# Patient Record
Sex: FEMALE | Race: WHITE | NOT HISPANIC OR LATINO
[De-identification: names, ages, dates, MRNs, and addresses within clinical notes are randomized per-mention and may not be internally consistent; named-entity substitution may affect disease eponyms.]

---

## 2020-01-01 ENCOUNTER — TRANSCRIPTION ENCOUNTER (OUTPATIENT)
Age: 70
End: 2020-01-01

## 2020-01-01 ENCOUNTER — APPOINTMENT (OUTPATIENT)
Dept: HEMATOLOGY ONCOLOGY | Facility: CLINIC | Age: 70
End: 2020-01-01

## 2020-01-01 ENCOUNTER — INPATIENT (INPATIENT)
Facility: HOSPITAL | Age: 70
LOS: 10 days | Discharge: NURSING, REHAB OR LONG-TERM CARE | End: 2020-09-23
Attending: STUDENT IN AN ORGANIZED HEALTH CARE EDUCATION/TRAINING PROGRAM | Admitting: STUDENT IN AN ORGANIZED HEALTH CARE EDUCATION/TRAINING PROGRAM
Payer: MEDICARE

## 2020-01-01 ENCOUNTER — APPOINTMENT (OUTPATIENT)
Dept: NUCLEAR MEDICINE | Facility: IMAGING CENTER | Age: 70
End: 2020-01-01

## 2020-01-01 ENCOUNTER — APPOINTMENT (OUTPATIENT)
Dept: INFUSION THERAPY | Facility: HOSPITAL | Age: 70
End: 2020-01-01

## 2020-01-01 ENCOUNTER — RESULT REVIEW (OUTPATIENT)
Age: 70
End: 2020-01-01

## 2020-01-01 ENCOUNTER — APPOINTMENT (OUTPATIENT)
Dept: ORTHOPEDIC SURGERY | Facility: CLINIC | Age: 70
End: 2020-01-01
Payer: MEDICARE

## 2020-01-01 ENCOUNTER — INPATIENT (INPATIENT)
Facility: HOSPITAL | Age: 70
LOS: 12 days | Discharge: HOME HEALTH | End: 2020-09-04
Attending: STUDENT IN AN ORGANIZED HEALTH CARE EDUCATION/TRAINING PROGRAM | Admitting: STUDENT IN AN ORGANIZED HEALTH CARE EDUCATION/TRAINING PROGRAM
Payer: MEDICARE

## 2020-01-01 ENCOUNTER — INPATIENT (INPATIENT)
Facility: HOSPITAL | Age: 70
LOS: 12 days | End: 2020-11-28
Attending: STUDENT IN AN ORGANIZED HEALTH CARE EDUCATION/TRAINING PROGRAM | Admitting: STUDENT IN AN ORGANIZED HEALTH CARE EDUCATION/TRAINING PROGRAM
Payer: MEDICARE

## 2020-01-01 ENCOUNTER — EMERGENCY (EMERGENCY)
Facility: HOSPITAL | Age: 70
LOS: 1 days | Discharge: HOME | End: 2020-01-01
Attending: STUDENT IN AN ORGANIZED HEALTH CARE EDUCATION/TRAINING PROGRAM | Admitting: STUDENT IN AN ORGANIZED HEALTH CARE EDUCATION/TRAINING PROGRAM
Payer: MEDICARE

## 2020-01-01 ENCOUNTER — OUTPATIENT (OUTPATIENT)
Dept: OUTPATIENT SERVICES | Facility: HOSPITAL | Age: 70
LOS: 1 days | Discharge: HOME | End: 2020-01-01

## 2020-01-01 ENCOUNTER — INPATIENT (INPATIENT)
Facility: HOSPITAL | Age: 70
LOS: 24 days | Discharge: NURSING, REHAB OR LONG-TERM CARE | End: 2020-11-04
Attending: HOSPITALIST | Admitting: HOSPITALIST
Payer: MEDICARE

## 2020-01-01 ENCOUNTER — OUTPATIENT (OUTPATIENT)
Dept: OUTPATIENT SERVICES | Facility: HOSPITAL | Age: 70
LOS: 1 days | End: 2020-01-01
Payer: COMMERCIAL

## 2020-01-01 ENCOUNTER — APPOINTMENT (OUTPATIENT)
Dept: HEMATOLOGY ONCOLOGY | Facility: CLINIC | Age: 70
End: 2020-01-01
Payer: MEDICARE

## 2020-01-01 VITALS
SYSTOLIC BLOOD PRESSURE: 159 MMHG | DIASTOLIC BLOOD PRESSURE: 85 MMHG | RESPIRATION RATE: 18 BRPM | TEMPERATURE: 98 F | HEART RATE: 82 BPM | OXYGEN SATURATION: 100 %

## 2020-01-01 VITALS
RESPIRATION RATE: 14 BRPM | BODY MASS INDEX: 20.77 KG/M2 | TEMPERATURE: 99.9 F | SYSTOLIC BLOOD PRESSURE: 107 MMHG | HEART RATE: 94 BPM | DIASTOLIC BLOOD PRESSURE: 70 MMHG | WEIGHT: 110 LBS | OXYGEN SATURATION: 95 % | HEIGHT: 61 IN

## 2020-01-01 VITALS
DIASTOLIC BLOOD PRESSURE: 66 MMHG | RESPIRATION RATE: 16 BRPM | HEART RATE: 102 BPM | TEMPERATURE: 98 F | OXYGEN SATURATION: 95 % | SYSTOLIC BLOOD PRESSURE: 105 MMHG

## 2020-01-01 VITALS
RESPIRATION RATE: 21 BRPM | SYSTOLIC BLOOD PRESSURE: 79 MMHG | DIASTOLIC BLOOD PRESSURE: 52 MMHG | HEART RATE: 111 BPM | TEMPERATURE: 98 F | OXYGEN SATURATION: 100 %

## 2020-01-01 VITALS
OXYGEN SATURATION: 100 % | TEMPERATURE: 98 F | SYSTOLIC BLOOD PRESSURE: 74 MMHG | HEART RATE: 93 BPM | RESPIRATION RATE: 18 BRPM | DIASTOLIC BLOOD PRESSURE: 44 MMHG | HEIGHT: 61 IN

## 2020-01-01 VITALS
BODY MASS INDEX: 20.42 KG/M2 | WEIGHT: 104 LBS | HEIGHT: 60 IN | HEART RATE: 85 BPM | TEMPERATURE: 97.2 F | SYSTOLIC BLOOD PRESSURE: 96 MMHG | DIASTOLIC BLOOD PRESSURE: 64 MMHG

## 2020-01-01 VITALS
OXYGEN SATURATION: 100 % | DIASTOLIC BLOOD PRESSURE: 68 MMHG | SYSTOLIC BLOOD PRESSURE: 125 MMHG | RESPIRATION RATE: 16 BRPM | TEMPERATURE: 99 F | HEART RATE: 72 BPM

## 2020-01-01 VITALS
SYSTOLIC BLOOD PRESSURE: 107 MMHG | TEMPERATURE: 98 F | RESPIRATION RATE: 18 BRPM | OXYGEN SATURATION: 96 % | DIASTOLIC BLOOD PRESSURE: 68 MMHG | HEART RATE: 75 BPM

## 2020-01-01 VITALS
DIASTOLIC BLOOD PRESSURE: 57 MMHG | HEART RATE: 82 BPM | TEMPERATURE: 99 F | SYSTOLIC BLOOD PRESSURE: 101 MMHG | RESPIRATION RATE: 18 BRPM | OXYGEN SATURATION: 100 %

## 2020-01-01 VITALS
WEIGHT: 108.03 LBS | HEART RATE: 74 BPM | SYSTOLIC BLOOD PRESSURE: 100 MMHG | RESPIRATION RATE: 16 BRPM | HEIGHT: 60 IN | TEMPERATURE: 98 F | DIASTOLIC BLOOD PRESSURE: 60 MMHG | OXYGEN SATURATION: 100 %

## 2020-01-01 VITALS
RESPIRATION RATE: 18 BRPM | OXYGEN SATURATION: 100 % | HEART RATE: 96 BPM | SYSTOLIC BLOOD PRESSURE: 85 MMHG | DIASTOLIC BLOOD PRESSURE: 56 MMHG

## 2020-01-01 DIAGNOSIS — Z29.9 ENCOUNTER FOR PROPHYLACTIC MEASURES, UNSPECIFIED: ICD-10-CM

## 2020-01-01 DIAGNOSIS — I48.91 UNSPECIFIED ATRIAL FIBRILLATION: ICD-10-CM

## 2020-01-01 DIAGNOSIS — Z51.5 ENCOUNTER FOR PALLIATIVE CARE: ICD-10-CM

## 2020-01-01 DIAGNOSIS — C34.90 MALIGNANT NEOPLASM OF UNSPECIFIED PART OF UNSPECIFIED BRONCHUS OR LUNG: ICD-10-CM

## 2020-01-01 DIAGNOSIS — R33.9 RETENTION OF URINE, UNSPECIFIED: ICD-10-CM

## 2020-01-01 DIAGNOSIS — M89.8X5 OTHER SPECIFIED DISORDERS OF BONE, THIGH: ICD-10-CM

## 2020-01-01 DIAGNOSIS — J18.9 PNEUMONIA, UNSPECIFIED ORGANISM: ICD-10-CM

## 2020-01-01 DIAGNOSIS — C79.9 SECONDARY MALIGNANT NEOPLASM OF UNSPECIFIED SITE: ICD-10-CM

## 2020-01-01 DIAGNOSIS — D64.9 ANEMIA, UNSPECIFIED: ICD-10-CM

## 2020-01-01 DIAGNOSIS — Z02.9 ENCOUNTER FOR ADMINISTRATIVE EXAMINATIONS, UNSPECIFIED: ICD-10-CM

## 2020-01-01 DIAGNOSIS — Z78.9 OTHER SPECIFIED HEALTH STATUS: ICD-10-CM

## 2020-01-01 DIAGNOSIS — C34.11 MALIGNANT NEOPLASM OF UPPER LOBE, RIGHT BRONCHUS OR LUNG: ICD-10-CM

## 2020-01-01 DIAGNOSIS — Z01.818 ENCOUNTER FOR OTHER PREPROCEDURAL EXAMINATION: ICD-10-CM

## 2020-01-01 DIAGNOSIS — E44.0 MODERATE PROTEIN-CALORIE MALNUTRITION: ICD-10-CM

## 2020-01-01 DIAGNOSIS — S72.001A FRACTURE OF UNSPECIFIED PART OF NECK OF RIGHT FEMUR, INITIAL ENCOUNTER FOR CLOSED FRACTURE: ICD-10-CM

## 2020-01-01 DIAGNOSIS — R53.81 OTHER MALAISE: ICD-10-CM

## 2020-01-01 DIAGNOSIS — A41.9 SEPSIS, UNSPECIFIED ORGANISM: ICD-10-CM

## 2020-01-01 DIAGNOSIS — C80.1 MALIGNANT (PRIMARY) NEOPLASM, UNSPECIFIED: ICD-10-CM

## 2020-01-01 DIAGNOSIS — G89.3 NEOPLASM RELATED PAIN (ACUTE) (CHRONIC): ICD-10-CM

## 2020-01-01 DIAGNOSIS — M54.5 LOW BACK PAIN: ICD-10-CM

## 2020-01-01 DIAGNOSIS — Z71.89 OTHER SPECIFIED COUNSELING: ICD-10-CM

## 2020-01-01 DIAGNOSIS — Z80.0 FAMILY HISTORY OF MALIGNANT NEOPLASM OF DIGESTIVE ORGANS: ICD-10-CM

## 2020-01-01 DIAGNOSIS — R91.8 OTHER NONSPECIFIC ABNORMAL FINDING OF LUNG FIELD: ICD-10-CM

## 2020-01-01 DIAGNOSIS — N32.1 VESICOINTESTINAL FISTULA: ICD-10-CM

## 2020-01-01 DIAGNOSIS — C79.51 SECONDARY MALIGNANT NEOPLASM OF BONE: ICD-10-CM

## 2020-01-01 DIAGNOSIS — K52.9 NONINFECTIVE GASTROENTERITIS AND COLITIS, UNSPECIFIED: ICD-10-CM

## 2020-01-01 DIAGNOSIS — J15.9 UNSPECIFIED BACTERIAL PNEUMONIA: ICD-10-CM

## 2020-01-01 DIAGNOSIS — T66.XXXA RADIATION SICKNESS, UNSPECIFIED, INITIAL ENCOUNTER: ICD-10-CM

## 2020-01-01 DIAGNOSIS — I31.4 CARDIAC TAMPONADE: ICD-10-CM

## 2020-01-01 DIAGNOSIS — R52 PAIN, UNSPECIFIED: ICD-10-CM

## 2020-01-01 DIAGNOSIS — A04.72 ENTEROCOLITIS DUE TO CLOSTRIDIUM DIFFICILE, NOT SPECIFIED AS RECURRENT: ICD-10-CM

## 2020-01-01 DIAGNOSIS — R11.0 NAUSEA: ICD-10-CM

## 2020-01-01 DIAGNOSIS — R06.02 SHORTNESS OF BREATH: ICD-10-CM

## 2020-01-01 DIAGNOSIS — K59.03 DRUG INDUCED CONSTIPATION: ICD-10-CM

## 2020-01-01 DIAGNOSIS — Z80.49 FAMILY HISTORY OF MALIGNANT NEOPLASM OF OTHER GENITAL ORGANS: ICD-10-CM

## 2020-01-01 DIAGNOSIS — I82.409 ACUTE EMBOLISM AND THROMBOSIS OF UNSPECIFIED DEEP VEINS OF UNSPECIFIED LOWER EXTREMITY: ICD-10-CM

## 2020-01-01 DIAGNOSIS — K59.01 SLOW TRANSIT CONSTIPATION: ICD-10-CM

## 2020-01-01 DIAGNOSIS — F17.200 NICOTINE DEPENDENCE, UNSPECIFIED, UNCOMPLICATED: ICD-10-CM

## 2020-01-01 DIAGNOSIS — E87.1 HYPO-OSMOLALITY AND HYPONATREMIA: ICD-10-CM

## 2020-01-01 LAB
-  AMPICILLIN: SIGNIFICANT CHANGE UP
-  CIPROFLOXACIN: SIGNIFICANT CHANGE UP
-  DAPTOMYCIN: SIGNIFICANT CHANGE UP
-  LEVOFLOXACIN: SIGNIFICANT CHANGE UP
-  LINEZOLID: SIGNIFICANT CHANGE UP
-  NITROFURANTOIN: SIGNIFICANT CHANGE UP
-  TETRACYCLINE: SIGNIFICANT CHANGE UP
-  VANCOMYCIN: SIGNIFICANT CHANGE UP
24R-OH-CALCIDIOL SERPL-MCNC: 43.5 PG/ML
25(OH)D3 SERPL-MCNC: 39.4 NG/ML
ALBUMIN SERPL ELPH-MCNC: 1.5 G/DL — LOW (ref 3.3–5)
ALBUMIN SERPL ELPH-MCNC: 1.7 G/DL — LOW (ref 3.3–5)
ALBUMIN SERPL ELPH-MCNC: 1.8 G/DL — LOW (ref 3.3–5)
ALBUMIN SERPL ELPH-MCNC: 1.9 G/DL — LOW (ref 3.3–5)
ALBUMIN SERPL ELPH-MCNC: 2 G/DL — LOW (ref 3.3–5)
ALBUMIN SERPL ELPH-MCNC: 2.1 G/DL — LOW (ref 3.3–5)
ALBUMIN SERPL ELPH-MCNC: 2.3 G/DL — LOW (ref 3.3–5)
ALBUMIN SERPL ELPH-MCNC: 2.3 G/DL — LOW (ref 3.3–5)
ALBUMIN SERPL ELPH-MCNC: 2.5 G/DL — LOW (ref 3.3–5)
ALBUMIN SERPL ELPH-MCNC: 2.5 G/DL — LOW (ref 3.3–5)
ALBUMIN SERPL ELPH-MCNC: 2.6 G/DL — LOW (ref 3.3–5)
ALBUMIN SERPL ELPH-MCNC: 2.7 G/DL — LOW (ref 3.3–5)
ALBUMIN SERPL ELPH-MCNC: 2.9 G/DL — LOW (ref 3.3–5)
ALBUMIN SERPL ELPH-MCNC: 3.1 G/DL — LOW (ref 3.3–5)
ALBUMIN SERPL ELPH-MCNC: 3.5 G/DL — SIGNIFICANT CHANGE UP (ref 3.3–5)
ALBUMIN SERPL ELPH-MCNC: 3.8 G/DL
ALBUMIN SERPL ELPH-MCNC: 3.9 G/DL — SIGNIFICANT CHANGE UP (ref 3.3–5)
ALBUMIN SERPL ELPH-MCNC: 4.2 G/DL — SIGNIFICANT CHANGE UP (ref 3.3–5)
ALP BLD-CCNC: 88 U/L
ALP SERPL-CCNC: 105 U/L — SIGNIFICANT CHANGE UP (ref 40–120)
ALP SERPL-CCNC: 110 U/L — SIGNIFICANT CHANGE UP (ref 40–120)
ALP SERPL-CCNC: 123 U/L — HIGH (ref 40–120)
ALP SERPL-CCNC: 124 U/L — HIGH (ref 40–120)
ALP SERPL-CCNC: 132 U/L — HIGH (ref 40–120)
ALP SERPL-CCNC: 137 U/L — HIGH (ref 40–120)
ALP SERPL-CCNC: 143 U/L — HIGH (ref 40–120)
ALP SERPL-CCNC: 154 U/L — HIGH (ref 40–120)
ALP SERPL-CCNC: 156 U/L — HIGH (ref 40–120)
ALP SERPL-CCNC: 167 U/L — HIGH (ref 40–120)
ALP SERPL-CCNC: 170 U/L — HIGH (ref 40–120)
ALP SERPL-CCNC: 183 U/L — HIGH (ref 40–120)
ALP SERPL-CCNC: 186 U/L — HIGH (ref 40–120)
ALP SERPL-CCNC: 189 U/L — HIGH (ref 40–120)
ALP SERPL-CCNC: 192 U/L — HIGH (ref 40–120)
ALP SERPL-CCNC: 193 U/L — HIGH (ref 40–120)
ALP SERPL-CCNC: 194 U/L — HIGH (ref 40–120)
ALP SERPL-CCNC: 195 U/L — HIGH (ref 40–120)
ALP SERPL-CCNC: 195 U/L — HIGH (ref 40–120)
ALP SERPL-CCNC: 196 U/L — HIGH (ref 40–120)
ALP SERPL-CCNC: 197 U/L — HIGH (ref 40–120)
ALP SERPL-CCNC: 203 U/L — HIGH (ref 40–120)
ALP SERPL-CCNC: 203 U/L — HIGH (ref 40–120)
ALP SERPL-CCNC: 220 U/L — HIGH (ref 40–120)
ALP SERPL-CCNC: 44 U/L — SIGNIFICANT CHANGE UP (ref 40–120)
ALP SERPL-CCNC: 73 U/L — SIGNIFICANT CHANGE UP (ref 40–120)
ALP SERPL-CCNC: 77 U/L — SIGNIFICANT CHANGE UP (ref 40–120)
ALP SERPL-CCNC: 87 U/L — SIGNIFICANT CHANGE UP (ref 40–120)
ALP SERPL-CCNC: 88 U/L — SIGNIFICANT CHANGE UP (ref 40–120)
ALP SERPL-CCNC: 95 U/L — SIGNIFICANT CHANGE UP (ref 40–120)
ALP SERPL-CCNC: 95 U/L — SIGNIFICANT CHANGE UP (ref 40–120)
ALP SERPL-CCNC: 97 U/L — SIGNIFICANT CHANGE UP (ref 40–120)
ALT FLD-CCNC: 13 U/L — SIGNIFICANT CHANGE UP (ref 4–33)
ALT FLD-CCNC: 14 U/L — SIGNIFICANT CHANGE UP (ref 4–33)
ALT FLD-CCNC: 15 U/L — SIGNIFICANT CHANGE UP (ref 4–33)
ALT FLD-CCNC: 15 U/L — SIGNIFICANT CHANGE UP (ref 4–33)
ALT FLD-CCNC: 16 U/L — SIGNIFICANT CHANGE UP (ref 4–33)
ALT FLD-CCNC: 17 U/L — SIGNIFICANT CHANGE UP (ref 4–33)
ALT FLD-CCNC: 18 U/L — SIGNIFICANT CHANGE UP (ref 4–33)
ALT FLD-CCNC: 19 U/L — SIGNIFICANT CHANGE UP (ref 4–33)
ALT FLD-CCNC: 20 U/L — SIGNIFICANT CHANGE UP (ref 4–33)
ALT FLD-CCNC: 23 U/L — SIGNIFICANT CHANGE UP (ref 4–33)
ALT FLD-CCNC: 26 U/L — SIGNIFICANT CHANGE UP (ref 4–33)
ALT FLD-CCNC: 26 U/L — SIGNIFICANT CHANGE UP (ref 4–33)
ALT FLD-CCNC: 27 U/L — SIGNIFICANT CHANGE UP (ref 4–33)
ALT FLD-CCNC: 27 U/L — SIGNIFICANT CHANGE UP (ref 4–33)
ALT FLD-CCNC: 29 U/L — SIGNIFICANT CHANGE UP (ref 4–33)
ALT FLD-CCNC: 30 U/L — SIGNIFICANT CHANGE UP (ref 4–33)
ALT FLD-CCNC: 31 U/L — SIGNIFICANT CHANGE UP (ref 4–33)
ALT FLD-CCNC: 32 U/L — SIGNIFICANT CHANGE UP (ref 4–33)
ALT FLD-CCNC: 35 U/L — HIGH (ref 4–33)
ALT FLD-CCNC: 36 U/L — HIGH (ref 4–33)
ALT FLD-CCNC: 38 U/L — HIGH (ref 4–33)
ALT FLD-CCNC: 9 U/L — SIGNIFICANT CHANGE UP (ref 4–33)
ALT FLD-CCNC: 9 U/L — SIGNIFICANT CHANGE UP (ref 4–33)
ALT SERPL-CCNC: 19 U/L
ANION GAP SERPL CALC-SCNC: 10 MMO/L — SIGNIFICANT CHANGE UP (ref 7–14)
ANION GAP SERPL CALC-SCNC: 11 MMO/L — SIGNIFICANT CHANGE UP (ref 7–14)
ANION GAP SERPL CALC-SCNC: 12 MMO/L — SIGNIFICANT CHANGE UP (ref 7–14)
ANION GAP SERPL CALC-SCNC: 13 MMO/L — SIGNIFICANT CHANGE UP (ref 7–14)
ANION GAP SERPL CALC-SCNC: 14 MMO/L — SIGNIFICANT CHANGE UP (ref 7–14)
ANION GAP SERPL CALC-SCNC: 15 MMO/L — HIGH (ref 7–14)
ANION GAP SERPL CALC-SCNC: 16 MMO/L — HIGH (ref 7–14)
ANION GAP SERPL CALC-SCNC: 16 MMO/L — HIGH (ref 7–14)
ANION GAP SERPL CALC-SCNC: 16 MMOL/L
ANION GAP SERPL CALC-SCNC: 17 MMO/L — HIGH (ref 7–14)
ANION GAP SERPL CALC-SCNC: 19 MMO/L — HIGH (ref 7–14)
ANION GAP SERPL CALC-SCNC: 23 MMO/L — HIGH (ref 7–14)
ANION GAP SERPL CALC-SCNC: 7 MMO/L — SIGNIFICANT CHANGE UP (ref 7–14)
ANION GAP SERPL CALC-SCNC: 8 MMO/L — SIGNIFICANT CHANGE UP (ref 7–14)
ANION GAP SERPL CALC-SCNC: 9 MMO/L — SIGNIFICANT CHANGE UP (ref 7–14)
ANISOCYTOSIS BLD QL: SIGNIFICANT CHANGE UP
ANISOCYTOSIS BLD QL: SIGNIFICANT CHANGE UP
ANISOCYTOSIS BLD QL: SLIGHT — SIGNIFICANT CHANGE UP
APPEARANCE UR: CLEAR — SIGNIFICANT CHANGE UP
APPEARANCE UR: SIGNIFICANT CHANGE UP
APPEARANCE UR: SIGNIFICANT CHANGE UP
APTT BLD: 104.4 SEC — HIGH (ref 27–36.3)
APTT BLD: 127.6 SEC — CRITICAL HIGH (ref 27–36.3)
APTT BLD: 26.1 SEC — LOW (ref 27–36.3)
APTT BLD: 27 SEC — SIGNIFICANT CHANGE UP (ref 27–36.3)
APTT BLD: 28.1 SEC — SIGNIFICANT CHANGE UP (ref 27–36.3)
APTT BLD: 28.3 SEC — SIGNIFICANT CHANGE UP (ref 27–36.3)
APTT BLD: 28.5 SEC — SIGNIFICANT CHANGE UP (ref 27–36.3)
APTT BLD: 29.8 SEC — SIGNIFICANT CHANGE UP (ref 27–36.3)
APTT BLD: 30.3 SEC — SIGNIFICANT CHANGE UP (ref 27–36.3)
APTT BLD: 30.4 SEC — SIGNIFICANT CHANGE UP (ref 27–36.3)
APTT BLD: 31.6 SEC — SIGNIFICANT CHANGE UP (ref 27–36.3)
APTT BLD: 32 SEC — SIGNIFICANT CHANGE UP (ref 27–36.3)
APTT BLD: 33.1 SEC — SIGNIFICANT CHANGE UP (ref 27–36.3)
APTT BLD: 34.7 SEC — SIGNIFICANT CHANGE UP (ref 27–36.3)
APTT BLD: 36.1 SEC — SIGNIFICANT CHANGE UP (ref 27–36.3)
APTT BLD: 36.4 SEC — HIGH (ref 27–36.3)
APTT BLD: 38.6 SEC — HIGH (ref 27–36.3)
APTT BLD: 41 SEC — HIGH (ref 27–36.3)
APTT BLD: 41.9 SEC — HIGH (ref 27–36.3)
APTT BLD: 43.4 SEC — HIGH (ref 27–36.3)
APTT BLD: 69.6 SEC — HIGH (ref 27–36.3)
APTT BLD: 74 SEC — HIGH (ref 27–36.3)
APTT BLD: 84.7 SEC — HIGH (ref 27–36.3)
APTT BLD: 87.4 SEC — HIGH (ref 27–36.3)
AST SERPL-CCNC: 16 U/L — SIGNIFICANT CHANGE UP (ref 4–32)
AST SERPL-CCNC: 17 U/L — SIGNIFICANT CHANGE UP (ref 4–32)
AST SERPL-CCNC: 18 U/L — SIGNIFICANT CHANGE UP (ref 4–32)
AST SERPL-CCNC: 19 U/L — SIGNIFICANT CHANGE UP (ref 4–32)
AST SERPL-CCNC: 20 U/L — SIGNIFICANT CHANGE UP (ref 4–32)
AST SERPL-CCNC: 20 U/L — SIGNIFICANT CHANGE UP (ref 4–32)
AST SERPL-CCNC: 21 U/L — SIGNIFICANT CHANGE UP (ref 4–32)
AST SERPL-CCNC: 21 U/L — SIGNIFICANT CHANGE UP (ref 4–32)
AST SERPL-CCNC: 22 U/L — SIGNIFICANT CHANGE UP (ref 4–32)
AST SERPL-CCNC: 23 U/L — SIGNIFICANT CHANGE UP (ref 4–32)
AST SERPL-CCNC: 24 U/L
AST SERPL-CCNC: 24 U/L — SIGNIFICANT CHANGE UP (ref 4–32)
AST SERPL-CCNC: 25 U/L — SIGNIFICANT CHANGE UP (ref 4–32)
AST SERPL-CCNC: 26 U/L — SIGNIFICANT CHANGE UP (ref 4–32)
AST SERPL-CCNC: 26 U/L — SIGNIFICANT CHANGE UP (ref 4–32)
AST SERPL-CCNC: 30 U/L — SIGNIFICANT CHANGE UP (ref 4–32)
AST SERPL-CCNC: 30 U/L — SIGNIFICANT CHANGE UP (ref 4–32)
AST SERPL-CCNC: 31 U/L — SIGNIFICANT CHANGE UP (ref 4–32)
AST SERPL-CCNC: 32 U/L — SIGNIFICANT CHANGE UP (ref 4–32)
AST SERPL-CCNC: 32 U/L — SIGNIFICANT CHANGE UP (ref 4–32)
AST SERPL-CCNC: 33 U/L — HIGH (ref 4–32)
AST SERPL-CCNC: 33 U/L — HIGH (ref 4–32)
AST SERPL-CCNC: 38 U/L — HIGH (ref 4–32)
AST SERPL-CCNC: 38 U/L — HIGH (ref 4–32)
AST SERPL-CCNC: 45 U/L — HIGH (ref 4–32)
AST SERPL-CCNC: 47 U/L — HIGH (ref 4–32)
AST SERPL-CCNC: 50 U/L — HIGH (ref 4–32)
AST SERPL-CCNC: 53 U/L — HIGH (ref 4–32)
B PERT DNA SPEC QL NAA+PROBE: SIGNIFICANT CHANGE UP
BACTERIA # UR AUTO: HIGH
BACTERIA # UR AUTO: SIGNIFICANT CHANGE UP
BASE EXCESS BLDA CALC-SCNC: -1.4 MMOL/L — SIGNIFICANT CHANGE UP
BASE EXCESS BLDA CALC-SCNC: -1.6 MMOL/L — SIGNIFICANT CHANGE UP
BASE EXCESS BLDA CALC-SCNC: -1.8 MMOL/L — SIGNIFICANT CHANGE UP
BASE EXCESS BLDA CALC-SCNC: -2.6 MMOL/L — SIGNIFICANT CHANGE UP
BASE EXCESS BLDA CALC-SCNC: -5.3 MMOL/L — SIGNIFICANT CHANGE UP
BASE EXCESS BLDV CALC-SCNC: -0.4 MMOL/L — SIGNIFICANT CHANGE UP
BASE EXCESS BLDV CALC-SCNC: -1.4 MMOL/L — SIGNIFICANT CHANGE UP
BASE EXCESS BLDV CALC-SCNC: -4 MMOL/L — SIGNIFICANT CHANGE UP
BASE EXCESS BLDV CALC-SCNC: 1.3 MMOL/L — SIGNIFICANT CHANGE UP
BASOPHILS # BLD AUTO: 0.01 K/UL — SIGNIFICANT CHANGE UP (ref 0–0.2)
BASOPHILS # BLD AUTO: 0.01 K/UL — SIGNIFICANT CHANGE UP (ref 0–0.2)
BASOPHILS # BLD AUTO: 0.02 K/UL — SIGNIFICANT CHANGE UP (ref 0–0.2)
BASOPHILS # BLD AUTO: 0.03 K/UL — SIGNIFICANT CHANGE UP (ref 0–0.2)
BASOPHILS # BLD AUTO: 0.04 K/UL — SIGNIFICANT CHANGE UP (ref 0–0.2)
BASOPHILS # BLD AUTO: 0.05 K/UL — SIGNIFICANT CHANGE UP (ref 0–0.2)
BASOPHILS # BLD AUTO: 0.06 K/UL — SIGNIFICANT CHANGE UP (ref 0–0.2)
BASOPHILS # BLD AUTO: 0.06 K/UL — SIGNIFICANT CHANGE UP (ref 0–0.2)
BASOPHILS # BLD AUTO: 0.07 K/UL — SIGNIFICANT CHANGE UP (ref 0–0.2)
BASOPHILS # BLD AUTO: 0.08 K/UL — SIGNIFICANT CHANGE UP (ref 0–0.2)
BASOPHILS # BLD AUTO: 0.14 K/UL — SIGNIFICANT CHANGE UP (ref 0–0.2)
BASOPHILS # BLD AUTO: 0.14 K/UL — SIGNIFICANT CHANGE UP (ref 0–0.2)
BASOPHILS NFR BLD AUTO: 0 % — SIGNIFICANT CHANGE UP (ref 0–2)
BASOPHILS NFR BLD AUTO: 0.1 % — SIGNIFICANT CHANGE UP (ref 0–2)
BASOPHILS NFR BLD AUTO: 0.2 % — SIGNIFICANT CHANGE UP (ref 0–2)
BASOPHILS NFR BLD AUTO: 0.3 % — SIGNIFICANT CHANGE UP (ref 0–2)
BASOPHILS NFR BLD AUTO: 0.4 % — SIGNIFICANT CHANGE UP (ref 0–2)
BASOPHILS NFR BLD AUTO: 0.5 % — SIGNIFICANT CHANGE UP (ref 0–2)
BASOPHILS NFR BLD AUTO: 0.5 % — SIGNIFICANT CHANGE UP (ref 0–2)
BASOPHILS NFR BLD AUTO: 0.6 % — SIGNIFICANT CHANGE UP (ref 0–2)
BASOPHILS NFR BLD AUTO: 0.8 % — SIGNIFICANT CHANGE UP (ref 0–2)
BASOPHILS NFR SPEC: 0 % — SIGNIFICANT CHANGE UP (ref 0–2)
BILIRUB SERPL-MCNC: 0.2 MG/DL — SIGNIFICANT CHANGE UP (ref 0.2–1.2)
BILIRUB SERPL-MCNC: 0.3 MG/DL — SIGNIFICANT CHANGE UP (ref 0.2–1.2)
BILIRUB SERPL-MCNC: 0.4 MG/DL — SIGNIFICANT CHANGE UP (ref 0.2–1.2)
BILIRUB SERPL-MCNC: 0.4 MG/DL — SIGNIFICANT CHANGE UP (ref 0.2–1.2)
BILIRUB SERPL-MCNC: 0.5 MG/DL — SIGNIFICANT CHANGE UP (ref 0.2–1.2)
BILIRUB SERPL-MCNC: 0.6 MG/DL — SIGNIFICANT CHANGE UP (ref 0.2–1.2)
BILIRUB SERPL-MCNC: 0.7 MG/DL — SIGNIFICANT CHANGE UP (ref 0.2–1.2)
BILIRUB SERPL-MCNC: 0.8 MG/DL — SIGNIFICANT CHANGE UP (ref 0.2–1.2)
BILIRUB SERPL-MCNC: 0.9 MG/DL — SIGNIFICANT CHANGE UP (ref 0.2–1.2)
BILIRUB SERPL-MCNC: < 0.2 MG/DL — LOW (ref 0.2–1.2)
BILIRUB SERPL-MCNC: <0.2 MG/DL
BILIRUB UR-MCNC: NEGATIVE — SIGNIFICANT CHANGE UP
BLASTS # FLD: 0 % — SIGNIFICANT CHANGE UP (ref 0–0)
BLD GP AB SCN SERPL QL: NEGATIVE — SIGNIFICANT CHANGE UP
BLOOD GAS ARTERIAL - FIO2: 100 — SIGNIFICANT CHANGE UP
BLOOD GAS VENOUS - CREATININE: 0.37 MG/DL — LOW (ref 0.5–1.3)
BLOOD GAS VENOUS - CREATININE: 0.46 MG/DL — LOW (ref 0.5–1.3)
BLOOD GAS VENOUS - CREATININE: 0.51 MG/DL — SIGNIFICANT CHANGE UP (ref 0.5–1.3)
BLOOD GAS VENOUS - CREATININE: 0.59 MG/DL — SIGNIFICANT CHANGE UP (ref 0.5–1.3)
BLOOD UR QL VISUAL: SIGNIFICANT CHANGE UP
BUN SERPL-MCNC: 10 MG/DL — SIGNIFICANT CHANGE UP (ref 7–23)
BUN SERPL-MCNC: 10 MG/DL — SIGNIFICANT CHANGE UP (ref 7–23)
BUN SERPL-MCNC: 11 MG/DL — SIGNIFICANT CHANGE UP (ref 7–23)
BUN SERPL-MCNC: 12 MG/DL — SIGNIFICANT CHANGE UP (ref 7–23)
BUN SERPL-MCNC: 13 MG/DL — SIGNIFICANT CHANGE UP (ref 7–23)
BUN SERPL-MCNC: 13 MG/DL — SIGNIFICANT CHANGE UP (ref 7–23)
BUN SERPL-MCNC: 14 MG/DL — SIGNIFICANT CHANGE UP (ref 7–23)
BUN SERPL-MCNC: 15 MG/DL — SIGNIFICANT CHANGE UP (ref 7–23)
BUN SERPL-MCNC: 15 MG/DL — SIGNIFICANT CHANGE UP (ref 7–23)
BUN SERPL-MCNC: 16 MG/DL — SIGNIFICANT CHANGE UP (ref 7–23)
BUN SERPL-MCNC: 17 MG/DL — SIGNIFICANT CHANGE UP (ref 7–23)
BUN SERPL-MCNC: 19 MG/DL — SIGNIFICANT CHANGE UP (ref 7–23)
BUN SERPL-MCNC: 19 MG/DL — SIGNIFICANT CHANGE UP (ref 7–23)
BUN SERPL-MCNC: 20 MG/DL — SIGNIFICANT CHANGE UP (ref 7–23)
BUN SERPL-MCNC: 22 MG/DL — SIGNIFICANT CHANGE UP (ref 7–23)
BUN SERPL-MCNC: 23 MG/DL — SIGNIFICANT CHANGE UP (ref 7–23)
BUN SERPL-MCNC: 24 MG/DL — HIGH (ref 7–23)
BUN SERPL-MCNC: 26 MG/DL — HIGH (ref 7–23)
BUN SERPL-MCNC: 27 MG/DL — HIGH (ref 7–23)
BUN SERPL-MCNC: 30 MG/DL — HIGH (ref 7–23)
BUN SERPL-MCNC: 32 MG/DL — HIGH (ref 7–23)
BUN SERPL-MCNC: 33 MG/DL — HIGH (ref 7–23)
BUN SERPL-MCNC: 5 MG/DL — LOW (ref 7–23)
BUN SERPL-MCNC: 6 MG/DL — LOW (ref 7–23)
BUN SERPL-MCNC: 6 MG/DL — LOW (ref 7–23)
BUN SERPL-MCNC: 7 MG/DL — SIGNIFICANT CHANGE UP (ref 7–23)
BUN SERPL-MCNC: 8 MG/DL
BUN SERPL-MCNC: 8 MG/DL — SIGNIFICANT CHANGE UP (ref 7–23)
BUN SERPL-MCNC: 9 MG/DL — SIGNIFICANT CHANGE UP (ref 7–23)
BURR CELLS BLD QL SMEAR: PRESENT — SIGNIFICANT CHANGE UP
C DIFF TOX GENS STL QL NAA+PROBE: DETECTED — SIGNIFICANT CHANGE UP
C DIFF TOX GENS STL QL NAA+PROBE: DETECTED — SIGNIFICANT CHANGE UP
C PNEUM DNA SPEC QL NAA+PROBE: SIGNIFICANT CHANGE UP
CA-I BLD-SCNC: 1.13 MMOL/L — SIGNIFICANT CHANGE UP (ref 1.03–1.23)
CA-I BLD-SCNC: 1.18 MMOL/L — SIGNIFICANT CHANGE UP (ref 1.03–1.23)
CA-I BLDA-SCNC: 1.25 MMOL/L — SIGNIFICANT CHANGE UP (ref 1.15–1.29)
CA-I BLDA-SCNC: 1.36 MMOL/L — HIGH (ref 1.15–1.29)
CA-I BLDA-SCNC: 1.41 MMOL/L — HIGH (ref 1.15–1.29)
CALCIUM SERPL-MCNC: 7 MG/DL — LOW (ref 8.4–10.5)
CALCIUM SERPL-MCNC: 7.2 MG/DL — LOW (ref 8.4–10.5)
CALCIUM SERPL-MCNC: 7.2 MG/DL — LOW (ref 8.4–10.5)
CALCIUM SERPL-MCNC: 7.5 MG/DL — LOW (ref 8.4–10.5)
CALCIUM SERPL-MCNC: 7.6 MG/DL — LOW (ref 8.4–10.5)
CALCIUM SERPL-MCNC: 7.7 MG/DL — LOW (ref 8.4–10.5)
CALCIUM SERPL-MCNC: 7.8 MG/DL — LOW (ref 8.4–10.5)
CALCIUM SERPL-MCNC: 7.9 MG/DL — LOW (ref 8.4–10.5)
CALCIUM SERPL-MCNC: 7.9 MG/DL — LOW (ref 8.4–10.5)
CALCIUM SERPL-MCNC: 8 MG/DL — LOW (ref 8.4–10.5)
CALCIUM SERPL-MCNC: 8.1 MG/DL — LOW (ref 8.4–10.5)
CALCIUM SERPL-MCNC: 8.1 MG/DL — LOW (ref 8.4–10.5)
CALCIUM SERPL-MCNC: 8.2 MG/DL — LOW (ref 8.4–10.5)
CALCIUM SERPL-MCNC: 8.3 MG/DL — LOW (ref 8.4–10.5)
CALCIUM SERPL-MCNC: 8.3 MG/DL — LOW (ref 8.4–10.5)
CALCIUM SERPL-MCNC: 8.4 MG/DL — SIGNIFICANT CHANGE UP (ref 8.4–10.5)
CALCIUM SERPL-MCNC: 8.4 MG/DL — SIGNIFICANT CHANGE UP (ref 8.4–10.5)
CALCIUM SERPL-MCNC: 8.5 MG/DL — SIGNIFICANT CHANGE UP (ref 8.4–10.5)
CALCIUM SERPL-MCNC: 8.6 MG/DL — SIGNIFICANT CHANGE UP (ref 8.4–10.5)
CALCIUM SERPL-MCNC: 8.6 MG/DL — SIGNIFICANT CHANGE UP (ref 8.4–10.5)
CALCIUM SERPL-MCNC: 8.7 MG/DL — SIGNIFICANT CHANGE UP (ref 8.4–10.5)
CALCIUM SERPL-MCNC: 8.7 MG/DL — SIGNIFICANT CHANGE UP (ref 8.4–10.5)
CALCIUM SERPL-MCNC: 8.9 MG/DL
CALCIUM SERPL-MCNC: 9 MG/DL — SIGNIFICANT CHANGE UP (ref 8.4–10.5)
CALCIUM SERPL-MCNC: 9 MG/DL — SIGNIFICANT CHANGE UP (ref 8.4–10.5)
CALCIUM SERPL-MCNC: 9.1 MG/DL — SIGNIFICANT CHANGE UP (ref 8.4–10.5)
CALCIUM SERPL-MCNC: 9.4 MG/DL — SIGNIFICANT CHANGE UP (ref 8.4–10.5)
CALCIUM SERPL-MCNC: 9.5 MG/DL — SIGNIFICANT CHANGE UP (ref 8.4–10.5)
CALCIUM SERPL-MCNC: 9.6 MG/DL — SIGNIFICANT CHANGE UP (ref 8.4–10.5)
CALCIUM SERPL-MCNC: 9.6 MG/DL — SIGNIFICANT CHANGE UP (ref 8.4–10.5)
CANCER AG19-9 SERPL-ACNC: HIGH U/ML
CEA SERPL-MCNC: 1500 NG/ML
CEA SERPL-MCNC: 950.8 NG/ML — HIGH (ref 1–3.8)
CHLORIDE BLDA-SCNC: 104 MMOL/L — SIGNIFICANT CHANGE UP (ref 96–108)
CHLORIDE BLDV-SCNC: 106 MMOL/L — SIGNIFICANT CHANGE UP (ref 96–108)
CHLORIDE BLDV-SCNC: 93 MMOL/L — LOW (ref 96–108)
CHLORIDE BLDV-SCNC: 95 MMOL/L — LOW (ref 96–108)
CHLORIDE BLDV-SCNC: 96 MMOL/L — SIGNIFICANT CHANGE UP (ref 96–108)
CHLORIDE SERPL-SCNC: 100 MMOL/L — SIGNIFICANT CHANGE UP (ref 98–107)
CHLORIDE SERPL-SCNC: 101 MMOL/L — SIGNIFICANT CHANGE UP (ref 98–107)
CHLORIDE SERPL-SCNC: 102 MMOL/L — SIGNIFICANT CHANGE UP (ref 98–107)
CHLORIDE SERPL-SCNC: 103 MMOL/L — SIGNIFICANT CHANGE UP (ref 98–107)
CHLORIDE SERPL-SCNC: 106 MMOL/L — SIGNIFICANT CHANGE UP (ref 98–107)
CHLORIDE SERPL-SCNC: 87 MMOL/L
CHLORIDE SERPL-SCNC: 89 MMOL/L — LOW (ref 98–107)
CHLORIDE SERPL-SCNC: 90 MMOL/L — LOW (ref 98–107)
CHLORIDE SERPL-SCNC: 91 MMOL/L — LOW (ref 98–107)
CHLORIDE SERPL-SCNC: 91 MMOL/L — LOW (ref 98–107)
CHLORIDE SERPL-SCNC: 92 MMOL/L — LOW (ref 98–107)
CHLORIDE SERPL-SCNC: 93 MMOL/L — LOW (ref 98–107)
CHLORIDE SERPL-SCNC: 94 MMOL/L — LOW (ref 98–107)
CHLORIDE SERPL-SCNC: 95 MMOL/L — LOW (ref 98–107)
CHLORIDE SERPL-SCNC: 95 MMOL/L — LOW (ref 98–107)
CHLORIDE SERPL-SCNC: 96 MMOL/L — LOW (ref 98–107)
CHLORIDE SERPL-SCNC: 97 MMOL/L — LOW (ref 98–107)
CHLORIDE SERPL-SCNC: 98 MMOL/L — SIGNIFICANT CHANGE UP (ref 98–107)
CHLORIDE SERPL-SCNC: 98 MMOL/L — SIGNIFICANT CHANGE UP (ref 98–107)
CHLORIDE SERPL-SCNC: 99 MMOL/L — SIGNIFICANT CHANGE UP (ref 98–107)
CHLORIDE UR-SCNC: < 20 MMOL/L — SIGNIFICANT CHANGE UP
CK SERPL-CCNC: 104 U/L — SIGNIFICANT CHANGE UP (ref 25–170)
CK SERPL-CCNC: 79 U/L — SIGNIFICANT CHANGE UP (ref 25–170)
CK SERPL-CCNC: 80 U/L — SIGNIFICANT CHANGE UP (ref 25–170)
CK SERPL-CCNC: 85 U/L — SIGNIFICANT CHANGE UP (ref 25–170)
CO2 SERPL-SCNC: 12 MMOL/L — LOW (ref 22–31)
CO2 SERPL-SCNC: 17 MMOL/L — LOW (ref 22–31)
CO2 SERPL-SCNC: 18 MMOL/L — LOW (ref 22–31)
CO2 SERPL-SCNC: 19 MMOL/L — LOW (ref 22–31)
CO2 SERPL-SCNC: 20 MMOL/L — LOW (ref 22–31)
CO2 SERPL-SCNC: 21 MMOL/L — LOW (ref 22–31)
CO2 SERPL-SCNC: 22 MMOL/L — SIGNIFICANT CHANGE UP (ref 22–31)
CO2 SERPL-SCNC: 22 MMOL/L — SIGNIFICANT CHANGE UP (ref 22–31)
CO2 SERPL-SCNC: 23 MMOL/L — SIGNIFICANT CHANGE UP (ref 22–31)
CO2 SERPL-SCNC: 23 MMOL/L — SIGNIFICANT CHANGE UP (ref 22–31)
CO2 SERPL-SCNC: 24 MMOL/L — SIGNIFICANT CHANGE UP (ref 22–31)
CO2 SERPL-SCNC: 25 MMOL/L — SIGNIFICANT CHANGE UP (ref 22–31)
CO2 SERPL-SCNC: 26 MMOL/L
CO2 SERPL-SCNC: 26 MMOL/L — SIGNIFICANT CHANGE UP (ref 22–31)
CO2 SERPL-SCNC: 27 MMOL/L — SIGNIFICANT CHANGE UP (ref 22–31)
CO2 SERPL-SCNC: 28 MMOL/L — SIGNIFICANT CHANGE UP (ref 22–31)
CO2 SERPL-SCNC: 29 MMOL/L — SIGNIFICANT CHANGE UP (ref 22–31)
CO2 SERPL-SCNC: 29 MMOL/L — SIGNIFICANT CHANGE UP (ref 22–31)
CO2 SERPL-SCNC: 30 MMOL/L — SIGNIFICANT CHANGE UP (ref 22–31)
CO2 SERPL-SCNC: 31 MMOL/L — SIGNIFICANT CHANGE UP (ref 22–31)
COLOR SPEC: YELLOW — SIGNIFICANT CHANGE UP
CORTIS SERPL-MCNC: 25.4 UG/DL — HIGH (ref 2.7–18.4)
CORTIS SERPL-MCNC: 37.3 UG/DL — HIGH (ref 2.7–18.4)
CREAT ?TM UR-MCNC: 49.1 MG/DL — SIGNIFICANT CHANGE UP
CREAT SERPL-MCNC: 0.26 MG/DL — LOW (ref 0.5–1.3)
CREAT SERPL-MCNC: 0.27 MG/DL — LOW (ref 0.5–1.3)
CREAT SERPL-MCNC: 0.27 MG/DL — LOW (ref 0.5–1.3)
CREAT SERPL-MCNC: 0.29 MG/DL — LOW (ref 0.5–1.3)
CREAT SERPL-MCNC: 0.31 MG/DL — LOW (ref 0.5–1.3)
CREAT SERPL-MCNC: 0.32 MG/DL — LOW (ref 0.5–1.3)
CREAT SERPL-MCNC: 0.33 MG/DL — LOW (ref 0.5–1.3)
CREAT SERPL-MCNC: 0.34 MG/DL — LOW (ref 0.5–1.3)
CREAT SERPL-MCNC: 0.36 MG/DL — LOW (ref 0.5–1.3)
CREAT SERPL-MCNC: 0.38 MG/DL — LOW (ref 0.5–1.3)
CREAT SERPL-MCNC: 0.39 MG/DL — LOW (ref 0.5–1.3)
CREAT SERPL-MCNC: 0.4 MG/DL — LOW (ref 0.5–1.3)
CREAT SERPL-MCNC: 0.41 MG/DL — LOW (ref 0.5–1.3)
CREAT SERPL-MCNC: 0.42 MG/DL — LOW (ref 0.5–1.3)
CREAT SERPL-MCNC: 0.43 MG/DL
CREAT SERPL-MCNC: 0.43 MG/DL — LOW (ref 0.5–1.3)
CREAT SERPL-MCNC: 0.44 MG/DL — LOW (ref 0.5–1.3)
CREAT SERPL-MCNC: 0.44 MG/DL — LOW (ref 0.5–1.3)
CREAT SERPL-MCNC: 0.46 MG/DL — LOW (ref 0.5–1.3)
CREAT SERPL-MCNC: 0.47 MG/DL — LOW (ref 0.5–1.3)
CREAT SERPL-MCNC: 0.48 MG/DL — LOW (ref 0.5–1.3)
CREAT SERPL-MCNC: 0.48 MG/DL — LOW (ref 0.5–1.3)
CREAT SERPL-MCNC: 0.51 MG/DL — SIGNIFICANT CHANGE UP (ref 0.5–1.3)
CREAT SERPL-MCNC: 0.51 MG/DL — SIGNIFICANT CHANGE UP (ref 0.5–1.3)
CREAT SERPL-MCNC: 0.6 MG/DL — SIGNIFICANT CHANGE UP (ref 0.5–1.3)
CREAT SERPL-MCNC: 0.69 MG/DL — SIGNIFICANT CHANGE UP (ref 0.5–1.3)
CRP SERPL-MCNC: 12.3 MG/L — HIGH
CULTURE RESULTS: SIGNIFICANT CHANGE UP
D DIMER BLD IA.RAPID-MCNC: 884 NG/ML — SIGNIFICANT CHANGE UP
DACRYOCYTES BLD QL SMEAR: SLIGHT — SIGNIFICANT CHANGE UP
DEPRECATED KAPPA LC FREE/LAMBDA SER: 13.87 RATIO — HIGH (ref 0.7–6.02)
EOSINOPHIL # BLD AUTO: 0 K/UL — SIGNIFICANT CHANGE UP (ref 0–0.5)
EOSINOPHIL # BLD AUTO: 0.01 K/UL — SIGNIFICANT CHANGE UP (ref 0–0.5)
EOSINOPHIL # BLD AUTO: 0.02 K/UL — SIGNIFICANT CHANGE UP (ref 0–0.5)
EOSINOPHIL # BLD AUTO: 0.02 K/UL — SIGNIFICANT CHANGE UP (ref 0–0.5)
EOSINOPHIL # BLD AUTO: 0.03 K/UL — SIGNIFICANT CHANGE UP (ref 0–0.5)
EOSINOPHIL # BLD AUTO: 0.04 K/UL — SIGNIFICANT CHANGE UP (ref 0–0.5)
EOSINOPHIL # BLD AUTO: 0.04 K/UL — SIGNIFICANT CHANGE UP (ref 0–0.5)
EOSINOPHIL # BLD AUTO: 0.05 K/UL — SIGNIFICANT CHANGE UP (ref 0–0.5)
EOSINOPHIL # BLD AUTO: 0.07 K/UL — SIGNIFICANT CHANGE UP (ref 0–0.5)
EOSINOPHIL # BLD AUTO: 0.08 K/UL — SIGNIFICANT CHANGE UP (ref 0–0.5)
EOSINOPHIL # BLD AUTO: 0.09 K/UL — SIGNIFICANT CHANGE UP (ref 0–0.5)
EOSINOPHIL # BLD AUTO: 0.15 K/UL — SIGNIFICANT CHANGE UP (ref 0–0.5)
EOSINOPHIL NFR BLD AUTO: 0 % — SIGNIFICANT CHANGE UP (ref 0–6)
EOSINOPHIL NFR BLD AUTO: 0.1 % — SIGNIFICANT CHANGE UP (ref 0–6)
EOSINOPHIL NFR BLD AUTO: 0.1 % — SIGNIFICANT CHANGE UP (ref 0–6)
EOSINOPHIL NFR BLD AUTO: 0.2 % — SIGNIFICANT CHANGE UP (ref 0–6)
EOSINOPHIL NFR BLD AUTO: 0.4 % — SIGNIFICANT CHANGE UP (ref 0–6)
EOSINOPHIL NFR BLD AUTO: 0.4 % — SIGNIFICANT CHANGE UP (ref 0–6)
EOSINOPHIL NFR BLD AUTO: 0.5 % — SIGNIFICANT CHANGE UP (ref 0–6)
EOSINOPHIL NFR BLD AUTO: 0.6 % — SIGNIFICANT CHANGE UP (ref 0–6)
EOSINOPHIL NFR BLD AUTO: 0.7 % — SIGNIFICANT CHANGE UP (ref 0–6)
EOSINOPHIL NFR BLD AUTO: 0.8 % — SIGNIFICANT CHANGE UP (ref 0–6)
EOSINOPHIL NFR BLD AUTO: 0.8 % — SIGNIFICANT CHANGE UP (ref 0–6)
EOSINOPHIL NFR BLD AUTO: 1 % — SIGNIFICANT CHANGE UP (ref 0–6)
EOSINOPHIL NFR FLD: 0 % — SIGNIFICANT CHANGE UP (ref 0–6)
EOSINOPHIL NFR FLD: 0.8 % — SIGNIFICANT CHANGE UP (ref 0–6)
ERYTHROCYTE [SEDIMENTATION RATE] IN BLOOD: 44 MM/HR — HIGH (ref 4–25)
FERRITIN SERPL-MCNC: 903 NG/ML
FERRITIN SERPL-MCNC: 967.3 NG/ML — HIGH (ref 15–150)
FLUAV H1 2009 PAND RNA SPEC QL NAA+PROBE: SIGNIFICANT CHANGE UP
FLUAV H1 RNA SPEC QL NAA+PROBE: SIGNIFICANT CHANGE UP
FLUAV H3 RNA SPEC QL NAA+PROBE: SIGNIFICANT CHANGE UP
FLUAV SUBTYP SPEC NAA+PROBE: SIGNIFICANT CHANGE UP
FLUBV RNA SPEC QL NAA+PROBE: SIGNIFICANT CHANGE UP
FOLATE SERPL-MCNC: 16.5 NG/ML
G6PD SER-CCNC: 18.9 U/G HGB
GAS PNL BLDMV: SIGNIFICANT CHANGE UP
GAS PNL BLDV: 123 MMOL/L — LOW (ref 136–146)
GAS PNL BLDV: 123 MMOL/L — LOW (ref 136–146)
GAS PNL BLDV: 124 MMOL/L — LOW (ref 136–146)
GAS PNL BLDV: 127 MMOL/L — LOW (ref 136–146)
GIANT PLATELETS BLD QL SMEAR: PRESENT — SIGNIFICANT CHANGE UP
GLUCOSE BLDA-MCNC: 123 MG/DL — HIGH (ref 70–99)
GLUCOSE BLDA-MCNC: 125 MG/DL — HIGH (ref 70–99)
GLUCOSE BLDA-MCNC: 128 MG/DL — HIGH (ref 70–99)
GLUCOSE BLDA-MCNC: 145 MG/DL — HIGH (ref 70–99)
GLUCOSE BLDA-MCNC: 157 MG/DL — HIGH (ref 70–99)
GLUCOSE BLDC GLUCOMTR-MCNC: 103 MG/DL — HIGH (ref 70–99)
GLUCOSE BLDC GLUCOMTR-MCNC: 112 MG/DL — HIGH (ref 70–99)
GLUCOSE BLDC GLUCOMTR-MCNC: 95 MG/DL — SIGNIFICANT CHANGE UP (ref 70–99)
GLUCOSE BLDV-MCNC: 104 MG/DL — HIGH (ref 70–99)
GLUCOSE BLDV-MCNC: 111 MG/DL — HIGH (ref 70–99)
GLUCOSE BLDV-MCNC: 123 MG/DL — HIGH (ref 70–99)
GLUCOSE BLDV-MCNC: 83 MG/DL — SIGNIFICANT CHANGE UP (ref 70–99)
GLUCOSE SERPL-MCNC: 100 MG/DL — HIGH (ref 70–99)
GLUCOSE SERPL-MCNC: 100 MG/DL — HIGH (ref 70–99)
GLUCOSE SERPL-MCNC: 101 MG/DL — HIGH (ref 70–99)
GLUCOSE SERPL-MCNC: 102 MG/DL — HIGH (ref 70–99)
GLUCOSE SERPL-MCNC: 102 MG/DL — HIGH (ref 70–99)
GLUCOSE SERPL-MCNC: 103 MG/DL — HIGH (ref 70–99)
GLUCOSE SERPL-MCNC: 104 MG/DL — HIGH (ref 70–99)
GLUCOSE SERPL-MCNC: 104 MG/DL — HIGH (ref 70–99)
GLUCOSE SERPL-MCNC: 105 MG/DL — HIGH (ref 70–99)
GLUCOSE SERPL-MCNC: 107 MG/DL — HIGH (ref 70–99)
GLUCOSE SERPL-MCNC: 109 MG/DL — HIGH (ref 70–99)
GLUCOSE SERPL-MCNC: 110 MG/DL — HIGH (ref 70–99)
GLUCOSE SERPL-MCNC: 116 MG/DL — HIGH (ref 70–99)
GLUCOSE SERPL-MCNC: 116 MG/DL — HIGH (ref 70–99)
GLUCOSE SERPL-MCNC: 117 MG/DL — HIGH (ref 70–99)
GLUCOSE SERPL-MCNC: 117 MG/DL — HIGH (ref 70–99)
GLUCOSE SERPL-MCNC: 118 MG/DL
GLUCOSE SERPL-MCNC: 120 MG/DL — HIGH (ref 70–99)
GLUCOSE SERPL-MCNC: 122 MG/DL — HIGH (ref 70–99)
GLUCOSE SERPL-MCNC: 124 MG/DL — HIGH (ref 70–99)
GLUCOSE SERPL-MCNC: 127 MG/DL — HIGH (ref 70–99)
GLUCOSE SERPL-MCNC: 128 MG/DL — HIGH (ref 70–99)
GLUCOSE SERPL-MCNC: 134 MG/DL — HIGH (ref 70–99)
GLUCOSE SERPL-MCNC: 136 MG/DL — HIGH (ref 70–99)
GLUCOSE SERPL-MCNC: 146 MG/DL — HIGH (ref 70–99)
GLUCOSE SERPL-MCNC: 148 MG/DL — HIGH (ref 70–99)
GLUCOSE SERPL-MCNC: 150 MG/DL — HIGH (ref 70–99)
GLUCOSE SERPL-MCNC: 150 MG/DL — HIGH (ref 70–99)
GLUCOSE SERPL-MCNC: 161 MG/DL — HIGH (ref 70–99)
GLUCOSE SERPL-MCNC: 168 MG/DL — HIGH (ref 70–99)
GLUCOSE SERPL-MCNC: 84 MG/DL — SIGNIFICANT CHANGE UP (ref 70–99)
GLUCOSE SERPL-MCNC: 85 MG/DL — SIGNIFICANT CHANGE UP (ref 70–99)
GLUCOSE SERPL-MCNC: 86 MG/DL — SIGNIFICANT CHANGE UP (ref 70–99)
GLUCOSE SERPL-MCNC: 87 MG/DL — SIGNIFICANT CHANGE UP (ref 70–99)
GLUCOSE SERPL-MCNC: 92 MG/DL — SIGNIFICANT CHANGE UP (ref 70–99)
GLUCOSE SERPL-MCNC: 94 MG/DL — SIGNIFICANT CHANGE UP (ref 70–99)
GLUCOSE SERPL-MCNC: 94 MG/DL — SIGNIFICANT CHANGE UP (ref 70–99)
GLUCOSE SERPL-MCNC: 95 MG/DL — SIGNIFICANT CHANGE UP (ref 70–99)
GLUCOSE SERPL-MCNC: 96 MG/DL — SIGNIFICANT CHANGE UP (ref 70–99)
GLUCOSE SERPL-MCNC: 97 MG/DL — SIGNIFICANT CHANGE UP (ref 70–99)
GLUCOSE SERPL-MCNC: 98 MG/DL — SIGNIFICANT CHANGE UP (ref 70–99)
GLUCOSE UR-MCNC: NEGATIVE — SIGNIFICANT CHANGE UP
HADV DNA SPEC QL NAA+PROBE: SIGNIFICANT CHANGE UP
HAPTOGLOB SERPL-MCNC: 282 MG/DL — HIGH (ref 34–200)
HBA1C BLD-MCNC: 6.1 % — HIGH (ref 4–5.6)
HBV CORE IGG+IGM SER QL: NONREACTIVE
HBV SURFACE AB SER QL: NONREACTIVE
HBV SURFACE AG SER QL: NONREACTIVE
HCO3 BLDA-SCNC: 20 MMOL/L — LOW (ref 22–26)
HCO3 BLDA-SCNC: 22 MMOL/L — SIGNIFICANT CHANGE UP (ref 22–26)
HCO3 BLDA-SCNC: 23 MMOL/L — SIGNIFICANT CHANGE UP (ref 22–26)
HCO3 BLDV-SCNC: 21 MMOL/L — SIGNIFICANT CHANGE UP (ref 20–27)
HCO3 BLDV-SCNC: 22 MMOL/L — SIGNIFICANT CHANGE UP (ref 20–27)
HCO3 BLDV-SCNC: 23 MMOL/L — SIGNIFICANT CHANGE UP (ref 20–27)
HCO3 BLDV-SCNC: 25 MMOL/L — SIGNIFICANT CHANGE UP (ref 20–27)
HCOV PNL SPEC NAA+PROBE: SIGNIFICANT CHANGE UP
HCT VFR BLD CALC: 17.8 % — CRITICAL LOW (ref 34.5–45)
HCT VFR BLD CALC: 19.8 % — CRITICAL LOW (ref 34.5–45)
HCT VFR BLD CALC: 21 % — CRITICAL LOW (ref 34.5–45)
HCT VFR BLD CALC: 21.4 % — LOW (ref 34.5–45)
HCT VFR BLD CALC: 22.4 % — LOW (ref 34.5–45)
HCT VFR BLD CALC: 22.9 % — LOW (ref 34.5–45)
HCT VFR BLD CALC: 23.2 % — LOW (ref 34.5–45)
HCT VFR BLD CALC: 23.6 % — LOW (ref 34.5–45)
HCT VFR BLD CALC: 24.4 % — LOW (ref 34.5–45)
HCT VFR BLD CALC: 24.5 % — LOW (ref 34.5–45)
HCT VFR BLD CALC: 25.1 % — LOW (ref 34.5–45)
HCT VFR BLD CALC: 25.3 % — LOW (ref 34.5–45)
HCT VFR BLD CALC: 25.4 % — LOW (ref 34.5–45)
HCT VFR BLD CALC: 25.5 % — LOW (ref 34.5–45)
HCT VFR BLD CALC: 26 % — LOW (ref 34.5–45)
HCT VFR BLD CALC: 26.1 % — LOW (ref 34.5–45)
HCT VFR BLD CALC: 26.4 % — LOW (ref 34.5–45)
HCT VFR BLD CALC: 26.4 % — LOW (ref 34.5–45)
HCT VFR BLD CALC: 26.5 % — LOW (ref 34.5–45)
HCT VFR BLD CALC: 26.8 % — LOW (ref 34.5–45)
HCT VFR BLD CALC: 26.9 % — LOW (ref 34.5–45)
HCT VFR BLD CALC: 27 % — LOW (ref 34.5–45)
HCT VFR BLD CALC: 27 % — LOW (ref 34.5–45)
HCT VFR BLD CALC: 27.1 % — LOW (ref 34.5–45)
HCT VFR BLD CALC: 27.4 % — LOW (ref 34.5–45)
HCT VFR BLD CALC: 27.5 % — LOW (ref 34.5–45)
HCT VFR BLD CALC: 27.6 % — LOW (ref 34.5–45)
HCT VFR BLD CALC: 27.6 % — LOW (ref 34.5–45)
HCT VFR BLD CALC: 27.7 % — LOW (ref 34.5–45)
HCT VFR BLD CALC: 27.9 % — LOW (ref 34.5–45)
HCT VFR BLD CALC: 28.2 % — LOW (ref 34.5–45)
HCT VFR BLD CALC: 28.4 % — LOW (ref 34.5–45)
HCT VFR BLD CALC: 28.5 % — LOW (ref 34.5–45)
HCT VFR BLD CALC: 28.5 % — LOW (ref 34.5–45)
HCT VFR BLD CALC: 28.6 % — LOW (ref 34.5–45)
HCT VFR BLD CALC: 28.7 % — LOW (ref 34.5–45)
HCT VFR BLD CALC: 28.9 % — LOW (ref 34.5–45)
HCT VFR BLD CALC: 29 % — LOW (ref 34.5–45)
HCT VFR BLD CALC: 29.2 % — LOW (ref 34.5–45)
HCT VFR BLD CALC: 29.2 % — LOW (ref 34.5–45)
HCT VFR BLD CALC: 29.5 % — LOW (ref 34.5–45)
HCT VFR BLD CALC: 30.3 % — LOW (ref 34.5–45)
HCT VFR BLD CALC: 30.9 % — LOW (ref 34.5–45)
HCT VFR BLD CALC: 31 % — LOW (ref 34.5–45)
HCT VFR BLD CALC: 31.7 % — LOW (ref 34.5–45)
HCT VFR BLD CALC: 32.6 % — LOW (ref 34.5–45)
HCT VFR BLD CALC: 33.5 % — LOW (ref 34.5–45)
HCT VFR BLD CALC: 33.7 % — LOW (ref 34.5–45)
HCT VFR BLD CALC: 33.8 % — LOW (ref 34.5–45)
HCT VFR BLD CALC: 34.3 % — LOW (ref 34.5–45)
HCT VFR BLD CALC: 34.6 % — SIGNIFICANT CHANGE UP (ref 34.5–45)
HCT VFR BLD CALC: 35.3 % — SIGNIFICANT CHANGE UP (ref 34.5–45)
HCT VFR BLD CALC: 35.5 % — SIGNIFICANT CHANGE UP (ref 34.5–45)
HCT VFR BLD CALC: 35.9 % — SIGNIFICANT CHANGE UP (ref 34.5–45)
HCT VFR BLD CALC: 36.1 % — SIGNIFICANT CHANGE UP (ref 34.5–45)
HCT VFR BLD CALC: 36.5 % — SIGNIFICANT CHANGE UP (ref 34.5–45)
HCT VFR BLD CALC: 38.3 % — SIGNIFICANT CHANGE UP (ref 34.5–45)
HCT VFR BLD CALC: 39.7 % — SIGNIFICANT CHANGE UP (ref 34.5–45)
HCT VFR BLDA CALC: 25.3 % — LOW (ref 34.5–46.5)
HCT VFR BLDA CALC: 25.7 % — LOW (ref 34.5–46.5)
HCT VFR BLDA CALC: 30.4 % — LOW (ref 34.5–46.5)
HCT VFR BLDA CALC: 32.2 % — LOW (ref 34.5–46.5)
HCT VFR BLDA CALC: 33.1 % — LOW (ref 34.5–46.5)
HCT VFR BLDV CALC: 22 % — LOW (ref 34.5–45)
HCT VFR BLDV CALC: 25.6 % — LOW (ref 34.5–45)
HCT VFR BLDV CALC: 27.3 % — LOW (ref 34.5–45)
HCT VFR BLDV CALC: 30.3 % — LOW (ref 34.5–45)
HCV AB S/CO SERPL IA: 0.12 S/CO — SIGNIFICANT CHANGE UP (ref 0–0.99)
HCV AB SER QL: NONREACTIVE
HCV AB SERPL-IMP: SIGNIFICANT CHANGE UP
HCV S/CO RATIO: 0.16 S/CO
HEPARIN CF II AG PPP-ACNC: 0.05 — SIGNIFICANT CHANGE UP (ref 0–0.39)
HGB BLD-MCNC: 10.2 G/DL — LOW (ref 11.5–15.5)
HGB BLD-MCNC: 10.3 G/DL — LOW (ref 11.5–15.5)
HGB BLD-MCNC: 10.4 G/DL — LOW (ref 11.5–15.5)
HGB BLD-MCNC: 10.7 G/DL — LOW (ref 11.5–15.5)
HGB BLD-MCNC: 10.7 G/DL — LOW (ref 11.5–15.5)
HGB BLD-MCNC: 10.8 G/DL — LOW (ref 11.5–15.5)
HGB BLD-MCNC: 10.9 G/DL — LOW (ref 11.5–15.5)
HGB BLD-MCNC: 11 G/DL — LOW (ref 11.5–15.5)
HGB BLD-MCNC: 11.1 G/DL — LOW (ref 11.5–15.5)
HGB BLD-MCNC: 11.2 G/DL — LOW (ref 11.5–15.5)
HGB BLD-MCNC: 11.3 G/DL — LOW (ref 11.5–15.5)
HGB BLD-MCNC: 11.5 G/DL — SIGNIFICANT CHANGE UP (ref 11.5–15.5)
HGB BLD-MCNC: 11.8 G/DL — SIGNIFICANT CHANGE UP (ref 11.5–15.5)
HGB BLD-MCNC: 11.9 G/DL — SIGNIFICANT CHANGE UP (ref 11.5–15.5)
HGB BLD-MCNC: 11.9 G/DL — SIGNIFICANT CHANGE UP (ref 11.5–15.5)
HGB BLD-MCNC: 5.7 G/DL — CRITICAL LOW (ref 11.5–15.5)
HGB BLD-MCNC: 6.6 G/DL — CRITICAL LOW (ref 11.5–15.5)
HGB BLD-MCNC: 6.8 G/DL — CRITICAL LOW (ref 11.5–15.5)
HGB BLD-MCNC: 7.2 G/DL — LOW (ref 11.5–15.5)
HGB BLD-MCNC: 7.4 G/DL — LOW (ref 11.5–15.5)
HGB BLD-MCNC: 7.5 G/DL — LOW (ref 11.5–15.5)
HGB BLD-MCNC: 7.6 G/DL — LOW (ref 11.5–15.5)
HGB BLD-MCNC: 7.8 G/DL — LOW (ref 11.5–15.5)
HGB BLD-MCNC: 8.1 G/DL — LOW (ref 11.5–15.5)
HGB BLD-MCNC: 8.2 G/DL — LOW (ref 11.5–15.5)
HGB BLD-MCNC: 8.3 G/DL — LOW (ref 11.5–15.5)
HGB BLD-MCNC: 8.4 G/DL — LOW (ref 11.5–15.5)
HGB BLD-MCNC: 8.5 G/DL — LOW (ref 11.5–15.5)
HGB BLD-MCNC: 8.6 G/DL — LOW (ref 11.5–15.5)
HGB BLD-MCNC: 8.7 G/DL — LOW (ref 11.5–15.5)
HGB BLD-MCNC: 8.8 G/DL — LOW (ref 11.5–15.5)
HGB BLD-MCNC: 8.8 G/DL — LOW (ref 11.5–15.5)
HGB BLD-MCNC: 8.9 G/DL — LOW (ref 11.5–15.5)
HGB BLD-MCNC: 9 G/DL — LOW (ref 11.5–15.5)
HGB BLD-MCNC: 9 G/DL — LOW (ref 11.5–15.5)
HGB BLD-MCNC: 9.1 G/DL — LOW (ref 11.5–15.5)
HGB BLD-MCNC: 9.2 G/DL — LOW (ref 11.5–15.5)
HGB BLD-MCNC: 9.3 G/DL — LOW (ref 11.5–15.5)
HGB BLD-MCNC: 9.4 G/DL — LOW (ref 11.5–15.5)
HGB BLD-MCNC: 9.5 G/DL — LOW (ref 11.5–15.5)
HGB BLD-MCNC: 9.7 G/DL — LOW (ref 11.5–15.5)
HGB BLD-MCNC: 9.7 G/DL — LOW (ref 11.5–15.5)
HGB BLD-MCNC: 9.8 G/DL — LOW (ref 11.5–15.5)
HGB BLD-MCNC: 9.9 G/DL — LOW (ref 11.5–15.5)
HGB BLDA-MCNC: 10.4 G/DL — LOW (ref 11.5–15.5)
HGB BLDA-MCNC: 10.7 G/DL — LOW (ref 11.5–15.5)
HGB BLDA-MCNC: 8.1 G/DL — LOW (ref 11.5–15.5)
HGB BLDA-MCNC: 8.3 G/DL — LOW (ref 11.5–15.5)
HGB BLDA-MCNC: 9.8 G/DL — LOW (ref 11.5–15.5)
HGB BLDV-MCNC: 7 G/DL — CRITICAL LOW (ref 11.5–15.5)
HGB BLDV-MCNC: 8.2 G/DL — LOW (ref 11.5–15.5)
HGB BLDV-MCNC: 8.8 G/DL — LOW (ref 11.5–15.5)
HGB BLDV-MCNC: 9.8 G/DL — LOW (ref 11.5–15.5)
HMPV RNA SPEC QL NAA+PROBE: SIGNIFICANT CHANGE UP
HPIV1 RNA SPEC QL NAA+PROBE: SIGNIFICANT CHANGE UP
HPIV2 RNA SPEC QL NAA+PROBE: SIGNIFICANT CHANGE UP
HPIV3 RNA SPEC QL NAA+PROBE: SIGNIFICANT CHANGE UP
HPIV4 RNA SPEC QL NAA+PROBE: SIGNIFICANT CHANGE UP
HYALINE CASTS # UR AUTO: HIGH
HYPOCHROMIA BLD QL: SIGNIFICANT CHANGE UP
HYPOCHROMIA BLD QL: SLIGHT — SIGNIFICANT CHANGE UP
HYPOCHROMIA BLD QL: SLIGHT — SIGNIFICANT CHANGE UP
IMM GRANULOCYTES NFR BLD AUTO: 0.3 % — SIGNIFICANT CHANGE UP (ref 0–1.5)
IMM GRANULOCYTES NFR BLD AUTO: 0.4 % — SIGNIFICANT CHANGE UP (ref 0–1.5)
IMM GRANULOCYTES NFR BLD AUTO: 0.5 % — SIGNIFICANT CHANGE UP (ref 0–1.5)
IMM GRANULOCYTES NFR BLD AUTO: 0.7 % — SIGNIFICANT CHANGE UP (ref 0–1.5)
IMM GRANULOCYTES NFR BLD AUTO: 1.1 % — SIGNIFICANT CHANGE UP (ref 0–1.5)
IMM GRANULOCYTES NFR BLD AUTO: 1.2 % — SIGNIFICANT CHANGE UP (ref 0–1.5)
IMM GRANULOCYTES NFR BLD AUTO: 1.3 % — SIGNIFICANT CHANGE UP (ref 0–1.5)
IMM GRANULOCYTES NFR BLD AUTO: 1.5 % — SIGNIFICANT CHANGE UP (ref 0–1.5)
IMM GRANULOCYTES NFR BLD AUTO: 1.7 % — HIGH (ref 0–1.5)
IMM GRANULOCYTES NFR BLD AUTO: 1.8 % — HIGH (ref 0–1.5)
IMM GRANULOCYTES NFR BLD AUTO: 1.8 % — HIGH (ref 0–1.5)
IMM GRANULOCYTES NFR BLD AUTO: 1.9 % — HIGH (ref 0–1.5)
IMM GRANULOCYTES NFR BLD AUTO: 1.9 % — HIGH (ref 0–1.5)
IMM GRANULOCYTES NFR BLD AUTO: 10.1 % — HIGH (ref 0–1.5)
IMM GRANULOCYTES NFR BLD AUTO: 11.6 % — HIGH (ref 0–1.5)
IMM GRANULOCYTES NFR BLD AUTO: 2.1 % — HIGH (ref 0–1.5)
IMM GRANULOCYTES NFR BLD AUTO: 2.1 % — HIGH (ref 0–1.5)
IMM GRANULOCYTES NFR BLD AUTO: 2.3 % — HIGH (ref 0–1.5)
IMM GRANULOCYTES NFR BLD AUTO: 2.4 % — HIGH (ref 0–1.5)
IMM GRANULOCYTES NFR BLD AUTO: 3 % — HIGH (ref 0–1.5)
IMM GRANULOCYTES NFR BLD AUTO: 3.8 % — HIGH (ref 0–1.5)
IMM GRANULOCYTES NFR BLD AUTO: 6.8 % — HIGH (ref 0–1.5)
INR BLD: 0.99 — SIGNIFICANT CHANGE UP (ref 0.88–1.16)
INR BLD: 1 — SIGNIFICANT CHANGE UP (ref 0.88–1.16)
INR BLD: 1.03 — SIGNIFICANT CHANGE UP (ref 0.88–1.16)
INR BLD: 1.05 — SIGNIFICANT CHANGE UP (ref 0.88–1.16)
INR BLD: 1.07 — SIGNIFICANT CHANGE UP (ref 0.88–1.16)
INR BLD: 1.09 — SIGNIFICANT CHANGE UP (ref 0.88–1.16)
INR BLD: 1.12 — SIGNIFICANT CHANGE UP (ref 0.88–1.16)
INR BLD: 1.16 — SIGNIFICANT CHANGE UP (ref 0.88–1.16)
INR BLD: 1.2 — HIGH (ref 0.88–1.16)
INR BLD: 1.35 — HIGH (ref 0.88–1.16)
INR BLD: 1.52 — HIGH (ref 0.88–1.16)
INR BLD: 1.53 — HIGH (ref 0.88–1.16)
INR BLD: 1.57 — HIGH (ref 0.88–1.16)
INR BLD: 1.57 — HIGH (ref 0.88–1.16)
INR BLD: 1.65 — HIGH (ref 0.88–1.16)
INR BLD: 1.86 — HIGH (ref 0.88–1.16)
INR BLD: 1.9 — HIGH (ref 0.88–1.16)
INR BLD: 1.93 — HIGH (ref 0.88–1.16)
IRON SATN MFR SERPL: 119 UG/DL — LOW (ref 140–530)
IRON SATN MFR SERPL: 12 UG/DL — LOW (ref 30–160)
IRON SATN MFR SERPL: 8 %
IRON SERPL-MCNC: 16 UG/DL
KAPPA FREE LIGHT CHAINS, SERUM: 1.29 MG/DL — SIGNIFICANT CHANGE UP (ref 0.33–1.94)
KAPPA LC UR-MCNC: 77.27 MG/L — HIGH
KAPPA/LAMBDA FREE LIGHT CHAIN RATIO, SERUM: 0.99 — SIGNIFICANT CHANGE UP
KETONES UR-MCNC: HIGH
KETONES UR-MCNC: NEGATIVE — SIGNIFICANT CHANGE UP
KETONES UR-MCNC: NEGATIVE — SIGNIFICANT CHANGE UP
LACTATE BLDA-SCNC: 1.8 MMOL/L — SIGNIFICANT CHANGE UP (ref 0.5–2)
LACTATE BLDA-SCNC: 2.4 MMOL/L — HIGH (ref 0.5–2)
LACTATE BLDV-MCNC: 1.2 MMOL/L — SIGNIFICANT CHANGE UP (ref 0.5–2)
LACTATE BLDV-MCNC: 1.2 MMOL/L — SIGNIFICANT CHANGE UP (ref 0.5–2)
LACTATE BLDV-MCNC: 2.2 MMOL/L — HIGH (ref 0.5–2)
LACTATE BLDV-MCNC: 2.3 MMOL/L — HIGH (ref 0.5–2)
LACTATE BLDV-MCNC: 3 MMOL/L — HIGH (ref 0.5–2)
LACTATE SERPL-SCNC: 1.5 MMOL/L — SIGNIFICANT CHANGE UP (ref 0.5–2)
LACTATE SERPL-SCNC: 2.1 MMOL/L — HIGH (ref 0.5–2)
LACTATE SERPL-SCNC: 2.4 MMOL/L — HIGH (ref 0.5–2)
LACTATE SERPL-SCNC: 3.4 MMOL/L — HIGH (ref 0.5–2)
LACTATE SERPL-SCNC: 3.7 MMOL/L — HIGH (ref 0.5–2)
LACTATE SERPL-SCNC: 3.8 MMOL/L — HIGH (ref 0.5–2)
LAMBDA FREE LIGHT CHAINS, SERUM: 1.3 MG/DL — SIGNIFICANT CHANGE UP (ref 0.57–2.63)
LAMBDA LC UR-MCNC: 5.57 MG/L — SIGNIFICANT CHANGE UP
LDH SERPL L TO P-CCNC: 329 U/L — HIGH (ref 135–225)
LDH SERPL L TO P-CCNC: 384 U/L — HIGH (ref 135–225)
LEUKOCYTE ESTERASE UR-ACNC: NEGATIVE — SIGNIFICANT CHANGE UP
LEUKOCYTE ESTERASE UR-ACNC: SIGNIFICANT CHANGE UP
LEUKOCYTE ESTERASE UR-ACNC: SIGNIFICANT CHANGE UP
LYMPHOCYTES # BLD AUTO: 0.15 K/UL — LOW (ref 1–3.3)
LYMPHOCYTES # BLD AUTO: 0.16 K/UL — LOW (ref 1–3.3)
LYMPHOCYTES # BLD AUTO: 0.32 K/UL — LOW (ref 1–3.3)
LYMPHOCYTES # BLD AUTO: 0.54 K/UL — LOW (ref 1–3.3)
LYMPHOCYTES # BLD AUTO: 0.55 K/UL — LOW (ref 1–3.3)
LYMPHOCYTES # BLD AUTO: 0.56 K/UL — LOW (ref 1–3.3)
LYMPHOCYTES # BLD AUTO: 0.59 K/UL — LOW (ref 1–3.3)
LYMPHOCYTES # BLD AUTO: 0.61 K/UL — LOW (ref 1–3.3)
LYMPHOCYTES # BLD AUTO: 0.61 K/UL — LOW (ref 1–3.3)
LYMPHOCYTES # BLD AUTO: 0.64 K/UL — LOW (ref 1–3.3)
LYMPHOCYTES # BLD AUTO: 0.64 K/UL — LOW (ref 1–3.3)
LYMPHOCYTES # BLD AUTO: 0.65 K/UL — LOW (ref 1–3.3)
LYMPHOCYTES # BLD AUTO: 0.66 K/UL — LOW (ref 1–3.3)
LYMPHOCYTES # BLD AUTO: 0.67 K/UL — LOW (ref 1–3.3)
LYMPHOCYTES # BLD AUTO: 0.75 K/UL — LOW (ref 1–3.3)
LYMPHOCYTES # BLD AUTO: 0.77 K/UL — LOW (ref 1–3.3)
LYMPHOCYTES # BLD AUTO: 0.8 % — LOW (ref 13–44)
LYMPHOCYTES # BLD AUTO: 0.82 K/UL — LOW (ref 1–3.3)
LYMPHOCYTES # BLD AUTO: 0.84 K/UL — LOW (ref 1–3.3)
LYMPHOCYTES # BLD AUTO: 0.92 K/UL — LOW (ref 1–3.3)
LYMPHOCYTES # BLD AUTO: 1 K/UL — SIGNIFICANT CHANGE UP (ref 1–3.3)
LYMPHOCYTES # BLD AUTO: 1.1 K/UL — SIGNIFICANT CHANGE UP (ref 1–3.3)
LYMPHOCYTES # BLD AUTO: 1.19 K/UL — SIGNIFICANT CHANGE UP (ref 1–3.3)
LYMPHOCYTES # BLD AUTO: 1.21 K/UL — SIGNIFICANT CHANGE UP (ref 1–3.3)
LYMPHOCYTES # BLD AUTO: 1.23 K/UL — SIGNIFICANT CHANGE UP (ref 1–3.3)
LYMPHOCYTES # BLD AUTO: 1.35 K/UL — SIGNIFICANT CHANGE UP (ref 1–3.3)
LYMPHOCYTES # BLD AUTO: 1.5 % — LOW (ref 13–44)
LYMPHOCYTES # BLD AUTO: 1.7 % — LOW (ref 13–44)
LYMPHOCYTES # BLD AUTO: 1.8 % — LOW (ref 13–44)
LYMPHOCYTES # BLD AUTO: 10.8 % — LOW (ref 13–44)
LYMPHOCYTES # BLD AUTO: 10.9 % — LOW (ref 13–44)
LYMPHOCYTES # BLD AUTO: 11.7 % — LOW (ref 13–44)
LYMPHOCYTES # BLD AUTO: 2.3 % — LOW (ref 13–44)
LYMPHOCYTES # BLD AUTO: 2.58 K/UL — SIGNIFICANT CHANGE UP (ref 1–3.3)
LYMPHOCYTES # BLD AUTO: 2.6 % — LOW (ref 13–44)
LYMPHOCYTES # BLD AUTO: 2.6 % — LOW (ref 13–44)
LYMPHOCYTES # BLD AUTO: 25 % — SIGNIFICANT CHANGE UP (ref 13–44)
LYMPHOCYTES # BLD AUTO: 3.1 % — LOW (ref 13–44)
LYMPHOCYTES # BLD AUTO: 3.3 % — LOW (ref 13–44)
LYMPHOCYTES # BLD AUTO: 3.8 % — LOW (ref 13–44)
LYMPHOCYTES # BLD AUTO: 3.9 % — LOW (ref 13–44)
LYMPHOCYTES # BLD AUTO: 4 % — LOW (ref 13–44)
LYMPHOCYTES # BLD AUTO: 4.1 % — LOW (ref 13–44)
LYMPHOCYTES # BLD AUTO: 4.1 % — LOW (ref 13–44)
LYMPHOCYTES # BLD AUTO: 4.2 % — LOW (ref 13–44)
LYMPHOCYTES # BLD AUTO: 4.9 % — LOW (ref 13–44)
LYMPHOCYTES # BLD AUTO: 5.4 % — LOW (ref 13–44)
LYMPHOCYTES # BLD AUTO: 5.7 % — LOW (ref 13–44)
LYMPHOCYTES # BLD AUTO: 6.1 % — LOW (ref 13–44)
LYMPHOCYTES # BLD AUTO: 7.4 % — LOW (ref 13–44)
LYMPHOCYTES # BLD AUTO: 9.2 % — LOW (ref 13–44)
LYMPHOCYTES # BLD AUTO: 9.8 % — LOW (ref 13–44)
LYMPHOCYTES NFR SPEC AUTO: 0 % — LOW (ref 13–44)
LYMPHOCYTES NFR SPEC AUTO: 0 % — LOW (ref 13–44)
LYMPHOCYTES NFR SPEC AUTO: 0.9 % — LOW (ref 13–44)
LYMPHOCYTES NFR SPEC AUTO: 0.9 % — LOW (ref 13–44)
LYMPHOCYTES NFR SPEC AUTO: 1.8 % — LOW (ref 13–44)
LYMPHOCYTES NFR SPEC AUTO: 3.4 % — LOW (ref 13–44)
LYMPHOCYTES NFR SPEC AUTO: 4 % — LOW (ref 13–44)
MACROCYTES BLD QL: SIGNIFICANT CHANGE UP
MACROCYTES BLD QL: SLIGHT — SIGNIFICANT CHANGE UP
MAGNESIUM SERPL-MCNC: 1.2 MG/DL — LOW (ref 1.6–2.6)
MAGNESIUM SERPL-MCNC: 1.5 MG/DL — LOW (ref 1.6–2.6)
MAGNESIUM SERPL-MCNC: 1.6 MG/DL — SIGNIFICANT CHANGE UP (ref 1.6–2.6)
MAGNESIUM SERPL-MCNC: 1.7 MG/DL — SIGNIFICANT CHANGE UP (ref 1.6–2.6)
MAGNESIUM SERPL-MCNC: 1.8 MG/DL — SIGNIFICANT CHANGE UP (ref 1.6–2.6)
MAGNESIUM SERPL-MCNC: 1.9 MG/DL — SIGNIFICANT CHANGE UP (ref 1.6–2.6)
MAGNESIUM SERPL-MCNC: 2 MG/DL — SIGNIFICANT CHANGE UP (ref 1.6–2.6)
MAGNESIUM SERPL-MCNC: 2.1 MG/DL — SIGNIFICANT CHANGE UP (ref 1.6–2.6)
MAGNESIUM SERPL-MCNC: 2.2 MG/DL — SIGNIFICANT CHANGE UP (ref 1.6–2.6)
MANUAL SMEAR VERIFICATION: SIGNIFICANT CHANGE UP
MCHC RBC-ENTMCNC: 24.9 PG — LOW (ref 27–34)
MCHC RBC-ENTMCNC: 24.9 PG — LOW (ref 27–34)
MCHC RBC-ENTMCNC: 25.1 PG — LOW (ref 27–34)
MCHC RBC-ENTMCNC: 25.3 PG — LOW (ref 27–34)
MCHC RBC-ENTMCNC: 25.4 PG — LOW (ref 27–34)
MCHC RBC-ENTMCNC: 25.5 PG — LOW (ref 27–34)
MCHC RBC-ENTMCNC: 25.8 PG — LOW (ref 27–34)
MCHC RBC-ENTMCNC: 25.8 PG — LOW (ref 27–34)
MCHC RBC-ENTMCNC: 25.9 PG — LOW (ref 27–34)
MCHC RBC-ENTMCNC: 26 PG — LOW (ref 27–34)
MCHC RBC-ENTMCNC: 26 PG — LOW (ref 27–34)
MCHC RBC-ENTMCNC: 26.1 PG — LOW (ref 27–34)
MCHC RBC-ENTMCNC: 26.1 PG — LOW (ref 27–34)
MCHC RBC-ENTMCNC: 26.2 PG — LOW (ref 27–34)
MCHC RBC-ENTMCNC: 26.3 PG — LOW (ref 27–34)
MCHC RBC-ENTMCNC: 26.3 PG — LOW (ref 27–34)
MCHC RBC-ENTMCNC: 26.4 PG — LOW (ref 27–34)
MCHC RBC-ENTMCNC: 26.5 PG — LOW (ref 27–34)
MCHC RBC-ENTMCNC: 26.6 PG — LOW (ref 27–34)
MCHC RBC-ENTMCNC: 26.6 PG — LOW (ref 27–34)
MCHC RBC-ENTMCNC: 26.7 PG — LOW (ref 27–34)
MCHC RBC-ENTMCNC: 26.7 PG — LOW (ref 27–34)
MCHC RBC-ENTMCNC: 26.8 PG — LOW (ref 27–34)
MCHC RBC-ENTMCNC: 26.9 PG — LOW (ref 27–34)
MCHC RBC-ENTMCNC: 27 PG — SIGNIFICANT CHANGE UP (ref 27–34)
MCHC RBC-ENTMCNC: 27 PG — SIGNIFICANT CHANGE UP (ref 27–34)
MCHC RBC-ENTMCNC: 27.1 PG — SIGNIFICANT CHANGE UP (ref 27–34)
MCHC RBC-ENTMCNC: 27.1 PG — SIGNIFICANT CHANGE UP (ref 27–34)
MCHC RBC-ENTMCNC: 27.2 PG — SIGNIFICANT CHANGE UP (ref 27–34)
MCHC RBC-ENTMCNC: 27.3 PG — SIGNIFICANT CHANGE UP (ref 27–34)
MCHC RBC-ENTMCNC: 27.4 PG — SIGNIFICANT CHANGE UP (ref 27–34)
MCHC RBC-ENTMCNC: 27.5 PG — SIGNIFICANT CHANGE UP (ref 27–34)
MCHC RBC-ENTMCNC: 27.6 PG — SIGNIFICANT CHANGE UP (ref 27–34)
MCHC RBC-ENTMCNC: 27.7 PG — SIGNIFICANT CHANGE UP (ref 27–34)
MCHC RBC-ENTMCNC: 27.8 PG — SIGNIFICANT CHANGE UP (ref 27–34)
MCHC RBC-ENTMCNC: 28.1 PG — SIGNIFICANT CHANGE UP (ref 27–34)
MCHC RBC-ENTMCNC: 28.4 PG — SIGNIFICANT CHANGE UP (ref 27–34)
MCHC RBC-ENTMCNC: 29.1 PG — SIGNIFICANT CHANGE UP (ref 27–34)
MCHC RBC-ENTMCNC: 29.3 PG — SIGNIFICANT CHANGE UP (ref 27–34)
MCHC RBC-ENTMCNC: 30 % — LOW (ref 32–36)
MCHC RBC-ENTMCNC: 30.3 % — LOW (ref 32–36)
MCHC RBC-ENTMCNC: 30.7 % — LOW (ref 32–36)
MCHC RBC-ENTMCNC: 30.8 % — LOW (ref 32–36)
MCHC RBC-ENTMCNC: 31.1 % — LOW (ref 32–36)
MCHC RBC-ENTMCNC: 31.1 % — LOW (ref 32–36)
MCHC RBC-ENTMCNC: 31.2 % — LOW (ref 32–36)
MCHC RBC-ENTMCNC: 31.3 % — LOW (ref 32–36)
MCHC RBC-ENTMCNC: 31.3 % — LOW (ref 32–36)
MCHC RBC-ENTMCNC: 31.4 % — LOW (ref 32–36)
MCHC RBC-ENTMCNC: 31.4 % — LOW (ref 32–36)
MCHC RBC-ENTMCNC: 31.5 % — LOW (ref 32–36)
MCHC RBC-ENTMCNC: 31.5 % — LOW (ref 32–36)
MCHC RBC-ENTMCNC: 31.6 % — LOW (ref 32–36)
MCHC RBC-ENTMCNC: 31.6 % — LOW (ref 32–36)
MCHC RBC-ENTMCNC: 31.7 % — LOW (ref 32–36)
MCHC RBC-ENTMCNC: 31.9 % — LOW (ref 32–36)
MCHC RBC-ENTMCNC: 32 % — SIGNIFICANT CHANGE UP (ref 32–36)
MCHC RBC-ENTMCNC: 32 G/DL — SIGNIFICANT CHANGE UP (ref 32–36)
MCHC RBC-ENTMCNC: 32.1 % — SIGNIFICANT CHANGE UP (ref 32–36)
MCHC RBC-ENTMCNC: 32.2 % — SIGNIFICANT CHANGE UP (ref 32–36)
MCHC RBC-ENTMCNC: 32.3 % — SIGNIFICANT CHANGE UP (ref 32–36)
MCHC RBC-ENTMCNC: 32.3 % — SIGNIFICANT CHANGE UP (ref 32–36)
MCHC RBC-ENTMCNC: 32.4 % — SIGNIFICANT CHANGE UP (ref 32–36)
MCHC RBC-ENTMCNC: 32.6 % — SIGNIFICANT CHANGE UP (ref 32–36)
MCHC RBC-ENTMCNC: 32.9 % — SIGNIFICANT CHANGE UP (ref 32–36)
MCHC RBC-ENTMCNC: 33 % — SIGNIFICANT CHANGE UP (ref 32–36)
MCHC RBC-ENTMCNC: 33.2 % — SIGNIFICANT CHANGE UP (ref 32–36)
MCHC RBC-ENTMCNC: 33.3 % — SIGNIFICANT CHANGE UP (ref 32–36)
MCHC RBC-ENTMCNC: 33.4 % — SIGNIFICANT CHANGE UP (ref 32–36)
MCHC RBC-ENTMCNC: 33.5 % — SIGNIFICANT CHANGE UP (ref 32–36)
MCHC RBC-ENTMCNC: 33.6 % — SIGNIFICANT CHANGE UP (ref 32–36)
MCHC RBC-ENTMCNC: 33.6 % — SIGNIFICANT CHANGE UP (ref 32–36)
MCHC RBC-ENTMCNC: 33.7 % — SIGNIFICANT CHANGE UP (ref 32–36)
MCHC RBC-ENTMCNC: 33.8 % — SIGNIFICANT CHANGE UP (ref 32–36)
MCHC RBC-ENTMCNC: 33.9 % — SIGNIFICANT CHANGE UP (ref 32–36)
MCHC RBC-ENTMCNC: 33.9 % — SIGNIFICANT CHANGE UP (ref 32–36)
MCHC RBC-ENTMCNC: 34 % — SIGNIFICANT CHANGE UP (ref 32–36)
MCHC RBC-ENTMCNC: 34.3 % — SIGNIFICANT CHANGE UP (ref 32–36)
MCHC RBC-ENTMCNC: 34.3 % — SIGNIFICANT CHANGE UP (ref 32–36)
MCHC RBC-ENTMCNC: 34.4 % — SIGNIFICANT CHANGE UP (ref 32–36)
MCHC RBC-ENTMCNC: 34.5 % — SIGNIFICANT CHANGE UP (ref 32–36)
MCHC RBC-ENTMCNC: 34.6 % — SIGNIFICANT CHANGE UP (ref 32–36)
MCHC RBC-ENTMCNC: 34.7 % — SIGNIFICANT CHANGE UP (ref 32–36)
MCV RBC AUTO: 78 FL — LOW (ref 80–100)
MCV RBC AUTO: 78.4 FL — LOW (ref 80–100)
MCV RBC AUTO: 78.6 FL — LOW (ref 80–100)
MCV RBC AUTO: 79.1 FL — LOW (ref 80–100)
MCV RBC AUTO: 79.2 FL — LOW (ref 80–100)
MCV RBC AUTO: 79.7 FL — LOW (ref 80–100)
MCV RBC AUTO: 80.1 FL — SIGNIFICANT CHANGE UP (ref 80–100)
MCV RBC AUTO: 80.2 FL — SIGNIFICANT CHANGE UP (ref 80–100)
MCV RBC AUTO: 80.2 FL — SIGNIFICANT CHANGE UP (ref 80–100)
MCV RBC AUTO: 80.4 FL — SIGNIFICANT CHANGE UP (ref 80–100)
MCV RBC AUTO: 80.4 FL — SIGNIFICANT CHANGE UP (ref 80–100)
MCV RBC AUTO: 80.8 FL — SIGNIFICANT CHANGE UP (ref 80–100)
MCV RBC AUTO: 80.8 FL — SIGNIFICANT CHANGE UP (ref 80–100)
MCV RBC AUTO: 80.9 FL — SIGNIFICANT CHANGE UP (ref 80–100)
MCV RBC AUTO: 80.9 FL — SIGNIFICANT CHANGE UP (ref 80–100)
MCV RBC AUTO: 81.1 FL — SIGNIFICANT CHANGE UP (ref 80–100)
MCV RBC AUTO: 81.3 FL — SIGNIFICANT CHANGE UP (ref 80–100)
MCV RBC AUTO: 81.4 FL — SIGNIFICANT CHANGE UP (ref 80–100)
MCV RBC AUTO: 81.5 FL — SIGNIFICANT CHANGE UP (ref 80–100)
MCV RBC AUTO: 81.6 FL — SIGNIFICANT CHANGE UP (ref 80–100)
MCV RBC AUTO: 81.8 FL — SIGNIFICANT CHANGE UP (ref 80–100)
MCV RBC AUTO: 81.9 FL — SIGNIFICANT CHANGE UP (ref 80–100)
MCV RBC AUTO: 81.9 FL — SIGNIFICANT CHANGE UP (ref 80–100)
MCV RBC AUTO: 82 FL — SIGNIFICANT CHANGE UP (ref 80–100)
MCV RBC AUTO: 82.2 FL — SIGNIFICANT CHANGE UP (ref 80–100)
MCV RBC AUTO: 82.3 FL — SIGNIFICANT CHANGE UP (ref 80–100)
MCV RBC AUTO: 82.4 FL — SIGNIFICANT CHANGE UP (ref 80–100)
MCV RBC AUTO: 82.4 FL — SIGNIFICANT CHANGE UP (ref 80–100)
MCV RBC AUTO: 82.5 FL — SIGNIFICANT CHANGE UP (ref 80–100)
MCV RBC AUTO: 82.8 FL — SIGNIFICANT CHANGE UP (ref 80–100)
MCV RBC AUTO: 82.8 FL — SIGNIFICANT CHANGE UP (ref 80–100)
MCV RBC AUTO: 82.9 FL — SIGNIFICANT CHANGE UP (ref 80–100)
MCV RBC AUTO: 82.9 FL — SIGNIFICANT CHANGE UP (ref 80–100)
MCV RBC AUTO: 83 FL — SIGNIFICANT CHANGE UP (ref 80–100)
MCV RBC AUTO: 83.2 FL — SIGNIFICANT CHANGE UP (ref 80–100)
MCV RBC AUTO: 83.3 FL — SIGNIFICANT CHANGE UP (ref 80–100)
MCV RBC AUTO: 83.4 FL — SIGNIFICANT CHANGE UP (ref 80–100)
MCV RBC AUTO: 83.4 FL — SIGNIFICANT CHANGE UP (ref 80–100)
MCV RBC AUTO: 83.8 FL — SIGNIFICANT CHANGE UP (ref 80–100)
MCV RBC AUTO: 83.8 FL — SIGNIFICANT CHANGE UP (ref 80–100)
MCV RBC AUTO: 84 FL — SIGNIFICANT CHANGE UP (ref 80–100)
MCV RBC AUTO: 84.4 FL — SIGNIFICANT CHANGE UP (ref 80–100)
MCV RBC AUTO: 85.2 FL — SIGNIFICANT CHANGE UP (ref 80–100)
MCV RBC AUTO: 85.5 FL — SIGNIFICANT CHANGE UP (ref 80–100)
MCV RBC AUTO: 85.8 FL — SIGNIFICANT CHANGE UP (ref 80–100)
MCV RBC AUTO: 85.9 FL — SIGNIFICANT CHANGE UP (ref 80–100)
MCV RBC AUTO: 86 FL — SIGNIFICANT CHANGE UP (ref 80–100)
MCV RBC AUTO: 86.8 FL — SIGNIFICANT CHANGE UP (ref 80–100)
MCV RBC AUTO: 87.4 FL — SIGNIFICANT CHANGE UP (ref 80–100)
METAMYELOCYTES # FLD: 0.9 % — SIGNIFICANT CHANGE UP (ref 0–1)
METAMYELOCYTES # FLD: 0.9 % — SIGNIFICANT CHANGE UP (ref 0–1)
METAMYELOCYTES # FLD: 1.7 % — HIGH (ref 0–1)
METAMYELOCYTES # FLD: 3 % — HIGH (ref 0–1)
METHOD TYPE: SIGNIFICANT CHANGE UP
MICROCYTES BLD QL: SLIGHT — SIGNIFICANT CHANGE UP
MONOCYTES # BLD AUTO: 0.3 K/UL — SIGNIFICANT CHANGE UP (ref 0–0.9)
MONOCYTES # BLD AUTO: 0.32 K/UL — SIGNIFICANT CHANGE UP (ref 0–0.9)
MONOCYTES # BLD AUTO: 0.45 K/UL — SIGNIFICANT CHANGE UP (ref 0–0.9)
MONOCYTES # BLD AUTO: 0.51 K/UL — SIGNIFICANT CHANGE UP (ref 0–0.9)
MONOCYTES # BLD AUTO: 0.58 K/UL — SIGNIFICANT CHANGE UP (ref 0–0.9)
MONOCYTES # BLD AUTO: 0.64 K/UL — SIGNIFICANT CHANGE UP (ref 0–0.9)
MONOCYTES # BLD AUTO: 0.65 K/UL — SIGNIFICANT CHANGE UP (ref 0–0.9)
MONOCYTES # BLD AUTO: 0.71 K/UL — SIGNIFICANT CHANGE UP (ref 0–0.9)
MONOCYTES # BLD AUTO: 0.75 K/UL — SIGNIFICANT CHANGE UP (ref 0–0.9)
MONOCYTES # BLD AUTO: 0.75 K/UL — SIGNIFICANT CHANGE UP (ref 0–0.9)
MONOCYTES # BLD AUTO: 0.85 K/UL — SIGNIFICANT CHANGE UP (ref 0–0.9)
MONOCYTES # BLD AUTO: 0.9 K/UL — SIGNIFICANT CHANGE UP (ref 0–0.9)
MONOCYTES # BLD AUTO: 0.91 K/UL — HIGH (ref 0–0.9)
MONOCYTES # BLD AUTO: 0.92 K/UL — HIGH (ref 0–0.9)
MONOCYTES # BLD AUTO: 0.94 K/UL — HIGH (ref 0–0.9)
MONOCYTES # BLD AUTO: 0.97 K/UL — HIGH (ref 0–0.9)
MONOCYTES # BLD AUTO: 1.04 K/UL — HIGH (ref 0–0.9)
MONOCYTES # BLD AUTO: 1.12 K/UL — HIGH (ref 0–0.9)
MONOCYTES # BLD AUTO: 1.16 K/UL — HIGH (ref 0–0.9)
MONOCYTES # BLD AUTO: 1.18 K/UL — HIGH (ref 0–0.9)
MONOCYTES # BLD AUTO: 1.27 K/UL — HIGH (ref 0–0.9)
MONOCYTES # BLD AUTO: 1.29 K/UL — HIGH (ref 0–0.9)
MONOCYTES # BLD AUTO: 1.33 K/UL — HIGH (ref 0–0.9)
MONOCYTES # BLD AUTO: 1.34 K/UL — HIGH (ref 0–0.9)
MONOCYTES # BLD AUTO: 1.38 K/UL — HIGH (ref 0–0.9)
MONOCYTES # BLD AUTO: 1.45 K/UL — HIGH (ref 0–0.9)
MONOCYTES NFR BLD AUTO: 1.4 % — LOW (ref 2–14)
MONOCYTES NFR BLD AUTO: 1.5 % — LOW (ref 2–14)
MONOCYTES NFR BLD AUTO: 11.6 % — SIGNIFICANT CHANGE UP (ref 2–14)
MONOCYTES NFR BLD AUTO: 3.5 % — SIGNIFICANT CHANGE UP (ref 2–14)
MONOCYTES NFR BLD AUTO: 3.5 % — SIGNIFICANT CHANGE UP (ref 2–14)
MONOCYTES NFR BLD AUTO: 3.7 % — SIGNIFICANT CHANGE UP (ref 2–14)
MONOCYTES NFR BLD AUTO: 4 % — SIGNIFICANT CHANGE UP (ref 2–14)
MONOCYTES NFR BLD AUTO: 4.4 % — SIGNIFICANT CHANGE UP (ref 2–14)
MONOCYTES NFR BLD AUTO: 4.6 % — SIGNIFICANT CHANGE UP (ref 2–14)
MONOCYTES NFR BLD AUTO: 4.7 % — SIGNIFICANT CHANGE UP (ref 2–14)
MONOCYTES NFR BLD AUTO: 4.8 % — SIGNIFICANT CHANGE UP (ref 2–14)
MONOCYTES NFR BLD AUTO: 4.8 % — SIGNIFICANT CHANGE UP (ref 2–14)
MONOCYTES NFR BLD AUTO: 5 % — SIGNIFICANT CHANGE UP (ref 2–14)
MONOCYTES NFR BLD AUTO: 5.2 % — SIGNIFICANT CHANGE UP (ref 2–14)
MONOCYTES NFR BLD AUTO: 5.5 % — SIGNIFICANT CHANGE UP (ref 2–14)
MONOCYTES NFR BLD AUTO: 5.8 % — SIGNIFICANT CHANGE UP (ref 2–14)
MONOCYTES NFR BLD AUTO: 6.1 % — SIGNIFICANT CHANGE UP (ref 2–14)
MONOCYTES NFR BLD AUTO: 6.6 % — SIGNIFICANT CHANGE UP (ref 2–14)
MONOCYTES NFR BLD AUTO: 7 % — SIGNIFICANT CHANGE UP (ref 2–14)
MONOCYTES NFR BLD AUTO: 7.1 % — SIGNIFICANT CHANGE UP (ref 2–14)
MONOCYTES NFR BLD AUTO: 7.6 % — SIGNIFICANT CHANGE UP (ref 2–14)
MONOCYTES NFR BLD AUTO: 8 % — SIGNIFICANT CHANGE UP (ref 2–14)
MONOCYTES NFR BLD AUTO: 8.7 % — SIGNIFICANT CHANGE UP (ref 2–14)
MONOCYTES NFR BLD AUTO: 8.8 % — SIGNIFICANT CHANGE UP (ref 2–14)
MONOCYTES NFR BLD AUTO: 9.1 % — SIGNIFICANT CHANGE UP (ref 2–14)
MONOCYTES NFR BLD AUTO: 9.5 % — SIGNIFICANT CHANGE UP (ref 2–14)
MONOCYTES NFR BLD: 0 % — LOW (ref 2–9)
MONOCYTES NFR BLD: 0 % — LOW (ref 2–9)
MONOCYTES NFR BLD: 1.8 % — LOW (ref 2–9)
MONOCYTES NFR BLD: 12 % — HIGH (ref 2–9)
MONOCYTES NFR BLD: 2.6 % — SIGNIFICANT CHANGE UP (ref 2–9)
MONOCYTES NFR BLD: 3.5 % — SIGNIFICANT CHANGE UP (ref 2–9)
MONOCYTES NFR BLD: 5.2 % — SIGNIFICANT CHANGE UP (ref 2–9)
MYELOCYTES NFR BLD: 0.9 % — HIGH (ref 0–0)
MYELOCYTES NFR BLD: 0.9 % — HIGH (ref 0–0)
NEUTROPHIL AB SER-ACNC: 70.4 % — SIGNIFICANT CHANGE UP (ref 43–77)
NEUTROPHIL AB SER-ACNC: 80 % — HIGH (ref 43–77)
NEUTROPHIL AB SER-ACNC: 90.6 % — HIGH (ref 43–77)
NEUTROPHIL AB SER-ACNC: 93.9 % — HIGH (ref 43–77)
NEUTROPHIL AB SER-ACNC: 97.3 % — HIGH (ref 43–77)
NEUTROPHIL AB SER-ACNC: 98.2 % — HIGH (ref 43–77)
NEUTROPHIL AB SER-ACNC: 99.1 % — HIGH (ref 43–77)
NEUTROPHILS # BLD AUTO: 11.35 K/UL — HIGH (ref 1.8–7.4)
NEUTROPHILS # BLD AUTO: 12.34 K/UL — HIGH (ref 1.8–7.4)
NEUTROPHILS # BLD AUTO: 12.51 K/UL — HIGH (ref 1.8–7.4)
NEUTROPHILS # BLD AUTO: 13.26 K/UL — HIGH (ref 1.8–7.4)
NEUTROPHILS # BLD AUTO: 13.65 K/UL — HIGH (ref 1.8–7.4)
NEUTROPHILS # BLD AUTO: 13.92 K/UL — HIGH (ref 1.8–7.4)
NEUTROPHILS # BLD AUTO: 14.44 K/UL — HIGH (ref 1.8–7.4)
NEUTROPHILS # BLD AUTO: 19.1 K/UL — HIGH (ref 1.8–7.4)
NEUTROPHILS # BLD AUTO: 19.93 K/UL — HIGH (ref 1.8–7.4)
NEUTROPHILS # BLD AUTO: 20.13 K/UL — HIGH (ref 1.8–7.4)
NEUTROPHILS # BLD AUTO: 21.39 K/UL — HIGH (ref 1.8–7.4)
NEUTROPHILS # BLD AUTO: 22.44 K/UL — HIGH (ref 1.8–7.4)
NEUTROPHILS # BLD AUTO: 26.39 K/UL — HIGH (ref 1.8–7.4)
NEUTROPHILS # BLD AUTO: 26.5 K/UL — HIGH (ref 1.8–7.4)
NEUTROPHILS # BLD AUTO: 26.79 K/UL — HIGH (ref 1.8–7.4)
NEUTROPHILS # BLD AUTO: 26.9 K/UL — HIGH (ref 1.8–7.4)
NEUTROPHILS # BLD AUTO: 33.04 K/UL — HIGH (ref 1.8–7.4)
NEUTROPHILS # BLD AUTO: 4.74 K/UL — SIGNIFICANT CHANGE UP (ref 1.8–7.4)
NEUTROPHILS # BLD AUTO: 5.21 K/UL — SIGNIFICANT CHANGE UP (ref 1.8–7.4)
NEUTROPHILS # BLD AUTO: 6.64 K/UL — SIGNIFICANT CHANGE UP (ref 1.8–7.4)
NEUTROPHILS # BLD AUTO: 7.08 K/UL — SIGNIFICANT CHANGE UP (ref 1.8–7.4)
NEUTROPHILS # BLD AUTO: 7.87 K/UL — HIGH (ref 1.8–7.4)
NEUTROPHILS # BLD AUTO: 8.71 K/UL — HIGH (ref 1.8–7.4)
NEUTROPHILS # BLD AUTO: 9.23 K/UL — HIGH (ref 1.8–7.4)
NEUTROPHILS # BLD AUTO: 9.26 K/UL — HIGH (ref 1.8–7.4)
NEUTROPHILS # BLD AUTO: 9.28 K/UL — HIGH (ref 1.8–7.4)
NEUTROPHILS NFR BLD AUTO: 64.3 % — SIGNIFICANT CHANGE UP (ref 43–77)
NEUTROPHILS NFR BLD AUTO: 73.1 % — SIGNIFICANT CHANGE UP (ref 43–77)
NEUTROPHILS NFR BLD AUTO: 75.8 % — SIGNIFICANT CHANGE UP (ref 43–77)
NEUTROPHILS NFR BLD AUTO: 75.8 % — SIGNIFICANT CHANGE UP (ref 43–77)
NEUTROPHILS NFR BLD AUTO: 77.9 % — HIGH (ref 43–77)
NEUTROPHILS NFR BLD AUTO: 81.4 % — HIGH (ref 43–77)
NEUTROPHILS NFR BLD AUTO: 81.6 % — HIGH (ref 43–77)
NEUTROPHILS NFR BLD AUTO: 82.6 % — HIGH (ref 43–77)
NEUTROPHILS NFR BLD AUTO: 83 % — HIGH (ref 43–77)
NEUTROPHILS NFR BLD AUTO: 83.2 % — HIGH (ref 43–77)
NEUTROPHILS NFR BLD AUTO: 84.6 % — HIGH (ref 43–77)
NEUTROPHILS NFR BLD AUTO: 85.1 % — HIGH (ref 43–77)
NEUTROPHILS NFR BLD AUTO: 87.8 % — HIGH (ref 43–77)
NEUTROPHILS NFR BLD AUTO: 88.4 % — HIGH (ref 43–77)
NEUTROPHILS NFR BLD AUTO: 88.6 % — HIGH (ref 43–77)
NEUTROPHILS NFR BLD AUTO: 88.7 % — HIGH (ref 43–77)
NEUTROPHILS NFR BLD AUTO: 89.3 % — HIGH (ref 43–77)
NEUTROPHILS NFR BLD AUTO: 90.5 % — HIGH (ref 43–77)
NEUTROPHILS NFR BLD AUTO: 90.5 % — HIGH (ref 43–77)
NEUTROPHILS NFR BLD AUTO: 90.6 % — HIGH (ref 43–77)
NEUTROPHILS NFR BLD AUTO: 91.3 % — HIGH (ref 43–77)
NEUTROPHILS NFR BLD AUTO: 91.4 % — HIGH (ref 43–77)
NEUTROPHILS NFR BLD AUTO: 91.7 % — HIGH (ref 43–77)
NEUTROPHILS NFR BLD AUTO: 92 % — HIGH (ref 43–77)
NEUTROPHILS NFR BLD AUTO: 95.4 % — HIGH (ref 43–77)
NEUTROPHILS NFR BLD AUTO: 96.5 % — HIGH (ref 43–77)
NEUTS BAND # BLD: 0.9 % — SIGNIFICANT CHANGE UP (ref 0–6)
NEUTS BAND # BLD: 0.9 % — SIGNIFICANT CHANGE UP (ref 0–6)
NEUTS BAND # BLD: 1 % — SIGNIFICANT CHANGE UP (ref 0–6)
NEUTS BAND # BLD: 1.7 % — SIGNIFICANT CHANGE UP (ref 0–6)
NEUTS BAND # BLD: 19.1 % — HIGH (ref 0–6)
NITRITE UR-MCNC: NEGATIVE — SIGNIFICANT CHANGE UP
NRBC # BLD: 0 /100 WBCS — SIGNIFICANT CHANGE UP (ref 0–0)
NRBC # BLD: 0 /100WBC — SIGNIFICANT CHANGE UP
NRBC # FLD: 0 K/UL — SIGNIFICANT CHANGE UP (ref 0–0)
NRBC # FLD: 0.02 K/UL — SIGNIFICANT CHANGE UP (ref 0–0)
NRBC # FLD: 0.03 K/UL — SIGNIFICANT CHANGE UP (ref 0–0)
NRBC # FLD: 0.04 K/UL — SIGNIFICANT CHANGE UP (ref 0–0)
NRBC # FLD: 0.05 K/UL — SIGNIFICANT CHANGE UP (ref 0–0)
NRBC # FLD: 0.06 K/UL — SIGNIFICANT CHANGE UP (ref 0–0)
NRBC # FLD: 0.07 K/UL — SIGNIFICANT CHANGE UP (ref 0–0)
NRBC # FLD: 0.1 K/UL — SIGNIFICANT CHANGE UP (ref 0–0)
NRBC # FLD: 0.12 K/UL — SIGNIFICANT CHANGE UP (ref 0–0)
NT-PROBNP SERPL-SCNC: 143.9 PG/ML — SIGNIFICANT CHANGE UP
OB PNL STL: POSITIVE — SIGNIFICANT CHANGE UP
ORGANISM # SPEC MICROSCOPIC CNT: SIGNIFICANT CHANGE UP
ORGANISM # SPEC MICROSCOPIC CNT: SIGNIFICANT CHANGE UP
OSMOLALITY UR: 103 MOSMO/KG — SIGNIFICANT CHANGE UP (ref 50–1200)
OSMOLALITY UR: 201 MOSMO/KG — SIGNIFICANT CHANGE UP (ref 50–1200)
OSMOLALITY UR: 458 MOSMO/KG — SIGNIFICANT CHANGE UP (ref 50–1200)
OSMOLALITY UR: 465 MOSMO/KG — SIGNIFICANT CHANGE UP (ref 50–1200)
OTHER - HEMATOLOGY %: 0 — SIGNIFICANT CHANGE UP
OVALOCYTES BLD QL SMEAR: SIGNIFICANT CHANGE UP
OVALOCYTES BLD QL SMEAR: SLIGHT — SIGNIFICANT CHANGE UP
PCO2 BLDA: 28 MMHG — LOW (ref 32–48)
PCO2 BLDA: 35 MMHG — SIGNIFICANT CHANGE UP (ref 32–48)
PCO2 BLDA: 40 MMHG — SIGNIFICANT CHANGE UP (ref 32–48)
PCO2 BLDA: 43 MMHG — SIGNIFICANT CHANGE UP (ref 32–48)
PCO2 BLDA: 43 MMHG — SIGNIFICANT CHANGE UP (ref 32–48)
PCO2 BLDV: 34 MMHG — LOW (ref 41–51)
PCO2 BLDV: 41 MMHG — SIGNIFICANT CHANGE UP (ref 41–51)
PH BLDA: 7.34 PH — LOW (ref 7.35–7.45)
PH BLDA: 7.35 PH — SIGNIFICANT CHANGE UP (ref 7.35–7.45)
PH BLDA: 7.37 PH — SIGNIFICANT CHANGE UP (ref 7.35–7.45)
PH BLDA: 7.42 PH — SIGNIFICANT CHANGE UP (ref 7.35–7.45)
PH BLDA: 7.43 PH — SIGNIFICANT CHANGE UP (ref 7.35–7.45)
PH BLDV: 7.37 PH — SIGNIFICANT CHANGE UP (ref 7.32–7.43)
PH BLDV: 7.38 PH — SIGNIFICANT CHANGE UP (ref 7.32–7.43)
PH BLDV: 7.39 PH — SIGNIFICANT CHANGE UP (ref 7.32–7.43)
PH BLDV: 7.41 PH — SIGNIFICANT CHANGE UP (ref 7.32–7.43)
PH UR: 6 — SIGNIFICANT CHANGE UP (ref 5–8)
PH UR: 6.5 — SIGNIFICANT CHANGE UP (ref 5–8)
PH UR: 6.5 — SIGNIFICANT CHANGE UP (ref 5–8)
PHOSPHATE SERPL-MCNC: 1.7 MG/DL — LOW (ref 2.5–4.5)
PHOSPHATE SERPL-MCNC: 2 MG/DL — LOW (ref 2.5–4.5)
PHOSPHATE SERPL-MCNC: 2.1 MG/DL — LOW (ref 2.5–4.5)
PHOSPHATE SERPL-MCNC: 2.4 MG/DL — LOW (ref 2.5–4.5)
PHOSPHATE SERPL-MCNC: 2.5 MG/DL — SIGNIFICANT CHANGE UP (ref 2.5–4.5)
PHOSPHATE SERPL-MCNC: 2.6 MG/DL — SIGNIFICANT CHANGE UP (ref 2.5–4.5)
PHOSPHATE SERPL-MCNC: 2.7 MG/DL — SIGNIFICANT CHANGE UP (ref 2.5–4.5)
PHOSPHATE SERPL-MCNC: 2.8 MG/DL — SIGNIFICANT CHANGE UP (ref 2.5–4.5)
PHOSPHATE SERPL-MCNC: 2.9 MG/DL — SIGNIFICANT CHANGE UP (ref 2.5–4.5)
PHOSPHATE SERPL-MCNC: 3 MG/DL — SIGNIFICANT CHANGE UP (ref 2.5–4.5)
PHOSPHATE SERPL-MCNC: 3.1 MG/DL — SIGNIFICANT CHANGE UP (ref 2.5–4.5)
PHOSPHATE SERPL-MCNC: 3.1 MG/DL — SIGNIFICANT CHANGE UP (ref 2.5–4.5)
PHOSPHATE SERPL-MCNC: 3.2 MG/DL — SIGNIFICANT CHANGE UP (ref 2.5–4.5)
PHOSPHATE SERPL-MCNC: 3.3 MG/DL — SIGNIFICANT CHANGE UP (ref 2.5–4.5)
PHOSPHATE SERPL-MCNC: 3.4 MG/DL — SIGNIFICANT CHANGE UP (ref 2.5–4.5)
PHOSPHATE SERPL-MCNC: 3.5 MG/DL — SIGNIFICANT CHANGE UP (ref 2.5–4.5)
PHOSPHATE SERPL-MCNC: 3.6 MG/DL — SIGNIFICANT CHANGE UP (ref 2.5–4.5)
PHOSPHATE SERPL-MCNC: 3.7 MG/DL — SIGNIFICANT CHANGE UP (ref 2.5–4.5)
PHOSPHATE SERPL-MCNC: 4.1 MG/DL — SIGNIFICANT CHANGE UP (ref 2.5–4.5)
PLATELET # BLD AUTO: 105 K/UL — LOW (ref 150–400)
PLATELET # BLD AUTO: 111 K/UL — LOW (ref 150–400)
PLATELET # BLD AUTO: 121 K/UL — LOW (ref 150–400)
PLATELET # BLD AUTO: 130 K/UL — LOW (ref 150–400)
PLATELET # BLD AUTO: 142 K/UL — LOW (ref 150–400)
PLATELET # BLD AUTO: 169 K/UL — SIGNIFICANT CHANGE UP (ref 150–400)
PLATELET # BLD AUTO: 170 K/UL — SIGNIFICANT CHANGE UP (ref 150–400)
PLATELET # BLD AUTO: 171 K/UL — SIGNIFICANT CHANGE UP (ref 150–400)
PLATELET # BLD AUTO: 199 K/UL — SIGNIFICANT CHANGE UP (ref 150–400)
PLATELET # BLD AUTO: 203 K/UL — SIGNIFICANT CHANGE UP (ref 150–400)
PLATELET # BLD AUTO: 205 K/UL — SIGNIFICANT CHANGE UP (ref 150–400)
PLATELET # BLD AUTO: 216 K/UL — SIGNIFICANT CHANGE UP (ref 150–400)
PLATELET # BLD AUTO: 219 K/UL — SIGNIFICANT CHANGE UP (ref 150–400)
PLATELET # BLD AUTO: 225 K/UL — SIGNIFICANT CHANGE UP (ref 150–400)
PLATELET # BLD AUTO: 231 K/UL — SIGNIFICANT CHANGE UP (ref 150–400)
PLATELET # BLD AUTO: 242 K/UL — SIGNIFICANT CHANGE UP (ref 150–400)
PLATELET # BLD AUTO: 243 K/UL — SIGNIFICANT CHANGE UP (ref 150–400)
PLATELET # BLD AUTO: 248 K/UL — SIGNIFICANT CHANGE UP (ref 150–400)
PLATELET # BLD AUTO: 250 K/UL — SIGNIFICANT CHANGE UP (ref 150–400)
PLATELET # BLD AUTO: 255 K/UL — SIGNIFICANT CHANGE UP (ref 150–400)
PLATELET # BLD AUTO: 258 K/UL — SIGNIFICANT CHANGE UP (ref 150–400)
PLATELET # BLD AUTO: 263 K/UL — SIGNIFICANT CHANGE UP (ref 150–400)
PLATELET # BLD AUTO: 264 K/UL — SIGNIFICANT CHANGE UP (ref 150–400)
PLATELET # BLD AUTO: 264 K/UL — SIGNIFICANT CHANGE UP (ref 150–400)
PLATELET # BLD AUTO: 279 K/UL — SIGNIFICANT CHANGE UP (ref 150–400)
PLATELET # BLD AUTO: 284 K/UL — SIGNIFICANT CHANGE UP (ref 150–400)
PLATELET # BLD AUTO: 287 K/UL — SIGNIFICANT CHANGE UP (ref 150–400)
PLATELET # BLD AUTO: 29 K/UL — LOW (ref 150–400)
PLATELET # BLD AUTO: 305 K/UL — SIGNIFICANT CHANGE UP (ref 150–400)
PLATELET # BLD AUTO: 310 K/UL — SIGNIFICANT CHANGE UP (ref 150–400)
PLATELET # BLD AUTO: 313 K/UL — SIGNIFICANT CHANGE UP (ref 150–400)
PLATELET # BLD AUTO: 315 K/UL — SIGNIFICANT CHANGE UP (ref 150–400)
PLATELET # BLD AUTO: 325 K/UL — SIGNIFICANT CHANGE UP (ref 150–400)
PLATELET # BLD AUTO: 334 K/UL — SIGNIFICANT CHANGE UP (ref 150–400)
PLATELET # BLD AUTO: 337 K/UL — SIGNIFICANT CHANGE UP (ref 150–400)
PLATELET # BLD AUTO: 348 K/UL — SIGNIFICANT CHANGE UP (ref 150–400)
PLATELET # BLD AUTO: 357 K/UL — SIGNIFICANT CHANGE UP (ref 150–400)
PLATELET # BLD AUTO: 358 K/UL — SIGNIFICANT CHANGE UP (ref 150–400)
PLATELET # BLD AUTO: 358 K/UL — SIGNIFICANT CHANGE UP (ref 150–400)
PLATELET # BLD AUTO: 363 K/UL — SIGNIFICANT CHANGE UP (ref 150–400)
PLATELET # BLD AUTO: 367 K/UL — SIGNIFICANT CHANGE UP (ref 150–400)
PLATELET # BLD AUTO: 373 K/UL — SIGNIFICANT CHANGE UP (ref 150–400)
PLATELET # BLD AUTO: 374 K/UL — SIGNIFICANT CHANGE UP (ref 150–400)
PLATELET # BLD AUTO: 386 K/UL — SIGNIFICANT CHANGE UP (ref 150–400)
PLATELET # BLD AUTO: 389 K/UL — SIGNIFICANT CHANGE UP (ref 150–400)
PLATELET # BLD AUTO: 409 K/UL — HIGH (ref 150–400)
PLATELET # BLD AUTO: 42 K/UL — LOW (ref 150–400)
PLATELET # BLD AUTO: 423 K/UL — HIGH (ref 150–400)
PLATELET # BLD AUTO: 423 K/UL — HIGH (ref 150–400)
PLATELET # BLD AUTO: 440 K/UL — HIGH (ref 150–400)
PLATELET # BLD AUTO: 446 K/UL — HIGH (ref 150–400)
PLATELET # BLD AUTO: 447 K/UL — HIGH (ref 150–400)
PLATELET # BLD AUTO: 463 K/UL — HIGH (ref 150–400)
PLATELET # BLD AUTO: 472 K/UL — HIGH (ref 150–400)
PLATELET # BLD AUTO: 475 K/UL — HIGH (ref 150–400)
PLATELET # BLD AUTO: 486 K/UL — HIGH (ref 150–400)
PLATELET # BLD AUTO: 486 K/UL — HIGH (ref 150–400)
PLATELET # BLD AUTO: 487 K/UL — HIGH (ref 150–400)
PLATELET # BLD AUTO: 502 K/UL — HIGH (ref 150–400)
PLATELET # BLD AUTO: 514 K/UL — HIGH (ref 150–400)
PLATELET # BLD AUTO: 59 K/UL — LOW (ref 150–400)
PLATELET # BLD AUTO: 96 K/UL — LOW (ref 150–400)
PLATELET COUNT - ESTIMATE: NORMAL — SIGNIFICANT CHANGE UP
PLATELET COUNT - ESTIMATE: SIGNIFICANT CHANGE UP
PLATELET COUNT - ESTIMATE: SIGNIFICANT CHANGE UP
PMV BLD: 10 FL — SIGNIFICANT CHANGE UP (ref 7–13)
PMV BLD: 10.1 FL — SIGNIFICANT CHANGE UP (ref 7–13)
PMV BLD: 10.2 FL — SIGNIFICANT CHANGE UP (ref 7–13)
PMV BLD: 10.2 FL — SIGNIFICANT CHANGE UP (ref 7–13)
PMV BLD: 10.3 FL — SIGNIFICANT CHANGE UP (ref 7–13)
PMV BLD: 10.4 FL — SIGNIFICANT CHANGE UP (ref 7–13)
PMV BLD: 10.5 FL — SIGNIFICANT CHANGE UP (ref 7–13)
PMV BLD: 10.5 FL — SIGNIFICANT CHANGE UP (ref 7–13)
PMV BLD: 10.6 FL — SIGNIFICANT CHANGE UP (ref 7–13)
PMV BLD: 10.7 FL — SIGNIFICANT CHANGE UP (ref 7–13)
PMV BLD: 10.7 FL — SIGNIFICANT CHANGE UP (ref 7–13)
PMV BLD: 10.8 FL — SIGNIFICANT CHANGE UP (ref 7–13)
PMV BLD: 10.8 FL — SIGNIFICANT CHANGE UP (ref 7–13)
PMV BLD: 10.9 FL — SIGNIFICANT CHANGE UP (ref 7–13)
PMV BLD: 10.9 FL — SIGNIFICANT CHANGE UP (ref 7–13)
PMV BLD: 11 FL — SIGNIFICANT CHANGE UP (ref 7–13)
PMV BLD: 11.2 FL — SIGNIFICANT CHANGE UP (ref 7–13)
PMV BLD: 11.3 FL — SIGNIFICANT CHANGE UP (ref 7–13)
PMV BLD: 11.3 FL — SIGNIFICANT CHANGE UP (ref 7–13)
PMV BLD: 11.6 FL — SIGNIFICANT CHANGE UP (ref 7–13)
PMV BLD: 12.1 FL — SIGNIFICANT CHANGE UP (ref 7–13)
PMV BLD: 9.2 FL — SIGNIFICANT CHANGE UP (ref 7–13)
PMV BLD: 9.3 FL — SIGNIFICANT CHANGE UP (ref 7–13)
PMV BLD: 9.6 FL — SIGNIFICANT CHANGE UP (ref 7–13)
PMV BLD: 9.6 FL — SIGNIFICANT CHANGE UP (ref 7–13)
PMV BLD: 9.7 FL — SIGNIFICANT CHANGE UP (ref 7–13)
PMV BLD: 9.7 FL — SIGNIFICANT CHANGE UP (ref 7–13)
PMV BLD: 9.8 FL — SIGNIFICANT CHANGE UP (ref 7–13)
PMV BLD: 9.9 FL — SIGNIFICANT CHANGE UP (ref 7–13)
PMV BLD: SIGNIFICANT CHANGE UP FL (ref 7–13)
PO2 BLDA: 126 MMHG — HIGH (ref 83–108)
PO2 BLDA: 176 MMHG — HIGH (ref 83–108)
PO2 BLDA: 190 MMHG — HIGH (ref 83–108)
PO2 BLDA: 195 MMHG — HIGH (ref 83–108)
PO2 BLDA: 76 MMHG — LOW (ref 83–108)
PO2 BLDV: 19 MMHG — LOW (ref 35–40)
PO2 BLDV: 33 MMHG — LOW (ref 35–40)
PO2 BLDV: 34 MMHG — LOW (ref 35–40)
PO2 BLDV: < 24 MMHG — LOW (ref 35–40)
POIKILOCYTOSIS BLD QL AUTO: SIGNIFICANT CHANGE UP
POIKILOCYTOSIS BLD QL AUTO: SLIGHT — SIGNIFICANT CHANGE UP
POIKILOCYTOSIS BLD QL AUTO: SLIGHT — SIGNIFICANT CHANGE UP
POLYCHROMASIA BLD QL SMEAR: SLIGHT — SIGNIFICANT CHANGE UP
POTASSIUM BLDA-SCNC: 3.3 MMOL/L — LOW (ref 3.4–4.5)
POTASSIUM BLDA-SCNC: 3.3 MMOL/L — LOW (ref 3.4–4.5)
POTASSIUM BLDA-SCNC: 3.5 MMOL/L — SIGNIFICANT CHANGE UP (ref 3.4–4.5)
POTASSIUM BLDA-SCNC: 3.8 MMOL/L — SIGNIFICANT CHANGE UP (ref 3.4–4.5)
POTASSIUM BLDA-SCNC: 3.9 MMOL/L — SIGNIFICANT CHANGE UP (ref 3.4–4.5)
POTASSIUM BLDV-SCNC: 2.9 MMOL/L — CRITICAL LOW (ref 3.4–4.5)
POTASSIUM BLDV-SCNC: 3.2 MMOL/L — LOW (ref 3.4–4.5)
POTASSIUM BLDV-SCNC: 3.2 MMOL/L — LOW (ref 3.4–4.5)
POTASSIUM BLDV-SCNC: 3.5 MMOL/L — SIGNIFICANT CHANGE UP (ref 3.4–4.5)
POTASSIUM SERPL-MCNC: 2.9 MMOL/L — CRITICAL LOW (ref 3.5–5.3)
POTASSIUM SERPL-MCNC: 3.1 MMOL/L — LOW (ref 3.5–5.3)
POTASSIUM SERPL-MCNC: 3.1 MMOL/L — LOW (ref 3.5–5.3)
POTASSIUM SERPL-MCNC: 3.2 MMOL/L — LOW (ref 3.5–5.3)
POTASSIUM SERPL-MCNC: 3.2 MMOL/L — LOW (ref 3.5–5.3)
POTASSIUM SERPL-MCNC: 3.3 MMOL/L — LOW (ref 3.5–5.3)
POTASSIUM SERPL-MCNC: 3.4 MMOL/L — LOW (ref 3.5–5.3)
POTASSIUM SERPL-MCNC: 3.5 MMOL/L — SIGNIFICANT CHANGE UP (ref 3.5–5.3)
POTASSIUM SERPL-MCNC: 3.6 MMOL/L — SIGNIFICANT CHANGE UP (ref 3.5–5.3)
POTASSIUM SERPL-MCNC: 3.7 MMOL/L — SIGNIFICANT CHANGE UP (ref 3.5–5.3)
POTASSIUM SERPL-MCNC: 3.7 MMOL/L — SIGNIFICANT CHANGE UP (ref 3.5–5.3)
POTASSIUM SERPL-MCNC: 3.8 MMOL/L — SIGNIFICANT CHANGE UP (ref 3.5–5.3)
POTASSIUM SERPL-MCNC: 3.9 MMOL/L — SIGNIFICANT CHANGE UP (ref 3.5–5.3)
POTASSIUM SERPL-MCNC: 4 MMOL/L — SIGNIFICANT CHANGE UP (ref 3.5–5.3)
POTASSIUM SERPL-MCNC: 4.1 MMOL/L — SIGNIFICANT CHANGE UP (ref 3.5–5.3)
POTASSIUM SERPL-MCNC: 4.1 MMOL/L — SIGNIFICANT CHANGE UP (ref 3.5–5.3)
POTASSIUM SERPL-MCNC: 4.2 MMOL/L — SIGNIFICANT CHANGE UP (ref 3.5–5.3)
POTASSIUM SERPL-MCNC: 4.4 MMOL/L — SIGNIFICANT CHANGE UP (ref 3.5–5.3)
POTASSIUM SERPL-MCNC: 4.6 MMOL/L — SIGNIFICANT CHANGE UP (ref 3.5–5.3)
POTASSIUM SERPL-MCNC: 4.7 MMOL/L — SIGNIFICANT CHANGE UP (ref 3.5–5.3)
POTASSIUM SERPL-MCNC: 4.8 MMOL/L — SIGNIFICANT CHANGE UP (ref 3.5–5.3)
POTASSIUM SERPL-SCNC: 2.9 MMOL/L — CRITICAL LOW (ref 3.5–5.3)
POTASSIUM SERPL-SCNC: 3.1 MMOL/L — LOW (ref 3.5–5.3)
POTASSIUM SERPL-SCNC: 3.1 MMOL/L — LOW (ref 3.5–5.3)
POTASSIUM SERPL-SCNC: 3.2 MMOL/L — LOW (ref 3.5–5.3)
POTASSIUM SERPL-SCNC: 3.2 MMOL/L — LOW (ref 3.5–5.3)
POTASSIUM SERPL-SCNC: 3.3 MMOL/L — LOW (ref 3.5–5.3)
POTASSIUM SERPL-SCNC: 3.4 MMOL/L — LOW (ref 3.5–5.3)
POTASSIUM SERPL-SCNC: 3.5 MMOL/L — SIGNIFICANT CHANGE UP (ref 3.5–5.3)
POTASSIUM SERPL-SCNC: 3.6 MMOL/L — SIGNIFICANT CHANGE UP (ref 3.5–5.3)
POTASSIUM SERPL-SCNC: 3.7 MMOL/L — SIGNIFICANT CHANGE UP (ref 3.5–5.3)
POTASSIUM SERPL-SCNC: 3.7 MMOL/L — SIGNIFICANT CHANGE UP (ref 3.5–5.3)
POTASSIUM SERPL-SCNC: 3.8 MMOL/L — SIGNIFICANT CHANGE UP (ref 3.5–5.3)
POTASSIUM SERPL-SCNC: 3.9 MMOL/L — SIGNIFICANT CHANGE UP (ref 3.5–5.3)
POTASSIUM SERPL-SCNC: 4 MMOL/L
POTASSIUM SERPL-SCNC: 4 MMOL/L — SIGNIFICANT CHANGE UP (ref 3.5–5.3)
POTASSIUM SERPL-SCNC: 4.1 MMOL/L — SIGNIFICANT CHANGE UP (ref 3.5–5.3)
POTASSIUM SERPL-SCNC: 4.1 MMOL/L — SIGNIFICANT CHANGE UP (ref 3.5–5.3)
POTASSIUM SERPL-SCNC: 4.2 MMOL/L — SIGNIFICANT CHANGE UP (ref 3.5–5.3)
POTASSIUM SERPL-SCNC: 4.4 MMOL/L — SIGNIFICANT CHANGE UP (ref 3.5–5.3)
POTASSIUM SERPL-SCNC: 4.6 MMOL/L — SIGNIFICANT CHANGE UP (ref 3.5–5.3)
POTASSIUM SERPL-SCNC: 4.7 MMOL/L — SIGNIFICANT CHANGE UP (ref 3.5–5.3)
POTASSIUM SERPL-SCNC: 4.8 MMOL/L — SIGNIFICANT CHANGE UP (ref 3.5–5.3)
POTASSIUM UR-SCNC: 13.8 MMOL/L — SIGNIFICANT CHANGE UP
POTASSIUM UR-SCNC: 25.3 MMOL/L — SIGNIFICANT CHANGE UP
POTASSIUM UR-SCNC: 39.6 MMOL/L — SIGNIFICANT CHANGE UP
PROMYELOCYTES # FLD: 0 % — SIGNIFICANT CHANGE UP (ref 0–0)
PROT SERPL-MCNC: 3.3 G/DL — LOW (ref 6–8.3)
PROT SERPL-MCNC: 3.4 G/DL — LOW (ref 6–8.3)
PROT SERPL-MCNC: 3.5 G/DL — LOW (ref 6–8.3)
PROT SERPL-MCNC: 3.6 G/DL — LOW (ref 6–8.3)
PROT SERPL-MCNC: 3.6 G/DL — LOW (ref 6–8.3)
PROT SERPL-MCNC: 3.7 G/DL — LOW (ref 6–8.3)
PROT SERPL-MCNC: 3.8 G/DL — LOW (ref 6–8.3)
PROT SERPL-MCNC: 3.9 G/DL — LOW (ref 6–8.3)
PROT SERPL-MCNC: 3.9 G/DL — LOW (ref 6–8.3)
PROT SERPL-MCNC: 4 G/DL — LOW (ref 6–8.3)
PROT SERPL-MCNC: 4.1 G/DL — LOW (ref 6–8.3)
PROT SERPL-MCNC: 4.2 G/DL — LOW (ref 6–8.3)
PROT SERPL-MCNC: 4.2 G/DL — LOW (ref 6–8.3)
PROT SERPL-MCNC: 4.4 G/DL — LOW (ref 6–8.3)
PROT SERPL-MCNC: 4.6 G/DL — LOW (ref 6–8.3)
PROT SERPL-MCNC: 4.6 G/DL — LOW (ref 6–8.3)
PROT SERPL-MCNC: 4.7 G/DL — LOW (ref 6–8.3)
PROT SERPL-MCNC: 4.7 G/DL — LOW (ref 6–8.3)
PROT SERPL-MCNC: 4.8 G/DL — LOW (ref 6–8.3)
PROT SERPL-MCNC: 4.9 G/DL — LOW (ref 6–8.3)
PROT SERPL-MCNC: 5.2 G/DL — LOW (ref 6–8.3)
PROT SERPL-MCNC: 5.3 G/DL — LOW (ref 6–8.3)
PROT SERPL-MCNC: 5.4 G/DL — LOW (ref 6–8.3)
PROT SERPL-MCNC: 5.6 G/DL — LOW (ref 6–8.3)
PROT SERPL-MCNC: 6.2 G/DL — SIGNIFICANT CHANGE UP (ref 6–8.3)
PROT SERPL-MCNC: 6.3 G/DL
PROT SERPL-MCNC: 6.4 G/DL — SIGNIFICANT CHANGE UP (ref 6–8.3)
PROT SERPL-MCNC: 6.7 G/DL — SIGNIFICANT CHANGE UP (ref 6–8.3)
PROT SERPL-MCNC: 6.8 G/DL — SIGNIFICANT CHANGE UP (ref 6–8.3)
PROT SERPL-MCNC: 7.2 G/DL — SIGNIFICANT CHANGE UP (ref 6–8.3)
PROT UR-MCNC: 12.3 MG/DL — SIGNIFICANT CHANGE UP
PROT UR-MCNC: 20 — SIGNIFICANT CHANGE UP
PROT UR-MCNC: 70 — SIGNIFICANT CHANGE UP
PROT UR-MCNC: 9.1 MG/DL — SIGNIFICANT CHANGE UP
PROT UR-MCNC: NEGATIVE — SIGNIFICANT CHANGE UP
PROTHROM AB SERPL-ACNC: 11.3 SEC — SIGNIFICANT CHANGE UP (ref 10.6–13.6)
PROTHROM AB SERPL-ACNC: 11.4 SEC — SIGNIFICANT CHANGE UP (ref 10.6–13.6)
PROTHROM AB SERPL-ACNC: 11.8 SEC — SIGNIFICANT CHANGE UP (ref 10.6–13.6)
PROTHROM AB SERPL-ACNC: 11.9 SEC — SIGNIFICANT CHANGE UP (ref 10.6–13.6)
PROTHROM AB SERPL-ACNC: 12.2 SEC — SIGNIFICANT CHANGE UP (ref 10.6–13.6)
PROTHROM AB SERPL-ACNC: 12.5 SEC — SIGNIFICANT CHANGE UP (ref 10.6–13.6)
PROTHROM AB SERPL-ACNC: 12.8 SEC — SIGNIFICANT CHANGE UP (ref 10.6–13.6)
PROTHROM AB SERPL-ACNC: 13.1 SEC — SIGNIFICANT CHANGE UP (ref 10.6–13.6)
PROTHROM AB SERPL-ACNC: 13.6 SEC — SIGNIFICANT CHANGE UP (ref 10.6–13.6)
PROTHROM AB SERPL-ACNC: 15.3 SEC — HIGH (ref 10.6–13.6)
PROTHROM AB SERPL-ACNC: 17.1 SEC — HIGH (ref 10.6–13.6)
PROTHROM AB SERPL-ACNC: 17.2 SEC — HIGH (ref 10.6–13.6)
PROTHROM AB SERPL-ACNC: 17.5 SEC — HIGH (ref 10.6–13.6)
PROTHROM AB SERPL-ACNC: 17.7 SEC — HIGH (ref 10.6–13.6)
PROTHROM AB SERPL-ACNC: 18.5 SEC — HIGH (ref 10.6–13.6)
PROTHROM AB SERPL-ACNC: 20.8 SEC — HIGH (ref 10.6–13.6)
PROTHROM AB SERPL-ACNC: 21 SEC — HIGH (ref 10.6–13.6)
PROTHROM AB SERPL-ACNC: 21.5 SEC — HIGH (ref 10.6–13.6)
PTH-INTACT SERPL-MCNC: 28.89 PG/ML — SIGNIFICANT CHANGE UP (ref 15–65)
RAPID RVP RESULT: SIGNIFICANT CHANGE UP
RBC # BLD: 2.2 M/UL — LOW (ref 3.8–5.2)
RBC # BLD: 2.47 M/UL — LOW (ref 3.8–5.2)
RBC # BLD: 2.56 M/UL — LOW (ref 3.8–5.2)
RBC # BLD: 2.58 M/UL — LOW (ref 3.8–5.2)
RBC # BLD: 2.64 M/UL — LOW (ref 3.8–5.2)
RBC # BLD: 2.83 M/UL — LOW (ref 3.8–5.2)
RBC # BLD: 2.87 M/UL — LOW (ref 3.8–5.2)
RBC # BLD: 2.92 M/UL — LOW (ref 3.8–5.2)
RBC # BLD: 2.97 M/UL — LOW (ref 3.8–5.2)
RBC # BLD: 2.99 M/UL — LOW (ref 3.8–5.2)
RBC # BLD: 3.1 M/UL — LOW (ref 3.8–5.2)
RBC # BLD: 3.11 M/UL — LOW (ref 3.8–5.2)
RBC # BLD: 3.12 M/UL — LOW (ref 3.8–5.2)
RBC # BLD: 3.13 M/UL — LOW (ref 3.8–5.2)
RBC # BLD: 3.13 M/UL — LOW (ref 3.8–5.2)
RBC # BLD: 3.14 M/UL — LOW (ref 3.8–5.2)
RBC # BLD: 3.16 M/UL — LOW (ref 3.8–5.2)
RBC # BLD: 3.17 M/UL — LOW (ref 3.8–5.2)
RBC # BLD: 3.19 M/UL — LOW (ref 3.8–5.2)
RBC # BLD: 3.19 M/UL — LOW (ref 3.8–5.2)
RBC # BLD: 3.2 M/UL — LOW (ref 3.8–5.2)
RBC # BLD: 3.2 M/UL — LOW (ref 3.8–5.2)
RBC # BLD: 3.24 M/UL — LOW (ref 3.8–5.2)
RBC # BLD: 3.28 M/UL — LOW (ref 3.8–5.2)
RBC # BLD: 3.3 M/UL — LOW (ref 3.8–5.2)
RBC # BLD: 3.3 M/UL — LOW (ref 3.8–5.2)
RBC # BLD: 3.31 M/UL — LOW (ref 3.8–5.2)
RBC # BLD: 3.32 M/UL — LOW (ref 3.8–5.2)
RBC # BLD: 3.35 M/UL — LOW (ref 3.8–5.2)
RBC # BLD: 3.36 M/UL — LOW (ref 3.8–5.2)
RBC # BLD: 3.36 M/UL — LOW (ref 3.8–5.2)
RBC # BLD: 3.4 M/UL — LOW (ref 3.8–5.2)
RBC # BLD: 3.42 M/UL — LOW (ref 3.8–5.2)
RBC # BLD: 3.44 M/UL — LOW (ref 3.8–5.2)
RBC # BLD: 3.45 M/UL — LOW (ref 3.8–5.2)
RBC # BLD: 3.47 M/UL — LOW (ref 3.8–5.2)
RBC # BLD: 3.48 M/UL — LOW (ref 3.8–5.2)
RBC # BLD: 3.5 M/UL — LOW (ref 3.8–5.2)
RBC # BLD: 3.59 M/UL — LOW (ref 3.8–5.2)
RBC # BLD: 3.63 M/UL — LOW (ref 3.8–5.2)
RBC # BLD: 3.77 M/UL — LOW (ref 3.8–5.2)
RBC # BLD: 3.8 M/UL — SIGNIFICANT CHANGE UP (ref 3.8–5.2)
RBC # BLD: 3.8 M/UL — SIGNIFICANT CHANGE UP (ref 3.8–5.2)
RBC # BLD: 3.85 M/UL — SIGNIFICANT CHANGE UP (ref 3.8–5.2)
RBC # BLD: 4.09 M/UL — SIGNIFICANT CHANGE UP (ref 3.8–5.2)
RBC # BLD: 4.11 M/UL — SIGNIFICANT CHANGE UP (ref 3.8–5.2)
RBC # BLD: 4.16 M/UL — SIGNIFICANT CHANGE UP (ref 3.8–5.2)
RBC # BLD: 4.23 M/UL — SIGNIFICANT CHANGE UP (ref 3.8–5.2)
RBC # BLD: 4.28 M/UL — SIGNIFICANT CHANGE UP (ref 3.8–5.2)
RBC # BLD: 4.36 M/UL — SIGNIFICANT CHANGE UP (ref 3.8–5.2)
RBC # BLD: 4.37 M/UL — SIGNIFICANT CHANGE UP (ref 3.8–5.2)
RBC # BLD: 4.43 M/UL — SIGNIFICANT CHANGE UP (ref 3.8–5.2)
RBC # BLD: 4.46 M/UL — SIGNIFICANT CHANGE UP (ref 3.8–5.2)
RBC # BLD: 4.49 M/UL — SIGNIFICANT CHANGE UP (ref 3.8–5.2)
RBC # BLD: 4.66 M/UL — SIGNIFICANT CHANGE UP (ref 3.8–5.2)
RBC # BLD: 4.74 M/UL — SIGNIFICANT CHANGE UP (ref 3.8–5.2)
RBC # FLD: 13.2 % — SIGNIFICANT CHANGE UP (ref 10.3–14.5)
RBC # FLD: 13.3 % — SIGNIFICANT CHANGE UP (ref 10.3–14.5)
RBC # FLD: 13.5 % — SIGNIFICANT CHANGE UP (ref 10.3–14.5)
RBC # FLD: 13.6 % — SIGNIFICANT CHANGE UP (ref 10.3–14.5)
RBC # FLD: 13.6 % — SIGNIFICANT CHANGE UP (ref 10.3–14.5)
RBC # FLD: 13.7 % — SIGNIFICANT CHANGE UP (ref 10.3–14.5)
RBC # FLD: 13.8 % — SIGNIFICANT CHANGE UP (ref 10.3–14.5)
RBC # FLD: 13.9 % — SIGNIFICANT CHANGE UP (ref 10.3–14.5)
RBC # FLD: 14.1 % — SIGNIFICANT CHANGE UP (ref 10.3–14.5)
RBC # FLD: 14.6 % — HIGH (ref 10.3–14.5)
RBC # FLD: 15.3 % — HIGH (ref 10.3–14.5)
RBC # FLD: 15.6 % — HIGH (ref 10.3–14.5)
RBC # FLD: 16.1 % — HIGH (ref 10.3–14.5)
RBC # FLD: 16.3 % — HIGH (ref 10.3–14.5)
RBC # FLD: 16.8 % — HIGH (ref 10.3–14.5)
RBC # FLD: 16.8 % — HIGH (ref 10.3–14.5)
RBC # FLD: 17.2 % — HIGH (ref 10.3–14.5)
RBC # FLD: 17.5 % — HIGH (ref 10.3–14.5)
RBC # FLD: 17.8 % — HIGH (ref 10.3–14.5)
RBC # FLD: 17.8 % — HIGH (ref 10.3–14.5)
RBC # FLD: 17.9 % — HIGH (ref 10.3–14.5)
RBC # FLD: 17.9 % — HIGH (ref 10.3–14.5)
RBC # FLD: 18.2 % — HIGH (ref 10.3–14.5)
RBC # FLD: 18.4 % — HIGH (ref 10.3–14.5)
RBC # FLD: 18.4 % — HIGH (ref 10.3–14.5)
RBC # FLD: 18.5 % — HIGH (ref 10.3–14.5)
RBC # FLD: 18.6 % — HIGH (ref 10.3–14.5)
RBC # FLD: 18.6 % — HIGH (ref 10.3–14.5)
RBC # FLD: 18.7 % — HIGH (ref 10.3–14.5)
RBC # FLD: 18.8 % — HIGH (ref 10.3–14.5)
RBC # FLD: 18.9 % — HIGH (ref 10.3–14.5)
RBC # FLD: 19 % — HIGH (ref 10.3–14.5)
RBC # FLD: 19.1 % — HIGH (ref 10.3–14.5)
RBC # FLD: 19.1 % — HIGH (ref 10.3–14.5)
RBC # FLD: 19.2 % — HIGH (ref 10.3–14.5)
RBC # FLD: 19.3 % — HIGH (ref 10.3–14.5)
RBC # FLD: 19.3 % — HIGH (ref 10.3–14.5)
RBC # FLD: 19.4 % — HIGH (ref 10.3–14.5)
RBC # FLD: 19.5 % — HIGH (ref 10.3–14.5)
RBC # FLD: 19.5 % — HIGH (ref 10.3–14.5)
RBC # FLD: 20.1 % — HIGH (ref 10.3–14.5)
RBC # FLD: 20.2 % — HIGH (ref 10.3–14.5)
RBC # FLD: 20.2 % — HIGH (ref 10.3–14.5)
RBC # FLD: 20.4 % — HIGH (ref 10.3–14.5)
RBC # FLD: 20.4 % — HIGH (ref 10.3–14.5)
RBC # FLD: 20.5 % — HIGH (ref 10.3–14.5)
RBC # FLD: 21 % — HIGH (ref 10.3–14.5)
RBC CASTS # UR COMP ASSIST: HIGH (ref 0–?)
RBC CASTS # UR COMP ASSIST: SIGNIFICANT CHANGE UP (ref 0–?)
RBC CASTS # UR COMP ASSIST: SIGNIFICANT CHANGE UP (ref 0–?)
RETICS #: 31 K/UL — SIGNIFICANT CHANGE UP (ref 25–125)
RETICS/RBC NFR: 0.7 % — SIGNIFICANT CHANGE UP (ref 0.5–2.5)
REVIEW TO FOLLOW: YES — SIGNIFICANT CHANGE UP
REVIEW TO FOLLOW: YES — SIGNIFICANT CHANGE UP
RH IG SCN BLD-IMP: POSITIVE — SIGNIFICANT CHANGE UP
RSV RNA SPEC QL NAA+PROBE: SIGNIFICANT CHANGE UP
RV+EV RNA SPEC QL NAA+PROBE: SIGNIFICANT CHANGE UP
SAO2 % BLDA: 95.7 % — SIGNIFICANT CHANGE UP (ref 95–99)
SAO2 % BLDA: 98.5 % — SIGNIFICANT CHANGE UP (ref 95–99)
SAO2 % BLDA: 98.9 % — SIGNIFICANT CHANGE UP (ref 95–99)
SAO2 % BLDA: 99.4 % — HIGH (ref 95–99)
SAO2 % BLDA: 99.4 % — HIGH (ref 95–99)
SAO2 % BLDV: 22.7 % — LOW (ref 60–85)
SAO2 % BLDV: 25.9 % — LOW (ref 60–85)
SAO2 % BLDV: 60.1 % — SIGNIFICANT CHANGE UP (ref 60–85)
SAO2 % BLDV: 60.3 % — SIGNIFICANT CHANGE UP (ref 60–85)
SARS-COV-2 RNA SPEC QL NAA+PROBE: SIGNIFICANT CHANGE UP
SCHISTOCYTES BLD QL AUTO: SLIGHT — SIGNIFICANT CHANGE UP
SMUDGE CELLS # BLD: PRESENT — SIGNIFICANT CHANGE UP
SODIUM BLDA-SCNC: 132 MMOL/L — LOW (ref 136–146)
SODIUM BLDA-SCNC: 133 MMOL/L — LOW (ref 136–146)
SODIUM SERPL-SCNC: 118 MMOL/L — CRITICAL LOW (ref 135–145)
SODIUM SERPL-SCNC: 121 MMOL/L — LOW (ref 135–145)
SODIUM SERPL-SCNC: 122 MMOL/L — LOW (ref 135–145)
SODIUM SERPL-SCNC: 123 MMOL/L — LOW (ref 135–145)
SODIUM SERPL-SCNC: 124 MMOL/L — LOW (ref 135–145)
SODIUM SERPL-SCNC: 125 MMOL/L — LOW (ref 135–145)
SODIUM SERPL-SCNC: 126 MMOL/L — LOW (ref 135–145)
SODIUM SERPL-SCNC: 126 MMOL/L — LOW (ref 135–145)
SODIUM SERPL-SCNC: 127 MMOL/L — LOW (ref 135–145)
SODIUM SERPL-SCNC: 127 MMOL/L — LOW (ref 135–145)
SODIUM SERPL-SCNC: 128 MMOL/L
SODIUM SERPL-SCNC: 128 MMOL/L — LOW (ref 135–145)
SODIUM SERPL-SCNC: 129 MMOL/L — LOW (ref 135–145)
SODIUM SERPL-SCNC: 130 MMOL/L — LOW (ref 135–145)
SODIUM SERPL-SCNC: 130 MMOL/L — LOW (ref 135–145)
SODIUM SERPL-SCNC: 131 MMOL/L — LOW (ref 135–145)
SODIUM SERPL-SCNC: 132 MMOL/L — LOW (ref 135–145)
SODIUM SERPL-SCNC: 133 MMOL/L — LOW (ref 135–145)
SODIUM SERPL-SCNC: 134 MMOL/L — LOW (ref 135–145)
SODIUM SERPL-SCNC: 135 MMOL/L — SIGNIFICANT CHANGE UP (ref 135–145)
SODIUM SERPL-SCNC: 136 MMOL/L — SIGNIFICANT CHANGE UP (ref 135–145)
SODIUM SERPL-SCNC: 136 MMOL/L — SIGNIFICANT CHANGE UP (ref 135–145)
SODIUM SERPL-SCNC: 137 MMOL/L — SIGNIFICANT CHANGE UP (ref 135–145)
SODIUM SERPL-SCNC: 138 MMOL/L — SIGNIFICANT CHANGE UP (ref 135–145)
SODIUM SERPL-SCNC: 139 MMOL/L — SIGNIFICANT CHANGE UP (ref 135–145)
SODIUM SERPL-SCNC: 140 MMOL/L — SIGNIFICANT CHANGE UP (ref 135–145)
SODIUM UR-SCNC: 23 MMOL/L — SIGNIFICANT CHANGE UP
SODIUM UR-SCNC: < 20 MMOL/L — SIGNIFICANT CHANGE UP
SP GR SPEC: 1.01 — SIGNIFICANT CHANGE UP (ref 1–1.04)
SP GR SPEC: 1.02 — SIGNIFICANT CHANGE UP (ref 1–1.04)
SP GR SPEC: 1.04 — SIGNIFICANT CHANGE UP (ref 1–1.04)
SPECIMEN SOURCE: SIGNIFICANT CHANGE UP
SQUAMOUS # UR AUTO: SIGNIFICANT CHANGE UP
SQUAMOUS # UR AUTO: SIGNIFICANT CHANGE UP
T4 FREE SERPL-MCNC: 0.56 NG/DL — LOW (ref 0.9–1.8)
T4 FREE SERPL-MCNC: 1.07 NG/DL — SIGNIFICANT CHANGE UP (ref 0.9–1.8)
T4 FREE SERPL-MCNC: 1.2 NG/DL — SIGNIFICANT CHANGE UP (ref 0.9–1.8)
TARGETS BLD QL SMEAR: SIGNIFICANT CHANGE UP
TARGETS BLD QL SMEAR: SLIGHT — SIGNIFICANT CHANGE UP
TIBC SERPL-MCNC: 210 UG/DL
TRANSFERRIN SERPL-MCNC: 94 MG/DL — LOW (ref 200–360)
TROPONIN T, HIGH SENSITIVITY: 20 NG/L — SIGNIFICANT CHANGE UP (ref ?–14)
TROPONIN T, HIGH SENSITIVITY: 22 NG/L — SIGNIFICANT CHANGE UP (ref ?–14)
TSH SERPL-MCNC: 2.29 UIU/ML — SIGNIFICANT CHANGE UP (ref 0.27–4.2)
TSH SERPL-MCNC: 2.31 UIU/ML — SIGNIFICANT CHANGE UP (ref 0.27–4.2)
TSH SERPL-MCNC: 6.44 UIU/ML — HIGH (ref 0.27–4.2)
UIBC SERPL-MCNC: 107 UG/DL — LOW (ref 110–370)
UIBC SERPL-MCNC: 194 UG/DL
URATE SERPL-MCNC: 3.1 MG/DL — SIGNIFICANT CHANGE UP (ref 2.5–7)
UROBILINOGEN FLD QL: NORMAL — SIGNIFICANT CHANGE UP
UROBILINOGEN FLD QL: NORMAL — SIGNIFICANT CHANGE UP
UROBILINOGEN FLD QL: SIGNIFICANT CHANGE UP
VANCOMYCIN FLD-MCNC: 0.9 UG/ML — SIGNIFICANT CHANGE UP
VARIANT LYMPHS # BLD: 1.7 % — SIGNIFICANT CHANGE UP
VIT B12 SERPL-MCNC: >2000 PG/ML
WBC # BLD: 10.13 K/UL — SIGNIFICANT CHANGE UP (ref 3.8–10.5)
WBC # BLD: 10.32 K/UL — SIGNIFICANT CHANGE UP (ref 3.8–10.5)
WBC # BLD: 10.62 K/UL — HIGH (ref 3.8–10.5)
WBC # BLD: 10.88 K/UL — HIGH (ref 3.8–10.5)
WBC # BLD: 11.15 K/UL — HIGH (ref 3.8–10.5)
WBC # BLD: 11.17 K/UL — HIGH (ref 3.8–10.5)
WBC # BLD: 11.34 K/UL — HIGH (ref 3.8–10.5)
WBC # BLD: 11.51 K/UL — HIGH (ref 3.8–10.5)
WBC # BLD: 12.46 K/UL — HIGH (ref 3.8–10.5)
WBC # BLD: 12.72 K/UL — HIGH (ref 3.8–10.5)
WBC # BLD: 13.31 K/UL — HIGH (ref 3.8–10.5)
WBC # BLD: 14.93 K/UL — HIGH (ref 3.8–10.5)
WBC # BLD: 15.12 K/UL — HIGH (ref 3.8–10.5)
WBC # BLD: 15.54 K/UL — HIGH (ref 3.8–10.5)
WBC # BLD: 15.68 K/UL — HIGH (ref 3.8–10.5)
WBC # BLD: 15.73 K/UL — HIGH (ref 3.8–10.5)
WBC # BLD: 16 K/UL — HIGH (ref 3.8–10.5)
WBC # BLD: 16.29 K/UL — HIGH (ref 3.8–10.5)
WBC # BLD: 16.5 K/UL — HIGH (ref 3.8–10.5)
WBC # BLD: 16.98 K/UL — HIGH (ref 3.8–10.5)
WBC # BLD: 19.19 K/UL — HIGH (ref 3.8–10.5)
WBC # BLD: 19.84 K/UL — HIGH (ref 3.8–10.5)
WBC # BLD: 19.96 K/UL — HIGH (ref 3.8–10.5)
WBC # BLD: 20.87 K/UL — HIGH (ref 3.8–10.5)
WBC # BLD: 20.91 K/UL — HIGH (ref 3.8–10.5)
WBC # BLD: 21.11 K/UL — HIGH (ref 3.8–10.5)
WBC # BLD: 22.87 K/UL — HIGH (ref 3.8–10.5)
WBC # BLD: 24.12 K/UL — HIGH (ref 3.8–10.5)
WBC # BLD: 24.48 K/UL — HIGH (ref 3.8–10.5)
WBC # BLD: 26.21 K/UL — HIGH (ref 3.8–10.5)
WBC # BLD: 27.58 K/UL — HIGH (ref 3.8–10.5)
WBC # BLD: 29.16 K/UL — HIGH (ref 3.8–10.5)
WBC # BLD: 29.32 K/UL — HIGH (ref 3.8–10.5)
WBC # BLD: 29.44 K/UL — HIGH (ref 3.8–10.5)
WBC # BLD: 29.59 K/UL — HIGH (ref 3.8–10.5)
WBC # BLD: 29.92 K/UL — HIGH (ref 3.8–10.5)
WBC # BLD: 34.04 K/UL — HIGH (ref 3.8–10.5)
WBC # BLD: 35.03 K/UL — HIGH (ref 3.8–10.5)
WBC # BLD: 35.9 K/UL — HIGH (ref 3.8–10.5)
WBC # BLD: 39.72 K/UL — HIGH (ref 3.8–10.5)
WBC # BLD: 5.1 K/UL — SIGNIFICANT CHANGE UP (ref 3.8–10.5)
WBC # BLD: 5.21 K/UL — SIGNIFICANT CHANGE UP (ref 3.8–10.5)
WBC # BLD: 5.23 K/UL — SIGNIFICANT CHANGE UP (ref 3.8–10.5)
WBC # BLD: 5.54 K/UL — SIGNIFICANT CHANGE UP (ref 3.8–10.5)
WBC # BLD: 6.02 K/UL — SIGNIFICANT CHANGE UP (ref 3.8–10.5)
WBC # BLD: 6.08 K/UL — SIGNIFICANT CHANGE UP (ref 3.8–10.5)
WBC # BLD: 6.08 K/UL — SIGNIFICANT CHANGE UP (ref 3.8–10.5)
WBC # BLD: 6.25 K/UL — SIGNIFICANT CHANGE UP (ref 3.8–10.5)
WBC # BLD: 6.32 K/UL — SIGNIFICANT CHANGE UP (ref 3.8–10.5)
WBC # BLD: 6.38 K/UL — SIGNIFICANT CHANGE UP (ref 3.8–10.5)
WBC # BLD: 6.76 K/UL — SIGNIFICANT CHANGE UP (ref 3.8–10.5)
WBC # BLD: 6.76 K/UL — SIGNIFICANT CHANGE UP (ref 3.8–10.5)
WBC # BLD: 6.83 K/UL — SIGNIFICANT CHANGE UP (ref 3.8–10.5)
WBC # BLD: 7.23 K/UL — SIGNIFICANT CHANGE UP (ref 3.8–10.5)
WBC # BLD: 7.78 K/UL — SIGNIFICANT CHANGE UP (ref 3.8–10.5)
WBC # BLD: 7.94 K/UL — SIGNIFICANT CHANGE UP (ref 3.8–10.5)
WBC # BLD: 8.05 K/UL — SIGNIFICANT CHANGE UP (ref 3.8–10.5)
WBC # BLD: 8.63 K/UL — SIGNIFICANT CHANGE UP (ref 3.8–10.5)
WBC # BLD: 9.01 K/UL — SIGNIFICANT CHANGE UP (ref 3.8–10.5)
WBC # BLD: 9.36 K/UL — SIGNIFICANT CHANGE UP (ref 3.8–10.5)
WBC # BLD: 9.68 K/UL — SIGNIFICANT CHANGE UP (ref 3.8–10.5)
WBC # BLD: 9.93 K/UL — SIGNIFICANT CHANGE UP (ref 3.8–10.5)
WBC # FLD AUTO: 10.13 K/UL — SIGNIFICANT CHANGE UP (ref 3.8–10.5)
WBC # FLD AUTO: 10.32 K/UL — SIGNIFICANT CHANGE UP (ref 3.8–10.5)
WBC # FLD AUTO: 10.62 K/UL — HIGH (ref 3.8–10.5)
WBC # FLD AUTO: 10.88 K/UL — HIGH (ref 3.8–10.5)
WBC # FLD AUTO: 11.15 K/UL — HIGH (ref 3.8–10.5)
WBC # FLD AUTO: 11.17 K/UL — HIGH (ref 3.8–10.5)
WBC # FLD AUTO: 11.34 K/UL — HIGH (ref 3.8–10.5)
WBC # FLD AUTO: 11.51 K/UL — HIGH (ref 3.8–10.5)
WBC # FLD AUTO: 12.46 K/UL — HIGH (ref 3.8–10.5)
WBC # FLD AUTO: 12.72 K/UL — HIGH (ref 3.8–10.5)
WBC # FLD AUTO: 13.31 K/UL — HIGH (ref 3.8–10.5)
WBC # FLD AUTO: 14.93 K/UL — HIGH (ref 3.8–10.5)
WBC # FLD AUTO: 15.12 K/UL — HIGH (ref 3.8–10.5)
WBC # FLD AUTO: 15.54 K/UL — HIGH (ref 3.8–10.5)
WBC # FLD AUTO: 15.68 K/UL — HIGH (ref 3.8–10.5)
WBC # FLD AUTO: 15.73 K/UL — HIGH (ref 3.8–10.5)
WBC # FLD AUTO: 16 K/UL — HIGH (ref 3.8–10.5)
WBC # FLD AUTO: 16.29 K/UL — HIGH (ref 3.8–10.5)
WBC # FLD AUTO: 16.5 K/UL — HIGH (ref 3.8–10.5)
WBC # FLD AUTO: 16.98 K/UL — HIGH (ref 3.8–10.5)
WBC # FLD AUTO: 19.19 K/UL — HIGH (ref 3.8–10.5)
WBC # FLD AUTO: 19.84 K/UL — HIGH (ref 3.8–10.5)
WBC # FLD AUTO: 19.96 K/UL — HIGH (ref 3.8–10.5)
WBC # FLD AUTO: 20.87 K/UL — HIGH (ref 3.8–10.5)
WBC # FLD AUTO: 20.91 K/UL — HIGH (ref 3.8–10.5)
WBC # FLD AUTO: 21.11 K/UL — HIGH (ref 3.8–10.5)
WBC # FLD AUTO: 22.87 K/UL — HIGH (ref 3.8–10.5)
WBC # FLD AUTO: 24.12 K/UL — HIGH (ref 3.8–10.5)
WBC # FLD AUTO: 24.48 K/UL — HIGH (ref 3.8–10.5)
WBC # FLD AUTO: 26.21 K/UL — HIGH (ref 3.8–10.5)
WBC # FLD AUTO: 27.58 K/UL — HIGH (ref 3.8–10.5)
WBC # FLD AUTO: 29.16 K/UL — HIGH (ref 3.8–10.5)
WBC # FLD AUTO: 29.32 K/UL — HIGH (ref 3.8–10.5)
WBC # FLD AUTO: 29.44 K/UL — HIGH (ref 3.8–10.5)
WBC # FLD AUTO: 29.59 K/UL — HIGH (ref 3.8–10.5)
WBC # FLD AUTO: 29.92 K/UL — HIGH (ref 3.8–10.5)
WBC # FLD AUTO: 34.04 K/UL — HIGH (ref 3.8–10.5)
WBC # FLD AUTO: 35.03 K/UL — HIGH (ref 3.8–10.5)
WBC # FLD AUTO: 35.9 K/UL — HIGH (ref 3.8–10.5)
WBC # FLD AUTO: 39.72 K/UL — HIGH (ref 3.8–10.5)
WBC # FLD AUTO: 5.1 K/UL — SIGNIFICANT CHANGE UP (ref 3.8–10.5)
WBC # FLD AUTO: 5.21 K/UL — SIGNIFICANT CHANGE UP (ref 3.8–10.5)
WBC # FLD AUTO: 5.23 K/UL — SIGNIFICANT CHANGE UP (ref 3.8–10.5)
WBC # FLD AUTO: 5.54 K/UL — SIGNIFICANT CHANGE UP (ref 3.8–10.5)
WBC # FLD AUTO: 6.02 K/UL — SIGNIFICANT CHANGE UP (ref 3.8–10.5)
WBC # FLD AUTO: 6.08 K/UL — SIGNIFICANT CHANGE UP (ref 3.8–10.5)
WBC # FLD AUTO: 6.08 K/UL — SIGNIFICANT CHANGE UP (ref 3.8–10.5)
WBC # FLD AUTO: 6.25 K/UL — SIGNIFICANT CHANGE UP (ref 3.8–10.5)
WBC # FLD AUTO: 6.32 K/UL — SIGNIFICANT CHANGE UP (ref 3.8–10.5)
WBC # FLD AUTO: 6.38 K/UL — SIGNIFICANT CHANGE UP (ref 3.8–10.5)
WBC # FLD AUTO: 6.76 K/UL — SIGNIFICANT CHANGE UP (ref 3.8–10.5)
WBC # FLD AUTO: 6.76 K/UL — SIGNIFICANT CHANGE UP (ref 3.8–10.5)
WBC # FLD AUTO: 6.83 K/UL — SIGNIFICANT CHANGE UP (ref 3.8–10.5)
WBC # FLD AUTO: 7.23 K/UL — SIGNIFICANT CHANGE UP (ref 3.8–10.5)
WBC # FLD AUTO: 7.78 K/UL — SIGNIFICANT CHANGE UP (ref 3.8–10.5)
WBC # FLD AUTO: 7.94 K/UL — SIGNIFICANT CHANGE UP (ref 3.8–10.5)
WBC # FLD AUTO: 8.05 K/UL — SIGNIFICANT CHANGE UP (ref 3.8–10.5)
WBC # FLD AUTO: 8.63 K/UL — SIGNIFICANT CHANGE UP (ref 3.8–10.5)
WBC # FLD AUTO: 9.01 K/UL — SIGNIFICANT CHANGE UP (ref 3.8–10.5)
WBC # FLD AUTO: 9.36 K/UL — SIGNIFICANT CHANGE UP (ref 3.8–10.5)
WBC # FLD AUTO: 9.68 K/UL — SIGNIFICANT CHANGE UP (ref 3.8–10.5)
WBC # FLD AUTO: 9.93 K/UL — SIGNIFICANT CHANGE UP (ref 3.8–10.5)
WBC UR QL: HIGH (ref 0–?)
WBC UR QL: HIGH (ref 0–?)
WBC UR QL: SIGNIFICANT CHANGE UP (ref 0–?)

## 2020-01-01 PROCEDURE — 99291 CRITICAL CARE FIRST HOUR: CPT

## 2020-01-01 PROCEDURE — 76937 US GUIDE VASCULAR ACCESS: CPT | Mod: 26,59

## 2020-01-01 PROCEDURE — 99233 SBSQ HOSP IP/OBS HIGH 50: CPT | Mod: GC

## 2020-01-01 PROCEDURE — 99223 1ST HOSP IP/OBS HIGH 75: CPT

## 2020-01-01 PROCEDURE — 74177 CT ABD & PELVIS W/CONTRAST: CPT | Mod: 26

## 2020-01-01 PROCEDURE — 77262 THER RADIOLOGY TX PLNG INTRM: CPT

## 2020-01-01 PROCEDURE — 88342 IMHCHEM/IMCYTCHM 1ST ANTB: CPT | Mod: 26

## 2020-01-01 PROCEDURE — 73502 X-RAY EXAM HIP UNI 2-3 VIEWS: CPT | Mod: 26,RT

## 2020-01-01 PROCEDURE — 88341 IMHCHEM/IMCYTCHM EA ADD ANTB: CPT | Mod: 26

## 2020-01-01 PROCEDURE — 78815 PET IMAGE W/CT SKULL-THIGH: CPT

## 2020-01-01 PROCEDURE — 99222 1ST HOSP IP/OBS MODERATE 55: CPT

## 2020-01-01 PROCEDURE — 99232 SBSQ HOSP IP/OBS MODERATE 35: CPT

## 2020-01-01 PROCEDURE — 99233 SBSQ HOSP IP/OBS HIGH 50: CPT

## 2020-01-01 PROCEDURE — 86335 IMMUNFIX E-PHORSIS/URINE/CSF: CPT | Mod: 26

## 2020-01-01 PROCEDURE — 70552 MRI BRAIN STEM W/DYE: CPT | Mod: 26

## 2020-01-01 PROCEDURE — 93010 ELECTROCARDIOGRAM REPORT: CPT

## 2020-01-01 PROCEDURE — ZZZZZ: CPT

## 2020-01-01 PROCEDURE — 77012 CT SCAN FOR NEEDLE BIOPSY: CPT | Mod: 26

## 2020-01-01 PROCEDURE — 99232 SBSQ HOSP IP/OBS MODERATE 35: CPT | Mod: GC

## 2020-01-01 PROCEDURE — 88305 TISSUE EXAM BY PATHOLOGIST: CPT | Mod: 26

## 2020-01-01 PROCEDURE — 73552 X-RAY EXAM OF FEMUR 2/>: CPT | Mod: 26,RT

## 2020-01-01 PROCEDURE — 72157 MRI CHEST SPINE W/O & W/DYE: CPT | Mod: 26

## 2020-01-01 PROCEDURE — 99283 EMERGENCY DEPT VISIT LOW MDM: CPT | Mod: 25

## 2020-01-01 PROCEDURE — 70450 CT HEAD/BRAIN W/O DYE: CPT | Mod: 26

## 2020-01-01 PROCEDURE — 99239 HOSP IP/OBS DSCHRG MGMT >30: CPT | Mod: GC

## 2020-01-01 PROCEDURE — 77427 RADIATION TX MANAGEMENT X5: CPT

## 2020-01-01 PROCEDURE — 77295 3-D RADIOTHERAPY PLAN: CPT | Mod: 26

## 2020-01-01 PROCEDURE — 88173 CYTOPATH EVAL FNA REPORT: CPT | Mod: 26

## 2020-01-01 PROCEDURE — 71250 CT THORAX DX C-: CPT | Mod: 26

## 2020-01-01 PROCEDURE — 84165 PROTEIN E-PHORESIS SERUM: CPT | Mod: 26

## 2020-01-01 PROCEDURE — 99223 1ST HOSP IP/OBS HIGH 75: CPT | Mod: GC

## 2020-01-01 PROCEDURE — 74176 CT ABD & PELVIS W/O CONTRAST: CPT | Mod: 26

## 2020-01-01 PROCEDURE — 99239 HOSP IP/OBS DSCHRG MGMT >30: CPT

## 2020-01-01 PROCEDURE — 71045 X-RAY EXAM CHEST 1 VIEW: CPT | Mod: 26

## 2020-01-01 PROCEDURE — 77334 RADIATION TREATMENT AID(S): CPT | Mod: 26

## 2020-01-01 PROCEDURE — 74181 MRI ABDOMEN W/O CONTRAST: CPT | Mod: 26

## 2020-01-01 PROCEDURE — 20225 BONE BIOPSY TROCAR/NDL DEEP: CPT

## 2020-01-01 PROCEDURE — 72158 MRI LUMBAR SPINE W/O & W/DYE: CPT | Mod: 26

## 2020-01-01 PROCEDURE — 99221 1ST HOSP IP/OBS SF/LOW 40: CPT

## 2020-01-01 PROCEDURE — 73560 X-RAY EXAM OF KNEE 1 OR 2: CPT | Mod: 26,RT

## 2020-01-01 PROCEDURE — 99284 EMERGENCY DEPT VISIT MOD MDM: CPT

## 2020-01-01 PROCEDURE — 99024 POSTOP FOLLOW-UP VISIT: CPT

## 2020-01-01 PROCEDURE — 99285 EMERGENCY DEPT VISIT HI MDM: CPT

## 2020-01-01 PROCEDURE — 86334 IMMUNOFIX E-PHORESIS SERUM: CPT | Mod: 26

## 2020-01-01 PROCEDURE — 99497 ADVNCD CARE PLAN 30 MIN: CPT | Mod: GC,25

## 2020-01-01 PROCEDURE — 84166 PROTEIN E-PHORESIS/URINE/CSF: CPT | Mod: 26

## 2020-01-01 PROCEDURE — G0414: CPT

## 2020-01-01 PROCEDURE — 99291 CRITICAL CARE FIRST HOUR: CPT | Mod: 25

## 2020-01-01 PROCEDURE — 72190 X-RAY EXAM OF PELVIS: CPT | Mod: 26

## 2020-01-01 PROCEDURE — 43752 NASAL/OROGASTRIC W/TUBE PLMT: CPT

## 2020-01-01 PROCEDURE — 27075 RESECT HIP TUMOR: CPT | Mod: RT

## 2020-01-01 PROCEDURE — 73501 X-RAY EXAM HIP UNI 1 VIEW: CPT | Mod: 26,RT

## 2020-01-01 PROCEDURE — 93306 TTE W/DOPPLER COMPLETE: CPT | Mod: 26

## 2020-01-01 PROCEDURE — 12345: CPT | Mod: NC,GC

## 2020-01-01 PROCEDURE — 99222 1ST HOSP IP/OBS MODERATE 55: CPT | Mod: GC

## 2020-01-01 PROCEDURE — 71275 CT ANGIOGRAPHY CHEST: CPT | Mod: 26

## 2020-01-01 PROCEDURE — 74018 RADEX ABDOMEN 1 VIEW: CPT | Mod: 26

## 2020-01-01 PROCEDURE — 99223 1ST HOSP IP/OBS HIGH 75: CPT | Mod: AI

## 2020-01-01 PROCEDURE — 72125 CT NECK SPINE W/O DYE: CPT | Mod: 26

## 2020-01-01 PROCEDURE — 36569 INSJ PICC 5 YR+ W/O IMAGING: CPT

## 2020-01-01 PROCEDURE — 77280 THER RAD SIMULAJ FIELD SMPL: CPT | Mod: 26

## 2020-01-01 PROCEDURE — 72170 X-RAY EXAM OF PELVIS: CPT

## 2020-01-01 PROCEDURE — 88311 DECALCIFY TISSUE: CPT | Mod: 26

## 2020-01-01 PROCEDURE — 27130 TOTAL HIP ARTHROPLASTY: CPT | Mod: RT

## 2020-01-01 PROCEDURE — 88360 TUMOR IMMUNOHISTOCHEM/MANUAL: CPT | Mod: 26,59

## 2020-01-01 PROCEDURE — 99215 OFFICE O/P EST HI 40 MIN: CPT

## 2020-01-01 PROCEDURE — 71045 X-RAY EXAM CHEST 1 VIEW: CPT | Mod: 26,76

## 2020-01-01 PROCEDURE — 93971 EXTREMITY STUDY: CPT | Mod: 26

## 2020-01-01 PROCEDURE — 72197 MRI PELVIS W/O & W/DYE: CPT | Mod: 26

## 2020-01-01 PROCEDURE — 77300 RADIATION THERAPY DOSE PLAN: CPT | Mod: 26

## 2020-01-01 PROCEDURE — A9552: CPT

## 2020-01-01 PROCEDURE — 78815 PET IMAGE W/CT SKULL-THIGH: CPT | Mod: 26,PI

## 2020-01-01 PROCEDURE — 76376 3D RENDER W/INTRP POSTPROCES: CPT | Mod: 26

## 2020-01-01 PROCEDURE — 72192 CT PELVIS W/O DYE: CPT | Mod: 26

## 2020-01-01 PROCEDURE — 72156 MRI NECK SPINE W/O & W/DYE: CPT | Mod: 26

## 2020-01-01 PROCEDURE — 99354: CPT

## 2020-01-01 PROCEDURE — 72100 X-RAY EXAM L-S SPINE 2/3 VWS: CPT | Mod: 26

## 2020-01-01 RX ORDER — NOREPINEPHRINE BITARTRATE/D5W 8 MG/250ML
0.06 PLASTIC BAG, INJECTION (ML) INTRAVENOUS
Qty: 8 | Refills: 0 | Status: DISCONTINUED | OUTPATIENT
Start: 2020-01-01 | End: 2020-01-01

## 2020-01-01 RX ORDER — METRONIDAZOLE 500 MG
500 TABLET ORAL EVERY 8 HOURS
Refills: 0 | Status: DISCONTINUED | OUTPATIENT
Start: 2020-01-01 | End: 2020-01-01

## 2020-01-01 RX ORDER — LACTOBACILLUS ACIDOPHILUS 100MM CELL
1 CAPSULE ORAL
Qty: 0 | Refills: 0 | DISCHARGE
Start: 2020-01-01

## 2020-01-01 RX ORDER — ENOXAPARIN SODIUM 100 MG/ML
40 INJECTION SUBCUTANEOUS AT BEDTIME
Refills: 0 | Status: DISCONTINUED | OUTPATIENT
Start: 2020-01-01 | End: 2020-01-01

## 2020-01-01 RX ORDER — SODIUM CHLORIDE 9 MG/ML
1000 INJECTION, SOLUTION INTRAVENOUS
Refills: 0 | Status: DISCONTINUED | OUTPATIENT
Start: 2020-01-01 | End: 2020-01-01

## 2020-01-01 RX ORDER — OXYCODONE HYDROCHLORIDE 5 MG/1
1 TABLET ORAL
Qty: 0 | Refills: 0 | DISCHARGE

## 2020-01-01 RX ORDER — OXYCODONE HYDROCHLORIDE 5 MG/1
20 TABLET ORAL EVERY 12 HOURS
Refills: 0 | Status: DISCONTINUED | OUTPATIENT
Start: 2020-01-01 | End: 2020-01-01

## 2020-01-01 RX ORDER — CEFEPIME 1 G/1
INJECTION, POWDER, FOR SOLUTION INTRAMUSCULAR; INTRAVENOUS
Refills: 0 | Status: DISCONTINUED | OUTPATIENT
Start: 2020-01-01 | End: 2020-01-01

## 2020-01-01 RX ORDER — MORPHINE SULFATE 15 MG/1
15 TABLET, FILM COATED, EXTENDED RELEASE ORAL
Refills: 0 | Status: ACTIVE | COMMUNITY

## 2020-01-01 RX ORDER — DEXTROSE 50 % IN WATER 50 %
25 SYRINGE (ML) INTRAVENOUS ONCE
Refills: 0 | Status: DISCONTINUED | OUTPATIENT
Start: 2020-01-01 | End: 2020-01-01

## 2020-01-01 RX ORDER — OXYCODONE HYDROCHLORIDE 5 MG/1
10 TABLET ORAL EVERY 8 HOURS
Refills: 0 | Status: DISCONTINUED | OUTPATIENT
Start: 2020-01-01 | End: 2020-01-01

## 2020-01-01 RX ORDER — LIDOCAINE 4 G/100G
1 CREAM TOPICAL EVERY 24 HOURS
Refills: 0 | Status: DISCONTINUED | OUTPATIENT
Start: 2020-01-01 | End: 2020-01-01

## 2020-01-01 RX ORDER — OXYCODONE HYDROCHLORIDE 5 MG/1
20 TABLET ORAL EVERY 8 HOURS
Refills: 0 | Status: DISCONTINUED | OUTPATIENT
Start: 2020-01-01 | End: 2020-01-01

## 2020-01-01 RX ORDER — SODIUM CHLORIDE 9 MG/ML
1000 INJECTION INTRAMUSCULAR; INTRAVENOUS; SUBCUTANEOUS ONCE
Refills: 0 | Status: COMPLETED | OUTPATIENT
Start: 2020-01-01 | End: 2020-01-01

## 2020-01-01 RX ORDER — LIDOCAINE 4 G/100G
1 CREAM TOPICAL DAILY
Refills: 0 | Status: DISCONTINUED | OUTPATIENT
Start: 2020-01-01 | End: 2020-01-01

## 2020-01-01 RX ORDER — LIDOCAINE 4 G/100G
1 CREAM TOPICAL
Qty: 0 | Refills: 0 | DISCHARGE
Start: 2020-01-01

## 2020-01-01 RX ORDER — ACETAMINOPHEN 325 MG/1
325 TABLET ORAL
Refills: 0 | Status: ACTIVE | COMMUNITY

## 2020-01-01 RX ORDER — POTASSIUM CHLORIDE 20 MEQ
40 PACKET (EA) ORAL ONCE
Refills: 0 | Status: COMPLETED | OUTPATIENT
Start: 2020-01-01 | End: 2020-01-01

## 2020-01-01 RX ORDER — OXYCODONE 10 MG/1
10 TABLET ORAL
Refills: 0 | Status: ACTIVE | COMMUNITY

## 2020-01-01 RX ORDER — ONDANSETRON 8 MG/1
4 TABLET, FILM COATED ORAL EVERY 8 HOURS
Refills: 0 | Status: COMPLETED | OUTPATIENT
Start: 2020-01-01 | End: 2020-01-01

## 2020-01-01 RX ORDER — MIDODRINE HYDROCHLORIDE 2.5 MG/1
20 TABLET ORAL ONCE
Refills: 0 | Status: COMPLETED | OUTPATIENT
Start: 2020-01-01 | End: 2020-01-01

## 2020-01-01 RX ORDER — LANOLIN ALCOHOL/MO/W.PET/CERES
3 CREAM (GRAM) TOPICAL AT BEDTIME
Refills: 0 | Status: DISCONTINUED | OUTPATIENT
Start: 2020-01-01 | End: 2020-01-01

## 2020-01-01 RX ORDER — ALBUMIN HUMAN 25 %
50 VIAL (ML) INTRAVENOUS ONCE
Refills: 0 | Status: COMPLETED | OUTPATIENT
Start: 2020-01-01 | End: 2020-01-01

## 2020-01-01 RX ORDER — LIDOCAINE 4 G/100G
2 CREAM TOPICAL DAILY
Refills: 0 | Status: DISCONTINUED | OUTPATIENT
Start: 2020-01-01 | End: 2020-01-01

## 2020-01-01 RX ORDER — HEPARIN SODIUM 5000 [USP'U]/ML
5000 INJECTION INTRAVENOUS; SUBCUTANEOUS EVERY 8 HOURS
Refills: 0 | Status: DISCONTINUED | OUTPATIENT
Start: 2020-01-01 | End: 2020-01-01

## 2020-01-01 RX ORDER — INFLUENZA VIRUS VACCINE 15; 15; 15; 15 UG/.5ML; UG/.5ML; UG/.5ML; UG/.5ML
0.5 SUSPENSION INTRAMUSCULAR ONCE
Refills: 0 | Status: DISCONTINUED | OUTPATIENT
Start: 2020-01-01 | End: 2020-01-01

## 2020-01-01 RX ORDER — PANTOPRAZOLE SODIUM 20 MG/1
40 TABLET, DELAYED RELEASE ORAL
Refills: 0 | Status: DISCONTINUED | OUTPATIENT
Start: 2020-01-01 | End: 2020-01-01

## 2020-01-01 RX ORDER — MIDODRINE HYDROCHLORIDE 2.5 MG/1
10 TABLET ORAL EVERY 8 HOURS
Refills: 0 | Status: DISCONTINUED | OUTPATIENT
Start: 2020-01-01 | End: 2020-01-01

## 2020-01-01 RX ORDER — MEROPENEM 1 G/30ML
1000 INJECTION INTRAVENOUS EVERY 8 HOURS
Refills: 0 | Status: DISCONTINUED | OUTPATIENT
Start: 2020-01-01 | End: 2020-01-01

## 2020-01-01 RX ORDER — SODIUM CHLORIDE 9 MG/ML
1000 INJECTION INTRAMUSCULAR; INTRAVENOUS; SUBCUTANEOUS
Refills: 0 | Status: DISCONTINUED | OUTPATIENT
Start: 2020-01-01 | End: 2020-01-01

## 2020-01-01 RX ORDER — APIXABAN 2.5 MG/1
1 TABLET, FILM COATED ORAL
Qty: 0 | Refills: 0 | DISCHARGE
Start: 2020-01-01

## 2020-01-01 RX ORDER — ONDANSETRON 8 MG/1
4 TABLET, FILM COATED ORAL ONCE
Refills: 0 | Status: COMPLETED | OUTPATIENT
Start: 2020-01-01 | End: 2020-01-01

## 2020-01-01 RX ORDER — MAGNESIUM SULFATE 500 MG/ML
2 VIAL (ML) INJECTION ONCE
Refills: 0 | Status: COMPLETED | OUTPATIENT
Start: 2020-01-01 | End: 2020-01-01

## 2020-01-01 RX ORDER — AZITHROMYCIN 500 MG/1
500 TABLET, FILM COATED ORAL ONCE
Refills: 0 | Status: COMPLETED | OUTPATIENT
Start: 2020-01-01 | End: 2020-01-01

## 2020-01-01 RX ORDER — LIDOCAINE 5% 700 MG/1
5 PATCH TOPICAL
Refills: 0 | Status: ACTIVE | COMMUNITY

## 2020-01-01 RX ORDER — APIXABAN 2.5 MG/1
2.5 TABLET, FILM COATED ORAL
Refills: 0 | Status: DISCONTINUED | OUTPATIENT
Start: 2020-01-01 | End: 2020-01-01

## 2020-01-01 RX ORDER — TOBRAMYCIN 0.3 %
2 DROPS OPHTHALMIC (EYE) EVERY 4 HOURS
Refills: 0 | Status: COMPLETED | OUTPATIENT
Start: 2020-01-01 | End: 2020-01-01

## 2020-01-01 RX ORDER — OXYCODONE HYDROCHLORIDE 5 MG/1
10 TABLET ORAL
Refills: 0 | Status: DISCONTINUED | OUTPATIENT
Start: 2020-01-01 | End: 2020-01-01

## 2020-01-01 RX ORDER — APIXABAN 2.5 MG/1
2 TABLET, FILM COATED ORAL
Qty: 0 | Refills: 0 | DISCHARGE
Start: 2020-01-01

## 2020-01-01 RX ORDER — SODIUM CHLORIDE 9 MG/ML
500 INJECTION INTRAMUSCULAR; INTRAVENOUS; SUBCUTANEOUS ONCE
Refills: 0 | Status: COMPLETED | OUTPATIENT
Start: 2020-01-01 | End: 2020-01-01

## 2020-01-01 RX ORDER — FUROSEMIDE 40 MG
40 TABLET ORAL ONCE
Refills: 0 | Status: COMPLETED | OUTPATIENT
Start: 2020-01-01 | End: 2020-01-01

## 2020-01-01 RX ORDER — PIPERACILLIN AND TAZOBACTAM 4; .5 G/20ML; G/20ML
3.38 INJECTION, POWDER, LYOPHILIZED, FOR SOLUTION INTRAVENOUS EVERY 8 HOURS
Refills: 0 | Status: DISCONTINUED | OUTPATIENT
Start: 2020-01-01 | End: 2020-01-01

## 2020-01-01 RX ORDER — HEPARIN SODIUM 5000 [USP'U]/ML
INJECTION INTRAVENOUS; SUBCUTANEOUS
Qty: 25000 | Refills: 0 | Status: DISCONTINUED | OUTPATIENT
Start: 2020-01-01 | End: 2020-01-01

## 2020-01-01 RX ORDER — ASPIRIN/CALCIUM CARB/MAGNESIUM 324 MG
325 TABLET ORAL DAILY
Refills: 0 | Status: DISCONTINUED | OUTPATIENT
Start: 2020-01-01 | End: 2020-01-01

## 2020-01-01 RX ORDER — HEPARIN SODIUM 5000 [USP'U]/ML
2000 INJECTION INTRAVENOUS; SUBCUTANEOUS EVERY 6 HOURS
Refills: 0 | Status: DISCONTINUED | OUTPATIENT
Start: 2020-01-01 | End: 2020-01-01

## 2020-01-01 RX ORDER — VANCOMYCIN HCL 1 G
1000 VIAL (EA) INTRAVENOUS EVERY 12 HOURS
Refills: 0 | Status: DISCONTINUED | OUTPATIENT
Start: 2020-01-01 | End: 2020-01-01

## 2020-01-01 RX ORDER — APIXABAN 2.5 MG/1
5 TABLET, FILM COATED ORAL
Refills: 0 | Status: DISCONTINUED | OUTPATIENT
Start: 2020-01-01 | End: 2020-01-01

## 2020-01-01 RX ORDER — METOPROLOL TARTRATE 50 MG
5 TABLET ORAL ONCE
Refills: 0 | Status: COMPLETED | OUTPATIENT
Start: 2020-01-01 | End: 2020-01-01

## 2020-01-01 RX ORDER — POLYETHYLENE GLYCOL 3350 17 G/17G
17 POWDER, FOR SOLUTION ORAL
Qty: 510 | Refills: 0
Start: 2020-01-01 | End: 2020-01-01

## 2020-01-01 RX ORDER — MIDODRINE HYDROCHLORIDE 2.5 MG/1
30 TABLET ORAL EVERY 8 HOURS
Refills: 0 | Status: DISCONTINUED | OUTPATIENT
Start: 2020-01-01 | End: 2020-01-01

## 2020-01-01 RX ORDER — POLYETHYLENE GLYCOL 3350 17 G/17G
17 POWDER, FOR SOLUTION ORAL
Refills: 0 | Status: DISCONTINUED | OUTPATIENT
Start: 2020-01-01 | End: 2020-01-01

## 2020-01-01 RX ORDER — ASPIRIN/CALCIUM CARB/MAGNESIUM 324 MG
1 TABLET ORAL
Qty: 0 | Refills: 0 | DISCHARGE
Start: 2020-01-01

## 2020-01-01 RX ORDER — OXYCODONE HYDROCHLORIDE 5 MG/1
5 TABLET ORAL EVERY 4 HOURS
Refills: 0 | Status: DISCONTINUED | OUTPATIENT
Start: 2020-01-01 | End: 2020-01-01

## 2020-01-01 RX ORDER — NOREPINEPHRINE BITARTRATE/D5W 8 MG/250ML
0.05 PLASTIC BAG, INJECTION (ML) INTRAVENOUS
Qty: 8 | Refills: 0 | Status: DISCONTINUED | OUTPATIENT
Start: 2020-01-01 | End: 2020-01-01

## 2020-01-01 RX ORDER — HALOPERIDOL DECANOATE 100 MG/ML
2.5 INJECTION INTRAMUSCULAR ONCE
Refills: 0 | Status: COMPLETED | OUTPATIENT
Start: 2020-01-01 | End: 2020-01-01

## 2020-01-01 RX ORDER — SODIUM CHLORIDE 9 MG/ML
500 INJECTION, SOLUTION INTRAVENOUS
Refills: 0 | Status: DISCONTINUED | OUTPATIENT
Start: 2020-01-01 | End: 2020-01-01

## 2020-01-01 RX ORDER — OXYCODONE HYDROCHLORIDE 5 MG/1
1 TABLET ORAL
Qty: 0 | Refills: 0 | DISCHARGE
Start: 2020-01-01

## 2020-01-01 RX ORDER — LANOLIN ALCOHOL/MO/W.PET/CERES
5 CREAM (GRAM) TOPICAL AT BEDTIME
Refills: 0 | Status: DISCONTINUED | OUTPATIENT
Start: 2020-01-01 | End: 2020-01-01

## 2020-01-01 RX ORDER — SENNA PLUS 8.6 MG/1
2 TABLET ORAL AT BEDTIME
Refills: 0 | Status: DISCONTINUED | OUTPATIENT
Start: 2020-01-01 | End: 2020-01-01

## 2020-01-01 RX ORDER — MORPHINE SULFATE 50 MG/1
2 CAPSULE, EXTENDED RELEASE ORAL ONCE
Refills: 0 | Status: DISCONTINUED | OUTPATIENT
Start: 2020-01-01 | End: 2020-01-01

## 2020-01-01 RX ORDER — APREPITANT 80 MG/1
20 CAPSULE ORAL ONCE
Refills: 0 | Status: DISCONTINUED | OUTPATIENT
Start: 2020-01-01 | End: 2020-01-01

## 2020-01-01 RX ORDER — SENNA PLUS 8.6 MG/1
2 TABLET ORAL
Qty: 60 | Refills: 0
Start: 2020-01-01 | End: 2020-01-01

## 2020-01-01 RX ORDER — POLYETHYLENE GLYCOL 3350 17 G/17G
17 POWDER, FOR SOLUTION ORAL
Refills: 0 | Status: ACTIVE | COMMUNITY

## 2020-01-01 RX ORDER — ACETAMINOPHEN 500 MG
1 TABLET ORAL
Qty: 0 | Refills: 0 | DISCHARGE
Start: 2020-01-01

## 2020-01-01 RX ORDER — LACTOBACILLUS ACIDOPHILUS 100MM CELL
0 CAPSULE ORAL
Qty: 0 | Refills: 0 | DISCHARGE
Start: 2020-01-01

## 2020-01-01 RX ORDER — HEPARIN SODIUM 5000 [USP'U]/ML
4000 INJECTION INTRAVENOUS; SUBCUTANEOUS ONCE
Refills: 0 | Status: COMPLETED | OUTPATIENT
Start: 2020-01-01 | End: 2020-01-01

## 2020-01-01 RX ORDER — APIXABAN 2.5 MG/1
10 TABLET, FILM COATED ORAL
Refills: 0 | Status: DISCONTINUED | OUTPATIENT
Start: 2020-01-01 | End: 2020-01-01

## 2020-01-01 RX ORDER — METHYLNALTREXONE BROMIDE 12 MG/.6ML
8 INJECTION, SOLUTION SUBCUTANEOUS ONCE
Refills: 0 | Status: COMPLETED | OUTPATIENT
Start: 2020-01-01 | End: 2020-01-01

## 2020-01-01 RX ORDER — PHYTONADIONE (VIT K1) 5 MG
5 TABLET ORAL ONCE
Refills: 0 | Status: COMPLETED | OUTPATIENT
Start: 2020-01-01 | End: 2020-01-01

## 2020-01-01 RX ORDER — POTASSIUM PHOSPHATE, MONOBASIC POTASSIUM PHOSPHATE, DIBASIC 236; 224 MG/ML; MG/ML
15 INJECTION, SOLUTION INTRAVENOUS ONCE
Refills: 0 | Status: COMPLETED | OUTPATIENT
Start: 2020-01-01 | End: 2020-01-01

## 2020-01-01 RX ORDER — LIDOCAINE 4 G/100G
2 CREAM TOPICAL EVERY 24 HOURS
Refills: 0 | Status: DISCONTINUED | OUTPATIENT
Start: 2020-01-01 | End: 2020-01-01

## 2020-01-01 RX ORDER — ALBUMIN HUMAN 25 %
250 VIAL (ML) INTRAVENOUS ONCE
Refills: 0 | Status: COMPLETED | OUTPATIENT
Start: 2020-01-01 | End: 2020-01-01

## 2020-01-01 RX ORDER — HALOPERIDOL DECANOATE 100 MG/ML
2.5 INJECTION INTRAMUSCULAR ONCE
Refills: 0 | Status: DISCONTINUED | OUTPATIENT
Start: 2020-01-01 | End: 2020-01-01

## 2020-01-01 RX ORDER — PIPERACILLIN AND TAZOBACTAM 4; .5 G/20ML; G/20ML
3.38 INJECTION, POWDER, LYOPHILIZED, FOR SOLUTION INTRAVENOUS EVERY 8 HOURS
Refills: 0 | Status: COMPLETED | OUTPATIENT
Start: 2020-01-01 | End: 2020-01-01

## 2020-01-01 RX ORDER — APIXABAN 2.5 MG/1
10 TABLET, FILM COATED ORAL EVERY 12 HOURS
Refills: 0 | Status: DISCONTINUED | OUTPATIENT
Start: 2020-01-01 | End: 2020-01-01

## 2020-01-01 RX ORDER — ONDANSETRON 8 MG/1
4 TABLET, FILM COATED ORAL
Refills: 0 | Status: DISCONTINUED | OUTPATIENT
Start: 2020-01-01 | End: 2020-01-01

## 2020-01-01 RX ORDER — OLANZAPINE 15 MG/1
2.5 TABLET, FILM COATED ORAL ONCE
Refills: 0 | Status: COMPLETED | OUTPATIENT
Start: 2020-01-01 | End: 2020-01-01

## 2020-01-01 RX ORDER — OXYCODONE HYDROCHLORIDE 5 MG/1
10 TABLET ORAL EVERY 12 HOURS
Refills: 0 | Status: DISCONTINUED | OUTPATIENT
Start: 2020-01-01 | End: 2020-01-01

## 2020-01-01 RX ORDER — HALOPERIDOL DECANOATE 100 MG/ML
2 INJECTION INTRAMUSCULAR ONCE
Refills: 0 | Status: COMPLETED | OUTPATIENT
Start: 2020-01-01 | End: 2020-01-01

## 2020-01-01 RX ORDER — SODIUM CHLORIDE 9 MG/ML
1000 INJECTION, SOLUTION INTRAVENOUS ONCE
Refills: 0 | Status: DISCONTINUED | OUTPATIENT
Start: 2020-01-01 | End: 2020-01-01

## 2020-01-01 RX ORDER — TIGECYCLINE 50 MG/5ML
50 INJECTION, POWDER, LYOPHILIZED, FOR SOLUTION INTRAVENOUS EVERY 12 HOURS
Refills: 0 | Status: DISCONTINUED | OUTPATIENT
Start: 2020-01-01 | End: 2020-01-01

## 2020-01-01 RX ORDER — ONDANSETRON 8 MG/1
1 TABLET, FILM COATED ORAL
Qty: 28 | Refills: 0
Start: 2020-01-01 | End: 2020-01-01

## 2020-01-01 RX ORDER — MIDODRINE HYDROCHLORIDE 2.5 MG/1
20 TABLET ORAL EVERY 8 HOURS
Refills: 0 | Status: DISCONTINUED | OUTPATIENT
Start: 2020-01-01 | End: 2020-01-01

## 2020-01-01 RX ORDER — BENZOCAINE AND MENTHOL 5; 1 G/100ML; G/100ML
1 LIQUID ORAL
Refills: 0 | Status: DISCONTINUED | OUTPATIENT
Start: 2020-01-01 | End: 2020-01-01

## 2020-01-01 RX ORDER — HEPARIN SODIUM 5000 [USP'U]/ML
4000 INJECTION INTRAVENOUS; SUBCUTANEOUS EVERY 6 HOURS
Refills: 0 | Status: DISCONTINUED | OUTPATIENT
Start: 2020-01-01 | End: 2020-01-01

## 2020-01-01 RX ORDER — ONDANSETRON 8 MG/1
4 TABLET, FILM COATED ORAL EVERY 12 HOURS
Refills: 0 | Status: DISCONTINUED | OUTPATIENT
Start: 2020-01-01 | End: 2020-01-01

## 2020-01-01 RX ORDER — FENTANYL CITRATE 50 UG/ML
25 INJECTION INTRAVENOUS ONCE
Refills: 0 | Status: DISCONTINUED | OUTPATIENT
Start: 2020-01-01 | End: 2020-01-01

## 2020-01-01 RX ORDER — HYDROMORPHONE HYDROCHLORIDE 2 MG/ML
1 INJECTION INTRAMUSCULAR; INTRAVENOUS; SUBCUTANEOUS ONCE
Refills: 0 | Status: DISCONTINUED | OUTPATIENT
Start: 2020-01-01 | End: 2020-01-01

## 2020-01-01 RX ORDER — METRONIDAZOLE 500 MG
500 TABLET ORAL ONCE
Refills: 0 | Status: COMPLETED | OUTPATIENT
Start: 2020-01-01 | End: 2020-01-01

## 2020-01-01 RX ORDER — METRONIDAZOLE 500 MG
TABLET ORAL
Refills: 0 | Status: DISCONTINUED | OUTPATIENT
Start: 2020-01-01 | End: 2020-01-01

## 2020-01-01 RX ORDER — ACETAMINOPHEN 500 MG
975 TABLET ORAL EVERY 8 HOURS
Refills: 0 | Status: DISCONTINUED | OUTPATIENT
Start: 2020-01-01 | End: 2020-01-01

## 2020-01-01 RX ORDER — PANTOPRAZOLE SODIUM 20 MG/1
1 TABLET, DELAYED RELEASE ORAL
Qty: 0 | Refills: 0 | DISCHARGE
Start: 2020-01-01

## 2020-01-01 RX ORDER — VANCOMYCIN HCL 1 G
125 VIAL (EA) INTRAVENOUS EVERY 6 HOURS
Refills: 0 | Status: DISCONTINUED | OUTPATIENT
Start: 2020-01-01 | End: 2020-01-01

## 2020-01-01 RX ORDER — LINEZOLID 600 MG/300ML
600 INJECTION, SOLUTION INTRAVENOUS ONCE
Refills: 0 | Status: COMPLETED | OUTPATIENT
Start: 2020-01-01 | End: 2020-01-01

## 2020-01-01 RX ORDER — POLYETHYLENE GLYCOL 3350 17 G/17G
17 POWDER, FOR SOLUTION ORAL DAILY
Refills: 0 | Status: DISCONTINUED | OUTPATIENT
Start: 2020-01-01 | End: 2020-01-01

## 2020-01-01 RX ORDER — OXYCODONE HYDROCHLORIDE 5 MG/1
7.5 TABLET ORAL
Refills: 0 | Status: DISCONTINUED | OUTPATIENT
Start: 2020-01-01 | End: 2020-01-01

## 2020-01-01 RX ORDER — NYSTATIN CREAM 100000 [USP'U]/G
1 CREAM TOPICAL
Refills: 0 | Status: DISCONTINUED | OUTPATIENT
Start: 2020-01-01 | End: 2020-01-01

## 2020-01-01 RX ORDER — OXYCODONE HYDROCHLORIDE 5 MG/1
5 TABLET ORAL
Refills: 0 | Status: DISCONTINUED | OUTPATIENT
Start: 2020-01-01 | End: 2020-01-01

## 2020-01-01 RX ORDER — AZITHROMYCIN 500 MG/1
TABLET, FILM COATED ORAL
Refills: 0 | Status: DISCONTINUED | OUTPATIENT
Start: 2020-01-01 | End: 2020-01-01

## 2020-01-01 RX ORDER — SENNA PLUS 8.6 MG/1
2 TABLET ORAL
Qty: 0 | Refills: 0 | DISCHARGE
Start: 2020-01-01

## 2020-01-01 RX ORDER — KETOROLAC TROMETHAMINE 30 MG/ML
30 SYRINGE (ML) INJECTION EVERY 6 HOURS
Refills: 0 | Status: DISCONTINUED | OUTPATIENT
Start: 2020-01-01 | End: 2020-01-01

## 2020-01-01 RX ORDER — CEFEPIME 1 G/1
2000 INJECTION, POWDER, FOR SOLUTION INTRAMUSCULAR; INTRAVENOUS ONCE
Refills: 0 | Status: DISCONTINUED | OUTPATIENT
Start: 2020-01-01 | End: 2020-01-01

## 2020-01-01 RX ORDER — ASPIRIN/CALCIUM CARB/MAGNESIUM 324 MG
325 TABLET ORAL
Refills: 0 | Status: DISCONTINUED | OUTPATIENT
Start: 2020-01-01 | End: 2020-01-01

## 2020-01-01 RX ORDER — MULTIVITAMIN
TABLET ORAL
Refills: 0 | Status: ACTIVE | COMMUNITY

## 2020-01-01 RX ORDER — ENOXAPARIN SODIUM 100 MG/ML
40 INJECTION SUBCUTANEOUS DAILY
Refills: 0 | Status: DISCONTINUED | OUTPATIENT
Start: 2020-01-01 | End: 2020-01-01

## 2020-01-01 RX ORDER — IPRATROPIUM/ALBUTEROL SULFATE 18-103MCG
3 AEROSOL WITH ADAPTER (GRAM) INHALATION EVERY 6 HOURS
Refills: 0 | Status: DISCONTINUED | OUTPATIENT
Start: 2020-01-01 | End: 2020-01-01

## 2020-01-01 RX ORDER — SODIUM,POTASSIUM PHOSPHATES 278-250MG
2 POWDER IN PACKET (EA) ORAL THREE TIMES A DAY
Refills: 0 | Status: DISCONTINUED | OUTPATIENT
Start: 2020-01-01 | End: 2020-01-01

## 2020-01-01 RX ORDER — IBUPROFEN 200 MG
600 TABLET ORAL ONCE
Refills: 0 | Status: COMPLETED | OUTPATIENT
Start: 2020-01-01 | End: 2020-01-01

## 2020-01-01 RX ORDER — APIXABAN 2.5 MG/1
2.5 TABLET, FILM COATED ORAL EVERY 12 HOURS
Refills: 0 | Status: DISCONTINUED | OUTPATIENT
Start: 2020-01-01 | End: 2020-01-01

## 2020-01-01 RX ORDER — LACTOBACILLUS ACIDOPHILUS 100MM CELL
1 CAPSULE ORAL
Refills: 0 | Status: DISCONTINUED | OUTPATIENT
Start: 2020-01-01 | End: 2020-01-01

## 2020-01-01 RX ORDER — MORPHINE SULFATE 50 MG/1
4 CAPSULE, EXTENDED RELEASE ORAL ONCE
Refills: 0 | Status: DISCONTINUED | OUTPATIENT
Start: 2020-01-01 | End: 2020-01-01

## 2020-01-01 RX ORDER — MORPHINE SULFATE 50 MG/1
1 CAPSULE, EXTENDED RELEASE ORAL
Qty: 42 | Refills: 0
Start: 2020-01-01 | End: 2020-01-01

## 2020-01-01 RX ORDER — MIDAZOLAM HYDROCHLORIDE 1 MG/ML
0.5 INJECTION, SOLUTION INTRAMUSCULAR; INTRAVENOUS ONCE
Refills: 0 | Status: DISCONTINUED | OUTPATIENT
Start: 2020-01-01 | End: 2020-01-01

## 2020-01-01 RX ORDER — SODIUM CHLORIDE 9 MG/ML
1000 INJECTION, SOLUTION INTRAVENOUS ONCE
Refills: 0 | Status: COMPLETED | OUTPATIENT
Start: 2020-01-01 | End: 2020-01-01

## 2020-01-01 RX ORDER — LANOLIN ALCOHOL/MO/W.PET/CERES
0.5 CREAM (GRAM) TOPICAL AT BEDTIME
Refills: 0 | Status: DISCONTINUED | OUTPATIENT
Start: 2020-01-01 | End: 2020-01-01

## 2020-01-01 RX ORDER — APIXABAN 2.5 MG/1
10 TABLET, FILM COATED ORAL EVERY 12 HOURS
Refills: 0 | Status: COMPLETED | OUTPATIENT
Start: 2020-01-01 | End: 2020-01-01

## 2020-01-01 RX ORDER — BISACODYL 10 MG/1
10 SUPPOSITORY RECTAL
Refills: 0 | Status: ACTIVE | COMMUNITY

## 2020-01-01 RX ORDER — CHLORHEXIDINE GLUCONATE 213 G/1000ML
1 SOLUTION TOPICAL
Refills: 0 | Status: DISCONTINUED | OUTPATIENT
Start: 2020-01-01 | End: 2020-01-01

## 2020-01-01 RX ORDER — SODIUM CHLORIDE 9 MG/ML
500 INJECTION, SOLUTION INTRAVENOUS ONCE
Refills: 0 | Status: COMPLETED | OUTPATIENT
Start: 2020-01-01 | End: 2020-01-01

## 2020-01-01 RX ORDER — VANCOMYCIN HCL 1 G
500 VIAL (EA) INTRAVENOUS ONCE
Refills: 0 | Status: COMPLETED | OUTPATIENT
Start: 2020-01-01 | End: 2020-01-01

## 2020-01-01 RX ORDER — NYSTATIN 100000 [USP'U]/G
POWDER TOPICAL
Refills: 0 | Status: ACTIVE | COMMUNITY

## 2020-01-01 RX ORDER — POVIDONE-IODINE 5 %
1 AEROSOL (ML) TOPICAL ONCE
Refills: 0 | Status: COMPLETED | OUTPATIENT
Start: 2020-01-01 | End: 2020-01-01

## 2020-01-01 RX ORDER — VANCOMYCIN HCL 1 G
1000 VIAL (EA) INTRAVENOUS ONCE
Refills: 0 | Status: COMPLETED | OUTPATIENT
Start: 2020-01-01 | End: 2020-01-01

## 2020-01-01 RX ORDER — MORPHINE SULFATE 50 MG/1
1 CAPSULE, EXTENDED RELEASE ORAL
Refills: 0 | Status: DISCONTINUED | OUTPATIENT
Start: 2020-01-01 | End: 2020-01-01

## 2020-01-01 RX ORDER — KETOROLAC TROMETHAMINE 30 MG/ML
30 SYRINGE (ML) INJECTION ONCE
Refills: 0 | Status: DISCONTINUED | OUTPATIENT
Start: 2020-01-01 | End: 2020-01-01

## 2020-01-01 RX ORDER — OXYCODONE HYDROCHLORIDE 5 MG/1
15 TABLET ORAL EVERY 8 HOURS
Refills: 0 | Status: DISCONTINUED | OUTPATIENT
Start: 2020-01-01 | End: 2020-01-01

## 2020-01-01 RX ORDER — POTASSIUM PHOSPHATE, MONOBASIC POTASSIUM PHOSPHATE, DIBASIC 236; 224 MG/ML; MG/ML
30 INJECTION, SOLUTION INTRAVENOUS ONCE
Refills: 0 | Status: COMPLETED | OUTPATIENT
Start: 2020-01-01 | End: 2020-01-01

## 2020-01-01 RX ORDER — VANCOMYCIN HCL 1 G
500 VIAL (EA) INTRAVENOUS EVERY 6 HOURS
Refills: 0 | Status: DISCONTINUED | OUTPATIENT
Start: 2020-01-01 | End: 2020-01-01

## 2020-01-01 RX ORDER — MEROPENEM 1 G/30ML
1000 INJECTION INTRAVENOUS EVERY 12 HOURS
Refills: 0 | Status: DISCONTINUED | OUTPATIENT
Start: 2020-01-01 | End: 2020-01-01

## 2020-01-01 RX ORDER — OXYCODONE HYDROCHLORIDE 5 MG/1
10 TABLET ORAL EVERY 8 HOURS
Refills: 0 | Status: COMPLETED | OUTPATIENT
Start: 2020-01-01 | End: 2020-01-01

## 2020-01-01 RX ORDER — SIMETHICONE 80 MG/1
1 TABLET, CHEWABLE ORAL
Qty: 0 | Refills: 0 | DISCHARGE
Start: 2020-01-01

## 2020-01-01 RX ORDER — OXYCODONE HYDROCHLORIDE 5 MG/1
1 TABLET ORAL
Qty: 42 | Refills: 0
Start: 2020-01-01 | End: 2020-01-01

## 2020-01-01 RX ORDER — LANOLIN ALCOHOL/MO/W.PET/CERES
1 CREAM (GRAM) TOPICAL
Qty: 0 | Refills: 0 | DISCHARGE
Start: 2020-01-01

## 2020-01-01 RX ORDER — OXYCODONE HYDROCHLORIDE 5 MG/1
10 TABLET ORAL ONCE
Refills: 0 | Status: DISCONTINUED | OUTPATIENT
Start: 2020-01-01 | End: 2020-01-01

## 2020-01-01 RX ORDER — OXYCODONE HYDROCHLORIDE 5 MG/1
10 TABLET ORAL EVERY 4 HOURS
Refills: 0 | Status: DISCONTINUED | OUTPATIENT
Start: 2020-01-01 | End: 2020-01-01

## 2020-01-01 RX ORDER — SIMETHICONE 80 MG/1
80 TABLET, CHEWABLE ORAL THREE TIMES A DAY
Refills: 0 | Status: DISCONTINUED | OUTPATIENT
Start: 2020-01-01 | End: 2020-01-01

## 2020-01-01 RX ORDER — OXYCODONE HYDROCHLORIDE 5 MG/1
7.5 TABLET ORAL EVERY 4 HOURS
Refills: 0 | Status: DISCONTINUED | OUTPATIENT
Start: 2020-01-01 | End: 2020-01-01

## 2020-01-01 RX ORDER — APIXABAN 2.5 MG/1
5 TABLET, FILM COATED ORAL ONCE
Refills: 0 | Status: COMPLETED | OUTPATIENT
Start: 2020-01-01 | End: 2020-01-01

## 2020-01-01 RX ORDER — MIDODRINE HYDROCHLORIDE 2.5 MG/1
40 TABLET ORAL EVERY 8 HOURS
Refills: 0 | Status: DISCONTINUED | OUTPATIENT
Start: 2020-01-01 | End: 2020-01-01

## 2020-01-01 RX ORDER — MORPHINE SULFATE 50 MG/1
1 CAPSULE, EXTENDED RELEASE ORAL ONCE
Refills: 0 | Status: DISCONTINUED | OUTPATIENT
Start: 2020-01-01 | End: 2020-01-01

## 2020-01-01 RX ORDER — ONDANSETRON 8 MG/1
4 TABLET, FILM COATED ORAL ONCE
Refills: 0 | Status: DISCONTINUED | OUTPATIENT
Start: 2020-01-01 | End: 2020-01-01

## 2020-01-01 RX ORDER — GLUCOSAMINE HCL/CHONDROITIN SU 500-400 MG
3 CAPSULE ORAL
Refills: 0 | Status: ACTIVE | COMMUNITY

## 2020-01-01 RX ORDER — CEFEPIME 1 G/1
2000 INJECTION, POWDER, FOR SOLUTION INTRAMUSCULAR; INTRAVENOUS EVERY 8 HOURS
Refills: 0 | Status: DISCONTINUED | OUTPATIENT
Start: 2020-01-01 | End: 2020-01-01

## 2020-01-01 RX ORDER — OXYCODONE HYDROCHLORIDE 5 MG/1
0.5 TABLET ORAL
Qty: 56 | Refills: 0
Start: 2020-01-01 | End: 2020-01-01

## 2020-01-01 RX ORDER — OXYCODONE HYDROCHLORIDE 5 MG/1
1 TABLET ORAL
Qty: 112 | Refills: 0
Start: 2020-01-01 | End: 2020-01-01

## 2020-01-01 RX ORDER — MORPHINE SULFATE 50 MG/1
15 CAPSULE, EXTENDED RELEASE ORAL EVERY 8 HOURS
Refills: 0 | Status: DISCONTINUED | OUTPATIENT
Start: 2020-01-01 | End: 2020-01-01

## 2020-01-01 RX ORDER — ACETAMINOPHEN 500 MG
650 TABLET ORAL EVERY 6 HOURS
Refills: 0 | Status: DISCONTINUED | OUTPATIENT
Start: 2020-01-01 | End: 2020-01-01

## 2020-01-01 RX ORDER — METOCLOPRAMIDE 10 MG/1
10 TABLET ORAL
Qty: 60 | Refills: 5 | Status: ACTIVE | COMMUNITY
Start: 2020-01-01 | End: 1900-01-01

## 2020-01-01 RX ORDER — AZITHROMYCIN 500 MG/1
500 TABLET, FILM COATED ORAL EVERY 24 HOURS
Refills: 0 | Status: DISCONTINUED | OUTPATIENT
Start: 2020-01-01 | End: 2020-01-01

## 2020-01-01 RX ORDER — AZITHROMYCIN 500 MG/1
500 TABLET, FILM COATED ORAL ONCE
Refills: 0 | Status: DISCONTINUED | OUTPATIENT
Start: 2020-01-01 | End: 2020-01-01

## 2020-01-01 RX ORDER — MORPHINE SULFATE 50 MG/1
2 CAPSULE, EXTENDED RELEASE ORAL EVERY 4 HOURS
Refills: 0 | Status: DISCONTINUED | OUTPATIENT
Start: 2020-01-01 | End: 2020-01-01

## 2020-01-01 RX ORDER — MORPHINE SULFATE 50 MG/1
2 CAPSULE, EXTENDED RELEASE ORAL
Refills: 0 | Status: DISCONTINUED | OUTPATIENT
Start: 2020-01-01 | End: 2020-01-01

## 2020-01-01 RX ORDER — SENNOSIDES 8.6 MG TABLETS 8.6 MG/1
8.6 TABLET ORAL
Refills: 0 | Status: ACTIVE | COMMUNITY

## 2020-01-01 RX ORDER — SODIUM CHLORIDE 9 MG/ML
250 INJECTION, SOLUTION INTRAVENOUS ONCE
Refills: 0 | Status: COMPLETED | OUTPATIENT
Start: 2020-01-01 | End: 2020-01-01

## 2020-01-01 RX ORDER — BENZOCAINE AND MENTHOL 5; 1 G/100ML; G/100ML
1 LIQUID ORAL
Qty: 0 | Refills: 0 | DISCHARGE
Start: 2020-01-01

## 2020-01-01 RX ORDER — MAGNESIUM SULFATE 500 MG/ML
1 VIAL (ML) INJECTION ONCE
Refills: 0 | Status: COMPLETED | OUTPATIENT
Start: 2020-01-01 | End: 2020-01-01

## 2020-01-01 RX ORDER — ONDANSETRON 4 MG/1
4 TABLET ORAL
Refills: 0 | Status: ACTIVE | COMMUNITY

## 2020-01-01 RX ORDER — FENTANYL CITRATE 50 UG/ML
25 INJECTION INTRAVENOUS
Refills: 0 | Status: DISCONTINUED | OUTPATIENT
Start: 2020-01-01 | End: 2020-01-01

## 2020-01-01 RX ORDER — TIGECYCLINE 50 MG/5ML
INJECTION, POWDER, LYOPHILIZED, FOR SOLUTION INTRAVENOUS
Refills: 0 | Status: DISCONTINUED | OUTPATIENT
Start: 2020-01-01 | End: 2020-01-01

## 2020-01-01 RX ORDER — GLUCAGON INJECTION, SOLUTION 0.5 MG/.1ML
1 INJECTION, SOLUTION SUBCUTANEOUS ONCE
Refills: 0 | Status: DISCONTINUED | OUTPATIENT
Start: 2020-01-01 | End: 2020-01-01

## 2020-01-01 RX ORDER — POTASSIUM CHLORIDE 20 MEQ
40 PACKET (EA) ORAL EVERY 4 HOURS
Refills: 0 | Status: COMPLETED | OUTPATIENT
Start: 2020-01-01 | End: 2020-01-01

## 2020-01-01 RX ORDER — OXYCODONE HYDROCHLORIDE 5 MG/1
5 TABLET ORAL EVERY 6 HOURS
Refills: 0 | Status: DISCONTINUED | OUTPATIENT
Start: 2020-01-01 | End: 2020-01-01

## 2020-01-01 RX ORDER — LACTULOSE 10 G/15ML
20 SOLUTION ORAL ONCE
Refills: 0 | Status: COMPLETED | OUTPATIENT
Start: 2020-01-01 | End: 2020-01-01

## 2020-01-01 RX ORDER — SODIUM,POTASSIUM PHOSPHATES 278-250MG
1 POWDER IN PACKET (EA) ORAL
Refills: 0 | Status: COMPLETED | OUTPATIENT
Start: 2020-01-01 | End: 2020-01-01

## 2020-01-01 RX ORDER — TIGECYCLINE 50 MG/5ML
100 INJECTION, POWDER, LYOPHILIZED, FOR SOLUTION INTRAVENOUS ONCE
Refills: 0 | Status: COMPLETED | OUTPATIENT
Start: 2020-01-01 | End: 2020-01-01

## 2020-01-01 RX ORDER — LIDOCAINE 4 G/100G
2 CREAM TOPICAL ONCE
Refills: 0 | Status: COMPLETED | OUTPATIENT
Start: 2020-01-01 | End: 2020-01-01

## 2020-01-01 RX ORDER — OXYCODONE AND ACETAMINOPHEN 5; 325 MG/1; MG/1
1 TABLET ORAL ONCE
Refills: 0 | Status: DISCONTINUED | OUTPATIENT
Start: 2020-01-01 | End: 2020-01-01

## 2020-01-01 RX ORDER — NYSTATIN CREAM 100000 [USP'U]/G
1 CREAM TOPICAL
Qty: 1 | Refills: 0
Start: 2020-01-01 | End: 2020-01-01

## 2020-01-01 RX ORDER — ONDANSETRON 8 MG/1
4 TABLET, FILM COATED ORAL EVERY 6 HOURS
Refills: 0 | Status: DISCONTINUED | OUTPATIENT
Start: 2020-01-01 | End: 2020-01-01

## 2020-01-01 RX ORDER — PIPERACILLIN AND TAZOBACTAM 4; .5 G/20ML; G/20ML
3.38 INJECTION, POWDER, LYOPHILIZED, FOR SOLUTION INTRAVENOUS ONCE
Refills: 0 | Status: COMPLETED | OUTPATIENT
Start: 2020-01-01 | End: 2020-01-01

## 2020-01-01 RX ORDER — POTASSIUM CHLORIDE 20 MEQ
20 PACKET (EA) ORAL ONCE
Refills: 0 | Status: COMPLETED | OUTPATIENT
Start: 2020-01-01 | End: 2020-01-01

## 2020-01-01 RX ORDER — DRONABINOL 2.5 MG
2.5 CAPSULE ORAL DAILY
Refills: 0 | Status: DISCONTINUED | OUTPATIENT
Start: 2020-01-01 | End: 2020-01-01

## 2020-01-01 RX ORDER — LINEZOLID 600 MG/300ML
INJECTION, SOLUTION INTRAVENOUS
Refills: 0 | Status: DISCONTINUED | OUTPATIENT
Start: 2020-01-01 | End: 2020-01-01

## 2020-01-01 RX ORDER — DEXTROSE 50 % IN WATER 50 %
12.5 SYRINGE (ML) INTRAVENOUS ONCE
Refills: 0 | Status: DISCONTINUED | OUTPATIENT
Start: 2020-01-01 | End: 2020-01-01

## 2020-01-01 RX ORDER — HALOPERIDOL DECANOATE 100 MG/ML
1 INJECTION INTRAMUSCULAR ONCE
Refills: 0 | Status: COMPLETED | OUTPATIENT
Start: 2020-01-01 | End: 2020-01-01

## 2020-01-01 RX ORDER — SODIUM,POTASSIUM PHOSPHATES 278-250MG
1 POWDER IN PACKET (EA) ORAL ONCE
Refills: 0 | Status: COMPLETED | OUTPATIENT
Start: 2020-01-01 | End: 2020-01-01

## 2020-01-01 RX ORDER — SODIUM,POTASSIUM PHOSPHATES 278-250MG
1 POWDER IN PACKET (EA) ORAL EVERY 4 HOURS
Refills: 0 | Status: COMPLETED | OUTPATIENT
Start: 2020-01-01 | End: 2020-01-01

## 2020-01-01 RX ORDER — POTASSIUM CHLORIDE 20 MEQ
10 PACKET (EA) ORAL
Refills: 0 | Status: COMPLETED | OUTPATIENT
Start: 2020-01-01 | End: 2020-01-01

## 2020-01-01 RX ORDER — CHLORHEXIDINE GLUCONATE 213 G/1000ML
1 SOLUTION TOPICAL ONCE
Refills: 0 | Status: COMPLETED | OUTPATIENT
Start: 2020-01-01 | End: 2020-01-01

## 2020-01-01 RX ORDER — BISACODYL 5 MG/1
5 TABLET, DELAYED RELEASE ORAL
Refills: 0 | Status: ACTIVE | COMMUNITY

## 2020-01-01 RX ORDER — PANTOPRAZOLE SODIUM 20 MG/1
40 TABLET, DELAYED RELEASE ORAL ONCE
Refills: 0 | Status: COMPLETED | OUTPATIENT
Start: 2020-01-01 | End: 2020-01-01

## 2020-01-01 RX ORDER — MORPHINE SULFATE 50 MG/1
0.5 CAPSULE, EXTENDED RELEASE ORAL ONCE
Refills: 0 | Status: DISCONTINUED | OUTPATIENT
Start: 2020-01-01 | End: 2020-01-01

## 2020-01-01 RX ORDER — CEFAZOLIN SODIUM 1 G
2000 VIAL (EA) INJECTION EVERY 8 HOURS
Refills: 0 | Status: COMPLETED | OUTPATIENT
Start: 2020-01-01 | End: 2020-01-01

## 2020-01-01 RX ORDER — ENOXAPARIN SODIUM 100 MG/ML
50 INJECTION SUBCUTANEOUS
Refills: 0 | Status: DISCONTINUED | OUTPATIENT
Start: 2020-01-01 | End: 2020-01-01

## 2020-01-01 RX ORDER — LIDOCAINE 4 G/100G
1 CREAM TOPICAL
Qty: 14 | Refills: 0
Start: 2020-01-01 | End: 2020-01-01

## 2020-01-01 RX ORDER — ACETAMINOPHEN 500 MG
500 TABLET ORAL ONCE
Refills: 0 | Status: COMPLETED | OUTPATIENT
Start: 2020-01-01 | End: 2020-01-01

## 2020-01-01 RX ORDER — LINEZOLID 600 MG/300ML
600 INJECTION, SOLUTION INTRAVENOUS EVERY 12 HOURS
Refills: 0 | Status: DISCONTINUED | OUTPATIENT
Start: 2020-01-01 | End: 2020-01-01

## 2020-01-01 RX ORDER — IPRATROPIUM/ALBUTEROL SULFATE 18-103MCG
3 AEROSOL WITH ADAPTER (GRAM) INHALATION EVERY 6 HOURS
Refills: 0 | Status: COMPLETED | OUTPATIENT
Start: 2020-01-01 | End: 2020-01-01

## 2020-01-01 RX ORDER — APIXABAN 2.5 MG/1
5 TABLET, FILM COATED ORAL EVERY 12 HOURS
Refills: 0 | Status: DISCONTINUED | OUTPATIENT
Start: 2020-01-01 | End: 2020-01-01

## 2020-01-01 RX ORDER — OXYCODONE HYDROCHLORIDE 5 MG/1
0.5 TABLET ORAL
Qty: 0 | Refills: 0 | DISCHARGE

## 2020-01-01 RX ORDER — HEPARIN SODIUM 5000 [USP'U]/ML
5000 INJECTION INTRAVENOUS; SUBCUTANEOUS ONCE
Refills: 0 | Status: DISCONTINUED | OUTPATIENT
Start: 2020-01-01 | End: 2020-01-01

## 2020-01-01 RX ORDER — ACETAMINOPHEN 500 MG
975 TABLET ORAL ONCE
Refills: 0 | Status: COMPLETED | OUTPATIENT
Start: 2020-01-01 | End: 2020-01-01

## 2020-01-01 RX ORDER — HYDROMORPHONE HYDROCHLORIDE 2 MG/ML
0.5 INJECTION INTRAMUSCULAR; INTRAVENOUS; SUBCUTANEOUS ONCE
Refills: 0 | Status: DISCONTINUED | OUTPATIENT
Start: 2020-01-01 | End: 2020-01-01

## 2020-01-01 RX ORDER — OXYCODONE AND ACETAMINOPHEN 5; 325 MG/1; MG/1
1 TABLET ORAL EVERY 6 HOURS
Refills: 0 | Status: DISCONTINUED | OUTPATIENT
Start: 2020-01-01 | End: 2020-01-01

## 2020-01-01 RX ORDER — CHLORHEXIDINE GLUCONATE 213 G/1000ML
1 SOLUTION TOPICAL DAILY
Refills: 0 | Status: DISCONTINUED | OUTPATIENT
Start: 2020-01-01 | End: 2020-01-01

## 2020-01-01 RX ORDER — GABAPENTIN 400 MG/1
1 CAPSULE ORAL
Qty: 0 | Refills: 0 | DISCHARGE
Start: 2020-01-01

## 2020-01-01 RX ORDER — HYDROCODONE BITARTRATE 50 MG/1
0.5 CAPSULE, EXTENDED RELEASE ORAL
Qty: 21 | Refills: 0
Start: 2020-01-01 | End: 2020-01-01

## 2020-01-01 RX ORDER — OXYCODONE HYDROCHLORIDE 5 MG/1
25 TABLET ORAL EVERY 8 HOURS
Refills: 0 | Status: DISCONTINUED | OUTPATIENT
Start: 2020-01-01 | End: 2020-01-01

## 2020-01-01 RX ORDER — LIDOCAINE 4 G/100G
0 CREAM TOPICAL
Qty: 0 | Refills: 0 | DISCHARGE
Start: 2020-01-01

## 2020-01-01 RX ORDER — ACETAMINOPHEN 500 MG
500 TABLET ORAL EVERY 4 HOURS
Refills: 0 | Status: DISCONTINUED | OUTPATIENT
Start: 2020-01-01 | End: 2020-01-01

## 2020-01-01 RX ORDER — GABAPENTIN 400 MG/1
100 CAPSULE ORAL THREE TIMES A DAY
Refills: 0 | Status: DISCONTINUED | OUTPATIENT
Start: 2020-01-01 | End: 2020-01-01

## 2020-01-01 RX ORDER — OLANZAPINE 15 MG/1
2.5 TABLET, FILM COATED ORAL ONCE
Refills: 0 | Status: DISCONTINUED | OUTPATIENT
Start: 2020-01-01 | End: 2020-01-01

## 2020-01-01 RX ORDER — ACETAMINOPHEN 500 MG
2 TABLET ORAL
Qty: 0 | Refills: 0 | DISCHARGE
Start: 2020-01-01

## 2020-01-01 RX ORDER — DEXTROSE 50 % IN WATER 50 %
15 SYRINGE (ML) INTRAVENOUS ONCE
Refills: 0 | Status: DISCONTINUED | OUTPATIENT
Start: 2020-01-01 | End: 2020-01-01

## 2020-01-01 RX ORDER — ONDANSETRON 8 MG/1
4 TABLET, FILM COATED ORAL EVERY 4 HOURS
Refills: 0 | Status: DISCONTINUED | OUTPATIENT
Start: 2020-01-01 | End: 2020-01-01

## 2020-01-01 RX ORDER — METOPROLOL TARTRATE 50 MG
5 TABLET ORAL
Refills: 0 | Status: DISCONTINUED | OUTPATIENT
Start: 2020-01-01 | End: 2020-01-01

## 2020-01-01 RX ORDER — PHENYLEPHRINE HYDROCHLORIDE 10 MG/ML
1.07 INJECTION INTRAVENOUS
Qty: 40 | Refills: 0 | Status: DISCONTINUED | OUTPATIENT
Start: 2020-01-01 | End: 2020-01-01

## 2020-01-01 RX ADMIN — Medication 100 MILLIGRAM(S): at 11:36

## 2020-01-01 RX ADMIN — Medication 100 MILLIGRAM(S): at 05:15

## 2020-01-01 RX ADMIN — Medication 125 MILLIGRAM(S): at 23:19

## 2020-01-01 RX ADMIN — Medication 3 MILLIGRAM(S): at 21:41

## 2020-01-01 RX ADMIN — Medication 500 MILLIGRAM(S): at 05:14

## 2020-01-01 RX ADMIN — Medication 1 TABLET(S): at 17:30

## 2020-01-01 RX ADMIN — SODIUM CHLORIDE 50 MILLILITER(S): 9 INJECTION INTRAMUSCULAR; INTRAVENOUS; SUBCUTANEOUS at 05:43

## 2020-01-01 RX ADMIN — MORPHINE SULFATE 2 MILLIGRAM(S): 50 CAPSULE, EXTENDED RELEASE ORAL at 07:52

## 2020-01-01 RX ADMIN — LIDOCAINE 1 PATCH: 4 CREAM TOPICAL at 12:09

## 2020-01-01 RX ADMIN — OXYCODONE HYDROCHLORIDE 10 MILLIGRAM(S): 5 TABLET ORAL at 22:08

## 2020-01-01 RX ADMIN — OXYCODONE HYDROCHLORIDE 7.5 MILLIGRAM(S): 5 TABLET ORAL at 17:00

## 2020-01-01 RX ADMIN — Medication 500 MILLIGRAM(S): at 11:35

## 2020-01-01 RX ADMIN — OXYCODONE HYDROCHLORIDE 20 MILLIGRAM(S): 5 TABLET ORAL at 06:12

## 2020-01-01 RX ADMIN — MIDODRINE HYDROCHLORIDE 20 MILLIGRAM(S): 2.5 TABLET ORAL at 13:33

## 2020-01-01 RX ADMIN — Medication 1 PACKET(S): at 17:11

## 2020-01-01 RX ADMIN — MIDODRINE HYDROCHLORIDE 40 MILLIGRAM(S): 2.5 TABLET ORAL at 14:01

## 2020-01-01 RX ADMIN — MORPHINE SULFATE 2 MILLIGRAM(S): 50 CAPSULE, EXTENDED RELEASE ORAL at 18:51

## 2020-01-01 RX ADMIN — PANTOPRAZOLE SODIUM 40 MILLIGRAM(S): 20 TABLET, DELAYED RELEASE ORAL at 06:27

## 2020-01-01 RX ADMIN — MORPHINE SULFATE 15 MILLIGRAM(S): 50 CAPSULE, EXTENDED RELEASE ORAL at 15:46

## 2020-01-01 RX ADMIN — GABAPENTIN 100 MILLIGRAM(S): 400 CAPSULE ORAL at 05:56

## 2020-01-01 RX ADMIN — OXYCODONE HYDROCHLORIDE 7.5 MILLIGRAM(S): 5 TABLET ORAL at 18:20

## 2020-01-01 RX ADMIN — Medication 125 MILLIGRAM(S): at 10:46

## 2020-01-01 RX ADMIN — OXYCODONE HYDROCHLORIDE 5 MILLIGRAM(S): 5 TABLET ORAL at 00:14

## 2020-01-01 RX ADMIN — MORPHINE SULFATE 15 MILLIGRAM(S): 50 CAPSULE, EXTENDED RELEASE ORAL at 15:31

## 2020-01-01 RX ADMIN — Medication 500 MILLIGRAM(S): at 21:22

## 2020-01-01 RX ADMIN — MORPHINE SULFATE 2 MILLIGRAM(S): 50 CAPSULE, EXTENDED RELEASE ORAL at 16:39

## 2020-01-01 RX ADMIN — OXYCODONE HYDROCHLORIDE 10 MILLIGRAM(S): 5 TABLET ORAL at 14:06

## 2020-01-01 RX ADMIN — OXYCODONE HYDROCHLORIDE 5 MILLIGRAM(S): 5 TABLET ORAL at 04:51

## 2020-01-01 RX ADMIN — Medication 50 GRAM(S): at 05:34

## 2020-01-01 RX ADMIN — OXYCODONE HYDROCHLORIDE 10 MILLIGRAM(S): 5 TABLET ORAL at 00:14

## 2020-01-01 RX ADMIN — APIXABAN 10 MILLIGRAM(S): 2.5 TABLET, FILM COATED ORAL at 22:10

## 2020-01-01 RX ADMIN — OXYCODONE HYDROCHLORIDE 7.5 MILLIGRAM(S): 5 TABLET ORAL at 05:59

## 2020-01-01 RX ADMIN — Medication 100 MILLIGRAM(S): at 05:48

## 2020-01-01 RX ADMIN — Medication 30 MILLIGRAM(S): at 17:30

## 2020-01-01 RX ADMIN — OXYCODONE HYDROCHLORIDE 10 MILLIGRAM(S): 5 TABLET ORAL at 13:30

## 2020-01-01 RX ADMIN — OXYCODONE HYDROCHLORIDE 10 MILLIGRAM(S): 5 TABLET ORAL at 20:06

## 2020-01-01 RX ADMIN — Medication 325 MILLIGRAM(S): at 05:14

## 2020-01-01 RX ADMIN — Medication 500 MILLIGRAM(S): at 21:28

## 2020-01-01 RX ADMIN — ONDANSETRON 4 MILLIGRAM(S): 8 TABLET, FILM COATED ORAL at 08:00

## 2020-01-01 RX ADMIN — OXYCODONE HYDROCHLORIDE 10 MILLIGRAM(S): 5 TABLET ORAL at 05:04

## 2020-01-01 RX ADMIN — MIDODRINE HYDROCHLORIDE 30 MILLIGRAM(S): 2.5 TABLET ORAL at 22:04

## 2020-01-01 RX ADMIN — OXYCODONE AND ACETAMINOPHEN 1 TABLET(S): 5; 325 TABLET ORAL at 21:47

## 2020-01-01 RX ADMIN — OXYCODONE HYDROCHLORIDE 7.5 MILLIGRAM(S): 5 TABLET ORAL at 06:29

## 2020-01-01 RX ADMIN — OXYCODONE HYDROCHLORIDE 10 MILLIGRAM(S): 5 TABLET ORAL at 13:06

## 2020-01-01 RX ADMIN — Medication 1 APPLICATION(S): at 15:31

## 2020-01-01 RX ADMIN — OXYCODONE HYDROCHLORIDE 5 MILLIGRAM(S): 5 TABLET ORAL at 09:48

## 2020-01-01 RX ADMIN — OXYCODONE HYDROCHLORIDE 7.5 MILLIGRAM(S): 5 TABLET ORAL at 15:52

## 2020-01-01 RX ADMIN — Medication 125 MILLIGRAM(S): at 13:20

## 2020-01-01 RX ADMIN — OXYCODONE HYDROCHLORIDE 10 MILLIGRAM(S): 5 TABLET ORAL at 20:46

## 2020-01-01 RX ADMIN — MIDODRINE HYDROCHLORIDE 10 MILLIGRAM(S): 2.5 TABLET ORAL at 22:26

## 2020-01-01 RX ADMIN — Medication 1 TABLET(S): at 12:22

## 2020-01-01 RX ADMIN — OXYCODONE HYDROCHLORIDE 10 MILLIGRAM(S): 5 TABLET ORAL at 10:16

## 2020-01-01 RX ADMIN — OXYCODONE HYDROCHLORIDE 10 MILLIGRAM(S): 5 TABLET ORAL at 08:22

## 2020-01-01 RX ADMIN — OXYCODONE HYDROCHLORIDE 5 MILLIGRAM(S): 5 TABLET ORAL at 13:08

## 2020-01-01 RX ADMIN — OXYCODONE HYDROCHLORIDE 20 MILLIGRAM(S): 5 TABLET ORAL at 05:14

## 2020-01-01 RX ADMIN — HEPARIN SODIUM 5000 UNIT(S): 5000 INJECTION INTRAVENOUS; SUBCUTANEOUS at 14:43

## 2020-01-01 RX ADMIN — GABAPENTIN 100 MILLIGRAM(S): 400 CAPSULE ORAL at 13:35

## 2020-01-01 RX ADMIN — Medication 500 MILLIGRAM(S): at 04:04

## 2020-01-01 RX ADMIN — APIXABAN 10 MILLIGRAM(S): 2.5 TABLET, FILM COATED ORAL at 10:19

## 2020-01-01 RX ADMIN — OXYCODONE HYDROCHLORIDE 5 MILLIGRAM(S): 5 TABLET ORAL at 15:45

## 2020-01-01 RX ADMIN — POLYETHYLENE GLYCOL 3350 17 GRAM(S): 17 POWDER, FOR SOLUTION ORAL at 13:02

## 2020-01-01 RX ADMIN — OXYCODONE HYDROCHLORIDE 10 MILLIGRAM(S): 5 TABLET ORAL at 19:30

## 2020-01-01 RX ADMIN — GABAPENTIN 100 MILLIGRAM(S): 400 CAPSULE ORAL at 13:38

## 2020-01-01 RX ADMIN — LINEZOLID 300 MILLIGRAM(S): 600 INJECTION, SOLUTION INTRAVENOUS at 11:14

## 2020-01-01 RX ADMIN — OXYCODONE HYDROCHLORIDE 10 MILLIGRAM(S): 5 TABLET ORAL at 22:24

## 2020-01-01 RX ADMIN — Medication 1 TABLET(S): at 18:19

## 2020-01-01 RX ADMIN — Medication 650 MILLIGRAM(S): at 21:56

## 2020-01-01 RX ADMIN — TIGECYCLINE 105 MILLIGRAM(S): 50 INJECTION, POWDER, LYOPHILIZED, FOR SOLUTION INTRAVENOUS at 19:28

## 2020-01-01 RX ADMIN — GABAPENTIN 100 MILLIGRAM(S): 400 CAPSULE ORAL at 05:11

## 2020-01-01 RX ADMIN — LIDOCAINE 1 PATCH: 4 CREAM TOPICAL at 19:37

## 2020-01-01 RX ADMIN — SODIUM CHLORIDE 100 MILLILITER(S): 9 INJECTION, SOLUTION INTRAVENOUS at 05:15

## 2020-01-01 RX ADMIN — PANTOPRAZOLE SODIUM 40 MILLIGRAM(S): 20 TABLET, DELAYED RELEASE ORAL at 19:55

## 2020-01-01 RX ADMIN — MIDODRINE HYDROCHLORIDE 20 MILLIGRAM(S): 2.5 TABLET ORAL at 22:56

## 2020-01-01 RX ADMIN — Medication 975 MILLIGRAM(S): at 02:10

## 2020-01-01 RX ADMIN — Medication 500 MILLIGRAM(S): at 22:22

## 2020-01-01 RX ADMIN — Medication 500 MILLIGRAM(S): at 19:13

## 2020-01-01 RX ADMIN — SODIUM CHLORIDE 100 MILLILITER(S): 9 INJECTION, SOLUTION INTRAVENOUS at 10:38

## 2020-01-01 RX ADMIN — OXYCODONE HYDROCHLORIDE 10 MILLIGRAM(S): 5 TABLET ORAL at 10:07

## 2020-01-01 RX ADMIN — Medication 500 MILLIGRAM(S): at 09:06

## 2020-01-01 RX ADMIN — Medication 40 MILLIEQUIVALENT(S): at 14:01

## 2020-01-01 RX ADMIN — Medication 500 MILLIGRAM(S): at 17:40

## 2020-01-01 RX ADMIN — OXYCODONE HYDROCHLORIDE 7.5 MILLIGRAM(S): 5 TABLET ORAL at 13:18

## 2020-01-01 RX ADMIN — Medication 500 MILLIGRAM(S): at 04:06

## 2020-01-01 RX ADMIN — SODIUM CHLORIDE 75 MILLILITER(S): 9 INJECTION INTRAMUSCULAR; INTRAVENOUS; SUBCUTANEOUS at 10:19

## 2020-01-01 RX ADMIN — Medication 500 MILLIGRAM(S): at 02:00

## 2020-01-01 RX ADMIN — Medication 500 MILLIGRAM(S): at 06:22

## 2020-01-01 RX ADMIN — Medication 975 MILLIGRAM(S): at 14:33

## 2020-01-01 RX ADMIN — CHLORHEXIDINE GLUCONATE 1 APPLICATION(S): 213 SOLUTION TOPICAL at 13:59

## 2020-01-01 RX ADMIN — OXYCODONE HYDROCHLORIDE 10 MILLIGRAM(S): 5 TABLET ORAL at 15:31

## 2020-01-01 RX ADMIN — OXYCODONE HYDROCHLORIDE 10 MILLIGRAM(S): 5 TABLET ORAL at 12:50

## 2020-01-01 RX ADMIN — OXYCODONE HYDROCHLORIDE 10 MILLIGRAM(S): 5 TABLET ORAL at 21:30

## 2020-01-01 RX ADMIN — Medication 650 MILLIGRAM(S): at 09:53

## 2020-01-01 RX ADMIN — Medication 500 MILLIGRAM(S): at 13:59

## 2020-01-01 RX ADMIN — GABAPENTIN 100 MILLIGRAM(S): 400 CAPSULE ORAL at 22:56

## 2020-01-01 RX ADMIN — OXYCODONE HYDROCHLORIDE 5 MILLIGRAM(S): 5 TABLET ORAL at 18:22

## 2020-01-01 RX ADMIN — OXYCODONE HYDROCHLORIDE 10 MILLIGRAM(S): 5 TABLET ORAL at 23:30

## 2020-01-01 RX ADMIN — OXYCODONE HYDROCHLORIDE 10 MILLIGRAM(S): 5 TABLET ORAL at 14:14

## 2020-01-01 RX ADMIN — OXYCODONE AND ACETAMINOPHEN 1 TABLET(S): 5; 325 TABLET ORAL at 13:35

## 2020-01-01 RX ADMIN — POLYETHYLENE GLYCOL 3350 17 GRAM(S): 17 POWDER, FOR SOLUTION ORAL at 17:37

## 2020-01-01 RX ADMIN — LIDOCAINE 1 PATCH: 4 CREAM TOPICAL at 19:51

## 2020-01-01 RX ADMIN — OXYCODONE HYDROCHLORIDE 5 MILLIGRAM(S): 5 TABLET ORAL at 02:40

## 2020-01-01 RX ADMIN — ONDANSETRON 4 MILLIGRAM(S): 8 TABLET, FILM COATED ORAL at 10:22

## 2020-01-01 RX ADMIN — MORPHINE SULFATE 2 MILLIGRAM(S): 50 CAPSULE, EXTENDED RELEASE ORAL at 18:09

## 2020-01-01 RX ADMIN — OXYCODONE HYDROCHLORIDE 7.5 MILLIGRAM(S): 5 TABLET ORAL at 08:15

## 2020-01-01 RX ADMIN — OXYCODONE HYDROCHLORIDE 10 MILLIGRAM(S): 5 TABLET ORAL at 06:21

## 2020-01-01 RX ADMIN — Medication 5.63 MICROGRAM(S)/KG/MIN: at 20:00

## 2020-01-01 RX ADMIN — ONDANSETRON 4 MILLIGRAM(S): 8 TABLET, FILM COATED ORAL at 05:58

## 2020-01-01 RX ADMIN — Medication 500 MILLIGRAM(S): at 23:00

## 2020-01-01 RX ADMIN — Medication 500 MILLIGRAM(S): at 09:53

## 2020-01-01 RX ADMIN — OXYCODONE HYDROCHLORIDE 5 MILLIGRAM(S): 5 TABLET ORAL at 14:30

## 2020-01-01 RX ADMIN — Medication 500 MILLIGRAM(S): at 14:20

## 2020-01-01 RX ADMIN — METHYLNALTREXONE BROMIDE 8 MILLIGRAM(S): 12 INJECTION, SOLUTION SUBCUTANEOUS at 13:43

## 2020-01-01 RX ADMIN — APIXABAN 5 MILLIGRAM(S): 2.5 TABLET, FILM COATED ORAL at 09:44

## 2020-01-01 RX ADMIN — LIDOCAINE 2 PATCH: 4 CREAM TOPICAL at 20:36

## 2020-01-01 RX ADMIN — OXYCODONE HYDROCHLORIDE 20 MILLIGRAM(S): 5 TABLET ORAL at 14:10

## 2020-01-01 RX ADMIN — GABAPENTIN 100 MILLIGRAM(S): 400 CAPSULE ORAL at 05:52

## 2020-01-01 RX ADMIN — Medication 975 MILLIGRAM(S): at 22:53

## 2020-01-01 RX ADMIN — MIDODRINE HYDROCHLORIDE 40 MILLIGRAM(S): 2.5 TABLET ORAL at 13:21

## 2020-01-01 RX ADMIN — Medication 5.63 MICROGRAM(S)/KG/MIN: at 08:20

## 2020-01-01 RX ADMIN — OXYCODONE HYDROCHLORIDE 10 MILLIGRAM(S): 5 TABLET ORAL at 13:16

## 2020-01-01 RX ADMIN — TIGECYCLINE 105 MILLIGRAM(S): 50 INJECTION, POWDER, LYOPHILIZED, FOR SOLUTION INTRAVENOUS at 18:13

## 2020-01-01 RX ADMIN — Medication 100 MILLIGRAM(S): at 13:19

## 2020-01-01 RX ADMIN — Medication 500 MILLIGRAM(S): at 09:18

## 2020-01-01 RX ADMIN — GABAPENTIN 100 MILLIGRAM(S): 400 CAPSULE ORAL at 14:10

## 2020-01-01 RX ADMIN — Medication 500 MILLIGRAM(S): at 18:52

## 2020-01-01 RX ADMIN — Medication 3 MILLIGRAM(S): at 22:22

## 2020-01-01 RX ADMIN — LIDOCAINE 2 PATCH: 4 CREAM TOPICAL at 13:15

## 2020-01-01 RX ADMIN — APIXABAN 5 MILLIGRAM(S): 2.5 TABLET, FILM COATED ORAL at 12:07

## 2020-01-01 RX ADMIN — OXYCODONE HYDROCHLORIDE 10 MILLIGRAM(S): 5 TABLET ORAL at 12:16

## 2020-01-01 RX ADMIN — Medication 500 MILLIGRAM(S): at 10:41

## 2020-01-01 RX ADMIN — Medication 1 TABLET(S): at 06:45

## 2020-01-01 RX ADMIN — OXYCODONE HYDROCHLORIDE 10 MILLIGRAM(S): 5 TABLET ORAL at 07:29

## 2020-01-01 RX ADMIN — OXYCODONE HYDROCHLORIDE 10 MILLIGRAM(S): 5 TABLET ORAL at 01:50

## 2020-01-01 RX ADMIN — OXYCODONE HYDROCHLORIDE 10 MILLIGRAM(S): 5 TABLET ORAL at 15:23

## 2020-01-01 RX ADMIN — Medication 500 MILLIGRAM(S): at 13:47

## 2020-01-01 RX ADMIN — MIDODRINE HYDROCHLORIDE 30 MILLIGRAM(S): 2.5 TABLET ORAL at 13:06

## 2020-01-01 RX ADMIN — SENNA PLUS 2 TABLET(S): 8.6 TABLET ORAL at 21:55

## 2020-01-01 RX ADMIN — Medication 500 MILLIGRAM(S): at 02:09

## 2020-01-01 RX ADMIN — Medication 500 MILLIGRAM(S): at 13:06

## 2020-01-01 RX ADMIN — Medication 2 DROP(S): at 10:08

## 2020-01-01 RX ADMIN — Medication 2.5 MILLIGRAM(S): at 20:55

## 2020-01-01 RX ADMIN — OXYCODONE HYDROCHLORIDE 7.5 MILLIGRAM(S): 5 TABLET ORAL at 09:14

## 2020-01-01 RX ADMIN — SENNA PLUS 2 TABLET(S): 8.6 TABLET ORAL at 00:58

## 2020-01-01 RX ADMIN — Medication 30 MILLIGRAM(S): at 17:45

## 2020-01-01 RX ADMIN — Medication 500 MILLIGRAM(S): at 03:10

## 2020-01-01 RX ADMIN — MORPHINE SULFATE 2 MILLIGRAM(S): 50 CAPSULE, EXTENDED RELEASE ORAL at 10:17

## 2020-01-01 RX ADMIN — OXYCODONE HYDROCHLORIDE 10 MILLIGRAM(S): 5 TABLET ORAL at 15:09

## 2020-01-01 RX ADMIN — Medication 3 MILLIGRAM(S): at 22:30

## 2020-01-01 RX ADMIN — Medication 500 MILLIGRAM(S): at 17:37

## 2020-01-01 RX ADMIN — Medication 1 TABLET(S): at 11:50

## 2020-01-01 RX ADMIN — Medication 500 MILLIGRAM(S): at 11:19

## 2020-01-01 RX ADMIN — Medication 6.09 MICROGRAM(S)/KG/MIN: at 21:30

## 2020-01-01 RX ADMIN — MORPHINE SULFATE 2 MILLIGRAM(S): 50 CAPSULE, EXTENDED RELEASE ORAL at 02:53

## 2020-01-01 RX ADMIN — AZITHROMYCIN 255 MILLIGRAM(S): 500 TABLET, FILM COATED ORAL at 04:00

## 2020-01-01 RX ADMIN — OXYCODONE HYDROCHLORIDE 10 MILLIGRAM(S): 5 TABLET ORAL at 00:30

## 2020-01-01 RX ADMIN — OXYCODONE HYDROCHLORIDE 7.5 MILLIGRAM(S): 5 TABLET ORAL at 15:03

## 2020-01-01 RX ADMIN — GABAPENTIN 100 MILLIGRAM(S): 400 CAPSULE ORAL at 21:11

## 2020-01-01 RX ADMIN — OXYCODONE HYDROCHLORIDE 5 MILLIGRAM(S): 5 TABLET ORAL at 07:25

## 2020-01-01 RX ADMIN — Medication 3 MILLIGRAM(S): at 22:54

## 2020-01-01 RX ADMIN — Medication 100 MILLIGRAM(S): at 17:00

## 2020-01-01 RX ADMIN — Medication 975 MILLIGRAM(S): at 05:50

## 2020-01-01 RX ADMIN — OXYCODONE HYDROCHLORIDE 5 MILLIGRAM(S): 5 TABLET ORAL at 07:57

## 2020-01-01 RX ADMIN — MORPHINE SULFATE 2 MILLIGRAM(S): 50 CAPSULE, EXTENDED RELEASE ORAL at 06:47

## 2020-01-01 RX ADMIN — Medication 100 MILLIGRAM(S): at 05:20

## 2020-01-01 RX ADMIN — MORPHINE SULFATE 2 MILLIGRAM(S): 50 CAPSULE, EXTENDED RELEASE ORAL at 17:05

## 2020-01-01 RX ADMIN — Medication 125 MILLIGRAM(S): at 05:34

## 2020-01-01 RX ADMIN — OXYCODONE HYDROCHLORIDE 10 MILLIGRAM(S): 5 TABLET ORAL at 10:39

## 2020-01-01 RX ADMIN — Medication 500 MILLIGRAM(S): at 10:20

## 2020-01-01 RX ADMIN — LIDOCAINE 2 PATCH: 4 CREAM TOPICAL at 23:00

## 2020-01-01 RX ADMIN — OXYCODONE HYDROCHLORIDE 7.5 MILLIGRAM(S): 5 TABLET ORAL at 20:40

## 2020-01-01 RX ADMIN — LIDOCAINE 1 PATCH: 4 CREAM TOPICAL at 20:40

## 2020-01-01 RX ADMIN — OXYCODONE HYDROCHLORIDE 10 MILLIGRAM(S): 5 TABLET ORAL at 07:35

## 2020-01-01 RX ADMIN — GABAPENTIN 100 MILLIGRAM(S): 400 CAPSULE ORAL at 06:22

## 2020-01-01 RX ADMIN — LIDOCAINE 1 PATCH: 4 CREAM TOPICAL at 12:06

## 2020-01-01 RX ADMIN — Medication 500 MILLIGRAM(S): at 01:31

## 2020-01-01 RX ADMIN — Medication 500 MILLIGRAM(S): at 21:05

## 2020-01-01 RX ADMIN — APIXABAN 5 MILLIGRAM(S): 2.5 TABLET, FILM COATED ORAL at 23:00

## 2020-01-01 RX ADMIN — Medication 125 MILLIGRAM(S): at 11:15

## 2020-01-01 RX ADMIN — Medication 500 MILLIGRAM(S): at 05:21

## 2020-01-01 RX ADMIN — PANTOPRAZOLE SODIUM 40 MILLIGRAM(S): 20 TABLET, DELAYED RELEASE ORAL at 06:13

## 2020-01-01 RX ADMIN — LIDOCAINE 1 PATCH: 4 CREAM TOPICAL at 00:16

## 2020-01-01 RX ADMIN — ENOXAPARIN SODIUM 40 MILLIGRAM(S): 100 INJECTION SUBCUTANEOUS at 12:22

## 2020-01-01 RX ADMIN — HEPARIN SODIUM 1000 UNIT(S)/HR: 5000 INJECTION INTRAVENOUS; SUBCUTANEOUS at 23:01

## 2020-01-01 RX ADMIN — MIDODRINE HYDROCHLORIDE 40 MILLIGRAM(S): 2.5 TABLET ORAL at 23:19

## 2020-01-01 RX ADMIN — Medication 125 MILLIGRAM(S): at 18:17

## 2020-01-01 RX ADMIN — OXYCODONE HYDROCHLORIDE 10 MILLIGRAM(S): 5 TABLET ORAL at 02:15

## 2020-01-01 RX ADMIN — CHLORHEXIDINE GLUCONATE 1 APPLICATION(S): 213 SOLUTION TOPICAL at 06:11

## 2020-01-01 RX ADMIN — OXYCODONE HYDROCHLORIDE 7.5 MILLIGRAM(S): 5 TABLET ORAL at 01:25

## 2020-01-01 RX ADMIN — Medication 500 MILLIGRAM(S): at 02:12

## 2020-01-01 RX ADMIN — FENTANYL CITRATE 25 MICROGRAM(S): 50 INJECTION INTRAVENOUS at 03:48

## 2020-01-01 RX ADMIN — GABAPENTIN 100 MILLIGRAM(S): 400 CAPSULE ORAL at 05:15

## 2020-01-01 RX ADMIN — LIDOCAINE 2 PATCH: 4 CREAM TOPICAL at 12:48

## 2020-01-01 RX ADMIN — SENNA PLUS 2 TABLET(S): 8.6 TABLET ORAL at 21:33

## 2020-01-01 RX ADMIN — Medication 40 MILLIEQUIVALENT(S): at 11:38

## 2020-01-01 RX ADMIN — Medication 500 MILLIGRAM(S): at 07:36

## 2020-01-01 RX ADMIN — OXYCODONE HYDROCHLORIDE 20 MILLIGRAM(S): 5 TABLET ORAL at 05:44

## 2020-01-01 RX ADMIN — OXYCODONE HYDROCHLORIDE 10 MILLIGRAM(S): 5 TABLET ORAL at 16:21

## 2020-01-01 RX ADMIN — Medication 1 TABLET(S): at 18:08

## 2020-01-01 RX ADMIN — MORPHINE SULFATE 2 MILLIGRAM(S): 50 CAPSULE, EXTENDED RELEASE ORAL at 09:20

## 2020-01-01 RX ADMIN — ONDANSETRON 4 MILLIGRAM(S): 8 TABLET, FILM COATED ORAL at 17:03

## 2020-01-01 RX ADMIN — PANTOPRAZOLE SODIUM 40 MILLIGRAM(S): 20 TABLET, DELAYED RELEASE ORAL at 05:09

## 2020-01-01 RX ADMIN — ONDANSETRON 4 MILLIGRAM(S): 8 TABLET, FILM COATED ORAL at 03:48

## 2020-01-01 RX ADMIN — OXYCODONE HYDROCHLORIDE 10 MILLIGRAM(S): 5 TABLET ORAL at 05:24

## 2020-01-01 RX ADMIN — MORPHINE SULFATE 2 MILLIGRAM(S): 50 CAPSULE, EXTENDED RELEASE ORAL at 00:37

## 2020-01-01 RX ADMIN — Medication 125 MILLIGRAM(S): at 05:35

## 2020-01-01 RX ADMIN — Medication 500 MILLIGRAM(S): at 18:04

## 2020-01-01 RX ADMIN — APIXABAN 5 MILLIGRAM(S): 2.5 TABLET, FILM COATED ORAL at 13:55

## 2020-01-01 RX ADMIN — OXYCODONE HYDROCHLORIDE 5 MILLIGRAM(S): 5 TABLET ORAL at 10:55

## 2020-01-01 RX ADMIN — ONDANSETRON 4 MILLIGRAM(S): 8 TABLET, FILM COATED ORAL at 12:30

## 2020-01-01 RX ADMIN — OXYCODONE HYDROCHLORIDE 10 MILLIGRAM(S): 5 TABLET ORAL at 13:47

## 2020-01-01 RX ADMIN — OXYCODONE HYDROCHLORIDE 7.5 MILLIGRAM(S): 5 TABLET ORAL at 09:39

## 2020-01-01 RX ADMIN — Medication 500 MILLIGRAM(S): at 06:45

## 2020-01-01 RX ADMIN — Medication 40 MILLIEQUIVALENT(S): at 19:25

## 2020-01-01 RX ADMIN — PIPERACILLIN AND TAZOBACTAM 25 GRAM(S): 4; .5 INJECTION, POWDER, LYOPHILIZED, FOR SOLUTION INTRAVENOUS at 05:11

## 2020-01-01 RX ADMIN — OXYCODONE HYDROCHLORIDE 5 MILLIGRAM(S): 5 TABLET ORAL at 17:51

## 2020-01-01 RX ADMIN — SODIUM CHLORIDE 500 MILLILITER(S): 9 INJECTION, SOLUTION INTRAVENOUS at 17:45

## 2020-01-01 RX ADMIN — OXYCODONE HYDROCHLORIDE 20 MILLIGRAM(S): 5 TABLET ORAL at 14:02

## 2020-01-01 RX ADMIN — LIDOCAINE 2 PATCH: 4 CREAM TOPICAL at 23:04

## 2020-01-01 RX ADMIN — Medication 500 MILLIGRAM(S): at 11:12

## 2020-01-01 RX ADMIN — Medication 500 MILLIGRAM(S): at 00:52

## 2020-01-01 RX ADMIN — Medication 500 MILLIGRAM(S): at 14:32

## 2020-01-01 RX ADMIN — PANTOPRAZOLE SODIUM 40 MILLIGRAM(S): 20 TABLET, DELAYED RELEASE ORAL at 05:15

## 2020-01-01 RX ADMIN — Medication 500 MILLIGRAM(S): at 11:47

## 2020-01-01 RX ADMIN — MEROPENEM 100 MILLIGRAM(S): 1 INJECTION INTRAVENOUS at 17:11

## 2020-01-01 RX ADMIN — OXYCODONE HYDROCHLORIDE 5 MILLIGRAM(S): 5 TABLET ORAL at 23:00

## 2020-01-01 RX ADMIN — MIDODRINE HYDROCHLORIDE 10 MILLIGRAM(S): 2.5 TABLET ORAL at 21:30

## 2020-01-01 RX ADMIN — GABAPENTIN 100 MILLIGRAM(S): 400 CAPSULE ORAL at 21:12

## 2020-01-01 RX ADMIN — ONDANSETRON 4 MILLIGRAM(S): 8 TABLET, FILM COATED ORAL at 16:28

## 2020-01-01 RX ADMIN — Medication 50 GRAM(S): at 12:19

## 2020-01-01 RX ADMIN — Medication 100 MILLIGRAM(S): at 13:52

## 2020-01-01 RX ADMIN — SODIUM CHLORIDE 250 MILLILITER(S): 9 INJECTION, SOLUTION INTRAVENOUS at 22:35

## 2020-01-01 RX ADMIN — Medication 500 MILLIGRAM(S): at 09:22

## 2020-01-01 RX ADMIN — HALOPERIDOL DECANOATE 2.5 MILLIGRAM(S): 100 INJECTION INTRAMUSCULAR at 01:00

## 2020-01-01 RX ADMIN — CHLORHEXIDINE GLUCONATE 1 APPLICATION(S): 213 SOLUTION TOPICAL at 06:03

## 2020-01-01 RX ADMIN — MIDODRINE HYDROCHLORIDE 30 MILLIGRAM(S): 2.5 TABLET ORAL at 23:00

## 2020-01-01 RX ADMIN — OXYCODONE HYDROCHLORIDE 7.5 MILLIGRAM(S): 5 TABLET ORAL at 17:14

## 2020-01-01 RX ADMIN — ENOXAPARIN SODIUM 40 MILLIGRAM(S): 100 INJECTION SUBCUTANEOUS at 17:32

## 2020-01-01 RX ADMIN — OXYCODONE HYDROCHLORIDE 10 MILLIGRAM(S): 5 TABLET ORAL at 08:04

## 2020-01-01 RX ADMIN — OXYCODONE HYDROCHLORIDE 15 MILLIGRAM(S): 5 TABLET ORAL at 14:58

## 2020-01-01 RX ADMIN — Medication 500 MILLIGRAM(S): at 14:15

## 2020-01-01 RX ADMIN — OXYCODONE HYDROCHLORIDE 10 MILLIGRAM(S): 5 TABLET ORAL at 18:35

## 2020-01-01 RX ADMIN — OXYCODONE HYDROCHLORIDE 20 MILLIGRAM(S): 5 TABLET ORAL at 06:07

## 2020-01-01 RX ADMIN — OXYCODONE HYDROCHLORIDE 10 MILLIGRAM(S): 5 TABLET ORAL at 04:04

## 2020-01-01 RX ADMIN — CHLORHEXIDINE GLUCONATE 1 APPLICATION(S): 213 SOLUTION TOPICAL at 11:15

## 2020-01-01 RX ADMIN — GABAPENTIN 100 MILLIGRAM(S): 400 CAPSULE ORAL at 05:04

## 2020-01-01 RX ADMIN — OXYCODONE HYDROCHLORIDE 7.5 MILLIGRAM(S): 5 TABLET ORAL at 12:21

## 2020-01-01 RX ADMIN — SENNA PLUS 2 TABLET(S): 8.6 TABLET ORAL at 21:25

## 2020-01-01 RX ADMIN — OXYCODONE HYDROCHLORIDE 5 MILLIGRAM(S): 5 TABLET ORAL at 01:36

## 2020-01-01 RX ADMIN — Medication 975 MILLIGRAM(S): at 22:36

## 2020-01-01 RX ADMIN — APIXABAN 5 MILLIGRAM(S): 2.5 TABLET, FILM COATED ORAL at 10:39

## 2020-01-01 RX ADMIN — HEPARIN SODIUM 5000 UNIT(S): 5000 INJECTION INTRAVENOUS; SUBCUTANEOUS at 05:12

## 2020-01-01 RX ADMIN — Medication 1 TABLET(S): at 13:20

## 2020-01-01 RX ADMIN — Medication 100 MILLIGRAM(S): at 21:16

## 2020-01-01 RX ADMIN — Medication 500 MILLIGRAM(S): at 02:02

## 2020-01-01 RX ADMIN — Medication 500 MILLIGRAM(S): at 11:38

## 2020-01-01 RX ADMIN — CHLORHEXIDINE GLUCONATE 1 APPLICATION(S): 213 SOLUTION TOPICAL at 12:31

## 2020-01-01 RX ADMIN — TIGECYCLINE 105 MILLIGRAM(S): 50 INJECTION, POWDER, LYOPHILIZED, FOR SOLUTION INTRAVENOUS at 06:32

## 2020-01-01 RX ADMIN — Medication 500 MILLIGRAM(S): at 09:47

## 2020-01-01 RX ADMIN — OXYCODONE HYDROCHLORIDE 7.5 MILLIGRAM(S): 5 TABLET ORAL at 00:56

## 2020-01-01 RX ADMIN — Medication 125 MILLILITER(S): at 03:23

## 2020-01-01 RX ADMIN — Medication 500 MILLIGRAM(S): at 18:10

## 2020-01-01 RX ADMIN — OXYCODONE HYDROCHLORIDE 10 MILLIGRAM(S): 5 TABLET ORAL at 05:57

## 2020-01-01 RX ADMIN — Medication 125 MILLIGRAM(S): at 00:59

## 2020-01-01 RX ADMIN — OXYCODONE HYDROCHLORIDE 10 MILLIGRAM(S): 5 TABLET ORAL at 17:05

## 2020-01-01 RX ADMIN — Medication 3 MILLIGRAM(S): at 21:36

## 2020-01-01 RX ADMIN — MIDODRINE HYDROCHLORIDE 40 MILLIGRAM(S): 2.5 TABLET ORAL at 13:16

## 2020-01-01 RX ADMIN — OXYCODONE HYDROCHLORIDE 10 MILLIGRAM(S): 5 TABLET ORAL at 09:37

## 2020-01-01 RX ADMIN — Medication 250 MILLIGRAM(S): at 05:44

## 2020-01-01 RX ADMIN — Medication 10 MILLIGRAM(S): at 13:06

## 2020-01-01 RX ADMIN — Medication 975 MILLIGRAM(S): at 06:33

## 2020-01-01 RX ADMIN — OXYCODONE HYDROCHLORIDE 5 MILLIGRAM(S): 5 TABLET ORAL at 04:06

## 2020-01-01 RX ADMIN — Medication 3 MILLIGRAM(S): at 21:16

## 2020-01-01 RX ADMIN — APIXABAN 10 MILLIGRAM(S): 2.5 TABLET, FILM COATED ORAL at 06:22

## 2020-01-01 RX ADMIN — OXYCODONE HYDROCHLORIDE 10 MILLIGRAM(S): 5 TABLET ORAL at 22:42

## 2020-01-01 RX ADMIN — MORPHINE SULFATE 15 MILLIGRAM(S): 50 CAPSULE, EXTENDED RELEASE ORAL at 05:30

## 2020-01-01 RX ADMIN — OXYCODONE HYDROCHLORIDE 5 MILLIGRAM(S): 5 TABLET ORAL at 21:30

## 2020-01-01 RX ADMIN — OXYCODONE HYDROCHLORIDE 10 MILLIGRAM(S): 5 TABLET ORAL at 19:45

## 2020-01-01 RX ADMIN — OXYCODONE HYDROCHLORIDE 10 MILLIGRAM(S): 5 TABLET ORAL at 17:38

## 2020-01-01 RX ADMIN — HEPARIN SODIUM 800 UNIT(S)/HR: 5000 INJECTION INTRAVENOUS; SUBCUTANEOUS at 01:42

## 2020-01-01 RX ADMIN — Medication 325 MILLIGRAM(S): at 19:13

## 2020-01-01 RX ADMIN — OXYCODONE HYDROCHLORIDE 10 MILLIGRAM(S): 5 TABLET ORAL at 19:03

## 2020-01-01 RX ADMIN — GABAPENTIN 100 MILLIGRAM(S): 400 CAPSULE ORAL at 13:55

## 2020-01-01 RX ADMIN — ONDANSETRON 4 MILLIGRAM(S): 8 TABLET, FILM COATED ORAL at 17:20

## 2020-01-01 RX ADMIN — Medication 500 MILLIGRAM(S): at 03:05

## 2020-01-01 RX ADMIN — MORPHINE SULFATE 1 MILLIGRAM(S): 50 CAPSULE, EXTENDED RELEASE ORAL at 11:56

## 2020-01-01 RX ADMIN — OXYCODONE HYDROCHLORIDE 10 MILLIGRAM(S): 5 TABLET ORAL at 11:09

## 2020-01-01 RX ADMIN — Medication 325 MILLIGRAM(S): at 12:10

## 2020-01-01 RX ADMIN — OXYCODONE HYDROCHLORIDE 5 MILLIGRAM(S): 5 TABLET ORAL at 03:03

## 2020-01-01 RX ADMIN — ENOXAPARIN SODIUM 40 MILLIGRAM(S): 100 INJECTION SUBCUTANEOUS at 11:36

## 2020-01-01 RX ADMIN — OXYCODONE HYDROCHLORIDE 10 MILLIGRAM(S): 5 TABLET ORAL at 20:16

## 2020-01-01 RX ADMIN — APIXABAN 5 MILLIGRAM(S): 2.5 TABLET, FILM COATED ORAL at 21:30

## 2020-01-01 RX ADMIN — Medication 3 MILLIGRAM(S): at 21:05

## 2020-01-01 RX ADMIN — MORPHINE SULFATE 2 MILLIGRAM(S): 50 CAPSULE, EXTENDED RELEASE ORAL at 20:30

## 2020-01-01 RX ADMIN — GABAPENTIN 100 MILLIGRAM(S): 400 CAPSULE ORAL at 15:21

## 2020-01-01 RX ADMIN — APIXABAN 5 MILLIGRAM(S): 2.5 TABLET, FILM COATED ORAL at 21:37

## 2020-01-01 RX ADMIN — Medication 500 MILLIGRAM(S): at 14:01

## 2020-01-01 RX ADMIN — Medication 1 TABLET(S): at 05:04

## 2020-01-01 RX ADMIN — Medication 500 MILLIGRAM(S): at 17:10

## 2020-01-01 RX ADMIN — PIPERACILLIN AND TAZOBACTAM 25 GRAM(S): 4; .5 INJECTION, POWDER, LYOPHILIZED, FOR SOLUTION INTRAVENOUS at 05:57

## 2020-01-01 RX ADMIN — OXYCODONE HYDROCHLORIDE 10 MILLIGRAM(S): 5 TABLET ORAL at 17:16

## 2020-01-01 RX ADMIN — OXYCODONE HYDROCHLORIDE 10 MILLIGRAM(S): 5 TABLET ORAL at 05:33

## 2020-01-01 RX ADMIN — Medication 500 MILLIGRAM(S): at 13:33

## 2020-01-01 RX ADMIN — OXYCODONE HYDROCHLORIDE 10 MILLIGRAM(S): 5 TABLET ORAL at 23:19

## 2020-01-01 RX ADMIN — Medication 100 MILLIGRAM(S): at 23:47

## 2020-01-01 RX ADMIN — LIDOCAINE 2 PATCH: 4 CREAM TOPICAL at 20:00

## 2020-01-01 RX ADMIN — OXYCODONE HYDROCHLORIDE 10 MILLIGRAM(S): 5 TABLET ORAL at 16:30

## 2020-01-01 RX ADMIN — GABAPENTIN 100 MILLIGRAM(S): 400 CAPSULE ORAL at 13:53

## 2020-01-01 RX ADMIN — GABAPENTIN 100 MILLIGRAM(S): 400 CAPSULE ORAL at 22:39

## 2020-01-01 RX ADMIN — Medication 500 MILLIGRAM(S): at 14:07

## 2020-01-01 RX ADMIN — PIPERACILLIN AND TAZOBACTAM 25 GRAM(S): 4; .5 INJECTION, POWDER, LYOPHILIZED, FOR SOLUTION INTRAVENOUS at 13:18

## 2020-01-01 RX ADMIN — Medication 40 MILLIEQUIVALENT(S): at 13:47

## 2020-01-01 RX ADMIN — Medication 50 MILLILITER(S): at 15:07

## 2020-01-01 RX ADMIN — MEROPENEM 100 MILLIGRAM(S): 1 INJECTION INTRAVENOUS at 05:23

## 2020-01-01 RX ADMIN — Medication 125 MILLIGRAM(S): at 13:42

## 2020-01-01 RX ADMIN — Medication 3 MILLILITER(S): at 10:12

## 2020-01-01 RX ADMIN — OXYCODONE AND ACETAMINOPHEN 1 TABLET(S): 5; 325 TABLET ORAL at 14:05

## 2020-01-01 RX ADMIN — OXYCODONE HYDROCHLORIDE 20 MILLIGRAM(S): 5 TABLET ORAL at 05:28

## 2020-01-01 RX ADMIN — GABAPENTIN 100 MILLIGRAM(S): 400 CAPSULE ORAL at 05:10

## 2020-01-01 RX ADMIN — SODIUM CHLORIDE 500 MILLILITER(S): 9 INJECTION INTRAMUSCULAR; INTRAVENOUS; SUBCUTANEOUS at 11:07

## 2020-01-01 RX ADMIN — OXYCODONE HYDROCHLORIDE 10 MILLIGRAM(S): 5 TABLET ORAL at 02:55

## 2020-01-01 RX ADMIN — OXYCODONE HYDROCHLORIDE 10 MILLIGRAM(S): 5 TABLET ORAL at 17:50

## 2020-01-01 RX ADMIN — LIDOCAINE 1 PATCH: 4 CREAM TOPICAL at 19:30

## 2020-01-01 RX ADMIN — Medication 500 MILLIGRAM(S): at 09:45

## 2020-01-01 RX ADMIN — MORPHINE SULFATE 2 MILLIGRAM(S): 50 CAPSULE, EXTENDED RELEASE ORAL at 02:20

## 2020-01-01 RX ADMIN — Medication 500 MILLIGRAM(S): at 14:10

## 2020-01-01 RX ADMIN — OXYCODONE HYDROCHLORIDE 7.5 MILLIGRAM(S): 5 TABLET ORAL at 18:56

## 2020-01-01 RX ADMIN — POLYETHYLENE GLYCOL 3350 17 GRAM(S): 17 POWDER, FOR SOLUTION ORAL at 06:14

## 2020-01-01 RX ADMIN — Medication 100 MILLIEQUIVALENT(S): at 13:19

## 2020-01-01 RX ADMIN — Medication 500 MILLIGRAM(S): at 00:08

## 2020-01-01 RX ADMIN — OXYCODONE HYDROCHLORIDE 20 MILLIGRAM(S): 5 TABLET ORAL at 06:22

## 2020-01-01 RX ADMIN — Medication 1 TABLET(S): at 17:39

## 2020-01-01 RX ADMIN — OXYCODONE HYDROCHLORIDE 10 MILLIGRAM(S): 5 TABLET ORAL at 00:03

## 2020-01-01 RX ADMIN — OXYCODONE HYDROCHLORIDE 10 MILLIGRAM(S): 5 TABLET ORAL at 21:25

## 2020-01-01 RX ADMIN — OXYCODONE HYDROCHLORIDE 5 MILLIGRAM(S): 5 TABLET ORAL at 18:18

## 2020-01-01 RX ADMIN — Medication 50 GRAM(S): at 08:39

## 2020-01-01 RX ADMIN — Medication 1 PACKET(S): at 04:41

## 2020-01-01 RX ADMIN — GABAPENTIN 100 MILLIGRAM(S): 400 CAPSULE ORAL at 13:06

## 2020-01-01 RX ADMIN — LIDOCAINE 2 PATCH: 4 CREAM TOPICAL at 13:37

## 2020-01-01 RX ADMIN — Medication 3 MILLIGRAM(S): at 22:10

## 2020-01-01 RX ADMIN — PANTOPRAZOLE SODIUM 40 MILLIGRAM(S): 20 TABLET, DELAYED RELEASE ORAL at 05:30

## 2020-01-01 RX ADMIN — MORPHINE SULFATE 2 MILLIGRAM(S): 50 CAPSULE, EXTENDED RELEASE ORAL at 16:54

## 2020-01-01 RX ADMIN — HYDROMORPHONE HYDROCHLORIDE 1 MILLIGRAM(S): 2 INJECTION INTRAMUSCULAR; INTRAVENOUS; SUBCUTANEOUS at 23:47

## 2020-01-01 RX ADMIN — Medication 50 MILLILITER(S): at 05:14

## 2020-01-01 RX ADMIN — OXYCODONE HYDROCHLORIDE 7.5 MILLIGRAM(S): 5 TABLET ORAL at 03:55

## 2020-01-01 RX ADMIN — OXYCODONE HYDROCHLORIDE 20 MILLIGRAM(S): 5 TABLET ORAL at 16:00

## 2020-01-01 RX ADMIN — Medication 600 MILLIGRAM(S): at 22:04

## 2020-01-01 RX ADMIN — MORPHINE SULFATE 2 MILLIGRAM(S): 50 CAPSULE, EXTENDED RELEASE ORAL at 13:53

## 2020-01-01 RX ADMIN — Medication 500 MILLIGRAM(S): at 11:14

## 2020-01-01 RX ADMIN — OXYCODONE HYDROCHLORIDE 10 MILLIGRAM(S): 5 TABLET ORAL at 01:16

## 2020-01-01 RX ADMIN — GABAPENTIN 100 MILLIGRAM(S): 400 CAPSULE ORAL at 14:47

## 2020-01-01 RX ADMIN — ONDANSETRON 4 MILLIGRAM(S): 8 TABLET, FILM COATED ORAL at 21:49

## 2020-01-01 RX ADMIN — Medication 500 MILLIGRAM(S): at 17:15

## 2020-01-01 RX ADMIN — MIDODRINE HYDROCHLORIDE 30 MILLIGRAM(S): 2.5 TABLET ORAL at 05:15

## 2020-01-01 RX ADMIN — CHLORHEXIDINE GLUCONATE 1 APPLICATION(S): 213 SOLUTION TOPICAL at 16:06

## 2020-01-01 RX ADMIN — OXYCODONE HYDROCHLORIDE 10 MILLIGRAM(S): 5 TABLET ORAL at 01:46

## 2020-01-01 RX ADMIN — OXYCODONE HYDROCHLORIDE 10 MILLIGRAM(S): 5 TABLET ORAL at 01:43

## 2020-01-01 RX ADMIN — Medication 975 MILLIGRAM(S): at 13:51

## 2020-01-01 RX ADMIN — OXYCODONE HYDROCHLORIDE 7.5 MILLIGRAM(S): 5 TABLET ORAL at 05:50

## 2020-01-01 RX ADMIN — Medication 500 MILLIGRAM(S): at 06:43

## 2020-01-01 RX ADMIN — OXYCODONE HYDROCHLORIDE 10 MILLIGRAM(S): 5 TABLET ORAL at 10:50

## 2020-01-01 RX ADMIN — OXYCODONE HYDROCHLORIDE 5 MILLIGRAM(S): 5 TABLET ORAL at 18:19

## 2020-01-01 RX ADMIN — Medication 250 MILLIGRAM(S): at 06:11

## 2020-01-01 RX ADMIN — Medication 100 MILLIGRAM(S): at 13:59

## 2020-01-01 RX ADMIN — MORPHINE SULFATE 2 MILLIGRAM(S): 50 CAPSULE, EXTENDED RELEASE ORAL at 15:30

## 2020-01-01 RX ADMIN — Medication 1 TABLET(S): at 17:44

## 2020-01-01 RX ADMIN — PIPERACILLIN AND TAZOBACTAM 25 GRAM(S): 4; .5 INJECTION, POWDER, LYOPHILIZED, FOR SOLUTION INTRAVENOUS at 21:17

## 2020-01-01 RX ADMIN — OXYCODONE HYDROCHLORIDE 5 MILLIGRAM(S): 5 TABLET ORAL at 12:00

## 2020-01-01 RX ADMIN — MORPHINE SULFATE 1 MILLIGRAM(S): 50 CAPSULE, EXTENDED RELEASE ORAL at 04:13

## 2020-01-01 RX ADMIN — OXYCODONE HYDROCHLORIDE 10 MILLIGRAM(S): 5 TABLET ORAL at 10:22

## 2020-01-01 RX ADMIN — MIDODRINE HYDROCHLORIDE 40 MILLIGRAM(S): 2.5 TABLET ORAL at 06:15

## 2020-01-01 RX ADMIN — Medication 500 MILLIGRAM(S): at 17:44

## 2020-01-01 RX ADMIN — Medication 975 MILLIGRAM(S): at 07:00

## 2020-01-01 RX ADMIN — Medication 3 MILLILITER(S): at 16:34

## 2020-01-01 RX ADMIN — GABAPENTIN 100 MILLIGRAM(S): 400 CAPSULE ORAL at 21:03

## 2020-01-01 RX ADMIN — Medication 1 TABLET(S): at 17:37

## 2020-01-01 RX ADMIN — OXYCODONE HYDROCHLORIDE 5 MILLIGRAM(S): 5 TABLET ORAL at 02:30

## 2020-01-01 RX ADMIN — PANTOPRAZOLE SODIUM 40 MILLIGRAM(S): 20 TABLET, DELAYED RELEASE ORAL at 05:33

## 2020-01-01 RX ADMIN — Medication 500 MILLIGRAM(S): at 01:07

## 2020-01-01 RX ADMIN — Medication 3 MILLIGRAM(S): at 21:27

## 2020-01-01 RX ADMIN — Medication 325 MILLIGRAM(S): at 06:33

## 2020-01-01 RX ADMIN — Medication 500 MILLIGRAM(S): at 18:14

## 2020-01-01 RX ADMIN — Medication 500 MILLIGRAM(S): at 14:40

## 2020-01-01 RX ADMIN — Medication 975 MILLIGRAM(S): at 05:30

## 2020-01-01 RX ADMIN — Medication 500 MILLIGRAM(S): at 10:44

## 2020-01-01 RX ADMIN — MORPHINE SULFATE 0.5 MILLIGRAM(S): 50 CAPSULE, EXTENDED RELEASE ORAL at 00:44

## 2020-01-01 RX ADMIN — Medication 500 MILLIGRAM(S): at 17:35

## 2020-01-01 RX ADMIN — OXYCODONE HYDROCHLORIDE 20 MILLIGRAM(S): 5 TABLET ORAL at 14:36

## 2020-01-01 RX ADMIN — METHYLNALTREXONE BROMIDE 8 MILLIGRAM(S): 12 INJECTION, SOLUTION SUBCUTANEOUS at 12:47

## 2020-01-01 RX ADMIN — OXYCODONE HYDROCHLORIDE 10 MILLIGRAM(S): 5 TABLET ORAL at 09:04

## 2020-01-01 RX ADMIN — PANTOPRAZOLE SODIUM 40 MILLIGRAM(S): 20 TABLET, DELAYED RELEASE ORAL at 07:42

## 2020-01-01 RX ADMIN — PIPERACILLIN AND TAZOBACTAM 200 GRAM(S): 4; .5 INJECTION, POWDER, LYOPHILIZED, FOR SOLUTION INTRAVENOUS at 14:33

## 2020-01-01 RX ADMIN — OXYCODONE HYDROCHLORIDE 5 MILLIGRAM(S): 5 TABLET ORAL at 09:02

## 2020-01-01 RX ADMIN — Medication 100 MILLIGRAM(S): at 05:54

## 2020-01-01 RX ADMIN — OXYCODONE HYDROCHLORIDE 5 MILLIGRAM(S): 5 TABLET ORAL at 11:54

## 2020-01-01 RX ADMIN — Medication 500 MILLIGRAM(S): at 01:15

## 2020-01-01 RX ADMIN — OXYCODONE HYDROCHLORIDE 5 MILLIGRAM(S): 5 TABLET ORAL at 13:25

## 2020-01-01 RX ADMIN — MIDODRINE HYDROCHLORIDE 20 MILLIGRAM(S): 2.5 TABLET ORAL at 06:23

## 2020-01-01 RX ADMIN — OXYCODONE HYDROCHLORIDE 10 MILLIGRAM(S): 5 TABLET ORAL at 16:05

## 2020-01-01 RX ADMIN — OXYCODONE HYDROCHLORIDE 10 MILLIGRAM(S): 5 TABLET ORAL at 07:28

## 2020-01-01 RX ADMIN — ENOXAPARIN SODIUM 40 MILLIGRAM(S): 100 INJECTION SUBCUTANEOUS at 12:33

## 2020-01-01 RX ADMIN — LIDOCAINE 2 PATCH: 4 CREAM TOPICAL at 14:36

## 2020-01-01 RX ADMIN — Medication 500 MILLIGRAM(S): at 20:00

## 2020-01-01 RX ADMIN — OXYCODONE HYDROCHLORIDE 10 MILLIGRAM(S): 5 TABLET ORAL at 18:54

## 2020-01-01 RX ADMIN — Medication 3 MILLIGRAM(S): at 21:34

## 2020-01-01 RX ADMIN — Medication 1 TABLET(S): at 06:13

## 2020-01-01 RX ADMIN — OXYCODONE HYDROCHLORIDE 20 MILLIGRAM(S): 5 TABLET ORAL at 13:38

## 2020-01-01 RX ADMIN — Medication 50 GRAM(S): at 04:41

## 2020-01-01 RX ADMIN — MIDODRINE HYDROCHLORIDE 30 MILLIGRAM(S): 2.5 TABLET ORAL at 21:27

## 2020-01-01 RX ADMIN — Medication 100 GRAM(S): at 10:20

## 2020-01-01 RX ADMIN — HYDROMORPHONE HYDROCHLORIDE 1 MILLIGRAM(S): 2 INJECTION INTRAMUSCULAR; INTRAVENOUS; SUBCUTANEOUS at 00:00

## 2020-01-01 RX ADMIN — OXYCODONE HYDROCHLORIDE 7.5 MILLIGRAM(S): 5 TABLET ORAL at 01:49

## 2020-01-01 RX ADMIN — OXYCODONE HYDROCHLORIDE 7.5 MILLIGRAM(S): 5 TABLET ORAL at 13:21

## 2020-01-01 RX ADMIN — POLYETHYLENE GLYCOL 3350 17 GRAM(S): 17 POWDER, FOR SOLUTION ORAL at 12:34

## 2020-01-01 RX ADMIN — TIGECYCLINE 105 MILLIGRAM(S): 50 INJECTION, POWDER, LYOPHILIZED, FOR SOLUTION INTRAVENOUS at 06:00

## 2020-01-01 RX ADMIN — Medication 100 MILLIGRAM(S): at 06:26

## 2020-01-01 RX ADMIN — ONDANSETRON 4 MILLIGRAM(S): 8 TABLET, FILM COATED ORAL at 10:23

## 2020-01-01 RX ADMIN — MORPHINE SULFATE 2 MILLIGRAM(S): 50 CAPSULE, EXTENDED RELEASE ORAL at 10:54

## 2020-01-01 RX ADMIN — GABAPENTIN 100 MILLIGRAM(S): 400 CAPSULE ORAL at 05:51

## 2020-01-01 RX ADMIN — MORPHINE SULFATE 2 MILLIGRAM(S): 50 CAPSULE, EXTENDED RELEASE ORAL at 09:28

## 2020-01-01 RX ADMIN — OXYCODONE HYDROCHLORIDE 7.5 MILLIGRAM(S): 5 TABLET ORAL at 00:26

## 2020-01-01 RX ADMIN — OXYCODONE HYDROCHLORIDE 5 MILLIGRAM(S): 5 TABLET ORAL at 13:50

## 2020-01-01 RX ADMIN — Medication 6.09 MICROGRAM(S)/KG/MIN: at 18:10

## 2020-01-01 RX ADMIN — Medication 100 MILLIGRAM(S): at 21:02

## 2020-01-01 RX ADMIN — Medication 325 MILLIGRAM(S): at 17:24

## 2020-01-01 RX ADMIN — Medication 1 TABLET(S): at 05:10

## 2020-01-01 RX ADMIN — Medication 100 MILLIGRAM(S): at 00:24

## 2020-01-01 RX ADMIN — MIDODRINE HYDROCHLORIDE 30 MILLIGRAM(S): 2.5 TABLET ORAL at 05:53

## 2020-01-01 RX ADMIN — OXYCODONE HYDROCHLORIDE 5 MILLIGRAM(S): 5 TABLET ORAL at 08:51

## 2020-01-01 RX ADMIN — Medication 325 MILLIGRAM(S): at 11:47

## 2020-01-01 RX ADMIN — GABAPENTIN 100 MILLIGRAM(S): 400 CAPSULE ORAL at 21:15

## 2020-01-01 RX ADMIN — Medication 500 MILLIGRAM(S): at 05:50

## 2020-01-01 RX ADMIN — OXYCODONE HYDROCHLORIDE 15 MILLIGRAM(S): 5 TABLET ORAL at 05:27

## 2020-01-01 RX ADMIN — APIXABAN 5 MILLIGRAM(S): 2.5 TABLET, FILM COATED ORAL at 17:30

## 2020-01-01 RX ADMIN — OXYCODONE HYDROCHLORIDE 20 MILLIGRAM(S): 5 TABLET ORAL at 06:00

## 2020-01-01 RX ADMIN — OXYCODONE HYDROCHLORIDE 10 MILLIGRAM(S): 5 TABLET ORAL at 11:04

## 2020-01-01 RX ADMIN — Medication 975 MILLIGRAM(S): at 13:17

## 2020-01-01 RX ADMIN — Medication 3 MILLIGRAM(S): at 21:33

## 2020-01-01 RX ADMIN — OXYCODONE HYDROCHLORIDE 5 MILLIGRAM(S): 5 TABLET ORAL at 20:30

## 2020-01-01 RX ADMIN — HEPARIN SODIUM 5000 UNIT(S): 5000 INJECTION INTRAVENOUS; SUBCUTANEOUS at 21:58

## 2020-01-01 RX ADMIN — LINEZOLID 300 MILLIGRAM(S): 600 INJECTION, SOLUTION INTRAVENOUS at 09:18

## 2020-01-01 RX ADMIN — Medication 30 MILLIGRAM(S): at 22:56

## 2020-01-01 RX ADMIN — Medication 500 MILLIGRAM(S): at 02:30

## 2020-01-01 RX ADMIN — OXYCODONE HYDROCHLORIDE 10 MILLIGRAM(S): 5 TABLET ORAL at 23:44

## 2020-01-01 RX ADMIN — Medication 500 MILLIGRAM(S): at 12:10

## 2020-01-01 RX ADMIN — Medication 500 MILLIGRAM(S): at 15:21

## 2020-01-01 RX ADMIN — LIDOCAINE 1 PATCH: 4 CREAM TOPICAL at 12:03

## 2020-01-01 RX ADMIN — PANTOPRAZOLE SODIUM 40 MILLIGRAM(S): 20 TABLET, DELAYED RELEASE ORAL at 05:04

## 2020-01-01 RX ADMIN — MEROPENEM 100 MILLIGRAM(S): 1 INJECTION INTRAVENOUS at 15:22

## 2020-01-01 RX ADMIN — MIDODRINE HYDROCHLORIDE 30 MILLIGRAM(S): 2.5 TABLET ORAL at 14:15

## 2020-01-01 RX ADMIN — Medication 500 MILLIGRAM(S): at 13:12

## 2020-01-01 RX ADMIN — MORPHINE SULFATE 2 MILLIGRAM(S): 50 CAPSULE, EXTENDED RELEASE ORAL at 03:06

## 2020-01-01 RX ADMIN — OXYCODONE HYDROCHLORIDE 10 MILLIGRAM(S): 5 TABLET ORAL at 20:00

## 2020-01-01 RX ADMIN — MIDODRINE HYDROCHLORIDE 30 MILLIGRAM(S): 2.5 TABLET ORAL at 14:16

## 2020-01-01 RX ADMIN — SENNA PLUS 2 TABLET(S): 8.6 TABLET ORAL at 21:44

## 2020-01-01 RX ADMIN — OXYCODONE HYDROCHLORIDE 10 MILLIGRAM(S): 5 TABLET ORAL at 03:47

## 2020-01-01 RX ADMIN — NYSTATIN CREAM 1 APPLICATION(S): 100000 CREAM TOPICAL at 05:41

## 2020-01-01 RX ADMIN — ONDANSETRON 4 MILLIGRAM(S): 8 TABLET, FILM COATED ORAL at 21:19

## 2020-01-01 RX ADMIN — Medication 5.63 MICROGRAM(S)/KG/MIN: at 20:56

## 2020-01-01 RX ADMIN — Medication 1 TABLET(S): at 06:22

## 2020-01-01 RX ADMIN — MORPHINE SULFATE 15 MILLIGRAM(S): 50 CAPSULE, EXTENDED RELEASE ORAL at 14:16

## 2020-01-01 RX ADMIN — Medication 500 MILLIGRAM(S): at 02:50

## 2020-01-01 RX ADMIN — FENTANYL CITRATE 25 MICROGRAM(S): 50 INJECTION INTRAVENOUS at 10:06

## 2020-01-01 RX ADMIN — OXYCODONE HYDROCHLORIDE 7.5 MILLIGRAM(S): 5 TABLET ORAL at 20:03

## 2020-01-01 RX ADMIN — OXYCODONE HYDROCHLORIDE 5 MILLIGRAM(S): 5 TABLET ORAL at 17:39

## 2020-01-01 RX ADMIN — POLYETHYLENE GLYCOL 3350 17 GRAM(S): 17 POWDER, FOR SOLUTION ORAL at 11:21

## 2020-01-01 RX ADMIN — Medication 975 MILLIGRAM(S): at 13:05

## 2020-01-01 RX ADMIN — MIDODRINE HYDROCHLORIDE 30 MILLIGRAM(S): 2.5 TABLET ORAL at 15:21

## 2020-01-01 RX ADMIN — OXYCODONE HYDROCHLORIDE 20 MILLIGRAM(S): 5 TABLET ORAL at 21:36

## 2020-01-01 RX ADMIN — MIDODRINE HYDROCHLORIDE 20 MILLIGRAM(S): 2.5 TABLET ORAL at 21:05

## 2020-01-01 RX ADMIN — OXYCODONE HYDROCHLORIDE 10 MILLIGRAM(S): 5 TABLET ORAL at 06:02

## 2020-01-01 RX ADMIN — OXYCODONE HYDROCHLORIDE 10 MILLIGRAM(S): 5 TABLET ORAL at 21:10

## 2020-01-01 RX ADMIN — OXYCODONE HYDROCHLORIDE 10 MILLIGRAM(S): 5 TABLET ORAL at 21:16

## 2020-01-01 RX ADMIN — OXYCODONE HYDROCHLORIDE 10 MILLIGRAM(S): 5 TABLET ORAL at 09:40

## 2020-01-01 RX ADMIN — Medication 500 MILLIGRAM(S): at 06:00

## 2020-01-01 RX ADMIN — ONDANSETRON 4 MILLIGRAM(S): 8 TABLET, FILM COATED ORAL at 16:10

## 2020-01-01 RX ADMIN — Medication 500 MILLIGRAM(S): at 14:46

## 2020-01-01 RX ADMIN — HALOPERIDOL DECANOATE 2 MILLIGRAM(S): 100 INJECTION INTRAMUSCULAR at 22:35

## 2020-01-01 RX ADMIN — OXYCODONE HYDROCHLORIDE 7.5 MILLIGRAM(S): 5 TABLET ORAL at 16:44

## 2020-01-01 RX ADMIN — OXYCODONE HYDROCHLORIDE 5 MILLIGRAM(S): 5 TABLET ORAL at 03:06

## 2020-01-01 RX ADMIN — Medication 500 MILLIGRAM(S): at 09:37

## 2020-01-01 RX ADMIN — OXYCODONE HYDROCHLORIDE 5 MILLIGRAM(S): 5 TABLET ORAL at 16:50

## 2020-01-01 RX ADMIN — Medication 250 MILLIGRAM(S): at 18:09

## 2020-01-01 RX ADMIN — Medication 1 TABLET(S): at 17:38

## 2020-01-01 RX ADMIN — OXYCODONE HYDROCHLORIDE 10 MILLIGRAM(S): 5 TABLET ORAL at 17:31

## 2020-01-01 RX ADMIN — OLANZAPINE 2.5 MILLIGRAM(S): 15 TABLET, FILM COATED ORAL at 10:21

## 2020-01-01 RX ADMIN — FENTANYL CITRATE 25 MICROGRAM(S): 50 INJECTION INTRAVENOUS at 10:30

## 2020-01-01 RX ADMIN — OXYCODONE HYDROCHLORIDE 10 MILLIGRAM(S): 5 TABLET ORAL at 03:22

## 2020-01-01 RX ADMIN — Medication 500 MILLIGRAM(S): at 22:31

## 2020-01-01 RX ADMIN — MORPHINE SULFATE 2 MILLIGRAM(S): 50 CAPSULE, EXTENDED RELEASE ORAL at 14:00

## 2020-01-01 RX ADMIN — ONDANSETRON 4 MILLIGRAM(S): 8 TABLET, FILM COATED ORAL at 17:15

## 2020-01-01 RX ADMIN — Medication 1 TABLET(S): at 05:11

## 2020-01-01 RX ADMIN — OXYCODONE HYDROCHLORIDE 15 MILLIGRAM(S): 5 TABLET ORAL at 21:11

## 2020-01-01 RX ADMIN — MORPHINE SULFATE 2 MILLIGRAM(S): 50 CAPSULE, EXTENDED RELEASE ORAL at 20:07

## 2020-01-01 RX ADMIN — LIDOCAINE 2 PATCH: 4 CREAM TOPICAL at 19:14

## 2020-01-01 RX ADMIN — Medication 500 MILLIGRAM(S): at 13:53

## 2020-01-01 RX ADMIN — GABAPENTIN 100 MILLIGRAM(S): 400 CAPSULE ORAL at 21:41

## 2020-01-01 RX ADMIN — Medication 1 TABLET(S): at 12:12

## 2020-01-01 RX ADMIN — OXYCODONE HYDROCHLORIDE 5 MILLIGRAM(S): 5 TABLET ORAL at 10:18

## 2020-01-01 RX ADMIN — LIDOCAINE 2 PATCH: 4 CREAM TOPICAL at 20:07

## 2020-01-01 RX ADMIN — OXYCODONE HYDROCHLORIDE 5 MILLIGRAM(S): 5 TABLET ORAL at 16:06

## 2020-01-01 RX ADMIN — APIXABAN 10 MILLIGRAM(S): 2.5 TABLET, FILM COATED ORAL at 09:47

## 2020-01-01 RX ADMIN — Medication 500 MILLIGRAM(S): at 00:54

## 2020-01-01 RX ADMIN — Medication 500 MILLIGRAM(S): at 05:57

## 2020-01-01 RX ADMIN — OXYCODONE HYDROCHLORIDE 10 MILLIGRAM(S): 5 TABLET ORAL at 12:34

## 2020-01-01 RX ADMIN — MIDODRINE HYDROCHLORIDE 20 MILLIGRAM(S): 2.5 TABLET ORAL at 21:11

## 2020-01-01 RX ADMIN — OXYCODONE HYDROCHLORIDE 5 MILLIGRAM(S): 5 TABLET ORAL at 19:49

## 2020-01-01 RX ADMIN — LIDOCAINE 2 PATCH: 4 CREAM TOPICAL at 01:00

## 2020-01-01 RX ADMIN — MIDODRINE HYDROCHLORIDE 30 MILLIGRAM(S): 2.5 TABLET ORAL at 22:05

## 2020-01-01 RX ADMIN — MORPHINE SULFATE 2 MILLIGRAM(S): 50 CAPSULE, EXTENDED RELEASE ORAL at 09:44

## 2020-01-01 RX ADMIN — HEPARIN SODIUM 5000 UNIT(S): 5000 INJECTION INTRAVENOUS; SUBCUTANEOUS at 21:17

## 2020-01-01 RX ADMIN — Medication 5.63 MICROGRAM(S)/KG/MIN: at 20:13

## 2020-01-01 RX ADMIN — OXYCODONE HYDROCHLORIDE 10 MILLIGRAM(S): 5 TABLET ORAL at 06:19

## 2020-01-01 RX ADMIN — OXYCODONE HYDROCHLORIDE 7.5 MILLIGRAM(S): 5 TABLET ORAL at 15:45

## 2020-01-01 RX ADMIN — Medication 500 MILLIGRAM(S): at 17:30

## 2020-01-01 RX ADMIN — OXYCODONE HYDROCHLORIDE 20 MILLIGRAM(S): 5 TABLET ORAL at 22:40

## 2020-01-01 RX ADMIN — ONDANSETRON 4 MILLIGRAM(S): 8 TABLET, FILM COATED ORAL at 05:33

## 2020-01-01 RX ADMIN — PIPERACILLIN AND TAZOBACTAM 3.38 GRAM(S): 4; .5 INJECTION, POWDER, LYOPHILIZED, FOR SOLUTION INTRAVENOUS at 17:39

## 2020-01-01 RX ADMIN — OXYCODONE HYDROCHLORIDE 5 MILLIGRAM(S): 5 TABLET ORAL at 09:45

## 2020-01-01 RX ADMIN — OXYCODONE HYDROCHLORIDE 10 MILLIGRAM(S): 5 TABLET ORAL at 00:00

## 2020-01-01 RX ADMIN — OXYCODONE HYDROCHLORIDE 20 MILLIGRAM(S): 5 TABLET ORAL at 21:46

## 2020-01-01 RX ADMIN — MIDODRINE HYDROCHLORIDE 20 MILLIGRAM(S): 2.5 TABLET ORAL at 21:03

## 2020-01-01 RX ADMIN — OXYCODONE HYDROCHLORIDE 10 MILLIGRAM(S): 5 TABLET ORAL at 17:45

## 2020-01-01 RX ADMIN — PIPERACILLIN AND TAZOBACTAM 25 GRAM(S): 4; .5 INJECTION, POWDER, LYOPHILIZED, FOR SOLUTION INTRAVENOUS at 21:32

## 2020-01-01 RX ADMIN — Medication 3 MILLIGRAM(S): at 22:56

## 2020-01-01 RX ADMIN — LINEZOLID 300 MILLIGRAM(S): 600 INJECTION, SOLUTION INTRAVENOUS at 10:11

## 2020-01-01 RX ADMIN — Medication 250 MILLIGRAM(S): at 19:30

## 2020-01-01 RX ADMIN — Medication 500 MILLIGRAM(S): at 13:45

## 2020-01-01 RX ADMIN — Medication 500 MILLIGRAM(S): at 18:22

## 2020-01-01 RX ADMIN — MIDODRINE HYDROCHLORIDE 30 MILLIGRAM(S): 2.5 TABLET ORAL at 13:59

## 2020-01-01 RX ADMIN — Medication 100 MILLIGRAM(S): at 00:56

## 2020-01-01 RX ADMIN — Medication 3 MILLIGRAM(S): at 21:59

## 2020-01-01 RX ADMIN — Medication 500 MILLIGRAM(S): at 09:19

## 2020-01-01 RX ADMIN — Medication 40 MILLIEQUIVALENT(S): at 14:33

## 2020-01-01 RX ADMIN — Medication 3 MILLILITER(S): at 04:46

## 2020-01-01 RX ADMIN — Medication 500 MILLIGRAM(S): at 17:38

## 2020-01-01 RX ADMIN — GABAPENTIN 100 MILLIGRAM(S): 400 CAPSULE ORAL at 05:06

## 2020-01-01 RX ADMIN — GABAPENTIN 100 MILLIGRAM(S): 400 CAPSULE ORAL at 13:33

## 2020-01-01 RX ADMIN — Medication 40 MILLIEQUIVALENT(S): at 13:18

## 2020-01-01 RX ADMIN — OXYCODONE HYDROCHLORIDE 7.5 MILLIGRAM(S): 5 TABLET ORAL at 22:28

## 2020-01-01 RX ADMIN — APIXABAN 10 MILLIGRAM(S): 2.5 TABLET, FILM COATED ORAL at 09:59

## 2020-01-01 RX ADMIN — OXYCODONE HYDROCHLORIDE 10 MILLIGRAM(S): 5 TABLET ORAL at 13:21

## 2020-01-01 RX ADMIN — Medication 500 MILLIGRAM(S): at 17:02

## 2020-01-01 RX ADMIN — OXYCODONE HYDROCHLORIDE 10 MILLIGRAM(S): 5 TABLET ORAL at 09:22

## 2020-01-01 RX ADMIN — OXYCODONE HYDROCHLORIDE 20 MILLIGRAM(S): 5 TABLET ORAL at 22:30

## 2020-01-01 RX ADMIN — Medication 3 MILLIGRAM(S): at 21:12

## 2020-01-01 RX ADMIN — Medication 100 MILLIGRAM(S): at 14:15

## 2020-01-01 RX ADMIN — Medication 50 GRAM(S): at 09:34

## 2020-01-01 RX ADMIN — ONDANSETRON 4 MILLIGRAM(S): 8 TABLET, FILM COATED ORAL at 13:18

## 2020-01-01 RX ADMIN — OXYCODONE HYDROCHLORIDE 7.5 MILLIGRAM(S): 5 TABLET ORAL at 16:22

## 2020-01-01 RX ADMIN — OXYCODONE AND ACETAMINOPHEN 1 TABLET(S): 5; 325 TABLET ORAL at 23:25

## 2020-01-01 RX ADMIN — OXYCODONE HYDROCHLORIDE 10 MILLIGRAM(S): 5 TABLET ORAL at 07:02

## 2020-01-01 RX ADMIN — Medication 40 MILLIEQUIVALENT(S): at 17:53

## 2020-01-01 RX ADMIN — OXYCODONE HYDROCHLORIDE 20 MILLIGRAM(S): 5 TABLET ORAL at 22:23

## 2020-01-01 RX ADMIN — Medication 500 MILLIGRAM(S): at 18:15

## 2020-01-01 RX ADMIN — MORPHINE SULFATE 2 MILLIGRAM(S): 50 CAPSULE, EXTENDED RELEASE ORAL at 17:20

## 2020-01-01 RX ADMIN — Medication 500 MILLIGRAM(S): at 22:00

## 2020-01-01 RX ADMIN — APIXABAN 5 MILLIGRAM(S): 2.5 TABLET, FILM COATED ORAL at 21:28

## 2020-01-01 RX ADMIN — MORPHINE SULFATE 2 MILLIGRAM(S): 50 CAPSULE, EXTENDED RELEASE ORAL at 10:32

## 2020-01-01 RX ADMIN — OXYCODONE HYDROCHLORIDE 10 MILLIGRAM(S): 5 TABLET ORAL at 15:55

## 2020-01-01 RX ADMIN — Medication 3 MILLILITER(S): at 21:12

## 2020-01-01 RX ADMIN — Medication 500 MILLIGRAM(S): at 10:18

## 2020-01-01 RX ADMIN — Medication 500 MILLIGRAM(S): at 21:06

## 2020-01-01 RX ADMIN — Medication 500 MILLIGRAM(S): at 01:43

## 2020-01-01 RX ADMIN — GABAPENTIN 100 MILLIGRAM(S): 400 CAPSULE ORAL at 06:13

## 2020-01-01 RX ADMIN — OXYCODONE HYDROCHLORIDE 10 MILLIGRAM(S): 5 TABLET ORAL at 02:01

## 2020-01-01 RX ADMIN — OXYCODONE HYDROCHLORIDE 20 MILLIGRAM(S): 5 TABLET ORAL at 10:45

## 2020-01-01 RX ADMIN — Medication 500 MILLIGRAM(S): at 21:50

## 2020-01-01 RX ADMIN — Medication 500 MILLIGRAM(S): at 09:27

## 2020-01-01 RX ADMIN — MORPHINE SULFATE 2 MILLIGRAM(S): 50 CAPSULE, EXTENDED RELEASE ORAL at 05:14

## 2020-01-01 RX ADMIN — GABAPENTIN 100 MILLIGRAM(S): 400 CAPSULE ORAL at 14:16

## 2020-01-01 RX ADMIN — GABAPENTIN 100 MILLIGRAM(S): 400 CAPSULE ORAL at 21:29

## 2020-01-01 RX ADMIN — SODIUM CHLORIDE 500 MILLILITER(S): 9 INJECTION, SOLUTION INTRAVENOUS at 15:06

## 2020-01-01 RX ADMIN — Medication 3 MILLILITER(S): at 16:51

## 2020-01-01 RX ADMIN — MORPHINE SULFATE 1 MILLIGRAM(S): 50 CAPSULE, EXTENDED RELEASE ORAL at 11:41

## 2020-01-01 RX ADMIN — MIDODRINE HYDROCHLORIDE 10 MILLIGRAM(S): 2.5 TABLET ORAL at 13:08

## 2020-01-01 RX ADMIN — OXYCODONE HYDROCHLORIDE 5 MILLIGRAM(S): 5 TABLET ORAL at 23:55

## 2020-01-01 RX ADMIN — MIDODRINE HYDROCHLORIDE 30 MILLIGRAM(S): 2.5 TABLET ORAL at 08:01

## 2020-01-01 RX ADMIN — Medication 500 MILLIGRAM(S): at 02:35

## 2020-01-01 RX ADMIN — Medication 500 MILLIGRAM(S): at 17:36

## 2020-01-01 RX ADMIN — OXYCODONE HYDROCHLORIDE 10 MILLIGRAM(S): 5 TABLET ORAL at 02:07

## 2020-01-01 RX ADMIN — OXYCODONE HYDROCHLORIDE 10 MILLIGRAM(S): 5 TABLET ORAL at 21:06

## 2020-01-01 RX ADMIN — LIDOCAINE 1 PATCH: 4 CREAM TOPICAL at 02:07

## 2020-01-01 RX ADMIN — Medication 5.63 MICROGRAM(S)/KG/MIN: at 19:13

## 2020-01-01 RX ADMIN — OXYCODONE AND ACETAMINOPHEN 1 TABLET(S): 5; 325 TABLET ORAL at 07:40

## 2020-01-01 RX ADMIN — MIDODRINE HYDROCHLORIDE 40 MILLIGRAM(S): 2.5 TABLET ORAL at 05:33

## 2020-01-01 RX ADMIN — PANTOPRAZOLE SODIUM 40 MILLIGRAM(S): 20 TABLET, DELAYED RELEASE ORAL at 05:07

## 2020-01-01 RX ADMIN — CHLORHEXIDINE GLUCONATE 1 APPLICATION(S): 213 SOLUTION TOPICAL at 11:52

## 2020-01-01 RX ADMIN — Medication 500 MILLIGRAM(S): at 13:05

## 2020-01-01 RX ADMIN — GABAPENTIN 100 MILLIGRAM(S): 400 CAPSULE ORAL at 21:34

## 2020-01-01 RX ADMIN — OXYCODONE HYDROCHLORIDE 7.5 MILLIGRAM(S): 5 TABLET ORAL at 00:25

## 2020-01-01 RX ADMIN — Medication 3 MILLILITER(S): at 16:38

## 2020-01-01 RX ADMIN — SODIUM CHLORIDE 75 MILLILITER(S): 9 INJECTION INTRAMUSCULAR; INTRAVENOUS; SUBCUTANEOUS at 14:16

## 2020-01-01 RX ADMIN — HALOPERIDOL DECANOATE 1 MILLIGRAM(S): 100 INJECTION INTRAMUSCULAR at 08:59

## 2020-01-01 RX ADMIN — NYSTATIN CREAM 1 APPLICATION(S): 100000 CREAM TOPICAL at 05:33

## 2020-01-01 RX ADMIN — HEPARIN SODIUM 5000 UNIT(S): 5000 INJECTION INTRAVENOUS; SUBCUTANEOUS at 13:30

## 2020-01-01 RX ADMIN — Medication 3 MILLIGRAM(S): at 21:51

## 2020-01-01 RX ADMIN — GABAPENTIN 100 MILLIGRAM(S): 400 CAPSULE ORAL at 05:33

## 2020-01-01 RX ADMIN — ENOXAPARIN SODIUM 40 MILLIGRAM(S): 100 INJECTION SUBCUTANEOUS at 12:09

## 2020-01-01 RX ADMIN — Medication 325 MILLIGRAM(S): at 17:05

## 2020-01-01 RX ADMIN — Medication 100 MILLIGRAM(S): at 00:28

## 2020-01-01 RX ADMIN — Medication 325 MILLIGRAM(S): at 05:30

## 2020-01-01 RX ADMIN — ENOXAPARIN SODIUM 40 MILLIGRAM(S): 100 INJECTION SUBCUTANEOUS at 11:35

## 2020-01-01 RX ADMIN — Medication 1 TABLET(S): at 05:42

## 2020-01-01 RX ADMIN — OXYCODONE HYDROCHLORIDE 10 MILLIGRAM(S): 5 TABLET ORAL at 16:46

## 2020-01-01 RX ADMIN — Medication 1 TABLET(S): at 13:17

## 2020-01-01 RX ADMIN — Medication 1 TABLET(S): at 05:57

## 2020-01-01 RX ADMIN — PANTOPRAZOLE SODIUM 40 MILLIGRAM(S): 20 TABLET, DELAYED RELEASE ORAL at 05:27

## 2020-01-01 RX ADMIN — MEROPENEM 100 MILLIGRAM(S): 1 INJECTION INTRAVENOUS at 21:25

## 2020-01-01 RX ADMIN — OXYCODONE HYDROCHLORIDE 10 MILLIGRAM(S): 5 TABLET ORAL at 10:14

## 2020-01-01 RX ADMIN — POTASSIUM PHOSPHATE, MONOBASIC POTASSIUM PHOSPHATE, DIBASIC 62.5 MILLIMOLE(S): 236; 224 INJECTION, SOLUTION INTRAVENOUS at 14:16

## 2020-01-01 RX ADMIN — MIDODRINE HYDROCHLORIDE 30 MILLIGRAM(S): 2.5 TABLET ORAL at 14:28

## 2020-01-01 RX ADMIN — HEPARIN SODIUM 5000 UNIT(S): 5000 INJECTION INTRAVENOUS; SUBCUTANEOUS at 05:58

## 2020-01-01 RX ADMIN — OXYCODONE HYDROCHLORIDE 10 MILLIGRAM(S): 5 TABLET ORAL at 22:22

## 2020-01-01 RX ADMIN — GABAPENTIN 100 MILLIGRAM(S): 400 CAPSULE ORAL at 21:49

## 2020-01-01 RX ADMIN — LIDOCAINE 2 PATCH: 4 CREAM TOPICAL at 07:07

## 2020-01-01 RX ADMIN — LIDOCAINE 1 PATCH: 4 CREAM TOPICAL at 12:22

## 2020-01-01 RX ADMIN — LIDOCAINE 2 PATCH: 4 CREAM TOPICAL at 11:32

## 2020-01-01 RX ADMIN — MORPHINE SULFATE 2 MILLIGRAM(S): 50 CAPSULE, EXTENDED RELEASE ORAL at 14:10

## 2020-01-01 RX ADMIN — OXYCODONE HYDROCHLORIDE 10 MILLIGRAM(S): 5 TABLET ORAL at 16:15

## 2020-01-01 RX ADMIN — OXYCODONE HYDROCHLORIDE 10 MILLIGRAM(S): 5 TABLET ORAL at 06:00

## 2020-01-01 RX ADMIN — Medication 30 MILLIGRAM(S): at 23:17

## 2020-01-01 RX ADMIN — TIGECYCLINE 105 MILLIGRAM(S): 50 INJECTION, POWDER, LYOPHILIZED, FOR SOLUTION INTRAVENOUS at 06:26

## 2020-01-01 RX ADMIN — Medication 100 MILLIGRAM(S): at 05:53

## 2020-01-01 RX ADMIN — OXYCODONE HYDROCHLORIDE 7.5 MILLIGRAM(S): 5 TABLET ORAL at 09:50

## 2020-01-01 RX ADMIN — MIDODRINE HYDROCHLORIDE 20 MILLIGRAM(S): 2.5 TABLET ORAL at 06:21

## 2020-01-01 RX ADMIN — OXYCODONE HYDROCHLORIDE 10 MILLIGRAM(S): 5 TABLET ORAL at 16:06

## 2020-01-01 RX ADMIN — Medication 500 MILLIGRAM(S): at 14:36

## 2020-01-01 RX ADMIN — Medication 500 MILLIGRAM(S): at 05:28

## 2020-01-01 RX ADMIN — OXYCODONE HYDROCHLORIDE 10 MILLIGRAM(S): 5 TABLET ORAL at 23:24

## 2020-01-01 RX ADMIN — Medication 500 MILLIGRAM(S): at 05:04

## 2020-01-01 RX ADMIN — OXYCODONE HYDROCHLORIDE 15 MILLIGRAM(S): 5 TABLET ORAL at 22:10

## 2020-01-01 RX ADMIN — MORPHINE SULFATE 2 MILLIGRAM(S): 50 CAPSULE, EXTENDED RELEASE ORAL at 09:08

## 2020-01-01 RX ADMIN — Medication 500 MILLIGRAM(S): at 20:06

## 2020-01-01 RX ADMIN — APIXABAN 10 MILLIGRAM(S): 2.5 TABLET, FILM COATED ORAL at 05:53

## 2020-01-01 RX ADMIN — Medication 500 MILLIGRAM(S): at 01:39

## 2020-01-01 RX ADMIN — Medication 125 MILLIGRAM(S): at 17:41

## 2020-01-01 RX ADMIN — OXYCODONE HYDROCHLORIDE 10 MILLIGRAM(S): 5 TABLET ORAL at 08:03

## 2020-01-01 RX ADMIN — APIXABAN 10 MILLIGRAM(S): 2.5 TABLET, FILM COATED ORAL at 21:08

## 2020-01-01 RX ADMIN — Medication 100 MILLIGRAM(S): at 21:49

## 2020-01-01 RX ADMIN — Medication 5 MILLIGRAM(S): at 02:35

## 2020-01-01 RX ADMIN — Medication 3 MILLIGRAM(S): at 21:08

## 2020-01-01 RX ADMIN — TIGECYCLINE 105 MILLIGRAM(S): 50 INJECTION, POWDER, LYOPHILIZED, FOR SOLUTION INTRAVENOUS at 05:15

## 2020-01-01 RX ADMIN — PANTOPRAZOLE SODIUM 40 MILLIGRAM(S): 20 TABLET, DELAYED RELEASE ORAL at 05:28

## 2020-01-01 RX ADMIN — Medication 100 MILLIGRAM(S): at 11:12

## 2020-01-01 RX ADMIN — SODIUM CHLORIDE 125 MILLILITER(S): 9 INJECTION INTRAMUSCULAR; INTRAVENOUS; SUBCUTANEOUS at 11:37

## 2020-01-01 RX ADMIN — ONDANSETRON 4 MILLIGRAM(S): 8 TABLET, FILM COATED ORAL at 05:06

## 2020-01-01 RX ADMIN — OXYCODONE HYDROCHLORIDE 10 MILLIGRAM(S): 5 TABLET ORAL at 14:15

## 2020-01-01 RX ADMIN — OXYCODONE HYDROCHLORIDE 20 MILLIGRAM(S): 5 TABLET ORAL at 13:34

## 2020-01-01 RX ADMIN — Medication 3 MILLIGRAM(S): at 21:26

## 2020-01-01 RX ADMIN — OXYCODONE HYDROCHLORIDE 5 MILLIGRAM(S): 5 TABLET ORAL at 05:30

## 2020-01-01 RX ADMIN — OXYCODONE HYDROCHLORIDE 10 MILLIGRAM(S): 5 TABLET ORAL at 17:15

## 2020-01-01 RX ADMIN — Medication 1 TABLET(S): at 13:19

## 2020-01-01 RX ADMIN — Medication 500 MILLIGRAM(S): at 22:25

## 2020-01-01 RX ADMIN — Medication 500 MILLIGRAM(S): at 23:21

## 2020-01-01 RX ADMIN — PIPERACILLIN AND TAZOBACTAM 25 GRAM(S): 4; .5 INJECTION, POWDER, LYOPHILIZED, FOR SOLUTION INTRAVENOUS at 05:44

## 2020-01-01 RX ADMIN — OXYCODONE HYDROCHLORIDE 10 MILLIGRAM(S): 5 TABLET ORAL at 22:26

## 2020-01-01 RX ADMIN — MIDODRINE HYDROCHLORIDE 10 MILLIGRAM(S): 2.5 TABLET ORAL at 05:57

## 2020-01-01 RX ADMIN — Medication 100 MILLIGRAM(S): at 13:55

## 2020-01-01 RX ADMIN — OXYCODONE HYDROCHLORIDE 7.5 MILLIGRAM(S): 5 TABLET ORAL at 01:56

## 2020-01-01 RX ADMIN — Medication 1 DROP(S): at 05:06

## 2020-01-01 RX ADMIN — LIDOCAINE 1 PATCH: 4 CREAM TOPICAL at 11:20

## 2020-01-01 RX ADMIN — LIDOCAINE 1 PATCH: 4 CREAM TOPICAL at 01:00

## 2020-01-01 RX ADMIN — LIDOCAINE 1 PATCH: 4 CREAM TOPICAL at 23:30

## 2020-01-01 RX ADMIN — MIDODRINE HYDROCHLORIDE 30 MILLIGRAM(S): 2.5 TABLET ORAL at 05:58

## 2020-01-01 RX ADMIN — OXYCODONE HYDROCHLORIDE 5 MILLIGRAM(S): 5 TABLET ORAL at 20:09

## 2020-01-01 RX ADMIN — PANTOPRAZOLE SODIUM 40 MILLIGRAM(S): 20 TABLET, DELAYED RELEASE ORAL at 05:53

## 2020-01-01 RX ADMIN — OXYCODONE AND ACETAMINOPHEN 1 TABLET(S): 5; 325 TABLET ORAL at 17:20

## 2020-01-01 RX ADMIN — LIDOCAINE 1 PATCH: 4 CREAM TOPICAL at 23:11

## 2020-01-01 RX ADMIN — OXYCODONE HYDROCHLORIDE 10 MILLIGRAM(S): 5 TABLET ORAL at 13:38

## 2020-01-01 RX ADMIN — GABAPENTIN 100 MILLIGRAM(S): 400 CAPSULE ORAL at 06:21

## 2020-01-01 RX ADMIN — OXYCODONE HYDROCHLORIDE 20 MILLIGRAM(S): 5 TABLET ORAL at 13:14

## 2020-01-01 RX ADMIN — Medication 125 MILLIGRAM(S): at 01:26

## 2020-01-01 RX ADMIN — OXYCODONE HYDROCHLORIDE 10 MILLIGRAM(S): 5 TABLET ORAL at 00:19

## 2020-01-01 RX ADMIN — Medication 500 MILLIGRAM(S): at 22:10

## 2020-01-01 RX ADMIN — Medication 500 MILLIGRAM(S): at 17:18

## 2020-01-01 RX ADMIN — GABAPENTIN 100 MILLIGRAM(S): 400 CAPSULE ORAL at 22:22

## 2020-01-01 RX ADMIN — Medication 975 MILLIGRAM(S): at 22:33

## 2020-01-01 RX ADMIN — GABAPENTIN 100 MILLIGRAM(S): 400 CAPSULE ORAL at 21:46

## 2020-01-01 RX ADMIN — Medication 5 MILLIGRAM(S): at 02:50

## 2020-01-01 RX ADMIN — Medication 500 MILLIGRAM(S): at 10:51

## 2020-01-01 RX ADMIN — ONDANSETRON 4 MILLIGRAM(S): 8 TABLET, FILM COATED ORAL at 03:57

## 2020-01-01 RX ADMIN — PIPERACILLIN AND TAZOBACTAM 25 GRAM(S): 4; .5 INJECTION, POWDER, LYOPHILIZED, FOR SOLUTION INTRAVENOUS at 12:56

## 2020-01-01 RX ADMIN — AZITHROMYCIN 255 MILLIGRAM(S): 500 TABLET, FILM COATED ORAL at 09:54

## 2020-01-01 RX ADMIN — MORPHINE SULFATE 2 MILLIGRAM(S): 50 CAPSULE, EXTENDED RELEASE ORAL at 14:15

## 2020-01-01 RX ADMIN — NYSTATIN CREAM 1 APPLICATION(S): 100000 CREAM TOPICAL at 18:03

## 2020-01-01 RX ADMIN — SODIUM CHLORIDE 150 MILLILITER(S): 9 INJECTION, SOLUTION INTRAVENOUS at 08:15

## 2020-01-01 RX ADMIN — OXYCODONE HYDROCHLORIDE 5 MILLIGRAM(S): 5 TABLET ORAL at 08:36

## 2020-01-01 RX ADMIN — Medication 100 MILLIGRAM(S): at 14:58

## 2020-01-01 RX ADMIN — FENTANYL CITRATE 25 MICROGRAM(S): 50 INJECTION INTRAVENOUS at 05:31

## 2020-01-01 RX ADMIN — OXYCODONE HYDROCHLORIDE 20 MILLIGRAM(S): 5 TABLET ORAL at 14:28

## 2020-01-01 RX ADMIN — OXYCODONE AND ACETAMINOPHEN 1 TABLET(S): 5; 325 TABLET ORAL at 16:29

## 2020-01-01 RX ADMIN — OXYCODONE AND ACETAMINOPHEN 1 TABLET(S): 5; 325 TABLET ORAL at 00:25

## 2020-01-01 RX ADMIN — SENNA PLUS 2 TABLET(S): 8.6 TABLET ORAL at 21:36

## 2020-01-01 RX ADMIN — Medication 1 TABLET(S): at 12:38

## 2020-01-01 RX ADMIN — OXYCODONE HYDROCHLORIDE 10 MILLIGRAM(S): 5 TABLET ORAL at 21:27

## 2020-01-01 RX ADMIN — OXYCODONE HYDROCHLORIDE 20 MILLIGRAM(S): 5 TABLET ORAL at 21:48

## 2020-01-01 RX ADMIN — POLYETHYLENE GLYCOL 3350 17 GRAM(S): 17 POWDER, FOR SOLUTION ORAL at 12:22

## 2020-01-01 RX ADMIN — Medication 125 MILLIGRAM(S): at 06:15

## 2020-01-01 RX ADMIN — PANTOPRAZOLE SODIUM 40 MILLIGRAM(S): 20 TABLET, DELAYED RELEASE ORAL at 05:45

## 2020-01-01 RX ADMIN — Medication 500 MILLIGRAM(S): at 06:13

## 2020-01-01 RX ADMIN — LIDOCAINE 1 PATCH: 4 CREAM TOPICAL at 12:43

## 2020-01-01 RX ADMIN — Medication 500 MILLIGRAM(S): at 05:12

## 2020-01-01 RX ADMIN — SODIUM CHLORIDE 1000 MILLILITER(S): 9 INJECTION, SOLUTION INTRAVENOUS at 22:11

## 2020-01-01 RX ADMIN — OXYCODONE HYDROCHLORIDE 5 MILLIGRAM(S): 5 TABLET ORAL at 03:00

## 2020-01-01 RX ADMIN — Medication 500 MILLIGRAM(S): at 23:11

## 2020-01-01 RX ADMIN — OXYCODONE HYDROCHLORIDE 10 MILLIGRAM(S): 5 TABLET ORAL at 20:17

## 2020-01-01 RX ADMIN — POLYETHYLENE GLYCOL 3350 17 GRAM(S): 17 POWDER, FOR SOLUTION ORAL at 17:40

## 2020-01-01 RX ADMIN — MORPHINE SULFATE 1 MILLIGRAM(S): 50 CAPSULE, EXTENDED RELEASE ORAL at 23:30

## 2020-01-01 RX ADMIN — Medication 50 GRAM(S): at 08:32

## 2020-01-01 RX ADMIN — GABAPENTIN 100 MILLIGRAM(S): 400 CAPSULE ORAL at 06:41

## 2020-01-01 RX ADMIN — Medication 500 MILLIGRAM(S): at 07:29

## 2020-01-01 RX ADMIN — Medication 500 MILLIGRAM(S): at 21:26

## 2020-01-01 RX ADMIN — ONDANSETRON 4 MILLIGRAM(S): 8 TABLET, FILM COATED ORAL at 09:23

## 2020-01-01 RX ADMIN — Medication 1 TABLET(S): at 17:10

## 2020-01-01 RX ADMIN — ONDANSETRON 4 MILLIGRAM(S): 8 TABLET, FILM COATED ORAL at 16:38

## 2020-01-01 RX ADMIN — OXYCODONE HYDROCHLORIDE 7.5 MILLIGRAM(S): 5 TABLET ORAL at 17:10

## 2020-01-01 RX ADMIN — PANTOPRAZOLE SODIUM 40 MILLIGRAM(S): 20 TABLET, DELAYED RELEASE ORAL at 05:12

## 2020-01-01 RX ADMIN — SODIUM CHLORIDE 1000 MILLILITER(S): 9 INJECTION INTRAMUSCULAR; INTRAVENOUS; SUBCUTANEOUS at 13:33

## 2020-01-01 RX ADMIN — ONDANSETRON 4 MILLIGRAM(S): 8 TABLET, FILM COATED ORAL at 21:15

## 2020-01-01 RX ADMIN — Medication 325 MILLIGRAM(S): at 11:43

## 2020-01-01 RX ADMIN — MORPHINE SULFATE 2 MILLIGRAM(S): 50 CAPSULE, EXTENDED RELEASE ORAL at 05:45

## 2020-01-01 RX ADMIN — OXYCODONE HYDROCHLORIDE 10 MILLIGRAM(S): 5 TABLET ORAL at 13:08

## 2020-01-01 RX ADMIN — APIXABAN 10 MILLIGRAM(S): 2.5 TABLET, FILM COATED ORAL at 18:22

## 2020-01-01 RX ADMIN — OXYCODONE HYDROCHLORIDE 20 MILLIGRAM(S): 5 TABLET ORAL at 13:12

## 2020-01-01 RX ADMIN — OXYCODONE HYDROCHLORIDE 10 MILLIGRAM(S): 5 TABLET ORAL at 17:06

## 2020-01-01 RX ADMIN — Medication 100 MILLIGRAM(S): at 11:21

## 2020-01-01 RX ADMIN — Medication 40 MILLIEQUIVALENT(S): at 22:00

## 2020-01-01 RX ADMIN — Medication 40 MILLIEQUIVALENT(S): at 09:45

## 2020-01-01 RX ADMIN — Medication 500 MILLIGRAM(S): at 20:14

## 2020-01-01 RX ADMIN — Medication 100 MILLIGRAM(S): at 00:39

## 2020-01-01 RX ADMIN — LINEZOLID 300 MILLIGRAM(S): 600 INJECTION, SOLUTION INTRAVENOUS at 22:05

## 2020-01-01 RX ADMIN — Medication 500 MILLIGRAM(S): at 19:00

## 2020-01-01 RX ADMIN — OXYCODONE HYDROCHLORIDE 5 MILLIGRAM(S): 5 TABLET ORAL at 10:50

## 2020-01-01 RX ADMIN — OXYCODONE HYDROCHLORIDE 20 MILLIGRAM(S): 5 TABLET ORAL at 21:05

## 2020-01-01 RX ADMIN — MORPHINE SULFATE 2 MILLIGRAM(S): 50 CAPSULE, EXTENDED RELEASE ORAL at 00:00

## 2020-01-01 RX ADMIN — SODIUM CHLORIDE 1000 MILLILITER(S): 9 INJECTION, SOLUTION INTRAVENOUS at 06:13

## 2020-01-01 RX ADMIN — Medication 2 DROP(S): at 05:34

## 2020-01-01 RX ADMIN — OXYCODONE HYDROCHLORIDE 10 MILLIGRAM(S): 5 TABLET ORAL at 23:42

## 2020-01-01 RX ADMIN — ONDANSETRON 4 MILLIGRAM(S): 8 TABLET, FILM COATED ORAL at 16:31

## 2020-01-01 RX ADMIN — TIGECYCLINE 105 MILLIGRAM(S): 50 INJECTION, POWDER, LYOPHILIZED, FOR SOLUTION INTRAVENOUS at 05:48

## 2020-01-01 RX ADMIN — APIXABAN 10 MILLIGRAM(S): 2.5 TABLET, FILM COATED ORAL at 05:57

## 2020-01-01 RX ADMIN — APIXABAN 2.5 MILLIGRAM(S): 2.5 TABLET, FILM COATED ORAL at 17:37

## 2020-01-01 RX ADMIN — Medication 125 MILLIGRAM(S): at 06:37

## 2020-01-01 RX ADMIN — OXYCODONE HYDROCHLORIDE 5 MILLIGRAM(S): 5 TABLET ORAL at 13:00

## 2020-01-01 RX ADMIN — OXYCODONE HYDROCHLORIDE 15 MILLIGRAM(S): 5 TABLET ORAL at 13:53

## 2020-01-01 RX ADMIN — OXYCODONE HYDROCHLORIDE 10 MILLIGRAM(S): 5 TABLET ORAL at 20:30

## 2020-01-01 RX ADMIN — GABAPENTIN 100 MILLIGRAM(S): 400 CAPSULE ORAL at 14:01

## 2020-01-01 RX ADMIN — LIDOCAINE 2 PATCH: 4 CREAM TOPICAL at 01:01

## 2020-01-01 RX ADMIN — PANTOPRAZOLE SODIUM 40 MILLIGRAM(S): 20 TABLET, DELAYED RELEASE ORAL at 05:52

## 2020-01-01 RX ADMIN — OXYCODONE HYDROCHLORIDE 10 MILLIGRAM(S): 5 TABLET ORAL at 12:12

## 2020-01-01 RX ADMIN — OXYCODONE HYDROCHLORIDE 20 MILLIGRAM(S): 5 TABLET ORAL at 13:45

## 2020-01-01 RX ADMIN — SODIUM CHLORIDE 1000 MILLILITER(S): 9 INJECTION INTRAMUSCULAR; INTRAVENOUS; SUBCUTANEOUS at 11:20

## 2020-01-01 RX ADMIN — Medication 500 MILLIGRAM(S): at 05:11

## 2020-01-01 RX ADMIN — TIGECYCLINE 105 MILLIGRAM(S): 50 INJECTION, POWDER, LYOPHILIZED, FOR SOLUTION INTRAVENOUS at 18:25

## 2020-01-01 RX ADMIN — CHLORHEXIDINE GLUCONATE 1 APPLICATION(S): 213 SOLUTION TOPICAL at 06:32

## 2020-01-01 RX ADMIN — Medication 1 PACKET(S): at 11:55

## 2020-01-01 RX ADMIN — LIDOCAINE 1 PATCH: 4 CREAM TOPICAL at 19:25

## 2020-01-01 RX ADMIN — OXYCODONE HYDROCHLORIDE 7.5 MILLIGRAM(S): 5 TABLET ORAL at 22:50

## 2020-01-01 RX ADMIN — LIDOCAINE 1 PATCH: 4 CREAM TOPICAL at 00:00

## 2020-01-01 RX ADMIN — APIXABAN 10 MILLIGRAM(S): 2.5 TABLET, FILM COATED ORAL at 18:19

## 2020-01-01 RX ADMIN — Medication 1 PACKET(S): at 12:19

## 2020-01-01 RX ADMIN — OXYCODONE HYDROCHLORIDE 10 MILLIGRAM(S): 5 TABLET ORAL at 12:30

## 2020-01-01 RX ADMIN — Medication 3 MILLIGRAM(S): at 21:07

## 2020-01-01 RX ADMIN — OXYCODONE HYDROCHLORIDE 7.5 MILLIGRAM(S): 5 TABLET ORAL at 21:58

## 2020-01-01 RX ADMIN — OXYCODONE HYDROCHLORIDE 7.5 MILLIGRAM(S): 5 TABLET ORAL at 11:51

## 2020-01-01 RX ADMIN — OXYCODONE HYDROCHLORIDE 5 MILLIGRAM(S): 5 TABLET ORAL at 16:28

## 2020-01-01 RX ADMIN — PANTOPRAZOLE SODIUM 40 MILLIGRAM(S): 20 TABLET, DELAYED RELEASE ORAL at 06:12

## 2020-01-01 RX ADMIN — OXYCODONE HYDROCHLORIDE 10 MILLIGRAM(S): 5 TABLET ORAL at 10:55

## 2020-01-01 RX ADMIN — Medication 100 MILLIEQUIVALENT(S): at 11:15

## 2020-01-01 RX ADMIN — OXYCODONE HYDROCHLORIDE 10 MILLIGRAM(S): 5 TABLET ORAL at 21:44

## 2020-01-01 RX ADMIN — LINEZOLID 300 MILLIGRAM(S): 600 INJECTION, SOLUTION INTRAVENOUS at 22:58

## 2020-01-01 RX ADMIN — Medication 3 MILLIGRAM(S): at 21:11

## 2020-01-01 RX ADMIN — OXYCODONE HYDROCHLORIDE 7.5 MILLIGRAM(S): 5 TABLET ORAL at 17:30

## 2020-01-01 RX ADMIN — LIDOCAINE 1 PATCH: 4 CREAM TOPICAL at 23:53

## 2020-01-01 RX ADMIN — LIDOCAINE 2 PATCH: 4 CREAM TOPICAL at 01:25

## 2020-01-01 RX ADMIN — APIXABAN 2.5 MILLIGRAM(S): 2.5 TABLET, FILM COATED ORAL at 05:15

## 2020-01-01 RX ADMIN — MORPHINE SULFATE 2 MILLIGRAM(S): 50 CAPSULE, EXTENDED RELEASE ORAL at 06:11

## 2020-01-01 RX ADMIN — OXYCODONE HYDROCHLORIDE 10 MILLIGRAM(S): 5 TABLET ORAL at 16:56

## 2020-01-01 RX ADMIN — Medication 5.63 MICROGRAM(S)/KG/MIN: at 09:02

## 2020-01-01 RX ADMIN — Medication 1 TABLET(S): at 14:17

## 2020-01-01 RX ADMIN — Medication 100 MILLIGRAM(S): at 04:01

## 2020-01-01 RX ADMIN — Medication 500 MILLIGRAM(S): at 20:24

## 2020-01-01 RX ADMIN — OXYCODONE HYDROCHLORIDE 10 MILLIGRAM(S): 5 TABLET ORAL at 00:50

## 2020-01-01 RX ADMIN — MIDODRINE HYDROCHLORIDE 10 MILLIGRAM(S): 2.5 TABLET ORAL at 13:53

## 2020-01-01 RX ADMIN — OXYCODONE HYDROCHLORIDE 20 MILLIGRAM(S): 5 TABLET ORAL at 00:37

## 2020-01-01 RX ADMIN — ONDANSETRON 4 MILLIGRAM(S): 8 TABLET, FILM COATED ORAL at 14:24

## 2020-01-01 RX ADMIN — ENOXAPARIN SODIUM 40 MILLIGRAM(S): 100 INJECTION SUBCUTANEOUS at 11:47

## 2020-01-01 RX ADMIN — OXYCODONE HYDROCHLORIDE 10 MILLIGRAM(S): 5 TABLET ORAL at 21:14

## 2020-01-01 RX ADMIN — PANTOPRAZOLE SODIUM 40 MILLIGRAM(S): 20 TABLET, DELAYED RELEASE ORAL at 06:22

## 2020-01-01 RX ADMIN — Medication 40 MILLIEQUIVALENT(S): at 11:10

## 2020-01-01 RX ADMIN — APIXABAN 10 MILLIGRAM(S): 2.5 TABLET, FILM COATED ORAL at 17:39

## 2020-01-01 RX ADMIN — MORPHINE SULFATE 2 MILLIGRAM(S): 50 CAPSULE, EXTENDED RELEASE ORAL at 14:30

## 2020-01-01 RX ADMIN — Medication 100 GRAM(S): at 09:35

## 2020-01-01 RX ADMIN — Medication 100 MILLIGRAM(S): at 12:06

## 2020-01-01 RX ADMIN — OXYCODONE HYDROCHLORIDE 7.5 MILLIGRAM(S): 5 TABLET ORAL at 10:15

## 2020-01-01 RX ADMIN — Medication 100 MILLIGRAM(S): at 13:33

## 2020-01-01 RX ADMIN — MORPHINE SULFATE 2 MILLIGRAM(S): 50 CAPSULE, EXTENDED RELEASE ORAL at 10:53

## 2020-01-01 RX ADMIN — Medication 3 MILLIGRAM(S): at 21:29

## 2020-01-01 RX ADMIN — APIXABAN 5 MILLIGRAM(S): 2.5 TABLET, FILM COATED ORAL at 09:06

## 2020-01-01 RX ADMIN — OXYCODONE HYDROCHLORIDE 5 MILLIGRAM(S): 5 TABLET ORAL at 09:43

## 2020-01-01 RX ADMIN — OXYCODONE HYDROCHLORIDE 5 MILLIGRAM(S): 5 TABLET ORAL at 00:00

## 2020-01-01 RX ADMIN — OXYCODONE HYDROCHLORIDE 5 MILLIGRAM(S): 5 TABLET ORAL at 01:56

## 2020-01-01 RX ADMIN — Medication 40 MILLIEQUIVALENT(S): at 12:38

## 2020-01-01 RX ADMIN — LIDOCAINE 1 PATCH: 4 CREAM TOPICAL at 00:24

## 2020-01-01 RX ADMIN — OXYCODONE HYDROCHLORIDE 10 MILLIGRAM(S): 5 TABLET ORAL at 22:30

## 2020-01-01 RX ADMIN — Medication 3 MILLIGRAM(S): at 22:36

## 2020-01-01 RX ADMIN — OXYCODONE AND ACETAMINOPHEN 1 TABLET(S): 5; 325 TABLET ORAL at 10:30

## 2020-01-01 RX ADMIN — OXYCODONE HYDROCHLORIDE 20 MILLIGRAM(S): 5 TABLET ORAL at 22:15

## 2020-01-01 RX ADMIN — ENOXAPARIN SODIUM 40 MILLIGRAM(S): 100 INJECTION SUBCUTANEOUS at 12:59

## 2020-01-01 RX ADMIN — OXYCODONE HYDROCHLORIDE 7.5 MILLIGRAM(S): 5 TABLET ORAL at 18:23

## 2020-01-01 RX ADMIN — Medication 500 MILLIGRAM(S): at 02:01

## 2020-01-01 RX ADMIN — OXYCODONE HYDROCHLORIDE 20 MILLIGRAM(S): 5 TABLET ORAL at 13:44

## 2020-01-01 RX ADMIN — SENNA PLUS 2 TABLET(S): 8.6 TABLET ORAL at 21:17

## 2020-01-01 RX ADMIN — OXYCODONE HYDROCHLORIDE 10 MILLIGRAM(S): 5 TABLET ORAL at 15:56

## 2020-01-01 RX ADMIN — LIDOCAINE 2 PATCH: 4 CREAM TOPICAL at 11:55

## 2020-01-01 RX ADMIN — MORPHINE SULFATE 2 MILLIGRAM(S): 50 CAPSULE, EXTENDED RELEASE ORAL at 01:07

## 2020-01-01 RX ADMIN — Medication 3 MILLIGRAM(S): at 21:48

## 2020-01-01 RX ADMIN — OXYCODONE HYDROCHLORIDE 10 MILLIGRAM(S): 5 TABLET ORAL at 18:53

## 2020-01-01 RX ADMIN — Medication 500 MILLIGRAM(S): at 05:06

## 2020-01-01 RX ADMIN — ONDANSETRON 4 MILLIGRAM(S): 8 TABLET, FILM COATED ORAL at 08:34

## 2020-01-01 RX ADMIN — MIDODRINE HYDROCHLORIDE 30 MILLIGRAM(S): 2.5 TABLET ORAL at 21:28

## 2020-01-01 RX ADMIN — MORPHINE SULFATE 2 MILLIGRAM(S): 50 CAPSULE, EXTENDED RELEASE ORAL at 09:34

## 2020-01-01 RX ADMIN — PANTOPRAZOLE SODIUM 40 MILLIGRAM(S): 20 TABLET, DELAYED RELEASE ORAL at 06:03

## 2020-01-01 RX ADMIN — Medication 500 MILLIGRAM(S): at 14:06

## 2020-01-01 RX ADMIN — APIXABAN 2.5 MILLIGRAM(S): 2.5 TABLET, FILM COATED ORAL at 17:01

## 2020-01-01 RX ADMIN — Medication 1 TABLET(S): at 17:35

## 2020-01-01 RX ADMIN — LINEZOLID 300 MILLIGRAM(S): 600 INJECTION, SOLUTION INTRAVENOUS at 23:00

## 2020-01-01 RX ADMIN — Medication 650 MILLIGRAM(S): at 23:00

## 2020-01-01 RX ADMIN — ENOXAPARIN SODIUM 40 MILLIGRAM(S): 100 INJECTION SUBCUTANEOUS at 21:48

## 2020-01-01 RX ADMIN — Medication 500 MILLIGRAM(S): at 17:11

## 2020-01-01 RX ADMIN — ENOXAPARIN SODIUM 40 MILLIGRAM(S): 100 INJECTION SUBCUTANEOUS at 12:06

## 2020-01-01 RX ADMIN — Medication 500 MILLIGRAM(S): at 01:08

## 2020-01-01 RX ADMIN — OXYCODONE HYDROCHLORIDE 20 MILLIGRAM(S): 5 TABLET ORAL at 06:36

## 2020-01-01 RX ADMIN — SODIUM CHLORIDE 100 MILLILITER(S): 9 INJECTION, SOLUTION INTRAVENOUS at 09:02

## 2020-01-01 RX ADMIN — Medication 100 MILLIGRAM(S): at 07:36

## 2020-01-01 RX ADMIN — Medication 500 MILLIGRAM(S): at 05:10

## 2020-01-01 RX ADMIN — Medication 500 MILLIGRAM(S): at 08:48

## 2020-01-01 RX ADMIN — Medication 6.09 MICROGRAM(S)/KG/MIN: at 16:19

## 2020-01-01 RX ADMIN — MORPHINE SULFATE 2 MILLIGRAM(S): 50 CAPSULE, EXTENDED RELEASE ORAL at 02:45

## 2020-01-01 RX ADMIN — OXYCODONE HYDROCHLORIDE 10 MILLIGRAM(S): 5 TABLET ORAL at 06:04

## 2020-01-01 RX ADMIN — PANTOPRAZOLE SODIUM 40 MILLIGRAM(S): 20 TABLET, DELAYED RELEASE ORAL at 06:44

## 2020-01-01 RX ADMIN — SODIUM CHLORIDE 1000 MILLILITER(S): 9 INJECTION, SOLUTION INTRAVENOUS at 05:15

## 2020-01-01 RX ADMIN — MIDODRINE HYDROCHLORIDE 30 MILLIGRAM(S): 2.5 TABLET ORAL at 21:22

## 2020-01-01 RX ADMIN — OXYCODONE HYDROCHLORIDE 10 MILLIGRAM(S): 5 TABLET ORAL at 07:24

## 2020-01-01 RX ADMIN — OXYCODONE HYDROCHLORIDE 10 MILLIGRAM(S): 5 TABLET ORAL at 12:10

## 2020-01-01 RX ADMIN — GABAPENTIN 100 MILLIGRAM(S): 400 CAPSULE ORAL at 13:05

## 2020-01-01 RX ADMIN — LIDOCAINE 2 PATCH: 4 CREAM TOPICAL at 01:40

## 2020-01-01 RX ADMIN — GABAPENTIN 100 MILLIGRAM(S): 400 CAPSULE ORAL at 05:28

## 2020-01-01 RX ADMIN — MORPHINE SULFATE 4 MILLIGRAM(S): 50 CAPSULE, EXTENDED RELEASE ORAL at 02:10

## 2020-01-01 RX ADMIN — OXYCODONE HYDROCHLORIDE 10 MILLIGRAM(S): 5 TABLET ORAL at 12:33

## 2020-01-01 RX ADMIN — Medication 650 MILLIGRAM(S): at 09:23

## 2020-01-01 RX ADMIN — Medication 500 MILLIGRAM(S): at 05:27

## 2020-01-01 RX ADMIN — Medication 500 MILLIGRAM(S): at 05:15

## 2020-01-01 RX ADMIN — Medication 325 MILLIGRAM(S): at 05:29

## 2020-01-01 RX ADMIN — MIDODRINE HYDROCHLORIDE 30 MILLIGRAM(S): 2.5 TABLET ORAL at 22:02

## 2020-01-01 RX ADMIN — OXYCODONE HYDROCHLORIDE 7.5 MILLIGRAM(S): 5 TABLET ORAL at 23:45

## 2020-01-01 RX ADMIN — GABAPENTIN 100 MILLIGRAM(S): 400 CAPSULE ORAL at 12:55

## 2020-01-01 RX ADMIN — GABAPENTIN 100 MILLIGRAM(S): 400 CAPSULE ORAL at 21:30

## 2020-01-01 RX ADMIN — Medication 3 MILLIGRAM(S): at 22:39

## 2020-01-01 RX ADMIN — LIDOCAINE 1 PATCH: 4 CREAM TOPICAL at 13:06

## 2020-01-01 RX ADMIN — MIDODRINE HYDROCHLORIDE 30 MILLIGRAM(S): 2.5 TABLET ORAL at 06:37

## 2020-01-01 RX ADMIN — OXYCODONE HYDROCHLORIDE 10 MILLIGRAM(S): 5 TABLET ORAL at 15:28

## 2020-01-01 RX ADMIN — Medication 1 TABLET(S): at 13:53

## 2020-01-01 RX ADMIN — Medication 500 MILLIGRAM(S): at 02:46

## 2020-01-01 RX ADMIN — PIPERACILLIN AND TAZOBACTAM 25 GRAM(S): 4; .5 INJECTION, POWDER, LYOPHILIZED, FOR SOLUTION INTRAVENOUS at 13:29

## 2020-01-01 RX ADMIN — Medication 500 MILLIGRAM(S): at 18:21

## 2020-01-01 RX ADMIN — SODIUM CHLORIDE 500 MILLILITER(S): 9 INJECTION INTRAMUSCULAR; INTRAVENOUS; SUBCUTANEOUS at 06:24

## 2020-01-01 RX ADMIN — HEPARIN SODIUM 800 UNIT(S)/HR: 5000 INJECTION INTRAVENOUS; SUBCUTANEOUS at 18:46

## 2020-01-01 RX ADMIN — OXYCODONE HYDROCHLORIDE 7.5 MILLIGRAM(S): 5 TABLET ORAL at 09:44

## 2020-01-01 RX ADMIN — OXYCODONE HYDROCHLORIDE 20 MILLIGRAM(S): 5 TABLET ORAL at 06:30

## 2020-01-01 RX ADMIN — Medication 100 GRAM(S): at 13:10

## 2020-01-01 RX ADMIN — OXYCODONE HYDROCHLORIDE 10 MILLIGRAM(S): 5 TABLET ORAL at 21:17

## 2020-01-01 RX ADMIN — MORPHINE SULFATE 2 MILLIGRAM(S): 50 CAPSULE, EXTENDED RELEASE ORAL at 11:10

## 2020-01-01 RX ADMIN — CHLORHEXIDINE GLUCONATE 1 APPLICATION(S): 213 SOLUTION TOPICAL at 14:48

## 2020-01-01 RX ADMIN — Medication 325 MILLIGRAM(S): at 11:36

## 2020-01-01 RX ADMIN — CHLORHEXIDINE GLUCONATE 1 APPLICATION(S): 213 SOLUTION TOPICAL at 13:38

## 2020-01-01 RX ADMIN — OXYCODONE HYDROCHLORIDE 5 MILLIGRAM(S): 5 TABLET ORAL at 19:42

## 2020-01-01 RX ADMIN — Medication 100 MILLIGRAM(S): at 05:21

## 2020-01-01 RX ADMIN — Medication 5.63 MICROGRAM(S)/KG/MIN: at 09:33

## 2020-01-01 RX ADMIN — OXYCODONE HYDROCHLORIDE 10 MILLIGRAM(S): 5 TABLET ORAL at 06:30

## 2020-01-01 RX ADMIN — ONDANSETRON 4 MILLIGRAM(S): 8 TABLET, FILM COATED ORAL at 05:15

## 2020-01-01 RX ADMIN — OXYCODONE HYDROCHLORIDE 10 MILLIGRAM(S): 5 TABLET ORAL at 23:03

## 2020-01-01 RX ADMIN — Medication 500 MILLIGRAM(S): at 11:00

## 2020-01-01 RX ADMIN — POLYETHYLENE GLYCOL 3350 17 GRAM(S): 17 POWDER, FOR SOLUTION ORAL at 12:09

## 2020-01-01 RX ADMIN — GABAPENTIN 100 MILLIGRAM(S): 400 CAPSULE ORAL at 21:27

## 2020-01-01 RX ADMIN — OXYCODONE HYDROCHLORIDE 10 MILLIGRAM(S): 5 TABLET ORAL at 02:40

## 2020-01-01 RX ADMIN — Medication 5.63 MICROGRAM(S)/KG/MIN: at 20:25

## 2020-01-01 RX ADMIN — OXYCODONE HYDROCHLORIDE 10 MILLIGRAM(S): 5 TABLET ORAL at 13:53

## 2020-01-01 RX ADMIN — OXYCODONE HYDROCHLORIDE 10 MILLIGRAM(S): 5 TABLET ORAL at 18:19

## 2020-01-01 RX ADMIN — OXYCODONE HYDROCHLORIDE 10 MILLIGRAM(S): 5 TABLET ORAL at 21:29

## 2020-01-01 RX ADMIN — Medication 500 MILLIGRAM(S): at 21:34

## 2020-01-01 RX ADMIN — OXYCODONE HYDROCHLORIDE 10 MILLIGRAM(S): 5 TABLET ORAL at 09:06

## 2020-01-01 RX ADMIN — ENOXAPARIN SODIUM 40 MILLIGRAM(S): 100 INJECTION SUBCUTANEOUS at 11:51

## 2020-01-01 RX ADMIN — MIDODRINE HYDROCHLORIDE 20 MILLIGRAM(S): 2.5 TABLET ORAL at 20:28

## 2020-01-01 RX ADMIN — MORPHINE SULFATE 2 MILLIGRAM(S): 50 CAPSULE, EXTENDED RELEASE ORAL at 17:30

## 2020-01-01 RX ADMIN — SODIUM CHLORIDE 3000 MILLILITER(S): 9 INJECTION, SOLUTION INTRAVENOUS at 02:15

## 2020-01-01 RX ADMIN — Medication 100 MILLIGRAM(S): at 22:42

## 2020-01-01 RX ADMIN — GABAPENTIN 100 MILLIGRAM(S): 400 CAPSULE ORAL at 21:28

## 2020-01-01 RX ADMIN — OXYCODONE HYDROCHLORIDE 10 MILLIGRAM(S): 5 TABLET ORAL at 04:22

## 2020-01-01 RX ADMIN — MORPHINE SULFATE 2 MILLIGRAM(S): 50 CAPSULE, EXTENDED RELEASE ORAL at 00:59

## 2020-01-01 RX ADMIN — OXYCODONE HYDROCHLORIDE 5 MILLIGRAM(S): 5 TABLET ORAL at 20:31

## 2020-01-01 RX ADMIN — APIXABAN 5 MILLIGRAM(S): 2.5 TABLET, FILM COATED ORAL at 10:22

## 2020-01-01 RX ADMIN — Medication 100 MILLIGRAM(S): at 12:34

## 2020-01-01 RX ADMIN — OXYCODONE HYDROCHLORIDE 10 MILLIGRAM(S): 5 TABLET ORAL at 08:40

## 2020-01-01 RX ADMIN — MORPHINE SULFATE 1 MILLIGRAM(S): 50 CAPSULE, EXTENDED RELEASE ORAL at 23:15

## 2020-01-01 RX ADMIN — PHENYLEPHRINE HYDROCHLORIDE 20 MICROGRAM(S)/KG/MIN: 10 INJECTION INTRAVENOUS at 02:00

## 2020-01-01 RX ADMIN — Medication 100 MILLIGRAM(S): at 17:15

## 2020-01-01 RX ADMIN — Medication 1 TABLET(S): at 17:18

## 2020-01-01 RX ADMIN — Medication 3 MILLIGRAM(S): at 21:22

## 2020-01-01 RX ADMIN — MORPHINE SULFATE 2 MILLIGRAM(S): 50 CAPSULE, EXTENDED RELEASE ORAL at 00:44

## 2020-01-01 RX ADMIN — Medication 500 MILLIGRAM(S): at 21:10

## 2020-01-01 RX ADMIN — OXYCODONE HYDROCHLORIDE 5 MILLIGRAM(S): 5 TABLET ORAL at 17:20

## 2020-01-01 RX ADMIN — SODIUM CHLORIDE 100 MILLILITER(S): 9 INJECTION, SOLUTION INTRAVENOUS at 08:00

## 2020-01-01 RX ADMIN — Medication 975 MILLIGRAM(S): at 05:14

## 2020-01-01 RX ADMIN — LIDOCAINE 2 PATCH: 4 CREAM TOPICAL at 00:00

## 2020-01-01 RX ADMIN — Medication 1 PACKET(S): at 09:22

## 2020-01-01 RX ADMIN — HEPARIN SODIUM 900 UNIT(S)/HR: 5000 INJECTION INTRAVENOUS; SUBCUTANEOUS at 12:20

## 2020-01-01 RX ADMIN — Medication 100 MILLIGRAM(S): at 17:22

## 2020-01-01 RX ADMIN — Medication 3 MILLIGRAM(S): at 21:28

## 2020-01-01 RX ADMIN — Medication 500 MILLIGRAM(S): at 08:04

## 2020-01-01 RX ADMIN — OXYCODONE HYDROCHLORIDE 7.5 MILLIGRAM(S): 5 TABLET ORAL at 11:21

## 2020-01-01 RX ADMIN — MORPHINE SULFATE 2 MILLIGRAM(S): 50 CAPSULE, EXTENDED RELEASE ORAL at 09:35

## 2020-01-01 RX ADMIN — MORPHINE SULFATE 2 MILLIGRAM(S): 50 CAPSULE, EXTENDED RELEASE ORAL at 18:47

## 2020-01-01 RX ADMIN — ONDANSETRON 4 MILLIGRAM(S): 8 TABLET, FILM COATED ORAL at 17:36

## 2020-01-01 RX ADMIN — OXYCODONE HYDROCHLORIDE 7.5 MILLIGRAM(S): 5 TABLET ORAL at 16:39

## 2020-01-01 RX ADMIN — Medication 50 GRAM(S): at 11:38

## 2020-01-01 RX ADMIN — MORPHINE SULFATE 2 MILLIGRAM(S): 50 CAPSULE, EXTENDED RELEASE ORAL at 09:50

## 2020-01-01 RX ADMIN — FENTANYL CITRATE 25 MICROGRAM(S): 50 INJECTION INTRAVENOUS at 10:45

## 2020-01-01 RX ADMIN — OXYCODONE HYDROCHLORIDE 5 MILLIGRAM(S): 5 TABLET ORAL at 13:18

## 2020-01-01 RX ADMIN — LIDOCAINE 2 PATCH: 4 CREAM TOPICAL at 21:42

## 2020-01-01 RX ADMIN — OXYCODONE HYDROCHLORIDE 5 MILLIGRAM(S): 5 TABLET ORAL at 14:00

## 2020-01-01 RX ADMIN — Medication 500 MILLIGRAM(S): at 01:52

## 2020-01-01 RX ADMIN — OXYCODONE HYDROCHLORIDE 10 MILLIGRAM(S): 5 TABLET ORAL at 09:00

## 2020-01-01 RX ADMIN — Medication 500 MILLIGRAM(S): at 06:41

## 2020-01-01 RX ADMIN — OXYCODONE HYDROCHLORIDE 10 MILLIGRAM(S): 5 TABLET ORAL at 22:58

## 2020-01-01 RX ADMIN — Medication 50 GRAM(S): at 09:22

## 2020-01-01 RX ADMIN — Medication 500 MILLIGRAM(S): at 17:08

## 2020-01-01 RX ADMIN — OXYCODONE HYDROCHLORIDE 7.5 MILLIGRAM(S): 5 TABLET ORAL at 02:19

## 2020-01-01 RX ADMIN — FENTANYL CITRATE 25 MICROGRAM(S): 50 INJECTION INTRAVENOUS at 06:06

## 2020-01-01 RX ADMIN — Medication 975 MILLIGRAM(S): at 05:10

## 2020-01-01 RX ADMIN — Medication 1 TABLET(S): at 13:51

## 2020-01-01 RX ADMIN — HEPARIN SODIUM 5000 UNIT(S): 5000 INJECTION INTRAVENOUS; SUBCUTANEOUS at 21:27

## 2020-01-01 RX ADMIN — MORPHINE SULFATE 2 MILLIGRAM(S): 50 CAPSULE, EXTENDED RELEASE ORAL at 10:45

## 2020-01-01 RX ADMIN — Medication 3 MILLILITER(S): at 03:27

## 2020-01-01 RX ADMIN — Medication 500 MILLIGRAM(S): at 13:08

## 2020-01-01 RX ADMIN — OXYCODONE HYDROCHLORIDE 10 MILLIGRAM(S): 5 TABLET ORAL at 13:05

## 2020-01-01 RX ADMIN — LIDOCAINE 1 PATCH: 4 CREAM TOPICAL at 13:02

## 2020-01-01 RX ADMIN — TIGECYCLINE 105 MILLIGRAM(S): 50 INJECTION, POWDER, LYOPHILIZED, FOR SOLUTION INTRAVENOUS at 17:26

## 2020-01-01 RX ADMIN — MIDODRINE HYDROCHLORIDE 20 MILLIGRAM(S): 2.5 TABLET ORAL at 13:19

## 2020-01-01 RX ADMIN — Medication 500 MILLIGRAM(S): at 21:03

## 2020-01-01 RX ADMIN — OXYCODONE HYDROCHLORIDE 10 MILLIGRAM(S): 5 TABLET ORAL at 11:52

## 2020-01-01 RX ADMIN — POTASSIUM PHOSPHATE, MONOBASIC POTASSIUM PHOSPHATE, DIBASIC 83.33 MILLIMOLE(S): 236; 224 INJECTION, SOLUTION INTRAVENOUS at 11:13

## 2020-01-01 RX ADMIN — OXYCODONE HYDROCHLORIDE 10 MILLIGRAM(S): 5 TABLET ORAL at 12:31

## 2020-01-01 RX ADMIN — MIDODRINE HYDROCHLORIDE 20 MILLIGRAM(S): 2.5 TABLET ORAL at 06:26

## 2020-01-01 RX ADMIN — POLYETHYLENE GLYCOL 3350 17 GRAM(S): 17 POWDER, FOR SOLUTION ORAL at 12:03

## 2020-01-01 RX ADMIN — MIDODRINE HYDROCHLORIDE 10 MILLIGRAM(S): 2.5 TABLET ORAL at 21:15

## 2020-01-01 RX ADMIN — MORPHINE SULFATE 2 MILLIGRAM(S): 50 CAPSULE, EXTENDED RELEASE ORAL at 06:50

## 2020-01-01 RX ADMIN — OXYCODONE HYDROCHLORIDE 10 MILLIGRAM(S): 5 TABLET ORAL at 07:45

## 2020-01-01 RX ADMIN — OXYCODONE HYDROCHLORIDE 10 MILLIGRAM(S): 5 TABLET ORAL at 17:25

## 2020-01-01 RX ADMIN — OXYCODONE HYDROCHLORIDE 5 MILLIGRAM(S): 5 TABLET ORAL at 10:25

## 2020-01-01 RX ADMIN — OXYCODONE HYDROCHLORIDE 5 MILLIGRAM(S): 5 TABLET ORAL at 08:57

## 2020-01-01 RX ADMIN — Medication 500 MILLIGRAM(S): at 09:48

## 2020-01-01 RX ADMIN — OXYCODONE HYDROCHLORIDE 7.5 MILLIGRAM(S): 5 TABLET ORAL at 16:09

## 2020-01-01 RX ADMIN — Medication 500 MILLIGRAM(S): at 13:20

## 2020-01-01 RX ADMIN — LIDOCAINE 2 PATCH: 4 CREAM TOPICAL at 01:26

## 2020-01-01 RX ADMIN — OXYCODONE HYDROCHLORIDE 10 MILLIGRAM(S): 5 TABLET ORAL at 12:11

## 2020-01-01 RX ADMIN — OXYCODONE HYDROCHLORIDE 10 MILLIGRAM(S): 5 TABLET ORAL at 10:06

## 2020-01-01 RX ADMIN — MORPHINE SULFATE 2 MILLIGRAM(S): 50 CAPSULE, EXTENDED RELEASE ORAL at 03:08

## 2020-01-01 RX ADMIN — APIXABAN 5 MILLIGRAM(S): 2.5 TABLET, FILM COATED ORAL at 09:45

## 2020-01-01 RX ADMIN — Medication 100 MILLIGRAM(S): at 13:35

## 2020-01-01 RX ADMIN — LIDOCAINE 1 PATCH: 4 CREAM TOPICAL at 12:12

## 2020-01-01 RX ADMIN — Medication 325 MILLIGRAM(S): at 05:10

## 2020-01-01 RX ADMIN — SODIUM CHLORIDE 1000 MILLILITER(S): 9 INJECTION INTRAMUSCULAR; INTRAVENOUS; SUBCUTANEOUS at 17:39

## 2020-01-01 RX ADMIN — GABAPENTIN 100 MILLIGRAM(S): 400 CAPSULE ORAL at 05:53

## 2020-01-01 RX ADMIN — OXYCODONE HYDROCHLORIDE 5 MILLIGRAM(S): 5 TABLET ORAL at 02:36

## 2020-01-01 RX ADMIN — Medication 500 MILLIGRAM(S): at 21:48

## 2020-01-01 RX ADMIN — Medication 500 MILLIGRAM(S): at 05:33

## 2020-01-01 RX ADMIN — OXYCODONE HYDROCHLORIDE 5 MILLIGRAM(S): 5 TABLET ORAL at 22:31

## 2020-01-01 RX ADMIN — Medication 500 MILLIGRAM(S): at 01:40

## 2020-01-01 RX ADMIN — OXYCODONE HYDROCHLORIDE 5 MILLIGRAM(S): 5 TABLET ORAL at 00:30

## 2020-01-01 RX ADMIN — SODIUM CHLORIDE 75 MILLILITER(S): 9 INJECTION, SOLUTION INTRAVENOUS at 09:00

## 2020-01-01 RX ADMIN — Medication 500 MILLIGRAM(S): at 13:46

## 2020-01-01 RX ADMIN — OXYCODONE HYDROCHLORIDE 20 MILLIGRAM(S): 5 TABLET ORAL at 21:29

## 2020-01-01 RX ADMIN — OXYCODONE HYDROCHLORIDE 10 MILLIGRAM(S): 5 TABLET ORAL at 08:15

## 2020-01-01 RX ADMIN — SENNA PLUS 2 TABLET(S): 8.6 TABLET ORAL at 22:22

## 2020-01-01 RX ADMIN — Medication 975 MILLIGRAM(S): at 14:16

## 2020-01-01 RX ADMIN — OXYCODONE HYDROCHLORIDE 10 MILLIGRAM(S): 5 TABLET ORAL at 09:03

## 2020-01-01 RX ADMIN — OXYCODONE HYDROCHLORIDE 7.5 MILLIGRAM(S): 5 TABLET ORAL at 05:19

## 2020-01-01 RX ADMIN — Medication 500 MILLIGRAM(S): at 05:52

## 2020-01-01 RX ADMIN — OXYCODONE HYDROCHLORIDE 10 MILLIGRAM(S): 5 TABLET ORAL at 17:53

## 2020-01-01 RX ADMIN — SODIUM CHLORIDE 1000 MILLILITER(S): 9 INJECTION, SOLUTION INTRAVENOUS at 20:28

## 2020-01-01 RX ADMIN — LIDOCAINE 1 PATCH: 4 CREAM TOPICAL at 19:27

## 2020-01-01 RX ADMIN — OXYCODONE HYDROCHLORIDE 20 MILLIGRAM(S): 5 TABLET ORAL at 21:32

## 2020-01-01 RX ADMIN — OXYCODONE HYDROCHLORIDE 10 MILLIGRAM(S): 5 TABLET ORAL at 16:28

## 2020-01-01 RX ADMIN — HEPARIN SODIUM 900 UNIT(S)/HR: 5000 INJECTION INTRAVENOUS; SUBCUTANEOUS at 05:45

## 2020-01-01 RX ADMIN — MEROPENEM 100 MILLIGRAM(S): 1 INJECTION INTRAVENOUS at 05:15

## 2020-01-01 RX ADMIN — OXYCODONE HYDROCHLORIDE 10 MILLIGRAM(S): 5 TABLET ORAL at 04:17

## 2020-01-01 RX ADMIN — APIXABAN 10 MILLIGRAM(S): 2.5 TABLET, FILM COATED ORAL at 21:34

## 2020-01-01 RX ADMIN — Medication 975 MILLIGRAM(S): at 22:58

## 2020-01-01 RX ADMIN — Medication 500 MILLIGRAM(S): at 17:13

## 2020-01-01 RX ADMIN — SENNA PLUS 2 TABLET(S): 8.6 TABLET ORAL at 21:08

## 2020-01-01 RX ADMIN — Medication 500 MILLIGRAM(S): at 06:20

## 2020-01-01 RX ADMIN — SODIUM CHLORIDE 500 MILLILITER(S): 9 INJECTION INTRAMUSCULAR; INTRAVENOUS; SUBCUTANEOUS at 04:44

## 2020-01-01 RX ADMIN — Medication 500 MILLIGRAM(S): at 18:18

## 2020-01-01 RX ADMIN — OXYCODONE HYDROCHLORIDE 10 MILLIGRAM(S): 5 TABLET ORAL at 12:13

## 2020-01-01 RX ADMIN — Medication 500 MILLIGRAM(S): at 03:44

## 2020-01-01 RX ADMIN — OXYCODONE HYDROCHLORIDE 10 MILLIGRAM(S): 5 TABLET ORAL at 12:41

## 2020-01-01 RX ADMIN — Medication 500 MILLIGRAM(S): at 10:22

## 2020-01-01 RX ADMIN — Medication 500 MILLIGRAM(S): at 18:08

## 2020-01-01 RX ADMIN — OXYCODONE HYDROCHLORIDE 5 MILLIGRAM(S): 5 TABLET ORAL at 23:28

## 2020-01-01 RX ADMIN — SENNA PLUS 2 TABLET(S): 8.6 TABLET ORAL at 21:27

## 2020-01-01 RX ADMIN — SODIUM CHLORIDE 75 MILLILITER(S): 9 INJECTION INTRAMUSCULAR; INTRAVENOUS; SUBCUTANEOUS at 13:55

## 2020-01-01 RX ADMIN — Medication 100 MILLIGRAM(S): at 10:39

## 2020-01-01 RX ADMIN — Medication 3 MILLILITER(S): at 10:13

## 2020-01-01 RX ADMIN — OXYCODONE HYDROCHLORIDE 10 MILLIGRAM(S): 5 TABLET ORAL at 13:19

## 2020-01-01 RX ADMIN — OXYCODONE HYDROCHLORIDE 5 MILLIGRAM(S): 5 TABLET ORAL at 00:28

## 2020-01-01 RX ADMIN — MORPHINE SULFATE 2 MILLIGRAM(S): 50 CAPSULE, EXTENDED RELEASE ORAL at 14:55

## 2020-01-01 RX ADMIN — Medication 40 MILLIEQUIVALENT(S): at 19:47

## 2020-01-01 RX ADMIN — NYSTATIN CREAM 1 APPLICATION(S): 100000 CREAM TOPICAL at 13:58

## 2020-01-01 RX ADMIN — Medication 100 MILLIGRAM(S): at 22:05

## 2020-01-01 RX ADMIN — LINEZOLID 300 MILLIGRAM(S): 600 INJECTION, SOLUTION INTRAVENOUS at 11:24

## 2020-01-01 RX ADMIN — Medication 500 MILLIGRAM(S): at 13:55

## 2020-01-01 RX ADMIN — PANTOPRAZOLE SODIUM 40 MILLIGRAM(S): 20 TABLET, DELAYED RELEASE ORAL at 05:42

## 2020-01-01 RX ADMIN — SODIUM CHLORIDE 75 MILLILITER(S): 9 INJECTION INTRAMUSCULAR; INTRAVENOUS; SUBCUTANEOUS at 11:38

## 2020-01-01 RX ADMIN — GABAPENTIN 100 MILLIGRAM(S): 400 CAPSULE ORAL at 13:58

## 2020-01-01 RX ADMIN — PANTOPRAZOLE SODIUM 40 MILLIGRAM(S): 20 TABLET, DELAYED RELEASE ORAL at 05:06

## 2020-01-01 RX ADMIN — Medication 500 MILLIGRAM(S): at 09:55

## 2020-01-01 RX ADMIN — LACTULOSE 20 GRAM(S): 10 SOLUTION ORAL at 11:21

## 2020-01-01 RX ADMIN — Medication 100 MILLIGRAM(S): at 21:03

## 2020-01-01 RX ADMIN — Medication 3 MILLIGRAM(S): at 21:10

## 2020-01-01 RX ADMIN — Medication 500 MILLIGRAM(S): at 23:08

## 2020-01-01 RX ADMIN — OXYCODONE HYDROCHLORIDE 20 MILLIGRAM(S): 5 TABLET ORAL at 21:30

## 2020-01-01 RX ADMIN — Medication 50 GRAM(S): at 09:33

## 2020-01-01 RX ADMIN — OXYCODONE HYDROCHLORIDE 10 MILLIGRAM(S): 5 TABLET ORAL at 05:59

## 2020-01-01 RX ADMIN — OXYCODONE HYDROCHLORIDE 10 MILLIGRAM(S): 5 TABLET ORAL at 06:43

## 2020-01-01 RX ADMIN — Medication 500 MILLIGRAM(S): at 17:14

## 2020-01-01 RX ADMIN — OXYCODONE HYDROCHLORIDE 20 MILLIGRAM(S): 5 TABLET ORAL at 23:00

## 2020-01-01 RX ADMIN — OXYCODONE HYDROCHLORIDE 5 MILLIGRAM(S): 5 TABLET ORAL at 10:45

## 2020-01-01 RX ADMIN — OXYCODONE HYDROCHLORIDE 10 MILLIGRAM(S): 5 TABLET ORAL at 14:01

## 2020-01-01 RX ADMIN — Medication 500 MILLIGRAM(S): at 22:04

## 2020-01-01 RX ADMIN — OXYCODONE HYDROCHLORIDE 10 MILLIGRAM(S): 5 TABLET ORAL at 23:46

## 2020-01-01 RX ADMIN — Medication 1 TABLET(S): at 13:15

## 2020-01-01 RX ADMIN — OXYCODONE HYDROCHLORIDE 10 MILLIGRAM(S): 5 TABLET ORAL at 13:50

## 2020-01-01 RX ADMIN — SODIUM CHLORIDE 500 MILLILITER(S): 9 INJECTION, SOLUTION INTRAVENOUS at 17:46

## 2020-01-01 RX ADMIN — Medication 1 TABLET(S): at 06:00

## 2020-01-01 RX ADMIN — Medication 100 MILLIGRAM(S): at 05:44

## 2020-01-01 RX ADMIN — LIDOCAINE 2 PATCH: 4 CREAM TOPICAL at 22:30

## 2020-01-01 RX ADMIN — LIDOCAINE 2 PATCH: 4 CREAM TOPICAL at 13:18

## 2020-01-01 RX ADMIN — SODIUM CHLORIDE 75 MILLILITER(S): 9 INJECTION, SOLUTION INTRAVENOUS at 20:56

## 2020-01-01 RX ADMIN — Medication 1 TABLET(S): at 11:31

## 2020-01-01 RX ADMIN — OXYCODONE HYDROCHLORIDE 10 MILLIGRAM(S): 5 TABLET ORAL at 04:44

## 2020-01-01 RX ADMIN — OXYCODONE HYDROCHLORIDE 10 MILLIGRAM(S): 5 TABLET ORAL at 18:31

## 2020-01-01 RX ADMIN — Medication 500 MILLIGRAM(S): at 05:54

## 2020-01-01 RX ADMIN — GABAPENTIN 100 MILLIGRAM(S): 400 CAPSULE ORAL at 14:23

## 2020-01-01 RX ADMIN — LIDOCAINE 2 PATCH: 4 CREAM TOPICAL at 19:27

## 2020-01-01 RX ADMIN — PANTOPRAZOLE SODIUM 40 MILLIGRAM(S): 20 TABLET, DELAYED RELEASE ORAL at 05:46

## 2020-01-01 RX ADMIN — LIDOCAINE 1 PATCH: 4 CREAM TOPICAL at 13:04

## 2020-01-01 RX ADMIN — LIDOCAINE 1 PATCH: 4 CREAM TOPICAL at 23:37

## 2020-01-01 RX ADMIN — OXYCODONE HYDROCHLORIDE 10 MILLIGRAM(S): 5 TABLET ORAL at 21:42

## 2020-01-01 RX ADMIN — Medication 40 MILLIEQUIVALENT(S): at 08:34

## 2020-01-01 RX ADMIN — OXYCODONE HYDROCHLORIDE 10 MILLIGRAM(S): 5 TABLET ORAL at 19:59

## 2020-01-01 RX ADMIN — OXYCODONE HYDROCHLORIDE 10 MILLIGRAM(S): 5 TABLET ORAL at 12:40

## 2020-01-01 RX ADMIN — Medication 500 MILLIGRAM(S): at 08:15

## 2020-01-01 RX ADMIN — Medication 1 TABLET(S): at 18:04

## 2020-01-01 RX ADMIN — Medication 3 MILLIGRAM(S): at 21:58

## 2020-01-01 RX ADMIN — Medication 5 MILLIGRAM(S): at 14:29

## 2020-01-01 RX ADMIN — HEPARIN SODIUM 5000 UNIT(S): 5000 INJECTION INTRAVENOUS; SUBCUTANEOUS at 13:17

## 2020-01-01 RX ADMIN — LIDOCAINE 1 PATCH: 4 CREAM TOPICAL at 07:00

## 2020-01-01 RX ADMIN — LIDOCAINE 1 PATCH: 4 CREAM TOPICAL at 19:15

## 2020-01-01 RX ADMIN — OXYCODONE HYDROCHLORIDE 10 MILLIGRAM(S): 5 TABLET ORAL at 14:51

## 2020-01-01 RX ADMIN — OXYCODONE HYDROCHLORIDE 10 MILLIGRAM(S): 5 TABLET ORAL at 15:32

## 2020-01-01 RX ADMIN — OXYCODONE HYDROCHLORIDE 10 MILLIGRAM(S): 5 TABLET ORAL at 06:22

## 2020-01-01 RX ADMIN — ONDANSETRON 4 MILLIGRAM(S): 8 TABLET, FILM COATED ORAL at 05:21

## 2020-01-01 RX ADMIN — GABAPENTIN 100 MILLIGRAM(S): 400 CAPSULE ORAL at 21:35

## 2020-01-01 RX ADMIN — CHLORHEXIDINE GLUCONATE 1 APPLICATION(S): 213 SOLUTION TOPICAL at 13:12

## 2020-01-01 RX ADMIN — MORPHINE SULFATE 2 MILLIGRAM(S): 50 CAPSULE, EXTENDED RELEASE ORAL at 05:04

## 2020-01-01 RX ADMIN — SODIUM CHLORIDE 1000 MILLILITER(S): 9 INJECTION INTRAMUSCULAR; INTRAVENOUS; SUBCUTANEOUS at 19:03

## 2020-01-01 RX ADMIN — OXYCODONE HYDROCHLORIDE 7.5 MILLIGRAM(S): 5 TABLET ORAL at 23:40

## 2020-01-01 RX ADMIN — LIDOCAINE 1 PATCH: 4 CREAM TOPICAL at 19:36

## 2020-01-01 RX ADMIN — MORPHINE SULFATE 2 MILLIGRAM(S): 50 CAPSULE, EXTENDED RELEASE ORAL at 17:10

## 2020-01-01 RX ADMIN — ENOXAPARIN SODIUM 40 MILLIGRAM(S): 100 INJECTION SUBCUTANEOUS at 11:21

## 2020-01-01 RX ADMIN — FENTANYL CITRATE 25 MICROGRAM(S): 50 INJECTION INTRAVENOUS at 04:05

## 2020-01-01 RX ADMIN — OXYCODONE HYDROCHLORIDE 10 MILLIGRAM(S): 5 TABLET ORAL at 05:01

## 2020-01-01 RX ADMIN — Medication 1 TABLET(S): at 11:36

## 2020-01-01 RX ADMIN — Medication 1 TABLET(S): at 11:15

## 2020-01-01 RX ADMIN — Medication 500 MILLIGRAM(S): at 21:11

## 2020-01-01 RX ADMIN — MIDODRINE HYDROCHLORIDE 30 MILLIGRAM(S): 2.5 TABLET ORAL at 13:15

## 2020-01-01 RX ADMIN — PANTOPRAZOLE SODIUM 40 MILLIGRAM(S): 20 TABLET, DELAYED RELEASE ORAL at 06:00

## 2020-01-01 RX ADMIN — Medication 100 MILLIGRAM(S): at 14:29

## 2020-01-01 RX ADMIN — MIDODRINE HYDROCHLORIDE 30 MILLIGRAM(S): 2.5 TABLET ORAL at 05:14

## 2020-01-01 RX ADMIN — CHLORHEXIDINE GLUCONATE 1 APPLICATION(S): 213 SOLUTION TOPICAL at 14:10

## 2020-01-01 RX ADMIN — Medication 6.09 MICROGRAM(S)/KG/MIN: at 19:24

## 2020-01-01 RX ADMIN — OXYCODONE HYDROCHLORIDE 20 MILLIGRAM(S): 5 TABLET ORAL at 05:48

## 2020-01-01 RX ADMIN — CHLORHEXIDINE GLUCONATE 1 APPLICATION(S): 213 SOLUTION TOPICAL at 17:01

## 2020-01-01 RX ADMIN — ONDANSETRON 4 MILLIGRAM(S): 8 TABLET, FILM COATED ORAL at 23:30

## 2020-01-01 RX ADMIN — OXYCODONE HYDROCHLORIDE 5 MILLIGRAM(S): 5 TABLET ORAL at 09:53

## 2020-01-01 RX ADMIN — MORPHINE SULFATE 2 MILLIGRAM(S): 50 CAPSULE, EXTENDED RELEASE ORAL at 06:00

## 2020-01-01 RX ADMIN — Medication 500 MILLIGRAM(S): at 09:44

## 2020-01-01 RX ADMIN — Medication 2 DROP(S): at 13:46

## 2020-01-01 RX ADMIN — OXYCODONE AND ACETAMINOPHEN 1 TABLET(S): 5; 325 TABLET ORAL at 07:10

## 2020-01-01 RX ADMIN — ONDANSETRON 4 MILLIGRAM(S): 8 TABLET, FILM COATED ORAL at 14:52

## 2020-01-01 RX ADMIN — MIDODRINE HYDROCHLORIDE 30 MILLIGRAM(S): 2.5 TABLET ORAL at 05:22

## 2020-01-01 RX ADMIN — Medication 1 TABLET(S): at 11:55

## 2020-01-01 RX ADMIN — OXYCODONE HYDROCHLORIDE 20 MILLIGRAM(S): 5 TABLET ORAL at 05:32

## 2020-01-01 RX ADMIN — OXYCODONE HYDROCHLORIDE 20 MILLIGRAM(S): 5 TABLET ORAL at 14:58

## 2020-01-01 RX ADMIN — MIDODRINE HYDROCHLORIDE 30 MILLIGRAM(S): 2.5 TABLET ORAL at 05:52

## 2020-01-01 RX ADMIN — GABAPENTIN 100 MILLIGRAM(S): 400 CAPSULE ORAL at 13:08

## 2020-01-01 RX ADMIN — Medication 40 MILLIEQUIVALENT(S): at 12:56

## 2020-01-01 RX ADMIN — OXYCODONE HYDROCHLORIDE 10 MILLIGRAM(S): 5 TABLET ORAL at 21:51

## 2020-01-01 RX ADMIN — OXYCODONE HYDROCHLORIDE 15 MILLIGRAM(S): 5 TABLET ORAL at 14:32

## 2020-01-01 RX ADMIN — LIDOCAINE 1 PATCH: 4 CREAM TOPICAL at 00:30

## 2020-01-01 RX ADMIN — POLYETHYLENE GLYCOL 3350 17 GRAM(S): 17 POWDER, FOR SOLUTION ORAL at 11:51

## 2020-01-01 RX ADMIN — Medication 500 MILLIGRAM(S): at 21:27

## 2020-01-01 RX ADMIN — GABAPENTIN 100 MILLIGRAM(S): 400 CAPSULE ORAL at 05:21

## 2020-01-01 RX ADMIN — LIDOCAINE 1 PATCH: 4 CREAM TOPICAL at 17:00

## 2020-01-01 RX ADMIN — OXYCODONE HYDROCHLORIDE 7.5 MILLIGRAM(S): 5 TABLET ORAL at 08:36

## 2020-01-01 RX ADMIN — Medication 325 MILLIGRAM(S): at 05:07

## 2020-01-01 RX ADMIN — Medication 975 MILLIGRAM(S): at 23:30

## 2020-01-01 RX ADMIN — ONDANSETRON 4 MILLIGRAM(S): 8 TABLET, FILM COATED ORAL at 20:07

## 2020-01-01 RX ADMIN — OXYCODONE HYDROCHLORIDE 20 MILLIGRAM(S): 5 TABLET ORAL at 21:02

## 2020-01-01 RX ADMIN — OXYCODONE HYDROCHLORIDE 10 MILLIGRAM(S): 5 TABLET ORAL at 16:00

## 2020-01-01 RX ADMIN — LIDOCAINE 2 PATCH: 4 CREAM TOPICAL at 09:34

## 2020-01-01 RX ADMIN — OXYCODONE HYDROCHLORIDE 5 MILLIGRAM(S): 5 TABLET ORAL at 20:19

## 2020-01-01 RX ADMIN — Medication 500 MILLIGRAM(S): at 11:36

## 2020-01-01 RX ADMIN — OXYCODONE HYDROCHLORIDE 10 MILLIGRAM(S): 5 TABLET ORAL at 21:08

## 2020-01-01 RX ADMIN — MIDODRINE HYDROCHLORIDE 40 MILLIGRAM(S): 2.5 TABLET ORAL at 06:26

## 2020-01-01 RX ADMIN — LIDOCAINE 2 PATCH: 4 CREAM TOPICAL at 19:37

## 2020-01-01 RX ADMIN — Medication 325 MILLIGRAM(S): at 17:07

## 2020-01-01 RX ADMIN — MIDODRINE HYDROCHLORIDE 20 MILLIGRAM(S): 2.5 TABLET ORAL at 13:47

## 2020-01-01 RX ADMIN — OXYCODONE HYDROCHLORIDE 10 MILLIGRAM(S): 5 TABLET ORAL at 05:45

## 2020-01-01 RX ADMIN — Medication 125 MILLIGRAM(S): at 00:28

## 2020-01-01 RX ADMIN — Medication 100 MILLIGRAM(S): at 21:22

## 2020-01-01 RX ADMIN — OXYCODONE HYDROCHLORIDE 7.5 MILLIGRAM(S): 5 TABLET ORAL at 19:56

## 2020-01-01 RX ADMIN — OXYCODONE HYDROCHLORIDE 10 MILLIGRAM(S): 5 TABLET ORAL at 05:20

## 2020-01-01 RX ADMIN — OXYCODONE HYDROCHLORIDE 7.5 MILLIGRAM(S): 5 TABLET ORAL at 16:00

## 2020-01-01 RX ADMIN — MIDODRINE HYDROCHLORIDE 20 MILLIGRAM(S): 2.5 TABLET ORAL at 13:46

## 2020-01-01 RX ADMIN — TIGECYCLINE 110 MILLIGRAM(S): 50 INJECTION, POWDER, LYOPHILIZED, FOR SOLUTION INTRAVENOUS at 15:48

## 2020-01-01 RX ADMIN — OXYCODONE HYDROCHLORIDE 5 MILLIGRAM(S): 5 TABLET ORAL at 16:27

## 2020-01-01 RX ADMIN — Medication 500 MILLIGRAM(S): at 05:45

## 2020-01-01 RX ADMIN — MEROPENEM 100 MILLIGRAM(S): 1 INJECTION INTRAVENOUS at 05:51

## 2020-01-01 RX ADMIN — LIDOCAINE 2 PATCH: 4 CREAM TOPICAL at 13:21

## 2020-01-01 RX ADMIN — Medication 40 MILLIEQUIVALENT(S): at 09:32

## 2020-01-01 RX ADMIN — OXYCODONE HYDROCHLORIDE 7.5 MILLIGRAM(S): 5 TABLET ORAL at 09:36

## 2020-01-01 RX ADMIN — OXYCODONE HYDROCHLORIDE 10 MILLIGRAM(S): 5 TABLET ORAL at 20:15

## 2020-01-01 RX ADMIN — LINEZOLID 300 MILLIGRAM(S): 600 INJECTION, SOLUTION INTRAVENOUS at 13:05

## 2020-01-01 RX ADMIN — MIDODRINE HYDROCHLORIDE 30 MILLIGRAM(S): 2.5 TABLET ORAL at 22:40

## 2020-01-01 RX ADMIN — GABAPENTIN 100 MILLIGRAM(S): 400 CAPSULE ORAL at 23:28

## 2020-01-01 RX ADMIN — OXYCODONE HYDROCHLORIDE 7.5 MILLIGRAM(S): 5 TABLET ORAL at 13:48

## 2020-01-01 RX ADMIN — MORPHINE SULFATE 2 MILLIGRAM(S): 50 CAPSULE, EXTENDED RELEASE ORAL at 19:00

## 2020-01-01 RX ADMIN — OXYCODONE HYDROCHLORIDE 10 MILLIGRAM(S): 5 TABLET ORAL at 13:15

## 2020-01-01 RX ADMIN — PANTOPRAZOLE SODIUM 40 MILLIGRAM(S): 20 TABLET, DELAYED RELEASE ORAL at 06:01

## 2020-01-01 RX ADMIN — OXYCODONE HYDROCHLORIDE 5 MILLIGRAM(S): 5 TABLET ORAL at 23:14

## 2020-01-01 RX ADMIN — Medication 50 GRAM(S): at 08:48

## 2020-01-01 RX ADMIN — Medication 500 MILLIGRAM(S): at 21:41

## 2020-01-01 RX ADMIN — Medication 500 MILLIGRAM(S): at 16:16

## 2020-01-01 RX ADMIN — LIDOCAINE 2 PATCH: 4 CREAM TOPICAL at 22:56

## 2020-01-01 RX ADMIN — ONDANSETRON 4 MILLIGRAM(S): 8 TABLET, FILM COATED ORAL at 05:54

## 2020-01-01 RX ADMIN — APIXABAN 10 MILLIGRAM(S): 2.5 TABLET, FILM COATED ORAL at 09:18

## 2020-01-01 RX ADMIN — OXYCODONE HYDROCHLORIDE 10 MILLIGRAM(S): 5 TABLET ORAL at 13:57

## 2020-01-01 RX ADMIN — Medication 325 MILLIGRAM(S): at 16:53

## 2020-01-01 RX ADMIN — Medication 975 MILLIGRAM(S): at 15:30

## 2020-01-01 RX ADMIN — SODIUM CHLORIDE 250 MILLILITER(S): 9 INJECTION INTRAMUSCULAR; INTRAVENOUS; SUBCUTANEOUS at 16:31

## 2020-01-01 RX ADMIN — OXYCODONE HYDROCHLORIDE 7.5 MILLIGRAM(S): 5 TABLET ORAL at 17:15

## 2020-01-01 RX ADMIN — MIDODRINE HYDROCHLORIDE 20 MILLIGRAM(S): 2.5 TABLET ORAL at 12:15

## 2020-01-01 RX ADMIN — ONDANSETRON 4 MILLIGRAM(S): 8 TABLET, FILM COATED ORAL at 06:33

## 2020-01-01 RX ADMIN — Medication 1 TABLET(S): at 05:53

## 2020-01-01 RX ADMIN — MORPHINE SULFATE 2 MILLIGRAM(S): 50 CAPSULE, EXTENDED RELEASE ORAL at 03:30

## 2020-01-01 RX ADMIN — SENNA PLUS 2 TABLET(S): 8.6 TABLET ORAL at 21:51

## 2020-01-01 RX ADMIN — PIPERACILLIN AND TAZOBACTAM 25 GRAM(S): 4; .5 INJECTION, POWDER, LYOPHILIZED, FOR SOLUTION INTRAVENOUS at 06:11

## 2020-01-01 RX ADMIN — HEPARIN SODIUM 5000 UNIT(S): 5000 INJECTION INTRAVENOUS; SUBCUTANEOUS at 12:21

## 2020-01-01 RX ADMIN — Medication 500 MILLIGRAM(S): at 00:45

## 2020-01-01 RX ADMIN — Medication 500 MILLIGRAM(S): at 02:34

## 2020-01-01 RX ADMIN — Medication 100 MILLIEQUIVALENT(S): at 12:23

## 2020-01-01 RX ADMIN — OXYCODONE HYDROCHLORIDE 10 MILLIGRAM(S): 5 TABLET ORAL at 14:28

## 2020-01-01 RX ADMIN — Medication 500 MILLIGRAM(S): at 21:12

## 2020-01-01 RX ADMIN — MIDODRINE HYDROCHLORIDE 30 MILLIGRAM(S): 2.5 TABLET ORAL at 13:54

## 2020-01-01 RX ADMIN — Medication 500 MILLIGRAM(S): at 01:03

## 2020-01-01 RX ADMIN — SENNA PLUS 2 TABLET(S): 8.6 TABLET ORAL at 21:34

## 2020-01-01 RX ADMIN — MORPHINE SULFATE 2 MILLIGRAM(S): 50 CAPSULE, EXTENDED RELEASE ORAL at 00:18

## 2020-01-01 RX ADMIN — Medication 975 MILLIGRAM(S): at 05:07

## 2020-01-01 RX ADMIN — MEROPENEM 100 MILLIGRAM(S): 1 INJECTION INTRAVENOUS at 21:28

## 2020-01-01 RX ADMIN — Medication 50 GRAM(S): at 17:06

## 2020-01-01 RX ADMIN — SODIUM CHLORIDE 150 MILLILITER(S): 9 INJECTION, SOLUTION INTRAVENOUS at 01:15

## 2020-01-01 RX ADMIN — HEPARIN SODIUM 800 UNIT(S)/HR: 5000 INJECTION INTRAVENOUS; SUBCUTANEOUS at 09:14

## 2020-01-01 RX ADMIN — OXYCODONE HYDROCHLORIDE 20 MILLIGRAM(S): 5 TABLET ORAL at 13:59

## 2020-01-01 RX ADMIN — LIDOCAINE 2 PATCH: 4 CREAM TOPICAL at 13:12

## 2020-01-01 RX ADMIN — MORPHINE SULFATE 1 MILLIGRAM(S): 50 CAPSULE, EXTENDED RELEASE ORAL at 23:45

## 2020-01-01 RX ADMIN — Medication 3 MILLILITER(S): at 16:07

## 2020-01-01 RX ADMIN — OXYCODONE HYDROCHLORIDE 10 MILLIGRAM(S): 5 TABLET ORAL at 05:49

## 2020-01-01 RX ADMIN — ENOXAPARIN SODIUM 50 MILLIGRAM(S): 100 INJECTION SUBCUTANEOUS at 17:40

## 2020-01-01 RX ADMIN — OXYCODONE HYDROCHLORIDE 20 MILLIGRAM(S): 5 TABLET ORAL at 14:22

## 2020-01-01 RX ADMIN — Medication 100 MILLIGRAM(S): at 05:35

## 2020-01-01 RX ADMIN — SODIUM CHLORIDE 1000 MILLILITER(S): 9 INJECTION, SOLUTION INTRAVENOUS at 01:30

## 2020-01-01 RX ADMIN — Medication 100 MILLIGRAM(S): at 21:04

## 2020-01-01 RX ADMIN — GABAPENTIN 100 MILLIGRAM(S): 400 CAPSULE ORAL at 22:10

## 2020-01-01 RX ADMIN — Medication 1 TABLET(S): at 12:45

## 2020-01-01 RX ADMIN — Medication 3 MILLIGRAM(S): at 21:46

## 2020-01-01 RX ADMIN — GABAPENTIN 100 MILLIGRAM(S): 400 CAPSULE ORAL at 14:32

## 2020-01-01 RX ADMIN — Medication 1 TABLET(S): at 17:23

## 2020-01-01 RX ADMIN — Medication 500 MILLIGRAM(S): at 14:29

## 2020-01-01 RX ADMIN — HEPARIN SODIUM 4000 UNIT(S): 5000 INJECTION INTRAVENOUS; SUBCUTANEOUS at 14:36

## 2020-01-01 RX ADMIN — PANTOPRAZOLE SODIUM 40 MILLIGRAM(S): 20 TABLET, DELAYED RELEASE ORAL at 05:14

## 2020-01-01 RX ADMIN — Medication 500 MILLIGRAM(S): at 09:02

## 2020-01-01 RX ADMIN — OXYCODONE HYDROCHLORIDE 15 MILLIGRAM(S): 5 TABLET ORAL at 05:11

## 2020-01-01 RX ADMIN — APIXABAN 5 MILLIGRAM(S): 2.5 TABLET, FILM COATED ORAL at 09:19

## 2020-01-01 RX ADMIN — MIDODRINE HYDROCHLORIDE 10 MILLIGRAM(S): 2.5 TABLET ORAL at 05:52

## 2020-01-01 RX ADMIN — MIDODRINE HYDROCHLORIDE 40 MILLIGRAM(S): 2.5 TABLET ORAL at 22:30

## 2020-01-01 RX ADMIN — SODIUM CHLORIDE 50 MILLILITER(S): 9 INJECTION INTRAMUSCULAR; INTRAVENOUS; SUBCUTANEOUS at 00:45

## 2020-01-01 RX ADMIN — SENNA PLUS 2 TABLET(S): 8.6 TABLET ORAL at 22:40

## 2020-01-01 RX ADMIN — APIXABAN 2.5 MILLIGRAM(S): 2.5 TABLET, FILM COATED ORAL at 05:10

## 2020-01-01 RX ADMIN — CHLORHEXIDINE GLUCONATE 1 APPLICATION(S): 213 SOLUTION TOPICAL at 16:02

## 2020-01-01 RX ADMIN — OXYCODONE HYDROCHLORIDE 10 MILLIGRAM(S): 5 TABLET ORAL at 08:16

## 2020-01-01 RX ADMIN — MIDODRINE HYDROCHLORIDE 30 MILLIGRAM(S): 2.5 TABLET ORAL at 21:48

## 2020-01-01 RX ADMIN — HEPARIN SODIUM 5000 UNIT(S): 5000 INJECTION INTRAVENOUS; SUBCUTANEOUS at 12:55

## 2020-01-01 RX ADMIN — OXYCODONE HYDROCHLORIDE 5 MILLIGRAM(S): 5 TABLET ORAL at 00:56

## 2020-01-01 RX ADMIN — OXYCODONE HYDROCHLORIDE 20 MILLIGRAM(S): 5 TABLET ORAL at 13:04

## 2020-01-01 RX ADMIN — OXYCODONE HYDROCHLORIDE 5 MILLIGRAM(S): 5 TABLET ORAL at 10:32

## 2020-01-01 RX ADMIN — OXYCODONE HYDROCHLORIDE 5 MILLIGRAM(S): 5 TABLET ORAL at 11:42

## 2020-01-01 RX ADMIN — Medication 20 MILLIEQUIVALENT(S): at 08:46

## 2020-01-01 RX ADMIN — Medication 3 MILLILITER(S): at 16:09

## 2020-01-01 RX ADMIN — Medication 100 MILLIGRAM(S): at 17:35

## 2020-01-01 RX ADMIN — OXYCODONE HYDROCHLORIDE 5 MILLIGRAM(S): 5 TABLET ORAL at 17:50

## 2020-01-01 RX ADMIN — OXYCODONE HYDROCHLORIDE 5 MILLIGRAM(S): 5 TABLET ORAL at 19:44

## 2020-01-01 RX ADMIN — OXYCODONE HYDROCHLORIDE 10 MILLIGRAM(S): 5 TABLET ORAL at 08:48

## 2020-01-01 RX ADMIN — OXYCODONE HYDROCHLORIDE 10 MILLIGRAM(S): 5 TABLET ORAL at 18:24

## 2020-01-01 RX ADMIN — MIDODRINE HYDROCHLORIDE 10 MILLIGRAM(S): 2.5 TABLET ORAL at 14:00

## 2020-01-01 RX ADMIN — LIDOCAINE 1 PATCH: 4 CREAM TOPICAL at 20:37

## 2020-01-01 RX ADMIN — Medication 500 MILLIGRAM(S): at 15:00

## 2020-01-01 RX ADMIN — Medication 500 MILLIGRAM(S): at 12:19

## 2020-01-01 RX ADMIN — Medication 500 MILLIGRAM(S): at 06:14

## 2020-01-01 RX ADMIN — MORPHINE SULFATE 1 MILLIGRAM(S): 50 CAPSULE, EXTENDED RELEASE ORAL at 08:27

## 2020-01-01 RX ADMIN — OXYCODONE HYDROCHLORIDE 10 MILLIGRAM(S): 5 TABLET ORAL at 21:02

## 2020-01-01 RX ADMIN — Medication 325 MILLIGRAM(S): at 12:59

## 2020-01-01 RX ADMIN — MORPHINE SULFATE 2 MILLIGRAM(S): 50 CAPSULE, EXTENDED RELEASE ORAL at 16:09

## 2020-01-01 RX ADMIN — OXYCODONE HYDROCHLORIDE 7.5 MILLIGRAM(S): 5 TABLET ORAL at 12:48

## 2020-01-01 RX ADMIN — Medication 40 MILLIGRAM(S): at 14:06

## 2020-01-01 RX ADMIN — Medication 500 MILLIGRAM(S): at 09:12

## 2020-01-01 RX ADMIN — GABAPENTIN 100 MILLIGRAM(S): 400 CAPSULE ORAL at 22:04

## 2020-01-01 RX ADMIN — ONDANSETRON 4 MILLIGRAM(S): 8 TABLET, FILM COATED ORAL at 06:43

## 2020-01-01 RX ADMIN — APIXABAN 10 MILLIGRAM(S): 2.5 TABLET, FILM COATED ORAL at 21:12

## 2020-01-01 RX ADMIN — Medication 100 MILLIGRAM(S): at 06:30

## 2020-01-01 RX ADMIN — OXYCODONE HYDROCHLORIDE 10 MILLIGRAM(S): 5 TABLET ORAL at 06:27

## 2020-01-01 RX ADMIN — PIPERACILLIN AND TAZOBACTAM 200 GRAM(S): 4; .5 INJECTION, POWDER, LYOPHILIZED, FOR SOLUTION INTRAVENOUS at 11:01

## 2020-01-01 RX ADMIN — Medication 1 TABLET(S): at 17:12

## 2020-01-01 RX ADMIN — APIXABAN 5 MILLIGRAM(S): 2.5 TABLET, FILM COATED ORAL at 21:46

## 2020-01-01 RX ADMIN — Medication 1 TABLET(S): at 05:33

## 2020-01-01 RX ADMIN — Medication 50 GRAM(S): at 09:02

## 2020-01-01 RX ADMIN — Medication 500 MILLIGRAM(S): at 17:00

## 2020-01-01 RX ADMIN — Medication 1 TABLET(S): at 17:00

## 2020-01-01 RX ADMIN — PIPERACILLIN AND TAZOBACTAM 25 GRAM(S): 4; .5 INJECTION, POWDER, LYOPHILIZED, FOR SOLUTION INTRAVENOUS at 12:22

## 2020-01-01 RX ADMIN — OXYCODONE HYDROCHLORIDE 7.5 MILLIGRAM(S): 5 TABLET ORAL at 01:26

## 2020-01-01 RX ADMIN — OXYCODONE HYDROCHLORIDE 10 MILLIGRAM(S): 5 TABLET ORAL at 06:15

## 2020-01-01 RX ADMIN — MIDODRINE HYDROCHLORIDE 30 MILLIGRAM(S): 2.5 TABLET ORAL at 05:48

## 2020-01-01 RX ADMIN — Medication 500 MILLIGRAM(S): at 20:28

## 2020-01-01 RX ADMIN — MORPHINE SULFATE 1 MILLIGRAM(S): 50 CAPSULE, EXTENDED RELEASE ORAL at 08:40

## 2020-01-01 RX ADMIN — ONDANSETRON 4 MILLIGRAM(S): 8 TABLET, FILM COATED ORAL at 00:46

## 2020-01-01 RX ADMIN — Medication 500 MILLIGRAM(S): at 02:56

## 2020-01-01 RX ADMIN — Medication 500 MILLIGRAM(S): at 21:29

## 2020-01-01 RX ADMIN — Medication 3 MILLIGRAM(S): at 21:02

## 2020-01-01 RX ADMIN — OXYCODONE HYDROCHLORIDE 5 MILLIGRAM(S): 5 TABLET ORAL at 15:00

## 2020-01-01 RX ADMIN — GABAPENTIN 100 MILLIGRAM(S): 400 CAPSULE ORAL at 13:12

## 2020-01-01 RX ADMIN — HEPARIN SODIUM 5000 UNIT(S): 5000 INJECTION INTRAVENOUS; SUBCUTANEOUS at 05:45

## 2020-01-01 RX ADMIN — Medication 500 MILLIGRAM(S): at 06:21

## 2020-01-01 RX ADMIN — Medication 500 MILLIGRAM(S): at 10:55

## 2020-01-01 RX ADMIN — Medication 975 MILLIGRAM(S): at 23:15

## 2020-01-01 RX ADMIN — LIDOCAINE 2 PATCH: 4 CREAM TOPICAL at 19:20

## 2020-01-01 RX ADMIN — LIDOCAINE 1 PATCH: 4 CREAM TOPICAL at 14:17

## 2020-01-01 RX ADMIN — OXYCODONE HYDROCHLORIDE 10 MILLIGRAM(S): 5 TABLET ORAL at 17:54

## 2020-01-01 RX ADMIN — OXYCODONE HYDROCHLORIDE 20 MILLIGRAM(S): 5 TABLET ORAL at 21:34

## 2020-01-01 RX ADMIN — LIDOCAINE 1 PATCH: 4 CREAM TOPICAL at 19:00

## 2020-01-01 RX ADMIN — Medication 1 TABLET(S): at 18:22

## 2020-01-01 RX ADMIN — Medication 40 MILLIEQUIVALENT(S): at 10:57

## 2020-01-01 RX ADMIN — LIDOCAINE 2 PATCH: 4 CREAM TOPICAL at 20:15

## 2020-01-01 RX ADMIN — OXYCODONE HYDROCHLORIDE 5 MILLIGRAM(S): 5 TABLET ORAL at 12:40

## 2020-01-01 RX ADMIN — LIDOCAINE 1 PATCH: 4 CREAM TOPICAL at 12:34

## 2020-01-01 RX ADMIN — OXYCODONE HYDROCHLORIDE 10 MILLIGRAM(S): 5 TABLET ORAL at 06:45

## 2020-01-01 RX ADMIN — OXYCODONE HYDROCHLORIDE 7.5 MILLIGRAM(S): 5 TABLET ORAL at 14:18

## 2020-01-01 RX ADMIN — OXYCODONE HYDROCHLORIDE 7.5 MILLIGRAM(S): 5 TABLET ORAL at 14:53

## 2020-01-01 RX ADMIN — ONDANSETRON 4 MILLIGRAM(S): 8 TABLET, FILM COATED ORAL at 17:33

## 2020-01-01 RX ADMIN — PANTOPRAZOLE SODIUM 40 MILLIGRAM(S): 20 TABLET, DELAYED RELEASE ORAL at 06:21

## 2020-01-01 RX ADMIN — Medication 500 MILLIGRAM(S): at 01:00

## 2020-01-01 RX ADMIN — ONDANSETRON 4 MILLIGRAM(S): 8 TABLET, FILM COATED ORAL at 05:11

## 2020-01-01 RX ADMIN — APIXABAN 5 MILLIGRAM(S): 2.5 TABLET, FILM COATED ORAL at 22:22

## 2020-01-01 RX ADMIN — Medication 500 MILLIGRAM(S): at 09:50

## 2020-01-01 RX ADMIN — SENNA PLUS 2 TABLET(S): 8.6 TABLET ORAL at 21:42

## 2020-01-01 RX ADMIN — APIXABAN 5 MILLIGRAM(S): 2.5 TABLET, FILM COATED ORAL at 06:21

## 2020-01-01 RX ADMIN — Medication 500 MILLIGRAM(S): at 22:56

## 2020-01-01 RX ADMIN — OXYCODONE HYDROCHLORIDE 5 MILLIGRAM(S): 5 TABLET ORAL at 21:10

## 2020-01-01 RX ADMIN — Medication 500 MILLIGRAM(S): at 21:36

## 2020-01-01 RX ADMIN — Medication 100 MILLIGRAM(S): at 23:00

## 2020-01-01 RX ADMIN — MIDODRINE HYDROCHLORIDE 20 MILLIGRAM(S): 2.5 TABLET ORAL at 05:14

## 2020-01-01 RX ADMIN — Medication 125 MILLIGRAM(S): at 18:24

## 2020-01-01 RX ADMIN — OXYCODONE HYDROCHLORIDE 20 MILLIGRAM(S): 5 TABLET ORAL at 10:16

## 2020-01-01 RX ADMIN — OXYCODONE HYDROCHLORIDE 5 MILLIGRAM(S): 5 TABLET ORAL at 22:15

## 2020-01-01 RX ADMIN — Medication 100 MILLIGRAM(S): at 05:05

## 2020-01-01 RX ADMIN — Medication 3 MILLILITER(S): at 12:37

## 2020-01-01 RX ADMIN — GABAPENTIN 100 MILLIGRAM(S): 400 CAPSULE ORAL at 22:26

## 2020-01-01 RX ADMIN — GABAPENTIN 100 MILLIGRAM(S): 400 CAPSULE ORAL at 05:27

## 2020-01-01 RX ADMIN — Medication 250 MILLIGRAM(S): at 02:30

## 2020-01-01 RX ADMIN — Medication 3 MILLILITER(S): at 22:10

## 2020-01-01 RX ADMIN — OXYCODONE HYDROCHLORIDE 10 MILLIGRAM(S): 5 TABLET ORAL at 14:37

## 2020-01-01 RX ADMIN — Medication 3 MILLIGRAM(S): at 21:03

## 2020-01-01 RX ADMIN — OXYCODONE HYDROCHLORIDE 10 MILLIGRAM(S): 5 TABLET ORAL at 06:20

## 2020-01-01 RX ADMIN — GABAPENTIN 100 MILLIGRAM(S): 400 CAPSULE ORAL at 13:59

## 2020-01-01 RX ADMIN — Medication 1 TABLET(S): at 13:05

## 2020-01-01 RX ADMIN — MORPHINE SULFATE 2 MILLIGRAM(S): 50 CAPSULE, EXTENDED RELEASE ORAL at 20:50

## 2020-01-01 RX ADMIN — Medication 30 MILLIGRAM(S): at 04:18

## 2020-01-01 RX ADMIN — OXYCODONE HYDROCHLORIDE 10 MILLIGRAM(S): 5 TABLET ORAL at 18:52

## 2020-01-01 RX ADMIN — HEPARIN SODIUM 5000 UNIT(S): 5000 INJECTION INTRAVENOUS; SUBCUTANEOUS at 21:32

## 2020-01-01 RX ADMIN — Medication 1 TABLET(S): at 13:06

## 2020-01-01 RX ADMIN — OXYCODONE HYDROCHLORIDE 10 MILLIGRAM(S): 5 TABLET ORAL at 19:35

## 2020-01-01 RX ADMIN — OXYCODONE HYDROCHLORIDE 10 MILLIGRAM(S): 5 TABLET ORAL at 11:59

## 2020-01-01 RX ADMIN — OXYCODONE HYDROCHLORIDE 10 MILLIGRAM(S): 5 TABLET ORAL at 03:01

## 2020-01-01 RX ADMIN — POLYETHYLENE GLYCOL 3350 17 GRAM(S): 17 POWDER, FOR SOLUTION ORAL at 12:06

## 2020-01-01 RX ADMIN — Medication 975 MILLIGRAM(S): at 13:50

## 2020-01-01 RX ADMIN — OXYCODONE HYDROCHLORIDE 5 MILLIGRAM(S): 5 TABLET ORAL at 14:46

## 2020-01-01 RX ADMIN — MORPHINE SULFATE 15 MILLIGRAM(S): 50 CAPSULE, EXTENDED RELEASE ORAL at 22:35

## 2020-01-01 RX ADMIN — Medication 1 TABLET(S): at 12:06

## 2020-01-01 RX ADMIN — CHLORHEXIDINE GLUCONATE 1 APPLICATION(S): 213 SOLUTION TOPICAL at 11:19

## 2020-01-01 RX ADMIN — MORPHINE SULFATE 2 MILLIGRAM(S): 50 CAPSULE, EXTENDED RELEASE ORAL at 13:45

## 2020-01-01 RX ADMIN — AZITHROMYCIN 255 MILLIGRAM(S): 500 TABLET, FILM COATED ORAL at 10:04

## 2020-01-01 RX ADMIN — GABAPENTIN 100 MILLIGRAM(S): 400 CAPSULE ORAL at 21:07

## 2020-01-01 RX ADMIN — OXYCODONE HYDROCHLORIDE 5 MILLIGRAM(S): 5 TABLET ORAL at 09:25

## 2020-01-01 RX ADMIN — OXYCODONE HYDROCHLORIDE 10 MILLIGRAM(S): 5 TABLET ORAL at 07:25

## 2020-01-01 RX ADMIN — MIDODRINE HYDROCHLORIDE 10 MILLIGRAM(S): 2.5 TABLET ORAL at 13:31

## 2020-01-01 RX ADMIN — LIDOCAINE 2 PATCH: 4 CREAM TOPICAL at 00:49

## 2020-01-01 RX ADMIN — APIXABAN 2.5 MILLIGRAM(S): 2.5 TABLET, FILM COATED ORAL at 17:20

## 2020-01-01 RX ADMIN — MORPHINE SULFATE 2 MILLIGRAM(S): 50 CAPSULE, EXTENDED RELEASE ORAL at 20:55

## 2020-01-01 RX ADMIN — MIDODRINE HYDROCHLORIDE 20 MILLIGRAM(S): 2.5 TABLET ORAL at 05:11

## 2020-01-01 RX ADMIN — Medication 30 MILLILITER(S): at 13:05

## 2020-01-01 RX ADMIN — Medication 3 MILLIGRAM(S): at 22:34

## 2020-01-01 RX ADMIN — OXYCODONE HYDROCHLORIDE 7.5 MILLIGRAM(S): 5 TABLET ORAL at 22:45

## 2020-01-01 RX ADMIN — Medication 100 MILLIGRAM(S): at 09:55

## 2020-01-01 RX ADMIN — OXYCODONE HYDROCHLORIDE 5 MILLIGRAM(S): 5 TABLET ORAL at 12:08

## 2020-01-01 RX ADMIN — Medication 50 GRAM(S): at 09:38

## 2020-01-01 RX ADMIN — OXYCODONE HYDROCHLORIDE 5 MILLIGRAM(S): 5 TABLET ORAL at 04:00

## 2020-01-01 RX ADMIN — OXYCODONE HYDROCHLORIDE 20 MILLIGRAM(S): 5 TABLET ORAL at 23:24

## 2020-01-01 RX ADMIN — GABAPENTIN 100 MILLIGRAM(S): 400 CAPSULE ORAL at 13:47

## 2020-01-01 RX ADMIN — POLYETHYLENE GLYCOL 3350 17 GRAM(S): 17 POWDER, FOR SOLUTION ORAL at 12:12

## 2020-01-01 RX ADMIN — Medication 100 MILLIGRAM(S): at 06:02

## 2020-01-01 RX ADMIN — Medication 500 MILLIGRAM(S): at 05:24

## 2020-01-01 RX ADMIN — OXYCODONE HYDROCHLORIDE 7.5 MILLIGRAM(S): 5 TABLET ORAL at 17:45

## 2020-01-01 RX ADMIN — OXYCODONE HYDROCHLORIDE 5 MILLIGRAM(S): 5 TABLET ORAL at 03:15

## 2020-01-01 RX ADMIN — OXYCODONE HYDROCHLORIDE 10 MILLIGRAM(S): 5 TABLET ORAL at 09:49

## 2020-01-01 RX ADMIN — CHLORHEXIDINE GLUCONATE 1 APPLICATION(S): 213 SOLUTION TOPICAL at 05:53

## 2020-01-01 RX ADMIN — OXYCODONE HYDROCHLORIDE 5 MILLIGRAM(S): 5 TABLET ORAL at 02:08

## 2020-01-01 RX ADMIN — OXYCODONE HYDROCHLORIDE 10 MILLIGRAM(S): 5 TABLET ORAL at 20:52

## 2020-01-01 RX ADMIN — Medication 125 MILLILITER(S): at 10:08

## 2020-01-01 RX ADMIN — Medication 3 MILLIGRAM(S): at 21:30

## 2020-01-01 RX ADMIN — OXYCODONE HYDROCHLORIDE 10 MILLIGRAM(S): 5 TABLET ORAL at 16:16

## 2020-01-01 RX ADMIN — Medication 500 MILLIGRAM(S): at 18:00

## 2020-01-01 RX ADMIN — Medication 30 MILLIGRAM(S): at 04:02

## 2020-01-01 RX ADMIN — MORPHINE SULFATE 2 MILLIGRAM(S): 50 CAPSULE, EXTENDED RELEASE ORAL at 13:54

## 2020-01-01 RX ADMIN — Medication 3 MILLILITER(S): at 08:01

## 2020-01-01 RX ADMIN — ENOXAPARIN SODIUM 50 MILLIGRAM(S): 100 INJECTION SUBCUTANEOUS at 05:36

## 2020-01-01 RX ADMIN — OXYCODONE HYDROCHLORIDE 5 MILLIGRAM(S): 5 TABLET ORAL at 22:00

## 2020-01-01 RX ADMIN — Medication 3 MILLILITER(S): at 22:25

## 2020-01-01 RX ADMIN — LIDOCAINE 1 PATCH: 4 CREAM TOPICAL at 19:13

## 2020-01-01 RX ADMIN — Medication 975 MILLIGRAM(S): at 22:30

## 2020-01-01 RX ADMIN — MORPHINE SULFATE 2 MILLIGRAM(S): 50 CAPSULE, EXTENDED RELEASE ORAL at 20:34

## 2020-01-01 RX ADMIN — MORPHINE SULFATE 1 MILLIGRAM(S): 50 CAPSULE, EXTENDED RELEASE ORAL at 20:55

## 2020-01-01 RX ADMIN — OXYCODONE HYDROCHLORIDE 7.5 MILLIGRAM(S): 5 TABLET ORAL at 08:50

## 2020-01-01 RX ADMIN — Medication 3 MILLIGRAM(S): at 22:05

## 2020-01-01 RX ADMIN — GABAPENTIN 100 MILLIGRAM(S): 400 CAPSULE ORAL at 06:44

## 2020-01-01 RX ADMIN — MORPHINE SULFATE 1 MILLIGRAM(S): 50 CAPSULE, EXTENDED RELEASE ORAL at 21:38

## 2020-01-01 RX ADMIN — OXYCODONE HYDROCHLORIDE 10 MILLIGRAM(S): 5 TABLET ORAL at 09:30

## 2020-01-01 RX ADMIN — OXYCODONE HYDROCHLORIDE 10 MILLIGRAM(S): 5 TABLET ORAL at 14:05

## 2020-01-01 RX ADMIN — OXYCODONE HYDROCHLORIDE 7.5 MILLIGRAM(S): 5 TABLET ORAL at 04:55

## 2020-01-01 RX ADMIN — Medication 3 MILLILITER(S): at 21:56

## 2020-01-01 RX ADMIN — Medication 6.09 MICROGRAM(S)/KG/MIN: at 08:30

## 2020-01-01 RX ADMIN — Medication 1 TABLET(S): at 06:21

## 2020-01-01 RX ADMIN — SODIUM CHLORIDE 750 MILLILITER(S): 9 INJECTION, SOLUTION INTRAVENOUS at 02:11

## 2020-01-01 RX ADMIN — PIPERACILLIN AND TAZOBACTAM 25 GRAM(S): 4; .5 INJECTION, POWDER, LYOPHILIZED, FOR SOLUTION INTRAVENOUS at 21:59

## 2020-01-01 RX ADMIN — MIDODRINE HYDROCHLORIDE 40 MILLIGRAM(S): 2.5 TABLET ORAL at 21:05

## 2020-01-01 RX ADMIN — OXYCODONE HYDROCHLORIDE 20 MILLIGRAM(S): 5 TABLET ORAL at 06:13

## 2020-01-01 RX ADMIN — OXYCODONE HYDROCHLORIDE 5 MILLIGRAM(S): 5 TABLET ORAL at 23:59

## 2020-01-01 RX ADMIN — GABAPENTIN 100 MILLIGRAM(S): 400 CAPSULE ORAL at 21:26

## 2020-01-01 RX ADMIN — ONDANSETRON 4 MILLIGRAM(S): 8 TABLET, FILM COATED ORAL at 16:06

## 2020-01-01 RX ADMIN — OXYCODONE HYDROCHLORIDE 10 MILLIGRAM(S): 5 TABLET ORAL at 21:37

## 2020-01-01 RX ADMIN — HALOPERIDOL DECANOATE 2.5 MILLIGRAM(S): 100 INJECTION INTRAMUSCULAR at 17:17

## 2020-01-01 RX ADMIN — CHLORHEXIDINE GLUCONATE 1 APPLICATION(S): 213 SOLUTION TOPICAL at 06:13

## 2020-01-01 RX ADMIN — OXYCODONE HYDROCHLORIDE 10 MILLIGRAM(S): 5 TABLET ORAL at 12:05

## 2020-01-01 RX ADMIN — Medication 3 MILLIGRAM(S): at 23:20

## 2020-01-01 RX ADMIN — Medication 500 MILLIGRAM(S): at 17:20

## 2020-01-01 RX ADMIN — PIPERACILLIN AND TAZOBACTAM 200 GRAM(S): 4; .5 INJECTION, POWDER, LYOPHILIZED, FOR SOLUTION INTRAVENOUS at 05:30

## 2020-01-01 RX ADMIN — GABAPENTIN 100 MILLIGRAM(S): 400 CAPSULE ORAL at 05:42

## 2020-01-01 RX ADMIN — OXYCODONE HYDROCHLORIDE 10 MILLIGRAM(S): 5 TABLET ORAL at 11:35

## 2020-01-01 RX ADMIN — OXYCODONE HYDROCHLORIDE 7.5 MILLIGRAM(S): 5 TABLET ORAL at 18:10

## 2020-01-01 RX ADMIN — OXYCODONE HYDROCHLORIDE 10 MILLIGRAM(S): 5 TABLET ORAL at 16:40

## 2020-01-01 RX ADMIN — LIDOCAINE 1 PATCH: 4 CREAM TOPICAL at 12:33

## 2020-01-01 RX ADMIN — OXYCODONE HYDROCHLORIDE 10 MILLIGRAM(S): 5 TABLET ORAL at 13:46

## 2020-01-01 RX ADMIN — Medication 975 MILLIGRAM(S): at 05:29

## 2020-01-01 RX ADMIN — PIPERACILLIN AND TAZOBACTAM 25 GRAM(S): 4; .5 INJECTION, POWDER, LYOPHILIZED, FOR SOLUTION INTRAVENOUS at 21:58

## 2020-01-01 RX ADMIN — Medication 100 MILLIGRAM(S): at 14:25

## 2020-01-01 RX ADMIN — OXYCODONE AND ACETAMINOPHEN 1 TABLET(S): 5; 325 TABLET ORAL at 22:54

## 2020-01-01 RX ADMIN — MIDODRINE HYDROCHLORIDE 30 MILLIGRAM(S): 2.5 TABLET ORAL at 05:35

## 2020-01-01 RX ADMIN — Medication 500 MILLIGRAM(S): at 13:38

## 2020-01-01 RX ADMIN — OXYCODONE HYDROCHLORIDE 20 MILLIGRAM(S): 5 TABLET ORAL at 05:35

## 2020-01-01 RX ADMIN — GABAPENTIN 100 MILLIGRAM(S): 400 CAPSULE ORAL at 14:15

## 2020-01-01 RX ADMIN — Medication 1 TABLET(S): at 12:20

## 2020-01-01 RX ADMIN — PANTOPRAZOLE SODIUM 40 MILLIGRAM(S): 20 TABLET, DELAYED RELEASE ORAL at 05:57

## 2020-01-01 RX ADMIN — Medication 500 MILLIGRAM(S): at 21:46

## 2020-01-01 RX ADMIN — SODIUM CHLORIDE 75 MILLILITER(S): 9 INJECTION INTRAMUSCULAR; INTRAVENOUS; SUBCUTANEOUS at 12:20

## 2020-01-01 RX ADMIN — OXYCODONE HYDROCHLORIDE 20 MILLIGRAM(S): 5 TABLET ORAL at 05:42

## 2020-01-01 RX ADMIN — OXYCODONE HYDROCHLORIDE 10 MILLIGRAM(S): 5 TABLET ORAL at 16:25

## 2020-01-01 RX ADMIN — MORPHINE SULFATE 2 MILLIGRAM(S): 50 CAPSULE, EXTENDED RELEASE ORAL at 20:40

## 2020-01-01 RX ADMIN — Medication 1 TABLET(S): at 05:27

## 2020-01-01 RX ADMIN — Medication 500 MILLIGRAM(S): at 09:14

## 2020-01-01 RX ADMIN — GABAPENTIN 100 MILLIGRAM(S): 400 CAPSULE ORAL at 06:00

## 2020-01-01 RX ADMIN — OXYCODONE HYDROCHLORIDE 10 MILLIGRAM(S): 5 TABLET ORAL at 17:24

## 2020-01-01 RX ADMIN — Medication 500 MILLIGRAM(S): at 09:33

## 2020-01-01 RX ADMIN — OXYCODONE HYDROCHLORIDE 20 MILLIGRAM(S): 5 TABLET ORAL at 06:20

## 2020-01-01 RX ADMIN — OXYCODONE HYDROCHLORIDE 5 MILLIGRAM(S): 5 TABLET ORAL at 13:38

## 2020-01-01 RX ADMIN — OXYCODONE HYDROCHLORIDE 5 MILLIGRAM(S): 5 TABLET ORAL at 00:58

## 2020-01-01 RX ADMIN — GABAPENTIN 100 MILLIGRAM(S): 400 CAPSULE ORAL at 21:22

## 2020-01-01 RX ADMIN — MIDODRINE HYDROCHLORIDE 30 MILLIGRAM(S): 2.5 TABLET ORAL at 13:53

## 2020-01-01 RX ADMIN — Medication 500 MILLIGRAM(S): at 02:15

## 2020-01-01 RX ADMIN — Medication 5.63 MICROGRAM(S)/KG/MIN: at 08:49

## 2020-01-01 RX ADMIN — OXYCODONE HYDROCHLORIDE 10 MILLIGRAM(S): 5 TABLET ORAL at 09:19

## 2020-01-01 RX ADMIN — Medication 500 MILLIGRAM(S): at 10:12

## 2020-01-01 RX ADMIN — MORPHINE SULFATE 2 MILLIGRAM(S): 50 CAPSULE, EXTENDED RELEASE ORAL at 14:08

## 2020-01-01 RX ADMIN — OXYCODONE HYDROCHLORIDE 10 MILLIGRAM(S): 5 TABLET ORAL at 12:00

## 2020-01-01 RX ADMIN — Medication 2.5 MILLIGRAM(S): at 11:15

## 2020-01-01 RX ADMIN — MIDODRINE HYDROCHLORIDE 10 MILLIGRAM(S): 2.5 TABLET ORAL at 21:16

## 2020-01-01 RX ADMIN — MORPHINE SULFATE 2 MILLIGRAM(S): 50 CAPSULE, EXTENDED RELEASE ORAL at 07:38

## 2020-01-01 RX ADMIN — OXYCODONE HYDROCHLORIDE 10 MILLIGRAM(S): 5 TABLET ORAL at 07:59

## 2020-01-01 RX ADMIN — LIDOCAINE 1 PATCH: 4 CREAM TOPICAL at 21:26

## 2020-01-01 RX ADMIN — HEPARIN SODIUM 1000 UNIT(S)/HR: 5000 INJECTION INTRAVENOUS; SUBCUTANEOUS at 14:36

## 2020-01-01 RX ADMIN — Medication 3 MILLILITER(S): at 04:58

## 2020-01-01 RX ADMIN — Medication 500 MILLIGRAM(S): at 02:05

## 2020-01-01 RX ADMIN — OXYCODONE HYDROCHLORIDE 10 MILLIGRAM(S): 5 TABLET ORAL at 08:33

## 2020-01-01 RX ADMIN — PIPERACILLIN AND TAZOBACTAM 25 GRAM(S): 4; .5 INJECTION, POWDER, LYOPHILIZED, FOR SOLUTION INTRAVENOUS at 21:27

## 2020-01-01 RX ADMIN — MORPHINE SULFATE 2 MILLIGRAM(S): 50 CAPSULE, EXTENDED RELEASE ORAL at 04:49

## 2020-01-01 RX ADMIN — LIDOCAINE 2 PATCH: 4 CREAM TOPICAL at 12:06

## 2020-01-01 RX ADMIN — MIDAZOLAM HYDROCHLORIDE 0.5 MILLIGRAM(S): 1 INJECTION, SOLUTION INTRAMUSCULAR; INTRAVENOUS at 14:35

## 2020-01-01 RX ADMIN — ONDANSETRON 4 MILLIGRAM(S): 8 TABLET, FILM COATED ORAL at 17:11

## 2020-01-01 RX ADMIN — HALOPERIDOL DECANOATE 2 MILLIGRAM(S): 100 INJECTION INTRAMUSCULAR at 09:25

## 2020-01-01 RX ADMIN — Medication 500 MILLIGRAM(S): at 08:34

## 2020-01-01 RX ADMIN — Medication 325 MILLIGRAM(S): at 12:28

## 2020-01-01 RX ADMIN — Medication 500 MILLIGRAM(S): at 10:19

## 2020-01-01 RX ADMIN — LIDOCAINE 1 PATCH: 4 CREAM TOPICAL at 01:15

## 2020-01-01 RX ADMIN — OXYCODONE HYDROCHLORIDE 10 MILLIGRAM(S): 5 TABLET ORAL at 00:05

## 2020-01-01 RX ADMIN — MORPHINE SULFATE 2 MILLIGRAM(S): 50 CAPSULE, EXTENDED RELEASE ORAL at 06:25

## 2020-01-01 RX ADMIN — SENNA PLUS 2 TABLET(S): 8.6 TABLET ORAL at 21:30

## 2020-01-01 RX ADMIN — Medication 1 TABLET(S): at 05:28

## 2020-01-01 RX ADMIN — HEPARIN SODIUM 5000 UNIT(S): 5000 INJECTION INTRAVENOUS; SUBCUTANEOUS at 06:13

## 2020-01-01 RX ADMIN — PANTOPRAZOLE SODIUM 40 MILLIGRAM(S): 20 TABLET, DELAYED RELEASE ORAL at 06:42

## 2020-01-01 RX ADMIN — OXYCODONE HYDROCHLORIDE 20 MILLIGRAM(S): 5 TABLET ORAL at 05:33

## 2020-01-01 RX ADMIN — LIDOCAINE 2 PATCH: 4 CREAM TOPICAL at 17:00

## 2020-01-01 RX ADMIN — MORPHINE SULFATE 2 MILLIGRAM(S): 50 CAPSULE, EXTENDED RELEASE ORAL at 17:00

## 2020-01-01 RX ADMIN — LIDOCAINE 1 PATCH: 4 CREAM TOPICAL at 11:41

## 2020-01-01 RX ADMIN — OXYCODONE HYDROCHLORIDE 10 MILLIGRAM(S): 5 TABLET ORAL at 05:19

## 2020-01-01 RX ADMIN — SENNA PLUS 2 TABLET(S): 8.6 TABLET ORAL at 23:14

## 2020-01-01 RX ADMIN — Medication 100 MILLIGRAM(S): at 05:24

## 2020-01-01 RX ADMIN — ONDANSETRON 4 MILLIGRAM(S): 8 TABLET, FILM COATED ORAL at 15:45

## 2020-01-01 RX ADMIN — Medication 3 MILLILITER(S): at 21:17

## 2020-01-01 RX ADMIN — MORPHINE SULFATE 1 MILLIGRAM(S): 50 CAPSULE, EXTENDED RELEASE ORAL at 23:47

## 2020-01-01 RX ADMIN — Medication 3 MILLILITER(S): at 04:24

## 2020-01-01 RX ADMIN — OXYCODONE HYDROCHLORIDE 10 MILLIGRAM(S): 5 TABLET ORAL at 16:17

## 2020-01-01 RX ADMIN — Medication 500 MILLIGRAM(S): at 17:39

## 2020-01-01 RX ADMIN — GABAPENTIN 100 MILLIGRAM(S): 400 CAPSULE ORAL at 14:36

## 2020-01-01 RX ADMIN — Medication 500 MILLIGRAM(S): at 23:44

## 2020-01-01 RX ADMIN — MIDODRINE HYDROCHLORIDE 10 MILLIGRAM(S): 2.5 TABLET ORAL at 13:55

## 2020-01-01 RX ADMIN — LINEZOLID 300 MILLIGRAM(S): 600 INJECTION, SOLUTION INTRAVENOUS at 21:02

## 2020-01-01 RX ADMIN — Medication 500 MILLIGRAM(S): at 09:00

## 2020-01-01 RX ADMIN — OXYCODONE HYDROCHLORIDE 5 MILLIGRAM(S): 5 TABLET ORAL at 11:18

## 2020-01-01 RX ADMIN — FENTANYL CITRATE 25 MICROGRAM(S): 50 INJECTION INTRAVENOUS at 09:20

## 2020-01-01 RX ADMIN — Medication 1 TABLET(S): at 11:19

## 2020-01-01 RX ADMIN — Medication 1 TABLET(S): at 13:31

## 2020-01-01 RX ADMIN — MORPHINE SULFATE 0.5 MILLIGRAM(S): 50 CAPSULE, EXTENDED RELEASE ORAL at 01:00

## 2020-01-01 RX ADMIN — CHLORHEXIDINE GLUCONATE 1 APPLICATION(S): 213 SOLUTION TOPICAL at 09:03

## 2020-01-01 RX ADMIN — OXYCODONE HYDROCHLORIDE 10 MILLIGRAM(S): 5 TABLET ORAL at 12:52

## 2020-01-01 RX ADMIN — Medication 125 MILLIGRAM(S): at 03:08

## 2020-01-01 RX ADMIN — OXYCODONE HYDROCHLORIDE 10 MILLIGRAM(S): 5 TABLET ORAL at 20:02

## 2020-01-01 RX ADMIN — Medication 500 MILLIGRAM(S): at 22:39

## 2020-01-01 RX ADMIN — ENOXAPARIN SODIUM 40 MILLIGRAM(S): 100 INJECTION SUBCUTANEOUS at 12:12

## 2020-01-01 RX ADMIN — OXYCODONE HYDROCHLORIDE 5 MILLIGRAM(S): 5 TABLET ORAL at 21:33

## 2020-01-01 RX ADMIN — SODIUM CHLORIDE 75 MILLILITER(S): 9 INJECTION INTRAMUSCULAR; INTRAVENOUS; SUBCUTANEOUS at 19:57

## 2020-01-01 RX ADMIN — Medication 500 MILLIGRAM(S): at 13:30

## 2020-01-01 RX ADMIN — OXYCODONE HYDROCHLORIDE 7.5 MILLIGRAM(S): 5 TABLET ORAL at 07:44

## 2020-01-01 RX ADMIN — MORPHINE SULFATE 2 MILLIGRAM(S): 50 CAPSULE, EXTENDED RELEASE ORAL at 04:44

## 2020-01-01 RX ADMIN — OXYCODONE HYDROCHLORIDE 5 MILLIGRAM(S): 5 TABLET ORAL at 12:03

## 2020-01-01 RX ADMIN — Medication 500 MILLIGRAM(S): at 14:58

## 2020-01-01 RX ADMIN — Medication 500 MILLIGRAM(S): at 14:05

## 2020-01-01 RX ADMIN — OXYCODONE HYDROCHLORIDE 5 MILLIGRAM(S): 5 TABLET ORAL at 02:14

## 2020-01-01 RX ADMIN — APIXABAN 5 MILLIGRAM(S): 2.5 TABLET, FILM COATED ORAL at 09:50

## 2020-01-01 RX ADMIN — MIDODRINE HYDROCHLORIDE 30 MILLIGRAM(S): 2.5 TABLET ORAL at 21:02

## 2020-01-01 RX ADMIN — PIPERACILLIN AND TAZOBACTAM 25 GRAM(S): 4; .5 INJECTION, POWDER, LYOPHILIZED, FOR SOLUTION INTRAVENOUS at 14:43

## 2020-01-01 RX ADMIN — SODIUM CHLORIDE 250 MILLILITER(S): 9 INJECTION, SOLUTION INTRAVENOUS at 14:47

## 2020-01-01 RX ADMIN — ONDANSETRON 4 MILLIGRAM(S): 8 TABLET, FILM COATED ORAL at 15:38

## 2020-01-01 RX ADMIN — MORPHINE SULFATE 2 MILLIGRAM(S): 50 CAPSULE, EXTENDED RELEASE ORAL at 14:37

## 2020-01-01 RX ADMIN — POLYETHYLENE GLYCOL 3350 17 GRAM(S): 17 POWDER, FOR SOLUTION ORAL at 11:36

## 2020-01-01 RX ADMIN — OXYCODONE HYDROCHLORIDE 5 MILLIGRAM(S): 5 TABLET ORAL at 15:27

## 2020-01-01 RX ADMIN — Medication 500 MILLIGRAM(S): at 01:41

## 2020-01-01 RX ADMIN — OXYCODONE HYDROCHLORIDE 5 MILLIGRAM(S): 5 TABLET ORAL at 11:45

## 2020-01-01 RX ADMIN — Medication 500 MILLIGRAM(S): at 03:19

## 2020-01-01 RX ADMIN — Medication 1 TABLET(S): at 12:55

## 2020-01-01 RX ADMIN — Medication 500 MILLIGRAM(S): at 21:15

## 2020-01-01 RX ADMIN — SODIUM CHLORIDE 100 MILLILITER(S): 9 INJECTION, SOLUTION INTRAVENOUS at 08:49

## 2020-01-01 RX ADMIN — Medication 500 MILLIGRAM(S): at 11:39

## 2020-01-01 RX ADMIN — Medication 250 MILLIGRAM(S): at 17:12

## 2020-01-01 RX ADMIN — Medication 3 MILLIGRAM(S): at 21:17

## 2020-01-01 RX ADMIN — PANTOPRAZOLE SODIUM 40 MILLIGRAM(S): 20 TABLET, DELAYED RELEASE ORAL at 05:29

## 2020-01-01 RX ADMIN — OXYCODONE HYDROCHLORIDE 10 MILLIGRAM(S): 5 TABLET ORAL at 05:41

## 2020-01-01 RX ADMIN — Medication 5.63 MICROGRAM(S)/KG/MIN: at 23:10

## 2020-01-01 RX ADMIN — OXYCODONE HYDROCHLORIDE 5 MILLIGRAM(S): 5 TABLET ORAL at 13:55

## 2020-01-01 RX ADMIN — OXYCODONE AND ACETAMINOPHEN 1 TABLET(S): 5; 325 TABLET ORAL at 10:17

## 2020-01-01 RX ADMIN — Medication 500 MILLIGRAM(S): at 05:53

## 2020-01-01 RX ADMIN — POLYETHYLENE GLYCOL 3350 17 GRAM(S): 17 POWDER, FOR SOLUTION ORAL at 05:36

## 2020-01-01 RX ADMIN — OXYCODONE HYDROCHLORIDE 7.5 MILLIGRAM(S): 5 TABLET ORAL at 08:39

## 2020-01-01 RX ADMIN — OXYCODONE HYDROCHLORIDE 10 MILLIGRAM(S): 5 TABLET ORAL at 14:10

## 2020-01-01 RX ADMIN — Medication 3 MILLIGRAM(S): at 22:26

## 2020-01-01 RX ADMIN — LIDOCAINE 1 PATCH: 4 CREAM TOPICAL at 13:52

## 2020-01-01 RX ADMIN — LIDOCAINE 2 PATCH: 4 CREAM TOPICAL at 11:16

## 2020-01-01 RX ADMIN — OXYCODONE HYDROCHLORIDE 5 MILLIGRAM(S): 5 TABLET ORAL at 03:33

## 2020-01-01 RX ADMIN — Medication 1 TABLET(S): at 17:15

## 2020-01-01 RX ADMIN — Medication 100 MILLIGRAM(S): at 14:40

## 2020-01-01 RX ADMIN — MIDODRINE HYDROCHLORIDE 30 MILLIGRAM(S): 2.5 TABLET ORAL at 21:47

## 2020-01-01 RX ADMIN — APIXABAN 5 MILLIGRAM(S): 2.5 TABLET, FILM COATED ORAL at 22:39

## 2020-01-01 RX ADMIN — Medication 500 MILLIGRAM(S): at 18:03

## 2020-08-21 NOTE — ED ADULT NURSE NOTE - OBJECTIVE STATEMENT
Pt presents with chief complaint of upper back and shoulder pain, and pain in the left hip. Pt is A&Ox3, pt states she has difficulty ambulating because of the pain. Pt has knee brace on L knee, Pt states she wears knee brace to help with the pain. pt denies any PSH. Respirations are even and unlabored, abdomen soft non-tender. MD veliz in progress. Patient safety maintained. Continue to continue. Pt presents with chief complaint of upper back and shoulder pain, and pain in the left hip. Pt is A&Ox3, pt states she has difficulty ambulating because of the pain. Pt has knee brace on R knee, Pt states she wears knee brace to help with the pain. pt denies any PSH. Respirations are even and unlabored, abdomen soft non-tender. MD veliz in progress. Patient safety maintained. Continue to continue.

## 2020-08-21 NOTE — ED PROVIDER NOTE - ATTENDING CONTRIBUTION TO CARE
69F with no pmh presenting with complaint of lower back pain x 2 months. Patient states pain started 2 months ago after lifting a heavy garbage can. Otherwise denies any trauma or accidents. Patient also endorses chronic right knee pain which has worsened causing her to start using a cane past 3 months. Denies fever, chills, cp, sob, nausea, vomiting, diarrhea, dysuria, weakness, numbness, tingling, bowel or urinary habit changes    GEN: NAD, awake, well appearing  HEENT: NCAT, MMM, normal conjunctiva, perrl  CHEST/LUNGS: Non-tachypneic, CTAB, bilateral breath sounds  CARDIAC: Non-tachycardic, s1s2, normal perfusion, no peripheral edema  ABDOMEN: Soft, NTND, No rebound/guarding  MSK: No joint tenderness, no gross deformity of extremities, ambulatory with cane, no spinal tenderness, knee with no swelling, ROM intact, mild pain with weight bearing   SKIN: No rashes, no petechiae, no vesicles  NEURO: CN grossly intact, normal coordination, no focal motor or sensory deficits  PSYCH: Alert, appropriate, cooperative     Patient presenting lower back pain x 2months. Likely exacerbated by chronic knee pain with change in ambulatory habits/posture with cane and compensation with back muscles otherwise aggravated by lifting heavy garbage can. Exam benign, no neuro deficits, no bony tenderness with no concern for any cord pathology.

## 2020-08-21 NOTE — ED ADULT NURSE NOTE - NSIMPLEMENTINTERV_GEN_ALL_ED
Implemented All Fall with Harm Risk Interventions:  [place] to call system. Call bell, personal items and telephone within reach. Instruct patient to call for assistance. Room bathroom lighting operational. Non-slip footwear when patient is off stretcher. Physically safe environment: no spills, clutter or unnecessary equipment. Stretcher in lowest position, wheels locked, appropriate side rails in place. Provide visual cue, wrist band, yellow gown, etc. Monitor gait and stability. Monitor for mental status changes and reorient to person, place, and time. Review medications for side effects contributing to fall risk. Reinforce activity limits and safety measures with patient and family. Provide visual clues: red socks.

## 2020-08-21 NOTE — ED PROVIDER NOTE - OBJECTIVE STATEMENT
Patient is a 70yo woman with no PMH coming in due to acute on chronic back pain. Patient states she has been having back pain for about a year, but it has worsened in the last couple of days. Has been using back patches for pain but has not helped. Also complains of right hip pain that has worsened over the last few days; using a cane to ambulate. No PMH, no meds. Geovani Cintron serves as  [phone]. Patient is a 68yo woman with no PMH coming in due to back pain. Patient states that three months ago she hurt her right knee, and has been using a brace and cane to ambulate. Two months ago patient lifted a heavy trash bag and started feeling bilateral lower back pain. No PMH, no meds. Information attained using hospital .

## 2020-08-21 NOTE — ED PROVIDER NOTE - PROGRESS NOTE DETAILS
Treva Begum MD, PGY-1: Pt with improved pain. Xrays of knee, hip, pelvis, showing no acute fracture,

## 2020-08-21 NOTE — ED PROVIDER NOTE - PATIENT PORTAL LINK FT
You can access the FollowMyHealth Patient Portal offered by [facility] by registering at the following website: [email or web address]. By joining [employer]’s FollowMyHealth portal, you will also be able to view your health information using other applications (apps) compatible with our system.

## 2020-08-21 NOTE — ED ADULT TRIAGE NOTE - NS ED NURSE BANDS TYPE
TDAP injection given IN R DELTOID.  Patient tolerated without incident. See IMM for documentation.    
Name band;

## 2020-08-21 NOTE — ED PROVIDER NOTE - NSFOLLOWUPINSTRUCTIONS_ED_ALL_ED_FT
Thank you for visiting our Emergency Department, it has been a pleasure taking part in your healthcare. Please follow up with your primary doctor within x48 hours.    Your discharge diagnosis is: low back pain    Return precautions to the Emergency Department include but are not limited to: unrelenting nausea, vomiting, fever, chills, chest pain, shortness of breath, dizziness, abdominal pain, worsening pain, syncope, blood in urine or stool, headache that doesn't resolve, numbness or tingling, loss of sensation, loss of motor function, or any other concerning symptoms.

## 2020-08-21 NOTE — ED ADULT TRIAGE NOTE - CHIEF COMPLAINT QUOTE
back pain    intermittent mid back pain rad to between shoulder blades for 2 months... saw pmd and told had muscle spasms... wearing pain patches but no relief.  denies trauma.  denies cp, sob, cough, fever.  speaks [place]ian- son interpreting-  gricel - [phone].  denies med or surgical  hx  or meds,

## 2020-08-21 NOTE — ED PROVIDER NOTE - CLINICAL SUMMARY MEDICAL DECISION MAKING FREE TEXT BOX
Treva Begum MD, PGY-1: Patient is a 68yo woman with no PMH coming in due to back pain. Likely musculoskeletal due to onset s/p heavy lifting, possible component of OA to hips, knees. Low suspicion cauda equina given no neuro findings. Screening xrays, pain control, DC home.

## 2020-08-21 NOTE — ED ADULT NURSE NOTE - CHIEF COMPLAINT QUOTE
back pain    intermittent mid back pain rad to between shoulder blades for 2 months... saw pmd and told had muscle spasms... wearing pain patches but no relief.  denies trauma.  denies cp, sob, cough, fever.  speaks [place]- son interpreting-  gricel - [phone].  denies med or surgical  hx  or meds,

## 2020-08-22 NOTE — ED PROVIDER NOTE - OBJECTIVE STATEMENT
68YO female, no pmhx, presenting with back pain and callback for radiology read.   hx obtained from 8/21: Patient states that three months ago she hurt her right knee, and has been using a brace and cane to ambulate. Two months ago patient lifted a heavy trash bag and started feeling bilateral lower back pain. No PMH, no meds. Information attained using hospital .  additional hx from 8/22: Patient presents with back pain today, in similar locations as prior visit - b/l R and L lower back and between R, L shoulders. States that pain in the lower back is higher up today than it was yesterday. Additional history: endorses no pmhx, no hx of cancer, hx of smoking 1-2 cigarettes per day. endorses 2m of difficulty sleeping on the back, waking up with back pain, increased urinary frequency, decreased PO intake and nausea for 1m. Denies weight loss. family hx of colon cancer, mother with endometrial cancer.

## 2020-08-22 NOTE — ED PROVIDER NOTE - ATTENDING CONTRIBUTION TO CARE
70 y/o F with no known PMH w/ recent ed vsiit for  back pain. called back for possibly lytic lesion in the right acetabulum. Pt reports she has had  three months of back pain and reports groin pain in left side. She states she hurt her right knee, and has been using a brace and cane to ambulate. She reports smoking 1-2  cigarettes for several years and more In the past. denies fever, chills, chest pain, SOB, abdominal pain, diarrhea, dysuria, syncope, bleeding, new rash,weakness, numbness, blurred vision    ROS  otherwise negative as per HPI  Gen: Awake, Alert, WD, WN, NAD, thin   Head:  NC/AT  Eyes:  PERRL, EOMI, Conjunctiva pink, lids normal, no scleral icterus  ENT: OP clear, no exudates, no erythema, uvula midline, TMs clear bilaterally, moist mucus membranes  Neck: supple, nontender, no meningismus, no JVD, trachea midline  Cardiac/CV:  S1 S2, RRR, no M/G/R  Respiratory/Pulm:  CTAB, good air movement, normal resp effort, no wheezes/stridor/retractions/rales/rhonchi  Gastrointestinal/Abdomen:  Soft, nontender, nondistended, +BS, no rebound/guarding  Back: midline back pain thoracic   Ext:  warm, well perfused, moving all extremities spontaneously, no peripheral edema, distal pulses intact  Skin: intact, no rash  Neuro:  AAOx3, sensation intact, motor 5/5 x 4 extremities, , speech clear  MDM as above

## 2020-08-22 NOTE — ED ADULT NURSE REASSESSMENT NOTE - NS ED NURSE REASSESS COMMENT FT1
Pt verbalized pain relief from lidocaine patch and ibuprofen. States her lower extremity pain is gone but back pain is still there. Comfort measures provided, extra pillows given for back. Will continue to monitor.

## 2020-08-22 NOTE — ED PROVIDER NOTE - NS ED ROS FT
+ frequency  + back pain    Gen: Denies fever, chills, weight loss  CV: Denies chest pain, palpitations  Skin: Denies rash, erythema, color changes  Resp: Denies SOB, cough  Endo: + increased urination  GI: + nausea. Denies diarrhea, constipation  Msk: + back pain. Denies LE swelling, extremity pain  : + increased frequency. Denies dysuria.   Neuro: Denies LOC, weakness  Psych: Denies mood changes

## 2020-08-22 NOTE — ED POST DISCHARGE NOTE - RESULT SUMMARY
Received call from Radiology at this time with abnormal finding on xray as follows:  Permeative lytic destruction of lateral right acetabular roof and lower right iliac wing compatible with myeloma or metastatic disease. No additional suspected aggressive destructive appearing lesions..  Called pt's phone and emergency contact without .  Messages left on both phones for call back.  Message left on call back line for ED to attempt call back again.

## 2020-08-22 NOTE — ED PROVIDER NOTE - CLINICAL SUMMARY MEDICAL DECISION MAKING FREE TEXT BOX
68 y/o F with no known PMH w/ recent ed vsiit for  back pain. called back for possibly lytic lesion in the right acetabulum. pt w/ pain in back and hx of smoking for many years , concern for possible malignant lesion to bone will do ct chest abdomen pelvis and c spine. Will check cbc, cmp, pt/inr, retic, ua, ionized calcium. pt will require orhto follow up and pcp follow yup for evalaution for possible MM vs metastatic cancer breast vs lung

## 2020-08-22 NOTE — ED PROVIDER NOTE - PHYSICAL EXAMINATION
Gen: WDWN, NAD  HEENT: EOMI, no nasal discharge, mucous membranes moist  CV: RRR, +S1/S2, no M/R/G  Resp: CTAB, no W/R/R  GI: Abdomen soft non-distended, NTTP, no masses  MSK: No open wounds, no bruising, no LE edema. No TTP of lumbosacral spine. No TTP of b/l shoulders, flanks. Full ROM of R, L hip. strength 3/5 R leg, L leg 5/5.  Neuro: A&Ox4, following commands, moving all four extremities spontaneously  Psych: appropriate mood.

## 2020-08-22 NOTE — ED PROVIDER NOTE - PROGRESS NOTE DETAILS
Dr. Waddell: I have personally seen and examined this patient at the bedside. I have fully participated in the care of this patient. I have reviewed all pertinent clinical information, including history, physical exam, plan and the Resident's note and agree except as noted. HPI above as by me. PE above as by me. DDX PLAN Treva Begum MD, PGY-1: Spoke with patient at bedside with son as . Pt endorses mildly improved pain s/p percocet. Will give toradol. I spoke with the patient and her son about diagnosis of primary lung cancer with mets to the rib, thoracic spine, and pelvis. Patient and family are compliant with admission for further workup.

## 2020-08-22 NOTE — ED ADULT NURSE NOTE - OBJECTIVE STATEMENT
pt received to room 12, English and [place] speaking c/o back pain, right hip pain and right leg pain. right knee in brace. pt ambulates with cane at baseline. 20G IV placed right arm, labs sent. VS as noted.

## 2020-08-23 NOTE — PROGRESS NOTE ADULT - PROBLEM SELECTOR PLAN 3
Also lesions in adrenal gland and thyroid  -f/u TSH  -f/u MRI abdomen adrenal protocol  -onc/palliative c/s as above

## 2020-08-23 NOTE — PROGRESS NOTE ADULT - PROBLEM SELECTOR PLAN 1
Recent x-ray with lytic lesion in acetabulum  -on CT A/P involvement of T11 and 6th rib  -concern for MM vs other cancerous process  -MRI of spine, brain, and abdomen with contrast ordered  -family history of malignancy in 2 sisters (CRC)  -appreciate oncology recs: MRI ?IR biopsy of acetabular mass  -palliative c/s for pain control and introduction of GOC  -pain control

## 2020-08-23 NOTE — H&P ADULT - NSHPPHYSICALEXAM_GEN_ALL_CORE
VITALS:   Vital Signs Last 24 Hrs  T(C): 36.7 (23 Aug 2020 00:03), Max: 37 (22 Aug 2020 18:34)  T(F): 98 (23 Aug 2020 00:03), Max: 98.6 (22 Aug 2020 18:34)  HR: 73 (23 Aug 2020 00:03) (66 - 73)  BP: 125/56 (23 Aug 2020 00:03) (125/56 - 128/58)  RR: 16 (23 Aug 2020 00:03) (16 - 16)  SpO2: 99% (23 Aug 2020 00:03) (97% - 100%)    GENERAL: NAD, lying in bed comfortably  HEAD:  Atraumatic, Normocephalic  EYES: EOMI, PERRLA, conjunctiva and sclera clear  ENT: Moist mucous membranes  NECK: Supple, No JVD  CHEST/LUNG: Clear to auscultation bilaterally; No rales, rhonchi, wheezing, or rubs. Unlabored respirations  HEART: Regular rate and rhythm; No murmurs, rubs, or gallops  ABDOMEN: Bowel sounds present; Soft, Nontender, Nondistended. No hepatomegally  EXTREMITIES:  2+ Peripheral Pulses, brisk capillary refill. No clubbing, cyanosis, or edema  NERVOUS SYSTEM:  Alert & Oriented X3, speech clear. No deficits   MSK: FROM all 4 extremities, full and equal strength  SKIN: No rashes or lesions VITALS:   Vital Signs Last 24 Hrs  T(C): 36.7 (23 Aug 2020 00:03), Max: 37 (22 Aug 2020 18:34)  T(F): 98 (23 Aug 2020 00:03), Max: 98.6 (22 Aug 2020 18:34)  HR: 73 (23 Aug 2020 00:03) (66 - 73)  BP: 125/56 (23 Aug 2020 00:03) (125/56 - 128/58)  RR: 16 (23 Aug 2020 00:03) (16 - 16)  SpO2: 99% (23 Aug 2020 00:03) (97% - 100%)    GENERAL: NAD, lying in bed comfortably  HEAD:  Atraumatic, Normocephalic  EYES: EOMI, PERRLA, conjunctiva and sclera clear  ENT: Moist mucous membranes  NECK: Supple, No JVD  CHEST/LUNG: Clear to auscultation bilaterally; No rales, rhonchi, wheezing, or rubs. Unlabored respirations  HEART: Regular rate and rhythm; No murmurs, rubs, or gallops  ABDOMEN: Bowel sounds present; Soft, Nontender, Nondistended.   EXTREMITIES:  2+ Peripheral Pulses, brisk capillary refill. No clubbing, cyanosis, or edema  NERVOUS SYSTEM:  Alert & Oriented X3, speech clear. No deficits   MSK: Weakness in right leg on active ROM. Preserved ROM passively. TTP in back  SKIN: No rashes or lesions VITALS:   Vital Signs Last 24 Hrs  T(C): 36.7 (23 Aug 2020 00:03), Max: 37 (22 Aug 2020 18:34)  T(F): 98 (23 Aug 2020 00:03), Max: 98.6 (22 Aug 2020 18:34)  HR: 73 (23 Aug 2020 00:03) (66 - 73)  BP: 125/56 (23 Aug 2020 00:03) (125/56 - 128/58)  RR: 16 (23 Aug 2020 00:03) (16 - 16)  SpO2: 99% (23 Aug 2020 00:03) (97% - 100%)    Constitutional: NAD, well-developed, well-nourished  Ears, Nose, Mouth, and Throat: normal external ears and nose, normal hearing, moist oral mucosa  Eyes: normal conjunctiva, EOMI, PERRL  Neck: supple, no JVD  Respiratory: Clear to auscultation bilaterally. No wheezes, rales or rhonchi. Normal respiratory effort  Cardiovascular: RRR, no M/R/G, no edema, 2+ Peripheral Pulses  Gastrointestinal: soft, nontender, nondistended, +BS, no hernia  Skin: warm, dry, no rash  Neurologic: sensation grossly intact, CN grossly intact, non-focal exam  Musculoskeletal: no clubbing, no cyanosis, no joint swelling, weakness in right leg on active ROM. Preserved ROM passively. TTP in back  Psychiatric: AOX3, appropriate mood, affect

## 2020-08-23 NOTE — H&P ADULT - PROBLEM SELECTOR PLAN 1
Recent x-ray with lytic lesion in acetabulum  -on CT A/P involvement of T11 and 6th rib  -concern for MM vs other cancerous process  -MRI of spine already ordered  -does not meet other CRAB criteria however, normal gamma gap  -family history of malignancy in 2 sisters (rectal? and colon Ca)  -to consult oncology in AM  -pain control

## 2020-08-23 NOTE — H&P ADULT - PROBLEM SELECTOR PLAN 2
Two spiculated nodules in RUL with concern for primary lung lesion  -oncology referral as above  -currently afebrile, in no respiratory distress

## 2020-08-23 NOTE — CONSULT NOTE ADULT - SUBJECTIVE AND OBJECTIVE BOX
Oncology Consult Note    HPI: 68 y/o Burmese speaking F with no known PMH w/ recent ED (on ) visit for back pain who presents to the hospital after being told to return for possible lytic lesion. Burmese interpreters unavailable at time of assessment via [employer]. The patient originally presented to the hospital for right knee pain x3 months (post trauma) as well as b/l back pain x2 months. The patient then received a x-ray of the hip/knee/L-spine and discharged. Lytic lesion was detected on her lateral R. acetabulum. The patient received a CT chest/A/P which noted 2 confluent spiculated nodules in the RUL with concern for primary lung cancer. In the spine on T11 noted with mild pathological fracture in addition to involvement of T6 and posterior 6th rib.  pt w/ pain in back and hx of smoking for many years , concern for possible malignant lesion to bone will do ct chest abdomen pelvis and c spine. (23 Aug 2020 02:09)    Oncology consulted given presence of multiple mets seen on imaging as above. History obtained from patient using [facility]  (ID [id number]). Patient reports that symptoms of joint pain started approximately 3 months prior, developed worsening right hip pain; underwent physical therapy and saw an orthopedic surgeon, treated also with Meloxican which helped her pain. She presented again when she developed acute worsening back pain. Denies any fevers, chills, changes in appetite or night sweats; denies significant weight loss (1 lb this month). She lives alone, but she has family in the area.     PAST MEDICAL & SURGICAL HISTORY:  No pertinent past medical history  No significant past surgical history    FAMILY HISTORY:  Mother  in her 30s of cervical cancer  She had 2 sisters who  of GI malignancy (1 rectal / anal, one colon)  No family hx of malignancy in her father's side of the family    MEDICATIONS  (STANDING):  lidocaine   Patch 1 Patch Transdermal daily  polyethylene glycol 3350 17 Gram(s) Oral daily  senna 2 Tablet(s) Oral at bedtime    MEDICATIONS  (PRN):  acetaminophen   Tablet .. 650 milliGRAM(s) Oral every 6 hours PRN Moderate Pain (4 - 6)  oxycodone    5 mG/acetaminophen 325 mG 1 Tablet(s) Oral every 6 hours PRN Severe Pain (7 - 10)    Allergies    No Known Allergies    Intolerances        SOCIAL HISTORY: Reports hx of smoking, ~ 1 pack per day since age 15, quit this year when her hip started hurting (~50 pack years); denies hx of alcohol use. 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 She has family in the area and has an aide; she is independent in her ADLs.    REVIEW OF SYSTEMS:    CONSTITUTIONAL: No weakness, fevers or chills; no significant weight loss; denies night sweats; pain as in HPI  EYES/ENT: No visual changes;  No vertigo or throat pain   NECK: No pain or stiffness  RESPIRATORY: No cough, wheezing, hemoptysis; No shortness of breath  CARDIOVASCULAR: No chest pain or palpitations  GASTROINTESTINAL: No abdominal or epigastric pain. No nausea, vomiting, or hematemesis; No diarrhea or constipation. No melena or hematochezia.  GENITOURINARY: No dysuria, frequency or hematuria  NEUROLOGICAL: No numbness or weakness  SKIN: No itching, burning, rashes, or lesions   All other review of systems is negative unless indicated above.        T(F): 98.5 (20 @ 10:16), Max: 98.6 (20 @ 18:34)  HR: 77 (20 @ 10:16)  BP: 125/75 (20 @ 10:16)  RR: 17 (20 @ 10:16)  SpO2: 98% (20 @ 10:16)  Wt(kg): --    GENERAL: NAD, well-developed, thin but not cachectic  HEAD:  Atraumatic, Normocephalic  EYES: EOMI, PERRLA, conjunctiva and sclera clear  NECK: Supple, No JVD  CHEST/LUNG: Clear to auscultation bilaterally; No wheeze  HEART: Regular rate and rhythm; No murmurs, rubs, or gallops  ABDOMEN: Soft, Nontender, Nondistended; Bowel sounds present  EXTREMITIES:  2+ Peripheral Pulses, No clubbing, cyanosis, or edema  NEUROLOGY: non-focal  SKIN: No rashes or lesions                          11.8   11.34 )-----------( 315      ( 22 Aug 2020 20:45 )             36.5           137  |  101  |  15  ----------------------------<  136<H>  3.8   |  23  |  0.38<L>    Ca    9.4      22 Aug 2020 20:45    TPro  7.2  /  Alb  4.2  /  TBili  < 0.2<L>  /  DBili  x   /  AST  21  /  ALT  18  /  AlkPhos  105  08-    < from: CT Cervical Spine No Cont (20 @ 20:05) >    EXAM:  CT CERVICAL SPINE        PROCEDURE DATE:  Aug 22 2020         INTERPRETATION:  INDICATIONS:  Back pain    TECHNIQUE:  Axial images were obtained using multislice helical technique.  Reformatted coronal and sagittal images were performed.    COMPARISON EXAMINATION:  None.    FINDINGS:  The bones are diffusely demineralized. There is no acute cervical spine fracture or evidence of traumatic malalignment. There is no significant prevertebral soft tissue swelling/hematoma. There is no suspicious lytic or sclerotic bony lesion. There is mild multilevel vertebral disc space narrowing small disc bulges and facet and uncinate hypertrophy contributing to mild multilevel segmental canal stenosis and varying degrees of neural foraminal stenosis. Minimal retrolisthesis of C3 on C4 and C4 on C5 is due to degenerated facets. There is heterogeneity of the thyroid gland with a hypodense nodule lesion in the left thyroid lobe measuring 1.8 x 1.5 x 2.8 cm. The incompletely visualized lung apices are clear.    IMPRESSION:    No acute cervical spine fracture or evidence of traumatic malalignment. Mild cervical spondylosis. No suspicious lytic or sclerotic    DIOGO CRUMP M.D., RESIDENT RADIOLOGIST  This document has been electronically signed.  LEONCIO SALEH D.O. ATTENDING RADIOLOGIST  This document has been electronically signed. Aug 22 2020 10:15PM      < end of copied text >    < from: CT Chest No Cont (20 @ 20:04) >    EXAM:  CT ABDOMEN AND PELVIS      EXAM:  CT CHEST        PROCEDURE DATE:  Aug 22 2020         INTERPRETATION:  CLINICAL INFORMATION: Back pain    COMPARISON: None.    PROCEDURE:  CT of the Chest, Abdomen and Pelvis was performed without intravenouscontrast.  Intravenous contrast: None.  Oral contrast: None.  Sagittal and coronal reformats were performed.    FINDINGS:  CHEST:  LUNGS AND LARGE AIRWAYS: Patent central airways. Two confulent spiculated nodules in the right upper lobe measuring 1.8x 2.1 cm and 1.7 x 2.1 cm in maximal axial dimensions (4:124 and 4:113). Left lower lung linear subsegmental atelectasis.  PLEURA: No pleural effusion.  VESSELS: Within normal limits.  HEART: Heart is mildly enlarged. Small pericardial effusion.  MEDIASTINUM AND OZIEL: No lymphadenopathy.  CHEST WALL AND LOWER NECK: Heterogeneous appearance of bilateral thyroid lobes, better evaluated on CT cervical spine from same day.    ABDOMEN AND PELVIS:  Evaluation of the solid organs is limited without the administration of IV contrast.  LIVER: Few scattered subcentimeter low-attenuation lesions that are too small to characterize.  BILE DUCTS: Normal caliber.  GALLBLADDER: Within normal limits.  SPLEEN: Within normal limits.  PANCREAS: Within normal limits.  ADRENALS: Indeterminate 1.7 cm nodule in the left adrenal gland.  KIDNEYS/URETERS: Left upper pole renal cyst. Right kidney subcentimeter round hyperdense and hypodense lesions are indeterminate, but may represent hemorrhagic/proteinaceous cyst.No right or left hydronephrosis. Punctate nonobstructing left intrarenal calculus.    BLADDER: Within normal limits.  REPRODUCTIVE ORGANS: Uterus and adnexa are grossly unremarkable.    BOWEL: No bowel obstruction. Appendix is not visualized. No evidence of inflammation in the pericecal region.  PERITONEUM: No ascites or pneumoperitoneum. No mesenteric lymphadenopathy.  VESSELS: Mild atherosclerotic calcifications of the abdominal aorta and iliac tree. Normal caliber abdominal aorta.  RETROPERITONEUM/LYMPH NODES: No lymphadenopathy.  ABDOMINAL WALL: Within normal limits.  BONES: Lytic metastatic lesions with extraosseous soft tissue extension involving the right acetabular roof, T11 vertebral body with involvement of the left-sided pedicle and transverse process, as well as, the posterior left 11th rib. Mild pathologic compression of the T11 vertebral body and mild extraosseous ventral epidural soft tissue extension at T11. Additional lytic lesion with extraosseous soft tissue at T6 involving the left-sided pedicle and transverse process transverse process, as well as, left posterior sixth rib with mild epidural soft tissue extension.    Permeative lytic and exophytic destructive lesion of lateral right acetabular roof and lower rightiliac wing, measuring up to 2.9 x 4.7 x 4.1 cm (2:123). Additional permeative lytic destruction lesion of the posterior left 11th rib, measuring 2.3 x 1.7 x 2.5 cm (2:55). Additional lytic destructive lesions of the T11 vertebral body, left transverse process and pedicle. The lesion of the left transverse process and pedicle measures 3.0 x 1.9 x 3.1 cm (2:55). There is minimal extension into the spinal canal at this level without significant spinal canal stenosis, the full extent of which is not well evaluated on CT.    IMPRESSION:    Two confluent spiculated nodules in the right upper lobe measuring up to 1.8 x 2.1 cm and 1.7 x 2.1 cm respectively highly concerning for primary lung malignancy.    Lytic metastatic lesions with extraosseous soft tissue extension involving the right acetabular roof, T11 vertebral body with involvement of the left-sided pedicle and transverse process, left posterior 11th rib, T6 left-sided pedicle and transverse process and left posterior sixth rib. Mild extraosseous soft tissue extension into the epidural space at T11 and T6. Mild pathologic compression of the T11 vertebral body. Dedicated MRI of the thoracic spine with contrast should be considered for better characterization.    Indeterminate left adrenal nodule.    DIOGO CRUMP M.D., RESIDENT RADIOLOGIST  This document has been electronically signed.  LEONCIO SALEH D.O. ATTENDING RADIOLOGIST  This document has been electronically signed. Aug 22 2020 10:04PM      < end of copied text >    < from: Xray Hip w/ Pelvis 2 or 3 Views, Right (20 @ 22:55) >    EXAM:  XR HIP WITH PELV 2-3V RT        PROCEDURE DATE:  Aug 21 2020         INTERPRETATION:  CLINICAL INFORMATION: Lower back and right hip pain.    TECHNIQUE: Frontal radiographs of the pelvis and right hip. Frog-leg radiograph of the right hip    COMPARISON: None.    FINDINGS/  IMPRESSION:  Generalized osteopenia.    Permeative lytic destruction of lateral right acetabular roof and lower right iliac wing compatible with myeloma or metastatic disease. No additional suspected aggressive destructive appearing lesions.    No current fractures or dislocations.    Intact pelvic and obturator rings and symmetrically aligned spaced SI joints and pubic symphysis.    No gross stigmata of arthropathy in either hip.    The above represents a change in interpretation from the preliminary report and was discussed with POLY Edmonds on 2020 at 1105 with read back.    DIOGO CRUMP M.D., RESIDENT RADIOLOGIST  This document has been electronically signed.  BLANE COSBY M.D., ATTENDING RADIOLOGIST  This document has been electronically signed. Aug 22 2020 11:05AM      < end of copied text >    < from: Xray Lumbar Spine AP + Lateral (20 @ 22:54) >    EXAM:  RAD LUMBAR SPINE AP LAT        PROCEDURE DATE:  Aug 21 2020         INTERPRETATION:  CLINICAL INFORMATION: Low back tenderness.    EXAM: 2 views of the lumbar spine.    COMPARISON: No similar prior studies available for comparison.    IMPRESSION:  No compression fractures, spondylolistheses, or spondylolysis defects.    Multilevel degenerative disease manifest by varying degrees of disc space narrowing with disc margin osteophyte formation. Slight lumbar levocurvature.    Unremarkable SIjoints and partially visualized hips.    No lytic or blastic lesions.    ZULMA BECK M.D., RADIOLOGY RESIDENT  This document has been electronically signed.  BLANE COSBY M.D., ATTENDING RADIOLOGIST  This document has been electronically signed. Aug 22 2020 10:56AM      < end of copied text >

## 2020-08-23 NOTE — H&P ADULT - NSHPREVIEWOFSYSTEMS_GEN_ALL_CORE
REVIEW OF SYSTEMS:    CONSTITUTIONAL: No weakness, fevers or chills  EYES/ENT: No visual changes;  No vertigo or throat pain   NECK: No pain or stiffness  RESPIRATORY: No cough, wheezing, hemoptysis; No shortness of breath  CARDIOVASCULAR: No chest pain or palpitations  GASTROINTESTINAL: No abdominal or epigastric pain. No nausea, vomiting, or hematemesis; No diarrhea or constipation. No melena or hematochezia.  GENITOURINARY: No dysuria, frequency or hematuria  NEUROLOGICAL: No numbness or weakness  SKIN: No itching, burning, rashes, or lesions   All other review of systems is negative unless indicated above. REVIEW OF SYSTEMS:    CONSTITUTIONAL: No weakness, fevers or chills  EYES/ENT: No visual changes;  No vertigo or throat pain   NECK: No pain or stiffness  RESPIRATORY: No cough, wheezing, hemoptysis; No shortness of breath  CARDIOVASCULAR: No chest pain or palpitations  GASTROINTESTINAL: No abdominal or epigastric pain. No nausea, vomiting, or hematemesis; No diarrhea or constipation. No melena or hematochezia.  GENITOURINARY: No dysuria, frequency or hematuria  NEUROLOGICAL: Endorses weakness  SKIN: No itching, burning, rashes, or lesions   All other review of systems is negative unless indicated above. Constitutional Symptoms: +weakness, no fevers, chills, weight loss  Eyes: No visual changes, headache, eye pain, double vision  Ears, Nose, Mouth, Throat: No runny nose, sinus pain, ear pain, tinnitus, sore throat, dysphagia, odynophagia  Cardiovascular: No chest pain, palpitations, edema  Respiratory: No cough, wheezing, hemoptysis, shortness of breath  Gastrointestinal: No abdominal pain, dysphagia, anorexia, nausea/vomiting, diarrhea/constipation, hematemesis, BRBPR, melena  Genitourinary: No dysuria, frequency, hematuria  Musculoskeletal: +back pain, no joint pain, joint swelling, decreased ROM  Skin: No pruritus, rashes, lesions, wounds  Neurologic:  No seizures, headache, paraesthesia, numbness, limb weakness    Positives and pertinent negatives noted and all other systems negative.

## 2020-08-23 NOTE — PROGRESS NOTE ADULT - SUBJECTIVE AND OBJECTIVE BOX
*******************************************************  Armen Medina MD PGY-1  Internal Medicine  Pager [phone] / [phone]  *******************************************************  Patient is a 69y old  Female who presents with a chief complaint of Lytic bone lesions (23 Aug 2020 02:09)      SUBJECTIVE / OVERNIGHT EVENTS:  - Patient seen and evaluated at bedside.  - No acute events overnight.   - Full history obtained, patient currently with adequate pain control, no other complaints  - Denies fevers/chills, headache, SOB at rest, chest pain, palpitations, abdominal pain, nausea/vomiting/diarrhea/constipation, dysuria, bleeding/bruising    MEDICATIONS  (STANDING):  lidocaine   Patch 1 Patch Transdermal daily  polyethylene glycol 3350 17 Gram(s) Oral daily  senna 2 Tablet(s) Oral at bedtime    MEDICATIONS  (PRN):  acetaminophen   Tablet .. 650 milliGRAM(s) Oral every 6 hours PRN Moderate Pain (4 - 6)  oxycodone    5 mG/acetaminophen 325 mG 1 Tablet(s) Oral every 6 hours PRN Severe Pain (7 - 10)    -------------------------------------------------------------------------------------------------------------    PHYSICAL EXAM:  Vital Signs Last 24 Hrs  T(C): 36.9 (23 Aug 2020 10:16), Max: 37 (22 Aug 2020 18:34)  T(F): 98.5 (23 Aug 2020 10:16), Max: 98.6 (22 Aug 2020 18:34)  HR: 77 (23 Aug 2020 10:16) (66 - 81)  BP: 125/75 (23 Aug 2020 10:16) (125/56 - 158/74)  RR: 17 (23 Aug 2020 10:16) (16 - 18)  SpO2: 98% (23 Aug 2020 10:16) (97% - 100%)    CONSTITUTIONAL: NAD, well-developed  HEENT: NCAT, EOMI, PERRLA, no scleral icterus, MMM  RESPIRATORY/CHEST: Normal respiratory effort; lungs are clear to auscultation bilaterally; no wheezing/crackles  CARDIO: Regular rate, normal S1 and S2, no murmur/rub/gallop; No JVD  VASCULAR: No lower extremity edema; Peripheral pulses are 2+ bilaterally; Capillary refill brisk  ABDOMEN: Soft, nontender to palpation, normoactive bowel sounds, no rebound/guarding; No hepatosplenomegaly  MUSCLOSKELETAL: unable to lift right leg due to hip pain  EXTREMITIES: hands/feet are warm, and without cyanosis or clubbing  SKIN: no visible rashes, pallor, diaphoresis, or jaundice  NEURO: No focal deficits; moving all extremities  PSYCH: A+O to person, place, and time; affect appropriate; cooperative  -------------------------------------------------------------------------------------------------------------  LABS:                        11.8   11.34 )-----------( 315      ( 22 Aug 2020 20:45 )             36.5     08-22    137  |  101  |  15  ----------------------------<  136<H>  3.8   |  23  |  0.38<L>    Ca    9.4      22 Aug 2020 20:45    TPro  7.2  /  Alb  4.2  /  TBili  < 0.2<L>  /  DBili  x   /  AST  21  /  ALT  18  /  AlkPhos  105  08-    PT/INR - ( 22 Aug 2020 20:45 )   PT: 11.3 SEC;   INR: 0.99          PTT - ( 22 Aug 2020 20:45 )  PTT:31.6 SEC      Urinalysis Basic - ( 22 Aug 2020 21:48 )    Color: YELLOW / Appearance: CLEAR / S.012 / pH: 6.5  Gluc: NEGATIVE / Ketone: NEGATIVE  / Bili: NEGATIVE / Urobili: NORMAL   Blood: TRACE / Protein: NEGATIVE / Nitrite: NEGATIVE   Leuk Esterase: NEGATIVE / RBC: 1-3 / WBC 0-1   Sq Epi: x / Non Sq Epi: x / Bacteria: x          RADIOLOGY & ADDITIONAL TESTS:  Results Reviewed:     Imaging Personally Reviewed:  Electrocardiogram Personally Reviewed:      COORDINATION OF CARE:  Care Discussed with Consultants/Other Providers [Y/N]:   Prior or Outpatient Records Reviewed [Y/N]:   -------------------------------------------------------------------------------------------------------------

## 2020-08-23 NOTE — H&P ADULT - ASSESSMENT
Patient is a 69 y.o Iraqi speaking F with no known PMH who originally presented for b/l flank pain and was found to have lung lesions concerning for primary lung Ca as well as lytic lesions on the acetabulum concerning for malignancy

## 2020-08-23 NOTE — H&P ADULT - HISTORY OF PRESENT ILLNESS
70 y/o Swiss speaking F with no known PMH w/ recent ED (on 8/22) visit for back pain who presents to the hospital after being told to return for possible lytic lesion. The patient originally presented to the hospital for right knee pain x3 months (post trauma) as well as b/l back pain x2 months. The patient then received a x-ray of the hip/knee/L-spine and discharged. Lytic lesion was detected on her lateral R. acetabulum. The patient received a CT chest/A/P which noted 2 confluent spiculated nodules in the RUL with concern for primary lung cancer. In the spine on T11 noted with mild pathological fracture in addition to involvement of T6 and posterior 6th rib.  pt w/ pain in back and hx of smoking for many years , concern for possible malignant lesion to bone will do ct chest abdomen pelvis and c spine. 70 y/o Persian speaking F with no known PMH w/ recent ED (on 8/22) visit for back pain who presents to the hospital after being told to return for possible lytic lesion. Persian interpreters unavailable at time of assessment via "[employer]. The patient originally presented to the hospital for right knee pain x3 months (post trauma) as well as b/l back pain x2 months. The patient then received a x-ray of the hip/knee/L-spine and discharged. Lytic lesion was detected on her lateral R. acetabulum. The patient received a CT chest/A/P which noted 2 confluent spiculated nodules in the RUL with concern for primary lung cancer. In the spine on T11 noted with mild pathological fracture in addition to involvement of T6 and posterior 6th rib.  pt w/ pain in back and hx of smoking for many years , concern for possible malignant lesion to bone will do ct chest abdomen pelvis and c spine. 70 y/o Kiswahili speaking F with no known PMH w/ recent ED (on 8/22) visit for back pain who presents to the hospital after being told to return for possible lytic lesion. Kiswahili interpreters unavailable at time of assessment via [employer]. The patient originally presented to the hospital for right knee pain x3 months (post trauma) as well as b/l back pain x2 months. The patient then received a x-ray of the hip/knee/L-spine and discharged. Lytic lesion was detected on her lateral R. acetabulum. The patient received a CT chest/A/P which noted 2 confluent spiculated nodules in the RUL with concern for primary lung cancer. In the spine on T11 noted with mild pathological fracture in addition to involvement of T6 and posterior 6th rib.  pt w/ pain in back and hx of smoking for many years , concern for possible malignant lesion to bone will do ct chest abdomen pelvis and c spine.

## 2020-08-23 NOTE — H&P ADULT - NSHPLABSRESULTS_GEN_ALL_CORE
137  |  101  |  15  ----------------------------<  136<H>  3.8   |  23  |  0.38<L>    Ca    9.4      22 Aug 2020 20:45    TPro  7.2  /  Alb  4.2  /  TBili  < 0.2<L>  /  DBili  x   /  AST  21  /  ALT  18  /  AlkPhos  105                         11.8   11.34 )-----------( 315      ( 22 Aug 2020 20:45 )             36.5     LIVER FUNCTIONS - ( 22 Aug 2020 20:45 )  Alb: 4.2 g/dL / Pro: 7.2 g/dL / ALK PHOS: 105 u/L / ALT: 18 u/L / AST: 21 u/L / GGT: x             PT/INR - ( 22 Aug 2020 20:45 )   PT: 11.3 SEC;   INR: 0.99          PTT - ( 22 Aug 2020 20:45 )  PTT:31.6 SEC    Urinalysis Basic - ( 22 Aug 2020 21:48 )    Color: YELLOW / Appearance: CLEAR / S.012 / pH: 6.5  Gluc: NEGATIVE / Ketone: NEGATIVE  / Bili: NEGATIVE / Urobili: NORMAL   Blood: TRACE / Protein: NEGATIVE / Nitrite: NEGATIVE   Leuk Esterase: NEGATIVE / RBC: 1-3 / WBC 0-1   Sq Epi: x / Non Sq Epi: x / Bacteria: x    < end of copied text >    < from: CT Chest No Cont (20 @ 20:04) >    EXAM:  CT ABDOMEN AND PELVIS      EXAM:  CT CHEST    PROCEDURE DATE:  Aug 22 2020     INTERPRETATION:  CLINICAL INFORMATION: Back pain    < end of copied text >    < from: CT Chest No Cont (20 @ 20:04) >    IMPRESSION:    Two confluent spiculated nodules in the right upper lobe measuring up to 1.8 x 2.1 cm and 1.7 x 2.1 cm respectively highly concerning for primary lung malignancy.    Lytic metastatic lesions with extraosseous soft tissue extension involving the right acetabular roof, T11 vertebral body with involvement of the left-sided pedicle and transverse process, left posterior 11th rib, T6 left-sided pedicle and transverse process and left posterior sixth rib. Mild extraosseous soft tissue extension into the epidural space at T11 and T6. Mild pathologic compression of the T11 vertebral body. Dedicated MRI of the thoracic spine with contrast should be considered for better characterization.    Indeterminate left adrenal nodule.    < end of copied text >

## 2020-08-23 NOTE — CONSULT NOTE ADULT - ASSESSMENT
Patient is a 69 y.o Serbian speaking F with no known PMH who originally presented for b/l flank pain and was found to have lung lesions concerning for primary lung Ca as well as lytic lesions on the acetabulum concerning for malignancy; oncology consulted for evaluation of suspected metastatic disease.    #r/o Metastatic disease  -imaging suspicious for primary lung cancer with mets as described above  -recommend obtaining MRI of the brain w/ IV contrast to complete staging; recommended also doing MRI of the spine with IV contrast given evidence of metastatic disease with soft tissue component; no symptoms noted at this time of spinal cord compression, but would monitor  -would need tissue diagnosis to discuss treatment options, discussed as such with patient and with her daughter, Veda, over the phone. -Recommend IR consult for core needle bx (not FNA) of one of the metastatic lesions with a soft tissue component; would recommend core needle over FNA since need enough tissue to sent for next-gen sequencing to evaluate treatment options.  -Recommend MRI w/ contrast of the abdomen protocoled to evaluate possible adrenal met  -Recommend orthopedic surgery evaluation to evaluate stability of R acetabular met (?risk for pathologic fracture) and possible stabilization  -would check LDH and uric acid    Will continue to follow. Please call back with any questions.    Kenji Rubio MD, MPH  Hematology / Oncology Fellow, PGY6  p  Patient is a 69 y.o Estonian speaking F with no known PMH who originally presented for b/l flank pain and was found to have lung lesions concerning for primary lung Ca as well as lytic lesions on the acetabulum concerning for malignancy; oncology consulted for evaluation of suspected metastatic disease.    #r/o Metastatic disease  -imaging suspicious for primary lung cancer with mets as described above  -recommend obtaining MRI of the brain w/ IV contrast to complete staging; recommended also doing MRI of the spine with IV contrast given evidence of metastatic disease with soft tissue component; no symptoms noted at this time of spinal cord compression, but would monitor  -would need tissue diagnosis to discuss treatment options, discussed as such with patient and with her daughter, Veda, over the phone. -Recommend IR consult for core needle bx (not FNA) of one of the metastatic lesions with a soft tissue component; would recommend core needle over FNA since need enough tissue to sent for next-gen sequencing to evaluate treatment options.  -Recommend MRI w/ contrast of the abdomen protocoled to evaluate possible adrenal met (and will also further evaluate liver lesions noted on CT noncon)  -Recommend orthopedic surgery evaluation to evaluate stability of R acetabular met (?risk for pathologic fracture) and possible stabilization  -would check LDH and uric acid    Will continue to follow. Please call back with any questions.    Kenji Rubio MD, MPH  Hematology / Oncology Fellow, PGY6  p

## 2020-08-23 NOTE — PROGRESS NOTE ADULT - ATTENDING COMMENTS
69 year old woman with no past medical history presented due to R hip pain on 8/21 to the ED. Patient had x-rays with a negative prelim read and d'cd home. Official read of x-rays revealed lytic lesions of R acetabular roof and lower R iliac wing so patient called back to [facility]. Patient since has had CTAP showing lytic lesions throughout thoracic spine w/o any fecal/urinary incontinence, or saddle anesthesia. Patient had CT chest revealing two spiculated lung lesions. Oncology consulted. Likely primary lung cancer w/ mets. Patient pending MRI brain, spine w/ IV contrast, MRI abdomen w/ contrast for further workup. Patient will also need biopsy of one of the mets for tissue diagnosis. Physical exam elderly woman lying in bed comfortably intermittently tearful, cardiac normal S1 and S2 no murmur, lungs CTAB, abdomen soft non-tender non-distended, CN II-XII intact, strength 5/5 upper extremities. 5/5 left lower extremity. Right lower extremity unable to move 2/2 to pain. Patient sensation intact. Finger to nose intact. Patient affect and behavior appropriate.   - ldh, uric acid  - onc recs appreciated   - MRI brain/ spine/ abdomen w/ contrast   - IR consult for biopsy   - orthopedic surgery eval to evaluate stability of R acetabular met   - palliative care consult for pain control and for advanced malignancy   - TFTs for thyroid nodule 69 year old woman with no past medical history presented due to R hip pain on 8/21 to the ED. Patient had x-rays with a negative prelim read and d'cd home. Official read of x-rays revealed lytic lesions of R acetabular roof and lower R iliac wing so patient called back to [facility]. Patient since has had CTAP showing lytic lesions throughout thoracic spine w/o any fecal/urinary incontinence, or saddle anesthesia. Patient had CT chest revealing two spiculated lung lesions. Oncology consulted. Likely primary lung cancer w/ mets. Patient pending MRI brain, spine w/ IV contrast, MRI abdomen w/ contrast for further workup. Patient will also need biopsy of one of the mets for tissue diagnosis. Physical exam elderly woman lying in bed comfortably intermittently tearful, cardiac normal S1 and S2 no murmur, lungs CTAB, abdomen soft non-tender non-distended, CN II-XII intact, strength 5/5 upper extremities. 5/5 left lower extremity. Right lower extremity unable to move 2/2 to pain. Patient sensation intact. Finger to nose intact. Patient affect and behavior appropriate.   - ldh, uric acid  - onc recs appreciated   - MRI brain/ spine/ abdomen w/ contrast   - IR consult for biopsy   - orthopedic surgery eval to evaluate stability of R acetabular met   - palliative care consult for pain control and for advanced malignancy   - TFTs for thyroid nodule    Dispo TBD pending Oncology Eval; PT eval after ortho eval to determine placement if needed; likely d'c 8/26-8/27

## 2020-08-24 NOTE — CONSULT NOTE ADULT - PROBLEM SELECTOR RECOMMENDATION 3
- Introduced palliative care as extra layer of support for symptom management and for assistance in goals of care discussion. Pt has 2 children in [place], 1 son and a daughter.   - Daughter Veda stated she is involved in her mothers care, but is difficult as she has a young son at home. Reported pt was smoking prior to admission.   - For now families goal is to receive a diagnosis as well as staging of possibly newly diagnosed cancer. Emotional support provided.

## 2020-08-24 NOTE — PROGRESS NOTE ADULT - ATTENDING COMMENTS
Patient was seen and examined personally by me. I have discussed the plan and reviewed the resident's note and agree with the above physical exam findings including assessment and plan except as indicated below.    69 year old woman with no PMH p/w R hip pain. Found to have lytic lesions of R acetabular roof and lower R iliac wing and CT chest revealing two spiculated lung lesions. Suspect possible primary lung cancer w/ mets.     On exam pain appears well controlled on current regimen    IR consulted and requested core biopsy: IR planning CT guided biopsy of T11 vertebral body.  Pending MRI brain, spine w/ IV contrast, MRI abdomen w/ contrast for further workup per Onc recs.   Appreciate ortho input: awaiting MRI pelvis  PT eval with weight bearing limits of RLE  TSH elevated but FT4 WNL Patient was seen and examined personally by me. I have discussed the plan and reviewed the resident's note and agree with the above physical exam findings including assessment and plan except as indicated below.    69 year old woman with no PMH p/w R hip pain. Found to have lytic lesions of R acetabular roof and lower R iliac wing and CT chest revealing two spiculated lung lesions. Suspect possible primary lung cancer w/ mets.     On exam pain appears well controlled on current regimen    IR consulted and requested core biopsy: IR planning CT guided biopsy of T11 vertebral body.  Pending MRI brain, spine w/ IV contrast, MRI abdomen w/ contrast for further workup per Onc recs.   Appreciate ortho input: awaiting MRI pelvis  PT eval with weight bearing limits of RLE  TSH elevated but FT4 WNL, suggesting subclinical hypothyroidism. No need for treatment presently. Can recheck TFTs in 4 weeks

## 2020-08-24 NOTE — CONSULT NOTE ADULT - SUBJECTIVE AND OBJECTIVE BOX
HPI:  68 y/o Palestinian speaking F with no known PMH w/ recent ED (on ) visit for back pain who presents to the hospital after being told to return for possible lytic lesion. Palestinian interpreters unavailable at time of assessment via Webs[employer]. The patient originally presented to the hospital for right knee pain x3 months (post trauma) as well as b/l back pain x2 months. The patient then received a x-ray of the hip/knee/L-spine and discharged. Lytic lesion was detected on her lateral R. acetabulum. The patient received a CT chest/A/P which noted 2 confluent spiculated nodules in the RUL with concern for primary lung cancer. In the spine on T11 noted with mild pathological fracture in addition to involvement of T6 and posterior 6th rib.  pt w/ pain in back and hx of smoking for many years , concern for possible malignant lesion to bone will do ct chest abdomen pelvis and c spine. (23 Aug 2020 02:09)    PERTINENT PM/SXH:   No pertinent past medical history    No significant past surgical history    FAMILY HISTORY:  No pertinent family history in first degree relatives    ITEMS NOT CHECKED ARE NOT PRESENT    SOCIAL HISTORY:   Significant other/partner[x]  Children[x] 1 son and 1 daughter  Anabaptism/Spirituality: Turkmen Spiritism  Substance hx:  [ ]   Tobacco hx:  [x] Smoker 1-2 cigarretes for multiple years with smoking a higher amount years in the past  Alcohol hx: [ ]   Home Opioid hx:  [x] I-Stop Reference No: # [id number]  Living Situation: [x]Home lives with daughter Veda and her grandson [ ]Long term care  [ ]Rehab [ ]Other    ADVANCE DIRECTIVES:    DNR  MOLST  [ ]  Living Will  [ ]   DECISION MAKER(s):  [ ] Health Care Proxy(s)  [x] Surrogate(s)  [ ] Guardian           Name(s): Veda Phone Number(s): [phone]    BASELINE (I)ADL(s) (prior to admission):  [place]: [x]Total  [ ] Moderate [ ]Dependent    Allergies    No Known Allergies    Intolerances    MEDICATIONS  (STANDING):  lidocaine   Patch 1 Patch Transdermal daily  polyethylene glycol 3350 17 Gram(s) Oral daily  senna 2 Tablet(s) Oral at bedtime    MEDICATIONS  (PRN):  acetaminophen   Tablet .. 650 milliGRAM(s) Oral every 6 hours PRN Moderate Pain (4 - 6)  oxycodone    5 mG/acetaminophen 325 mG 1 Tablet(s) Oral every 6 hours PRN Severe Pain (7 - 10)    PRESENT SYMPTOMS: [ ]Unable to obtain due to poor mentation   Source if other than patient:  [ ]Family   [ ]Team     Pain: [x]yes [ ]no  QOL impact - yes  Location - Both legs, hips, both shoulders and neck. Most Severe in left hip                    Aggravating factors - Movement and weight bearing   Quality - sharp / shooting / stabbing   Radiation - no   Timing- intermittent   Severity (0-10 scale): 10/10  Minimal acceptable level (0-10 scale): 5/10     PAIN AD Score:     [email or web address] (press ctrl +  left click to view)    Dyspnea:                           [ ]Mild [ ]Moderate [ ]Severe  Anxiety:                             [ ]Mild [ ]Moderate [ ]Severe  Fatigue:                             [ ]Mild [ ]Moderate [ ]Severe  Nausea:                             [ ]Mild [x]Moderate [ ]Severe  Loss of appetite:              [ ]Mild [ ]Moderate [ ]Severe  Constipation:                    [ ]Mild [ ]Moderate [x]Severe    Other Symptoms:  [x]All other review of systems negative     Palliative Performance Status Version 2: 60%    http://npcrc.org/files/news/palliative_performance_scale_ppsv2.pdf  PHYSICAL EXAM:  Vital Signs Last 24 Hrs  T(C): 36.7 (24 Aug 2020 09:12), Max: 37 (23 Aug 2020 16:24)  T(F): 98.1 (24 Aug 2020 09:12), Max: 98.6 (23 Aug 2020 16:24)  HR: 78 (24 Aug 2020 09:12) (60 - 78)  BP: 131/68 (24 Aug 2020 09:12) (114/61 - 131/68)  BP(mean): --  RR: 16 (24 Aug 2020 09:12) (16 - 18)  SpO2: 99% (24 Aug 2020 09:12) (97% - 100%) I&O's Summary    GENERAL:  [x]Alert  [x]Oriented x 3  [ ]Lethargic  [ ]Cachexia  [ ]Unarousable  [x]Verbal  [ ]Non-Verbal  Behavioral:   [ ] Anxiety  [ ] Delirium [ ] Agitation [ ] Other  HEENT:  [x]Normal   [ ]Dry mouth   [ ]ET Tube/Trach  [ ]Oral lesions  PULMONARY:   [x]Clear [ ]Tachypnea  [ ]Audible excessive secretions   [ ]Rhonchi        [ ]Right [ ]Left [ ]Bilateral  [ ]Crackles        [ ]Right [ ]Left [ ]Bilateral  [ ]Wheezing     [ ]Right [ ]Left [ ]Bilatera  [ ]Diminished breath sounds [ ]right [ ]left [ ]bilateral  CARDIOVASCULAR:    [x]Regular [ ]Irregular [ ]Tachy  [ ]Prasanth [ ]Murmur [ ]Other  GASTROINTESTINAL:  [x]Soft  [ ]Distended   [x]+BS  [x]Non tender [ ]Tender  [ ]PEG [ ]OGT/ NGT  Last BM:   GENITOURINARY:  [x]Normal [ ] Incontinent   [ ]Oliguria/Anuria   [ ]Zamorano  MUSCULOSKELETAL:   [x]Normal   [ ]Weakness  [ ]Bed/Wheelchair bound [ ]Edema  NEUROLOGIC:   [x]No focal deficits  [ ]Cognitive impairment  [ ]Dysphagia [ ]Dysarthria [ ]Paresis [ ]Other   SKIN:   [x]Normal    [ ]Rash  [ ]Pressure ulcer(s)       Present on admission [ ]y [ ]n    CRITICAL CARE:  [ ] Shock Present  [ ]Septic [ ]Cardiogenic [ ]Neurologic [ ]Hypovolemic  [ ]  Vasopressors [ ]  Inotropes   [ ]Respiratory failure present [ ]Mechanical ventilation [ ]Non-invasive ventilatory support [ ]High flow  [ ]Acute  [ ]Chronic [ ]Hypoxic  [ ]Hypercarbic [ ]Other  [ ]Other organ failure     LABS:                        11.9   10.32 )-----------( 325      ( 24 Aug 2020 05:30 )             38.3   08-24    139  |  99  |  17  ----------------------------<  86  4.1   |  26  |  0.47<L>    Ca    9.6      24 Aug 2020 05:30  Phos  3.7     08-24  Mg     1.9     08-24    TPro  6.8  /  Alb  3.9  /  TBili  0.2  /  DBili  x   /  AST  18  /  ALT  14  /  AlkPhos  95  08-24  PT/INR - ( 24 Aug 2020 05:30 )   PT: 11.4 SEC;   INR: 1.00          PTT - ( 24 Aug 2020 05:30 )  PTT:26.1 SEC    Urinalysis Basic - ( 22 Aug 2020 21:48 )    Color: YELLOW / Appearance: CLEAR / S.012 / pH: 6.5  Gluc: NEGATIVE / Ketone: NEGATIVE  / Bili: NEGATIVE / Urobili: NORMAL   Blood: TRACE / Protein: NEGATIVE / Nitrite: NEGATIVE   Leuk Esterase: NEGATIVE / RBC: 1-3 / WBC 0-1   Sq Epi: x / Non Sq Epi: x / Bacteria: x    RADIOLOGY & ADDITIONAL STUDIES:    < from: CT Chest No Cont (20 @ 20:04) >  FINDINGS:  CHEST:  LUNGS AND LARGE AIRWAYS: Patent central airways. Two confulent spiculated nodules in the right upper lobe measuring 1.8x 2.1 cm and 1.7 x 2.1 cm in maximal axial dimensions (4:124 and 4:113). Left lower lung linear subsegmental atelectasis.  PLEURA: No pleural effusion.  VESSELS: Within normal limits.  HEART: Heart is mildly enlarged. Small pericardial effusion.  MEDIASTINUM AND OZIEL: No lymphadenopathy.  CHEST WALL AND LOWER NECK: Heterogeneous appearance of bilateral thyroid lobes, better evaluated on CT cervical spine from same day.    ABDOMEN AND PELVIS:  Evaluation of the solid organs is limited without the administration of IV contrast.  LIVER: Few scattered subcentimeter low-attenuation lesions that are too small to characterize.  BILE DUCTS: Normal caliber.  GALLBLADDER: Within normal limits.  SPLEEN: Within normal limits.  PANCREAS: Within normal limits.  ADRENALS: Indeterminate 1.7 cm nodule in the left adrenal gland.  KIDNEYS/URETERS: Left upper pole renal cyst. Right kidney subcentimeter round hyperdense and hypodense lesions are indeterminate, but may represent hemorrhagic/proteinaceous cyst.No right or left hydronephrosis. Punctate nonobstructing left intrarenal calculus.    BLADDER: Within normal limits.  REPRODUCTIVE ORGANS: Uterus and adnexa are grossly unremarkable.    BOWEL: No bowel obstruction. Appendix is not visualized. No evidence of inflammation in the pericecal region.  PERITONEUM: No ascites or pneumoperitoneum. No mesenteric lymphadenopathy.  VESSELS: Mild atherosclerotic calcifications of the abdominal aorta and iliac tree. Normal caliber abdominal aorta.  RETROPERITONEUM/LYMPH NODES: No lymphadenopathy.  ABDOMINAL WALL: Within normal limits.  BONES: Lytic metastatic lesions with extraosseous soft tissue extension involving the right acetabular roof, T11 vertebral body with involvement of the left-sided pedicle and transverse process, as well as, the posterior left 11th rib. Mild pathologic compression of the T11 vertebral body and mild extraosseous ventral epidural soft tissue extension at T11. Additional lytic lesion with extraosseous soft tissue at T6 involving the left-sided pedicle and transverse process transverse process, as well as, left posterior sixth rib with mild epidural soft tissue extension.    Permeative lytic and exophytic destructive lesion of lateral right acetabular roof and lower rightiliac wing, measuring up to 2.9 x 4.7 x 4.1 cm (2:123). Additional permeative lytic destruction lesion of the posterior left 11th rib, measuring 2.3 x 1.7 x 2.5 cm (2:55). Additional lytic destructive lesions of the T11 vertebral body, left transverse process and pedicle. The lesion of the left transverse process and pedicle measures 3.0 x 1.9 x 3.1 cm (2:55). There is minimal extension into the spinal canal at this level without significant spinal canal stenosis, the full extent of which is not well evaluated on CT.    IMPRESSION:    Two confluent spiculated nodules in the right upper lobe measuring up to 1.8 x 2.1 cm and 1.7 x 2.1 cm respectively highly concerning for primary lung malignancy.    Lytic metastatic lesions with extraosseous soft tissue extension involving the right acetabular roof, T11 vertebral body with involvement of the left-sided pedicle and transverse process, left posterior 11th rib, T6 left-sided pedicle and transverse process and left posterior sixth rib. Mild extraosseous soft tissue extension into the epidural space at T11 and T6. Mild pathologic compression of the T11 vertebral body. Dedicated MRI of the thoracic spine with contrast should be considered for better characterization.    Indeterminate left adrenal nodule.    < end of copied text >      < from: Xray Pelvis Oblique (20 @ 11:22) >  INTERPRETATION:  CLINICAL INDICATION: right acetabular lesion    EXAM:  AP and bilateral oblique views of the pelvis from 2020 at 1122. Compared to appearance of the bony pelvis and hips on abdomen/pelvis CT from 2020.    IMPRESSION:  Generalized osteopenia.    Redemonstrated lytic destructive lesion in lower mid and lateral iliac bone and involving lateral right acetabular roof articular cortex with extraosseous soft tissue mass component compatible with metastatic disease or myeloma.    No additional focal osseous lesions in the remaining imaged regions.    No current fractures or dislocations.    No sacroiliac or pubic symphysis diastases.    Preserved left hip joint space.    < end of copied text >    PROTEIN CALORIE MALNUTRITION PRESENT: [ ]mild [ ]moderate [ ]severe [ ]underweight [ ]morbid obesity  https://www.andeal.org/vault/2440/web/files/ONC/Table_Clinical%20Characteristics%20to%20Document%20Malnutrition-White%20JV%20et%20al%2020.pdf    [ ]PPSV2 < or = to 30% [ ]significant weight loss  [ ]poor nutritional intake  [ ]anasarca     Albumin, Serum: 3.9 g/dL (20 @ 05:30)   [ ]Artificial Nutrition      REFERRALS:   [ ]Chaplaincy  [ ]Hospice  [ ]Child Life  [ ]Social Work  [ ]Case management [ ]Holistic Therapy     Goals of Care Document: HPI:  68 y/o Finnish speaking F with no known PMH w/ recent ED (on ) visit for back pain who presents to the hospital after being told to return for possible lytic lesion. Finnish interpreters unavailable at time of assessment via [employer]o. The patient originally presented to the hospital for right knee pain x3 months (post trauma) as well as b/l back pain x2 months. The patient then received a x-ray of the hip/knee/L-spine and discharged. Lytic lesion was detected on her lateral R. acetabulum. The patient received a CT chest/A/P which noted 2 confluent spiculated nodules in the RUL with concern for primary lung cancer. In the spine on T11 noted with mild pathological fracture in addition to involvement of T6 and posterior 6th rib.  pt w/ pain in back and hx of smoking for many years , concern for possible malignant lesion to bone will do ct chest abdomen pelvis and c spine. (23 Aug 2020 02:09)    PERTINENT PM/SXH:   No pertinent past medical history    No significant past surgical history    FAMILY HISTORY:  No pertinent family history in first degree relatives    ITEMS NOT CHECKED ARE NOT PRESENT    SOCIAL HISTORY:   Significant other/partner[x]  Children[x] 1 son and 1 daughter  Mormon/Spirituality: Ukrainian Bahai  Substance hx:  [ ]   Tobacco hx:  [x] Smoker 1-2 cigarretes for multiple years with smoking a higher amount years in the past  Alcohol hx: [ ]   Home Opioid hx:  [x] I-Stop Reference No: # [id number]  Living Situation: [x]Home lives with daughter Veda and her grandson [ ]Long term care  [ ]Rehab [ ]Other    ADVANCE DIRECTIVES:    DNR  MOLST  [ ]  Living Will  [ ]   DECISION MAKER(s):  [ ] Health Care Proxy(s)  [x] Surrogate(s)  [ ] Guardian           Name(s): Veda Phone Number(s): [phone]    BASELINE (I)ADL(s) (prior to admission):  [place]: [x]Total  [ ] Moderate [ ]Dependent    Allergies    No Known Allergies    Intolerances    MEDICATIONS  (STANDING):  lidocaine   Patch 1 Patch Transdermal daily  polyethylene glycol 3350 17 Gram(s) Oral daily  senna 2 Tablet(s) Oral at bedtime    MEDICATIONS  (PRN):  acetaminophen   Tablet .. 650 milliGRAM(s) Oral every 6 hours PRN Moderate Pain (4 - 6)  oxycodone    5 mG/acetaminophen 325 mG 1 Tablet(s) Oral every 6 hours PRN Severe Pain (7 - 10)    PRESENT SYMPTOMS: [ ]Unable to obtain due to poor mentation   Source if other than patient:  [ ]Family   [ ]Team     Pain: [x]yes [ ]no  QOL impact - yes  Location - Both legs, lower back, hips, both shoulders and neck. Most Severe in left hip                    Aggravating factors - Movement and weight bearing   Quality - sharp / shooting / stabbing   Radiation - no   Timing- intermittent   Severity (0-10 scale): 10/10  Minimal acceptable level (0-10 scale): 5/10     PAIN AD Score:     [email or web address] (press ctrl +  left click to view)    Dyspnea:                           [ ]Mild [ ]Moderate [ ]Severe  Anxiety:                             [ ]Mild [ ]Moderate [ ]Severe  Fatigue:                             [ ]Mild [ ]Moderate [ ]Severe  Nausea:                             [ ]Mild [x]Moderate [ ]Severe  Loss of appetite:              [ ]Mild [ ]Moderate [ ]Severe  Constipation:                    [ ]Mild [ ]Moderate [x]Severe    Other Symptoms:  [x]All other review of systems negative     Palliative Performance Status Version 2: 60%    http://npcrc.org/files/news/palliative_performance_scale_ppsv2.pdf  PHYSICAL EXAM:  Vital Signs Last 24 Hrs  T(C): 36.7 (24 Aug 2020 09:12), Max: 37 (23 Aug 2020 16:24)  T(F): 98.1 (24 Aug 2020 09:12), Max: 98.6 (23 Aug 2020 16:24)  HR: 78 (24 Aug 2020 09:12) (60 - 78)  BP: 131/68 (24 Aug 2020 09:12) (114/61 - 131/68)  BP(mean): --  RR: 16 (24 Aug 2020 09:12) (16 - 18)  SpO2: 99% (24 Aug 2020 09:12) (97% - 100%) I&O's Summary    GENERAL:  [x]Alert  [x]Oriented x 3  [ ]Lethargic  [ ]Cachexia  [ ]Unarousable  [x]Verbal  [ ]Non-Verbal  Behavioral:   [ ] Anxiety  [ ] Delirium [ ] Agitation [ ] Other  HEENT:  [x]Normal   [ ]Dry mouth   [ ]ET Tube/Trach  [ ]Oral lesions  PULMONARY:   [x]Clear [ ]Tachypnea  [ ]Audible excessive secretions   [ ]Rhonchi        [ ]Right [ ]Left [ ]Bilateral  [ ]Crackles        [ ]Right [ ]Left [ ]Bilateral  [ ]Wheezing     [ ]Right [ ]Left [ ]Bilatera  [ ]Diminished breath sounds [ ]right [ ]left [ ]bilateral  CARDIOVASCULAR:    [x]Regular [ ]Irregular [ ]Tachy  [ ]Prasanth [ ]Murmur [ ]Other  GASTROINTESTINAL:  [x]Soft  [ ]Distended   [x]+BS  [x]Non tender [ ]Tender  [ ]PEG [ ]OGT/ NGT  Last BM:   GENITOURINARY:  [x]Normal [ ] Incontinent   [ ]Oliguria/Anuria   [ ]Zamorano  MUSCULOSKELETAL:   [x]Normal   [ ]Weakness  [ ]Bed/Wheelchair bound [ ]Edema  NEUROLOGIC:   [x]No focal deficits  [ ]Cognitive impairment  [ ]Dysphagia [ ]Dysarthria [ ]Paresis [ ]Other   SKIN:   [x]Normal    [ ]Rash  [ ]Pressure ulcer(s)       Present on admission [ ]y [ ]n    CRITICAL CARE:  [ ] Shock Present  [ ]Septic [ ]Cardiogenic [ ]Neurologic [ ]Hypovolemic  [ ]  Vasopressors [ ]  Inotropes   [ ]Respiratory failure present [ ]Mechanical ventilation [ ]Non-invasive ventilatory support [ ]High flow  [ ]Acute  [ ]Chronic [ ]Hypoxic  [ ]Hypercarbic [ ]Other  [ ]Other organ failure     LABS:                        11.9   10.32 )-----------( 325      ( 24 Aug 2020 05:30 )             38.3   08-24    139  |  99  |  17  ----------------------------<  86  4.1   |  26  |  0.47<L>    Ca    9.6      24 Aug 2020 05:30  Phos  3.7     08-24  Mg     1.9     08-24    TPro  6.8  /  Alb  3.9  /  TBili  0.2  /  DBili  x   /  AST  18  /  ALT  14  /  AlkPhos  95  08-24  PT/INR - ( 24 Aug 2020 05:30 )   PT: 11.4 SEC;   INR: 1.00          PTT - ( 24 Aug 2020 05:30 )  PTT:26.1 SEC    Urinalysis Basic - ( 22 Aug 2020 21:48 )    Color: YELLOW / Appearance: CLEAR / S.012 / pH: 6.5  Gluc: NEGATIVE / Ketone: NEGATIVE  / Bili: NEGATIVE / Urobili: NORMAL   Blood: TRACE / Protein: NEGATIVE / Nitrite: NEGATIVE   Leuk Esterase: NEGATIVE / RBC: 1-3 / WBC 0-1   Sq Epi: x / Non Sq Epi: x / Bacteria: x    RADIOLOGY & ADDITIONAL STUDIES:    < from: CT Chest No Cont (20 @ 20:04) >  FINDINGS:  CHEST:  LUNGS AND LARGE AIRWAYS: Patent central airways. Two confulent spiculated nodules in the right upper lobe measuring 1.8x 2.1 cm and 1.7 x 2.1 cm in maximal axial dimensions (4:124 and 4:113). Left lower lung linear subsegmental atelectasis.  PLEURA: No pleural effusion.  VESSELS: Within normal limits.  HEART: Heart is mildly enlarged. Small pericardial effusion.  MEDIASTINUM AND OZIEL: No lymphadenopathy.  CHEST WALL AND LOWER NECK: Heterogeneous appearance of bilateral thyroid lobes, better evaluated on CT cervical spine from same day.    ABDOMEN AND PELVIS:  Evaluation of the solid organs is limited without the administration of IV contrast.  LIVER: Few scattered subcentimeter low-attenuation lesions that are too small to characterize.  BILE DUCTS: Normal caliber.  GALLBLADDER: Within normal limits.  SPLEEN: Within normal limits.  PANCREAS: Within normal limits.  ADRENALS: Indeterminate 1.7 cm nodule in the left adrenal gland.  KIDNEYS/URETERS: Left upper pole renal cyst. Right kidney subcentimeter round hyperdense and hypodense lesions are indeterminate, but may represent hemorrhagic/proteinaceous cyst.No right or left hydronephrosis. Punctate nonobstructing left intrarenal calculus.    BLADDER: Within normal limits.  REPRODUCTIVE ORGANS: Uterus and adnexa are grossly unremarkable.    BOWEL: No bowel obstruction. Appendix is not visualized. No evidence of inflammation in the pericecal region.  PERITONEUM: No ascites or pneumoperitoneum. No mesenteric lymphadenopathy.  VESSELS: Mild atherosclerotic calcifications of the abdominal aorta and iliac tree. Normal caliber abdominal aorta.  RETROPERITONEUM/LYMPH NODES: No lymphadenopathy.  ABDOMINAL WALL: Within normal limits.  BONES: Lytic metastatic lesions with extraosseous soft tissue extension involving the right acetabular roof, T11 vertebral body with involvement of the left-sided pedicle and transverse process, as well as, the posterior left 11th rib. Mild pathologic compression of the T11 vertebral body and mild extraosseous ventral epidural soft tissue extension at T11. Additional lytic lesion with extraosseous soft tissue at T6 involving the left-sided pedicle and transverse process transverse process, as well as, left posterior sixth rib with mild epidural soft tissue extension.    Permeative lytic and exophytic destructive lesion of lateral right acetabular roof and lower rightiliac wing, measuring up to 2.9 x 4.7 x 4.1 cm (2:123). Additional permeative lytic destruction lesion of the posterior left 11th rib, measuring 2.3 x 1.7 x 2.5 cm (2:55). Additional lytic destructive lesions of the T11 vertebral body, left transverse process and pedicle. The lesion of the left transverse process and pedicle measures 3.0 x 1.9 x 3.1 cm (2:55). There is minimal extension into the spinal canal at this level without significant spinal canal stenosis, the full extent of which is not well evaluated on CT.    IMPRESSION:    Two confluent spiculated nodules in the right upper lobe measuring up to 1.8 x 2.1 cm and 1.7 x 2.1 cm respectively highly concerning for primary lung malignancy.    Lytic metastatic lesions with extraosseous soft tissue extension involving the right acetabular roof, T11 vertebral body with involvement of the left-sided pedicle and transverse process, left posterior 11th rib, T6 left-sided pedicle and transverse process and left posterior sixth rib. Mild extraosseous soft tissue extension into the epidural space at T11 and T6. Mild pathologic compression of the T11 vertebral body. Dedicated MRI of the thoracic spine with contrast should be considered for better characterization.    Indeterminate left adrenal nodule.    < end of copied text >      < from: Xray Pelvis Oblique (20 @ 11:22) >  INTERPRETATION:  CLINICAL INDICATION: right acetabular lesion    EXAM:  AP and bilateral oblique views of the pelvis from 2020 at 1122. Compared to appearance of the bony pelvis and hips on abdomen/pelvis CT from 2020.    IMPRESSION:  Generalized osteopenia.    Redemonstrated lytic destructive lesion in lower mid and lateral iliac bone and involving lateral right acetabular roof articular cortex with extraosseous soft tissue mass component compatible with metastatic disease or myeloma.    No additional focal osseous lesions in the remaining imaged regions.    No current fractures or dislocations.    No sacroiliac or pubic symphysis diastases.    Preserved left hip joint space.    < end of copied text >    PROTEIN CALORIE MALNUTRITION PRESENT: [ ]mild [ ]moderate [ ]severe [ ]underweight [ ]morbid obesity  https://www.andeal.org/vault/2440/web/files/ONC/Table_Clinical%20Characteristics%20to%20Document%20Malnutrition-White%20JV%20et%20al%2020.pdf    [ ]PPSV2 < or = to 30% [ ]significant weight loss  [ ]poor nutritional intake  [ ]anasarca     Albumin, Serum: 3.9 g/dL (20 @ 05:30)   [ ]Artificial Nutrition      REFERRALS:   [ ]Chaplaincy  [ ]Hospice  [ ]Child Life  [ ]Social Work  [ ]Case management [ ]Holistic Therapy     Goals of Care Document:

## 2020-08-24 NOTE — CHART NOTE - NSCHARTNOTEFT_GEN_A_CORE
Pre-Interventional Radiology Procedure Note    69y    Female    Procedure: IR guided bx.     Diagnosis/Indication: Patient is a 69y old  Female who presents with a chief complaint of Lytic bone lesions (24 Aug 2020 14:34)      Interventional Radiology Attending Physician: Dr. Akhtar     Ordering Attending Physician: Dr. Alicea     PAST MEDICAL & SURGICAL HISTORY:  No pertinent past medical history  No significant past surgical history       CBC Full  -  ( 24 Aug 2020 05:30 )  WBC Count : 10.32 K/uL  RBC Count : 4.66 M/uL  Hemoglobin : 11.9 g/dL  Hematocrit : 38.3 %  Platelet Count - Automated : 325 K/uL  Mean Cell Volume : 82.2 fL  Mean Cell Hemoglobin : 25.5 pg  Mean Cell Hemoglobin Concentration : 31.1 %  Auto Neutrophil # : 6.64 K/uL  Auto Lymphocyte # : 2.58 K/uL  Auto Monocyte # : 0.94 K/uL  Auto Eosinophil # : 0.08 K/uL  Auto Basophil # : 0.05 K/uL  Auto Neutrophil % : 64.3 %  Auto Lymphocyte % : 25.0 %  Auto Monocyte % : 9.1 %  Auto Eosinophil % : 0.8 %  Auto Basophil % : 0.5 %    08-24    139  |  99  |  17  ----------------------------<  86  4.1   |  26  |  0.47<L>    Ca    9.6      24 Aug 2020 05:30  Phos  3.7     08-24  Mg     1.9     08-24    TPro  6.8  /  Alb  3.9  /  TBili  0.2  /  DBili  x   /  AST  18  /  ALT  14  /  AlkPhos  95  08-24    PT/INR - ( 24 Aug 2020 05:30 )   PT: 11.4 SEC;   INR: 1.00          PTT - ( 24 Aug 2020 05:30 )  PTT:26.1 SEC Pre-Interventional Radiology Procedure Note    69y    Female    Procedure: IR guided bx of t11 vertebral body.     Diagnosis/Indication: Patient is a 69y old  Female who presents with a chief complaint of Lytic bone lesions (24 Aug 2020 14:34)      Interventional Radiology Attending Physician: Dr. Akhtar     Ordering Attending Physician: Dr. Alicea     PAST MEDICAL & SURGICAL HISTORY:  No pertinent past medical history  No significant past surgical history       CBC Full  -  ( 24 Aug 2020 05:30 )  WBC Count : 10.32 K/uL  RBC Count : 4.66 M/uL  Hemoglobin : 11.9 g/dL  Hematocrit : 38.3 %  Platelet Count - Automated : 325 K/uL  Mean Cell Volume : 82.2 fL  Mean Cell Hemoglobin : 25.5 pg  Mean Cell Hemoglobin Concentration : 31.1 %  Auto Neutrophil # : 6.64 K/uL  Auto Lymphocyte # : 2.58 K/uL  Auto Monocyte # : 0.94 K/uL  Auto Eosinophil # : 0.08 K/uL  Auto Basophil # : 0.05 K/uL  Auto Neutrophil % : 64.3 %  Auto Lymphocyte % : 25.0 %  Auto Monocyte % : 9.1 %  Auto Eosinophil % : 0.8 %  Auto Basophil % : 0.5 %    08-24    139  |  99  |  17  ----------------------------<  86  4.1   |  26  |  0.47<L>    Ca    9.6      24 Aug 2020 05:30  Phos  3.7     08-24  Mg     1.9     08-24    TPro  6.8  /  Alb  3.9  /  TBili  0.2  /  DBili  x   /  AST  18  /  ALT  14  /  AlkPhos  95  08-24    PT/INR - ( 24 Aug 2020 05:30 )   PT: 11.4 SEC;   INR: 1.00          PTT - ( 24 Aug 2020 05:30 )  PTT:26.1 SEC

## 2020-08-24 NOTE — CONSULT NOTE ADULT - PROBLEM SELECTOR RECOMMENDATION 9
- Pt complains of shooting pain, 10/10 in bilateral hips, shoulders, neck. Pain is most severe on her lower back on the left side. Pain is constant, but exacerbated by movement and reported pain often wakes her up at night. Pain started 3 months ago, started on her right leg and now has travelled to her lower back and left leg. Making ambulating difficult for her.   - CT imaging showing multiple lytic metastatic bone lesions with extraosseous soft tissue extension involving the right acetabular roof, T11 vertebral body with involvement of the left-sided pedicle and transverse process, left posterior 11th rib, T6 left-sided pedicle and transverse process and left posterior sixth rib. Mild extraosseous soft tissue extension into the epidural space at T11 and T6. Mild pathologic compression of the T11 vertebral body.   - Pt was started on Percocet and has required BTD x 4 in the past 24 hrs   - Reported Percocet reliefs pain to 5/10 but effect only lasts for 3 hrs.   - Discussed adding long acting opioid for pain relief. Pt concerned about sedating effect but understands opioids are able to help her control her symptoms.  - Pt denies any relief from Tylenol, would DC percocet   - Would recommend adding Oxycontin 10mg q/ 12 hrs   - Switch to oxycodone 5mg q/ 4 hrs as needed for severe pain - Pt complains of shooting pain, 10/10 in lower back, bilateral hips, shoulders, neck. - Pain is most severe on her lower back on the left side. Pain is constant, but exacerbated by movement and reported pain often wakes her up at night. Pain started 3 months ago, started on her right leg and now has travelled to her lower back and left leg. Making ambulating difficult for her.   - CT imaging showing multiple lytic metastatic bone lesions with extraosseous soft tissue extension involving the right acetabular roof, T11 vertebral body with involvement of the left-sided pedicle and transverse process, left posterior 11th rib, T6 left-sided pedicle and transverse process and left posterior sixth rib. Mild extraosseous soft tissue extension into the epidural space at T11 and T6. Mild pathologic compression of the T11 vertebral body.   - Pending IR guided bx   - Pt was started on Percocet and has required BTD x 4 in the past 24 hrs   - Reported Percocet reliefs pain to 5/10 but effect only lasts for 3 hrs.   - Discussed adding long acting opioid for pain relief. Pt concerned about sedating effect but understands opioids are able to help her control her symptoms.  - Pt denies any relief from Tylenol, would DC percocet   - Would recommend adding Oxycontin 10mg q/ 12 hrs   - Switch to oxycodone 5mg q/ 4 hrs as needed for severe pain

## 2020-08-24 NOTE — PROGRESS NOTE ADULT - PROBLEM SELECTOR PLAN 3
Also lesions in adrenal gland and thyroid  -f/u labs - TSH, Uric acid, LDH, PTH, ESR/CRP, SPEP/UPEP, alk phos, iCa, TSH, Ca19-9, CEA  -f/u MRI abdomen adrenal protocol  -onc/palliative c/s as above

## 2020-08-24 NOTE — CONSULT NOTE ADULT - PROBLEM SELECTOR RECOMMENDATION 2
- Pt reported last BM 4 days ago  - Currently on senna and Miralax  - Would recommend adding Dulcolax oral 5mg q/ 12 PRN for constipation

## 2020-08-24 NOTE — PROGRESS NOTE ADULT - PROBLEM SELECTOR PLAN 4
ppx: lovenox (holding pending head imaging then lovenox)  diet: regular  Dispo: admission for malignancy work up

## 2020-08-24 NOTE — PROGRESS NOTE ADULT - PROBLEM SELECTOR PLAN 5
Transitions of Care Status:  1.  Name of PCP: no PCP  2.  PCP Contacted on Admission: [ ] Y    [ ] N    3.  PCP contacted at Discharge: [ ] Y    [ ] N    [ ] N/A  4.  Post-Discharge Appointment Date and Location:  5.  Summary of Handoff given to PCP:

## 2020-08-24 NOTE — CONSULT NOTE ADULT - ATTENDING COMMENTS
Agree with above. Pt admitted for 3 months of progressive back/r hip pain. Xrays reveal a large destructive lesion of the right acetabulum.     Imaging reviewed. Follow up MRI pelvis for further evaluation and possible preoperative planning.  FU bone scan. Will have further discussion after studies have been performed.
The patient was seen and examined today with the fellow, Dr. Rubio, and I agree with the History, Physical Exam and Plan in the record. Labs and imaging reviewed by me.

## 2020-08-24 NOTE — PROGRESS NOTE ADULT - SUBJECTIVE AND OBJECTIVE BOX
*******************************************************  Armen Medina MD PGY-1  Internal Medicine  Pager [phone] / [phone]  *******************************************************  Patient is a 69y old  Female who presents with a chief complaint of Lytic bone lesions (24 Aug 2020 10:32)      SUBJECTIVE / OVERNIGHT EVENTS:  - No acute events overnight.   - Patient seen and evaluated at bedside.  - Feeling well this AM. Endorsing some L lower back pain.  - Denies fevers/chills, headache, SOB at rest, chest pain, palpitations, abdominal pain, nausea/vomiting/diarrhea/constipation, dysuria    -------------------------------------------------------------------------------------------------------------  MEDICATIONS  (STANDING):  lidocaine   Patch 1 Patch Transdermal daily  polyethylene glycol 3350 17 Gram(s) Oral daily  senna 2 Tablet(s) Oral at bedtime    MEDICATIONS  (PRN):  acetaminophen   Tablet .. 650 milliGRAM(s) Oral every 6 hours PRN Moderate Pain (4 - 6)  oxycodone    5 mG/acetaminophen 325 mG 1 Tablet(s) Oral every 6 hours PRN Severe Pain (7 - 10)    -------------------------------------------------------------------------------------------------------------    OBJECTIVE:    PHYSICAL EXAM:  Vital Signs Last 24 Hrs  T(C): 36.7 (24 Aug 2020 09:12), Max: 37 (23 Aug 2020 16:24)  T(F): 98.1 (24 Aug 2020 09:12), Max: 98.6 (23 Aug 2020 16:24)  HR: 78 (24 Aug 2020 09:12) (60 - 78)  BP: 131/68 (24 Aug 2020 09:12) (114/61 - 131/68)  RR: 16 (24 Aug 2020 09:12) (16 - 18)  SpO2: 99% RA (24 Aug 2020 09:12) (97% - 100%)    CONSTITUTIONAL: NAD, well-developed  HEENT: NCAT, EOMI, PERRLA, no scleral icterus, MMM  RESPIRATORY/CHEST: Normal respiratory effort; lungs are clear to auscultation bilaterally; no wheezing/crackles  CARDIO: Regular rate, normal S1 and S2, no murmur/rub/gallop; No JVD  VASCULAR: No lower extremity edema; Peripheral pulses are 2+ bilaterally; Capillary refill brisk  ABDOMEN: Soft, nontender to palpation, normoactive bowel sounds, no rebound/guarding; No hepatosplenomegaly  MUSCLOSKELETAL: [+]unable to lift right leg due to hip pain  EXTREMITIES: hands/feet are warm, and without cyanosis or clubbing  SKIN: no visible rashes, pallor, diaphoresis, or jaundice  NEURO: No focal deficits; moving all extremities  PSYCH: A+O to person, place, and time; affect appropriate; cooperative    -------------------------------------------------------------------------------------------------------------  LABS:                        11.9   10.32 )-----------( 325      ( 24 Aug 2020 05:30 )             38.3     08-24    139  |  99  |  17  ----------------------------<  86  4.1   |  26  |  0.47<L>    Ca    9.6      24 Aug 2020 05:30  Phos  3.7     08-24  Mg     1.9     08-24    TPro  6.8  /  Alb  3.9  /  TBili  0.2  /  DBili  x   /  AST  18  /  ALT  14  /  AlkPhos  95  08-24    PT/INR - ( 24 Aug 2020 05:30 )   PT: 11.4 SEC;   INR: 1.00     PTT - ( 24 Aug 2020 05:30 )  PTT:26.1 SEC      Urinalysis Basic - ( 22 Aug 2020 21:48 )    Color: YELLOW / Appearance: CLEAR / S.012 / pH: 6.5  Gluc: NEGATIVE / Ketone: NEGATIVE  / Bili: NEGATIVE / Urobili: NORMAL   Blood: TRACE / Protein: NEGATIVE / Nitrite: NEGATIVE   Leuk Esterase: NEGATIVE / RBC: 1-3 / WBC 0-1   Sq Epi: x / Non Sq Epi: x / Bacteria: x          RADIOLOGY & ADDITIONAL TESTS:  Results Reviewed:     Imaging Personally Reviewed:  Electrocardiogram Personally Reviewed:      COORDINATION OF CARE:  Care Discussed with Consultants/Other Providers [Y/N]: Ortho, Onc, IR, Palliative  Prior or Outpatient Records Reviewed [Y/N]:   -------------------------------------------------------------------------------------------------------------

## 2020-08-24 NOTE — CONSULT NOTE ADULT - ASSESSMENT
Patient is a 69 y.o Telugu speaking F with no known PMH who originally presented for b/l flank pain and was found to have lung lesions concerning for primary lung Ca as well as lytic lesions on the acetabulum concerning for malignancy. Palliative care team consulted for symptom management and goals of care. Met with pt at bedside used Grace[employer] Telugu  # [id number]. Daughter Veda at bedside.

## 2020-08-24 NOTE — CONSULT NOTE ADULT - SUBJECTIVE AND OBJECTIVE BOX
Ortho Consult Note  MAGDALENE YOST   MRN-[id number]    69F admitted for back/hip pain for 3mos, atraumatic. Per primary team patient was a XR recall for lytic lesion.  Pt mainly Uzbek speaking but can carry conversation in English. Pt denies pain at rest, pain only on ROM.    Vital Signs Last 24 Hrs  T(C): 36.7 (24 Aug 2020 09:12), Max: 37 (23 Aug 2020 16:24)  T(F): 98.1 (24 Aug 2020 09:12), Max: 98.6 (23 Aug 2020 16:24)  HR: 78 (24 Aug 2020 09:12) (60 - 78)  BP: 131/68 (24 Aug 2020 09:12) (114/61 - 131/68)  BP(mean): --  RR: 16 (24 Aug 2020 09:12) (16 - 18)  SpO2: 99% (24 Aug 2020 09:12) (97% - 100%)  No Known Allergies      equal leg lengths  full AROM on LLE vs limited AROM to R hip - able to SLR but coronado due to pain, otherwise full AROM to remaining RLE  no tenderness or decrease in degrees on PROM of Right hip  pt's own knee sleeve to Right knee in place  no tenderness to pelvic rock, heel strike BLE, internal/external rotation BLE  motor BLE EHL, TA, GS 5/5 intact  sensation BLE intact to light touch  no skin discoloration to BLE                          11.9   10.32 )-----------( 325      ( 24 Aug 2020 05:30 )             38.3     08-24    139  |  99  |  17  ----------------------------<  86  4.1   |  26  |  0.47<L>    Ca    9.6      24 Aug 2020 05:30< from: Xray Hip w/ Pelvis 2 or 3 Views, Right (08.21.20 @ 22:55) >  Phos  3.7     08-24  Mg     1.9     08-24    TPro  6.8  /  Alb  3.9  /  TBili  0.2  /  DBili  x   /  AST  18  /  ALT  14  /  AlkPhos  95  08-24    PT/INR - ( 24 Aug 2020 05:30 )   PT: 11.4 SEC;   INR: 1.00     PTT - ( 24 Aug 2020 05:30 )  PTT:26.1 SEC        < from: Xray Hip w/ Pelvis 2 or 3 Views, Right (08.21.20 @ 22:55) >  EXAM:  XR HIP WITH PELV 2-3V RT      PROCEDURE DATE:  Aug 21 2020   INTERPRETATION:  CLINICAL INFORMATION: Lower back and right hip pain.    TECHNIQUE: Frontal radiographs of the pelvis and right hip. Frog-leg radiograph of the right hip    COMPARISON: None.    FINDINGS/  IMPRESSION:  Generalized osteopenia.    Permeative lytic destruction of lateral right acetabular roof and lower right iliac wing compatible with myeloma or metastatic disease. No additional suspected aggressive destructive appearing lesions.    No current fractures or dislocations.    Intact pelvic and obturator rings and symmetrically aligned spaced SI joints and pubic symphysis.    No gross stigmata of arthropathy in either hip.      < from: CT Abdomen and Pelvis No Cont (08.22.20 @ 20:04) >  IMPRESSION:    Two confluent spiculated nodules in the right upper lobe measuring up to 1.8 x 2.1 cm and 1.7 x 2.1 cm respectively highly concerning for primary lung malignancy.    Lytic metastatic lesions with extraosseous soft tissue extension involving the right acetabular roof, T11 vertebral body with involvement of the left-sided pedicle and transverse process, left posterior 11th rib, T6 left-sided pedicle and transverse process and left posterior sixth rib. Mild extraosseous soft tissue extension into the epidural space at T11 and T6. Mild pathologic compression of the T11 vertebral body. Dedicated MRI of the thoracic spine with contrast should be considered for better characterization.    Indeterminate left adrenal nodule.    < end of copied text >        A/P 69F with Right acetabular lytic lesion  d/w Dr. Gilbert Alcantar - recommends:  ck Labs if not already done - ESR/CRP, SPEP/UPEP, alk phos, ionized Ca, PTH, TSH, LDH, Ca199, CEA  MRI pelvis with/without contrast to eval Right acetab lesion  Bone scan to eval extent of other poss bony disease  agree with IR biopsy of R acetab lesion for path  NWB to RLE for now, patient may be OOB with walker with Physical Therapy for gait training  final plan to be determined once Dr. Ortiz veliz pt  consider anticoagulation therapy due to newly diagnosed mets CA if medically stable   continue pain control per primary team    recs d/w primary team spectra [id number] Ortho Consult Note  MAGDALENE YOST   MRN-[id number]    69F admitted for back/hip pain for 3mos, atraumatic. Per primary team patient was a XR recall for lytic lesion.  Pt mainly Khmer speaking but can carry conversation in English. Pt denies pain at rest, pain only on ROM.    Vital Signs Last 24 Hrs  T(C): 36.7 (24 Aug 2020 09:12), Max: 37 (23 Aug 2020 16:24)  T(F): 98.1 (24 Aug 2020 09:12), Max: 98.6 (23 Aug 2020 16:24)  HR: 78 (24 Aug 2020 09:12) (60 - 78)  BP: 131/68 (24 Aug 2020 09:12) (114/61 - 131/68)  BP(mean): --  RR: 16 (24 Aug 2020 09:12) (16 - 18)  SpO2: 99% (24 Aug 2020 09:12) (97% - 100%)  No Known Allergies      equal leg lengths  full AROM on LLE vs limited AROM to R hip - able to SLR but coronado due to pain, otherwise full AROM to remaining RLE  no tenderness or decrease in degrees on PROM of Right hip  pt's own knee sleeve to Right knee in place  no tenderness to pelvic rock, heel strike BLE, internal/external rotation BLE  motor BLE EHL, TA, GS 5/5 intact  sensation BLE intact to light touch  no skin discoloration to BLE                          11.9   10.32 )-----------( 325      ( 24 Aug 2020 05:30 )             38.3     08-24    139  |  99  |  17  ----------------------------<  86  4.1   |  26  |  0.47<L>    Ca    9.6      24 Aug 2020 05:30< from: Xray Hip w/ Pelvis 2 or 3 Views, Right (08.21.20 @ 22:55) >  Phos  3.7     08-24  Mg     1.9     08-24    TPro  6.8  /  Alb  3.9  /  TBili  0.2  /  DBili  x   /  AST  18  /  ALT  14  /  AlkPhos  95  08-24    PT/INR - ( 24 Aug 2020 05:30 )   PT: 11.4 SEC;   INR: 1.00     PTT - ( 24 Aug 2020 05:30 )  PTT:26.1 SEC        < from: Xray Hip w/ Pelvis 2 or 3 Views, Right (08.21.20 @ 22:55) >  EXAM:  XR HIP WITH PELV 2-3V RT      PROCEDURE DATE:  Aug 21 2020   INTERPRETATION:  CLINICAL INFORMATION: Lower back and right hip pain.    TECHNIQUE: Frontal radiographs of the pelvis and right hip. Frog-leg radiograph of the right hip    COMPARISON: None.    FINDINGS/  IMPRESSION:  Generalized osteopenia.    Permeative lytic destruction of lateral right acetabular roof and lower right iliac wing compatible with myeloma or metastatic disease. No additional suspected aggressive destructive appearing lesions.    No current fractures or dislocations.    Intact pelvic and obturator rings and symmetrically aligned spaced SI joints and pubic symphysis.    No gross stigmata of arthropathy in either hip.      < from: CT Abdomen and Pelvis No Cont (08.22.20 @ 20:04) >  IMPRESSION:    Two confluent spiculated nodules in the right upper lobe measuring up to 1.8 x 2.1 cm and 1.7 x 2.1 cm respectively highly concerning for primary lung malignancy.    Lytic metastatic lesions with extraosseous soft tissue extension involving the right acetabular roof, T11 vertebral body with involvement of the left-sided pedicle and transverse process, left posterior 11th rib, T6 left-sided pedicle and transverse process and left posterior sixth rib. Mild extraosseous soft tissue extension into the epidural space at T11 and T6. Mild pathologic compression of the T11 vertebral body. Dedicated MRI of the thoracic spine with contrast should be considered for better characterization.    Indeterminate left adrenal nodule.    < end of copied text >        A/P 69F with Right acetabular lytic lesion  d/w Dr. Gilbert Alcantar - recommends:  ck Labs if not already done - ESR/CRP, SPEP/UPEP, alk phos, ionized Ca, PTH, TSH, LDH, Ca199, CEA  follow up XR obliques (Judet views) - ordered by Ortho  MRI pelvis with/without contrast to eval Right acetab lesion  Bone scan to eval extent of other poss bony disease  agree with IR biopsy of R acetab lesion for path  NWB to RLE for now, patient may be OOB with walker with Physical Therapy for gait training  final plan to be determined once Dr. Ortiz veliz pt  consider anticoagulation therapy due to newly diagnosed mets CA if medically stable   continue pain control per primary team    recs d/w primary team spectra [id number]

## 2020-08-24 NOTE — CONSULT NOTE ADULT - SUBJECTIVE AND OBJECTIVE BOX
VASCULAR & INTERVENTIONAL RADIOLOGY    Case discussed and reviewed with Dr. Akhtar.     Interventional Radiology Pre-Procedure Note    Patient is a 69y old  Female who presents with a chief complaint of Lytic bone lesions (24 Aug 2020 13:41). IR has been consulted for core needle biopsy of possible bony metastatic lesion from lung nodules concerning for primary lung cancer.    PAST MEDICAL & SURGICAL HISTORY:  No pertinent past medical history  No significant past surgical history    Allergies: No Known Allergies      LABS:  CBC Full  -  ( 24 Aug 2020 05:30 )  WBC Count : 10.32 K/uL  RBC Count : 4.66 M/uL  Hemoglobin : 11.9 g/dL  Hematocrit : 38.3 %  Platelet Count - Automated : 325 K/uL    08-24    139  |  99  |  17  ----------------------------<  86  4.1   |  26  |  0.47<L>    Ca    9.6      24 Aug 2020 05:30  Phos  3.7     08-24  Mg     1.9     08-24    TPro  6.8  /  Alb  3.9  /  TBili  0.2  /  DBili  x   /  AST  18  /  ALT  14  /  AlkPhos  95  08-24    PT/INR - ( 24 Aug 2020 05:30 )   PT: 11.4 SEC;   INR: 1.00          PTT - ( 24 Aug 2020 05:30 )  PTT:26.1 SEC    Plan:  - CT guided biopsy of T11 vertebral body.  - NPO after midnight  - CBC, BMP and coags in AM  - IR procedure order under Dr. Akhtar. Approved for 8/25/2020.  - please complete IR pre-procedure checklist and pre-procedure note    Please contact us if the clinical situation changes or if you have any questions/comments/concerns.    Soo Pacheco MD

## 2020-08-24 NOTE — PROGRESS NOTE ADULT - PROBLEM SELECTOR PLAN 1
Recent x-ray with lytic lesion in acetabulum on CT A/P involvement of T11 and 6th rib, concern for MM vs other cancerous process    Plan:  -MRI of spine, brain, hip, and abdomen with contrast ordered  -family history of malignancy in 2 sisters (CRC)  -appreciate oncology recs: MRI, IR biopsy of acetabular mass  -appreciate ortho recs: NWB R LE, MRI  -palliative c/s for pain control and introduction of GOC  -pain control

## 2020-08-25 NOTE — CHART NOTE - NSCHARTNOTEFT_GEN_A_CORE
MRI spine reviewed with Dr. Alcantar. Per Dr. Alcantar, will delay tomorrow's scheduled surgery for patient until patient is evaluated for spine lesions.   Discussed above with primary team. Recommended neurosurgery consult.   Ortho operative planning to be made after patient is evaluated by neurosurgery.

## 2020-08-25 NOTE — PROGRESS NOTE ADULT - PROBLEM SELECTOR PLAN 1
Recent x-ray with lytic lesion in acetabulum on CT A/P involvement of T11 and 6th rib, concern for MM vs other cancerous process    Plan:  -MRI of spine, brain, hip, and abdomen with contrast ordered  -family history of malignancy in 2 sisters (CRC)  -appreciate oncology recs: MRI, IR biopsy of acetabular mass  -appreciate ortho recs: NWB R LE, MRI  -palliative c/s for pain control and introduction of GOC  -pain control Recent x-ray with lytic lesion in acetabulum on CT A/P involvement of T11 and 6th rib, concern for MM vs other cancerous process    Plan:  -f/u MRI hip, and abdomen with contrast   -MRI brain /spine with multiple lesions   -family history of malignancy in 2 sisters (CRC)  -appreciate oncology recs: MRI, IR biopsy of acetabular mass  -appreciate ortho recs: NWB R LE, MRI  -palliative c/s for pain control and introduction of GOC  -pain control Recent x-ray with lytic lesion in acetabulum on CT A/P involvement of T11 and 6th rib, concern for MM vs other cancerous process    Plan:  -f/u MRI hip, and abdomen with contrast   -MRI brain /spine with multiple lesions   -family history of malignancy in 2 sisters (CRC)  -appreciate oncology recs: MRI, IR biopsy of t11 lesion  -appreciate ortho recs: NWB R LE, MRI hip, neurosurg c/s  -appreciate neuro surg recs: no intervention, proceed with ortho surgery. consider rad onc consult  -appreciate palliative recs re pain control RCRI score: 0. Patient with no history of cardiac, pulmonary, renal disease or diabetes on insulin. Patient previously active prior to R hip and back pain 2/2 metastatic disease. ECG with NSR with occasional PACs, BNP wnl.     plan:  -no further cardiac evaluation needed.  -patient medically optimized RCRI score: 0. Patient with no history of cardiac, pulmonary, renal disease or diabetes on insulin. Patient previously active prior to R hip and back pain 2/2 metastatic disease. ECG with NSR with occasional PACs, BNP wnl.   plan:  -no further cardiac evaluation needed.  -patient medically optimized

## 2020-08-25 NOTE — PROGRESS NOTE ADULT - SUBJECTIVE AND OBJECTIVE BOX
Pt's recent MRI findings reviewed. She has extensive multifocal metastatic disease with a poor prognosis. A full discussion was had with the family regarding goals of care. Due to the extent of diease we are recommending nonoperative management, as any attempt at surgical excision carries a high risk of morbidity and mortality. May refer to radiation oncology for possible XRT to the pelvis to help halt progression. FU PRN. All questions were answered.

## 2020-08-25 NOTE — PROGRESS NOTE ADULT - SUBJECTIVE AND OBJECTIVE BOX
S: Patient seen and examined at bedside. Pain unchanged    O:  Vital Signs Last 24 Hrs  T(C): 36.7 (25 Aug 2020 06:01), Max: 37 (24 Aug 2020 07:08)  T(F): 98.1 (25 Aug 2020 06:01), Max: 98.6 (24 Aug 2020 07:08)  HR: 77 (25 Aug 2020 06:01) (60 - 78)  BP: 114/61 (25 Aug 2020 06:01) (114/61 - 131/68)  BP(mean): --  RR: 18 (25 Aug 2020 06:01) (16 - 18)  SpO2: 98% (25 Aug 2020 06:01) (98% - 99%)    PHYSICAL EXAM:    Gen: NAD, AAOx3    Right Lower Extremity:  +EHL/FHL/TA/GS  SILT L3-S1  +DP/PT Pulses  Compartments soft  No calf TTP B/L      A/P 69F with Right acetabular lytic lesion  -Plan for OR thursday  -NWB RLE  -FU MRI  -FU cancer labs  -Needs clearance

## 2020-08-25 NOTE — CONSULT NOTE ADULT - SUBJECTIVE AND OBJECTIVE BOX
NEUROSURGERY CONSULT  MAGDALENE YOST   08-25-20 @ 14:52    HPI: 68 y/o Thai speaking F with no known PMH w/ recent ED (on 8/22) visit for back pain who presents to the hospital after being told to return for possible lytic lesion. The patient originally presented to the hospital for right knee pain x3 months (post trauma) as well as b/l back pain x2 months. The patient then received a x-ray of the hip/knee/L-spine and discharged. Lytic lesion was detected on her lateral R. acetabulum. The patient received a CT chest/A/P which noted 2 confluent spiculated nodules in the RUL with concern for primary lung cancer. In the spine on T11 noted with mild pathological fracture in addition to involvement of T6 and posterior 6th rib.  pt w/ pain in back and hx of smoking for many years , concern for possible malignant lesion to bone will do ct chest abdomen pelvis and c spine. (23 Aug 2020 02:09)    RADIOLOGY:   < from: MR Head w/ IV Cont (08.24.20 @ 21:38) >  There is evidence of a abnormal enhancing lesion seen along the left CP angle region. This lesion measures approximately 2.2 x 1.1 cm. While this could be compatible meningioma, the possibility of an underlying dural metastasis cannot be entirely excluded given patient's history. Clinical correlation and continued close interval follow-up is recommended.    There is no acute hemorrhage in the posterior fossa or supratentorial region.    Evaluation of the diffusion weighted sequence demonstrates no abnormal areas of restricted diffusion to suggest acute infarct.    The large vessels demonstrate normal flow voids    Left ethmoid sinus mucosal thickening is seen.    Both mastoid and middle ear regions appear clear.    There is a well-defined extracalvarial soft tissue lesion seen involving the right apex. This lesion measures approximately 2.1 x 1.4 cm and is likely compatible with a sebaceous cyst. Clinical correlation is recommended. No significant shift or herniation is seen.    Evaluation of the diffusion weighted sequence demonstrates no abnormal areas of restricted diffusion to suggest acute infarct.    The large vessels demonstrate normal flow voids.    IMPRESSION: Enhancing extra-axial lesion is seen involving the left CP angle region as described above.    Extracalvarial lesion seen involving the high left parietal region which is suspicious for a sebaceous cyst.    < from: MR Lumbar Spine w/wo IV Cont (08.24.20 @ 21:38) >  Cervical spine:    Loss of the normal cervical lordosis is seen.    Mild scoliosis is seen.    C3 is slightly posteriorly displaced relative to C4. This is likely the result of chronic degenerative changes.    The vertebral body height and marrow signal appear normal    Disc desiccation is seen involving the C2-3 C3-4 C4-5 C5-6 C6-7levels. These findings are secondary to degenerative change    C2-3: Normal.    C3-4: Disc bulge and bilateral hypertrophic facet changes seen. Mild narrowing of the spinal canal is seen. Moderate narrowing of both neural foramen is seen.    C4-5: Disc bulge is seen. Bilateral hypertrophic facet changes seen. Moderate to severe narrowing of the right neural foramen is seen.    C5-6: Disc bulge is identified. Mild narrowing of the spinal canal. Mild to moderate narrowing of the left neural foramen. Moderate to severe narrowing of the right neural foramen    C6-7: Normal    C7-T1: Normal    The spinal cord demonstrates normal signal and caliber. No abnormal areas enhancement seen.    Evaluation of the paraspinal soft tissues appear normal.    Thoracic spine:    Scoliosis is identified.    There is evidence of abnormal signal seen involving the left pedicle, left lamina and left transverse process of C3-C6 with associated enhancement. This finding does extend demonstrate epidural extension on the left side without spinal cord compression at this time.    There is evidence of small focus of abnormal T1 prolongation with associated enhancement involving the left T7 vertebral body. This is best seen on series 3 image 9 and series 6/5 image 19.    Abnormal T1 prolongation seen involving the inferior endplate of T10 on left side with involvement of the left pedicle and transverse process. This finding does extend into the left T10-11 neural foramen as well as the left paraspinal soft tissue.    Abnormal T1 prolongation with associated enhancement is seen involving the T11 vertebral body. Involvement of the left pedicle and left T11-12 neural foramen is seen. There is evidence of a large bulky soft tissue mass in the left paravertebralregion at the T11 level. Tumor involves the adjacent posterior left T11 rib as well.    These above lesions are likely compatible with metastasis, though multiple myeloma lymphoma or other similar etiology cannot because excluded. Clinical correlation recommended.    Small pleural effusion is seen on left side.    Lumbar spine:    Loss of the normal lumbar lordosis seen.    Disc desiccation seen throughout the lumbar spine region with associated Schmorl's nodes. These findings are secondary to degenerative change    T12-L1: Bilateral hypertrophic facet changes are seen. Mild narrowing of the spinal canal.    L1-2: Disc bulge is seen. No significant, as of the spinal canal or either neural foramen.    L2-3: Disc bulge and bilateral hypertrophic facet joint changes are seen. Mild narrowing of the spinal canal.    L3-4: Disc bulge and bilateral hypertrophic facet joint changes are seen. Moderate narrowing of the right neural foramen. Moderate narrowing of the spinal canal.    L4-5: Disc bulge and bilateral hypertrophic facet joint changes seen. Hypertrophic change is seen involving both ligamentum flavum. Mild to moderate narrowing of the spinal canal. Moderate narrowing of the right neural foramen and moderate to severe narrowing of theleft neural foramen    L5-S1: Disc bulge and bilateral hypertrophic facet joint changes seen. Mild narrowing of the spinal canal and both neural foramen.    No abnormal areas of enhancement seen.    The conus ends at L2 and appears normal    Bilateral renal cysts are identified.    Evaluation of the paraspinal soft tissues appear normal.    IMPRESSION: Abnormal lesions identified as described above.    MEDS:  acetaminophen   Tablet .. 650 milliGRAM(s) Oral every 6 hours PRN  bisacodyl 5 milliGRAM(s) Oral every 12 hours PRN  lidocaine   Patch 1 Patch Transdermal daily  oxyCODONE    IR 5 milliGRAM(s) Oral every 4 hours PRN  oxyCODONE  ER Tablet 10 milliGRAM(s) Oral every 12 hours  polyethylene glycol 3350 17 Gram(s) Oral daily  senna 2 Tablet(s) Oral at bedtime      PHYSICAL EXAM:  Vital Signs Last 24 Hrs  T(C): 36.8 (25 Aug 2020 12:43), Max: 36.8 (25 Aug 2020 11:55)  T(F): 98.3 (25 Aug 2020 12:43), Max: 98.3 (25 Aug 2020 11:55)  HR: 61 (25 Aug 2020 12:43) (61 - 77)  BP: 120/60 (25 Aug 2020 12:43) (103/58 - 127/69)  BP(mean): --  RR: 18 (25 Aug 2020 12:43) (18 - 18)  SpO2: 98% (25 Aug 2020 12:43) (97% - 99%)    LABS:                        11.9   9.93  )-----------( 305      ( 25 Aug 2020 05:57 )             39.7     08-25    139  |  99  |  15  ----------------------------<  98  3.9   |  24  |  0.41<L>    Ca    9.5      25 Aug 2020 05:57  Phos  3.6     08-25  Mg     1.8     08-25    TPro  6.8  /  Alb  3.9  /  TBili  0.3  /  DBili  x   /  AST  24  /  ALT  17  /  AlkPhos  97  08-25    PT/INR - ( 25 Aug 2020 05:57 )   PT: 11.8 SEC;   INR: 1.03          PTT - ( 25 Aug 2020 05:57 )  PTT:30.3 SEC NEUROSURGERY CONSULT  MAGDALENE YOST   08-25-20 @ 14:52    HPI: 68 y/o Kiswahili speaking F with no known PMH w/ recent ED (on 8/22) visit for back pain who presents to the hospital after being told to return for possible lytic lesion. The patient originally presented to the hospital for right knee pain x3 months (post trauma) as well as b/l back pain x2 months. The patient then received a x-ray of the hip/knee/L-spine and discharged. Lytic lesion was detected on her lateral R. acetabulum. The patient received a CT chest/A/P which noted 2 confluent spiculated nodules in the RUL with concern for primary lung cancer. In the spine on T11 noted with mild pathological fracture in addition to involvement of T6 and posterior 6th rib.  pt w/ pain in back and hx of smoking for many years , concern for possible malignant lesion to bone will do ct chest abdomen pelvis and c spine. (23 Aug 2020 02:09)    RADIOLOGY:   < from: MR Head w/ IV Cont (08.24.20 @ 21:38) >  There is evidence of a abnormal enhancing lesion seen along the left CP angle region. This lesion measures approximately 2.2 x 1.1 cm. While this could be compatible meningioma, the possibility of an underlying dural metastasis cannot be entirely excluded given patient's history. Clinical correlation and continued close interval follow-up is recommended.    There is no acute hemorrhage in the posterior fossa or supratentorial region.    Evaluation of the diffusion weighted sequence demonstrates no abnormal areas of restricted diffusion to suggest acute infarct.    The large vessels demonstrate normal flow voids    Left ethmoid sinus mucosal thickening is seen.    Both mastoid and middle ear regions appear clear.    There is a well-defined extracalvarial soft tissue lesion seen involving the right apex. This lesion measures approximately 2.1 x 1.4 cm and is likely compatible with a sebaceous cyst. Clinical correlation is recommended. No significant shift or herniation is seen.    Evaluation of the diffusion weighted sequence demonstrates no abnormal areas of restricted diffusion to suggest acute infarct.    The large vessels demonstrate normal flow voids.    IMPRESSION: Enhancing extra-axial lesion is seen involving the left CP angle region as described above.    Extracalvarial lesion seen involving the high left parietal region which is suspicious for a sebaceous cyst.    < from: MR Lumbar Spine w/wo IV Cont (08.24.20 @ 21:38) >  Cervical spine:    Loss of the normal cervical lordosis is seen.    Mild scoliosis is seen.    C3 is slightly posteriorly displaced relative to C4. This is likely the result of chronic degenerative changes.    The vertebral body height and marrow signal appear normal    Disc desiccation is seen involving the C2-3 C3-4 C4-5 C5-6 C6-7levels. These findings are secondary to degenerative change    C2-3: Normal.    C3-4: Disc bulge and bilateral hypertrophic facet changes seen. Mild narrowing of the spinal canal is seen. Moderate narrowing of both neural foramen is seen.    C4-5: Disc bulge is seen. Bilateral hypertrophic facet changes seen. Moderate to severe narrowing of the right neural foramen is seen.    C5-6: Disc bulge is identified. Mild narrowing of the spinal canal. Mild to moderate narrowing of the left neural foramen. Moderate to severe narrowing of the right neural foramen    C6-7: Normal    C7-T1: Normal    The spinal cord demonstrates normal signal and caliber. No abnormal areas enhancement seen.    Evaluation of the paraspinal soft tissues appear normal.    Thoracic spine:    Scoliosis is identified.    There is evidence of abnormal signal seen involving the left pedicle, left lamina and left transverse process of C3-C6 with associated enhancement. This finding does extend demonstrate epidural extension on the left side without spinal cord compression at this time.    There is evidence of small focus of abnormal T1 prolongation with associated enhancement involving the left T7 vertebral body. This is best seen on series 3 image 9 and series 6/5 image 19.    Abnormal T1 prolongation seen involving the inferior endplate of T10 on left side with involvement of the left pedicle and transverse process. This finding does extend into the left T10-11 neural foramen as well as the left paraspinal soft tissue.    Abnormal T1 prolongation with associated enhancement is seen involving the T11 vertebral body. Involvement of the left pedicle and left T11-12 neural foramen is seen. There is evidence of a large bulky soft tissue mass in the left paravertebralregion at the T11 level. Tumor involves the adjacent posterior left T11 rib as well.    These above lesions are likely compatible with metastasis, though multiple myeloma lymphoma or other similar etiology cannot because excluded. Clinical correlation recommended.    Small pleural effusion is seen on left side.    Lumbar spine:    Loss of the normal lumbar lordosis seen.    Disc desiccation seen throughout the lumbar spine region with associated Schmorl's nodes. These findings are secondary to degenerative change    T12-L1: Bilateral hypertrophic facet changes are seen. Mild narrowing of the spinal canal.    L1-2: Disc bulge is seen. No significant, as of the spinal canal or either neural foramen.    L2-3: Disc bulge and bilateral hypertrophic facet joint changes are seen. Mild narrowing of the spinal canal.    L3-4: Disc bulge and bilateral hypertrophic facet joint changes are seen. Moderate narrowing of the right neural foramen. Moderate narrowing of the spinal canal.    L4-5: Disc bulge and bilateral hypertrophic facet joint changes seen. Hypertrophic change is seen involving both ligamentum flavum. Mild to moderate narrowing of the spinal canal. Moderate narrowing of the right neural foramen and moderate to severe narrowing of theleft neural foramen    L5-S1: Disc bulge and bilateral hypertrophic facet joint changes seen. Mild narrowing of the spinal canal and both neural foramen.    No abnormal areas of enhancement seen.    The conus ends at L2 and appears normal    Bilateral renal cysts are identified.    Evaluation of the paraspinal soft tissues appear normal.    IMPRESSION: Abnormal lesions identified as described above.    MEDS:  acetaminophen   Tablet .. 650 milliGRAM(s) Oral every 6 hours PRN  bisacodyl 5 milliGRAM(s) Oral every 12 hours PRN  lidocaine   Patch 1 Patch Transdermal daily  oxyCODONE    IR 5 milliGRAM(s) Oral every 4 hours PRN  oxyCODONE  ER Tablet 10 milliGRAM(s) Oral every 12 hours  polyethylene glycol 3350 17 Gram(s) Oral daily  senna 2 Tablet(s) Oral at bedtime      PHYSICAL EXAM:  Vital Signs Last 24 Hrs  T(C): 36.8 (25 Aug 2020 12:43), Max: 36.8 (25 Aug 2020 11:55)  T(F): 98.3 (25 Aug 2020 12:43), Max: 98.3 (25 Aug 2020 11:55)  HR: 61 (25 Aug 2020 12:43) (61 - 77)  BP: 120/60 (25 Aug 2020 12:43) (103/58 - 127/69)  BP(mean): --  RR: 18 (25 Aug 2020 12:43) (18 - 18)  SpO2: 98% (25 Aug 2020 12:43) (97% - 99%)    AAOx3  MAEx4  PERRL, EOMI  B/L UE 5/5, no hoffmans   RLE 2/5 HF, Distally 4/5  LLE 4+/5, distal 5/5   no clonus     LABS:                        11.9   9.93  )-----------( 305      ( 25 Aug 2020 05:57 )             39.7     08-25    139  |  99  |  15  ----------------------------<  98  3.9   |  24  |  0.41<L>    Ca    9.5      25 Aug 2020 05:57  Phos  3.6     08-25  Mg     1.8     08-25    TPro  6.8  /  Alb  3.9  /  TBili  0.3  /  DBili  x   /  AST  24  /  ALT  17  /  AlkPhos  97  08-25    PT/INR - ( 25 Aug 2020 05:57 )   PT: 11.8 SEC;   INR: 1.03          PTT - ( 25 Aug 2020 05:57 )  PTT:30.3 SEC

## 2020-08-25 NOTE — PROGRESS NOTE ADULT - ATTENDING COMMENTS
Spine MRI performed yesterday demonstrating multilevel disease compatible with metastatic disease. Thoracis lesions are quite large, will recommend a spine consultation. Priority will be to address spinal tumor prior to any hip intervention. Patients scheduled for biopsy today, will FU results. Family called and this was all discussed. All questions were answered.

## 2020-08-25 NOTE — CHART NOTE - NSCHARTNOTEFT_GEN_A_CORE
had discussion with daughter and son regarding plan this morning. updated them on imaging findings and plan for today including IR biopsy and MRI evaluation. All questions answered.

## 2020-08-25 NOTE — PROGRESS NOTE ADULT - ATTENDING COMMENTS
Patient was seen and examined personally by me. I have discussed the plan and reviewed the resident's note and agree with the above physical exam findings including assessment and plan except as indicated below.    69 year old woman with no PMH p/w R hip pain. Found to have lytic lesions of R acetabular roof and lower R iliac wing and CT chest revealing two spiculated lung lesions. Suspect possible primary lung cancer w/ mets, r/o myeloma    IR planning CT guided biopsy of T11 vertebral body 8/25  MRI brain with well-defined extracalvarial soft tissue lesion" possible sebaceous cyst and abnml enhancing lesion along CP angle possibly meningioma, underlying dural mets can't be excluded, MRI spine w/ IV contrast with abnml lesions and large bulky soft tissue mass in left paravertebral region at T11 also involving T11 rib.  F/u SPEP, UPEP, serum and urine immunofixation, serum and urine free light chains  MRI abdomen w/ contrast pending for further workup per Onc recs.   Appreciate ortho input: awaiting MRI pelvis, possible OR on Thurs for right acetabular lytic lesion after Nsx comments on spinal lesions  PT eval with weight bearing limits of RLE

## 2020-08-25 NOTE — PROGRESS NOTE ADULT - PROBLEM SELECTOR PLAN 5
Transitions of Care Status:  1.  Name of PCP: no PCP  2.  PCP Contacted on Admission: [ ] Y    [ ] N    3.  PCP contacted at Discharge: [ ] Y    [ ] N    [ ] N/A  4.  Post-Discharge Appointment Date and Location:  5.  Summary of Handoff given to PCP: ppx: holding lovenox pending procedure(s)  diet: regular  Dispo: admission for malignancy work up

## 2020-08-25 NOTE — PROGRESS NOTE ADULT - SUBJECTIVE AND OBJECTIVE BOX
*INCOMPLETE NOTE*  *******************************************************  Armen Medina MD PGY-1  Internal Medicine  Pager [phone] / [phone]  *******************************************************  Patient is a 69y old  Female who presents with a chief complaint of Lytic bone lesions (25 Aug 2020 06:54)          NOTE TO BE UPDATED AFTER ROUNDS *INCOMPLETE NOTE*  *******************************************************  Armen Medina MD PGY-1  Internal Medicine  Pager [phone] / [phone]  *******************************************************  Patient is a 69y old  Female who presents with a chief complaint of Lytic bone lesions (25 Aug 2020 07:31)      SUBJECTIVE / OVERNIGHT EVENTS:  - No acute events overnight.   - Patient seen and evaluated at bedside.  - endorses L sided back pain from 3am until she was next given pain meds at 6am.  - Denies fevers/chills, headache, SOB at rest, chest pain, palpitations, abdominal pain, nausea/vomiting/diarrhea/constipation, dysuria, abnormal sensations, numbness/tingling, bowel/bladder incontinence.    -------------------------------------------------------------------------------------------------------------  MEDICATIONS  (STANDING):  lidocaine   Patch 1 Patch Transdermal daily  oxyCODONE  ER Tablet 10 milliGRAM(s) Oral every 12 hours  polyethylene glycol 3350 17 Gram(s) Oral daily  senna 2 Tablet(s) Oral at bedtime    MEDICATIONS  (PRN):  acetaminophen   Tablet .. 650 milliGRAM(s) Oral every 6 hours PRN Moderate Pain (4 - 6)  bisacodyl 5 milliGRAM(s) Oral every 12 hours PRN Constipation  oxyCODONE    IR 5 milliGRAM(s) Oral every 4 hours PRN Severe Pain (7 - 10)    -------------------------------------------------------------------------------------------------------------    OBJECTIVE:  Daily Height in cm: 154.94 (24 Aug 2020 15:17)    Daily Weight in k (24 Aug 2020 15:17)    PHYSICAL EXAM:  Vital Signs Last 24 Hrs  T(C): 36.7 (25 Aug 2020 06:01), Max: 36.7 (24 Aug 2020 23:09)  T(F): 98.1 (25 Aug 2020 06:01), Max: 98.1 (24 Aug 2020 23:09)  HR: 77 (25 Aug 2020 06:01) (70 - 77)  BP: 114/61 (25 Aug 2020 06:01) (114/61 - 127/69)  RR: 18 (25 Aug 2020 06:01) (18 - 18)  SpO2: 98% RA (25 Aug 2020 06:01) (98% - 99%)    CONSTITUTIONAL: NAD, well-developed  HEENT: NCAT, EOMI, PERRLA, no scleral icterus, MMM  RESPIRATORY/CHEST: Normal respiratory effort; lungs are clear to auscultation bilaterally; no wheezing/crackles  CARDIO: Regular rate, normal S1 and S2, no murmur/rub/gallop; No JVD  VASCULAR: No lower extremity edema; Peripheral pulses are 2+ bilaterally; Capillary refill brisk  ABDOMEN: Soft, nontender to palpation, normoactive bowel sounds, no rebound/guarding; No hepatosplenomegaly  MUSCLOSKELETAL: [+]unable to lift right leg due to hip pain  EXTREMITIES: hands/feet are warm, and without cyanosis or clubbing  SKIN: no visible rashes, pallor, diaphoresis, or jaundice  NEURO: No focal deficits; moving all extremities  PSYCH: A+O to person, place, and time; affect appropriate; cooperative  -------------------------------------------------------------------------------------------------------------  LABS:                        11.9   9.93  )-----------( 305      ( 25 Aug 2020 05:57 )             39.7     08-    139  |  99  |  15  ----------------------------<  98  3.9   |  24  |  0.41<L>    Ca    9.5      25 Aug 2020 05:57  Phos  3.6       Mg     1.8         TPro  6.8  /  Alb  3.9  /  TBili  0.3  /  DBili  x   /  AST  24  /  ALT  17  /  AlkPhos  97  08    PT/INR - ( 25 Aug 2020 05:57 )   PT: 11.8 SEC;   INR: 1.03     PTT - ( 25 Aug 2020 05:57 )  PTT:30.3 SEC            RADIOLOGY & ADDITIONAL TESTS:  Results Reviewed:     Imaging Personally Reviewed:  Electrocardiogram Personally Reviewed:      COORDINATION OF CARE:  Care Discussed with Consultants/Other Providers [Y/N]:   Prior or Outpatient Records Reviewed [Y/N]:   ------------------------------------------------------------------------------------------------------------- *******************************************************  Armen Medina MD PGY-1  Internal Medicine  Pager [phone] / [phone]  *******************************************************  Patient is a 69y old  Female who presents with a chief complaint of Lytic bone lesions (25 Aug 2020 07:31)      SUBJECTIVE / OVERNIGHT EVENTS:  - No acute events overnight.   - Patient seen and evaluated at bedside.  - endorses L sided back pain from 3am until she was next given pain meds at 6am.  - Denies fevers/chills, headache, SOB at rest, chest pain, palpitations, abdominal pain, nausea/vomiting/diarrhea/constipation, dysuria, abnormal sensations, numbness/tingling, bowel/bladder incontinence.    -------------------------------------------------------------------------------------------------------------  MEDICATIONS  (STANDING):  lidocaine   Patch 1 Patch Transdermal daily  oxyCODONE  ER Tablet 10 milliGRAM(s) Oral every 12 hours  polyethylene glycol 3350 17 Gram(s) Oral daily  senna 2 Tablet(s) Oral at bedtime    MEDICATIONS  (PRN):  acetaminophen   Tablet .. 650 milliGRAM(s) Oral every 6 hours PRN Moderate Pain (4 - 6)  bisacodyl 5 milliGRAM(s) Oral every 12 hours PRN Constipation  oxyCODONE    IR 5 milliGRAM(s) Oral every 4 hours PRN Severe Pain (7 - 10)    -------------------------------------------------------------------------------------------------------------    OBJECTIVE:  Daily Height in cm: 154.94 (24 Aug 2020 15:17)    Daily Weight in k (24 Aug 2020 15:17)    PHYSICAL EXAM:  Vital Signs Last 24 Hrs  T(C): 36.7 (25 Aug 2020 06:01), Max: 36.7 (24 Aug 2020 23:09)  T(F): 98.1 (25 Aug 2020 06:01), Max: 98.1 (24 Aug 2020 23:09)  HR: 77 (25 Aug 2020 06:01) (70 - 77)  BP: 114/61 (25 Aug 2020 06:01) (114/61 - 127/69)  RR: 18 (25 Aug 2020 06:01) (18 - 18)  SpO2: 98% RA (25 Aug 2020 06:01) (98% - 99%)    CONSTITUTIONAL: NAD, well-developed  HEENT: NCAT, EOMI, PERRLA, no scleral icterus, MMM  RESPIRATORY/CHEST: Normal respiratory effort; lungs are clear to auscultation bilaterally; no wheezing/crackles  CARDIO: Regular rate, normal S1 and S2, no murmur/rub/gallop; No JVD  VASCULAR: No lower extremity edema; Peripheral pulses are 2+ bilaterally; Capillary refill brisk  ABDOMEN: Soft, nontender to palpation, normoactive bowel sounds, no rebound/guarding; No hepatosplenomegaly  MUSCLOSKELETAL: [+]unable to lift right leg due to hip pain  EXTREMITIES: hands/feet are warm, and without cyanosis or clubbing  SKIN: no visible rashes, pallor, diaphoresis, or jaundice  NEURO: No focal deficits; moving all extremities  PSYCH: A+O to person, place, and time; affect appropriate; cooperative  -------------------------------------------------------------------------------------------------------------  LABS:                        11.9   9.93  )-----------( 305      ( 25 Aug 2020 05:57 )             39.7     08-    139  |  99  |  15  ----------------------------<  98  3.9   |  24  |  0.41<L>    Ca    9.5      25 Aug 2020 05:57  Phos  3.6       Mg     1.8         TPro  6.8  /  Alb  3.9  /  TBili  0.3  /  DBili  x   /  AST  24  /  ALT  17  /  AlkPhos  97  08-    PT/INR - ( 25 Aug 2020 05:57 )   PT: 11.8 SEC;   INR: 1.03     PTT - ( 25 Aug 2020 05:57 )  PTT:30.3 SEC            RADIOLOGY & ADDITIONAL TESTS:  Results Reviewed:     Imaging Personally Reviewed:  Electrocardiogram Personally Reviewed:      COORDINATION OF CARE:  Care Discussed with Consultants/Other Providers [Y/N]:   Prior or Outpatient Records Reviewed [Y/N]:   ------------------------------------------------------------------------------------------------------------- *******************************************************  Armen Medina MD PGY-1  Internal Medicine  Pager [phone] / [phone]  *******************************************************  Patient is a 69y old  Female who presents with a chief complaint of Lytic bone lesions (25 Aug 2020 07:31)      PI Greek [id number]    SUBJECTIVE / OVERNIGHT EVENTS:  - No acute events overnight.   - Patient seen and evaluated at bedside.  - endorses L sided back pain from 3am until she was next given pain meds at 6am.  - Denies fevers/chills, headache, SOB at rest, chest pain, palpitations, abdominal pain, nausea/vomiting/diarrhea/constipation, dysuria, abnormal sensations, numbness/tingling, bowel/bladder incontinence.    -------------------------------------------------------------------------------------------------------------  MEDICATIONS  (STANDING):  lidocaine   Patch 1 Patch Transdermal daily  oxyCODONE  ER Tablet 10 milliGRAM(s) Oral every 12 hours  polyethylene glycol 3350 17 Gram(s) Oral daily  senna 2 Tablet(s) Oral at bedtime    MEDICATIONS  (PRN):  acetaminophen   Tablet .. 650 milliGRAM(s) Oral every 6 hours PRN Moderate Pain (4 - 6)  bisacodyl 5 milliGRAM(s) Oral every 12 hours PRN Constipation  oxyCODONE    IR 5 milliGRAM(s) Oral every 4 hours PRN Severe Pain (7 - 10)    -------------------------------------------------------------------------------------------------------------    OBJECTIVE:  Daily Height in cm: 154.94 (24 Aug 2020 15:17)    Daily Weight in k (24 Aug 2020 15:17)    PHYSICAL EXAM:  Vital Signs Last 24 Hrs  T(C): 36.7 (25 Aug 2020 06:01), Max: 36.7 (24 Aug 2020 23:09)  T(F): 98.1 (25 Aug 2020 06:01), Max: 98.1 (24 Aug 2020 23:09)  HR: 77 (25 Aug 2020 06:01) (70 - 77)  BP: 114/61 (25 Aug 2020 06:01) (114/61 - 127/69)  RR: 18 (25 Aug 2020 06:01) (18 - 18)  SpO2: 98% RA (25 Aug 2020 06:01) (98% - 99%)    CONSTITUTIONAL: NAD, well-developed  HEENT: NCAT, EOMI, PERRLA, no scleral icterus, MMM  RESPIRATORY/CHEST: Normal respiratory effort; lungs are clear to auscultation bilaterally; no wheezing/crackles  CARDIO: Regular rate, normal S1 and S2, no murmur/rub/gallop; No JVD  VASCULAR: No lower extremity edema; Peripheral pulses are 2+ bilaterally; Capillary refill brisk  ABDOMEN: Soft, nontender to palpation, normoactive bowel sounds, no rebound/guarding; No hepatosplenomegaly  MUSCLOSKELETAL: [+]unable to lift right leg due to hip pain  EXTREMITIES: hands/feet are warm, and without cyanosis or clubbing  SKIN: no visible rashes, pallor, diaphoresis, or jaundice  NEURO: No focal deficits; moving all extremities  PSYCH: A+O to person, place, and time; affect appropriate; cooperative  -------------------------------------------------------------------------------------------------------------  LABS:                        11.9   9.93  )-----------( 305      ( 25 Aug 2020 05:57 )             39.7     08-    139  |  99  |  15  ----------------------------<  98  3.9   |  24  |  0.41<L>    Ca    9.5      25 Aug 2020 05:57  Phos  3.6       Mg     1.8         TPro  6.8  /  Alb  3.9  /  TBili  0.3  /  DBili  x   /  AST  24  /  ALT  17  /  AlkPhos  97  08    PT/INR - ( 25 Aug 2020 05:57 )   PT: 11.8 SEC;   INR: 1.03     PTT - ( 25 Aug 2020 05:57 )  PTT:30.3 SEC            RADIOLOGY & ADDITIONAL TESTS:  Results Reviewed:     Imaging Personally Reviewed:  Electrocardiogram Personally Reviewed:      COORDINATION OF CARE:  Care Discussed with Consultants/Other Providers [Y/N]:   Prior or Outpatient Records Reviewed [Y/N]:   -------------------------------------------------------------------------------------------------------------

## 2020-08-25 NOTE — PROGRESS NOTE ADULT - PROBLEM SELECTOR PLAN 3
Also lesions in adrenal gland and thyroid  -f/u labs - TSH, Uric acid, LDH, PTH, ESR/CRP, SPEP/UPEP, alk phos, iCa, TSH, Ca19-9, CEA  -f/u MRI abdomen adrenal protocol  -onc/palliative c/s as above Two spiculated nodules in RUL with concern for primary lung lesion  -oncology referral as above  -currently afebrile, in no respiratory distress

## 2020-08-25 NOTE — PROGRESS NOTE ADULT - PROBLEM SELECTOR PLAN 4
ppx: lovenox (holding pending head imaging then lovenox)  diet: regular  Dispo: admission for malignancy work up ppx: holding lovenox pending procedure(s)  diet: regular  Dispo: admission for malignancy work up Also lesions in adrenal gland and thyroid  -f/u labs - TSH, Uric acid, LDH, PTH, ESR/CRP, SPEP/UPEP, alk phos, iCa, TSH, Ca19-9, CEA  -f/u MRI abdomen adrenal protocol  -onc/palliative c/s as above

## 2020-08-25 NOTE — CONSULT NOTE ADULT - ASSESSMENT
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 Per daughter at bedside, patient has been having hip pain x 3 months, with intermittent back pain x 1 month. Now walks with a walker but otherwise independent. Daughter requests for all conversations had with her mother that she be called as well.     PLAN:   - f/u IR bx of T11 soft tissue mass   - call radiation oncology consult   - oncology consult for prognosis   - order TLSO brace for T11 compression fx, to be worn when OOB  - will decide on surgical management after rad onc/oncology weigh in     Case discussed with attending neurosurgeon.

## 2020-08-26 NOTE — CONSULT NOTE ADULT - REASON FOR ADMISSION
Lytic bone lesions

## 2020-08-26 NOTE — PROGRESS NOTE ADULT - PROBLEM SELECTOR PLAN 1
Recent x-ray with lytic lesion in acetabulum on CT A/P involvement of T11 and 6th rib, concern for MM vs other cancerous process    Plan:  -f/u MRI hip, and abdomen with contrast   -MRI brain /spine with multiple lesions   -family history of malignancy in 2 sisters (CRC)  -appreciate oncology recs: MRI, IR biopsy of t11 lesion by IR  -appreciate ortho recs: NWB R LE, neurosurg c/s for spine lesions, no ortho surgery at this time due to prognosis  -appreciate palliative recs re pain control - pain not adequately controlled Recent x-ray with lytic lesion in acetabulum on CT A/P involvement of T11 and 6th rib, concern for MM vs other cancerous process    Plan:  -f/u MRI hip, and abdomen with contrast   -MRI brain /spine with multiple lesions   -family history of malignancy in 2 sisters (CRC)  -appreciate oncology recs: MRI, IR biopsy of t11 lesion by IR  -appreciate ortho recs: NWB R LE, neurosurg c/s for spine lesions, no ortho surgery at this time due to prognosis  -appreciate palliative recs re pain control - pain not adequately controlled will inform patient to ask for pain medications that have already been ordered.

## 2020-08-26 NOTE — CONSULT NOTE ADULT - SUBJECTIVE AND OBJECTIVE BOX
HPI:  70 y/o Russian speaking F with no known PMH w/ recent ED (on 8/22) visit for back pain who presents to the hospital after being told to return for possible lytic lesions. RT consult was requested today for back pain.  Pathology is pending.       The patient originally presented to the hospital for right knee pain x3 months (post trauma) as well as b/l back pain x2 months. The patient then received a x-ray of the hip/knee/L-spine and discharged. Lytic lesion was detected on her lateral R. acetabulum. The patient received a CT chest/A/P which noted 2 confluent spiculated nodules in the RUL with concern for primary lung cancer. In the spine on T11 noted with mild pathological fracture in addition to involvement of T6 and posterior 6th rib.  pt w/ pain in back and hx of smoking for many years , concern for possible malignant lesion to bone will do ct chest abdomen pelvis and c spine. (23 Aug 2020 02:09)    She lives alone but has family in the area.    < from: CT Abdomen and Pelvis No Cont (08.22.20 @ 20:04) >  IMPRESSION:    Two confluent spiculated nodules in the right upper lobe measuring up to 1.8 x 2.1 cm and 1.7 x 2.1 cm respectively highly concerning for primary lung malignancy.    Lytic metastatic lesions with extraosseous soft tissue extension involving the right acetabular roof, T11 vertebral body with involvement of the left-sided pedicle and transverse process, left posterior 11th rib, T6 left-sided pedicle and transverse process and left posterior sixth rib. Mild extraosseous soft tissue extension into the epidural space at T11 and T6. Mild pathologic compression of the T11 vertebral body. Dedicated MRI of the thoracic spine with contrast should be considered for better characterization.    Indeterminate left adrenal nodule.      < from: MR Head w/ IV Cont (08.24.20 @ 21:38) >  IMPRESSION: Enhancing extra-axial lesion is seen involving the left CP angle region as described above.    Extracalvarial lesion seen involving the high left parietal region which is suspicious for a sebaceous cyst.                  Allergies    No Known Allergies    Intolerances        ROS: [  ] Fever  [  ] Chills  [  ]Chest Pain [  ] SOB  [  ]Cough [  ] N/V  [  ] Diarrhea [  ]Constipation [  ]Other ROS:  [  ] ROS otherwise negative    PAST MEDICAL & SURGICAL HISTORY:  No pertinent past medical history  No significant past surgical history      FAMILY HISTORY:  No pertinent family history in first degree relatives      MEDICATIONS  (STANDING):  lidocaine   Patch 1 Patch Transdermal daily  oxyCODONE  ER Tablet 10 milliGRAM(s) Oral every 12 hours  polyethylene glycol 3350 17 Gram(s) Oral daily  senna 2 Tablet(s) Oral at bedtime    MEDICATIONS  (PRN):  acetaminophen   Tablet .. 650 milliGRAM(s) Oral every 6 hours PRN Moderate Pain (4 - 6)  bisacodyl 5 milliGRAM(s) Oral every 12 hours PRN Constipation  oxyCODONE    IR 5 milliGRAM(s) Oral every 4 hours PRN Severe Pain (7 - 10)      PHYSICAL EXAM  Vital Signs Last 24 Hrs  T(C): 36.8 (26 Aug 2020 06:04), Max: 37 (25 Aug 2020 19:30)  T(F): 98.2 (26 Aug 2020 06:04), Max: 98.6 (25 Aug 2020 19:30)  HR: 81 (26 Aug 2020 06:04) (61 - 85)  BP: 131/72 (26 Aug 2020 06:04) (103/58 - 142/64)  BP(mean): --  RR: 17 (26 Aug 2020 06:04) (17 - 18)  SpO2: 97% (26 Aug 2020 06:04) (96% - 98%)    General: Well nourished, well developed, no acute distress  HEENT: NC/AT; EOMI, PERRL, sclera nonicteric; external ears normal; no rhinorrhea or epistaxis; mucous membranes moist; oropharynx clear and without erythema  CV: NR, RR; no appreciable r/m/g  Lungs: CTAB, no increased work of breathing  Abodmen: Bowel sounds present; soft, NTND  MSK: Vertebral spine non-tender to palpation  Neuro: AAOx3; cranial nerves II-XII intact; strength 5/5 in upper and lower extremities; sensation to light touch in tact bilaterally.  Psych: Full affect; mood congruent  Skin: no visible rashes on limited examination      ASSESSMENT/PLAN    MAGDALENE YOST is a 69y woman with     We discussed the use of palliative radiation in this setting, namely to improve quality of life through the reduction of symptoms.  We talked about the risks, benefits, acute and long term side effects, as well as expected treatment outcomes.  He/She was given the opportunity to ask questions, which were answered to his/her apparent satisfaction.  He/She provided written consent to proceed with radiation therapy. We will arrange for inpatient/outpatient treatment. HPI:  70 y/o Georgian speaking F with no known PMH w/ recent ED (on 8/22) visit for back pain who presents to the hospital after being told to return for possible lytic lesions. RT consult was requested today for back pain.  Pathology is unavailable as CT guided biopsy was planned via I.R. at T11 lesion.  It is unclear if biopsy has been done yet.     The patient originally presented to the hospital for right knee pain x3 months (post trauma) as well as b/l back pain x2 months. The patient then received a x-ray of the hip/knee/L-spine and discharged. Lytic lesion was detected on her lateral R. acetabulum. The patient received a CT chest/A/P which noted 2 confluent spiculated nodules in the RUL with concern for primary lung cancer. In the spine on T11 noted with mild pathological fracture in addition to involvement of T6 and posterior 6th rib.  pt w/ pain in back and hx of smoking for many years , concern for possible malignant lesion to bone will do ct chest abdomen pelvis and c spine. (23 Aug 2020 02:09)    She was evaluated by orthopedics and deemed no intervention.    She lives alone but has family in the area.    < from: CT Abdomen and Pelvis No Cont (08.22.20 @ 20:04) >  IMPRESSION:    Two confluent spiculated nodules in the right upper lobe measuring up to 1.8 x 2.1 cm and 1.7 x 2.1 cm respectively highly concerning for primary lung malignancy.    Lytic metastatic lesions with extraosseous soft tissue extension involving the right acetabular roof, T11 vertebral body with involvement of the left-sided pedicle and transverse process, left posterior 11th rib, T6 left-sided pedicle and transverse process and left posterior sixth rib. Mild extraosseous soft tissue extension into the epidural space at T11 and T6. Mild pathologic compression of the T11 vertebral body. Dedicated MRI of the thoracic spine with contrast should be considered for better characterization.    Indeterminate left adrenal nodule.    < from: MR Thoracic Spine w/wo IV Cont (08.24.20 @ 21:38) >  There is evidence of abnormal signal seen involving the left pedicle, left lamina and left transverse process of C3-C6 with associated enhancement. This finding does extend demonstrate epidural extension on the left side without spinal cord compression at this time.    There is evidence of small focus of abnormal T1 prolongation with associated enhancement involving the left T7 vertebral body. This is best seen on series 3 image 9 and series 6/5 image 19.    Abnormal T1 prolongation seen involving the inferior endplate of T10 on left side with involvement of the left pedicle and transverse process. This finding does extend into the left T10-11 neural foramen as well as the left paraspinal soft tissue.    Abnormal T1 prolongation with associated enhancement is seen involving the T11 vertebral body. Involvement of the left pedicle and left T11-12 neural foramen is seen. There is evidence of a large bulky soft tissue mass in the left paravertebralregion at the T11 level. Tumor involves the adjacent posterior left T11 rib as well.        < from: MR Head w/ IV Cont (08.24.20 @ 21:38) >  IMPRESSION: Enhancing extra-axial lesion is seen involving the left CP angle region as described above.    Extra calvarial lesion seen involving the high left parietal region which is suspicious for a sebaceous cyst.          Allergies    No Known Allergies    Intolerances        ROS: [  ] Fever  [  ] Chills  [  ]Chest Pain [  ] SOB  [  ]Cough [  ] N/V  [  ] Diarrhea [  ]Constipation [  ]Other ROS:  [  ] ROS otherwise negative    PAST MEDICAL & SURGICAL HISTORY:  No pertinent past medical history  No significant past surgical history      FAMILY HISTORY:  No pertinent family history in first degree relatives      MEDICATIONS  (STANDING):  lidocaine   Patch 1 Patch Transdermal daily  oxyCODONE  ER Tablet 10 milliGRAM(s) Oral every 12 hours  polyethylene glycol 3350 17 Gram(s) Oral daily  senna 2 Tablet(s) Oral at bedtime    MEDICATIONS  (PRN):  acetaminophen   Tablet .. 650 milliGRAM(s) Oral every 6 hours PRN Moderate Pain (4 - 6)  bisacodyl 5 milliGRAM(s) Oral every 12 hours PRN Constipation  oxyCODONE    IR 5 milliGRAM(s) Oral every 4 hours PRN Severe Pain (7 - 10)      PHYSICAL EXAM  Vital Signs Last 24 Hrs  T(C): 36.8 (26 Aug 2020 06:04), Max: 37 (25 Aug 2020 19:30)  T(F): 98.2 (26 Aug 2020 06:04), Max: 98.6 (25 Aug 2020 19:30)  HR: 81 (26 Aug 2020 06:04) (61 - 85)  BP: 131/72 (26 Aug 2020 06:04) (103/58 - 142/64)  BP(mean): --  RR: 17 (26 Aug 2020 06:04) (17 - 18)  SpO2: 97% (26 Aug 2020 06:04) (96% - 98%)    General: Well nourished, well developed, no acute distress  HEENT: NC/AT; EOMI, PERRL, sclera nonicteric; external ears normal; no rhinorrhea or epistaxis; mucous membranes moist; oropharynx clear and without erythema  CV: NR, RR; no appreciable r/m/g  Lungs: CTAB, no increased work of breathing  Abodmen: Bowel sounds present; soft, NTND  MSK: Vertebral spine non-tender to palpation  Neuro: AAOx3; cranial nerves II-XII intact; strength 5/5 in upper and lower extremities; sensation to light touch in tact bilaterally.  Psych: Full affect; mood congruent  Skin: no visible rashes on limited examination      ASSESSMENT/PLAN    MAGDALENE YOST is a 69y woman with a recent history of mid to low back pain, and right sided hip pains likely secondary to metastatic lung cancer.  Imaging results show two spiculated lung nodules in the RUL, a right acetabular lesion, and T6/T11 lesions causing pain.   C3-C6 MRI shows epidural extension without cord involvement.     We discussed the use of palliative radiation in this setting, namely to improve quality of life through the reduction of symptoms.  We talked about the risks, benefits, acute and long term side effects, as well as expected treatment outcomes.  He/She was given the opportunity to ask questions, which were answered to his/her apparent satisfaction.  He/She provided written consent to proceed with radiation therapy. We will arrange for inpatient/outpatient treatment. HPI:  70 y/o Welsh speaking F with no known PMH w/ recent ED (on 8/22) visit for back pain who presents to the hospital after being told to return for possible lytic lesions. RT consult was requested today for back pain.  Pathology is unavailable as CT guided biopsy was completed via I.R. at T11 lesion yesterday per floor RN.    The patient originally presented to the hospital for right knee pain x3 months (post trauma) as well as b/l back pain x2 months. The patient then received a x-ray of the hip/knee/L-spine and discharged. Lytic lesion was detected on her lateral R. acetabulum. The patient received a CT chest/A/P which noted 2 confluent spiculated nodules in the RUL with concern for primary lung cancer. In the spine on T11 noted with mild pathological fracture in addition to involvement of T6 and posterior 6th rib.  pt w/ pain in back and hx of smoking for many years , concern for possible malignant lesion to bone will do ct chest abdomen pelvis and c spine. (23 Aug 2020 02:09)    She was evaluated by orthopedics and deemed no intervention.    She lives alone but has family in the area.    < from: CT Abdomen and Pelvis No Cont (08.22.20 @ 20:04) >  IMPRESSION:    Two confluent spiculated nodules in the right upper lobe measuring up to 1.8 x 2.1 cm and 1.7 x 2.1 cm respectively highly concerning for primary lung malignancy.    Lytic metastatic lesions with extraosseous soft tissue extension involving the right acetabular roof, T11 vertebral body with involvement of the left-sided pedicle and transverse process, left posterior 11th rib, T6 left-sided pedicle and transverse process and left posterior sixth rib. Mild extraosseous soft tissue extension into the epidural space at T11 and T6. Mild pathologic compression of the T11 vertebral body. Dedicated MRI of the thoracic spine with contrast should be considered for better characterization.    Indeterminate left adrenal nodule.    < from: MR Thoracic Spine w/wo IV Cont (08.24.20 @ 21:38) >  There is evidence of abnormal signal seen involving the left pedicle, left lamina and left transverse process of C3-C6 with associated enhancement. This finding does extend demonstrate epidural extension on the left side without spinal cord compression at this time.    There is evidence of small focus of abnormal T1 prolongation with associated enhancement involving the left T7 vertebral body. This is best seen on series 3 image 9 and series 6/5 image 19.    Abnormal T1 prolongation seen involving the inferior endplate of T10 on left side with involvement of the left pedicle and transverse process. This finding does extend into the left T10-11 neural foramen as well as the left paraspinal soft tissue.    Abnormal T1 prolongation with associated enhancement is seen involving the T11 vertebral body. Involvement of the left pedicle and left T11-12 neural foramen is seen. There is evidence of a large bulky soft tissue mass in the left paravertebralregion at the T11 level. Tumor involves the adjacent posterior left T11 rib as well.        < from: MR Head w/ IV Cont (08.24.20 @ 21:38) >  IMPRESSION: Enhancing extra-axial lesion is seen involving the left CP angle region as described above.    Extra calvarial lesion seen involving the high left parietal region which is suspicious for a sebaceous cyst.          Allergies    No Known Allergies    Intolerances        ROS: [  ] Fever  [  ] Chills  [  ]Chest Pain [  ] SOB  [  ]Cough [  ] N/V  [  ] Diarrhea [  ]Constipation [  ]Other ROS:  [  ] ROS otherwise negative    PAST MEDICAL & SURGICAL HISTORY:  No pertinent past medical history  No significant past surgical history      FAMILY HISTORY:  No pertinent family history in first degree relatives      MEDICATIONS  (STANDING):  lidocaine   Patch 1 Patch Transdermal daily  oxyCODONE  ER Tablet 10 milliGRAM(s) Oral every 12 hours  polyethylene glycol 3350 17 Gram(s) Oral daily  senna 2 Tablet(s) Oral at bedtime    MEDICATIONS  (PRN):  acetaminophen   Tablet .. 650 milliGRAM(s) Oral every 6 hours PRN Moderate Pain (4 - 6)  bisacodyl 5 milliGRAM(s) Oral every 12 hours PRN Constipation  oxyCODONE    IR 5 milliGRAM(s) Oral every 4 hours PRN Severe Pain (7 - 10)      PHYSICAL EXAM  KPS 50-60  Vital Signs Last 24 Hrs  T(C): 36.8 (26 Aug 2020 06:04), Max: 37 (25 Aug 2020 19:30)  T(F): 98.2 (26 Aug 2020 06:04), Max: 98.6 (25 Aug 2020 19:30)  HR: 81 (26 Aug 2020 06:04) (61 - 85)  BP: 131/72 (26 Aug 2020 06:04) (103/58 - 142/64)  BP(mean): --  RR: 17 (26 Aug 2020 06:04) (17 - 18)  SpO2: 97% (26 Aug 2020 06:04) (96% - 98%)    General: thin, no acute distress  HEENT: NC/AT; EOMI, PERRL, sclera nonicteric; external ears normal; no rhinorrhea or epistaxis; mucous membranes moist; oropharynx clear and without erythema  CV: NR, RR; no appreciable r/m/g  Lungs: CTAB, no increased work of breathing  Abdomen: Bowel sounds present; soft, NTND  MSK: Vertebral spine tender to palpation at the mid-low back.  also tenderness to the right hip region.   Neuro: AAOx3; cranial nerves II-XII intact; strength 5/5 in upper extremities, RLE weakness unable to raise off of the bed 4/5.  LLE 5/5 with some weakness.   Psych: Full affect; mood congruent  Skin: no visible rashes on limited examination      ASSESSMENT/PLAN    MAGDALENE YOST is a 69y woman with a recent history of mid to low back pain, and right sided hip pains likely secondary to metastatic lung cancer pathology pending.  Imaging results show two spiculated lung nodules in the RUL, a right acetabular lesion, and T6/T11 lesions causing pain.   C3-C6 MRI shows epidural extension without cord involvement.     We plan for CT simulation today.     We discussed the use of palliative radiation in this setting, namely to improve quality of life through the reduction of symptoms.  We talked about the risks, benefits, acute and long term side effects, as well as expected treatment outcomes.  She was given the opportunity to ask questions, which were answered to her apparent satisfaction.  She provided written consent to proceed with radiation therapy. We will arrange for inpatient/outpatient treatment. HPI:  70 y/o Martiniquais speaking F with no known PMH w/ recent ED (on 8/22) visit for back pain who presents to the hospital after being told to return for possible lytic lesions. RT consult was requested today for back pain.  Pathology is unavailable as CT guided biopsy was completed via I.R. at T11 lesion yesterday per floor RN.    The patient originally presented to the hospital for right knee pain x3 months (post trauma) as well as b/l back pain x2 months. The patient then received a x-ray of the hip/knee/L-spine and discharged. Lytic lesion was detected on her lateral R. acetabulum. The patient received a CT chest/A/P which noted 2 confluent spiculated nodules in the RUL with concern for primary lung cancer. In the spine on T11 noted with mild pathological fracture in addition to involvement of T6 and posterior 6th rib.  pt w/ pain in back and hx of smoking for many years , concern for possible malignant lesion to bone will do ct chest abdomen pelvis and c spine. (23 Aug 2020 02:09)    She was evaluated by orthopedics and deemed no intervention.    She lives alone but has family in the area.    from: CT   IMPRESSION:    Two confluent spiculated nodules in the right upper lobe measuring up to 1.8 x 2.1 cm and 1.7 x 2.1 cm respectively highly concerning for primary lung malignancy.  Lytic metastatic lesions with extraosseous soft tissue extension involving the right acetabular roof, T11 vertebral body with involvement of the left-sided pedicle and transverse process, left posterior 11th rib, T6 left-sided pedicle and transverse process and left posterior sixth rib. Mild extraosseous soft tissue extension into the epidural space at T11 and T6. Mild pathologic compression of the T11 vertebral body. Dedicated MRI of the thoracic spine with contrast should be considered for better characterization.  Indeterminate left adrenal nodule.  < from: MR Thoracic Spine w/wo IV Cont (08.24.20 @ 21:38) >  There is evidence of abnormal signal seen involving the left pedicle, left lamina and left transverse process of C3-C6 with associated enhancement. This finding does extend demonstrate epidural extension on the left side without spinal cord compression at this time.  There is evidence of small focus of abnormal T1 prolongation with associated enhancement involving the left T7 vertebral body. This is best seen on series 3 image 9 and series 6/5 image 19.  Abnormal T1 prolongation seen involving the inferior endplate of T10 on left side with involvement of the left pedicle and transverse process. This finding does extend into the left T10-11 neural foramen as well as the left paraspinal soft tissue.  Abnormal T1 prolongation with associated enhancement is seen involving the T11 vertebral body. Involvement of the left pedicle and left T11-12 neural foramen is seen. There is evidence of a large bulky soft tissue mass in the left paravertebralregion at the T11 level. Tumor involves the adjacent posterior left T11 rib as well.      EXAM:  CT CHEST/ABDOMEN AND PELVIS    PROCEDURE DATE:  Aug 22 2020   FINDINGS:  CHEST:  LUNGS AND LARGE AIRWAYS: Patent central airways. Two confulent spiculated nodules in the right upper lobe measuring 1.8x 2.1 cm and 1.7 x 2.1 cm in maximal axial dimensions (4:124 and 4:113). Left lower lung linear subsegmental atelectasis.  PLEURA: No pleural effusion.  VESSELS: Within normal limits.  HEART: Heart is mildly enlarged. Small pericardial effusion.  MEDIASTINUM AND OZIEL: No lymphadenopathy.  CHEST WALL AND LOWER NECK: Heterogeneous appearance of bilateral thyroid lobes, better evaluated on CT cervical spine from same day.    ABDOMEN AND PELVIS:  Evaluation of the solid organs is limited without the administration of IV contrast.  LIVER: Few scattered subcentimeter low-attenuation lesions that are too small to characterize.  BILE DUCTS: Normal caliber.  GALLBLADDER: Within normal limits.  SPLEEN: Within normal limits.  PANCREAS: Within normal limits.  ADRENALS: Indeterminate 1.7 cm nodule in the left adrenal gland.  KIDNEYS/URETERS: Left upper pole renal cyst. Right kidney subcentimeter round hyperdense and hypodense lesions are indeterminate, but may represent hemorrhagic/proteinaceous cyst.No right or left hydronephrosis. Punctate nonobstructing left intrarenal calculus.    BLADDER: Within normal limits.  REPRODUCTIVE ORGANS: Uterus and adnexa are grossly unremarkable.    BOWEL: No bowel obstruction. Appendix is not visualized. No evidence of inflammation in the pericecal region.  PERITONEUM: No ascites or pneumoperitoneum. No mesenteric lymphadenopathy.  VESSELS: Mild atherosclerotic calcifications of the abdominal aorta and iliac tree. Normal caliber abdominal aorta.  RETROPERITONEUM/LYMPH NODES: No lymphadenopathy.  ABDOMINAL WALL: Within normal limits.  BONES: Lytic metastatic lesions with extraosseous soft tissue extension involving the right acetabular roof, T11 vertebral body with involvement of the left-sided pedicle and transverse process, as well as, the posterior left 11th rib. Mild pathologic compression of the T11 vertebral body and mild extraosseous ventral epidural soft tissue extension at T11. Additional lytic lesion with extraosseous soft tissue at T6 involving the left-sided pedicle and transverse process transverse process, as well as, left posterior sixth rib with mild epidural soft tissue extension.    Permeative lytic and exophytic destructive lesion of lateral right acetabular roof and lower rightiliac wing, measuring up to 2.9 x 4.7 x 4.1 cm (2:123). Additional permeative lytic destruction lesion of the posterior left 11th rib, measuring 2.3 x 1.7 x 2.5 cm (2:55). Additional lytic destructive lesions of the T11 vertebral body, left transverse process and pedicle. The lesion of the left transverse process and pedicle measures 3.0 x 1.9 x 3.1 cm (2:55). There is minimal extension into the spinal canal at this level without significant spinal canal stenosis, the full extent of which is not well evaluated on CT.    IMPRESSION:  Two confluent spiculated nodules in the right upper lobe measuring up to 1.8 x 2.1 cm and 1.7 x 2.1 cm respectively highly concerning for primary lung malignancy.  Lytic metastatic lesions with extraosseous soft tissue extension involving the right acetabular roof, T11 vertebral body with involvement of the left-sided pedicle and transverse process, left posterior 11th rib, T6 left-sided pedicle and transverse process and left posterior sixth rib. Mild extraosseous soft tissue extension into the epidural space at T11 and T6. Mild pathologic compression of the T11 vertebral body. Dedicated MRI of the thoracic spine with contrast should be considered for better characterization.  Indeterminate left adrenal nodule.    No Known Allergies    Intolerances        ROS: [  ] Fever  [  ] Chills  [  ]Chest Pain [  ] SOB  [  ]Cough [  ] N/V  [  ] Diarrhea [  ]Constipation [  ]Other ROS:  [  ] ROS otherwise negative    PAST MEDICAL & SURGICAL HISTORY:  No pertinent past medical history  No significant past surgical history      FAMILY HISTORY:  No pertinent family history in first degree relatives      MEDICATIONS  (STANDING):  lidocaine   Patch 1 Patch Transdermal daily  oxyCODONE  ER Tablet 10 milliGRAM(s) Oral every 12 hours  polyethylene glycol 3350 17 Gram(s) Oral daily  senna 2 Tablet(s) Oral at bedtime    MEDICATIONS  (PRN):  acetaminophen   Tablet .. 650 milliGRAM(s) Oral every 6 hours PRN Moderate Pain (4 - 6)  bisacodyl 5 milliGRAM(s) Oral every 12 hours PRN Constipation  oxyCODONE    IR 5 milliGRAM(s) Oral every 4 hours PRN Severe Pain (7 - 10)      PHYSICAL EXAM  KPS 50-60  Vital Signs Last 24 Hrs  T(C): 36.8 (26 Aug 2020 06:04), Max: 37 (25 Aug 2020 19:30)  T(F): 98.2 (26 Aug 2020 06:04), Max: 98.6 (25 Aug 2020 19:30)  HR: 81 (26 Aug 2020 06:04) (61 - 85)  BP: 131/72 (26 Aug 2020 06:04) (103/58 - 142/64)  BP(mean): --  RR: 17 (26 Aug 2020 06:04) (17 - 18)  SpO2: 97% (26 Aug 2020 06:04) (96% - 98%)    General: thin, no acute distress  HEENT: NC/AT; EOMI, PERRL, sclera nonicteric; external ears normal; no rhinorrhea or epistaxis; mucous membranes moist; oropharynx clear and without erythema  CV: NR, RR; no appreciable r/m/g  Lungs: scattered wheezing  Abdomen: Bowel sounds present; soft, NTND  MSK: Vertebral spine tender to palpation at the mid-low back.  also tenderness to the right hip region.   Neuro: AAOx3; cranial nerves II-XII intact; strength 5/5 in upper extremities, RLE weakness unable to raise off of the bed 4/5.  LLE 5/5 with some weakness.   Psych: Full affect; mood congruent  Skin: no visible rashes on limited examination      ASSESSMENT/PLAN    MAGDALENE YOST is a 69y woman with a recent history of mid to low back pain, and right sided hip pains likely secondary to metastatic lung cancer pathology pending.  Imaging results show two spiculated lung nodules in the RUL, a lytic right acetabular lesion, and T6/T11 lesions causing pain.   C3-C6 MRI shows epidural extension without cord involvement.     We plan for CT simulation today; the plan is  to treat the most heavily involved areas of the spine as well as the symptomatic right hip/acetabular lesion.  We discussed the use of palliative radiation in this setting, namely to improve quality of life through the reduction of symptoms.  We talked about the risks, benefits, acute and long term side effects, as well as expected treatment outcomes.  She was given the opportunity to ask questions, which were answered to her apparent satisfaction.  She provided written consent to proceed with radiation therapy. We will arrange for inpatientt treatment. HPI:  70 y/o [place]ian speaking F with no known PMH w/ recent ED (on 8/22) visit for back pain who presents to the hospital after being told to return for possible lytic lesions. RT consult was requested today for back pain.  Pathology is unavailable as CT guided biopsy was completed via I.R. at T11 lesion yesterday per floor RN.    The patient originally presented to the hospital for right knee pain x3 months (post trauma) as well as b/l back pain x2 months. The patient then received a x-ray of the hip/knee/L-spine and discharged. Lytic lesion was detected on her lateral R. acetabulum. The patient received a CT chest/A/P which noted 2 confluent spiculated nodules in the RUL with concern for primary lung cancer. In the spine on T11 noted with mild pathological fracture in addition to involvement of T6 and posterior 6th rib.  pt w/ pain in back and hx of smoking for many years , concern for possible malignant lesion to bone will do ct chest abdomen pelvis and c spine. (23 Aug 2020 02:09)    She was evaluated by orthopedics and deemed no intervention.    She lives alone but has family in the area.    from: CT   IMPRESSION:    Two confluent spiculated nodules in the right upper lobe measuring up to 1.8 x 2.1 cm and 1.7 x 2.1 cm respectively highly concerning for primary lung malignancy.  Lytic metastatic lesions with extraosseous soft tissue extension involving the right acetabular roof, T11 vertebral body with involvement of the left-sided pedicle and transverse process, left posterior 11th rib, T6 left-sided pedicle and transverse process and left posterior sixth rib. Mild extraosseous soft tissue extension into the epidural space at T11 and T6. Mild pathologic compression of the T11 vertebral body. Dedicated MRI of the thoracic spine with contrast should be considered for better characterization.  Indeterminate left adrenal nodule.  < from: MR Thoracic Spine w/wo IV Cont (08.24.20 @ 21:38) >  There is evidence of abnormal signal seen involving the left pedicle, left lamina and left transverse process of C3-C6 with associated enhancement. This finding does extend demonstrate epidural extension on the left side without spinal cord compression at this time.  There is evidence of small focus of abnormal T1 prolongation with associated enhancement involving the left T7 vertebral body. This is best seen on series 3 image 9 and series 6/5 image 19.  Abnormal T1 prolongation seen involving the inferior endplate of T10 on left side with involvement of the left pedicle and transverse process. This finding does extend into the left T10-11 neural foramen as well as the left paraspinal soft tissue.  Abnormal T1 prolongation with associated enhancement is seen involving the T11 vertebral body. Involvement of the left pedicle and left T11-12 neural foramen is seen. There is evidence of a large bulky soft tissue mass in the left paravertebralregion at the T11 level. Tumor involves the adjacent posterior left T11 rib as well.      EXAM:  CT CHEST/ABDOMEN AND PELVIS    PROCEDURE DATE:  Aug 22 2020   FINDINGS:  CHEST:  LUNGS AND LARGE AIRWAYS: Patent central airways. Two confulent spiculated nodules in the right upper lobe measuring 1.8x 2.1 cm and 1.7 x 2.1 cm in maximal axial dimensions (4:124 and 4:113). Left lower lung linear subsegmental atelectasis.  PLEURA: No pleural effusion.  VESSELS: Within normal limits.  HEART: Heart is mildly enlarged. Small pericardial effusion.  MEDIASTINUM AND OZIEL: No lymphadenopathy.  CHEST WALL AND LOWER NECK: Heterogeneous appearance of bilateral thyroid lobes, better evaluated on CT cervical spine from same day.    ABDOMEN AND PELVIS:  Evaluation of the solid organs is limited without the administration of IV contrast.  LIVER: Few scattered subcentimeter low-attenuation lesions that are too small to characterize.  BILE DUCTS: Normal caliber.  GALLBLADDER: Within normal limits.  SPLEEN: Within normal limits.  PANCREAS: Within normal limits.  ADRENALS: Indeterminate 1.7 cm nodule in the left adrenal gland.  KIDNEYS/URETERS: Left upper pole renal cyst. Right kidney subcentimeter round hyperdense and hypodense lesions are indeterminate, but may represent hemorrhagic/proteinaceous cyst.No right or left hydronephrosis. Punctate nonobstructing left intrarenal calculus.    BLADDER: Within normal limits.  REPRODUCTIVE ORGANS: Uterus and adnexa are grossly unremarkable.    BOWEL: No bowel obstruction. Appendix is not visualized. No evidence of inflammation in the pericecal region.  PERITONEUM: No ascites or pneumoperitoneum. No mesenteric lymphadenopathy.  VESSELS: Mild atherosclerotic calcifications of the abdominal aorta and iliac tree. Normal caliber abdominal aorta.  RETROPERITONEUM/LYMPH NODES: No lymphadenopathy.  ABDOMINAL WALL: Within normal limits.  BONES: Lytic metastatic lesions with extraosseous soft tissue extension involving the right acetabular roof, T11 vertebral body with involvement of the left-sided pedicle and transverse process, as well as, the posterior left 11th rib. Mild pathologic compression of the T11 vertebral body and mild extraosseous ventral epidural soft tissue extension at T11. Additional lytic lesion with extraosseous soft tissue at T6 involving the left-sided pedicle and transverse process transverse process, as well as, left posterior sixth rib with mild epidural soft tissue extension.    Permeative lytic and exophytic destructive lesion of lateral right acetabular roof and lower rightiliac wing, measuring up to 2.9 x 4.7 x 4.1 cm (2:123). Additional permeative lytic destruction lesion of the posterior left 11th rib, measuring 2.3 x 1.7 x 2.5 cm (2:55). Additional lytic destructive lesions of the T11 vertebral body, left transverse process and pedicle. The lesion of the left transverse process and pedicle measures 3.0 x 1.9 x 3.1 cm (2:55). There is minimal extension into the spinal canal at this level without significant spinal canal stenosis, the full extent of which is not well evaluated on CT.    IMPRESSION:  Two confluent spiculated nodules in the right upper lobe measuring up to 1.8 x 2.1 cm and 1.7 x 2.1 cm respectively highly concerning for primary lung malignancy.  Lytic metastatic lesions with extraosseous soft tissue extension involving the right acetabular roof, T11 vertebral body with involvement of the left-sided pedicle and transverse process, left posterior 11th rib, T6 left-sided pedicle and transverse process and left posterior sixth rib. Mild extraosseous soft tissue extension into the epidural space at T11 and T6. Mild pathologic compression of the T11 vertebral body. Dedicated MRI of the thoracic spine with contrast should be considered for better characterization.  Indeterminate left adrenal nodule.    No Known Allergies    Intolerances        ROS: [  ] Fever  [  ] Chills  [  ]Chest Pain [  ] SOB  [  ]Cough [  ] N/V  [  ] Diarrhea [  ]Constipation [  ]Other ROS:  [  ] ROS otherwise negative    PAST MEDICAL & SURGICAL HISTORY:  No pertinent past medical history  No significant past surgical history      FAMILY HISTORY:  No pertinent family history in first degree relatives      MEDICATIONS  (STANDING):  lidocaine   Patch 1 Patch Transdermal daily  oxyCODONE  ER Tablet 10 milliGRAM(s) Oral every 12 hours  polyethylene glycol 3350 17 Gram(s) Oral daily  senna 2 Tablet(s) Oral at bedtime    MEDICATIONS  (PRN):  acetaminophen   Tablet .. 650 milliGRAM(s) Oral every 6 hours PRN Moderate Pain (4 - 6)  bisacodyl 5 milliGRAM(s) Oral every 12 hours PRN Constipation  oxyCODONE    IR 5 milliGRAM(s) Oral every 4 hours PRN Severe Pain (7 - 10)      PHYSICAL EXAM  KPS 50-60  Vital Signs Last 24 Hrs  T(C): 36.8 (26 Aug 2020 06:04), Max: 37 (25 Aug 2020 19:30)  T(F): 98.2 (26 Aug 2020 06:04), Max: 98.6 (25 Aug 2020 19:30)  HR: 81 (26 Aug 2020 06:04) (61 - 85)  BP: 131/72 (26 Aug 2020 06:04) (103/58 - 142/64)  BP(mean): --  RR: 17 (26 Aug 2020 06:04) (17 - 18)  SpO2: 97% (26 Aug 2020 06:04) (96% - 98%)    General: thin, no acute distress  HEENT: NC/AT; EOMI, PERRL, sclera nonicteric; external ears normal; no rhinorrhea or epistaxis; mucous membranes moist; oropharynx clear and without erythema  CV: NR, RR; no appreciable r/m/g  Lungs: scattered wheezing  Abdomen: Bowel sounds present; soft, NTND  MSK: Vertebral spine tender to palpation at the mid-low back.  also tenderness to the right hip region.   Neuro: AAOx3; cranial nerves II-XII intact; strength 5/5 in upper extremities, RLE weakness unable to raise off of the bed 4/5.  LLE 5/5 with some weakness.   Psych: Full affect; mood congruent  Skin: no visible rashes on limited examination      ASSESSMENT/PLAN    MAGDALENE YOST is a 69y woman with a recent history of mid to low back pain, and right sided hip pains likely secondary to metastatic lung cancer pathology pending.  Imaging results show two spiculated lung nodules in the RUL, a lytic right acetabular lesion, and T6/T11 lesions causing pain.   C3-C6 MRI shows epidural extension without cord involvement. I discussed with 4th year resident Jarrell who confirms that no neoplastic disease was seen in the C-spine    We plan for CT simulation today; the plan is  to treat the most heavily involved areas of the spine Tspine as well as the symptomatic right hip/acetabular lesion.  We discussed the use of palliative radiation in this setting, namely to improve quality of life through the reduction of symptoms.  We talked about the risks, benefits, acute and long term side effects, as well as expected treatment outcomes.  Ms Yost was given the opportunity to ask questions, which were answered to her apparent satisfaction.  She provided written consent to proceed with radiation therapy. We will arrange for inpatientt treatment. HPI:  68 y/o Lithuanian speaking F with no known PMH w/ recent ED (on 8/22) visit for back pain who presents to the hospital after being told to return for possible lytic lesions. RT consult was requested today for back pain.  Pathology is unavailable as CT guided biopsy was completed via I.R. at T11 lesion yesterday per floor RN.    The patient originally presented to the hospital for right knee pain x3 months (post trauma) as well as b/l back pain x2 months. The patient then received a x-ray of the hip/knee/L-spine and discharged. Lytic lesion was detected on her lateral R. acetabulum. The patient received a CT chest/A/P which noted 2 confluent spiculated nodules in the RUL with concern for primary lung cancer. In the spine on T11 noted with mild pathological fracture in addition to involvement of T3-T6 and posterior 6th rib.  pt w/ pain in back and hx of smoking for many years , concern for possible malignant lesion to bone will do ct chest abdomen pelvis and c spine. (23 Aug 2020 02:09)    She was evaluated by orthopedics and deemed no intervention.    She lives alone but has family in the area.    from: CT   IMPRESSION:    Two confluent spiculated nodules in the right upper lobe measuring up to 1.8 x 2.1 cm and 1.7 x 2.1 cm respectively highly concerning for primary lung malignancy.  Lytic metastatic lesions with extraosseous soft tissue extension involving the right acetabular roof, T11 vertebral body with involvement of the left-sided pedicle and transverse process, left posterior 11th rib, T6 left-sided pedicle and transverse process and left posterior sixth rib. Mild extraosseous soft tissue extension into the epidural space at T11 and T6. Mild pathologic compression of the T11 vertebral body. Dedicated MRI of the thoracic spine with contrast should be considered for better characterization.  Indeterminate left adrenal nodule.  < from: MR Thoracic Spine w/wo IV Cont (08.24.20 @ 21:38) >  There is evidence of abnormal signal seen involving the left pedicle, left lamina and left transverse process of C3-C6 with associated enhancement. This finding does extend demonstrate epidural extension on the left side without spinal cord compression at this time.  There is evidence of small focus of abnormal T1 prolongation with associated enhancement involving the left T7 vertebral body. This is best seen on series 3 image 9 and series 6/5 image 19.  Abnormal T1 prolongation seen involving the inferior endplate of T10 on left side with involvement of the left pedicle and transverse process. This finding does extend into the left T10-11 neural foramen as well as the left paraspinal soft tissue.  Abnormal T1 prolongation with associated enhancement is seen involving the T11 vertebral body. Involvement of the left pedicle and left T11-12 neural foramen is seen. There is evidence of a large bulky soft tissue mass in the left paravertebralregion at the T11 level. Tumor involves the adjacent posterior left T11 rib as well.      EXAM:  CT CHEST/ABDOMEN AND PELVIS    PROCEDURE DATE:  Aug 22 2020   FINDINGS:  CHEST:  LUNGS AND LARGE AIRWAYS: Patent central airways. Two confulent spiculated nodules in the right upper lobe measuring 1.8x 2.1 cm and 1.7 x 2.1 cm in maximal axial dimensions (4:124 and 4:113). Left lower lung linear subsegmental atelectasis.  PLEURA: No pleural effusion.  VESSELS: Within normal limits.  HEART: Heart is mildly enlarged. Small pericardial effusion.  MEDIASTINUM AND OZIEL: No lymphadenopathy.  CHEST WALL AND LOWER NECK: Heterogeneous appearance of bilateral thyroid lobes, better evaluated on CT cervical spine from same day.    ABDOMEN AND PELVIS:  Evaluation of the solid organs is limited without the administration of IV contrast.  LIVER: Few scattered subcentimeter low-attenuation lesions that are too small to characterize.  BILE DUCTS: Normal caliber.  GALLBLADDER: Within normal limits.  SPLEEN: Within normal limits.  PANCREAS: Within normal limits.  ADRENALS: Indeterminate 1.7 cm nodule in the left adrenal gland.  KIDNEYS/URETERS: Left upper pole renal cyst. Right kidney subcentimeter round hyperdense and hypodense lesions are indeterminate, but may represent hemorrhagic/proteinaceous cyst.No right or left hydronephrosis. Punctate nonobstructing left intrarenal calculus.    BLADDER: Within normal limits.  REPRODUCTIVE ORGANS: Uterus and adnexa are grossly unremarkable.    BOWEL: No bowel obstruction. Appendix is not visualized. No evidence of inflammation in the pericecal region.  PERITONEUM: No ascites or pneumoperitoneum. No mesenteric lymphadenopathy.  VESSELS: Mild atherosclerotic calcifications of the abdominal aorta and iliac tree. Normal caliber abdominal aorta.  RETROPERITONEUM/LYMPH NODES: No lymphadenopathy.  ABDOMINAL WALL: Within normal limits.  BONES: Lytic metastatic lesions with extraosseous soft tissue extension involving the right acetabular roof, T11 vertebral body with involvement of the left-sided pedicle and transverse process, as well as, the posterior left 11th rib. Mild pathologic compression of the T11 vertebral body and mild extraosseous ventral epidural soft tissue extension at T11. Additional lytic lesion with extraosseous soft tissue at T6 involving the left-sided pedicle and transverse process transverse process, as well as, left posterior sixth rib with mild epidural soft tissue extension.    Permeative lytic and exophytic destructive lesion of lateral right acetabular roof and lower rightiliac wing, measuring up to 2.9 x 4.7 x 4.1 cm (2:123). Additional permeative lytic destruction lesion of the posterior left 11th rib, measuring 2.3 x 1.7 x 2.5 cm (2:55). Additional lytic destructive lesions of the T11 vertebral body, left transverse process and pedicle. The lesion of the left transverse process and pedicle measures 3.0 x 1.9 x 3.1 cm (2:55). There is minimal extension into the spinal canal at this level without significant spinal canal stenosis, the full extent of which is not well evaluated on CT.    IMPRESSION:  Two confluent spiculated nodules in the right upper lobe measuring up to 1.8 x 2.1 cm and 1.7 x 2.1 cm respectively highly concerning for primary lung malignancy.  Lytic metastatic lesions with extraosseous soft tissue extension involving the right acetabular roof, T11 vertebral body with involvement of the left-sided pedicle and transverse process, left posterior 11th rib, T6 left-sided pedicle and transverse process and left posterior sixth rib. Mild extraosseous soft tissue extension into the epidural space at T11 and T6. Mild pathologic compression of the T11 vertebral body. Dedicated MRI of the thoracic spine with contrast should be considered for better characterization.  Indeterminate left adrenal nodule.    No Known Allergies    Intolerances        ROS: [  ] Fever  [  ] Chills  [  ]Chest Pain [  ] SOB  [  ]Cough [  ] N/V  [  ] Diarrhea [  ]Constipation [  ]Other ROS:  [  ] ROS otherwise negative    PAST MEDICAL & SURGICAL HISTORY:  No pertinent past medical history  No significant past surgical history      FAMILY HISTORY:  No pertinent family history in first degree relatives      MEDICATIONS  (STANDING):  lidocaine   Patch 1 Patch Transdermal daily  oxyCODONE  ER Tablet 10 milliGRAM(s) Oral every 12 hours  polyethylene glycol 3350 17 Gram(s) Oral daily  senna 2 Tablet(s) Oral at bedtime    MEDICATIONS  (PRN):  acetaminophen   Tablet .. 650 milliGRAM(s) Oral every 6 hours PRN Moderate Pain (4 - 6)  bisacodyl 5 milliGRAM(s) Oral every 12 hours PRN Constipation  oxyCODONE    IR 5 milliGRAM(s) Oral every 4 hours PRN Severe Pain (7 - 10)      PHYSICAL EXAM  KPS 50-60  Vital Signs Last 24 Hrs  T(C): 36.8 (26 Aug 2020 06:04), Max: 37 (25 Aug 2020 19:30)  T(F): 98.2 (26 Aug 2020 06:04), Max: 98.6 (25 Aug 2020 19:30)  HR: 81 (26 Aug 2020 06:04) (61 - 85)  BP: 131/72 (26 Aug 2020 06:04) (103/58 - 142/64)  BP(mean): --  RR: 17 (26 Aug 2020 06:04) (17 - 18)  SpO2: 97% (26 Aug 2020 06:04) (96% - 98%)    General: thin, no acute distress  HEENT: NC/AT; EOMI, PERRL, sclera nonicteric; external ears normal; no rhinorrhea or epistaxis; mucous membranes moist; oropharynx clear and without erythema  CV: NR, RR; no appreciable r/m/g  Lungs: scattered wheezing  Abdomen: Bowel sounds present; soft, NTND  MSK: Vertebral spine tender to palpation at the mid-low back.  also tenderness to the right hip region.   Neuro: AAOx3; cranial nerves II-XII intact; strength 5/5 in upper extremities, RLE weakness unable to raise off of the bed 4/5.  LLE 5/5 with some weakness.   Psych: Full affect; mood congruent  Skin: no visible rashes on limited examination      ASSESSMENT/PLAN    MAGDALENE GALLEGOS is a 69y woman with a recent history of mid to low back pain, and right sided hip pains likely secondary to metastatic lung cancer pathology pending.  Imaging results show two spiculated lung nodules in the RUL, a lytic right acetabular lesion, and T6/T11 lesions causing pain.   T3-T6 MRI ( original report had a typo, describing C3-C6 involvement) shows epidural extension without cord involvement. I discussed with 4th year resident Jarrell who confirms that no neoplastic disease was seen in the C-spine. Addendum issued by dr De La Paz.    We plan for CT simulation today; the plan is  to treat the most heavily involved areas of the upper and lower  T-spine as well as the symptomatic right hip/acetabular lesion.  We discussed the use of palliative radiation in this setting, namely to improve quality of life through the reduction of symptoms.  We talked about the risks, benefits, acute and long term side effects, as well as expected treatment outcomes.  Ms Gallegos was given the opportunity to ask questions, which were answered to her apparent satisfaction.  She provided written consent to proceed with radiation therapy. We will arrange for inpatientt treatment.

## 2020-08-26 NOTE — PROGRESS NOTE ADULT - PROBLEM SELECTOR PLAN 2
Multiple lytic lesions of cervival/thoracic spine  -appreciate neuro surg recs: no intervention. rad onc consult  -appreciate rad onc recs Multiple lytic lesions of thoracic spine  -appreciate neuro surg recs: no intervention. rad onc consult  -appreciate rad onc recs: CT simulation today and plan for palliative radiation of T spine and R hip

## 2020-08-26 NOTE — PROGRESS NOTE ADULT - SUBJECTIVE AND OBJECTIVE BOX
S: Patient seen and examined at bedside. Reports that her pain is not adequately controlled    O:  Vital Signs Last 24 Hrs  T(C): 36.8 (26 Aug 2020 06:04), Max: 37 (25 Aug 2020 19:30)  T(F): 98.2 (26 Aug 2020 06:04), Max: 98.6 (25 Aug 2020 19:30)  HR: 81 (26 Aug 2020 06:04) (61 - 85)  BP: 131/72 (26 Aug 2020 06:04) (103/58 - 142/64)  BP(mean): --  RR: 17 (26 Aug 2020 06:04) (17 - 18)  SpO2: 97% (26 Aug 2020 06:04) (96% - 98%)                      11.9   9.93  )-----------( 305      ( 25 Aug 2020 05:57 )             39.7   08-25    139  |  99  |  15  ----------------------------<  98  3.9   |  24  |  0.41<L>    Ca    9.5      25 Aug 2020 05:57  Phos  3.6     08-25  Mg     1.8     08-25    TPro  6.8  /  Alb  3.9  /  TBili  0.3  /  DBili  x   /  AST  24  /  ALT  17  /  AlkPhos  97  08-25      PHYSICAL EXAM:    Gen: NAD, AAOx3    Right Lower Extremity:  +EHL/FHL/TA/GS  SILT L3-S1  +DP/PT Pulses  Compartments soft  No calf TTP B/L    AP: 70yo female with right acetabular lesion likely secondary to metastatic lung cancer with extensive metastases to the spine  -No acute orthopedic intervention, please page [phone] with further questions   -Radiation therapy  -Pain control, recommend pain consult given patient's increasing pain and metastatic disease   -LENCHO RUSHING

## 2020-08-26 NOTE — PROGRESS NOTE ADULT - ATTENDING COMMENTS
Patient was seen and examined personally by me. I have discussed the plan and reviewed the resident's note and agree with the above physical exam findings including assessment and plan except as indicated below.    69 year old woman with no PMH p/w R hip pain. Found to have lytic lesions of R acetabular roof and lower R iliac wing and CT chest revealing two spiculated lung lesions. Suspect possible primary lung cancer w/ mets to spine, r/o myeloma    Patient seen at bedside with daughter in mild distress due to pain requesting pain meds  patient in bed in no distress  s1/s2  lungs clear  no LE edema  TTP to right hip    S/p IR planning CT guided biopsy of T11 vertebral body 8/25  CT simulation started on 8/26 by Rad Onc.  MRI brain with possible sebaceous cyst and abnml enhancing lesion along CP angle possibly meningioma, underlying dural mets can't be excluded, MRI spine w/ IV contrast with abnml lesions and large bulky soft tissue mass in left paravertebral region at T11 also involving T11 rib.  CEA and CA 19-9 elevated  F/u SPEP, UPEP, serum and urine immunofixation, serum and urine free light chains  MRI abdomen w/ contrast pending for further workup per Onc recs.   MRI pelvis reviewed. No surgical plans by ortho or neurosurgery at this time  PT recs: home PT  DC planning after inpatient RT completed.  Discussed plan with daughter, Veda ([phone]) who requests call from Radiation oncologist.

## 2020-08-26 NOTE — PHYSICAL THERAPY INITIAL EVALUATION ADULT - ADDITIONAL COMMENTS
Pt lives with her son and daughter in private home, pt was using rolling walker prior to admission. pt also owns a cane. Pt states there are no steps she needs to negotiate, Pt occasionally requires assist with ADL she states.

## 2020-08-26 NOTE — PROGRESS NOTE ADULT - SUBJECTIVE AND OBJECTIVE BOX
*INCOMPLETE NOTE*  *******************************************************  Armen Medina MD PGY-1  Internal Medicine  Pager [phone] / [phone]  *******************************************************  Patient is a 69y old  Female who presents with a chief complaint of Lytic bone lesions (26 Aug 2020 06:09)      -Ortho sign off  -Neurosurg NTD  -consulting rad onc      NOTE TO BE UPDATED AFTER ROUNDS *******************************************************  Armen Medina MD PGY-1  Internal Medicine  Pager [phone] / [phone]  *******************************************************  Patient is a 69y old  Female who presents with a chief complaint of Lytic bone lesions (26 Aug 2020 09:20)      SUBJECTIVE / OVERNIGHT EVENTS:  - No acute events overnight.   - Patient away at MRI when I went to evaluate this AM.    -------------------------------------------------------------------------------------------------------------  MEDICATIONS  (STANDING):  enoxaparin Injectable 40 milliGRAM(s) SubCutaneous daily  lidocaine   Patch 1 Patch Transdermal daily  oxyCODONE  ER Tablet 10 milliGRAM(s) Oral every 12 hours  polyethylene glycol 3350 17 Gram(s) Oral daily  senna 2 Tablet(s) Oral at bedtime    MEDICATIONS  (PRN):  acetaminophen   Tablet .. 650 milliGRAM(s) Oral every 6 hours PRN Moderate Pain (4 - 6)  bisacodyl 5 milliGRAM(s) Oral every 12 hours PRN Constipation  oxyCODONE    IR 5 milliGRAM(s) Oral every 4 hours PRN Severe Pain (7 - 10)    -------------------------------------------------------------------------------------------------------------    OBJECTIVE:    PHYSICAL EXAM:  Vital Signs Last 24 Hrs  T(C): 37.4 (26 Aug 2020 12:57), Max: 37.4 (26 Aug 2020 12:57)  T(F): 99.4 (26 Aug 2020 12:57), Max: 99.4 (26 Aug 2020 12:57)  HR: 90 (26 Aug 2020 12:57) (77 - 90)  BP: 126/62 (26 Aug 2020 12:57) (125/65 - 142/64)  RR: 18 (26 Aug 2020 12:57) (17 - 18)  SpO2: 97% (26 Aug 2020 12:57) (96% - 98%)      -------------------------------------------------------------------------------------------------------------  LABS:                        11.3   10.88 )-----------( 287      ( 26 Aug 2020 06:30 )             36.1     08-26    139  |  98  |  13  ----------------------------<  95  3.8   |  26  |  0.39<L>    Ca    9.1      26 Aug 2020 06:30  Phos  3.3     08-26  Mg     1.7     08-26    TPro  6.7  /  Alb  x   /  TBili  x   /  DBili  x   /  AST  x   /  ALT  x   /  AlkPhos  x   08-26    PT/INR - ( 26 Aug 2020 06:30 )   PT: 11.9 SEC;   INR: 1.05          PTT - ( 26 Aug 2020 06:30 )  PTT:28.1 SEC            RADIOLOGY & ADDITIONAL TESTS:  Results Reviewed:     Imaging Personally Reviewed:  Electrocardiogram Personally Reviewed:      COORDINATION OF CARE:  Care Discussed with Consultants/Other Providers [Y/N]:   Prior or Outpatient Records Reviewed [Y/N]:   ------------------------------------------------------------------------------------------------------------- *******************************************************  Armen Medina MD PGY-1  Internal Medicine  Pager [phone] / [phone]  *******************************************************  Patient is a 69y old  Female who presents with a chief complaint of Lytic bone lesions (26 Aug 2020 09:20)      SUBJECTIVE / OVERNIGHT EVENTS:  - No acute events overnight.   - Patient away at MRI when I went to evaluate this AM.  - This afternoon no complaints but had pain this AM. Encouraged patient to ask for pain medications that are ordered.  -Denies fevers/chills, headache, SOB at rest, chest pain, palpitations, abdominal pain, nausea/vomiting/diarrhea/constipation, dysuria, abnormal sensations, numbness/tingling, bowel/bladder incontinence.    -------------------------------------------------------------------------------------------------------------  MEDICATIONS  (STANDING):  enoxaparin Injectable 40 milliGRAM(s) SubCutaneous daily  lidocaine   Patch 1 Patch Transdermal daily  oxyCODONE  ER Tablet 10 milliGRAM(s) Oral every 12 hours  polyethylene glycol 3350 17 Gram(s) Oral daily  senna 2 Tablet(s) Oral at bedtime    MEDICATIONS  (PRN):  acetaminophen   Tablet .. 650 milliGRAM(s) Oral every 6 hours PRN Moderate Pain (4 - 6)  bisacodyl 5 milliGRAM(s) Oral every 12 hours PRN Constipation  oxyCODONE    IR 5 milliGRAM(s) Oral every 4 hours PRN Severe Pain (7 - 10)    -------------------------------------------------------------------------------------------------------------    OBJECTIVE:    PHYSICAL EXAM:  Vital Signs Last 24 Hrs  T(C): 37.4 (26 Aug 2020 12:57), Max: 37.4 (26 Aug 2020 12:57)  T(F): 99.4 (26 Aug 2020 12:57), Max: 99.4 (26 Aug 2020 12:57)  HR: 90 (26 Aug 2020 12:57) (77 - 90)  BP: 126/62 (26 Aug 2020 12:57) (125/65 - 142/64)  RR: 18 (26 Aug 2020 12:57) (17 - 18)  SpO2: 97% (26 Aug 2020 12:57) (96% - 98%)    CONSTITUTIONAL: NAD, well-developed  HEENT: NCAT, EOMI, PERRLA, no scleral icterus, MMM  RESPIRATORY/CHEST: Normal respiratory effort; lungs are clear to auscultation bilaterally; no wheezing/crackles  CARDIO: Regular rate, normal S1 and S2, no murmur/rub/gallop; No JVD  VASCULAR: No lower extremity edema; Peripheral pulses are 2+ bilaterally; Capillary refill brisk  ABDOMEN: Soft, nontender to palpation, normoactive bowel sounds, no rebound/guarding; No hepatosplenomegaly  MUSCLOSKELETAL: [+]unable to lift right leg due to hip pain  EXTREMITIES: hands/feet are warm, and without cyanosis or clubbing  SKIN: no visible rashes, pallor, diaphoresis, or jaundice  NEURO: No focal deficits; moving all extremities  PSYCH: A+O to person, place, and time; affect appropriate; cooperative  -------------------------------------------------------------------------------------------------------------  LABS:                        11.3   10.88 )-----------( 287      ( 26 Aug 2020 06:30 )             36.1     08-26    139  |  98  |  13  ----------------------------<  95  3.8   |  26  |  0.39<L>    Ca    9.1      26 Aug 2020 06:30  Phos  3.3     08-26  Mg     1.7     08-26    TPro  6.7  /  Alb  x   /  TBili  x   /  DBili  x   /  AST  x   /  ALT  x   /  AlkPhos  x   08-26    PT/INR - ( 26 Aug 2020 06:30 )   PT: 11.9 SEC;   INR: 1.05          PTT - ( 26 Aug 2020 06:30 )  PTT:28.1 SEC            RADIOLOGY & ADDITIONAL TESTS:  Results Reviewed:     Imaging Personally Reviewed:  Electrocardiogram Personally Reviewed:      COORDINATION OF CARE:  Care Discussed with Consultants/Other Providers [Y/N]:   Prior or Outpatient Records Reviewed [Y/N]:   -------------------------------------------------------------------------------------------------------------

## 2020-08-26 NOTE — PHYSICAL THERAPY INITIAL EVALUATION ADULT - PERTINENT HX OF CURRENT PROBLEM, REHAB EVAL
68 y/o [place] speaking F with no known PMH w/ recent ED (on 8/22) visit for back pain who presents to the hospital after being told to return for possible lytic lesion

## 2020-08-26 NOTE — PROGRESS NOTE ADULT - PROBLEM SELECTOR PLAN 4
Also lesions in adrenal gland and thyroid  -f/u labs - TSH, Uric acid, LDH, PTH, ESR/CRP, SPEP/UPEP, alk phos, iCa, TSH, Ca19-9, CEA  -f/u MRI abdomen adrenal protocol  -onc/palliative c/s as above Statement Selected

## 2020-08-26 NOTE — PROGRESS NOTE ADULT - PROBLEM SELECTOR PLAN 5
ppx: holding lovenox pending procedure(s)  diet: regular  Dispo: admission for malignancy work up ppx: lovenox  diet: regular  Dispo: admission for malignancy work up

## 2020-08-27 NOTE — PROGRESS NOTE ADULT - PROBLEM SELECTOR PLAN 2
Multiple lytic lesions of thoracic spine  -appreciate neuro surg recs: no intervention. rad onc consult  -appreciate rad onc recs: meditation therapy inpatient until 9/3

## 2020-08-27 NOTE — PROGRESS NOTE ADULT - PROBLEM SELECTOR PLAN 1
Recent x-ray with lytic lesion in acetabulum on CT A/P involvement of T11 and 6th rib, concern for MM vs other cancerous process    Plan:  -MRI brain /spine with multiple lesions   -family history of malignancy in 2 sisters ([facility])  -appreciate oncology recs  -appreciate palliative recs   -appreciate rad onc recs

## 2020-08-27 NOTE — PROGRESS NOTE ADULT - PROBLEM SELECTOR PLAN 4
Also lesions in adrenal gland and thyroid  -f/u labs - TSH, Uric acid, LDH, PTH, ESR/CRP, SPEP/UPEP, alk phos, iCa, TSH, Ca19-9, CEA  - elevated kappa/lambda light chain ratio  -SPEP without M spike  -MRI abdomen - adrenal adenoma  -onc/palliative/rad-onc c/s as above

## 2020-08-27 NOTE — PROGRESS NOTE ADULT - SUBJECTIVE AND OBJECTIVE BOX
*INCOMPLETE NOTE*  *******************************************************  Armen Medina MD PGY-1  Internal Medicine  Pager [phone] / [phone]  *******************************************************  Patient is a 69y old  Female who presents with a chief complaint of Lytic bone lesions (26 Aug 2020 09:20)      -no  available on PI  -pain overnight, encouraged to ask for pain meds up to 6x/day  -spoke to rad onc. patient to stay through next thurs for radx    NOTE TO BE UPDATED AFTER ROUNDS *******************************************************  Armen Medina MD PGY-1  Internal Medicine  Pager [phone] / [phone]  *******************************************************  Patient is a 69y old  Female who presents with a chief complaint of Lytic bone lesions (27 Aug 2020 09:45)    No [place] interpreters available this AM    SUBJECTIVE / OVERNIGHT EVENTS:  - No acute events overnight.   - Patient seen and evaluated at bedside.  - Endorses pain this AM, again encouraged to ask for pain medications up to 6x day  - Denies fevers/chills, headache, SOB at rest, chest pain, palpitations, abdominal pain, nausea/vomiting/diarrhea/constipation, dysuria    -------------------------------------------------------------------------------------------------------------  MEDICATIONS  (STANDING):  enoxaparin Injectable 40 milliGRAM(s) SubCutaneous daily  lidocaine   Patch 1 Patch Transdermal daily  oxyCODONE  ER Tablet 10 milliGRAM(s) Oral every 12 hours  polyethylene glycol 3350 17 Gram(s) Oral daily  senna 2 Tablet(s) Oral at bedtime    MEDICATIONS  (PRN):  acetaminophen   Tablet .. 650 milliGRAM(s) Oral every 6 hours PRN Moderate Pain (4 - 6)  bisacodyl 5 milliGRAM(s) Oral every 12 hours PRN Constipation  oxyCODONE    IR 5 milliGRAM(s) Oral every 4 hours PRN Severe Pain (7 - 10)    -------------------------------------------------------------------------------------------------------------    OBJECTIVE:    PHYSICAL EXAM:  Vital Signs Last 24 Hrs  T(C): 37.6 (27 Aug 2020 12:52), Max: 37.7 (26 Aug 2020 21:52)  T(F): 99.6 (27 Aug 2020 12:52), Max: 99.8 (26 Aug 2020 21:52)  HR: 94 (27 Aug 2020 12:52) (81 - 94)  BP: 111/66 (27 Aug 2020 12:52) (106/60 - 123/72)  RR: 17 (27 Aug 2020 12:52) (16 - 18)  SpO2: 97% RA (27 Aug 2020 12:52) (96% - 97%)    CONSTITUTIONAL: NAD, well-developed  HEENT: NCAT, EOMI, PERRLA, no scleral icterus, MMM  RESPIRATORY/CHEST: Normal respiratory effort; lungs are clear to auscultation bilaterally; no wheezing/crackles  CARDIO: Regular rate, normal S1 and S2, no murmur/rub/gallop; No JVD  VASCULAR: No lower extremity edema; Peripheral pulses are 2+ bilaterally; Capillary refill brisk  ABDOMEN: Soft, nontender to palpation, normoactive bowel sounds, no rebound/guarding; No hepatosplenomegaly  MUSCLOSKELETAL: [+]unable to lift right leg due to hip pain  EXTREMITIES: hands/feet are warm, and without cyanosis or clubbing  SKIN: no visible rashes, pallor, diaphoresis, or jaundice  NEURO: No focal deficits; moving all extremities  PSYCH: A+O to person, place, and time; affect appropriate; cooperative  -------------------------------------------------------------------------------------------------------------  LABS:                        11.1   11.51 )-----------( 279      ( 27 Aug 2020 06:30 )             35.5     08-27    137  |  97<L>  |  11  ----------------------------<  104<H>  3.9   |  28  |  0.42<L>    Ca    9.1      27 Aug 2020 06:30  Phos  3.3     08-27  Mg     1.8     08-27    TPro  6.4  /  Alb  3.5  /  TBili  0.3  /  DBili  x   /  AST  20  /  ALT  14  /  AlkPhos  87  08-27    PT/INR - ( 26 Aug 2020 06:30 )   PT: 11.9 SEC;   INR: 1.05          PTT - ( 26 Aug 2020 06:30 )  PTT:28.1 SEC      COORDINATION OF CARE:  Care Discussed with Consultants/Other Providers [Y/N]: Radiation oncology, medical oncology  -------------------------------------------------------------------------------------------------------------

## 2020-08-27 NOTE — PROGRESS NOTE ADULT - ATTENDING COMMENTS
Patient was seen and examined personally by me. I have discussed the plan and reviewed the resident's note and agree with the above physical exam findings including assessment and plan except as indicated below.    69 year old woman with no PMH p/w R hip pain. Found to have lytic lesions of R acetabular roof and lower R iliac wing and CT chest revealing two spiculated lung lesions. Suspect possible primary lung cancer w/ mets to spine, r/o myeloma    S/p IR planning CT guided biopsy of T11 vertebral body 8/25. Awaiting path  CT simulation started on 8/26 by Rad Onc. RT sessions to spine and right hip to start 8/27 and to complete 5 inpt sessions, ending on 9/3  SPEP with hypoalbuminemia and is consistent with acute phase rxn.   F/u UPEP, serum and urine immunofixation, serum free light chains, K/L FLC ratio in urine elevated  MRI abdomen done noncontrast with 1.8cm left adrenal adenoma  PT recs: home PT. Ongoing PT eval  DC planning after inpatient RT completed. No plans currnetly by NSx or ortho  Dr. Burroughs discussed with family

## 2020-08-28 NOTE — PROGRESS NOTE ADULT - PROBLEM SELECTOR PLAN 3
Two spiculated nodules in RUL with concern for primary lung lesion  -oncology referral as above  -currently afebrile, in no respiratory distress  -cough today - f/u CXR Two spiculated nodules in RUL with concern for primary lung lesion  -oncology referral as above  -currently afebrile, in no respiratory distress    ##cough today  -Xray without obvious consolidation, chest US with B lines at bases unclear if atelectasis 2/2 splinting vs. COPD vs. pneumonia. Currently afebrile and cough "phlegmy" but non-productive. Will trial with incentive spirometry, duonebs, and robitussin and watch closely for signs of developing pneumonia.

## 2020-08-28 NOTE — PROGRESS NOTE ADULT - SUBJECTIVE AND OBJECTIVE BOX
*INCOMPLETE NOTE*  *******************************************************  Armen Medina MD PGY-1  Internal Medicine  Pager [phone] / [phone]  *******************************************************  Patient is a 69y old  Female who presents with a chief complaint of Lytic bone lesions (27 Aug 2020 09:45)          NOTE TO BE UPDATED AFTER ROUNDS        [id number]    -cough this AM (mildly productive)  -constipation this AM  -no esophagitis, N/V  -no bowel/bladder incontinence    +crackles  +irregular rhythm  +nystagmus +crackles  +irregular rhythm  +nystagmus    *******************************************************  Armen Medina MD PGY-1  Internal Medicine  Pager [phone] / [phone]  *******************************************************  Patient is a 69y old  Female who presents with a chief complaint of Lytic bone lesions (28 Aug 2020 09:38)    PI Azeri: #[id number]    SUBJECTIVE / OVERNIGHT EVENTS:  - No acute events overnight.   - Patient seen and evaluated at bedside.  - complaining of phlegm in chest with nonproductive cough. Also concerned about constipation  - Denies fevers/chills, headache, mouth pain, dysphagia/odynophagia, SOB at rest, chest pain, palpitations, abdominal pain, nausea/vomiting/diarrhea, melena/hematochezia, bowell/bladder incontinence dysuria, and hematuria    -------------------------------------------------------------------------------------------------------------    MEDICATIONS  (STANDING):  albuterol/ipratropium for Nebulization 3 milliLiter(s) Nebulizer every 6 hours  enoxaparin Injectable 40 milliGRAM(s) SubCutaneous daily  guaiFENesin   Syrup  (Sugar-Free) 100 milliGRAM(s) Oral every 6 hours  lidocaine   Patch 1 Patch Transdermal daily  oxyCODONE  ER Tablet 10 milliGRAM(s) Oral every 8 hours  polyethylene glycol 3350 17 Gram(s) Oral daily  senna 2 Tablet(s) Oral at bedtime    MEDICATIONS  (PRN):  acetaminophen   Tablet .. 650 milliGRAM(s) Oral every 6 hours PRN Moderate Pain (4 - 6)  bisacodyl 5 milliGRAM(s) Oral every 12 hours PRN Constipation  bisacodyl Suppository 10 milliGRAM(s) Rectal daily PRN Constipation  oxyCODONE    IR 7.5 milliGRAM(s) Oral every 3 hours PRN Severe Pain (7 - 10)      -------------------------------------------------------------------------------------------------------------    OBJECTIVE:    PHYSICAL EXAM:  T(F): 98.1, Max: 99.2 (08-28-20 @ 04:48)  HR: 84 (79 - 84)  BP: 110/60 (110/60 - 123/62)  RR: 18 (17 - 18)  SpO2: 98% (96% - 98%)      CONSTITUTIONAL: NAD, well-developed  HEENT: NCAT, EOMI, PERRLA, no scleral icterus, MMM  RESPIRATORY/CHEST: [+] crackles bilateral mid-lower lobes. Normal respiratory effort; no wheezing  CARDIO: [+] Irregular rate, normal S1 and S2, no murmur/rub/gallop; No JVD  VASCULAR: No lower extremity edema; Peripheral pulses are 2+ bilaterally; Capillary refill brisk  ABDOMEN: Soft, nontender to palpation, normoactive bowel sounds, no rebound/guarding; No hepatosplenomegaly  MUSCULOSKELETAL: [+] unable to lift right leg due to hip pain  EXTREMITIES: hands/feet are warm, and without cyanosis or clubbing  SKIN: no visible rashes, pallor, diaphoresis, or jaundice  NEURO: [+] horizontal nystagmus. No focal deficits; moving all extremities  PSYCH: A+O to person, place, and time; affect appropriate; cooperative    -------------------------------------------------------------------------------------------------------------  LABS:                        11.0   11.15 )-----------( 264      ( 28 Aug 2020 06:45 )             34.3     08-28    136  |  96<L>  |  12  ----------------------------<  98  3.8   |  26  |  0.36<L>    Ca    9.1      28 Aug 2020 06:45  Phos  3.1     08-28  Mg     1.9     08-28    TPro  6.2  /  Alb  3.1<L>  /  TBili  0.3  /  DBili  x   /  AST  17  /  ALT  17  /  AlkPhos  88  08-28    PT/INR - ( 28 Aug 2020 06:45 )   PT: 13.6 SEC;   INR: 1.20     PTT - ( 28 Aug 2020 06:45 )  PTT:29.8 SEC            RADIOLOGY & ADDITIONAL TESTS:  Results Reviewed:   -SPEP -  -immunofixation -  -Urine Kappa/Lambda ratio +  -Serum Kappa/Lambda ratio -    Imaging Personally Reviewed:  -Bedside POCUS:   --R base: B-lines diffusely over middle/lower lobes, +curtain sign, no pleural effusion  --L base: B-lines diffusely over lower lobe, air-bronchograms (unclear if inspiratory change), +curtain sign, no pleural effusion    Electrocardiogram Personally Reviewed:      COORDINATION OF CARE:  Care Discussed with Consultants/Other Providers [Y/N]:   Prior or Outpatient Records Reviewed [Y/N]:   ------------------------------------------------------------------------------------------------------------- *******************************************************  Armen Medina MD PGY-1  Internal Medicine  Pager [phone] / [phone]  *******************************************************  Patient is a 69y old  Female who presents with a chief complaint of Lytic bone lesions (28 Aug 2020 09:38)    PI Egyptian: #[id number]    SUBJECTIVE / OVERNIGHT EVENTS:  - No acute events overnight.   - Patient seen and evaluated at bedside.  - complaining of phlegm in chest with nonproductive cough. Also concerned about constipation  - Denies fevers/chills, headache, mouth pain, dysphagia/odynophagia, SOB at rest, chest pain, palpitations, abdominal pain, nausea/vomiting/diarrhea, melena/hematochezia, bowell/bladder incontinence dysuria, and hematuria    -------------------------------------------------------------------------------------------------------------    MEDICATIONS  (STANDING):  albuterol/ipratropium for Nebulization 3 milliLiter(s) Nebulizer every 6 hours  enoxaparin Injectable 40 milliGRAM(s) SubCutaneous daily  guaiFENesin   Syrup  (Sugar-Free) 100 milliGRAM(s) Oral every 6 hours  lidocaine   Patch 1 Patch Transdermal daily  oxyCODONE  ER Tablet 10 milliGRAM(s) Oral every 8 hours  polyethylene glycol 3350 17 Gram(s) Oral daily  senna 2 Tablet(s) Oral at bedtime    MEDICATIONS  (PRN):  acetaminophen   Tablet .. 650 milliGRAM(s) Oral every 6 hours PRN Moderate Pain (4 - 6)  bisacodyl 5 milliGRAM(s) Oral every 12 hours PRN Constipation  bisacodyl Suppository 10 milliGRAM(s) Rectal daily PRN Constipation  oxyCODONE    IR 7.5 milliGRAM(s) Oral every 3 hours PRN Severe Pain (7 - 10)      -------------------------------------------------------------------------------------------------------------    OBJECTIVE:    PHYSICAL EXAM:  T(F): 98.1, Max: 99.2 (08-28-20 @ 04:48)  HR: 84 (79 - 84)  BP: 110/60 (110/60 - 123/62)  RR: 18 (17 - 18)  SpO2: 98% (96% - 98%)      CONSTITUTIONAL: NAD, well-developed  HEENT: NCAT, EOMI, PERRLA, no scleral icterus, MMM  RESPIRATORY/CHEST: [+] crackles bilateral mid-lower lobes. Normal respiratory effort; no wheezing  CARDIO: [+] Irregular rate, normal S1 and S2, no murmur/rub/gallop; No JVD  VASCULAR: No lower extremity edema; Peripheral pulses are 2+ bilaterally; Capillary refill brisk  ABDOMEN: Soft, nontender to palpation, normoactive bowel sounds, no rebound/guarding; No hepatosplenomegaly  MUSCULOSKELETAL: [+] unable to lift right leg due to hip pain  EXTREMITIES: hands/feet are warm, and without cyanosis or clubbing  SKIN: no visible rashes, pallor, diaphoresis, or jaundice  NEURO: [+] horizontal nystagmus. No focal deficits; moving all extremities  PSYCH: A+O to person, place, and time; affect appropriate; cooperative    -------------------------------------------------------------------------------------------------------------  LABS:                        11.0   11.15 )-----------( 264      ( 28 Aug 2020 06:45 )             34.3     08-28    136  |  96<L>  |  12  ----------------------------<  98  3.8   |  26  |  0.36<L>    Ca    9.1      28 Aug 2020 06:45  Phos  3.1     08-28  Mg     1.9     08-28    TPro  6.2  /  Alb  3.1<L>  /  TBili  0.3  /  DBili  x   /  AST  17  /  ALT  17  /  AlkPhos  88  08-28    PT/INR - ( 28 Aug 2020 06:45 )   PT: 13.6 SEC;   INR: 1.20     PTT - ( 28 Aug 2020 06:45 )  PTT:29.8 SEC            RADIOLOGY & ADDITIONAL TESTS:  Results Reviewed:   -SPEP -  -immunofixation -  -Urine Kappa/Lambda ratio +  -Serum Kappa/Lambda ratio -    Imaging Personally Reviewed:  -Bedside POCUS:   --R base: B-lines diffusely over middle/lower lobes, +curtain sign, no pleural effusion  --L base: B-lines diffusely over lower lobe, air-bronchograms (unclear if inspiratory change), +curtain sign, no pleural effusion    Electrocardiogram Personally Reviewed:      COORDINATION OF CARE:  Care Discussed with Consultants/Other Providers [Y/N]:   Prior or Outpatient Records Reviewed [Y/N]:   -------------------------------------------------------------------------------------------------------------

## 2020-08-28 NOTE — PROGRESS NOTE ADULT - ATTENDING COMMENTS
Patient was seen and examined personally by me. I have discussed the plan and reviewed the resident's note and agree with the above physical exam findings including assessment and plan except as indicated below.    69 year old woman with no PMH p/w R hip pain. Found to have lytic lesions of R acetabular roof and lower R iliac wing and CT chest revealing two spiculated lung lesions. Suspect possible primary lung cancer w/ mets to spine, r/o myeloma    Needs better pain control. Oxy ER increased to 10mg q8 from BID. Increased PRN oxy IR to 7.5mg q3 PRN for severe pain  S/p IR planning CT guided biopsy of T11 vertebral body 8/25. Awaiting path  CT simulation started on 8/26 by Rad Onc. 5 RT sessions to spine and right hip started 8/27 and to complete on 9/3  SPEP with hypoalbuminemia and is consistent with acute phase rxn. Serum and urine immunofixation with no monoclonal components  F/u UPEP, serum free light chains, K/L FLC ratio in urine elevated  MRI abdomen done noncontrast with 1.8cm left adrenal adenoma  PT recs: home PT. Ongoing PT eval  DC planning after inpatient RT completed. No plans currently by NSx or ortho  Emotional support provided to patient Patient was seen and examined personally by me. I have discussed the plan and reviewed the resident's note and agree with the above physical exam findings including assessment and plan except as indicated below.    69 year old woman with no PMH p/w R hip pain. Found to have lytic lesions of R acetabular roof and lower R iliac wing and CT chest revealing two spiculated lung lesions. Suspect possible primary lung cancer w/ mets to spine, r/o myeloma    Needs better pain control. Oxy ER increased to 10mg q8 from BID. Increased PRN oxy IR to 7.5mg q3 PRN for severe pain  S/p IR planning CT guided biopsy of T11 vertebral body 8/25. Awaiting path  CT simulation started on 8/26 by Rad Onc. 5 RT sessions to spine and right hip started 8/27 and to complete on 9/3  SPEP with hypoalbuminemia and is consistent with acute phase rxn. Serum and urine immunofixation with no monoclonal components  F/u UPEP, serum free light chains, K/L FLC ratio in urine elevated  MRI abdomen done noncontrast with 1.8cm left adrenal adenoma  Notable cough on exam 8/28. Cough regimen ordered. CXR with no gross consolidations. Monitor off Abx for now  PT recs: home PT. Ongoing PT eval  DC planning after inpatient RT completed. No plans currently by NSx or ortho  Emotional support provided to patient

## 2020-08-28 NOTE — PROGRESS NOTE ADULT - PROBLEM SELECTOR PLAN 4
Also lesions in adrenal gland and thyroid  -f/u labs - TSH, Uric acid, LDH, PTH, ESR/CRP, SPEP/UPEP, alk phos, iCa, TSH, Ca19-9, CEA  - elevated kappa/lambda light chain ratio  -SPEP without M spike  -MRI abdomen - adrenal adenoma  -onc/palliative/rad-onc c/s as above  -f/u pathology report Also lesions in adrenal gland and thyroid  -f/u labs - TSH, Uric acid, LDH, PTH, ESR/CRP, SPEP/UPEP, alk phos, iCa, TSH, Ca19-9, CEA  - elevated urine kappa/lambda light chain ratio but normal in serum  -TSH wnl  -SPEP without M spike and immunofixation without monoclonal components  -MRI abdomen - adrenal adenoma but not protocoled for adrenals  -onc/palliative/rad-onc c/s as above  -f/u pathology report

## 2020-08-28 NOTE — PROGRESS NOTE ADULT - PROBLEM SELECTOR PLAN 1
Recent x-ray with lytic lesion in acetabulum on CT A/P involvement of T11 and 6th rib, concern for MM vs other cancerous process    Plan:  -MRI brain /spine with multiple lesions   -family history of malignancy in 2 sisters (CRC)  -appreciate oncology recs  -appreciate palliative recs   -appreciate rad onc recs: inpatient radx until 9/3

## 2020-08-28 NOTE — PROGRESS NOTE ADULT - PROBLEM SELECTOR PLAN 2
Multiple lytic lesions of thoracic spine  -appreciate neuro surg recs: no intervention. rad onc consult  -appreciate rad onc recs: radiation therapy inpatient until 9/3 - monitor for side effects Multiple lytic lesions of thoracic spine  -appreciate neuro surg recs: no intervention. rad onc consult  -appreciate rad onc recs: radiation therapy inpatient until 9/3 - monitor for side effects  -zofran for nauesa, sucrafalate for esophagitis  -if sudden change to neuro exam: evaluate for cord compression, urgent imaging

## 2020-08-28 NOTE — PROGRESS NOTE ADULT - SUBJECTIVE AND OBJECTIVE BOX
SUBJECTIVE AND OBJECTIVE:  pt took a few break through doses (3x in 24 hrs).  Pt receiving Rt.  No bm x 4 days.   INTERVAL HPI/OVERNIGHT EVENTS:    DNR on chart:   Allergies    No Known Allergies    Intolerances    MEDICATIONS  (STANDING):  albuterol/ipratropium for Nebulization 3 milliLiter(s) Nebulizer every 6 hours  enoxaparin Injectable 40 milliGRAM(s) SubCutaneous daily  guaiFENesin   Syrup  (Sugar-Free) 100 milliGRAM(s) Oral every 6 hours  lidocaine   Patch 1 Patch Transdermal daily  oxyCODONE  ER Tablet 10 milliGRAM(s) Oral every 8 hours  polyethylene glycol 3350 17 Gram(s) Oral daily  senna 2 Tablet(s) Oral at bedtime    MEDICATIONS  (PRN):  acetaminophen   Tablet .. 650 milliGRAM(s) Oral every 6 hours PRN Moderate Pain (4 - 6)  bisacodyl 5 milliGRAM(s) Oral every 12 hours PRN Constipation  bisacodyl Suppository 10 milliGRAM(s) Rectal daily PRN Constipation  oxyCODONE    IR 7.5 milliGRAM(s) Oral every 3 hours PRN Severe Pain (7 - 10)      ITEMS UNCHECKED ARE NOT PRESENT    PRESENT SYMPTOMS: [ ]Unable to obtain due to poor mentation   Source if other than patient:  [ ]Family   [ ]Team     Pain (Impact on QOL):  yes  Location: right hip  Minimal acceptable level (0-10 scale):        4/10    Aggravating factors: movement  Quality: dull  Radiation: none  Severity (0-10 scale):  7/10  Timing: comes and goes    Dyspnea:                           [ ]Mild [ ]Moderate [ ]Severe  Anxiety:                             [ ]Mild [ ]Moderate [ ]Severe  Fatigue:                             [ ]Mild [ x]Moderate [ ]Severe  Nausea:                             [ ]Mild [ ]Moderate [ ]Severe  Loss of appetite:              [ ]Mild [ x]Moderate [ ]Severe  Constipation:                    [ ]Mild [ x]Moderate [ ]Severe    PAIN AD Score:	  http://geriatrictoolkit.[employer].edu/cog/painad.pdf (Ctrl + left click to view)    Other Symptoms:  [x ]All other review of systems negative     Karnofsky Performance Score/Palliative Performance Status Version 2:   70      %    http://palliative.info/resource_material/PPSv2.pdf  PHYSICAL EXAM:  Vital Signs Last 24 Hrs  T(C): 36.7 (28 Aug 2020 14:29), Max: 37.3 (28 Aug 2020 04:48)  T(F): 98.1 (28 Aug 2020 14:29), Max: 99.2 (28 Aug 2020 04:48)  HR: 84 (28 Aug 2020 14:29) (79 - 84)  BP: 110/60 (28 Aug 2020 14:29) (110/60 - 123/62)  BP(mean): --  RR: 18 (28 Aug 2020 14:29) (17 - 18)  SpO2: 98% (28 Aug 2020 14:29) (96% - 98%) I&O's Summary   GENERAL:  [x ]Alert  [ x]Oriented x 3  [ ]Lethargic  [ ]Cachexia  [ ]Unarousable  [ ]Verbal  [ ]Non-Verbal    Behavioral:   [ ] Anxiety  [ ] Delirium [ ] Agitation [ ] Other    HEENT:  [ x]Normal   [ ]Dry mouth   [ ]ET Tube/Trach  [ ]Oral lesions    PULMONARY:   [x ]Clear [ ]Tachypnea  [ ]Audible excessive secretions   [ ]Rhonchi        [ ]Right [ ]Left [ ]Bilateral  [ ]Crackles        [ ]Right [ ]Left [ ]Bilateral  [ ]Wheezing     [ ]Right [ ]Left [ ]Bilateral    CARDIOVASCULAR:    [x ]Regular [ ]Irregular [ ]Tachy  [ ]Prasanth [ ]Murmur [ ]Other    GASTROINTESTINAL:  [ x]Soft  [ ]Distended   [ x]+BS  [ x]Non tender [ ]Tender  [ ]PEG [ ]OGT/ NGT   Last BM:  GENITOURINARY:  [ ]Normal [ ] Incontinent   [ ]Oliguria/Anuria   [ ]Zamorano    MUSCULOSKELETAL:   [ ]Normal   [x ]Weakness  [ ]Bed/Wheelchair bound [ ]Edema    NEUROLOGIC:   [x ]No focal deficits  [ ] Cognitive impairment  [ ] Dysphagia [ ]Dysarthria [ ] Paresis [ ]Other     SKIN:   [x ]Normal   [ ]Pressure ulcer(s)  [ ]Rash    CRITICAL CARE:  [ ] Shock Present  [ ]Septic [ ]Cardiogenic [ ]Neurologic [ ]Hypovolemic  [ ]  Vasopressors [ ]  Inotropes   [ ] Respiratory failure present  [ ] Acute  [ ] Chronic [ ] Hypoxic  [ ] Hypercarbic [ ] Other  [ ] Other organ failure     LABS:                        11.0   11.15 )-----------( 264      ( 28 Aug 2020 06:45 )             34.3   08-28    136  |  96<L>  |  12  ----------------------------<  98  3.8   |  26  |  0.36<L>    Ca    9.1      28 Aug 2020 06:45  Phos  3.1     08-28  Mg     1.9     08-28    TPro  6.2  /  Alb  3.1<L>  /  TBili  0.3  /  DBili  x   /  AST  17  /  ALT  17  /  AlkPhos  88  08-28  PT/INR - ( 28 Aug 2020 06:45 )   PT: 13.6 SEC;   INR: 1.20          PTT - ( 28 Aug 2020 06:45 )  PTT:29.8 SEC      RADIOLOGY & ADDITIONAL STUDIES:    Protein Calorie Malnutrition Present: [ ] yes [ ] no  [ ] PPSV2 < or = 30%  [ ] significant weight loss [ ] poor nutritional intake [ ] anasarca [ ] catabolic state Albumin, Serum: 3.1 g/dL (08-28-20 @ 06:45)  Artificial Nutrition [ ]     REFERRALS:   [ ]Chaplaincy  [ ] Hospice  [ ]Child Life  [ ]Social Work  [ ]Case management [ ]Holistic Therapy   Goals of Care Document:

## 2020-08-29 NOTE — PROGRESS NOTE ADULT - PROBLEM SELECTOR PLAN 3
Two spiculated nodules in RUL with concern for primary lung lesion  -oncology referral as above  -currently afebrile, in no respiratory distress    ##cough today  -Xray without obvious consolidation, chest US with B lines at bases unclear if atelectasis 2/2 splinting vs. COPD vs. pneumonia. Currently afebrile and cough "phlegmy" but non-productive. Will trial with incentive spirometry, duonebs, and robitussin and watch closely for signs of developing pneumonia.

## 2020-08-29 NOTE — PROGRESS NOTE ADULT - SUBJECTIVE AND OBJECTIVE BOX
PROGRESS NOTE:     Patient is a 69y old  Female who presents with a chief complaint of Lytic bone lesions (28 Aug 2020 16:11)      SUBJECTIVE / OVERNIGHT EVENTS: Pt seen and examined. No acute overnight events. In the morning, pt denies fever, chills, CP, SOB, abdominal pain, N/V, urinary or bowel issues.     ADDITIONAL REVIEW OF SYSTEMS: Otherwise negative    MEDICATIONS  (STANDING):  albuterol/ipratropium for Nebulization 3 milliLiter(s) Nebulizer every 6 hours  enoxaparin Injectable 40 milliGRAM(s) SubCutaneous daily  guaiFENesin   Syrup  (Sugar-Free) 100 milliGRAM(s) Oral every 6 hours  lidocaine   Patch 1 Patch Transdermal daily  oxyCODONE  ER Tablet 10 milliGRAM(s) Oral every 8 hours  polyethylene glycol 3350 17 Gram(s) Oral daily  senna 2 Tablet(s) Oral at bedtime    MEDICATIONS  (PRN):  acetaminophen   Tablet .. 650 milliGRAM(s) Oral every 6 hours PRN Moderate Pain (4 - 6)  bisacodyl 5 milliGRAM(s) Oral every 12 hours PRN Constipation  bisacodyl Suppository 10 milliGRAM(s) Rectal daily PRN Constipation  oxyCODONE    IR 7.5 milliGRAM(s) Oral every 3 hours PRN Severe Pain (7 - 10)      CAPILLARY BLOOD GLUCOSE        I&O's Summary      PHYSICAL EXAM:  Vital Signs Last 24 Hrs  T(C): 37.1 (29 Aug 2020 05:17), Max: 37.1 (29 Aug 2020 05:17)  T(F): 98.7 (29 Aug 2020 05:17), Max: 98.7 (29 Aug 2020 05:17)  HR: 77 (29 Aug 2020 05:17) (74 - 94)  BP: 108/62 (29 Aug 2020 05:17) (108/62 - 112/62)  BP(mean): --  RR: 18 (29 Aug 2020 05:17) (18 - 18)  SpO2: 97% (29 Aug 2020 05:17) (97% - 99%)    CONSTITUTIONAL: NAD  RESPIRATORY: Normal respiratory effort; lungs are clear to auscultation bilaterally  CARDIOVASCULAR: Regular rate and rhythm, normal S1 and S2, no murmur/rub/gallop  ABDOMEN: Nontender to palpation, normoactive bowel sounds, no rebound/guarding; No hepatosplenomegaly  EXTREMITIES: No lower extremity edema; Peripheral pulses are 2+ bilaterally  MUSCLOSKELETAL: no clubbing or cyanosis of digits; no joint swelling or tenderness to palpation  PSYCH: A+O to person, place, and time; affect appropriate    LABS:                        11.0   11.15 )-----------( 264      ( 28 Aug 2020 06:45 )             34.3     08-28    136  |  96<L>  |  12  ----------------------------<  98  3.8   |  26  |  0.36<L>    Ca    9.1      28 Aug 2020 06:45  Phos  3.1     08-28  Mg     1.9     08-28    TPro  6.2  /  Alb  3.1<L>  /  TBili  0.3  /  DBili  x   /  AST  17  /  ALT  17  /  AlkPhos  88  08-28    PT/INR - ( 28 Aug 2020 06:45 )   PT: 13.6 SEC;   INR: 1.20          PTT - ( 28 Aug 2020 06:45 )  PTT:29.8 SEC            RADIOLOGY & ADDITIONAL TESTS:  Results Reviewed: Y  Imaging Personally Reviewed: Y  Electrocardiogram Personally Reviewed: Y    COORDINATION OF CARE:  Care Discussed with Consultants/Other Providers [Y/N]: Y  Prior or Outpatient Records Reviewed [Y/N]: Y PROGRESS NOTE:     Patient is a 69y old  Female who presents with a chief complaint of Lytic bone lesions (28 Aug 2020 16:11)      SUBJECTIVE / OVERNIGHT EVENTS: Pt seen and examined. No acute overnight events. In the morning, pt states has back and hip pain that improves w/ oral opiates but no bowel movement in 3-4 days. Denies fever, chills, CP, SOB, abdominal pain, N/V, urinary issues.     ADDITIONAL REVIEW OF SYSTEMS: Otherwise negative    MEDICATIONS  (STANDING):  albuterol/ipratropium for Nebulization 3 milliLiter(s) Nebulizer every 6 hours  enoxaparin Injectable 40 milliGRAM(s) SubCutaneous daily  guaiFENesin   Syrup  (Sugar-Free) 100 milliGRAM(s) Oral every 6 hours  lidocaine   Patch 1 Patch Transdermal daily  oxyCODONE  ER Tablet 10 milliGRAM(s) Oral every 8 hours  polyethylene glycol 3350 17 Gram(s) Oral daily  senna 2 Tablet(s) Oral at bedtime    MEDICATIONS  (PRN):  acetaminophen   Tablet .. 650 milliGRAM(s) Oral every 6 hours PRN Moderate Pain (4 - 6)  bisacodyl 5 milliGRAM(s) Oral every 12 hours PRN Constipation  bisacodyl Suppository 10 milliGRAM(s) Rectal daily PRN Constipation  oxyCODONE    IR 7.5 milliGRAM(s) Oral every 3 hours PRN Severe Pain (7 - 10)      CAPILLARY BLOOD GLUCOSE        I&O's Summary      PHYSICAL EXAM:  Vital Signs Last 24 Hrs  T(C): 37.1 (29 Aug 2020 05:17), Max: 37.1 (29 Aug 2020 05:17)  T(F): 98.7 (29 Aug 2020 05:17), Max: 98.7 (29 Aug 2020 05:17)  HR: 77 (29 Aug 2020 05:17) (74 - 94)  BP: 108/62 (29 Aug 2020 05:17) (108/62 - 112/62)  BP(mean): --  RR: 18 (29 Aug 2020 05:17) (18 - 18)  SpO2: 97% (29 Aug 2020 05:17) (97% - 99%)    CONSTITUTIONAL: NAD  RESPIRATORY: Normal respiratory effort; lungs are clear to auscultation bilaterally  CARDIOVASCULAR: Regular rate and rhythm, normal S1 and S2, no murmur/rub/gallop  ABDOMEN: Nontender to palpation, normoactive bowel sounds, no rebound/guarding; No hepatosplenomegaly  EXTREMITIES: No lower extremity edema; Peripheral pulses are 2+ bilaterally  MUSCLOSKELETAL: pain on palpation of hip and back, no erythema  PSYCH: A+O to person, place, and time; affect appropriate    LABS:                        11.0   11.15 )-----------( 264      ( 28 Aug 2020 06:45 )             34.3     08-28    136  |  96<L>  |  12  ----------------------------<  98  3.8   |  26  |  0.36<L>    Ca    9.1      28 Aug 2020 06:45  Phos  3.1     08-28  Mg     1.9     08-28    TPro  6.2  /  Alb  3.1<L>  /  TBili  0.3  /  DBili  x   /  AST  17  /  ALT  17  /  AlkPhos  88  08-28    PT/INR - ( 28 Aug 2020 06:45 )   PT: 13.6 SEC;   INR: 1.20          PTT - ( 28 Aug 2020 06:45 )  PTT:29.8 SEC            RADIOLOGY & ADDITIONAL TESTS:  Results Reviewed: Y  Imaging Personally Reviewed: Y  Electrocardiogram Personally Reviewed: Y    COORDINATION OF CARE:  Care Discussed with Consultants/Other Providers [Y/N]: Y  Prior or Outpatient Records Reviewed [Y/N]: Y

## 2020-08-29 NOTE — PROGRESS NOTE ADULT - ATTENDING COMMENTS
Patient was seen and examined personally by me. I have discussed the plan and reviewed the resident's note and agree with the above physical exam findings including assessment and plan except as indicated below.    69 year old woman with no PMH p/w R hip pain. Found to have lytic lesions of R acetabular roof and lower R iliac wing and CT chest revealing two spiculated lung lesions. Suspect possible primary lung cancer w/ mets to spine, r/o myeloma     Pain improving . Oxy ER increased to 10mg q8 from BID. Increased PRN oxy IR to 7.5mg q3 PRN for severe pain  S/p IR CT guided biopsy of T11 vertebral body 8/25. Awaiting path  CT simulation started on 8/26 by Rad Onc. 5 RT sessions to spine and right hip started 8/27 and to complete on 9/3  SPEP with hypoalbuminemia and is consistent with acute phase rxn. Serum and urine immunofixation with no monoclonal components  F/u UPEP, serum free light chains, K/L FLC ratio in urine elevated  MRI abdomen done noncontrast with 1.8cm left adrenal adenoma  Notable cough on exam 8/28. Cough regimen ordered. CXR with no gross consolidations. Monitor off Abx for now  PT recs: home PT. Ongoing PT eval  DC planning after inpatient RT completed. No plans currently by NSx or ortho

## 2020-08-29 NOTE — PROGRESS NOTE ADULT - PROBLEM SELECTOR PLAN 2
Multiple lytic lesions of thoracic spine  -appreciate neuro surg recs: no intervention. rad onc consult  -appreciate rad onc recs: radiation therapy inpatient until 9/3 - monitor for side effects  -zofran for nauesa, sucrafalate for esophagitis  -if sudden change to neuro exam: evaluate for cord compression, urgent imaging

## 2020-08-29 NOTE — PROGRESS NOTE ADULT - PROBLEM SELECTOR PLAN 4
Also lesions in adrenal gland and thyroid  -f/u labs - TSH, Uric acid, LDH, PTH, ESR/CRP, SPEP/UPEP, alk phos, iCa, TSH, Ca19-9, CEA  - elevated urine kappa/lambda light chain ratio but normal in serum  -TSH wnl  -SPEP without M spike and immunofixation without monoclonal components  -MRI abdomen - adrenal adenoma but not protocoled for adrenals  -onc/palliative/rad-onc c/s as above  -f/u pathology report

## 2020-08-30 NOTE — PROGRESS NOTE ADULT - SUBJECTIVE AND OBJECTIVE BOX
*INCOMPLETE NOTE*  *******************************************************  Armen Medina MD PGY-1  Internal Medicine  Pager [phone] / [phone]  *******************************************************  Patient is a 69y old  Female who presents with a chief complaint of Lytic bone lesions (29 Aug 2020 08:09)          NOTE TO BE UPDATED AFTER ROUNDS        -pain appears better controlled  -afebrile - less likely pneumonia  -constipation__ no -pain appears better controlled  -afebrile - less likely pneumonia  -constipation__        *******************************************************  Armen Medina MD PGY-1  Internal Medicine  Pager [phone] / [phone]  *******************************************************  Patient is a 69y old  Female who presents with a chief complaint of Lytic bone lesions (30 Aug 2020 07:58)    PI (Greek) #[id number]    SUBJECTIVE / OVERNIGHT EVENTS:  - No acute events overnight.   - Patient seen and evaluated at bedside.  - Endorsing BM yesterday after initiation of lactulose and pain 2.5 hours after dose of IR oxycodone. Informed her that she only got 4 out of 8 possible doses of IR oxy and that she should ask for it if she's in pain.  - Denies fevers/chills, headache, mouth pain, dysphagia/odynophagia, SOB at rest, chest pain, palpitations, abdominal pain, nausea/vomiting/diarrhea/constipation, melena/hematochezia, bowell/bladder incontinence dysuria, and hematuria.      -------------------------------------------------------------------------------------------------------------    MEDICATIONS  (STANDING):  albuterol/ipratropium for Nebulization 3 milliLiter(s) Nebulizer every 6 hours  enoxaparin Injectable 40 milliGRAM(s) SubCutaneous daily  guaiFENesin   Syrup  (Sugar-Free) 100 milliGRAM(s) Oral every 6 hours  lidocaine   Patch 1 Patch Transdermal daily  oxyCODONE  ER Tablet 10 milliGRAM(s) Oral every 8 hours  polyethylene glycol 3350 17 Gram(s) Oral daily  senna 2 Tablet(s) Oral at bedtime    MEDICATIONS  (PRN):  acetaminophen   Tablet .. 650 milliGRAM(s) Oral every 6 hours PRN Moderate Pain (4 - 6)  bisacodyl 5 milliGRAM(s) Oral every 12 hours PRN Constipation  bisacodyl Suppository 10 milliGRAM(s) Rectal daily PRN Constipation  oxyCODONE    IR 7.5 milliGRAM(s) Oral every 3 hours PRN Severe Pain (7 - 10)      -------------------------------------------------------------------------------------------------------------    OBJECTIVE:    PHYSICAL EXAM:  T(F): 98.8, Max: 98.8 (08-30-20 @ 05:19)  HR: 89 (78 - 98)  BP: 137/78 (106/68 - 137/78)  RR: 18 (18 - 18)  SpO2: 96% (94% - 97%)    CONSTITUTIONAL: NAD, well-developed  HEENT: NCAT, EOMI, PERRLA, no scleral icterus, MMM  RESPIRATORY/CHEST: [+] crackles bilateral mid-lower lobes. Normal respiratory effort; no wheezing  CARDIO: [+] Irregular rate, normal S1 and S2, no murmur/rub/gallop; No JVD  VASCULAR: No lower extremity edema; Peripheral pulses are 2+ bilaterally; Capillary refill brisk  ABDOMEN: Soft, nontender to palpation, normoactive bowel sounds, no rebound/guarding; No hepatosplenomegaly  MUSCULOSKELETAL: [+] unable to lift right leg due to hip pain, no pain to passive ROM  EXTREMITIES: hands/feet are warm, and without cyanosis or clubbing  SKIN: no visible rashes, pallor, diaphoresis, or jaundice  NEURO: No focal deficits; moving all extremities  PSYCH: A+O to person, place, and time; affect appropriate; cooperative  -------------------------------------------------------------------------------------------------------------    ------------------------------------------------------------------------------------------------------------- *******************************************************  Armen Medina MD PGY-1  Internal Medicine  Pager [phone] / [phone]  *******************************************************  Patient is a 69y old  Female who presents with a chief complaint of Lytic bone lesions (30 Aug 2020 07:58)    PI (Greek) #[id number]    SUBJECTIVE / OVERNIGHT EVENTS:  - No acute events overnight.   - Patient seen and evaluated at bedside.  - Endorsing BM yesterday after initiation of lactulose and pain 2.5 hours after dose of IR oxycodone. Informed her that she only got 4 out of 8 possible doses of IR oxy and that she should ask for it if she's in pain.  - Denies fevers/chills, headache, mouth pain, dysphagia/odynophagia, SOB at rest, chest pain, palpitations, abdominal pain, nausea/vomiting/diarrhea/constipation, melena/hematochezia, bowell/bladder incontinence dysuria, and hematuria.      -------------------------------------------------------------------------------------------------------------    MEDICATIONS  (STANDING):  albuterol/ipratropium for Nebulization 3 milliLiter(s) Nebulizer every 6 hours  enoxaparin Injectable 40 milliGRAM(s) SubCutaneous daily  guaiFENesin   Syrup  (Sugar-Free) 100 milliGRAM(s) Oral every 6 hours  lidocaine   Patch 1 Patch Transdermal daily  oxyCODONE  ER Tablet 10 milliGRAM(s) Oral every 8 hours  polyethylene glycol 3350 17 Gram(s) Oral daily  senna 2 Tablet(s) Oral at bedtime    MEDICATIONS  (PRN):  acetaminophen   Tablet .. 650 milliGRAM(s) Oral every 6 hours PRN Moderate Pain (4 - 6)  bisacodyl 5 milliGRAM(s) Oral every 12 hours PRN Constipation  bisacodyl Suppository 10 milliGRAM(s) Rectal daily PRN Constipation  oxyCODONE    IR 7.5 milliGRAM(s) Oral every 3 hours PRN Severe Pain (7 - 10)      -------------------------------------------------------------------------------------------------------------    OBJECTIVE:    PHYSICAL EXAM:  T(F): 98.8, Max: 98.8 (08-30-20 @ 05:19)  HR: 89 (78 - 98)  BP: 137/78 (106/68 - 137/78)  RR: 18 (18 - 18)  SpO2: 96% (94% - 97%)    CONSTITUTIONAL: NAD, well-developed  HEENT: NCAT, EOMI, PERRLA, no scleral icterus, MMM  RESPIRATORY/CHEST: [+] crackles bilateral mid-lower lobes. Normal respiratory effort; no wheezing  CARDIO: [+] Irregular rate, normal S1 and S2, no murmur/rub/gallop; No JVD  VASCULAR: No lower extremity edema; Peripheral pulses are 2+ bilaterally; Capillary refill brisk  ABDOMEN: Soft, nontender to palpation, normoactive bowel sounds, no rebound/guarding; No hepatosplenomegaly  MUSCULOSKELETAL: [+] unable to lift right leg due to hip pain, no pain to passive ROM  EXTREMITIES: hands/feet are warm, and without cyanosis or clubbing  SKIN: no visible rashes, pallor, diaphoresis, or jaundice  NEURO: No focal deficits; moving all extremities  PSYCH: A+O to person, place, and time; affect appropriate; cooperative  -------------------------------------------------------------------------------------------------------------    -------------------------------------------------------------------------------------------------------------

## 2020-08-30 NOTE — PROGRESS NOTE ADULT - PROBLEM SELECTOR PLAN 5
ppx: lovenox  diet: regular  Dispo: admission for malignancy work up ppx: lovenox  diet: regular  bowel regimen: senna/miralax/lactulose  Dispo: admission for malignancy work up

## 2020-08-30 NOTE — PROGRESS NOTE ADULT - PROBLEM SELECTOR PLAN 1
Recent x-ray with lytic lesion in acetabulum on CT A/P involvement of T11 and 6th rib, concern for MM vs other cancerous process    Plan:  -MRI brain /spine with multiple lesions   -family history of malignancy in 2 sisters (CRC)  -appreciate oncology recs  -appreciate palliative recs   -appreciate rad onc recs: inpatient radx until 9/3 Recent x-ray with lytic lesion in acetabulum on CT A/P involvement of T11 and 6th rib, concern for MM vs other cancerous process    Plan:  -MRI brain /spine with multiple lesions   -appreciate oncology recs  -appreciate palliative recs   -appreciate rad onc recs: inpatient radx until thurs 9/3

## 2020-08-30 NOTE — PROGRESS NOTE ADULT - ATTENDING COMMENTS
Ms. Gallegos is a 70 yo woman with no PMH p/w R hip pain., found to have lytic lesions of R acetabular roof and lower R iliac wing and CT chest revealing two spiculated lung lesions consistent with metastatic disease 2/2 Suspected primary lung cancer w/ mets to spine. Pt s/p IR CT guided biopsy of T11 vertebral body 8/25. Serum and urine immunofixation negative for monoclonal components, making multiple myeloma not likely.  - c/w Oxy ER 10mg q8 and Increased PRN oxy IR to 7.5mg q3 PRN for severe pain; daily bowel regimen  - f/u biopsy results, Awaiting path  - CT simulation started on 8/26 by Rad Onc. 5 RT sessions to spine and right hip started 8/27 and to complete on 9/3  - F/u UPEP, serum free light chains, K/L FLC ratio in urine elevated  MRI abdomen done noncontrast with 1.8cm left adrenal adenoma  PT recs: home PT. Ongoing PT eval  DC planning after inpatient RT completed. No plans currently by NSx or ortho .   Remainder of plan as discussed above

## 2020-08-30 NOTE — PROGRESS NOTE ADULT - PROBLEM SELECTOR PLAN 2
Multiple lytic lesions of thoracic spine  -appreciate neuro surg recs: no intervention. rad onc consult  -appreciate rad onc recs: radiation therapy inpatient until 9/3 - monitor for side effects  -zofran for nauesa, sucrafalate for esophagitis  -if sudden change to neuro exam: evaluate for cord compression, urgent imaging Multiple lytic lesions of thoracic spine  -appreciate ortho and neuro surgery recs: no intervention  -appreciate rad onc recs: radiation therapy inpatient until 9/3 - monitor for side effects: zofran for nauesa, sucrafalate for esophagitis  -if sudden change to neuro exam: evaluate for cord compression, urgent imaging    ##constipation  -No BM x3-4 days on senna/miralax  -lactulose yesterday with 1 BM Multiple lytic lesions of thoracic spine  -appreciate ortho and neuro surgery recs: no intervention  -appreciate rad onc recs: radiation therapy inpatient until 9/3 - monitor for side effects: zofran for nauesa, sucrafalate for esophagitis  -if sudden change to neuro exam: evaluate for cord compression, urgent imaging    ##constipation  -No BM x3-4 days on senna/miralax  -lactulose yesterday with 1 BM  -will f/u on BM this afternoon

## 2020-08-31 NOTE — PROGRESS NOTE ADULT - PROBLEM SELECTOR PLAN 4
spoke with daughter over the phone  she is aware of plan  goal is home with PT and home care  will sign off as pain is under control and goals established  please recall [phone] with any need of follow up

## 2020-08-31 NOTE — PROGRESS NOTE ADULT - PROBLEM SELECTOR PLAN 5
ppx: lovenox  diet: regular  bowel regimen: senna/miralax/lactulose  Dispo: admission for malignancy work up ppx: lovenox  diet: regular + MV  bowel regimen: senna/miralax/lactulose  Dispo: admission for malignancy work up

## 2020-08-31 NOTE — PROGRESS NOTE ADULT - PROBLEM SELECTOR PLAN 1
continue oxycontin 10mg tid with oxy ir 7.5mg prn  would benefit from steroids but awaiting path  RT until 9/3

## 2020-08-31 NOTE — PROGRESS NOTE ADULT - ATTENDING COMMENTS
Patient was seen and examined personally by me. I have discussed the plan and reviewed the resident's note and agree with the above physical exam findings including assessment and plan except as indicated below.    69 year old woman with no PMH p/w R hip pain. Found to have lytic lesions of R acetabular roof and lower R iliac wing and CT chest revealing two spiculated lung lesions. Suspect possible primary lung cancer w/ mets to spine, r/o myeloma    Pain better controlled on current regimen. Palliative care following  S/p IR planning CT guided biopsy of T11 vertebral body 8/25. Awaiting path  CT simulation started on 8/26 by Rad Onc. 5 RT sessions to spine and right hip started 8/27 and to complete on 9/3  SPEP with hypoalbuminemia and is consistent with acute phase rxn. Serum and urine immunofixation with no monoclonal components  F/u UPEP, serum free light chains, K/L FLC ratio in urine elevated  PT recs: home PT. Ongoing PT eval  DC planning after inpatient RT completed. No plans currently by NSx or ortho

## 2020-08-31 NOTE — PROGRESS NOTE ADULT - PROBLEM SELECTOR PLAN 3
Two spiculated nodules in RUL with concern for primary lung lesion  -oncology referral as above  -currently afebrile, in no respiratory distress    ##cough 8/28  -RESOLVED with robitussin Two spiculated nodules in RUL with concern for primary lung lesion  -oncology referral as above  -currently afebrile, in no respiratory distress    ##cough 8/28  -RESOLVED with Robitussin

## 2020-08-31 NOTE — PROGRESS NOTE ADULT - SUBJECTIVE AND OBJECTIVE BOX
SUBJECTIVE AND OBJECTIVE: pt had bm, pain fairly well controlled.  walking with walker.  goal is for dmt as outpatient  INTERVAL HPI/OVERNIGHT EVENTS:    DNR on chart:   Allergies    No Known Allergies    Intolerances    MEDICATIONS  (STANDING):  enoxaparin Injectable 40 milliGRAM(s) SubCutaneous daily  guaiFENesin   Syrup  (Sugar-Free) 100 milliGRAM(s) Oral every 6 hours  lidocaine   Patch 1 Patch Transdermal daily  multivitamin 1 Tablet(s) Oral daily  oxyCODONE  ER Tablet 10 milliGRAM(s) Oral every 8 hours  polyethylene glycol 3350 17 Gram(s) Oral daily  senna 2 Tablet(s) Oral at bedtime    MEDICATIONS  (PRN):  acetaminophen   Tablet .. 650 milliGRAM(s) Oral every 6 hours PRN Moderate Pain (4 - 6)  albuterol/ipratropium for Nebulization 3 milliLiter(s) Nebulizer every 6 hours PRN Shortness of Breath and/or Wheezing  bisacodyl 5 milliGRAM(s) Oral every 12 hours PRN Constipation  bisacodyl Suppository 10 milliGRAM(s) Rectal daily PRN Constipation  ondansetron    Tablet 4 milliGRAM(s) Oral two times a day PRN Nausea and/or Vomiting  oxyCODONE    IR 7.5 milliGRAM(s) Oral every 3 hours PRN Severe Pain (7 - 10)      ITEMS UNCHECKED ARE NOT PRESENT    PRESENT SYMPTOMS: [ ]Unable to obtain due to poor mentation   Source if other than patient:  [ ]Family   [ ]Team     Pain (Impact on QOL):  yes  Location: hip  Minimal acceptable level (0-10 scale): 4/10           Aggravating factors: movement  Quality: full  Radiation: none  Severity (0-10 scale): 5/10   Timing:comes and goes    Dyspnea:                           [ ]Mild [ ]Moderate [ ]Severe  Anxiety:                             [ ]Mild [ ]Moderate [ ]Severe  Fatigue:                             [ ]Mild [ ]Moderate [ ]Severe  Nausea:                             [ ]Mild [ ]Moderate [ ]Severe  Loss of appetite:              [ ]Mild [ ]Moderate [ ]Severe  Constipation:                    [x ]Mild [ ]Moderate [ ]Severe    PAIN AD Score:	  http://geriatrictoolkit.[place].[facility]/cog/painad.pdf (Ctrl + left click to view)    Other Symptoms:  [ x]All other review of systems negative     Karnofsky Performance Score/Palliative Performance Status Version 2:   70      %    http://palliative.info/resource_material/PPSv2.pdf  PHYSICAL EXAM:  Vital Signs Last 24 Hrs  T(C): 36.5 (31 Aug 2020 13:03), Max: 37.2 (30 Aug 2020 21:24)  T(F): 97.7 (31 Aug 2020 13:03), Max: 99 (30 Aug 2020 21:24)  HR: 77 (31 Aug 2020 13:03) (77 - 98)  BP: 109/66 (31 Aug 2020 13:03) (106/66 - 137/61)  BP(mean): --  RR: 18 (31 Aug 2020 13:03) (18 - 18)  SpO2: 96% (31 Aug 2020 13:03) (95% - 98%) I&O's Summary   GENERAL:  [x ]Alert  [ x]Oriented x  3 [ ]Lethargic  [ ]Cachexia  [ ]Unarousable  [ ]Verbal  [ ]Non-Verbal    Behavioral:   [ ] Anxiety  [ ] Delirium [ ] Agitation [ ] Other    HEENT:  [ x]Normal   [ ]Dry mouth   [ ]ET Tube/Trach  [ ]Oral lesions    PULMONARY:   [x ]Clear [ ]Tachypnea  [ ]Audible excessive secretions   [ ]Rhonchi        [ ]Right [ ]Left [ ]Bilateral  [ ]Crackles        [ ]Right [ ]Left [ ]Bilateral  [ ]Wheezing     [ ]Right [ ]Left [ ]Bilateral    CARDIOVASCULAR:    [ x]Regular [ ]Irregular [ ]Tachy  [ ]Prasanth [ ]Murmur [ ]Other    GASTROINTESTINAL:  [xSoft  [ ]Distended   [x ]+BS  [x ]Non tender [ ]Tender  [ ]PEG [ ]OGT/ NGT   Last BM:  GENITOURINARY:  [ ]Normal [ ] Incontinent   [ ]Oliguria/Anuria   [ ]Zamorano    MUSCULOSKELETAL:   [ ]Normal   [x ]Weakness  [ ]Bed/Wheelchair bound [ ]Edema    NEUROLOGIC:   [ x]No focal deficits  [ ] Cognitive impairment  [ ] Dysphagia [ ]Dysarthria [ ] Paresis [ ]Other     SKIN:   [ ]Normal   [ ]Pressure ulcer(s)  [ ]Rash    CRITICAL CARE:  [ ] Shock Present  [ ]Septic [ ]Cardiogenic [ ]Neurologic [ ]Hypovolemic  [ ]  Vasopressors [ ]  Inotropes   [ ] Respiratory failure present  [ ] Acute  [ ] Chronic [ ] Hypoxic  [ ] Hypercarbic [ ] Other  [ ] Other organ failure     LABS:            RADIOLOGY & ADDITIONAL STUDIES:    Protein Calorie Malnutrition Present: [ ] yes [ ] no  [ ] PPSV2 < or = 30%  [ ] significant weight loss [ ] poor nutritional intake [ ] anasarca [ ] catabolic state Albumin, Serum: 3.1 g/dL (08-28-20 @ 06:45)  Artificial Nutrition [ ]     REFERRALS:   [ ]Chaplaincy  [ ] Hospice  [ ]Child Life  [ ]Social Work  [ ]Case management [ ]Holistic Therapy   Goals of Care Document:

## 2020-08-31 NOTE — DIETITIAN INITIAL EVALUATION ADULT. - OTHER INFO
Pt. primarily Telugu speaking but communicated throughout nutritional assessment in English.  Pt. endorses decrease in appetite/PO intake x 3 months PTA attributed to pain.  States usual body weight as ~110lbs.  Is uncertain if she has had any weight changes PTA.  Denies food allergies, N/V, issues chewing swallowing or diarrhea @ this time.  C/o constipation---> on bowel regimen.      Offered Ensure Enlive supplement, which Pt. declined.  Encouraged PO intake with nutrient/calorie dense foods and offered to obtain/provide food preferences, as well as assist with menu options.

## 2020-08-31 NOTE — CHART NOTE - NSCHARTNOTEFT_GEN_A_CORE
NUTRITION SERVICES     Upon Nutritional Assessment by the Registered Dietitian your patient was determined to meet criteria/ has evidence of the following diagnosis/diagnoses:  [ ] Mild Protein Calorie Malnutrition   [       X     ] Moderate Protein Calorie Malnutrition   [ ] Severe Protein Calorie Malnutrition   [ ] Unspecified Protein Calorie Malnutrition   [ ] Underweight / BMI <19  [ ] Morbid Obesity / BMI >40    Findings as based on:  •  Comprehensive nutritional assessment and consultation    Please refer to Initial Dietitian Evaluation via documents section of [facility] EMR for further recommendations.    Dian Jones RDN, CDN   pager: [phone]

## 2020-08-31 NOTE — PROGRESS NOTE ADULT - PROBLEM SELECTOR PLAN 2
Multiple lytic lesions of thoracic spine  -appreciate ortho and neuro surgery recs: no intervention  -appreciate rad onc recs: radiation therapy inpatient until 9/3 - monitor for side effects: zofran for nauesa, sucrafalate for esophagitis  -if sudden change to neuro exam: evaluate for cord compression, urgent imaging    ##constipation  -No BM x3-4 days on senna/miralax  -lactulose yesterday with 1 BM  -will f/u on BM this afternoon Multiple lytic lesions of thoracic spine  -appreciate ortho and neuro surgery recs: no intervention  -appreciate rad onc recs: radiation therapy inpatient until 9/3 - monitor for side effects: zofran for nauesa, sucrafalate for esophagitis  -nausea today starting PRN zofran 4mg bid   -if sudden change to neuro exam: evaluate for cord compression, urgent imaging    ##constipation  -No BM x3-4 days on senna/miralax  -lactulose yesterday with 1 BM  -will f/u on BM this afternoon

## 2020-08-31 NOTE — DIETITIAN INITIAL EVALUATION ADULT. - PERTINENT MEDS FT
MEDICATIONS  (STANDING):  enoxaparin Injectable 40 milliGRAM(s) SubCutaneous daily  guaiFENesin   Syrup  (Sugar-Free) 100 milliGRAM(s) Oral every 6 hours  lidocaine   Patch 1 Patch Transdermal daily  oxyCODONE  ER Tablet 10 milliGRAM(s) Oral every 8 hours  polyethylene glycol 3350 17 Gram(s) Oral daily  senna 2 Tablet(s) Oral at bedtime

## 2020-08-31 NOTE — DIETITIAN INITIAL EVALUATION ADULT. - PHYSICAL APPEARANCE
other (specify)/Mild subcutaneous fat loss of orbital, buccal & upper arm regions.  Mild muscle loss of temple, & moderate muscle loss of clavicle & acromion regions. No noted edema or pressure injuries.

## 2020-08-31 NOTE — PROGRESS NOTE ADULT - SUBJECTIVE AND OBJECTIVE BOX
*******************************************************  Armen Medina MD PGY-1  Internal Medicine  Pager [phone] / [phone]  *******************************************************  Patient is a 69y old  Female who presents with a chief complaint of Lytic bone lesions (30 Aug 2020 07:58)      PI (Sinhala): #[id number]    SUBJECTIVE / OVERNIGHT EVENTS:  - No acute events overnight.   - Patient seen and evaluated at bedside.  - Complains of not getting pain meds overnight but otherwise no issues.  - Denies fevers/chills, headache, mouth pain, dysphagia/odynophagia, SOB at rest, chest pain, palpitations, abdominal pain, nausea/vomiting/diarrhea/constipation, melena/hematochezia, bowell/bladder incontinence dysuria, and hematuria.    ------------------------------------------------------------------------------------------------------------    MEDICATIONS  (STANDING):  albuterol/ipratropium for Nebulization 3 milliLiter(s) Nebulizer every 6 hours  enoxaparin Injectable 40 milliGRAM(s) SubCutaneous daily  guaiFENesin   Syrup  (Sugar-Free) 100 milliGRAM(s) Oral every 6 hours  lidocaine   Patch 1 Patch Transdermal daily  oxyCODONE  ER Tablet 10 milliGRAM(s) Oral every 8 hours  polyethylene glycol 3350 17 Gram(s) Oral daily  senna 2 Tablet(s) Oral at bedtime    MEDICATIONS  (PRN):  acetaminophen   Tablet .. 650 milliGRAM(s) Oral every 6 hours PRN Moderate Pain (4 - 6)  bisacodyl 5 milliGRAM(s) Oral every 12 hours PRN Constipation  bisacodyl Suppository 10 milliGRAM(s) Rectal daily PRN Constipation  oxyCODONE    IR 7.5 milliGRAM(s) Oral every 3 hours PRN Severe Pain (7 - 10)      ------------------------------------------------------------------------------------------------------------    OBJECTIVE:    PHYSICAL EXAM:  T(F): 98.9, Max: 99 (08-30-20 @ 21:24)  HR: 96 (79 - 98)  BP: 119/72 (106/66 - 137/61)  RR: 18 (18 - 18)  SpO2: 98% RA (95% - 98%)      CONSTITUTIONAL: NAD, well-developed  HEENT: NCAT, EOMI, PERRLA, no scleral icterus, MMM  RESPIRATORY/CHEST: [+] crackles bilateral mid-lower lobes. Normal respiratory effort; no wheezing  CARDIO: [+] Irregular rate, normal S1 and S2, no murmur/rub/gallop; No JVD  VASCULAR: No lower extremity edema; Peripheral pulses are 2+ bilaterally; Capillary refill brisk  ABDOMEN: Soft, nontender to palpation, normoactive bowel sounds, no rebound/guarding; No hepatosplenomegaly  MUSCULOSKELETAL: [+] unable to lift right leg due to hip pain, no pain to passive ROM  EXTREMITIES: hands/feet are warm, and without cyanosis or clubbing  SKIN: no visible rashes, pallor, diaphoresis, or jaundice  NEURO: No focal deficits; moving all extremities  PSYCH: A+O to person, place, and time; affect appropriate; cooperative    ------------------------------------------------------------------------------------------------------------  LABS:                      RADIOLOGY & ADDITIONAL TESTS:  Results Reviewed:     Imaging Personally Reviewed:  Electrocardiogram Personally Reviewed:      COORDINATION OF CARE:  Care Discussed with Consultants/Other Providers [Y/N]:   Prior or Outpatient Records Reviewed [Y/N]:   ------------------------------------------------------------------------------------------------------------ *******************************************************  Armen Medina MD PGY-1  Internal Medicine  Pager [phone] / [phone]  *******************************************************  Patient is a 69y old  Female who presents with a chief complaint of Lytic bone lesions (30 Aug 2020 07:58)      PI (Sinhala): #[id number]    SUBJECTIVE / OVERNIGHT EVENTS:  - No acute events overnight.   - Patient seen and evaluated at bedside.  - Complains of not getting pain meds overnight but otherwise no issues.  - Denies fevers/chills, headache, mouth pain, dysphagia/odynophagia, SOB at rest, chest pain, palpitations, abdominal pain, nausea/vomiting/diarrhea/constipation, melena/hematochezia, bowell/bladder incontinence dysuria, and hematuria.    ------------------------------------------------------------------------------------------------------------    MEDICATIONS  (STANDING):  albuterol/ipratropium for Nebulization 3 milliLiter(s) Nebulizer every 6 hours  enoxaparin Injectable 40 milliGRAM(s) SubCutaneous daily  guaiFENesin   Syrup  (Sugar-Free) 100 milliGRAM(s) Oral every 6 hours  lidocaine   Patch 1 Patch Transdermal daily  oxyCODONE  ER Tablet 10 milliGRAM(s) Oral every 8 hours  polyethylene glycol 3350 17 Gram(s) Oral daily  senna 2 Tablet(s) Oral at bedtime    MEDICATIONS  (PRN):  acetaminophen   Tablet .. 650 milliGRAM(s) Oral every 6 hours PRN Moderate Pain (4 - 6)  bisacodyl 5 milliGRAM(s) Oral every 12 hours PRN Constipation  bisacodyl Suppository 10 milliGRAM(s) Rectal daily PRN Constipation  oxyCODONE    IR 7.5 milliGRAM(s) Oral every 3 hours PRN Severe Pain (7 - 10)      ------------------------------------------------------------------------------------------------------------    OBJECTIVE:    PHYSICAL EXAM:  T(F): 98.9, Max: 99 (08-30-20 @ 21:24)  HR: 96 (79 - 98)  BP: 119/72 (106/66 - 137/61)  RR: 18 (18 - 18)  SpO2: 98% RA (95% - 98%)      CONSTITUTIONAL: NAD, well-developed  HEENT: NCAT, no scleral icterus, MMM  RESPIRATORY/CHEST: [+] crackles bilateral mid-lower lobes. Normal respiratory effort; no wheezing  CARDIO: [+] S1 and S2, no murmur/rub/gallop; No JVD  VASCULAR: No lower extremity edema; Peripheral pulses are 2+ bilaterally; Capillary refill brisk  ABDOMEN: Soft, nontender to palpation, normoactive bowel sounds, no rebound/guarding; No hepatosplenomegaly  MUSCULOSKELETAL: [+] unable to lift right leg due to hip pain, no pain to passive ROM  EXTREMITIES: hands/feet are warm, and without cyanosis or clubbing  SKIN: no visible rashes, pallor, diaphoresis, or jaundice  NEURO: No focal deficits; moving all extremities  PSYCH: A+O to person, place, and time; affect appropriate; cooperative    ------------------------------------------------------------------------------------------------------------  LABS:        RADIOLOGY & ADDITIONAL TESTS:  Results Reviewed:     Imaging Personally Reviewed:  Electrocardiogram Personally Reviewed:      COORDINATION OF CARE:  Care Discussed with Consultants/Other Providers [Y/N]:   Prior or Outpatient Records Reviewed [Y/N]:   ------------------------------------------------------------------------------------------------------------

## 2020-09-01 NOTE — DISCHARGE NOTE PROVIDER - NSDCMRMEDTOKEN_GEN_ALL_CORE_FT
acetaminophen 325 mg oral tablet: 2 tab(s) orally every 6 hours, As needed, Moderate Pain (4 - 6)  melatonin 3 mg oral tablet: 1 tab(s) orally once a day (at bedtime)  oxyCODONE 10 mg oral tablet, extended release: 1 tab(s) orally every 8 hours  oxyCODONE 7.5 mg oral tablet: 1 tab(s) orally every 3 hours, As needed, Severe Pain (7 - 10) acetaminophen 325 mg oral tablet: 2 tab(s) orally every 6 hours, As needed, Moderate Pain (4 - 6)  bisacodyl 10 mg rectal suppository: 1 suppository(ies) rectal once a day, As Needed -Constipation   bisacodyl 5 mg oral delayed release tablet: 1 tab(s) orally every 12 hours, As Needed -for constipation   lidocaine 5% topical film: Apply topically to affected area once a day, As Needed for back pain. MDD:1 patch. Maximum time on12 hours followed by 12 hours off.  melatonin 3 mg oral tablet: 1 tab(s) orally once a day (at bedtime)  morphine 15 mg/8 to 12 hr oral tablet, extended release: 1 tab(s) orally every 8 hours -for severe pain MDD:45mg (3 tabs)  Multiple Vitamins oral tablet: 1 tab(s) orally once a day  nystatin 100,000 units/g topical powder: 1 application topically 2 times a day  ondansetron 4 mg oral tablet: 1 tab(s) orally 2 times a day, As Needed -Nausea and/or Vomiting - for nausea   oxyCODONE 10 mg oral tablet: 0.5 tab(s) orally every 3 hours, As Needed - for severe pain MDD:4 full tabs (40mg)  polyethylene glycol 3350 oral powder for reconstitution: 17 gram(s) orally once a day  senna oral tablet: 2 tab(s) orally once a day (at bedtime)

## 2020-09-01 NOTE — PROGRESS NOTE ADULT - PROBLEM SELECTOR PLAN 1
Patient presented to ED with back pain and was called back when lytic lesion discovered on review of plain films. MRI of articular skeleton and head with many lytic lesions of thoracic spine/posterior ribs, soft tissue mass with invasion into right hip acetabulum and brain lesions. Two spiculated nosules in RUL concerning for primary lung cancer with widespread metastases although must rule out multiple myeloma. Also with lesions noted on thyroid and adrenal glands on imaging    Plan:  -s/p IR [id number] of T11 soft tissue mass. Awaiting path and genetic workup of tumor.  -appreciate oncology reccomendations  -f/u oncology labs  -ESR/CRP elevated  -elevated urine kappa/lambda light chain ratio   -normal serum kappa/lambda light chain ratio  -no signs of TLS at present  -SPEP/UPEP and immunofixations negative for monoclonal elements  -cancer markers (Ca19-9, CEA) elevated  -TSH elevated at 6.44, FT4 wnl  -LDH mild elevation  -uric acid wnl  -PTH wnl  -iCa wnl Patient presented to ED with back pain and was called back when lytic lesion discovered on review of plain films. MRI of articular skeleton and head with many lytic lesions of thoracic spine/posterior ribs, soft tissue mass with invasion into right hip acetabulum and brain lesions. Two spiculated nodules in RUL concerning for primary lung cancer with widespread metastases although must rule out multiple myeloma. Also with lesions noted on thyroid and adrenal glands on imaging    Plan:  -s/p IR [id number] of T11 soft tissue mass. Awaiting path and genetic workup of tumor.  -preliminary read: metastatic andenocarcinoma, immunohistochemical testing pending  -appreciate oncology reccomendations  -f/u oncology labs  -ESR/CRP elevated  -elevated urine kappa/lambda light chain ratio   -normal serum kappa/lambda light chain ratio  -no signs of TLS at present  -SPEP/UPEP and immunofixations negative for monoclonal elements  -cancer markers (Ca19-9, CEA) elevated  -TSH elevated at 6.44, FT4 wnl  -LDH mild elevation  -uric acid wnl  -PTH wnl  -iCa wnl

## 2020-09-01 NOTE — PROGRESS NOTE ADULT - PROBLEM SELECTOR PLAN 5
ppx: lovenox  diet: regular + MV  bowel regimen: senna/miralax/dulcolax  Dispo: admission for malignancy work up

## 2020-09-01 NOTE — DISCHARGE NOTE PROVIDER - NSDCCPCAREPLAN_GEN_ALL_CORE_FT
PRINCIPAL DISCHARGE DIAGNOSIS  Diagnosis: Cancer of unknown origin  Assessment and Plan of Treatment: You were called back to the hospital when the radiologists noticed a lesion on one of your Xrays. Additional imaging was performed which showed lesions in your spine, hip, lung, and brain. A biopsy was taken from one of the lesions in your spine and the results are still pending at this time. However, it is highly likely that this pattern of lesions represents metastatic cancer, probably from lung cancer. The treatment options for you will only be clear once the pathology results are in and a genetic analysis is performed on the cancer cells. For some lung cancers with specific markers there exist highly specific chemotherapy regimens, while for others without those markers the list of treatment options is much shorter. While you were in the hospital you received palliative radiation therapy aimed at decreasing your back pain by shrinking those tumors in your spine. Please follow up with your medical oncologist so that the results of the tests can be relayed to you and your family. Please return to the hospital if you experience sudden onset of severe pain. Please also call your doctor and return to the hospital if you develop sudden neurologic changes such as weakness or numbness in your legs or loss of control of urination or defecation as these may represent compression of your spinal cord which is an emergency.

## 2020-09-01 NOTE — PROGRESS NOTE ADULT - SUBJECTIVE AND OBJECTIVE BOX
*INCOMPLETE NOTE*  *******************************************************  Armen Medina MD PGY-1  Internal Medicine  Pager [phone] / [phone]  *******************************************************  Patient is a 69y old  Female who presents with a chief complaint of Lytic bone lesions (01 Sep 2020 07:42)        SUBJECTIVE / OVERNIGHT EVENTS:  - No acute events overnight.   - Patient undergoing inpatient palliative radiation therapy      NOTE TO BE UPDATED AFTER ROUNDS *******************************************************  Armen Medina MD PGY-1  Internal Medicine  Pager [phone] / [phone]  *******************************************************  Patient is a 69y old  Female who presents with a chief complaint of Lytic bone lesions (01 Sep 2020 07:42)      SUBJECTIVE / OVERNIGHT EVENTS:  - No acute events overnight.   - Patient seen and evaluated at bedside.  - Patient undergoing inpatient palliative radiation treatment  - Denies fevers/chills, headache, SOB at rest, cough, chest pain, palpitations, abdominal pain, nausea/vomiting/diarrhea/constipation, melena/hematochezia, dysuria, and hematuria    ------------------------------------------------------------------------------------------------------------    MEDICATIONS  (STANDING):  enoxaparin Injectable 40 milliGRAM(s) SubCutaneous daily  guaiFENesin   Syrup  (Sugar-Free) 100 milliGRAM(s) Oral every 6 hours  lidocaine   Patch 1 Patch Transdermal daily  melatonin 3 milliGRAM(s) Oral at bedtime  multivitamin 1 Tablet(s) Oral daily  oxyCODONE  ER Tablet 10 milliGRAM(s) Oral every 8 hours  polyethylene glycol 3350 17 Gram(s) Oral daily  senna 2 Tablet(s) Oral at bedtime    MEDICATIONS  (PRN):  acetaminophen   Tablet .. 650 milliGRAM(s) Oral every 6 hours PRN Moderate Pain (4 - 6)  albuterol/ipratropium for Nebulization 3 milliLiter(s) Nebulizer every 6 hours PRN Shortness of Breath and/or Wheezing  bisacodyl 5 milliGRAM(s) Oral every 12 hours PRN Constipation  bisacodyl Suppository 10 milliGRAM(s) Rectal daily PRN Constipation  ondansetron    Tablet 4 milliGRAM(s) Oral two times a day PRN Nausea and/or Vomiting  oxyCODONE    IR 7.5 milliGRAM(s) Oral every 3 hours PRN Severe Pain (7 - 10)      ------------------------------------------------------------------------------------------------------------    OBJECTIVE:    PHYSICAL EXAM:  T(F): 98.6, Max: 98.9 (08-31-20 @ 20:48)  HR: 61 (61 - 89)  BP: 125/69 (109/66 - 126/76)  RR: 18 (18 - 18)  SpO2: 96% RA (96% - 98%)      CONSTITUTIONAL: NAD, well-developed  HEENT: NCAT, no scleral icterus, MMM  RESPIRATORY/CHEST: [+] crackles bilateral mid-lower lobes. Normal respiratory effort; no wheezing  CARDIO: [+] irregular rhythm, S1 and S2, no murmur/rub/gallop; No JVD  VASCULAR: No lower extremity edema; Peripheral pulses are 2+ bilaterally; Capillary refill brisk  ABDOMEN: Soft, nontender to palpation, normoactive bowel sounds, no rebound/guarding; No hepatosplenomegaly  MUSCULOSKELETAL: [+] unable to lift right leg due to hip pain, no pain to passive ROM  EXTREMITIES: hands/feet are warm, and without cyanosis or clubbing  SKIN: no visible rashes, pallor, diaphoresis, or jaundice  NEURO: No focal deficits; moving all extremities; gait with walker: unable to put weight on R leg  PSYCH: A+O to person, place, and time; affect appropriate; cooperative  ------------------------------------------------------------------------------------------------------------  LABS:                      RADIOLOGY & ADDITIONAL TESTS:  Results Reviewed:     Imaging Personally Reviewed:  Electrocardiogram Personally Reviewed:      COORDINATION OF CARE:  Care Discussed with Consultants/Other Providers [Y/N]:   Prior or Outpatient Records Reviewed [Y/N]:   ------------------------------------------------------------------------------------------------------------

## 2020-09-01 NOTE — PROGRESS NOTE ADULT - PROBLEM SELECTOR PLAN 3
Widespread metastases including right hip, spine, and brain. Brain lesion at L cerbellopontine angle currently asymptomatic. Spine and hip lesions associated with severe pain and debility.    Plan:  -undergoing radiation  -pain regimen: oxy ER 10mg TID and oxy IR 7.5mg q3hr PRN  -would benefit from corticosteroids but awaiting pathology report first  -appreciate palliative recs  -appreciate neurosurgery and orthopedic surgery recs - no surgical interventions at this time Widespread metastases including right hip, spine, and brain. Brain lesion at L cerebellopontine angle currently asymptomatic. Spine and hip lesions associated with severe pain and debility.    Plan:  -undergoing radiation  -pain regimen: oxy ER 10mg TID and oxy IR 7.5mg q3hr PRN  -would benefit from corticosteroids but awaiting pathology report first  -appreciate palliative recs  -appreciate neurosurgery and orthopedic surgery recs - no surgical interventions at this time

## 2020-09-01 NOTE — PROGRESS NOTE ADULT - ATTENDING COMMENTS
Patient was seen and examined personally by me. I have discussed the plan and reviewed the resident's note and agree with the above physical exam findings including assessment and plan except as indicated below.    69 year old woman with no PMH p/w R hip pain. Found to have lytic lesions of R acetabular roof and lower R iliac wing and CT chest revealing two spiculated lung lesions. Suspect possible primary lung cancer w/ mets to spine, r/o myeloma    Continue current pain regimen  S/p IR planning CT guided biopsy of T11 vertebral body 8/25. Path pending  CT simulation started on 8/26 by Rad Onc. 5 RT sessions to spine and right hip started 8/27 and to complete on 9/3  PT recs: home PT. PT to reeval 9/1  DC planning after inpatient RT completed. No plans currently by NSx or ortho

## 2020-09-01 NOTE — PROGRESS NOTE ADULT - PROBLEM SELECTOR PLAN 2
currently undergoing palliative radiation treatment of spine and hip with 5 inpatient sessions 8/27-9/3    Plan:  -appreciate rad onc recs  -patient currently without mucositis/esophagitis  -patient complaining of nausea - Zofran 4mg BID PRN(QTc 8/21 408)  -watch for abrupt change to neuro exam for signs of cord compression (patient with nonfocal exam, unable to move right leg 2/2 pain at hip): obtain urgent imaging, rad onc consult, possible steroids

## 2020-09-01 NOTE — DISCHARGE NOTE PROVIDER - NSDCFUADDAPPT_GEN_ALL_CORE_FT
Please call [facility] upon arriving home to set up appointment with new oncologist within the next week.

## 2020-09-01 NOTE — DISCHARGE NOTE PROVIDER - HOSPITAL COURSE
68 y/o [place]arag[place] speaking F with no known PMH w/ recent ED (on 8/22) visit for back pain who presents to the hospital after being told to return for possible lytic lesion. Further imaging showed RUL lung nodules as well as lytic lesions to thoracic spine, right hip, and metastasis to brain. Patient had IR biopsy of soft tissue lesion at T11 with pathology showing "metastatic adenocarcinoma". Patient was evaluated by orthopedics for hip reconstruction and by neurosurgery for spine surgery but was deemed to be poor surgical candidate given widespread nature of disease. Patient received 5 days of inpatient palliative radiotherapy for spine and bone metastases. Patient was medically optimized, stable and ready for discharge. Plan of care and return precautions were discussed with the patient who verbally stated understanding.

## 2020-09-01 NOTE — DISCHARGE NOTE PROVIDER - CARE PROVIDERS DIRECT ADDRESSES
[email or web address]

## 2020-09-01 NOTE — DISCHARGE NOTE PROVIDER - NSFOLLOWUPCLINICS_GEN_ALL_ED_FT
[facility]  Hematology/Oncology  [address] [id number]  Phone: [phone]  Fax:   Follow Up Time:

## 2020-09-02 NOTE — PROGRESS NOTE ADULT - PROBLEM SELECTOR PLAN 6
Patient will need home PT Patient will need home PT  Planned d/c on 9/3 after conclusion of radiation assuming oncology doesn't need any more tests

## 2020-09-02 NOTE — CHART NOTE - NSCHARTNOTEFT_GEN_A_CORE
ISTOP checked and reviewed by me on 09-02-20 @ 14:36  Reference #: [id number]    No prescriptions in ISTOP    Rx Last Dispensed:  Rx:  Quant:  Days:  Prescriber:    Armen Medina MD  [phone]

## 2020-09-02 NOTE — PROGRESS NOTE ADULT - ATTENDING COMMENTS
Patient was seen and examined personally by me. I have discussed the plan and reviewed the resident's note and agree with the above physical exam findings including assessment and plan except as indicated below.    69 year old woman with no PMH p/w R hip pain. Found to have lytic lesions of R acetabular roof and lower R iliac wing and CT chest revealing two spiculated lung lesions. Suspect possible primary lung cancer w/ mets to spine. Prelim path with metastatic adenocarcinoma    Continue current pain regimen  S/p IR planning CT guided biopsy of T11 vertebral body 8/25 with prelim path showing metastatic adenocarcinoma. Onc consulted  CT simulation started on 8/26 by Rad Onc. 5 RT sessions to spine and right hip started 8/27 and to complete on 9/3  PT recs: home PT. Per CM, family wishes for outpatient PT instead.  DC planning after inpatient RT completed. No plans currently by NSx or ortho

## 2020-09-02 NOTE — PROGRESS NOTE ADULT - PROBLEM SELECTOR PLAN 3
Widespread metastases including right hip, spine, and brain. Brain lesion at L cerebellopontine angle currently asymptomatic. Spine and hip lesions associated with severe pain and debility.    Plan:  -undergoing radiation  -pain regimen: oxy ER 10mg TID and oxy IR 7.5mg q3hr PRN  -would benefit from corticosteroids but awaiting pathology report first  -appreciate palliative recs  -appreciate neurosurgery and orthopedic surgery recs - no surgical interventions at this time

## 2020-09-02 NOTE — PROGRESS NOTE ADULT - PROBLEM SELECTOR PLAN 1
Patient presented to ED with back pain and was called back when lytic lesion discovered on review of plain films. MRI of articular skeleton and head with many lytic lesions of thoracic spine/posterior ribs, soft tissue mass with invasion into right hip acetabulum and brain lesions. Two spiculated nodules in RUL concerning for primary lung cancer with widespread metastases although must rule out multiple myeloma. Also with lesions noted on thyroid and adrenal glands on imaging    Plan:  -s/p IR [id number] of T11 soft tissue mass. Awaiting path and genetic workup of tumor.  -preliminary read: metastatic adenocarcinoma, immunohistochemical testing pending  -appreciate oncology recommendations  -f/u oncology labs  -ESR/CRP elevated  -elevated urine kappa/lambda light chain ratio   -normal serum kappa/lambda light chain ratio  -no signs of TLS at present  -SPEP/UPEP and immunofixations negative for monoclonal elements  -cancer markers (Ca19-9, CEA) elevated  -TSH elevated at 6.44, FT4 wnl  -LDH mild elevation  -uric acid wnl  -PTH wnl  -iCa wnl Patient presented to ED with back pain and was called back when lytic lesion discovered on review of plain films. MRI of articular skeleton and head with many lytic lesions of thoracic spine/posterior ribs, soft tissue mass with invasion into right hip acetabulum and brain lesions. Two spiculated nodules in RUL concerning for primary lung cancer with widespread metastases although must rule out multiple myeloma. Also with lesions noted on thyroid and adrenal glands on imaging    Plan:  -s/p IR [id number] of T11 soft tissue mass. Awaiting path and genetic workup of tumor.  -preliminary read: metastatic adenocarcinoma, immunohistochemical testing pending  -appreciate oncology recommendations  -Khorana score 2: patient at increased risk of VTE due to RLE immobility from lytic lesion of hip although also with small CPA intracranial metastasis. Decision re outpatient AC also dependent on decision re chemo/immunotherapy  -f/u CBC and Ddimer for risk stratification  -f/u oncology labs  -ESR/CRP elevated  -elevated urine kappa/lambda light chain ratio   -normal serum kappa/lambda light chain ratio  -no signs of TLS at present  -SPEP/UPEP and immunofixations negative for monoclonal elements  -cancer markers (Ca19-9, CEA) elevated  -TSH elevated at 6.44, FT4 wnl  -LDH mild elevation  -uric acid wnl  -PTH wnl  -iCa wnl

## 2020-09-02 NOTE — PROGRESS NOTE ADULT - SUBJECTIVE AND OBJECTIVE BOX
*INCOMPLETE NOTE*  *******************************************************  Armen Medina MD PGY-1  Internal Medicine  Pager [phone] / [phone]  *******************************************************  Patient is a 69y old  Female who presents with a chief complaint of Lytic bone lesions (01 Sep 2020 15:06)        SUBJECTIVE / OVERNIGHT EVENTS:  - No acute events overnight.       NOTE TO BE UPDATED AFTER ROUNDS *******************************************************  Armen Medina MD PGY-1  Internal Medicine  Pager [phone] / [phone]  *******************************************************  Patient is a 69y old  Female who presents with a chief complaint of Lytic bone lesions (02 Sep 2020 07:58)      PI (_): #    SUBJECTIVE / OVERNIGHT EVENTS:  - No acute events overnight.   - Patient seen and evaluated at bedside.  - Patient in pain at the moment and due for medication, medication was delivered by RN Cristina  - Denies fevers/chills, headache, SOB at rest, cough, chest pain, palpitations, abdominal pain, nausea/vomiting/diarrhea/constipation, melena/hematochezia, dysuria, and hematuria    ------------------------------------------------------------------------------------------------------------    MEDICATIONS  (STANDING):  enoxaparin Injectable 40 milliGRAM(s) SubCutaneous daily  lidocaine   Patch 1 Patch Transdermal daily  melatonin 3 milliGRAM(s) Oral at bedtime  multivitamin 1 Tablet(s) Oral daily  oxyCODONE  ER Tablet 10 milliGRAM(s) Oral every 8 hours  polyethylene glycol 3350 17 Gram(s) Oral daily  senna 2 Tablet(s) Oral at bedtime    MEDICATIONS  (PRN):  acetaminophen   Tablet .. 650 milliGRAM(s) Oral every 6 hours PRN Moderate Pain (4 - 6)  albuterol/ipratropium for Nebulization 3 milliLiter(s) Nebulizer every 6 hours PRN Shortness of Breath and/or Wheezing  bisacodyl 5 milliGRAM(s) Oral every 12 hours PRN Constipation  bisacodyl Suppository 10 milliGRAM(s) Rectal daily PRN Constipation  guaiFENesin   Syrup  (Sugar-Free) 100 milliGRAM(s) Oral every 6 hours PRN Cough  ondansetron    Tablet 4 milliGRAM(s) Oral two times a day PRN Nausea and/or Vomiting  oxyCODONE    IR 7.5 milliGRAM(s) Oral every 3 hours PRN Severe Pain (7 - 10)      ------------------------------------------------------------------------------------------------------------    OBJECTIVE:    PHYSICAL EXAM:  T(F): 98.1, Max: 98.6 (09-01-20 @ 12:46)  HR: 89 (77 - 89)  BP: 130/78 (103/70 - 130/78)  RR: 18 (18 - 18)  SpO2: 97% (97% - 98%)      CONSTITUTIONAL: NAD, well-developed  HEENT: NCAT, no scleral icterus, MMM  RESPIRATORY/CHEST:  Normal respiratory effort; no wheezing/crackles  CARDIO: [+] irregular rhythm, S1 and S2, no murmur/rub/gallop; No JVD  VASCULAR: No lower extremity edema; Peripheral pulses are 2+ bilaterally; Capillary refill brisk  ABDOMEN: Soft, nontender to palpation, normoactive bowel sounds, no rebound/guarding; No hepatosplenomegaly  MUSCULOSKELETAL: [+] unable to lift right leg due to hip pain, no pain to passive ROM  EXTREMITIES: hands/feet are warm, and without cyanosis or clubbing  SKIN: no visible rashes, pallor, diaphoresis, or jaundice  NEURO: No focal deficits; moving all extremities; gait with walker: unable to put weight on R leg  PSYCH: A+O to person, place, and time; affect appropriate; cooperative    ------------------------------------------------------------------------------------------------------------  LABS:                      RADIOLOGY & ADDITIONAL TESTS:  Results Reviewed:     Imaging Personally Reviewed:  Electrocardiogram Personally Reviewed:      COORDINATION OF CARE:  Care Discussed with Consultants/Other Providers [Y/N]:   Prior or Outpatient Records Reviewed [Y/N]:   ------------------------------------------------------------------------------------------------------------ *******************************************************  Armen Medina MD PGY-1  Internal Medicine  Pager [phone] / [phone]  *******************************************************  Patient is a 69y old  Female who presents with a chief complaint of Lytic bone lesions (02 Sep 2020 07:58)      PI (Maori): #    SUBJECTIVE / OVERNIGHT EVENTS:  - No acute events overnight.   - Patient seen and evaluated at bedside.  - Patient in pain at the moment and due for medication, medication was delivered by RN Cristina  - Denies fevers/chills, headache, SOB at rest, cough, chest pain, palpitations, abdominal pain, nausea/vomiting/diarrhea/constipation, melena/hematochezia, dysuria, and hematuria    ------------------------------------------------------------------------------------------------------------    MEDICATIONS  (STANDING):  enoxaparin Injectable 40 milliGRAM(s) SubCutaneous daily  lidocaine   Patch 1 Patch Transdermal daily  melatonin 3 milliGRAM(s) Oral at bedtime  multivitamin 1 Tablet(s) Oral daily  oxyCODONE  ER Tablet 10 milliGRAM(s) Oral every 8 hours  polyethylene glycol 3350 17 Gram(s) Oral daily  senna 2 Tablet(s) Oral at bedtime    MEDICATIONS  (PRN):  acetaminophen   Tablet .. 650 milliGRAM(s) Oral every 6 hours PRN Moderate Pain (4 - 6)  albuterol/ipratropium for Nebulization 3 milliLiter(s) Nebulizer every 6 hours PRN Shortness of Breath and/or Wheezing  bisacodyl 5 milliGRAM(s) Oral every 12 hours PRN Constipation  bisacodyl Suppository 10 milliGRAM(s) Rectal daily PRN Constipation  guaiFENesin   Syrup  (Sugar-Free) 100 milliGRAM(s) Oral every 6 hours PRN Cough  ondansetron    Tablet 4 milliGRAM(s) Oral two times a day PRN Nausea and/or Vomiting  oxyCODONE    IR 7.5 milliGRAM(s) Oral every 3 hours PRN Severe Pain (7 - 10)      ------------------------------------------------------------------------------------------------------------    OBJECTIVE:    PHYSICAL EXAM:  T(F): 98.1, Max: 98.6 (09-01-20 @ 12:46)  HR: 89 (77 - 89)  BP: 130/78 (103/70 - 130/78)  RR: 18 (18 - 18)  SpO2: 97% (97% - 98%)      CONSTITUTIONAL: NAD, well-developed  HEENT: NCAT, no scleral icterus, MMM  RESPIRATORY/CHEST:  Normal respiratory effort; no wheezing/crackles  CARDIO: [+] irregular rhythm, S1 and S2, no murmur/rub/gallop; No JVD  VASCULAR: No lower extremity edema; Peripheral pulses are 2+ bilaterally; Capillary refill brisk  ABDOMEN: Soft, nontender to palpation, normoactive bowel sounds, no rebound/guarding; No hepatosplenomegaly  MUSCULOSKELETAL: [+] unable to lift right leg due to hip pain, no pain to passive ROM  EXTREMITIES: hands/feet are warm, and without cyanosis or clubbing  SKIN: no visible rashes, pallor, diaphoresis, or jaundice  NEURO: No focal deficits; moving all extremities; gait with walker: unable to put weight on R leg  PSYCH: A+O to person, place, and time; affect appropriate; cooperative    ------------------------------------------------------------------------------------------------------------  LABS:                      RADIOLOGY & ADDITIONAL TESTS:  Results Reviewed:     Imaging Personally Reviewed:  Electrocardiogram Personally Reviewed:      COORDINATION OF CARE:  Care Discussed with Consultants/Other Providers [Y/N]:   Prior or Outpatient Records Reviewed [Y/N]:   ------------------------------------------------------------------------------------------------------------ *******************************************************  Armen Medina MD PGY-1  Internal Medicine  Pager [phone] / [phone]  *******************************************************  Patient is a 69y old  Female who presents with a chief complaint of Lytic bone lesions (02 Sep 2020 07:58)      PI (Romansh): # [id number]    SUBJECTIVE / OVERNIGHT EVENTS:  - No acute events overnight.   - Patient seen and evaluated at bedside.  - Patient in pain at the moment and due for medication, medication was delivered by RN Cristina  - Denies fevers/chills, headache, SOB at rest, cough, chest pain, palpitations, abdominal pain, nausea/vomiting/diarrhea/constipation, melena/hematochezia, dysuria, and hematuria    ------------------------------------------------------------------------------------------------------------    MEDICATIONS  (STANDING):  enoxaparin Injectable 40 milliGRAM(s) SubCutaneous daily  lidocaine   Patch 1 Patch Transdermal daily  melatonin 3 milliGRAM(s) Oral at bedtime  multivitamin 1 Tablet(s) Oral daily  oxyCODONE  ER Tablet 10 milliGRAM(s) Oral every 8 hours  polyethylene glycol 3350 17 Gram(s) Oral daily  senna 2 Tablet(s) Oral at bedtime    MEDICATIONS  (PRN):  acetaminophen   Tablet .. 650 milliGRAM(s) Oral every 6 hours PRN Moderate Pain (4 - 6)  albuterol/ipratropium for Nebulization 3 milliLiter(s) Nebulizer every 6 hours PRN Shortness of Breath and/or Wheezing  bisacodyl 5 milliGRAM(s) Oral every 12 hours PRN Constipation  bisacodyl Suppository 10 milliGRAM(s) Rectal daily PRN Constipation  guaiFENesin   Syrup  (Sugar-Free) 100 milliGRAM(s) Oral every 6 hours PRN Cough  ondansetron    Tablet 4 milliGRAM(s) Oral two times a day PRN Nausea and/or Vomiting  oxyCODONE    IR 7.5 milliGRAM(s) Oral every 3 hours PRN Severe Pain (7 - 10)      ------------------------------------------------------------------------------------------------------------    OBJECTIVE:    PHYSICAL EXAM:  T(F): 98.1, Max: 98.6 (09-01-20 @ 12:46)  HR: 89 (77 - 89)  BP: 130/78 (103/70 - 130/78)  RR: 18 (18 - 18)  SpO2: 97% (97% - 98%)      CONSTITUTIONAL: NAD, well-developed  HEENT: NCAT, no scleral icterus, MMM  RESPIRATORY/CHEST:  Normal respiratory effort; no wheezing/crackles  CARDIO: [+] irregular rhythm, S1 and S2, no murmur/rub/gallop; No JVD  VASCULAR: No lower extremity edema; Peripheral pulses are 2+ bilaterally; Capillary refill brisk  ABDOMEN: Soft, nontender to palpation, normoactive bowel sounds, no rebound/guarding; No hepatosplenomegaly  MUSCULOSKELETAL: [+] unable to lift right leg due to hip pain, no pain to passive ROM  EXTREMITIES: hands/feet are warm, and without cyanosis or clubbing  SKIN: no visible rashes, pallor, diaphoresis, or jaundice  NEURO: No focal deficits; moving all extremities; gait with walker: unable to put weight on R leg  PSYCH: A+O to person, place, and time; affect appropriate; cooperative    ------------------------------------------------------------------------------------------------------------  LABS:                      RADIOLOGY & ADDITIONAL TESTS:  Results Reviewed:     Imaging Personally Reviewed:  Electrocardiogram Personally Reviewed:      COORDINATION OF CARE:  Care Discussed with Consultants/Other Providers [Y/N]:   Prior or Outpatient Records Reviewed [Y/N]:   ------------------------------------------------------------------------------------------------------------

## 2020-09-03 NOTE — PROGRESS NOTE ADULT - PROBLEM SELECTOR PLAN 6
Patient will need home PT  Planned d/c on 9/3 after conclusion of radiation assuming oncology doesn't need any more tests

## 2020-09-03 NOTE — DISCHARGE NOTE NURSING/CASE MANAGEMENT/SOCIAL WORK - NSDCFUADDAPPT_GEN_ALL_CORE_FT
Please call [facility] upon arriving home to set up appointment with new oncologist within the next week.

## 2020-09-03 NOTE — PROGRESS NOTE ADULT - ATTENDING COMMENTS
Patient was seen and examined personally by me. I have discussed the plan and reviewed the resident's note and agree with the above physical exam findings including assessment and plan except as indicated below.    69 year old woman with no PMH p/w R hip pain. Found to have lytic lesions of R acetabular roof and lower R iliac wing and CT chest revealing two spiculated lung lesions. Suspect possible primary lung cancer w/ mets to spine. S/p IR biopsy of T11 vertebral body 8/25 with prelim path showing metastatic adenocarcinoma.    Continue current pain regimen  Completed inpatient RT on 9/3  PT recs: home PT. Per CM, family wishes for outpatient PT instead.  No plans currently by NSx or ortho. Awaiting TLSO brace for back  Stable for DC home. DC time: 32 min Patient was seen and examined personally by me. I have discussed the plan and reviewed the resident's note and agree with the above physical exam findings including assessment and plan except as indicated below.    69 year old woman with no PMH p/w R hip pain. Found to have lytic lesions of R acetabular roof and lower R iliac wing and CT chest revealing two spiculated lung lesions. Suspect possible primary lung cancer w/ mets to spine. S/p IR biopsy of T11 vertebral body 8/25 with prelim path showing metastatic adenocarcinoma.    Continue current pain regimen. Was changed to morphine ER on dc due to insurance coverage. Discussed therapeutic interchange with pharmacy.  Completed inpatient RT on 9/3  PT recs: home PT. Per CM, family wishes for outpatient PT instead.  No plans currently by NSx or ortho. Awaiting TLSO brace for back  Stable for DC home. DC time: 32 min

## 2020-09-03 NOTE — PROGRESS NOTE ADULT - PROBLEM SELECTOR PLAN 3
Widespread metastases including right hip, spine, and brain. Brain lesion at L cerebellopontine angle currently asymptomatic. Spine and hip lesions associated with severe pain and debility.    Plan:  -undergoing radiation  -pain regimen: oxy ER 10mg TID and oxy IR 7.5mg q3hr PRN  -would benefit from corticosteroids but awaiting pathology report first  -appreciate palliative recs  -appreciate neurosurgery and orthopedic surgery recs - no surgical interventions at this time Widespread metastases including right hip, spine, and brain. Brain lesion at L cerebellopontine angle currently asymptomatic. Spine and hip lesions associated with severe pain and debility.    Plan:  -undergoing radiation  -pain regimen: oxy ER 10mg TID and oxy IR 7.5mg q3hr PRN  -would benefit from corticosteroids but awaiting pathology report first  -appreciate palliative recs  -appreciate neurosurgery and orthopedic surgery recs - no surgical interventions at this time  -will obtain TSLO brace

## 2020-09-03 NOTE — PROGRESS NOTE ADULT - SUBJECTIVE AND OBJECTIVE BOX
*INCOMPLETE NOTE*      *******************************************************  Armen Medina MD PGY-1  Internal Medicine  Pager [phone] / [phone]  *******************************************************  Patient is a 69y old  Female who presents with a chief complaint of Lytic bone lesions (02 Sep 2020 07:58)      PI (_): #    SUBJECTIVE / OVERNIGHT EVENTS:  - No acute events overnight.       ------------------------------------------------------------------------------------------------------------    MEDICATIONS  (STANDING):  enoxaparin Injectable 40 milliGRAM(s) SubCutaneous daily  lidocaine   Patch 1 Patch Transdermal daily  melatonin 3 milliGRAM(s) Oral at bedtime  multivitamin 1 Tablet(s) Oral daily  nystatin Powder 1 Application(s) Topical two times a day  oxyCODONE  ER Tablet 10 milliGRAM(s) Oral every 8 hours  polyethylene glycol 3350 17 Gram(s) Oral daily  senna 2 Tablet(s) Oral at bedtime    MEDICATIONS  (PRN):  acetaminophen   Tablet .. 650 milliGRAM(s) Oral every 6 hours PRN Moderate Pain (4 - 6)  albuterol/ipratropium for Nebulization 3 milliLiter(s) Nebulizer every 6 hours PRN Shortness of Breath and/or Wheezing  bisacodyl 5 milliGRAM(s) Oral every 12 hours PRN Constipation  bisacodyl Suppository 10 milliGRAM(s) Rectal daily PRN Constipation  guaiFENesin   Syrup  (Sugar-Free) 100 milliGRAM(s) Oral every 6 hours PRN Cough  ondansetron    Tablet 4 milliGRAM(s) Oral two times a day PRN Nausea and/or Vomiting  oxyCODONE    IR 7.5 milliGRAM(s) Oral every 3 hours PRN Severe Pain (7 - 10)      ------------------------------------------------------------------------------------------------------------    OBJECTIVE:      PHYSICAL EXAM:  T(F): 98.3, Max: 98.3 (09-02-20 @ 12:32)  HR: 82 (82 - 107)  BP: 100/60 (100/60 - 105/60)  RR: 18 (17 - 18)  SpO2: 97% (97% - 98%)          ------------------------------------------------------------------------------------------------------------  LABS:                        10.8   7.23  )-----------( 357      ( 02 Sep 2020 12:30 )             33.8             D-Dimer Assay, Quantitative (09.02.20 @ 12:30)    D-Dimer Assay, Quantitative: 884            ------------------------------------------------------------------------------------------------------------ *******************************************************  Armen Medina MD PGY-1  Internal Medicine  Pager [phone] / [phone]  *******************************************************  Patient is a 69y old  Female who presents with a chief complaint of Lytic bone lesions (02 Sep 2020 07:58)          SUBJECTIVE / OVERNIGHT EVENTS:  - No acute events overnight.   - Patient seen and examined at bedside.   - Patient reports symptoms improving today.   - Denies fevers/chills, headache, chest pain, palpitations, shortness of breath, cough, abdominal pain, nausea/vomiting, diarrhea, constipation, dysuria/hematuria, lower extremity swelling, joint pain, and rashes.     ------------------------------------------------------------------------------------------------------------    MEDICATIONS  (STANDING):  enoxaparin Injectable 40 milliGRAM(s) SubCutaneous daily  lidocaine   Patch 1 Patch Transdermal daily  melatonin 3 milliGRAM(s) Oral at bedtime  multivitamin 1 Tablet(s) Oral daily  nystatin Powder 1 Application(s) Topical two times a day  oxyCODONE  ER Tablet 10 milliGRAM(s) Oral every 8 hours  polyethylene glycol 3350 17 Gram(s) Oral daily  senna 2 Tablet(s) Oral at bedtime    MEDICATIONS  (PRN):  acetaminophen   Tablet .. 650 milliGRAM(s) Oral every 6 hours PRN Moderate Pain (4 - 6)  albuterol/ipratropium for Nebulization 3 milliLiter(s) Nebulizer every 6 hours PRN Shortness of Breath and/or Wheezing  bisacodyl 5 milliGRAM(s) Oral every 12 hours PRN Constipation  bisacodyl Suppository 10 milliGRAM(s) Rectal daily PRN Constipation  guaiFENesin   Syrup  (Sugar-Free) 100 milliGRAM(s) Oral every 6 hours PRN Cough  ondansetron    Tablet 4 milliGRAM(s) Oral two times a day PRN Nausea and/or Vomiting  oxyCODONE    IR 7.5 milliGRAM(s) Oral every 3 hours PRN Severe Pain (7 - 10)      ------------------------------------------------------------------------------------------------------------    OBJECTIVE:      PHYSICAL EXAM:  T(F): 98.3, Max: 98.3 (09-02-20 @ 12:32)  HR: 82 (82 - 107)  BP: 100/60 (100/60 - 105/60)  RR: 18 (17 - 18)  SpO2: 97% (97% - 98%)      CONSTITUTIONAL: NAD, well-developed  HEENT: NCAT, no scleral icterus, MMM  RESPIRATORY/CHEST:  Normal respiratory effort; no wheezing/crackles  CARDIO: [+] irregular rhythm, S1 and S2, no murmur/rub/gallop; No JVD  VASCULAR: No lower extremity edema; Peripheral pulses are 2+ bilaterally; Capillary refill brisk  ABDOMEN: Soft, nontender to palpation, normoactive bowel sounds, no rebound/guarding; No hepatosplenomegaly  MUSCULOSKELETAL: [+] unable to lift right leg due to hip pain, no pain to passive ROM  EXTREMITIES: hands/feet are warm, and without cyanosis or clubbing  SKIN: no visible rashes, pallor, diaphoresis, or jaundice  NEURO: No focal deficits; moving all extremities; gait with walker: unable to put weight on R leg  PSYCH: A+O to person, place, and time; affect appropriate; cooperative    ------------------------------------------------------------------------------------------------------------  LABS:                        10.8   7.23  )-----------( 357      ( 02 Sep 2020 12:30 )             33.8             D-Dimer Assay, Quantitative (09.02.20 @ 12:30)    D-Dimer Assay, Quantitative: 884            ------------------------------------------------------------------------------------------------------------

## 2020-09-03 NOTE — DISCHARGE NOTE NURSING/CASE MANAGEMENT/SOCIAL WORK - NSSCNAMETXT_GEN_ALL_CORE
[facility] [phone]  Pending acceptance [facility] [phone]

## 2020-09-03 NOTE — PROGRESS NOTE ADULT - PROBLEM SELECTOR PLAN 1
Patient presented to ED with back pain and was called back when lytic lesion discovered on review of plain films. MRI of articular skeleton and head with many lytic lesions of thoracic spine/posterior ribs, soft tissue mass with invasion into right hip acetabulum and brain lesions. Two spiculated nodules in RUL concerning for primary lung cancer with widespread metastases although must rule out multiple myeloma. Also with lesions noted on thyroid and adrenal glands on imaging    Plan:  -s/p IR [id number] of T11 soft tissue mass. Awaiting path and genetic workup of tumor.  -preliminary read: metastatic adenocarcinoma, IHC testing revealing ?GI/hepatobiliary source although no current imaging consistent with GI mass other than scattered liver cysts seen on MR and CT which were too small to characterize  -appreciate oncology recommendations  -Khorana score 2: patient at increased risk of VTE due to RLE immobility from lytic lesion of hip although also with small CPA intracranial metastasis. Decision re outpatient AC also dependent on decision re chemo/immunotherapy  -f/u CBC and Ddimer for risk stratification: Khorana score still 2 (Platelet count, and tumor site), ddimer elevated  -f/u oncology labs:  -ESR/CRP elevated  -elevated urine kappa/lambda light chain ratio   -normal serum kappa/lambda light chain ratio  -no signs of TLS at present  -SPEP/UPEP and immunofixations negative for monoclonal elements  -cancer markers (Ca19-9, CEA) elevated  -TSH elevated at 6.44, FT4 wnl  -LDH mild elevation  -uric acid wnl  -PTH wnl  -iCa wnl

## 2020-09-03 NOTE — PROGRESS NOTE ADULT - SUBJECTIVE AND OBJECTIVE BOX
INTERVAL EVENTS:  No overnight events    REVIEW OF SYSTEMS:  All other review of systems is negative unless indicated above.    Vital Signs Last 24 Hrs  T(C): 36.8 (03 Sep 2020 13:37), Max: 36.8 (03 Sep 2020 05:40)  T(F): 98.2 (03 Sep 2020 13:37), Max: 98.3 (03 Sep 2020 05:40)  HR: 91 (03 Sep 2020 15:50) (82 - 107)  BP: 114/70 (03 Sep 2020 15:50) (100/60 - 114/70)  BP(mean): --  RR: 16 (03 Sep 2020 15:50) (16 - 18)  SpO2: 95% (03 Sep 2020 15:50) (95% - 97%)      PHYSICAL EXAM:   GENERAL: no acute distress  RESPIRATORY: LCTAB/L, no rhonchi, rales, or wheezing  CARDIOVASCULAR: RRR, no murmurs, gallops, rubs  ABDOMINAL: soft, non-tender, non-distended, positive bowel sounds   EXTREMITIES: no clubbing, cyanosis, or edema  NEUROLOGICAL: alert and oriented x 3,                        10.8   7.23  )-----------( 357      ( 02 Sep 2020 12:30 )             33.8               MEDICATIONS  (STANDING):  enoxaparin Injectable 40 milliGRAM(s) SubCutaneous daily  lidocaine   Patch 1 Patch Transdermal daily  melatonin 3 milliGRAM(s) Oral at bedtime  multivitamin 1 Tablet(s) Oral daily  nystatin Powder 1 Application(s) Topical two times a day  oxyCODONE  ER Tablet 10 milliGRAM(s) Oral every 8 hours  polyethylene glycol 3350 17 Gram(s) Oral daily  senna 2 Tablet(s) Oral at bedtime

## 2020-09-03 NOTE — DISCHARGE NOTE NURSING/CASE MANAGEMENT/SOCIAL WORK - PATIENT PORTAL LINK FT
You can access the FollowMyHealth Patient Portal offered by [facility] by registering at the following website: [email or web address]. By joining [facility]’s FollowMyHealth portal, you will also be able to view your health information using other applications (apps) compatible with our system.

## 2020-09-04 NOTE — PROGRESS NOTE ADULT - SUBJECTIVE AND OBJECTIVE BOX
*******************************************************  Armen Medina MD PGY-1  Internal Medicine  Pager [phone] / [phone]  *******************************************************  Patient is a 69y old  Female who presents with a chief complaint of Lytic bone lesions (03 Sep 2020 17:35)    Daughter acted as  via facetime    SUBJECTIVE / OVERNIGHT EVENTS:  - No acute events overnight.   - Patient seen and evaluated at bedside.  - Feeling ok, pain is well controlled.  - Denies fevers/chills, headache, SOB at rest, cough, chest pain, palpitations, abdominal pain, nausea/vomiting/diarrhea/constipation, melena/hematochezia, dysuria, and hematuria    ------------------------------------------------------------------------------------------------------------    MEDICATIONS  (STANDING):  enoxaparin Injectable 40 milliGRAM(s) SubCutaneous daily  lidocaine   Patch 1 Patch Transdermal daily  melatonin 3 milliGRAM(s) Oral at bedtime  multivitamin 1 Tablet(s) Oral daily  nystatin Powder 1 Application(s) Topical two times a day  oxyCODONE  ER Tablet 10 milliGRAM(s) Oral every 8 hours  polyethylene glycol 3350 17 Gram(s) Oral daily  senna 2 Tablet(s) Oral at bedtime    MEDICATIONS  (PRN):  acetaminophen   Tablet .. 650 milliGRAM(s) Oral every 6 hours PRN Moderate Pain (4 - 6)  albuterol/ipratropium for Nebulization 3 milliLiter(s) Nebulizer every 6 hours PRN Shortness of Breath and/or Wheezing  bisacodyl 5 milliGRAM(s) Oral every 12 hours PRN Constipation  bisacodyl Suppository 10 milliGRAM(s) Rectal daily PRN Constipation  guaiFENesin   Syrup  (Sugar-Free) 100 milliGRAM(s) Oral every 6 hours PRN Cough  ondansetron    Tablet 4 milliGRAM(s) Oral two times a day PRN Nausea and/or Vomiting  oxyCODONE    IR 7.5 milliGRAM(s) Oral every 3 hours PRN Severe Pain (7 - 10)      ------------------------------------------------------------------------------------------------------------    OBJECTIVE:    PHYSICAL EXAM:  T(F): 98.2, Max: 98.4 (09-04-20 @ 06:04)  HR: 75 (75 - 95)  BP: 107/68 (107/68 - 127/74)  RR: 18 (16 - 18)  SpO2: 96% (95% - 96%)      CONSTITUTIONAL: NAD, well-developed  HEENT: NCAT, no scleral icterus, MMM  RESPIRATORY/CHEST:  Normal respiratory effort; no wheezing/crackles  CARDIO: [+] irregular rhythm, S1 and S2, no murmur/rub/gallop; No JVD  VASCULAR: No lower extremity edema; Peripheral pulses are 2+ bilaterally; Capillary refill brisk  ABDOMEN: Soft, nontender to palpation, normoactive bowel sounds, no rebound/guarding; No hepatosplenomegaly  MUSCULOSKELETAL: [+] unable to lift right leg due to hip pain, no pain to passive ROM  EXTREMITIES: hands/feet are warm, and without cyanosis or clubbing  SKIN: no visible rashes, pallor, diaphoresis, or jaundice  NEURO: No focal deficits; moving all extremities; gait with walker: unable to put weight on R leg  PSYCH: A+O to person, place, and time; affect appropriate; cooperative    ------------------------------------------------------------------------------------------------------------  LABS:                      RADIOLOGY & ADDITIONAL TESTS:  Results Reviewed:     Imaging Personally Reviewed:  Electrocardiogram Personally Reviewed:      COORDINATION OF CARE:  Care Discussed with Consultants/Other Providers [Y/N]:   Prior or Outpatient Records Reviewed [Y/N]:   ------------------------------------------------------------------------------------------------------------

## 2020-09-04 NOTE — PROGRESS NOTE ADULT - PROBLEM SELECTOR PLAN 3
Widespread metastases including right hip, spine, and brain. Brain lesion at L cerebellopontine angle currently asymptomatic. Spine and hip lesions associated with severe pain and debility.    Plan:  -undergoing radiation  -pain regimen: oxy ER 10mg TID and oxy IR 7.5mg q3hr PRN  -would benefit from corticosteroids but awaiting pathology report first  -appreciate palliative recs  -appreciate neurosurgery and orthopedic surgery recs - no surgical interventions at this time  -will obtain TSLO brace

## 2020-09-04 NOTE — PROGRESS NOTE ADULT - NSHPATTENDINGPLANDISCUSS_GEN_ALL_CORE
HS
patient and resident
HS, pt
HS, pt, son: Abdulaziz and daughter: Oniel Mccollum Onc
HS, pt
HS, pt and daughter at bedside
HS, pt
HS, pt
HS

## 2020-09-04 NOTE — PROGRESS NOTE ADULT - NUTRITIONAL ASSESSMENT
Malnutrition MODERATE [Etiology In the context of chronic illness]: Regular diet. Add Multivitamin for micronutrient coverage.

## 2020-09-04 NOTE — PROGRESS NOTE ADULT - ATTENDING COMMENTS
Patient was seen and examined personally by me. I have discussed the plan and reviewed the resident's note and agree with the above physical exam findings including assessment and plan except as indicated below.    69 year old woman with no PMH p/w R hip pain. Found to have lytic lesions of R acetabular roof and lower R iliac wing and CT chest revealing two spiculated lung lesions. Suspect possible primary lung cancer w/ mets to spine. S/p IR biopsy of T11 vertebral body 8/25 with prelim path showing metastatic adenocarcinoma.    Continue current pain regimen. Was changed to morphine ER on dc due to insurance coverage. Discussed therapeutic interchange with pharmacy.  Completed inpatient RT on 9/3  PT recs: home PT. Per CM, family wishes for outpatient PT instead.  No plans currently by NSx or ortho. Awaiting TLSO brace for back  Stable for DC home. DC time: 37 minutes.

## 2020-09-04 NOTE — PROGRESS NOTE ADULT - REASON FOR ADMISSION
Lytic bone lesions

## 2020-09-04 NOTE — PROGRESS NOTE ADULT - PROVIDER SPECIALTY LIST ADULT
Heme/Onc
Internal Medicine
Orthopedics
Palliative Care
Palliative Care
Internal Medicine

## 2020-09-04 NOTE — PROGRESS NOTE ADULT - ASSESSMENT
Patient is a 69 y.o Armenian speaking F with no known PMH who originally presented for b/l flank pain and was found to have lung lesions concerning for primary lung Ca as well as lytic lesions on the acetabulum concerning for malignancy. Inpatient for radiation oncology treatment until 9/3.
Patient is a 69 y.o New Zealander speaking F with no known PMH who originally presented for b/l flank pain and was found to have lung lesions concerning for primary lung Ca as well as lytic lesions on the acetabulum concerning for malignancy.
Patient is a 69 y.o Bangladeshi speaking F with no known PMH who originally presented for b/l flank pain and was found to have lung lesions concerning for primary lung Ca as well as lytic lesions on the acetabulum concerning for malignancy. Inpatient for radiation oncology treatment until 9/3. Plan for discharge today.
Patient is a 69 y.o Belizean speaking F with no known PMH who originally presented for b/l flank pain and was found to have lung lesions concerning for primary lung Ca as well as lytic lesions on the acetabulum concerning for malignancy.
Patient is a 69 y.o Bolivian speaking F with no known PMH who originally presented for b/l flank pain and was found to have lung lesions concerning for primary lung Ca as well as lytic lesions on the acetabulum concerning for malignancy. Inpatient for radiation oncology treatment until 9/3.
Patient is a 69 y.o Cuban speaking F with no known PMH who originally presented for b/l flank pain and was found to have lung lesions concerning for primary lung Ca as well as lytic lesions on the acetabulum concerning for malignancy.
Patient is a 69 y.o Jordanian speaking F with no known PMH who originally presented for b/l flank pain and was found to have lung lesions concerning for primary lung Ca as well as lytic lesions on the acetabulum concerning for malignancy. Inpatient for radiation oncology treatment until 9/3. Plan for discharge today.
Patient is a 69 y.o Luxembourgish speaking F with no known PMH who originally presented for b/l flank pain and was found to have lung lesions concerning for primary lung Ca as well as lytic lesions on the acetabulum concerning for malignancy; oncology consulted for evaluation of suspected metastatic disease.    #r/o Metastatic disease  -CT C/A/P: Two confluent spiculated nodules in the right upper lobe measuring up to 1.8 x 2.1 cm and 1.7 x 2.1 cm respectively highly concerning for primary lung malignancy.  Lytic metastatic lesions with extraosseous soft tissue extension involving the right acetabular roof, T11 vertebral body with involvement of the left-sided pedicle and transverse process, left posterior 11th rib, T6 left-sided pedicle and transverse process and left posterior sixth rib. Mild extraosseous soft tissue extension into the epidural space at T11 and T6. Mild pathologic compression of the T11 vertebral body.   _MRI pelvis: Multifocal bony pelvis osseous metastases as detailed above largest in right supra-acetabular region with associated extraosseous soft tissue mass component and destruction of the right acetabular articular cortical roof.Nonspecific increased T2 signal abnormality in the adjacent right gluteal and iliacus muscles indeterminate for reactive edema/inflammation or microtumor infiltration.  -MRI abdomen Limited noncontrast study. This study is not protocoled for evaluation of the solid intra-abdominal organs for metastatic disease. A 1.8 cm left adrenal adenoma. Partially imaged osseous metastases, several with soft tissue components with the T11 lesions extending into the spinal canal.  -MRI brain: There is evidence of a abnormal enhancing lesion seen along the left CP angle region. This lesion measures approximately 2.2 x 1.1 cm. While this could be compatible meningioma, the possibility of an underlying dural metastasis cannot be entirely excluded given patient's history. There is a well-defined extracalvarial soft tissue lesion seen involving the right apex. This lesion measures approximately 2.1 x 1.4 cm and is likely compatible with a sebaceous cyst. Clinical correlation is recommended. No significant shift or herniation is seen.  -patient s/p IR biopsy of T11 vertebral body 8/25 with prelim path showing metastatic adenocarcinoma, primary GI or pancreaticobiliary? (will wait for IHC results), Please make sure NGS panel for adenocarcinoma sent  -Patient completed inpatient RT on 9/3  -Patient can follow in [facility] after discharge      Sheeba Garg PGY5  Heme/Onc fellow  [phone]
Patient is a 69 y.o Montenegrin speaking F with no known PMH who originally presented for b/l flank pain and was found to have lung lesions concerning for primary lung Ca as well as lytic lesions on the acetabulum concerning for malignancy.
Patient is a 69 y.o Papua New Guinean speaking F with no known PMH who originally presented for b/l flank pain and was found to have lung lesions concerning for primary lung Ca as well as lytic lesions on the acetabulum concerning for malignancy. Inpatient for radiation oncology treatment until 9/3.
Patient is a 69 y.o Swiss speaking F with no known PMH who originally presented for b/l flank pain and was found to have lung lesions concerning for primary lung Ca as well as lytic lesions on the acetabulum concerning for malignancy. Inpatient for radiation oncology treatment until 9/3.
Patient is a 69 y.o Ugandan speaking F with no known PMH who originally presented for b/l flank pain and was found to have lung lesions concerning for primary lung Ca as well as lytic lesions on the acetabulum concerning for malignancy. Inpatient for radiation oncology treatment until 9/3.
Patient is a 69 y.o Uzbek speaking F with no known PMH who originally presented for b/l flank pain and was found to have lung lesions concerning for primary lung Ca as well as lytic lesions on the acetabulum concerning for malignancy. Palliative care team consulted for symptom management and goals of care. Met with pt at bedside used [place]  # [id number]. Daughter Veda at bedside.
Patient is a 69 y.o Afghan speaking F with no known PMH who originally presented for b/l flank pain and was found to have lung lesions concerning for primary lung Ca as well as lytic lesions on the acetabulum concerning for malignancy.
Patient is a 69 y.o Japanese speaking F with no known PMH who originally presented for b/l flank pain and was found to have lung lesions concerning for primary lung Ca as well as lytic lesions on the acetabulum concerning for malignancy. Palliative care team consulted for symptom management and goals of care. Met with pt at bedside used [place]  # [id number]. Daughter Veda at bedside.
Patient is a 69 y.o Afghan speaking F with no known PMH who originally presented for b/l flank pain and was found to have lung lesions concerning for primary lung Ca as well as lytic lesions on the acetabulum concerning for malignancy. Inpatient for radiation oncology treatment until 9/3.

## 2020-09-09 PROBLEM — Z00.00 ENCOUNTER FOR PREVENTIVE HEALTH EXAMINATION: Status: ACTIVE | Noted: 2020-01-01

## 2020-09-11 PROBLEM — Z80.49 FAMILY HISTORY OF CERVICAL CANCER: Status: ACTIVE | Noted: 2020-01-01

## 2020-09-11 PROBLEM — C34.11 MALIGNANT NEOPLASM OF UPPER LOBE OF RIGHT LUNG: Status: ACTIVE | Noted: 2020-01-01

## 2020-09-11 PROBLEM — D64.9 ANEMIA, UNSPECIFIED TYPE: Status: ACTIVE | Noted: 2020-01-01

## 2020-09-11 PROBLEM — Z80.0 FAMILY HISTORY OF COLON CANCER: Status: ACTIVE | Noted: 2020-01-01

## 2020-09-11 PROBLEM — Z78.9 NO FAMILY HISTORY OF CANCER: Status: ACTIVE | Noted: 2020-01-01

## 2020-09-11 PROBLEM — G89.3 NEOPLASM RELATED PAIN: Status: ACTIVE | Noted: 2020-01-01

## 2020-09-11 PROBLEM — Z80.0 FAMILY HISTORY OF MALIGNANT NEOPLASM OF RECTUM: Status: ACTIVE | Noted: 2020-01-01

## 2020-09-11 PROBLEM — Z78.9 NO PERTINENT PAST MEDICAL HISTORY: Status: RESOLVED | Noted: 2020-01-01 | Resolved: 2020-01-01

## 2020-09-11 PROBLEM — C79.51 BONE METASTASES: Status: ACTIVE | Noted: 2020-01-01

## 2020-09-11 PROBLEM — F17.200 CURRENT SMOKER: Status: ACTIVE | Noted: 2020-01-01

## 2020-09-12 NOTE — CONSULT NOTE ADULT - SUBJECTIVE AND OBJECTIVE BOX
Orthopedic Surgery Consult Note    HPI:   69 y.o. Female smoker w/ recently diagnosed with Lung Ca in 8/2020 with mets to brain, Spine, and R acetabulum/pelvis, s/p RT to lung/t-spine x 5 (last tx 9/2, no RT to hip/pelvis), not currently on chemo but planned to start, presents to the [facility] ED with acutely worsened hip pain and inability to ambulate.  Her R hip and back pain has been present for the past 3-4 months, before which she was in her usual state of health, ambulating independently.  She was admitted to [facility] 8/21-9/4 for this problem, underwent MRI showing extensive metastatic disease and was recommended for nonoperative treatement at that time, NWB RLE with walker.  Per patient/daughter she has been compliant with NWB restrictions, using the walker and ambulating ok.  She lives with her daughter who helps with ADLs/IADLs, was taking her to an oncology appt walking down steps yesterday when she felt acute onset of worsening R hip pain, needed to sit down, pain still did not pepe so EMS was called.  She now reports that her pain is significantly improved, she is comfortable.  Denies numbness/tingling in RLE.  Denies nausea/vomiting/shortness of breath.  She was febrile to 102 in the ED, found to have R upper lobe opacity concerning for pneumonia, admitted to medicine service.     Review of systems: As per HPI, otherwise negative.     PAST MEDICAL & SURGICAL HISTORY:  Lung cancer metastatic to bone    No pertinent past medical history    No significant past surgical history      Family History: FAMILY HISTORY:  FH: colon cancer    Family history of cervical cancer        Medications: MEDICATIONS  (STANDING):  heparin   Injectable 5000 Unit(s) SubCutaneous every 8 hours  lidocaine   Patch 1 Patch Transdermal every 24 hours  melatonin 3 milliGRAM(s) Oral at bedtime  multivitamin 1 Tablet(s) Oral daily  pantoprazole    Tablet 40 milliGRAM(s) Oral before breakfast  piperacillin/tazobactam IVPB.. 3.375 Gram(s) IV Intermittent every 8 hours  polyethylene glycol 3350 17 Gram(s) Oral daily  senna 2 Tablet(s) Oral at bedtime  sodium chloride 0.9%. 1000 milliLiter(s) (75 mL/Hr) IV Continuous <Continuous>  vancomycin  IVPB 1000 milliGRAM(s) IV Intermittent every 12 hours    MEDICATIONS  (PRN):  acetaminophen   Tablet .. 650 milliGRAM(s) Oral every 6 hours PRN Temp greater or equal to 38C (100.4F), Mild Pain (1 - 3)  morphine  - Injectable 2 milliGRAM(s) IV Push every 4 hours PRN Severe Pain (7 - 10)  ondansetron Injectable 4 milliGRAM(s) IV Push every 4 hours PRN Nausea and/or Vomiting  oxyCODONE    IR 5 milliGRAM(s) Oral every 3 hours PRN Moderate Pain (4 - 6)      T(C): --  HR: --  BP: --  RR: --  SpO2: --  Wt(kg): --                        10.7   9.02  )-----------( 243      ( 12 Sep 2020 01:30 )             33.2     09-12    127<L>  |  89<L>  |  7   ----------------------------<  117<H>  3.6   |  26  |  0.36<L>    Ca    8.7      12 Sep 2020 01:30    TPro  6.3  /  Alb  3.3  /  TBili  < 0.2<L>  /  DBili  x   /  AST  19  /  ALT  18  /  AlkPhos  80  09-12      EXAM:  Gen: NAD, alert and oriented  Resp: no increased WOB on RA  RLLE:  Skin intact, no overlying ecchymosis/erythema  R leg ~2cm shortened compared to contralateral side  SILT diffusely  TA/GS/EHL/FHL motor intact  compartments soft, no calf tenderness  WWP distally      Labs:  SUBJECTIVE:  Doing well.   No overnight events.     OBJECTIVE:     ** VITAL SIGNS / I&O's **    T(C): --  T(F): --  HR: --  BP: --  RR: --  SpO2: --        ** PHYSICAL EXAM **    -- CONSTITUTIONAL:   -- HEENT:   -- FLAP: Soft and pink with 2-3 second capillary refill. (+) arterial Cook Doppler signal.   -- NECK:   -- CARDIOVASCULAR: Regular rate and rhythm. S1, S2.  -- RESPIRATORY: Bilateral breath sounds.   -- ABDOMEN: Soft, nontender, nondistended.  -- EXTREMITIES:   -- VASCULAR:   -- NEUROLOGICAL:     ** LABS **                  10.7   9.02   )----------(  243       ( 12 Sep 2020 01:30 )               33.2      127    |  89     |  7      ----------------------------<  117        ( 12 Sep 2020 01:30 )  3.6     |  26     |  0.36     Ca    8.7        ( 12 Sep 2020 01:30 )    TPro  6.3    /  Alb  3.3    /  TBili  < 0.2  /  DBili  x      /  AST  19     /  ALT  18     /  AlkPhos  80     ( 12 Sep 2020 01:30 )    PT/INR -  13.3 SEC / 1.16    ( 12 Sep 2020 01:30 )       PTT -  31.2 SEC   ( 12 Sep 2020 01:30 )  CAPILLARY BLOOD GLUCOSE    CARDIAC MARKERS ( 12 Sep 2020 01:30 )  x     / x     / 45 u/L / x     / x              Imaging    ad< from: Xray Hip w/ Pelvis 2 or 3 Views, Right (09.12.20 @ 03:23) >  IMPRESSION:  Redemonstrated permeative lytic destructive metastatic disease involving lower right iliac wing/right supra-acetabular region and acetabular roof. Additional smaller osseous metastases apparent on MRI from 8/26/2020 not well demonstrated radiographically.    Generalized osteopenia however to greater extent in right hip region.    No current fractures or dislocations.    < end of copied text >        A/P: This is a 69y Female with recently diagnosed lung cancer with mets to brain/spine/pelvis, receiving RT to lung and spine,  presenting to the ED with acutely worsened R hip pain and inability to ambulate with walker.  Now reports pain is improved    -   -  - Orthopedic Surgery Consult Note    HPI:   69 y.o. Female smoker w/ recently diagnosed with Lung Ca in 8/2020 with mets to brain, Spine, and R acetabulum/pelvis, s/p RT to lung/t-spine x 5 (last tx 9/2, no RT to hip/pelvis), not currently on chemo but planned to start, presents to the [facility] ED with acutely worsened hip pain and inability to ambulate.  Her R hip and back pain has been present for the past 3-4 months, before which she was in her usual state of health, ambulating independently.  She was admitted to [facility] 8/21-9/4 for this problem, underwent MRI showing extensive metastatic disease and was recommended for nonoperative treatement at that time, NWB RLE with walker.  Per patient/daughter she has been compliant with NWB restrictions, using the walker and ambulating ok.  She lives with her daughter who helps with ADLs/IADLs, was taking her to an oncology appt walking down steps yesterday when she felt acute onset of worsening R hip pain, needed to sit down, pain still did not pepe so EMS was called.  She now reports that her pain is significantly improved, she is comfortable.  Denies numbness/tingling in RLE.  Denies nausea/vomiting/shortness of breath.  She was febrile to 102 in the ED, found to have R upper lobe opacity concerning for pneumonia, admitted to medicine service.     Review of systems: As per HPI, otherwise negative.     PAST MEDICAL & SURGICAL HISTORY:  Lung cancer metastatic to bone    No pertinent past medical history    No significant past surgical history      Family History: FAMILY HISTORY:  FH: colon cancer    Family history of cervical cancer      Medications: MEDICATIONS  (STANDING):  heparin   Injectable 5000 Unit(s) SubCutaneous every 8 hours  lidocaine   Patch 1 Patch Transdermal every 24 hours  melatonin 3 milliGRAM(s) Oral at bedtime  multivitamin 1 Tablet(s) Oral daily  pantoprazole    Tablet 40 milliGRAM(s) Oral before breakfast  piperacillin/tazobactam IVPB.. 3.375 Gram(s) IV Intermittent every 8 hours  polyethylene glycol 3350 17 Gram(s) Oral daily  senna 2 Tablet(s) Oral at bedtime  sodium chloride 0.9%. 1000 milliLiter(s) (75 mL/Hr) IV Continuous <Continuous>  vancomycin  IVPB 1000 milliGRAM(s) IV Intermittent every 12 hours    MEDICATIONS  (PRN):  acetaminophen   Tablet .. 650 milliGRAM(s) Oral every 6 hours PRN Temp greater or equal to 38C (100.4F), Mild Pain (1 - 3)  morphine  - Injectable 2 milliGRAM(s) IV Push every 4 hours PRN Severe Pain (7 - 10)  ondansetron Injectable 4 milliGRAM(s) IV Push every 4 hours PRN Nausea and/or Vomiting  oxyCODONE    IR 5 milliGRAM(s) Oral every 3 hours PRN Moderate Pain (4 - 6)                            10.7   9.02  )-----------( 243      ( 12 Sep 2020 01:30 )             33.2     09-12    127<L>  |  89<L>  |  7   ----------------------------<  117<H>  3.6   |  26  |  0.36<L>    Ca    8.7      12 Sep 2020 01:30    TPro  6.3  /  Alb  3.3  /  TBili  < 0.2<L>  /  DBili  x   /  AST  19  /  ALT  18  /  AlkPhos  80  09-12      EXAM:  Gen: NAD, alert and oriented  Resp: no increased WOB on RA  RLE:  Skin intact, no overlying ecchymosis/erythema  R leg ~2cm shortened compared to contralateral side  unable to SLR  +log roll, negative heel strike  Unable to passively range joint d/t severe pain  SILT diffusely  TA/GS/EHL/FHL motor intact  compartments soft, no calf tenderness  WWP distally      OBJECTIVE:       ** LABS **                  10.7   9.02   )----------(  243       ( 12 Sep 2020 01:30 )               33.2      127    |  89     |  7      ----------------------------<  117        ( 12 Sep 2020 01:30 )  3.6     |  26     |  0.36     Ca    8.7        ( 12 Sep 2020 01:30 )    TPro  6.3    /  Alb  3.3    /  TBili  < 0.2  /  DBili  x      /  AST  19     /  ALT  18     /  AlkPhos  80     ( 12 Sep 2020 01:30 )    PT/INR -  13.3 SEC / 1.16    ( 12 Sep 2020 01:30 )       PTT -  31.2 SEC   ( 12 Sep 2020 01:30 )  CAPILLARY BLOOD GLUCOSE    CARDIAC MARKERS ( 12 Sep 2020 01:30 )  x     / x     / 45 u/L / x     / x              Imaging    ad< from: Xray Hip w/ Pelvis 2 or 3 Views, Right (09.12.20 @ 03:23) >  IMPRESSION:  Redemonstrated permeative lytic destructive metastatic disease involving lower right iliac wing/right supra-acetabular region and acetabular roof. Additional smaller osseous metastases apparent on MRI from 8/26/2020 not well demonstrated radiographically.    Generalized osteopenia however to greater extent in right hip region.    No current fractures or dislocations.    < end of copied text >        A/P: This is a 69y Female with recently diagnosed lung cancer with mets to brain/spine/pelvis, receiving RT to lung and spine,  presenting to the ED with acutely worsened R hip pain and inability to ambulate with walker.  Now reports pain is improved at rest compared to prior admission, but is unable to move R hip without severe pain.    - FU CT R hip without contrast  - NWB RLE  - pain control  - PT/OT

## 2020-09-12 NOTE — DOWNTIME INTERRUPTION NOTE - WHICH MANUAL FORMS INITIATED?
Vital sign flow sheet  Emergency Downtime form  Progress note Vital sign flow sheet  Emergency Downtime form  Progress note  Emergency Admission notification   Lab requisition forms  General test requisition  SBIRT Screen

## 2020-09-12 NOTE — ED PROVIDER NOTE - OBJECTIVE STATEMENT
68 yo F with recently diagnosed Lung Ca in 8/2020 with mets to brain, spine, and Right hip s/p RT x 5 ( last tx 9/2) , not on chemo yet p/w sudden onset, severe Right hip pain, unable to ambulate. vitals: Febrile 102 in the ED with pleuritic chest pain.

## 2020-09-12 NOTE — H&P ADULT - HISTORY OF PRESENT ILLNESS
69 y.o. Female smoker w/ recently diagnosed with Lung Ca in 8/2020 with mets to brain, Spine, and Right hip s/p RT x 5 ( last tx 9/2) , no on chemo yet p/w sudden onset, severe Right hip pain, unable to ambulate and fever. Patient says she has been on standing morphine since diagnosed with metastatic lung Ca last month but was unable to ambulate up 3 steps without severe 10 out of 10 pain despite the help of her son or daughter. She denies falling or trauma to her right hip. She has also had intractable nausea and vomiting over the last few days , left pleuritic cp, and today had a low grade fever. She denies SOB, cough, dysuria, abdominal pain or diarrhea. In ED she had a fever of 102.2. CT chest showed known RUL mass but new LLL infiltrate with associated small left pleural effusion and [id number] rib lesion. 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 On physical exam, her right leg was externally rotated and shortened. Xray of Right hip and pelvis showed known right hip lesion but no fracture. Patient was given Morphine 4mg IVP x 1 for hip and rib pain and pain now resolved. 69 y.o. Female smoker w/ recently diagnosed with Lung Ca in 8/2020 with mets to brain, Spine, and Right hip s/p RT x 5 ( last tx 9/2) , not on chemo yet p/w sudden onset, severe Right hip pain, unable to ambulate and fever. Patient says she has been on standing morphine since diagnosed with metastatic lung Ca last month but was unable to ambulate up 3 steps without severe 10 out of 10 pain despite the help of her son or daughter. She denies falling or trauma to her right hip. She has also had intractable nausea and vomiting over the last few days , left pleuritic cp, and today had a low grade fever. She denies SOB, cough, dysuria, abdominal pain or diarrhea. In ED she had a fever of 102.2. CT chest showed known RUL mass but new LLL infiltrate with associated small left pleural effusion and [id number] rib lesion. 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 On physical exam, her right leg was externally rotated and shortened. Xray of Right hip and pelvis showed known right hip lesion but no fracture. Patient was given Morphine 4mg IVP x 1 for hip and rib pain and pain now resolved.

## 2020-09-12 NOTE — H&P ADULT - NSHPPHYSICALEXAM_GEN_ALL_CORE
Vital Signs Last 24 Hrs  T(C): --  T(F): -- 102.2  HR: -- 73  BP: -- 113/56  BP(mean): --  RR: -- 19  SpO2: -- 95%

## 2020-09-12 NOTE — H&P ADULT - PROBLEM SELECTOR PLAN 1
new LLL infiltrate on Ct chest associated with fever, left pleuritic cp, and known lung cancer; No hypoxia or SOB.  Start Zosyn/Vanco  check Blood Cx  COVID negative

## 2020-09-12 NOTE — H&P ADULT - NEGATIVE NEUROLOGICAL SYMPTOMS
no syncope/no loss of consciousness/no hemiparesis/no loss of sensation/no weakness/no paresthesias/no generalized seizures

## 2020-09-12 NOTE — H&P ADULT - PROBLEM SELECTOR PLAN 3
Xray of right hip and pelvis negative for fractures but visibly RLE externally rotated and shortened and patient can't bare weight.  In the setting of known malignancy in Right hip and absence of trauma, suspect pathologic occult hip fracture.   Check CT of Right hip and Pelvis.  Bedrest  Consult Ortho  c/w standing Morphine 15mg PO q8hr and Morphine IV prn for pain Xray of right hip and pelvis negative for fractures but visibly RLE externally rotated and shortened and patient can't bare weight.  In the setting of known malignancy in Right hip and absence of trauma, suspect pathologic occult hip fracture.   Check CT of Right hip and Pelvis.  Bedrest  Consulted Ortho  c/w standing Morphine 15mg PO q8hr and Morphine IV prn for pain

## 2020-09-12 NOTE — H&P ADULT - PROBLEM SELECTOR PLAN 4
Follows with [facility]  s/p RT x 5 ( last dose 9/2  Not on chemo yet  c/w standing Morphine 15mg PO q8hr / Oxycontin/ Lidoderm patch/ Morphine IV prn for pain  Spoke to Daughter Veda @ [phone]  and is aware of condition

## 2020-09-12 NOTE — ED PROVIDER NOTE - CLINICAL SUMMARY MEDICAL DECISION MAKING FREE TEXT BOX
Fever 102, new LLL Pneumonia on CT chest, hyponatremia, and possible occult pathological Right hip fracture and known lung cancer; No hypoxia or SOB. Zosyn/Vanco initiated; sepsis work-up sent; admission.

## 2020-09-12 NOTE — H&P ADULT - NEGATIVE GASTROINTESTINAL SYMPTOMS
no diarrhea/no change in bowel habits/no flatulence/no melena/no jaundice/no abdominal pain/no constipation/no hematochezia

## 2020-09-12 NOTE — H&P ADULT - ASSESSMENT
Patient discharge with home to home hospice.    69 y.o. Female smoker w/ recently diagnosed with Lung Ca in 8/2020 with mets to brain, spine, and Right hip s/p RT x 5 ( last tx 9/2) , not on chemo yet p/w sudden onset, severe Right hip pain, unable to ambulate found to have a fever 102, new LLL Pneumonia on CT chest, hyponatremia, and possible occult pathological Right hip fracture.

## 2020-09-12 NOTE — H&P ADULT - PROBLEM SELECTOR PLAN 2
Most likely due to dehydration from N/V. Vomiting may be due to radiation treatments.  However can't exclude SIADH in the setting of Lung Ca  Will start NS @ 75ml/hr and zofran prn for N/V  Repeat Serum Na in 6 hours  check Urine electrolytes, Urine osm, TSH, free T4 and a.m. cortisol

## 2020-09-12 NOTE — H&P ADULT - GASTROINTESTINAL DETAILS
normal/bowel sounds normal/no bruit/no distention/no masses palpable/soft/nontender/no rebound tenderness

## 2020-09-13 NOTE — PROGRESS NOTE ADULT - SUBJECTIVE AND OBJECTIVE BOX
[facility] Division of Hospital Medicine  Sariah Swann MD  Pager: [phone]      Patient is a 69y old  Female who presents with a chief complaint of Right hip pain (12 Sep 2020 14:13)      SUBJECTIVE / OVERNIGHT EVENTS: [place]  # [id number]. Patient states that pain is currently controlled, but she cannot move her R leg and cannot walk. She denies CP, SOB, cough.     MEDICATIONS  (STANDING):  heparin   Injectable 5000 Unit(s) SubCutaneous every 8 hours  influenza   Vaccine 0.5 milliLiter(s) IntraMuscular once  lidocaine   Patch 1 Patch Transdermal every 24 hours  melatonin 3 milliGRAM(s) Oral at bedtime  multivitamin 1 Tablet(s) Oral daily  pantoprazole    Tablet 40 milliGRAM(s) Oral before breakfast  piperacillin/tazobactam IVPB.. 3.375 Gram(s) IV Intermittent every 8 hours  polyethylene glycol 3350 17 Gram(s) Oral daily  senna 2 Tablet(s) Oral at bedtime    MEDICATIONS  (PRN):  acetaminophen   Tablet .. 650 milliGRAM(s) Oral every 6 hours PRN Temp greater or equal to 38C (100.4F), Mild Pain (1 - 3)  bisacodyl 5 milliGRAM(s) Oral every 12 hours PRN Constipation  bisacodyl Suppository 10 milliGRAM(s) Rectal daily PRN Constipation  morphine  - Injectable 2 milliGRAM(s) IV Push every 4 hours PRN Severe Pain (7 - 10)  ondansetron Injectable 4 milliGRAM(s) IV Push every 4 hours PRN Nausea and/or Vomiting  oxyCODONE    IR 5 milliGRAM(s) Oral every 3 hours PRN Moderate Pain (4 - 6)    PHYSICAL EXAM:  Vital Signs Last 24 Hrs  T(C): 36.4 (13 Sep 2020 04:48), Max: 37.3 (12 Sep 2020 20:39)  T(F): 97.6 (13 Sep 2020 04:48), Max: 99.1 (12 Sep 2020 20:39)  HR: 81 (13 Sep 2020 04:48) (75 - 100)  BP: 105/58 (13 Sep 2020 04:48) (105/58 - 107/57)  BP(mean): --  RR: 16 (13 Sep 2020 04:48) (16 - 18)  SpO2: 94% (13 Sep 2020 04:48) (94% - 97%)    CONSTITUTIONAL: elderly, frail female in NAD  RESPIRATORY: Normal respiratory effort; lungs are clear to auscultation anteriorly   CARDIOVASCULAR:  S1/S2; No lower extremity edema  ABDOMEN: Nontender to palpation, normoactive bowel sounds, no rebound/guarding  MUSCULOSKELETAL: R leg unable to assess ROM 2/2 pain, +sensation intact. Able to lift LLE and bend knee   PSYCH: A+O to person, place, and time; affect appropriate  SKIN: No rashes; no palpable lesions    LABS:                        10.2   7.78  )-----------( 219      ( 13 Sep 2020 07:30 )             33.7     09-13    135  |  99  |  8   ----------------------------<  128<H>  3.1<L>   |  24  |  0.39<L>    Ca    8.5      13 Sep 2020 07:30  Phos  3.0     09-13  Mg     1.7     -13    TPro  5.4<L>  /  Alb  2.6<L>  /  TBili  < 0.2<L>  /  DBili  x   /  AST  23  /  ALT  15  /  AlkPhos  73  09-13    PT/INR - ( 12 Sep 2020 01:30 )   PT: 13.3 SEC;   INR: 1.16          PTT - ( 12 Sep 2020 01:30 )  PTT:31.2 SEC  CARDIAC MARKERS ( 12 Sep 2020 01:30 )  x     / x     / 45 u/L / x     / x          Urinalysis Basic - ( 12 Sep 2020 04:20 )    Color: LIGHT YELLOW / Appearance: Lt TURBID / S.010 / pH: 7.0  Gluc: NEGATIVE / Ketone: SMALL  / Bili: NEGATIVE / Urobili: NORMAL   Blood: SMALL / Protein: 10 / Nitrite: NEGATIVE   Leuk Esterase: NEGATIVE / RBC: 3-5 / WBC 0-2   Sq Epi: OCC / Non Sq Epi: x / Bacteria: NEGATIVE        Culture - Blood (collected 12 Sep 2020 06:58)  Source: .Blood  Preliminary Report (13 Sep 2020 07:01):    No growth to date.    Culture - Blood (collected 12 Sep 2020 06:58)  Source: .Blood  Preliminary Report (13 Sep 2020 07:01):    No growth to date.        RADIOLOGY & ADDITIONAL TESTS:  Results Reviewed: X  Imaging Personally Reviewed:  Electrocardiogram Personally Reviewed:    COORDINATION OF CARE:  Care Discussed with Consultants/Other Providers [Y/N]:  Prior or Outpatient Records Reviewed [Y/N]:

## 2020-09-13 NOTE — CHART NOTE - NSCHARTNOTEFT_GEN_A_CORE
CT pelvis results noted: Large destructive right acetabular and iliac bone lytic lesion with smaller adjacent lesions within the more superior aspect of the iliac bone. No femoral neck fracture.    Discussed with attending - will ask ortho for their input.  Ortho called x 2 - no response - will keep trying.  Patient currently stable, will continue to monitor.

## 2020-09-13 NOTE — PROGRESS NOTE ADULT - ASSESSMENT
69F, former smoker w/ recently diagnosed with Lung Ca in 8/2020 with mets to brain, spine, and Right hip s/p RT x 5 ( last tx 9/2), not on chemo yet p/w sudden onset, severe Right hip pain, unable to ambulate found to have a fever 102, new LLL Pneumonia on CT chest, hyponatremia, and possible occult pathological Right hip fracture.

## 2020-09-13 NOTE — PROGRESS NOTE ADULT - PROBLEM SELECTOR PLAN 1
New LLL infiltrate on Ct chest associated with fever, left pleuritic cp, and known lung cancer; No hypoxia or SOB.  - C/w Zosyn. D/c Vanco as low suspicion for MRSA at this time   - Blood Cx NGTD

## 2020-09-14 NOTE — DISCHARGE NOTE PROVIDER - NSDCMRMEDTOKEN_GEN_ALL_CORE_FT
acetaminophen 325 mg oral tablet: 2 tab(s) orally every 6 hours, As needed, Moderate Pain (4 - 6)  bisacodyl 10 mg rectal suppository: 1 suppository(ies) rectal once a day, As Needed -Constipation   bisacodyl 5 mg oral delayed release tablet: 1 tab(s) orally every 12 hours, As Needed -for constipation   lidocaine 5% topical film: Apply topically to affected area once a day, As Needed for back pain. MDD:1 patch. Maximum time on12 hours followed by 12 hours off.  melatonin 3 mg oral tablet: 1 tab(s) orally once a day (at bedtime)  morphine 15 mg/8 to 12 hr oral tablet, extended release: 1 tab(s) orally every 8 hours -for severe pain MDD:45mg (3 tabs)  Multiple Vitamins oral tablet: 1 tab(s) orally once a day  nystatin 100,000 units/g topical powder: 1 application topically 2 times a day  ondansetron 4 mg oral tablet: 1 tab(s) orally 2 times a day, As Needed -Nausea and/or Vomiting - for nausea   oxyCODONE 10 mg oral tablet: 0.5 tab(s) orally every 3 hours, As Needed - for severe pain MDD:4 full tabs (40mg)  polyethylene glycol 3350 oral powder for reconstitution: 17 gram(s) orally once a day  senna oral tablet: 2 tab(s) orally once a day (at bedtime)   acetaminophen 325 mg oral tablet: 2 tab(s) orally every 6 hours, As needed, Moderate Pain (4 - 6)  aluminum hydroxide-magnesium hydroxide 200 mg-200 mg/5 mL oral suspension: 30 milliliter(s) orally 4 times a day, As needed, Indigestion  aspirin 325 mg oral tablet: 1 tab(s) orally 2 times a day  bisacodyl 10 mg rectal suppository: 1 suppository(ies) rectal once a day, As Needed -Constipation   bisacodyl 5 mg oral delayed release tablet: 1 tab(s) orally every 12 hours, As Needed -for constipation   lidocaine 5% topical film: Apply topically to affected area once a day, As Needed for back pain. MDD:1 patch. Maximum time on12 hours followed by 12 hours off.  melatonin 3 mg oral tablet: 1 tab(s) orally once a day (at bedtime)  morphine 15 mg/8 to 12 hr oral tablet, extended release: 1 tab(s) orally every 8 hours -for severe pain MDD:45mg (3 tabs)  Multiple Vitamins oral tablet: 1 tab(s) orally once a day  nystatin 100,000 units/g topical powder: 1 application topically 2 times a day  ondansetron 4 mg oral tablet: 1 tab(s) orally 2 times a day, As Needed -Nausea and/or Vomiting - for nausea   oxyCODONE 10 mg oral tablet: 0.5 tab(s) orally every 3 hours, As Needed - for severe pain MDD:4 full tabs (40mg)  pantoprazole 40 mg oral delayed release tablet: 1 tab(s) orally once a day (before a meal)  polyethylene glycol 3350 oral powder for reconstitution: 17 gram(s) orally once a day  senna oral tablet: 2 tab(s) orally once a day (at bedtime)

## 2020-09-14 NOTE — DISCHARGE NOTE PROVIDER - NSDCFUADDAPPT_GEN_ALL_CORE_FT
Please follow up with your primary care provider in 1 to 2 weeks for further care. If you don't have a primary care provider please follow up at our Medicine Clinic at  [facility] [place] [address] [phone] or [phone]  (please call to make appointment)

## 2020-09-14 NOTE — DISCHARGE NOTE PROVIDER - CARE PROVIDER_API CALL
Jesus Xie)  Hematology; Medical Oncology  [address] [place] [address]  Phone: [phone]  Fax: [phone]  Follow Up Time: 1 week    Maksim Alcantar  ORTHOPEDICS  [address] [place] [address], [place] [address]  Phone: [phone]  Fax: [phone]  Follow Up Time: 1 week

## 2020-09-14 NOTE — PROGRESS NOTE ADULT - PROBLEM SELECTOR PLAN 1
New LLL infiltrate on Ct chest associated with fever, left pleuritic cp, and known lung cancer; No hypoxia or SOB.  - C/w Zosyn. D/c Vanco as low suspicion for MRSA at this time   - Blood Cx NGTD  -Plan for 5 day course

## 2020-09-14 NOTE — DISCHARGE NOTE PROVIDER - HOSPITAL COURSE
Pneumonia, bacterial.  Plan: New LLL infiltrate on Ct chest associated with fever, left pleuritic cp, and known lung cancer; No hypoxia or SOB.  - C/w Zosyn. D/c Vanco as low suspicion for MRSA at this time   - Blood Cx NGTD  -Plan for 5 day course.      Problem/Plan - 2:  ·  Problem: R/O Closed fracture of right hip, initial encounter.  Plan: X-ray of right hip and pelvis negative for fractures but visibly RLE externally rotated and shortened and patient can't bare weight.  - In the setting of known malignancy in Right hip and absence of trauma, suspect pathologic occult hip fracture.   - Pain currently controlled on current regimen.   - Ortho recs appreciated, NWB RLE   - Already had RT to R hip last admission. Per Radonc no further plans for RT  -No surgical intervention planned per ortho.      Problem/Plan - 3:  ·  Problem: Lung cancer metastatic to bone.  Plan: Newly diagnosed adenocarcinoma last admission  - Supposed to follow with [facility]  - s/p RT x 5 ( last dose 9/2), Not on chemo yet  -Onc consult called.      Problem/Plan - 4:  ·  Problem: Hyponatremia.  Plan: Na 127 -> 135, improved  - Most likely due to dehydration from N/V. Vomiting may be due to radiation treatments.  - Improved with IVF, encourage PO intake.      Pt. is a 69F, former smoker w/ recently diagnosed with Lung Ca in 8/2020 with mets to brain, spine, and Right hip s/p RT x 5 ( last tx 9/2), not on chemo yet p/w sudden onset, severe Right hip pain, unable to ambulate found to have a fever 102, new LLL Pneumonia on CT chest, hyponatremia, and possible occult pathological Right hip fracture s/p wide resection JAYANT on 9/17. Course complicated by hypotension in PACU requiring pressors. Now off pressors and HD stable. In the setting of known malignancy in Right hip and absence of trauma, suspect pathologic occult hip fracture. Ortho recs appreciated. Pt. already had RT to R hip last admission. Per [facility] no further plans for RT. Palliative care consulted for cancer related pain, recs noted.     Pt. noted with new onset afib post orthopedic procedure. CHADVASC 2, on  BID. Monitored on tele, rate controlled currently.    Pt. noted with new LLL infiltrate on Ct chest associated with fever, left pleuritic cp, and known lung cancer. No hypoxia or SOB. Pt. completed 5 day course of zosyn and is now afebrile, with no leukocytosis and respiratory status is stable. Blood Cx NGTD. Small left pleural effusion - not amenable to tap per ct surg and IR. Patient is clinically stable.     Pt. has newly diagnosed adenocarcinoma on last admission and was supposed to follow with [facility]. S/p RT x 5 ( last dose 9/2), Not on chemo yet. Onc consult appreciated. Outpt PET scan recommended.     Pt. noted with some mild hyponatremia which has resolved.       Patient medically optimized for discharge. Will be going to rehab. After completion of rehab, would recommend follow up with oncology for further treatment after rehab stay. D/W patient's daughter, Veda  and patient's son at bedside via patient's cell phone. On 9/23/2020 this case was reviewed with Dr. Armas. All medications were reviewed and prescriptions were sent to mutually agreed upon pharmacy.   Pt. is a 69F, former smoker w/ recently diagnosed with Lung Ca in 8/2020 with mets to brain, spine, and Right hip s/p RT x 5 ( last tx 9/2), not on chemo yet p/w sudden onset, severe Right hip pain, unable to ambulate found to have a fever 102, new LLL Pneumonia on CT chest, hyponatremia, and possible occult pathological Right hip fracture s/p wide resection JAYANT on 9/17. Course complicated by hypotension in PACU requiring pressors. Now off pressors and HD stable. In the setting of known malignancy in Right hip and absence of trauma, suspect pathologic occult hip fracture. Ortho recs appreciated. Pt. already had RT to R hip last admission. Per [facility] no further plans for RT. Palliative care consulted for cancer related pain, recs noted.     Pt. noted with new onset afib post orthopedic procedure. CHADVASC 2, on  BID. Monitored on tele, rate controlled currently.    Pt. noted with new LLL infiltrate on Ct chest associated with fever, left pleuritic cp, and known lung cancer. No hypoxia or SOB. Pt. completed 5 day course of zosyn and is now afebrile, with no leukocytosis and respiratory status is stable. Blood Cx NGTD. Small left pleural effusion - not amenable to tap per ct surg and IR. Patient is clinically stable.     Pt. has newly diagnosed adenocarcinoma on last admission and was supposed to follow with [facility]. S/p RT x 5 ( last dose 9/2), Not on chemo yet. Onc consult appreciated. Outpt PET scan recommended.     Pt. noted with some mild hyponatremia which has resolved.       Patient medically optimized for discharge. Will be going to rehab. After completion of rehab, would recommend follow up with oncology for further treatment after rehab stay. D/W patient's daughter, Veda  and patient's son at bedside via patient's cell phone. On 9/23/2020 this case was reviewed with Dr. Armas. All medications were reviewed and prescriptions were sent to mutually agreed upon pharmacy.

## 2020-09-14 NOTE — CONSULT NOTE ADULT - ASSESSMENT
69 y.o. Female smoker w/ recently diagnosed with NSCLC  in 8/2020 (VETEBRAL BODY, T11, CT GUIDED CORE BIOPSY 8/25/20: POSITIVE FOR MALIGNANT CELLS. Metastatic adenocarcinoma. IHC is positive for CK7, CK20(patchy) and CDX2. Rare tumor cells are positive for ELLY-3. Tumor cells are negative for PAX-8 and TTF-1. 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  -c/w Zosyn    #R hip pain  -Patient s/p RT to uppler/lower spine and R hip s/p RT x 5 ( last tx 9/2)  -CT pelvis: Large destructive right acetabular and iliac bone lytic lesion with smaller adjacent lesions within the more superior aspect of the iliac bone. No femoral neck fracture.  -Appreciate palliative recs  -Appreciate Rad/Onc recs    #NSCLC  -DX on 8/25/20 s/p  T11, CT GUIDED CORE BIOPSY 8/25/20: POSITIVE FOR MALIGNANT CELLS. Metastatic adenocarcinoma. IHC is positive for CK7, CK20(patchy) and CDX2. Rare tumor cells are positive for ELLY-3. Tumor cells are negative for PAX-8 and TTF-1. 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 Xie after discharge    Sheeba Garg PGY5  Heme/Onc fellow  [phone]

## 2020-09-14 NOTE — PROGRESS NOTE ADULT - SUBJECTIVE AND OBJECTIVE BOX
Patient is a 69y old  Female who presents with a chief complaint of Right hip pain (12 Sep 2020 14:13)      SUBJECTIVE / OVERNIGHT EVENTS: Patient states that pain is currently controlled, but she cannot move her R leg and cannot walk. She denies CP, SOB, cough. No new complaints today.     MEDICATIONS  (STANDING):  heparin   Injectable 5000 Unit(s) SubCutaneous every 8 hours  influenza   Vaccine 0.5 milliLiter(s) IntraMuscular once  lidocaine   Patch 1 Patch Transdermal every 24 hours  melatonin 3 milliGRAM(s) Oral at bedtime  multivitamin 1 Tablet(s) Oral daily  pantoprazole    Tablet 40 milliGRAM(s) Oral before breakfast  piperacillin/tazobactam IVPB.. 3.375 Gram(s) IV Intermittent every 8 hours  polyethylene glycol 3350 17 Gram(s) Oral daily  senna 2 Tablet(s) Oral at bedtime    MEDICATIONS  (PRN):  acetaminophen   Tablet .. 650 milliGRAM(s) Oral every 6 hours PRN Temp greater or equal to 38C (100.4F), Mild Pain (1 - 3)  bisacodyl 5 milliGRAM(s) Oral every 12 hours PRN Constipation  bisacodyl Suppository 10 milliGRAM(s) Rectal daily PRN Constipation  morphine  - Injectable 2 milliGRAM(s) IV Push every 4 hours PRN Severe Pain (7 - 10)  ondansetron Injectable 4 milliGRAM(s) IV Push every 4 hours PRN Nausea and/or Vomiting  oxyCODONE    IR 5 milliGRAM(s) Oral every 3 hours PRN Moderate Pain (4 - 6)    PHYSICAL EXAM:  Vital Signs Last 24 Hrs  T(C): 36.7 (14 Sep 2020 06:09), Max: 37.2 (13 Sep 2020 22:35)  T(F): 98 (14 Sep 2020 06:09), Max: 99 (13 Sep 2020 22:35)  HR: 79 (14 Sep 2020 06:09) (75 - 88)  BP: 101/67 (14 Sep 2020 06:09) (101/67 - 106/55)  BP(mean): --  RR: 18 (14 Sep 2020 06:09) (16 - 18)  SpO2: 97% (14 Sep 2020 06:09) (94% - 97%)    CONSTITUTIONAL: elderly, frail female in NAD  RESPIRATORY: Normal respiratory effort; lungs are clear to auscultation anteriorly   CARDIOVASCULAR:  S1/S2; No lower extremity edema  ABDOMEN: Nontender to palpation, normoactive bowel sounds, no rebound/guarding  MUSCULOSKELETAL: R leg unable to assess ROM 2/2 pain, +sensation intact. Able to lift LLE and bend knee   PSYCH: A+O to person, place, and time; affect appropriate  SKIN: No rashes; no palpable lesions    LABS:                        10.7   6.83  )-----------( 243      ( 14 Sep 2020 05:12 )             33.5   -14    138  |  101  |  9   ----------------------------<  86  3.6   |  21<L>  |  0.46<L>    Ca    9.1      14 Sep 2020 05:12  Phos  3.2       Mg     1.8         TPro  5.6<L>  /  Alb  2.7<L>  /  TBili  < 0.2<L>  /  DBili  x   /  AST  18  /  ALT  15  /  AlkPhos  77  14      Urinalysis Basic - ( 12 Sep 2020 04:20 )    Color: LIGHT YELLOW / Appearance: Lt TURBID / S.010 / pH: 7.0  Gluc: NEGATIVE / Ketone: SMALL  / Bili: NEGATIVE / Urobili: NORMAL   Blood: SMALL / Protein: 10 / Nitrite: NEGATIVE   Leuk Esterase: NEGATIVE / RBC: 3-5 / WBC 0-2   Sq Epi: OCC / Non Sq Epi: x / Bacteria: NEGATIVE        Culture - Blood (collected 12 Sep 2020 06:58)  Source: .Blood  Preliminary Report (13 Sep 2020 07:01):    No growth to date.    Culture - Blood (collected 12 Sep 2020 06:58)  Source: .Blood  Preliminary Report (13 Sep 2020 07:01):    No growth to date.        RADIOLOGY & ADDITIONAL TESTS:  Results Reviewed: X  Imaging Personally Reviewed:  Electrocardiogram Personally Reviewed:    COORDINATION OF CARE:  Care Discussed with Consultants/Other Providers [Y/N]:  Prior or Outpatient Records Reviewed [Y/N]:

## 2020-09-14 NOTE — DISCHARGE NOTE PROVIDER - NSDCCPCAREPLAN_GEN_ALL_CORE_FT
PRINCIPAL DISCHARGE DIAGNOSIS  Diagnosis: Closed fracture of right hip, initial encounter  Assessment and Plan of Treatment: You were found to have a Right hip fracture and were seen by orthopedics and you had surgery. Please continue your pain medications as prescribed. Follow up with orthopedics in 1 week for further care. Bear weight as tolerated and do not undergo any heavy physical activity such as weight lifting, running or playing sports.      SECONDARY DISCHARGE DIAGNOSES  Diagnosis: Pleural effusion, left  Assessment and Plan of Treatment: You were found to have a small left pleural effusion, possibly due to your pneumonia. Follow up with your outpatient primary care provider in 1 to 2 weeks for further care and repeat chest X ray or echocardiogram to monitor. Monitor for signs such as fever, chills, shortness of breath, cough, chest pain, weakness and return to ER if such symptoms persist.    Diagnosis: Pneumonia, bacterial  Assessment and Plan of Treatment: You completed antibiotics for pneumonia. Monitor for signs such as fever, chills, shortness of breath, cough, chest pain, weakness and return to ER if such symptoms persist.    Diagnosis: Cancer associated pain  Assessment and Plan of Treatment: Cancer associated pain    Diagnosis: Lung cancer metastatic to bone  Assessment and Plan of Treatment: You were seen by oncology for lung cancer. Please follow up at [facility] for further care as scheduled or in 1 week. You will need an outpatient PET scan. Please call to make an appointment.    Diagnosis: Paroxysmal A-fib  Assessment and Plan of Treatment: You were found to briefly have atrial fibrillation of your heart, which is an issue with the beating of your heart. This has resolved after your surgery and you are no longer in A fib. Continue a low fat diet, reduce any caffeine intake, and exercise at least 30 minutes daily. Go to ER if any new or worsening symptoms such as dizziness, lightheadedness, weakness, arm or leg weakness, numbness, tingling, visual changes, facial droop, difficulty speaking or swallowing, chest pain, shortness of breath, palpitations, abdominal pain, nausea, vomiting, calf pain. Follow up with your cardiologist and primary care provider within 1 to 2 weeks.

## 2020-09-14 NOTE — CONSULT NOTE ADULT - SUBJECTIVE AND OBJECTIVE BOX
HPI:  69 y.o. Female smoker w/ recently diagnosed with NSCLC  in 8/2020 (VETEBRAL BODY, T11, CT GUIDED CORE BIOPSY 8/25/20: POSITIVE FOR MALIGNANT CELLS. Metastatic adenocarcinoma. IHC is positive for CK7, CK20(patchy) and CDX2. Rare tumor cells are positive for ELLY-3. Tumor cells are negative for PAX-8 and TTF-1. 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 Patient says she has been on standing morphine since diagnosed with metastatic lung Ca last month but was unable to ambulate up 3 steps without severe 10 out of 10 pain despite the help of her son or daughter. She denies falling or trauma to her right hip. She has also had intractable nausea and vomiting over the last few days , left pleuritic cp, and today had a low grade fever. She denies SOB, cough, dysuria, abdominal pain or diarrhea. In ED she had a fever of 102.2. CT chest showed known RUL mass but new LLL infiltrate with associated small left pleural effusion and [id number] rib lesion. 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 CT Large destructive right acetabular and iliac bone lytic lesion with smaller adjacent lesions within the more superior aspect of the iliac bone. No femoral neck fracture.    Of note patient was seen by Dr. Xie in [facility]            REVIEW OF SYSTEMS:  All other review of systems is negative unless indicated above.  Allergies    No Known Allergies    Intolerances        PAST MEDICAL & SURGICAL HISTORY:  Lung cancer metastatic to bone    No significant past surgical history        FAMILY HISTORY:  FH: colon cancer    Family history of cervical cancer      VITAL SIGNS:  Vital Signs Last 24 Hrs  T(C): 36.6 (14 Sep 2020 14:58), Max: 37.2 (13 Sep 2020 22:35)  T(F): 97.8 (14 Sep 2020 14:58), Max: 99 (13 Sep 2020 22:35)  HR: 77 (14 Sep 2020 14:58) (75 - 79)  BP: 105/60 (14 Sep 2020 14:58) (101/67 - 105/60)  BP(mean): --  RR: 18 (14 Sep 2020 14:58) (16 - 18)  SpO2: 100% (14 Sep 2020 14:58) (94% - 100%)      PHYSICAL EXAM:     GENERAL: no acute distress  RESPIRATORY: LCTAB/L, no rhonchi, rales, or wheezing  CARDIOVASCULAR: RRR, no murmurs, gallops, rubs  ABDOMINAL: soft, non-tender, non-distended, positive bowel sounds   EXTREMITIES: no clubbing, cyanosis, or edema  NEUROLOGICAL: alert and oriented x 3                          10.7   6.83  )-----------( 243      ( 14 Sep 2020 05:12 )             33.5     09-14    138  |  101  |  9   ----------------------------<  86  3.6   |  21<L>  |  0.46<L>    Ca    9.1      14 Sep 2020 05:12  Phos  3.2     09-14  Mg     1.8     09-14    TPro  5.6<L>  /  Alb  2.7<L>  /  TBili  < 0.2<L>  /  DBili  x   /  AST  18  /  ALT  15  /  AlkPhos  77  09-14      MEDICATIONS  (STANDING):  heparin   Injectable 5000 Unit(s) SubCutaneous every 8 hours  influenza   Vaccine 0.5 milliLiter(s) IntraMuscular once  lidocaine   Patch 1 Patch Transdermal every 24 hours  melatonin 3 milliGRAM(s) Oral at bedtime  multivitamin 1 Tablet(s) Oral daily  pantoprazole    Tablet 40 milliGRAM(s) Oral before breakfast  piperacillin/tazobactam IVPB.. 3.375 Gram(s) IV Intermittent every 8 hours  polyethylene glycol 3350 17 Gram(s) Oral daily  senna 2 Tablet(s) Oral at bedtime      CT CHEST:  IMPRESSION:  1.  Spiculated right upper lobe mass measuring up to 4.2 cm in greatest dimension is stable from 05/22/2020.  2.  New complete opacification of left lower lobe bronchus and new left lower lobe consolidation, suspicious for pneumonia.  3.  Redemonstration of paraspinous soft tissue masses at T6-T7 and T10-T11 with extensive bony destruction and epidural tumor extension.  Within limits of a noncontrast CT scan, there is mild spinal canal stenosis at T6 and at least mild to moderate spinal canal stenosis at T11 that appears slightly progressed from 08/22/2023 there is destruction of the posterior medial left eleventh rib with evidence of pleural invasion.  4.  A small left pleural effusion is increased from 08/22/2020.  This may be malignant or parapneumonic in etiology  5.  An approximately 1.3 cm metastasis in the left scapula appears increased in size from 08/22/2020.  6.  Thyroid nodules measure up to 2 cm.  Ultrasound evaluation may be obtained as clinically warranted         HPI:  69 y.o. Female smoker w/ recently diagnosed with NSCLC  in 8/2020 (VETEBRAL BODY, T11, CT GUIDED CORE BIOPSY 8/25/20: POSITIVE FOR MALIGNANT CELLS. Metastatic adenocarcinoma. IHC is positive for CK7, CK20(patchy) and CDX2. Rare tumor cells are positive for ELLY-3. Tumor cells are negative for PAX-8 and TTF-1. 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 Patient says she has been on standing morphine since diagnosed with metastatic lung Ca last month but was unable to ambulate up 3 steps without severe 10 out of 10 pain despite the help of her son or daughter. She denies falling or trauma to her right hip. She has also had intractable nausea and vomiting over the last few days , left pleuritic cp, and today had a low grade fever. She denies SOB, cough, dysuria, abdominal pain or diarrhea. In ED she had a fever of 102.2. CT chest showed known RUL mass but new LLL infiltrate with associated small left pleural effusion and [id number] rib lesion. 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 CT Large destructive right acetabular and iliac bone lytic lesion with smaller adjacent lesions within the more superior aspect of the iliac bone. No femoral neck fracture.               REVIEW OF SYSTEMS:  All other review of systems is negative unless indicated above.  Allergies    No Known Allergies    Intolerances        PAST MEDICAL & SURGICAL HISTORY:  Lung cancer metastatic to bone    No significant past surgical history        FAMILY HISTORY:  FH: colon cancer    Family history of cervical cancer      VITAL SIGNS:  Vital Signs Last 24 Hrs  T(C): 36.6 (14 Sep 2020 14:58), Max: 37.2 (13 Sep 2020 22:35)  T(F): 97.8 (14 Sep 2020 14:58), Max: 99 (13 Sep 2020 22:35)  HR: 77 (14 Sep 2020 14:58) (75 - 79)  BP: 105/60 (14 Sep 2020 14:58) (101/67 - 105/60)  BP(mean): --  RR: 18 (14 Sep 2020 14:58) (16 - 18)  SpO2: 100% (14 Sep 2020 14:58) (94% - 100%)      PHYSICAL EXAM:     GENERAL: no acute distress  RESPIRATORY: LCTAB/L, no rhonchi, rales, or wheezing  CARDIOVASCULAR: RRR, no murmurs, gallops, rubs  ABDOMINAL: soft, non-tender, non-distended, positive bowel sounds   EXTREMITIES: no clubbing, cyanosis, or edema  NEUROLOGICAL: alert and oriented x 3                          10.7   6.83  )-----------( 243      ( 14 Sep 2020 05:12 )             33.5     09-14    138  |  101  |  9   ----------------------------<  86  3.6   |  21<L>  |  0.46<L>    Ca    9.1      14 Sep 2020 05:12  Phos  3.2     09-14  Mg     1.8     09-14    TPro  5.6<L>  /  Alb  2.7<L>  /  TBili  < 0.2<L>  /  DBili  x   /  AST  18  /  ALT  15  /  AlkPhos  77  09-14      MEDICATIONS  (STANDING):  heparin   Injectable 5000 Unit(s) SubCutaneous every 8 hours  influenza   Vaccine 0.5 milliLiter(s) IntraMuscular once  lidocaine   Patch 1 Patch Transdermal every 24 hours  melatonin 3 milliGRAM(s) Oral at bedtime  multivitamin 1 Tablet(s) Oral daily  pantoprazole    Tablet 40 milliGRAM(s) Oral before breakfast  piperacillin/tazobactam IVPB.. 3.375 Gram(s) IV Intermittent every 8 hours  polyethylene glycol 3350 17 Gram(s) Oral daily  senna 2 Tablet(s) Oral at bedtime      CT CHEST:  IMPRESSION:  1.  Spiculated right upper lobe mass measuring up to 4.2 cm in greatest dimension is stable from 05/22/2020.  2.  New complete opacification of left lower lobe bronchus and new left lower lobe consolidation, suspicious for pneumonia.  3.  Redemonstration of paraspinous soft tissue masses at T6-T7 and T10-T11 with extensive bony destruction and epidural tumor extension.  Within limits of a noncontrast CT scan, there is mild spinal canal stenosis at T6 and at least mild to moderate spinal canal stenosis at T11 that appears slightly progressed from 08/22/2023 there is destruction of the posterior medial left eleventh rib with evidence of pleural invasion.  4.  A small left pleural effusion is increased from 08/22/2020.  This may be malignant or parapneumonic in etiology  5.  An approximately 1.3 cm metastasis in the left scapula appears increased in size from 08/22/2020.  6.  Thyroid nodules measure up to 2 cm.  Ultrasound evaluation may be obtained as clinically warranted

## 2020-09-14 NOTE — DISCHARGE NOTE PROVIDER - PROVIDER TOKENS
PROVIDER:[TOKEN:[[id number]],FOLLOWUP:[1 week]],PROVIDER:[TOKEN:[[id number]],FOLLOWUP:[1 week]]

## 2020-09-14 NOTE — DISCHARGE NOTE PROVIDER - CARE PROVIDERS DIRECT ADDRESSES
[email or web address]

## 2020-09-15 NOTE — PROGRESS NOTE ADULT - SUBJECTIVE AND OBJECTIVE BOX
69 years old female with medical history of lung carcinoma left lower lobe with multifocal skeletal metastatic disease, to include right hip ,pelvis, lumbar spine brain metastases presented this time with fever and possible lower lung pneumonia .recent pelvic CT reveal a large metastatic lesion of the right acetabulum involving large segment of the pelvic bone and pathological fracture of the acetabulum.  This condition was discussed with the patient and her son,. It was indicated that surgery could be complicated due to the extent of the metastatic disease, the recommendation to perform THR. Patient expressed her desire to have the surgical procedure even with potential complication including life threatening procedure.   At this point patient having left lower pleural effusion, we recommend thoracocentesis by interventional radiology.

## 2020-09-15 NOTE — PROGRESS NOTE ADULT - SUBJECTIVE AND OBJECTIVE BOX
Pt seen and examined on 9/14/20. Imaging reviewed. Pt was readmitted d/t fevers, found to have PNA, currently getting Zosyn. Patient recently had XRT to R acetabulum, however now is complaining of severe R hip pain, dramatically increases as compared to prior admission. Imaging shows no changes- she continues to have a large destructive lesion involving the right acetabulum.     I have had a long discussion with the patient and her children regarding management. Risks and benefits of surgical and non surgical options discussed. Without surgery she will be NWB and the tumor may continue to grow and cause local destruction. She may develop bed sores and will have increased risk of VTE d/t immobility. Surgery has risks of bleeding, infection, nerve damage, loss of life/limb, among others. Goals of surgery are to get from bed to chair and treat her pain. We will have another discussion today with Dr. Maksim Alcantar to see if they would like to proceed with surgery for R hip tumor resection and complex JAYANT. If they agree she will need full medical clearance and optimization.

## 2020-09-15 NOTE — PROGRESS NOTE ADULT - SUBJECTIVE AND OBJECTIVE BOX
Patient is a 69y old  Female who presents with a chief complaint of Right hip pain (12 Sep 2020 14:13)    SUBJECTIVE / OVERNIGHT EVENTS: Patient states that pain is currently controlled. No new complaints.     MEDICATIONS  (STANDING):  heparin   Injectable 5000 Unit(s) SubCutaneous every 8 hours  influenza   Vaccine 0.5 milliLiter(s) IntraMuscular once  lidocaine   Patch 1 Patch Transdermal every 24 hours  melatonin 3 milliGRAM(s) Oral at bedtime  multivitamin 1 Tablet(s) Oral daily  pantoprazole    Tablet 40 milliGRAM(s) Oral before breakfast  piperacillin/tazobactam IVPB.. 3.375 Gram(s) IV Intermittent every 8 hours  polyethylene glycol 3350 17 Gram(s) Oral daily  senna 2 Tablet(s) Oral at bedtime    MEDICATIONS  (PRN):  acetaminophen   Tablet .. 650 milliGRAM(s) Oral every 6 hours PRN Temp greater or equal to 38C (100.4F), Mild Pain (1 - 3)  bisacodyl 5 milliGRAM(s) Oral every 12 hours PRN Constipation  bisacodyl Suppository 10 milliGRAM(s) Rectal daily PRN Constipation  morphine  - Injectable 2 milliGRAM(s) IV Push every 4 hours PRN Severe Pain (7 - 10)  ondansetron Injectable 4 milliGRAM(s) IV Push every 4 hours PRN Nausea and/or Vomiting  oxyCODONE    IR 5 milliGRAM(s) Oral every 3 hours PRN Moderate Pain (4 - 6)    PHYSICAL EXAM:  Vital Signs Last 24 Hrs  T(C): 36.9 (15 Sep 2020 05:09), Max: 37.1 (14 Sep 2020 20:38)  T(F): 98.5 (15 Sep 2020 05:09), Max: 98.7 (14 Sep 2020 20:38)  HR: 78 (15 Sep 2020 05:09) (71 - 78)  BP: 129/73 (15 Sep 2020 05:09) (102/60 - 129/73)  BP(mean): --  RR: 16 (15 Sep 2020 05:09) (16 - 18)  SpO2: 95% (15 Sep 2020 05:09) (95% - 100%)    CONSTITUTIONAL: elderly, frail female in NAD  RESPIRATORY: Normal respiratory effort; lungs are clear to auscultation anteriorly   CARDIOVASCULAR:  S1/S2; No lower extremity edema  ABDOMEN: Nontender to palpation, normoactive bowel sounds, no rebound/guarding  MUSCULOSKELETAL: R leg unable to assess ROM 2/2 pain, +sensation intact. Able to lift LLE and bend knee   PSYCH: A+O to person, place, and time; affect appropriate  SKIN: No rashes; no palpable lesions    LABS:                          10.7   6.83  )-----------( 243      ( 14 Sep 2020 05:12 )             33.5   09-14    138  |  101  |  9   ----------------------------<  86  3.6   |  21<L>  |  0.46<L>    Ca    9.1      14 Sep 2020 05:12  Phos  3.2       Mg     1.8         TPro  5.6<L>  /  Alb  2.7<L>  /  TBili  < 0.2<L>  /  DBili  x   /  AST  18  /  ALT  15  /  AlkPhos  77  09-14    Urinalysis Basic - ( 12 Sep 2020 04:20 )    Color: LIGHT YELLOW / Appearance: Lt TURBID / S.010 / pH: 7.0  Gluc: NEGATIVE / Ketone: SMALL  / Bili: NEGATIVE / Urobili: NORMAL   Blood: SMALL / Protein: 10 / Nitrite: NEGATIVE   Leuk Esterase: NEGATIVE / RBC: 3-5 / WBC 0-2   Sq Epi: OCC / Non Sq Epi: x / Bacteria: NEGATIVE        Culture - Blood (collected 12 Sep 2020 06:58)  Source: .Blood  Preliminary Report (13 Sep 2020 07:01):    No growth to date.    Culture - Blood (collected 12 Sep 2020 06:58)  Source: .Blood  Preliminary Report (13 Sep 2020 07:01):    No growth to date.        RADIOLOGY & ADDITIONAL TESTS:  Results Reviewed: X  Imaging Personally Reviewed:  Electrocardiogram Personally Reviewed:    COORDINATION OF CARE:  Care Discussed with Consultants/Other Providers [Y/N]:  Prior or Outpatient Records Reviewed [Y/N]:

## 2020-09-16 NOTE — CHART NOTE - NSCHARTNOTEFT_GEN_A_CORE
IR consulted for thoracentesis of left lower pleural effusion. As per IR, effusion is close to lung cancer site and is of little benefit to aspirate given small size and no signs of empyema.     ACP  [id number] IR consulted for thoracentesis of left lower pleural effusion. As per IR, effusion is close to lung cancer site and is of little benefit to aspirate given small size and no signs of empyema. Risks outweight benefits.     ACP  [id number] IR consulted for thoracentesis of left lower pleural effusion. As per IR, effusion is close to lung cancer site and is of little benefit to aspirate given small size and no signs of empyema. Risks outweight benefits.     CTSx (Dr. Cortez) consulted for 2nd opinion for thoracentesis of pleural effusion. Will follow recs.     ACP  [id number]

## 2020-09-16 NOTE — PROGRESS NOTE ADULT - PROBLEM SELECTOR PLAN 1
New LLL infiltrate on Ct chest associated with fever, left pleuritic cp, and known lung cancer; No hypoxia or SOB.  - C/w Zosyn. D/c Vanco as low suspicion for MRSA at this time   - Blood Cx NGTD  -Plan for 5 day course. D5/5 today.  -Small left pleural effusion - not amenable to tap per ct surg and IR. Patient is clinically stable.

## 2020-09-16 NOTE — CONSULT NOTE ADULT - ASSESSMENT
ASSESSMENT:   69yFemalePAST MEDICAL & SURGICAL HISTORY:  Lung cancer metastatic to bone, small pleural effusion    No significant past surgical history    HEALTH ISSUES - PROBLEM Dx:  Need for prophylactic measure  Need for prophylactic measure    Lung cancer metastatic to bone  Lung cancer metastatic to bone    Hyponatremia  Hyponatremia    Pneumonia, bacterial  Pneumonia, bacterial        HEALTH ISSUES - R/O PROBLEM Dx:  Closed fracture of right hip, initial encounter  R/O Closed fracture of right hip, initial encounter        PLAN:    -Patient currently afebrile, no shortness of breath  -No indication for drainage of effusion at this time  -Discussed with CT surgeon

## 2020-09-16 NOTE — CONSULT NOTE ADULT - SUBJECTIVE AND OBJECTIVE BOX
History of Present Illness: 69yFemale w/ a pmhx of metastatic lung cancer who presents with severe right hip pain. IR consulted for preoperative thoracentesis prior to orthopedic surgery.     Allergies:   No Known Allergies    Medications (Antibiotics/Cardiovascular/Anticoagulants/Blood Products):     heparin   Injectable: 5000 Unit(s) SubCutaneous (09-16-20 @ 05:58)  piperacillin/tazobactam IVPB..: 25 mL/Hr IV Intermittent (09-16-20 @ 05:57)    Vital Signs:  T(F): 97.4 (09-16-20 @ 05:45), Max: 97.4 (09-16-20 @ 05:45)  HR: 78 (09-16-20 @ 05:45) (77 - 78)  BP: 106/57 (09-16-20 @ 10:15) (106/57 - 120/72)  RR: 17 (09-16-20 @ 05:45) (17 - 17)  SpO2: 97% (09-16-20 @ 05:45) (97% - 97%)    Relevant Lab Results:            11.2  5.54)-----(263     (09-16-20 @ 05:30)         35.3     137 | 96 | 6  --------------------< 94     (09-16-20 @ 05:30)  3.1 | 28 | 0.42       PT: 12.2 09-16-20 @ 05:30  aPTT: 30.4 09-16-20 @ 05:30   INR: 1.07 09-16-20 @ 05:30    Imaging:   CT with left paraspinal lower chest mass, small left pleural effusion. stable RUL mass.

## 2020-09-16 NOTE — PROGRESS NOTE ADULT - SUBJECTIVE AND OBJECTIVE BOX
INTERVAL HPI/OVERNIGHT EVENTS:  Patient seen at bedside.      VITAL SIGNS:  T(F): 97.4 (09-16-20 @ 05:45)  HR: 78 (09-16-20 @ 05:45)  BP: 106/57 (09-16-20 @ 10:15)  RR: 17 (09-16-20 @ 05:45)  SpO2: 97% (09-16-20 @ 05:45)  Wt(kg): --    PHYSICAL EXAM:    Constitutional: NAD, resting in bed comfortably  Eyes: EOMI, sclera non-icteric  Neck: supple, no LAD  Respiratory: CTA b/l, good air entry b/l, no wheezing, rhonchi or crackles  Cardiovascular: RRR, normal S1S2, no M/R/G  Gastrointestinal: soft, NTND  Extremities: no edema  Neurological: AAOx3, non focal  Skin: Normal temperature    MEDICATIONS  (STANDING):  heparin   Injectable 5000 Unit(s) SubCutaneous every 8 hours  influenza   Vaccine 0.5 milliLiter(s) IntraMuscular once  lidocaine   Patch 1 Patch Transdermal every 24 hours  lidocaine   Patch 1 Patch Transdermal daily  melatonin 3 milliGRAM(s) Oral at bedtime  multivitamin 1 Tablet(s) Oral daily  pantoprazole    Tablet 40 milliGRAM(s) Oral before breakfast  piperacillin/tazobactam IVPB.. 3.375 Gram(s) IV Intermittent every 8 hours  potassium chloride    Tablet ER 40 milliEquivalent(s) Oral every 4 hours  senna 2 Tablet(s) Oral at bedtime    MEDICATIONS  (PRN):  acetaminophen   Tablet .. 650 milliGRAM(s) Oral every 6 hours PRN Temp greater or equal to 38C (100.4F), Mild Pain (1 - 3)  bisacodyl 5 milliGRAM(s) Oral every 12 hours PRN Constipation  bisacodyl Suppository 10 milliGRAM(s) Rectal daily PRN Constipation  morphine  - Injectable 2 milliGRAM(s) IV Push every 4 hours PRN Severe Pain (7 - 10)  ondansetron Injectable 4 milliGRAM(s) IV Push every 4 hours PRN Nausea and/or Vomiting  oxyCODONE    IR 7.5 milliGRAM(s) Oral every 3 hours PRN Moderate Pain (4 - 6)      Allergies    No Known Allergies    Intolerances        LABS:                        11.2   5.54  )-----------( 263      ( 16 Sep 2020 05:30 )             35.3     09-16    137  |  96<L>  |  6<L>  ----------------------------<  94  3.1<L>   |  28  |  0.42<L>    Ca    9.0      16 Sep 2020 05:30  Phos  3.2     09-16  Mg     1.8     09-16      PT/INR - ( 16 Sep 2020 05:30 )   PT: 12.2 SEC;   INR: 1.07          PTT - ( 16 Sep 2020 05:30 )  PTT:30.4 SEC      RADIOLOGY & ADDITIONAL TESTS:  Studies reviewed.   INTERVAL HPI/OVERNIGHT EVENTS:  Patient seen at bedside.  Patient endorsing that she is   getting some relief with pain meds  Patient also complaining of  diarrhea   since being given the "pills" (? laxatives)  Denies abdominal pain  When asked about possible [place] procedure for  her lytic lesion- patient deferring decisions to her dtr  Patient alert and oriented x 3    Used [employer]  via telephone  [place]zech  #[phone]    VITAL SIGNS:  T(F): 97.4 (09-16-20 @ 05:45)  HR: 78 (09-16-20 @ 05:45)  BP: 106/57 (09-16-20 @ 10:15)  RR: 17 (09-16-20 @ 05:45)  SpO2: 97% (09-16-20 @ 05:45)  Wt(kg): --    PHYSICAL EXAM:    In accordance with current standard limiting patient contact, deferred physical exam  2/2 COVID pandemic  Please refer to physical exam of primary team.    MEDICATIONS  (STANDING):  heparin   Injectable 5000 Unit(s) SubCutaneous every 8 hours  influenza   Vaccine 0.5 milliLiter(s) IntraMuscular once  lidocaine   Patch 1 Patch Transdermal every 24 hours  lidocaine   Patch 1 Patch Transdermal daily  melatonin 3 milliGRAM(s) Oral at bedtime  multivitamin 1 Tablet(s) Oral daily  pantoprazole    Tablet 40 milliGRAM(s) Oral before breakfast  piperacillin/tazobactam IVPB.. 3.375 Gram(s) IV Intermittent every 8 hours  potassium chloride    Tablet ER 40 milliEquivalent(s) Oral every 4 hours  senna 2 Tablet(s) Oral at bedtime    MEDICATIONS  (PRN):  acetaminophen   Tablet .. 650 milliGRAM(s) Oral every 6 hours PRN Temp greater or equal to 38C (100.4F), Mild Pain (1 - 3)  bisacodyl 5 milliGRAM(s) Oral every 12 hours PRN Constipation  bisacodyl Suppository 10 milliGRAM(s) Rectal daily PRN Constipation  morphine  - Injectable 2 milliGRAM(s) IV Push every 4 hours PRN Severe Pain (7 - 10)  ondansetron Injectable 4 milliGRAM(s) IV Push every 4 hours PRN Nausea and/or Vomiting  oxyCODONE    IR 7.5 milliGRAM(s) Oral every 3 hours PRN Moderate Pain (4 - 6)      Allergies    No Known Allergies    Intolerances        LABS:                        11.2   5.54  )-----------( 263      ( 16 Sep 2020 05:30 )             35.3     09-16    137  |  96<L>  |  6<L>  ----------------------------<  94  3.1<L>   |  28  |  0.42<L>    Ca    9.0      16 Sep 2020 05:30  Phos  3.2     09-16  Mg     1.8     09-16      PT/INR - ( 16 Sep 2020 05:30 )   PT: 12.2 SEC;   INR: 1.07          PTT - ( 16 Sep 2020 05:30 )  PTT:30.4 SEC      RADIOLOGY & ADDITIONAL TESTS:  Studies reviewed.

## 2020-09-16 NOTE — PROGRESS NOTE ADULT - SUBJECTIVE AND OBJECTIVE BOX
Diagnosis: Right ilium/acetabular mets  Surgeon: Dr. Maksim Alcantar  Procedure: Wide Resection JAYANT    Labs:                        11.2   5.54  )-----------( 263      ( 16 Sep 2020 05:30 )             35.3       09-16    137  |  96<L>  |  6<L>  ----------------------------<  94  3.1<L>   |  28  |  0.42<L>    Ca    9.0      16 Sep 2020 05:30  Phos  3.2     09-16  Mg     1.8     09-16        PT/INR - ( 16 Sep 2020 05:30 )   PT: 12.2 SEC;   INR: 1.07          PTT - ( 16 Sep 2020 05:30 )  PTT:30.4 SEC    Type and Screen X 2: to be collected in AM      EKG: ordered, to be completed and placed in chart  CXR: Right upper lobe nodular opacity better evaluated on recent chest CT. Left pleural effusion and left lower lung consolidation, grossly unchanged   Consent: to be consented  Clearance: Medicine cleared patient as optimized for planned procedure  COVID: negative 9/12    A/P: Patient is a 69y y/o Female Pending Orthopaedic surgery tomorrow  -NPO and IVF @ midnight  -Pain control/analgesia  -Hold Anticoagulation @ midnight   -F/U Pending Labs  -Notify orthopedics with any questions

## 2020-09-16 NOTE — CHART NOTE - NSCHARTNOTEFT_GEN_A_CORE
Spoke with anesthesia on call ([id number]) on 9/16/2020 2128, patient clear from anesthesia.  No concerns impeding OR tomorrow.

## 2020-09-16 NOTE — PROGRESS NOTE ADULT - ASSESSMENT
69 y.o. Female smoker w/ recently diagnosed with NSCLC  in 8/2020 (VETEBRAL BODY, T11, CT GUIDED CORE BIOPSY 8/25/20: POSITIVE FOR MALIGNANT CELLS. Metastatic adenocarcinoma. IHC is positive for CK7, CK20(patchy) and CDX2. Rare tumor cells are positive for ELLY-3. Tumor cells are negative for PAX-8 and TTF-1. 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  -c/w Zosyn    #R hip pain  -Patient s/p RT to uppler/lower spine and R hip s/p RT x 5 ( last tx 9/2)  -CT pelvis: Large destructive right acetabular and iliac bone lytic lesion with smaller adjacent lesions within the more superior aspect of the iliac bone. No femoral neck fracture.  -Appreciate palliative recs  -Appreciate Rad/Onc recs    #NSCLC  -DX on 8/25/20 s/p  T11, CT GUIDED CORE BIOPSY 8/25/20: POSITIVE FOR MALIGNANT CELLS. Metastatic adenocarcinoma. IHC is positive for CK7, CK20(patchy) and CDX2. Rare tumor cells are positive for ELLY-3. Tumor cells are negative for PAX-8 and TTF-1. 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 Xie after discharge     69 y.o. Female smoker w/ recently diagnosed with NSCLC  in 8/2020 (VETEBRAL BODY, T11, CT GUIDED CORE BIOPSY 8/25/20: POSITIVE FOR MALIGNANT CELLS. Metastatic adenocarcinoma. IHC is positive for CK7, CK20(patchy) and CDX2. Rare tumor cells are positive for ELLY-3. Tumor cells are negative for PAX-8 and TTF-1. 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  - Blood Cx NGTD  -Plan for 5 day course. D5/5 today.  -IR deferring thoracentesis at current moment. Likely no therapeutic benefit to aspiration of small pleural effusion.,       #R hip pain  -Patient s/p RT to uppler/lower spine and R hip s/p RT x 5 ( last tx 9/2)  -CT pelvis: Large destructive right acetabular and iliac bone lytic lesion with smaller adjacent lesions within the more superior aspect of the iliac bone. No femoral neck fracture.  -Appreciate palliative recs for pain management  -Appreciate orthopedic consult, NWB RLE   - Already had RT to R hip last admission. Per Radonc no further plans for RT  - Pending Orthopaedic surgery tomorrow (Total hip replacement)  -NPO and IVF @ midnight      #NSCLC  -DX on 8/25/20 s/p  T11, CT GUIDED CORE BIOPSY 8/25/20: POSITIVE FOR MALIGNANT CELLS. Metastatic adenocarcinoma. IHC is positive for CK7, CK20(patchy) and CDX2. Rare tumor cells are positive for ELLY-3. Tumor cells are negative for PAX-8 and TTF-1. This immunoprofile is compatible with adenocarcinoma of primary gastro-intestinal or pancreato-biliary origin among others  -Awaiting PDL1 and CtDNA test result  -Patient is treatment naive  -Plan for outpatient PET/CT  -Patient will follow with Dr. Xie after discharge  -C/w Supportive care, pain control, Nutrition, PT, DVT ppx  -Oncology will continue to follow with you      Case d/w Dr. Kirby HO  Oncology Physician Assistant  [facility]  Pager [phone]    If after 5pm or weekends please page On-call Oncology Fellow

## 2020-09-16 NOTE — PROGRESS NOTE ADULT - SUBJECTIVE AND OBJECTIVE BOX
Patient is a 69y old  Female who presents with a chief complaint of Right hip pain (12 Sep 2020 14:13)    SUBJECTIVE / OVERNIGHT EVENTS: Patient reports diarrhea overnight. No abdominal pain. Has pain in left back as well which bothers her more than right hip at this time.     MEDICATIONS  (STANDING):  heparin   Injectable 5000 Unit(s) SubCutaneous every 8 hours  influenza   Vaccine 0.5 milliLiter(s) IntraMuscular once  lidocaine   Patch 1 Patch Transdermal every 24 hours  melatonin 3 milliGRAM(s) Oral at bedtime  multivitamin 1 Tablet(s) Oral daily  pantoprazole    Tablet 40 milliGRAM(s) Oral before breakfast  piperacillin/tazobactam IVPB.. 3.375 Gram(s) IV Intermittent every 8 hours  polyethylene glycol 3350 17 Gram(s) Oral daily  senna 2 Tablet(s) Oral at bedtime    MEDICATIONS  (PRN):  acetaminophen   Tablet .. 650 milliGRAM(s) Oral every 6 hours PRN Temp greater or equal to 38C (100.4F), Mild Pain (1 - 3)  bisacodyl 5 milliGRAM(s) Oral every 12 hours PRN Constipation  bisacodyl Suppository 10 milliGRAM(s) Rectal daily PRN Constipation  morphine  - Injectable 2 milliGRAM(s) IV Push every 4 hours PRN Severe Pain (7 - 10)  ondansetron Injectable 4 milliGRAM(s) IV Push every 4 hours PRN Nausea and/or Vomiting  oxyCODONE    IR 5 milliGRAM(s) Oral every 3 hours PRN Moderate Pain (4 - 6)    PHYSICAL EXAM:  Vital Signs Last 24 Hrs  T(C): 36.3 (16 Sep 2020 05:45), Max: 36.3 (15 Sep 2020 21:55)  T(F): 97.4 (16 Sep 2020 05:45), Max: 97.4 (16 Sep 2020 05:45)  HR: 78 (16 Sep 2020 05:45) (77 - 78)  BP: 106/57 (16 Sep 2020 10:15) (106/57 - 120/72)  BP(mean): --  RR: 17 (16 Sep 2020 05:45) (17 - 17)  SpO2: 97% (16 Sep 2020 05:45) (97% - 97%)    CONSTITUTIONAL: elderly, frail female in NAD  RESPIRATORY: Normal respiratory effort; lungs are clear to auscultation anteriorly   CARDIOVASCULAR:  S1/S2; No lower extremity edema  ABDOMEN: Nontender to palpation, normoactive bowel sounds, no rebound/guarding  MUSCULOSKELETAL: R leg unable to assess ROM 2/2 pain, +sensation intact. Able to lift LLE and bend knee   PSYCH: A+O to person, place, and time; affect appropriate  SKIN: No rashes; no palpable lesions    LABS:                          11.2   5.54  )-----------( 263      ( 16 Sep 2020 05:30 )             35.3   09-16    137  |  96<L>  |  6<L>  ----------------------------<  94  3.1<L>   |  28  |  0.42<L>    Ca    9.0      16 Sep 2020 05:30  Phos  3.2       Mg     1.8                   Urinalysis Basic - ( 12 Sep 2020 04:20 )    Color: LIGHT YELLOW / Appearance: Lt TURBID / S.010 / pH: 7.0  Gluc: NEGATIVE / Ketone: SMALL  / Bili: NEGATIVE / Urobili: NORMAL   Blood: SMALL / Protein: 10 / Nitrite: NEGATIVE   Leuk Esterase: NEGATIVE / RBC: 3-5 / WBC 0-2   Sq Epi: OCC / Non Sq Epi: x / Bacteria: NEGATIVE        Culture - Blood (collected 12 Sep 2020 06:58)  Source: .Blood  Preliminary Report (13 Sep 2020 07:01):    No growth to date.    Culture - Blood (collected 12 Sep 2020 06:58)  Source: .Blood  Preliminary Report (13 Sep 2020 07:01):    No growth to date.        RADIOLOGY & ADDITIONAL TESTS:  Results Reviewed: X  Imaging Personally Reviewed:  Electrocardiogram Personally Reviewed:    COORDINATION OF CARE:  Care Discussed with Consultants/Other Providers [Y/N]:  Prior or Outpatient Records Reviewed [Y/N]:

## 2020-09-16 NOTE — CONSULT NOTE ADULT - SUBJECTIVE AND OBJECTIVE BOX
69 y.o. Female smoker (40pck yr) w/ recently diagnosed with Lung Ca in 8/2020 with mets to brain, Spine, and Right hip s/p RT x 5 ( last tx 9/2) , not on chemo yet p/w sudden onset, severe Right hip pain, unable to ambulate and fever. Patient says she has been on standing morphine since diagnosed with metastatic lung Ca last month but was unable to ambulate up 3 steps without severe 10 out of 10 pain despite the help of her son or daughter. She denies falling or trauma to her right hip. She has also had intractable nausea and vomiting over the last few days , left pleuritic cp, and today had a low grade fever. She denies SOB, cough, dysuria, abdominal pain or diarrhea. In ED she had a fever of 102.2. CT chest showed known RUL mass but new LLL infiltrate with associated small left pleural effusion and [id number] rib lesion.   Thoracic surgery consulted for small pleural effusion. Patient seen in bed. She denies shortness of breath, difficulty breathing, chest pain, palpitations.     [employer]  Viktor #[id number]    PAST MEDICAL & SURGICAL HISTORY:  Lung cancer metastatic to bone    No significant past surgical history        REVIEW OF SYSTEMS      General:No Weight change/ Fatigue/ HA/Dizzy	    Skin/Breast: No Rashes/ Lesions/ Masses  	  Ophthalmologic: No Blurry vision/ Glaucoma/ Blindness  	  ENMT: No Hearing loss/ Drainage/ Lesions	    Respiratory and Thorax: No Cough/ Wheezing/ SOB/ Hemoptysis/ Sputum production  	  Cardiovascular: No Chest pain/ Palpitations/ Diaphoresis	    Gastrointestinal: No Nausea/ Vomiting/ Constipation/ Appetite Change	    Genitourinary: No Heamturia/ Dysuria/ Frequency change/ Impotence	    Musculoskeletal: hip pain    Neurological: No Seizures/ TIA/CVA/ Parastesias	    Hematology/Lymphatics: No hx of bleeding/ Edema	    Allergic/Immunologic:	 No Anaphylaxis/ Intolerance/ Recent illnesses    MEDICATIONS  (STANDING):  heparin   Injectable 5000 Unit(s) SubCutaneous every 8 hours  influenza   Vaccine 0.5 milliLiter(s) IntraMuscular once  lidocaine   Patch 1 Patch Transdermal every 24 hours  lidocaine   Patch 1 Patch Transdermal daily  melatonin 3 milliGRAM(s) Oral at bedtime  multivitamin 1 Tablet(s) Oral daily  pantoprazole    Tablet 40 milliGRAM(s) Oral before breakfast  piperacillin/tazobactam IVPB.. 3.375 Gram(s) IV Intermittent every 8 hours  potassium chloride    Tablet ER 40 milliEquivalent(s) Oral every 4 hours  senna 2 Tablet(s) Oral at bedtime    MEDICATIONS  (PRN):  acetaminophen   Tablet .. 650 milliGRAM(s) Oral every 6 hours PRN Temp greater or equal to 38C (100.4F), Mild Pain (1 - 3)  bisacodyl 5 milliGRAM(s) Oral every 12 hours PRN Constipation  bisacodyl Suppository 10 milliGRAM(s) Rectal daily PRN Constipation  morphine  - Injectable 2 milliGRAM(s) IV Push every 4 hours PRN Severe Pain (7 - 10)  ondansetron Injectable 4 milliGRAM(s) IV Push every 4 hours PRN Nausea and/or Vomiting  oxyCODONE    IR 7.5 milliGRAM(s) Oral every 3 hours PRN Moderate Pain (4 - 6)      Allergies    No Known Allergies    Intolerances        SOCIAL HISTORY:  38pck year   no alcohol use  no ilicit drug use    FAMILY HISTORY:  FH: colon cancer    Family history of cervical cancer        Vital Signs Last 24 Hrs  T(C): 36.3 (16 Sep 2020 05:45), Max: 36.8 (15 Sep 2020 12:34)  T(F): 97.4 (16 Sep 2020 05:45), Max: 98.3 (15 Sep 2020 12:34)  HR: 78 (16 Sep 2020 05:45) (76 - 78)  BP: 106/57 (16 Sep 2020 10:15) (106/57 - 120/72)  BP(mean): --  RR: 17 (16 Sep 2020 05:45) (17 - 17)  SpO2: 97% (16 Sep 2020 05:45) (96% - 97%)    General: WN/WD NAD  Neurology: Awake, nonfocal  Eyes: Scleras clear, PERRLA/ EOMI, Gross vision intact  ENT:no stridor  Neck: Neck supple, trachea midline, No JVD,   Respiratory: CTA B/L, No wheezing, rales, rhonchi  CV: RRR, S1S2, no murmurs  Abdominal: Soft, NT, ND +BS,   Extremities: No edema, + peripheral pulses, pain b/l hip  Skin: No Rashes, Hematoma, Ecchymosis  Lymphatic: No Neck, axilla, groin LAD  Psych: Oriented x 3, normal affect    LABS:                        11.2   5.54  )-----------( 263      ( 16 Sep 2020 05:30 )             35.3     09-16    137  |  96<L>  |  6<L>  ----------------------------<  94  3.1<L>   |  28  |  0.42<L>    Ca    9.0      16 Sep 2020 05:30  Phos  3.2     09-16  Mg     1.8     09-16      PT/INR - ( 16 Sep 2020 05:30 )   PT: 12.2 SEC;   INR: 1.07          PTT - ( 16 Sep 2020 05:30 )  PTT:30.4 SEC      RADIOLOGY & ADDITIONAL STUDIES:  < from: CT Chest No Cont (09.12.20 @ 03:30) >  A small left pleural effusion is increased from 08/22/2020.  This may be malignant or parapneumonic in etiology    < end of copied text >

## 2020-09-16 NOTE — CONSULT NOTE ADULT - ASSESSMENT
Assessment: 69y Female w/ pmhx of metastatic cancer presenting with severe right hip pain. Consult for thoracentesis.     Plan:  - No role for IR procedure at this time, patient is saturating 97 on room air, no signs of sepsis. Likely no therapeutic benefit to aspiration of small pleural effusion. no clinical signs or concern for empyema for drainage.   - Please reconsult IR if patients clinical status changes for reconsideration of a procedure.

## 2020-09-17 NOTE — PROGRESS NOTE ADULT - SUBJECTIVE AND OBJECTIVE BOX
Patient is a 69y old  Female who presents with a chief complaint of Right hip pain (12 Sep 2020 14:13)    SUBJECTIVE / OVERNIGHT EVENTS: Patient reports 2 episodes diarrhea overnight and 1 this morning. Per RN, very small amounts. No abdominal pain. Left back pain improved with lidocaine patch.    MEDICATIONS  (STANDING):  heparin   Injectable 5000 Unit(s) SubCutaneous every 8 hours  influenza   Vaccine 0.5 milliLiter(s) IntraMuscular once  lidocaine   Patch 1 Patch Transdermal every 24 hours  melatonin 3 milliGRAM(s) Oral at bedtime  multivitamin 1 Tablet(s) Oral daily  pantoprazole    Tablet 40 milliGRAM(s) Oral before breakfast  piperacillin/tazobactam IVPB.. 3.375 Gram(s) IV Intermittent every 8 hours  polyethylene glycol 3350 17 Gram(s) Oral daily  senna 2 Tablet(s) Oral at bedtime    MEDICATIONS  (PRN):  acetaminophen   Tablet .. 650 milliGRAM(s) Oral every 6 hours PRN Temp greater or equal to 38C (100.4F), Mild Pain (1 - 3)  bisacodyl 5 milliGRAM(s) Oral every 12 hours PRN Constipation  bisacodyl Suppository 10 milliGRAM(s) Rectal daily PRN Constipation  morphine  - Injectable 2 milliGRAM(s) IV Push every 4 hours PRN Severe Pain (7 - 10)  ondansetron Injectable 4 milliGRAM(s) IV Push every 4 hours PRN Nausea and/or Vomiting  oxyCODONE    IR 5 milliGRAM(s) Oral every 3 hours PRN Moderate Pain (4 - 6)    PHYSICAL EXAM:  Vital Signs Last 24 Hrs  T(C): 36.9 (17 Sep 2020 04:41), Max: 37.1 (16 Sep 2020 21:55)  T(F): 98.5 (17 Sep 2020 04:41), Max: 98.7 (16 Sep 2020 21:55)  HR: 75 (17 Sep 2020 09:35) (67 - 92)  BP: 115/65 (17 Sep 2020 09:35) (103/61 - 122/71)  BP(mean): --  RR: 18 (17 Sep 2020 09:35) (18 - 18)  SpO2: 95% (17 Sep 2020 09:35) (95% - 98%)    CONSTITUTIONAL: elderly, frail female in NAD  RESPIRATORY: Normal respiratory effort; lungs are clear to auscultation anteriorly   CARDIOVASCULAR:  S1/S2; No lower extremity edema  ABDOMEN: Nontender to palpation, normoactive bowel sounds, no rebound/guarding  MUSCULOSKELETAL: R leg unable to assess ROM 2/2 pain, +sensation intact. Able to lift LLE and bend knee   PSYCH: A+O to person, place, and time; affect appropriate  SKIN: No rashes; no palpable lesions    LABS:                          10.9   5.21  )-----------( 255      ( 17 Sep 2020 04:29 )             34.6       136  |  100  |  6<L>  ----------------------------<  102<H>  4.7   |  25  |  0.42<L>    Ca    9.0      17 Sep 2020 04:29  Phos  2.8       Mg     2.0                     Urinalysis Basic - ( 12 Sep 2020 04:20 )    Color: LIGHT YELLOW / Appearance: Lt TURBID / S.010 / pH: 7.0  Gluc: NEGATIVE / Ketone: SMALL  / Bili: NEGATIVE / Urobili: NORMAL   Blood: SMALL / Protein: 10 / Nitrite: NEGATIVE   Leuk Esterase: NEGATIVE / RBC: 3-5 / WBC 0-2   Sq Epi: OCC / Non Sq Epi: x / Bacteria: NEGATIVE        Culture - Blood (collected 12 Sep 2020 06:58)  Source: .Blood  Preliminary Report (13 Sep 2020 07:01):    No growth to date.    Culture - Blood (collected 12 Sep 2020 06:58)  Source: .Blood  Preliminary Report (13 Sep 2020 07:01):    No growth to date.        RADIOLOGY & ADDITIONAL TESTS:  Results Reviewed: X  Imaging Personally Reviewed:  Electrocardiogram Personally Reviewed:    COORDINATION OF CARE:  Care Discussed with Consultants/Other Providers [Y/N]:  Prior or Outpatient Records Reviewed [Y/N]:

## 2020-09-17 NOTE — PROGRESS NOTE ADULT - PROBLEM SELECTOR PLAN 1
New LLL infiltrate on Ct chest associated with fever, left pleuritic cp, and known lung cancer; No hypoxia or SOB.  - D/c Vanco as low suspicion for MRSA at this time   -S/p 5 day course of zosyn. Now monitor off given patient is now afebrile, no leukocytosis and respiratory status is stable.   - Blood Cx NGTD  -Small left pleural effusion - not amenable to tap per ct surg and IR. Patient is clinically stable.

## 2020-09-18 PROBLEM — C34.90 MALIGNANT NEOPLASM OF UNSPECIFIED PART OF UNSPECIFIED BRONCHUS OR LUNG: Chronic | Status: ACTIVE | Noted: 2020-01-01

## 2020-09-18 NOTE — CHART NOTE - NSCHARTNOTEFT_GEN_A_CORE
ORTHO POST OP CHECK    S: Pt seen and examined. Doing well postoperatively. Pain well controlled. Denies N/V/CP/SOB.       O:   PE:  Gen: NAD, laying comfortably in bed  Resp: Unlabored breathing  MSK:           +EHL/FHL/TA/Gas/SOl          +DP/SP/Julio/Saph           2+DP  Prevena + QUIQUE in place                        8.7    10.13 )-----------( 142      ( 18 Sep 2020 01:30 )             26.0     09-17    136  |  100  |  6<L>  ----------------------------<  102<H>  4.7   |  25  |  0.42<L>    Ca    9.0      17 Sep 2020 04:29  Phos  2.8     09-17  Mg     2.0     09-17        Vital Signs Last 24 Hrs  T(C): 36.4 (18 Sep 2020 00:45), Max: 36.9 (17 Sep 2020 04:41)  T(F): 97.5 (18 Sep 2020 00:45), Max: 98.5 (17 Sep 2020 04:41)  HR: 96 (18 Sep 2020 02:00) (67 - 118)  BP: 91/57 (18 Sep 2020 02:00) (80/46 - 140/86)  BP(mean): 64 (18 Sep 2020 02:00) (53 - 75)  RR: 20 (18 Sep 2020 02:00) (13 - 29)  SpO2: 99% (18 Sep 2020 02:00) (95% - 99%)  I&O's Summary      A/P    -Neuro: Multimodal pain control  -Resp: IS  -GI: reg diet  -MSK: OOB, WBAT, PT/OT  -Heme: DVT PPx    Ortho

## 2020-09-18 NOTE — PROGRESS NOTE ADULT - SUBJECTIVE AND OBJECTIVE BOX
post major resection metastatic carcinoma right acetabulum and iliac crest patient underwent major resection and internal fixation acetabulum including total hip replacement. No complication, patient is fully alert oriented no gross neurovascular deficit,

## 2020-09-18 NOTE — BRIEF OPERATIVE NOTE - OPERATION/FINDINGS
Exploration and wide resection of right acetabulum metastatic lesion with internal fixation of acetabulum and right total hip arthroplasty

## 2020-09-18 NOTE — PROGRESS NOTE ADULT - PROBLEM SELECTOR PLAN 1
-s/p wide resection JAYANT on 9/17  -Course complicated by hypotension in PACU requiring pressors. Now off pressors and HD stable  - In the setting of known malignancy in Right hip and absence of trauma, suspect pathologic occult hip fracture.   - Pain currently controlled on current regimen.   - Ortho recs appreciated  - Already had RT to R hip last admission. Per Radonc no further plans for RT

## 2020-09-18 NOTE — PROGRESS NOTE ADULT - SUBJECTIVE AND OBJECTIVE BOX
Patient seen and evaluated this AM.  While in the RR, patient's became hypoTN and tachy, requiring albumin, 1uPRBC, and increasing harjit (max 2.5).  Patient noted to be in afib w/ RVR.  On current exam, patient broke into NSR, pressures and rate stabilized.    ICU Vital Signs Last 24 Hrs  T(C): 35.8 (18 Sep 2020 06:15), Max: 36.9 (17 Sep 2020 17:19)  T(F): 96.5 (18 Sep 2020 06:15), Max: 98.5 (17 Sep 2020 17:19)  HR: 77 (18 Sep 2020 06:15) (74 - 160)  BP: 114/70 (18 Sep 2020 06:05) (79/57 - 127/52)  BP(mean): 78 (18 Sep 2020 06:05) (53 - 86)  ABP: 118/61 (18 Sep 2020 06:15) (77/46 - 148/66)  ABP(mean): 82 (18 Sep 2020 06:15) (57 - 99)  RR: 21 (18 Sep 2020 06:15) (13 - 31)  SpO2: 96% (18 Sep 2020 06:15) (95% - 100%)      PE:  Gen: NAD, laying comfortably in bed  Resp: Unlabored breathing  MSK:           +EHL/FHL/TA/Gas/SOl          +DP/SP/Julio/Saph           2+DP  Prevena + QUIQUE in place    Assessment: 69F s/p wide resection JAYANT, now recovering well in PACU    Plan:  - Continue to monitor vitals  - Follow drain output  - Transfuse as needed  - Wean pressors as tolerated  - Consider SICU consult if needed - spoke w/ anesthesia, at this time consult does not appear needed as patient's vitals are stabilizing  - Pain control PRN  - WBAT   - Anterior precautions  -ABduction pillow

## 2020-09-18 NOTE — PROGRESS NOTE ADULT - ASSESSMENT
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

## 2020-09-18 NOTE — PROGRESS NOTE ADULT - SUBJECTIVE AND OBJECTIVE BOX
ANESTHESIA POSTOP CHECK    69y Female POSTOP DAY 1 S/P Right hip replacement    [x ] NO APPARENT ANESTHESIA COMPLICATIONS      Comments:

## 2020-09-18 NOTE — PROGRESS NOTE ADULT - SUBJECTIVE AND OBJECTIVE BOX
Patient is a 69y old  Female who presents with a chief complaint of Right hip pain (12 Sep 2020 14:13)    SUBJECTIVE / OVERNIGHT EVENTS: S/p OR yesterday. Required monitoring in PACU for hypotension and afib. Now hemodynamically stable. Seen in PACU. Doing well. Denies any chest pain or SOB. Off of pressors since 1130am and BP stable.     MEDICATIONS  (STANDING):  heparin   Injectable 5000 Unit(s) SubCutaneous every 8 hours  influenza   Vaccine 0.5 milliLiter(s) IntraMuscular once  lidocaine   Patch 1 Patch Transdermal every 24 hours  melatonin 3 milliGRAM(s) Oral at bedtime  multivitamin 1 Tablet(s) Oral daily  pantoprazole    Tablet 40 milliGRAM(s) Oral before breakfast  piperacillin/tazobactam IVPB.. 3.375 Gram(s) IV Intermittent every 8 hours  polyethylene glycol 3350 17 Gram(s) Oral daily  senna 2 Tablet(s) Oral at bedtime    MEDICATIONS  (PRN):  acetaminophen   Tablet .. 650 milliGRAM(s) Oral every 6 hours PRN Temp greater or equal to 38C (100.4F), Mild Pain (1 - 3)  bisacodyl 5 milliGRAM(s) Oral every 12 hours PRN Constipation  bisacodyl Suppository 10 milliGRAM(s) Rectal daily PRN Constipation  morphine  - Injectable 2 milliGRAM(s) IV Push every 4 hours PRN Severe Pain (7 - 10)  ondansetron Injectable 4 milliGRAM(s) IV Push every 4 hours PRN Nausea and/or Vomiting  oxyCODONE    IR 5 milliGRAM(s) Oral every 3 hours PRN Moderate Pain (4 - 6)    PHYSICAL EXAM:  Vital Signs Last 24 Hrs  T(C): 37 (18 Sep 2020 12:00), Max: 37 (18 Sep 2020 12:00)  T(F): 98.6 (18 Sep 2020 12:00), Max: 98.6 (18 Sep 2020 12:00)  HR: 79 (18 Sep 2020 14:00) (66 - 160)  BP: 92/52 (18 Sep 2020 14:00) (79/57 - 127/52)  BP(mean): 60 (18 Sep 2020 14:00) (53 - 86)  RR: 23 (18 Sep 2020 14:00) (13 - 31)  SpO2: 97% (18 Sep 2020 14:00) (93% - 100%)    CONSTITUTIONAL: elderly, frail female in NAD  RESPIRATORY: Normal respiratory effort; lungs are clear to auscultation anteriorly   CARDIOVASCULAR:  S1/S2; No lower extremity edema  ABDOMEN: Nontender to palpation, normoactive bowel sounds, no rebound/guarding  MUSCULOSKELETAL: R hip dressing and drain intact   PSYCH: A+O to person, place, and time; affect appropriate  SKIN: No rashes; no palpable lesions    LABS:                          10.4   10.62 )-----------( 130      ( 18 Sep 2020 07:21 )             30.9   09-18    134<L>  |  95<L>  |  7   ----------------------------<  161<H>  3.3<L>   |  20<L>  |  0.31<L>    Ca    8.3<L>      18 Sep 2020 03:40  Phos  3.3     -18  Mg     1.2     18    TPro  4.6<L>  /  Alb  2.9<L>  /  TBili  0.6  /  DBili  x   /  AST  45<H>  /  ALT  35<H>  /  AlkPhos  44  -18              Urinalysis Basic - ( 12 Sep 2020 04:20 )    Color: LIGHT YELLOW / Appearance: Lt TURBID / S.010 / pH: 7.0  Gluc: NEGATIVE / Ketone: SMALL  / Bili: NEGATIVE / Urobili: NORMAL   Blood: SMALL / Protein: 10 / Nitrite: NEGATIVE   Leuk Esterase: NEGATIVE / RBC: 3-5 / WBC 0-2   Sq Epi: OCC / Non Sq Epi: x / Bacteria: NEGATIVE        Culture - Blood (collected 12 Sep 2020 06:58)  Source: .Blood  Preliminary Report (13 Sep 2020 07:01):    No growth to date.    Culture - Blood (collected 12 Sep 2020 06:58)  Source: .Blood  Preliminary Report (13 Sep 2020 07:01):    No growth to date.        RADIOLOGY & ADDITIONAL TESTS:  Results Reviewed: X  Imaging Personally Reviewed:  Electrocardiogram Personally Reviewed:    COORDINATION OF CARE:  Care Discussed with Consultants/Other Providers [Y/N]:  Prior or Outpatient Records Reviewed [Y/N]:

## 2020-09-19 NOTE — OCCUPATIONAL THERAPY INITIAL EVALUATION ADULT - NS ASR FOLLOW COMMAND OT EVAL
Patient able to make needs known and follow commands in ENglish/able to follow multistep instructions/100% of the time

## 2020-09-19 NOTE — PHYSICAL THERAPY INITIAL EVALUATION ADULT - CRITERIA FOR SKILLED THERAPEUTIC INTERVENTIONS
rehab potential/impairments found/therapy frequency/anticipated discharge recommendation/predicted duration of therapy intervention
impairments found

## 2020-09-19 NOTE — PHYSICAL THERAPY INITIAL EVALUATION ADULT - PERTINENT HX OF CURRENT PROBLEM, REHAB EVAL
Pt is a 69 year old female presenting with sudden onset, severe right hip pain, unable to ambulate and fever. Pt with a pre-operative diagnosis of lung cancer metastatic to bone. Pt s/p right total hip replacement on 9/18/2020. PMH: Recently diagnosed Lung Cancer in 8/2020 with mets to brain, Spine, and Right hip s/p RT x 5 ( last tx 9/2) , not on chemo yet.
Pt. admitted for right hip pain. Per radiology report, CT pelvis revealed large destructive right acetabular and iliac bone lytic lesion with smaller adjacent lesions within the more superior aspect of the iliac bone. No femoral neck fracture.

## 2020-09-19 NOTE — PHYSICAL THERAPY INITIAL EVALUATION ADULT - FOLLOWS COMMANDS/ANSWERS QUESTIONS, REHAB EVAL
100% of the time/able to follow single-step instructions
Macedonian speaking but able to make needs known and follow commands in English./able to follow multistep instructions/100% of the time

## 2020-09-19 NOTE — PHYSICAL THERAPY INITIAL EVALUATION ADULT - LEVEL OF INDEPENDENCE: GAIT, REHAB EVAL
To be assessed.
secondary to pain. ARTI KENDRICK aware and provided with pain medication./unable to perform

## 2020-09-19 NOTE — PHYSICAL THERAPY INITIAL EVALUATION ADULT - MANUAL MUSCLE TESTING RESULTS, REHAB EVAL
bilateral UE and LE grossly 3/5. Unable to assess right hip
bilateral UEs & left LE grossly at least 3+/5, not formally tested secondary to recent surgery. Pt able to wiggle right LE toes.

## 2020-09-19 NOTE — PROGRESS NOTE ADULT - SUBJECTIVE AND OBJECTIVE BOX
Patient is a 69y old  Female who presents with a chief complaint of Right hip pain (12 Sep 2020 14:13)    SUBJECTIVE / OVERNIGHT EVENTS: DOing well this morning. Denies pain. Eating breakfast. Sinus with PAC on tele    MEDICATIONS  (STANDING):  heparin   Injectable 5000 Unit(s) SubCutaneous every 8 hours  influenza   Vaccine 0.5 milliLiter(s) IntraMuscular once  lidocaine   Patch 1 Patch Transdermal every 24 hours  melatonin 3 milliGRAM(s) Oral at bedtime  multivitamin 1 Tablet(s) Oral daily  pantoprazole    Tablet 40 milliGRAM(s) Oral before breakfast  piperacillin/tazobactam IVPB.. 3.375 Gram(s) IV Intermittent every 8 hours  polyethylene glycol 3350 17 Gram(s) Oral daily  senna 2 Tablet(s) Oral at bedtime    MEDICATIONS  (PRN):  acetaminophen   Tablet .. 650 milliGRAM(s) Oral every 6 hours PRN Temp greater or equal to 38C (100.4F), Mild Pain (1 - 3)  bisacodyl 5 milliGRAM(s) Oral every 12 hours PRN Constipation  bisacodyl Suppository 10 milliGRAM(s) Rectal daily PRN Constipation  morphine  - Injectable 2 milliGRAM(s) IV Push every 4 hours PRN Severe Pain (7 - 10)  ondansetron Injectable 4 milliGRAM(s) IV Push every 4 hours PRN Nausea and/or Vomiting  oxyCODONE    IR 5 milliGRAM(s) Oral every 3 hours PRN Moderate Pain (4 - 6)    PHYSICAL EXAM:  Vital Signs Last 24 Hrs  T(C): 37 (19 Sep 2020 10:45), Max: 37 (18 Sep 2020 12:00)  T(F): 98.6 (19 Sep 2020 10:45), Max: 98.6 (18 Sep 2020 12:00)  HR: 78 (19 Sep 2020 10:45) (62 - 81)  BP: 111/59 (19 Sep 2020 10:45) (91/50 - 123/52)  BP(mean): 56 (18 Sep 2020 22:00) (51 - 67)  RR: 16 (19 Sep 2020 10:45) (15 - 23)  SpO2: 100% (19 Sep 2020 10:45) (94% - 100%)    CONSTITUTIONAL: elderly, frail female in NAD  RESPIRATORY: Normal respiratory effort; lungs are clear to auscultation anteriorly   CARDIOVASCULAR:  S1/S2; No lower extremity edema  ABDOMEN: Nontender to palpation, normoactive bowel sounds, no rebound/guarding  MUSCULOSKELETAL: R hip dressing and drain intact   PSYCH: A+O to person, place, and time; affect appropriate  SKIN: No rashes; no palpable lesions    LABS:                          10.7   9.36  )-----------( 169      ( 19 Sep 2020 07:07 )             31.7   -19    139  |  99  |  5<L>  ----------------------------<  124<H>  2.9<LL>   |  26  |  0.33<L>    Ca    8.2<L>      19 Sep 2020 07:07  Phos  2.0       Mg     1.6         TPro  4.6<L>  /  Alb  2.9<L>  /  TBili  0.6  /  DBili  x   /  AST  45<H>  /  ALT  35<H>  /  AlkPhos  44  18              Urinalysis Basic - ( 12 Sep 2020 04:20 )    Color: LIGHT YELLOW / Appearance: Lt TURBID / S.010 / pH: 7.0  Gluc: NEGATIVE / Ketone: SMALL  / Bili: NEGATIVE / Urobili: NORMAL   Blood: SMALL / Protein: 10 / Nitrite: NEGATIVE   Leuk Esterase: NEGATIVE / RBC: 3-5 / WBC 0-2   Sq Epi: OCC / Non Sq Epi: x / Bacteria: NEGATIVE        Culture - Blood (collected 12 Sep 2020 06:58)  Source: .Blood  Preliminary Report (13 Sep 2020 07:01):    No growth to date.    Culture - Blood (collected 12 Sep 2020 06:58)  Source: .Blood  Preliminary Report (13 Sep 2020 07:01):    No growth to date.        RADIOLOGY & ADDITIONAL TESTS:  Results Reviewed: X  Imaging Personally Reviewed:  Electrocardiogram Personally Reviewed:    COORDINATION OF CARE:  Care Discussed with Consultants/Other Providers [Y/N]:  Prior or Outpatient Records Reviewed [Y/N]:

## 2020-09-19 NOTE — PHYSICAL THERAPY INITIAL EVALUATION ADULT - ADDITIONAL COMMENTS
Pt. reports owning a rolling walker and straight cane.    Pt. was left semisupine in bed post PT Evaluation, no apparent distress, call mast within reach. ARTI Quan made aware of pt. status.
Pt lives in a house with 4 exterior steps to enter. There is one flight of stairs within home to second floor bedroom. Pt lives with daughter and reports ambulating with a rolling walker prior to the onset of pain.    Pt was left semi-supine in bed as found, all lines/tubes intact and call bell within reach, RN aware.

## 2020-09-19 NOTE — PHYSICAL THERAPY INITIAL EVALUATION ADULT - LEVEL OF INDEPENDENCE: SUPINE/SIT, REHAB EVAL
To be assessed. Attempted, however pt. unable to perform at this time secondary to pain.
moderate assist (50% patients effort)

## 2020-09-19 NOTE — PHYSICAL THERAPY INITIAL EVALUATION ADULT - RANGE OF MOTION EXAMINATION, REHAB EVAL
bilateral upper extremity ROM was WFL (within functional limits)/bilateral lower extremity ROM was WFL (within functional limits)/except pt. with difficulty moving right hip secondary to pain.
within hip precautions/bilateral lower extremity ROM was WFL (within functional limits)/bilateral upper extremity ROM was WFL (within functional limits)

## 2020-09-19 NOTE — PHYSICAL THERAPY INITIAL EVALUATION ADULT - PATIENT PROFILE REVIEW, REHAB EVAL
yes/PT orders received: Ambulate as tolerated, Out of bed to chair. Consult with RN Mili KENDRICK, patient may participate in PT evaluation.
yes

## 2020-09-19 NOTE — OCCUPATIONAL THERAPY INITIAL EVALUATION ADULT - GENERAL OBSERVATIONS, REHAB EVAL
Patient received semisupine in bed in NAD. + Prima fit. +abduction pillow. +hemovac. +wound vac. Per RN Mili, patient okay to participate in OT evaluation.

## 2020-09-19 NOTE — PHYSICAL THERAPY INITIAL EVALUATION ADULT - GENERAL OBSERVATIONS, REHAB EVAL
Pt received semisupine in bed, +prevena plus, +hemovac, +primafit, +hip ABduction pillow, in no apparent distress. Pt agreeable to participate in physical therapy evaluation.
Consult received, chart reviewed. Patient received semisupine in bed, no apparent distress.

## 2020-09-19 NOTE — OCCUPATIONAL THERAPY INITIAL EVALUATION ADULT - ADDITIONAL COMMENTS
Patient reports performing ADLs and functional mobility independently prior to onset of right hip pain.

## 2020-09-19 NOTE — OCCUPATIONAL THERAPY INITIAL EVALUATION ADULT - LIVES WITH, PROFILE
Patient lives in a private home with her daughter and son with steps to manage. Patient has a tub shower with no durable medical equipment.

## 2020-09-19 NOTE — PHYSICAL THERAPY INITIAL EVALUATION ADULT - PLANNED THERAPY INTERVENTIONS, PT EVAL
transfer training/bed mobility training/strengthening/balance training/gait training
transfer training/gait training/balance training/strengthening/bed mobility training

## 2020-09-19 NOTE — PROGRESS NOTE ADULT - PROBLEM SELECTOR PLAN 1
-s/p wide resection JAYANT on 9/17  -Course complicated by hypotension in PACU requiring pressors. Now off pressors and HD stable  - In the setting of known malignancy in Right hip and absence of trauma, suspect pathologic occult hip fracture.   - Pain currently controlled on current regimen.   - Ortho recs appreciated  - Already had RT to R hip last admission. Per Radon no further plans for RT  -PT/OT per ortho

## 2020-09-19 NOTE — OCCUPATIONAL THERAPY INITIAL EVALUATION ADULT - DIAGNOSIS, OT EVAL
s/p pathologic right hip fracture, s/p metastatic lung cancer, s/p wide resection right JAYANT; decreased functional mobility, decreased ADL performance, decreased standing balance

## 2020-09-19 NOTE — OCCUPATIONAL THERAPY INITIAL EVALUATION ADULT - PERTINENT HX OF CURRENT PROBLEM, REHAB EVAL
69 year old female with recently diagnosed with Lung Ca in 8/2020 with mets to brain, spine, and Right hip s/p RT x 5 ( last tx 9/2), not on chemo yet presenting with sudden onset, severe right hip pain, unable to ambulate. Found to have new LLL Pneumonia on CT chest, hyponatremia, and pathological right hip fracture s/p wide resection JAYANT on 9/17/2020.

## 2020-09-20 NOTE — DIETITIAN INITIAL EVALUATION ADULT. - PERTINENT MEDS FT
MEDICATIONS  (STANDING):  acetaminophen   Tablet .. 975 milliGRAM(s) Oral every 8 hours  aspirin 325 milliGRAM(s) Oral two times a day  influenza   Vaccine 0.5 milliLiter(s) IntraMuscular once  lidocaine   Patch 1 Patch Transdermal daily  melatonin 3 milliGRAM(s) Oral at bedtime  metoprolol tartrate Injectable 5 milliGRAM(s) IV Push every 5 minutes  multivitamin 1 Tablet(s) Oral daily  pantoprazole    Tablet 40 milliGRAM(s) Oral before breakfast  polyethylene glycol 3350 17 Gram(s) Oral daily  MEDICATIONS  (PRN):  aluminum hydroxide/magnesium hydroxide/simethicone Suspension 30 milliLiter(s) Oral four times a day PRN Indigestion  bisacodyl 5 milliGRAM(s) Oral every 12 hours PRN Constipation  bisacodyl Suppository 10 milliGRAM(s) Rectal daily PRN If no bowel movement by POD#2  bisacodyl Suppository 10 milliGRAM(s) Rectal daily PRN Constipation  morphine  - Injectable 2 milliGRAM(s) IV Push every 3 hours PRN Severe Pain (7 - 10)  ondansetron Injectable 4 milliGRAM(s) IV Push every 6 hours PRN Nausea and/or Vomiting  oxyCODONE    IR 5 milliGRAM(s) Oral every 4 hours PRN Mild Pain (1 - 3)  oxyCODONE    IR 10 milliGRAM(s) Oral every 4 hours PRN Moderate Pain (4 - 6)  senna 2 Tablet(s) Oral at bedtime PRN Constipation

## 2020-09-20 NOTE — PROGRESS NOTE ADULT - SUBJECTIVE AND OBJECTIVE BOX
Patient is a 69y old  Female who presents with a chief complaint of Right hip pain (12 Sep 2020 14:13)    SUBJECTIVE / OVERNIGHT EVENTS: Doing well this morning. Denies pain. Worked with PT yesterday    MEDICATIONS  (STANDING):  heparin   Injectable 5000 Unit(s) SubCutaneous every 8 hours  influenza   Vaccine 0.5 milliLiter(s) IntraMuscular once  lidocaine   Patch 1 Patch Transdermal every 24 hours  melatonin 3 milliGRAM(s) Oral at bedtime  multivitamin 1 Tablet(s) Oral daily  pantoprazole    Tablet 40 milliGRAM(s) Oral before breakfast  piperacillin/tazobactam IVPB.. 3.375 Gram(s) IV Intermittent every 8 hours  polyethylene glycol 3350 17 Gram(s) Oral daily  senna 2 Tablet(s) Oral at bedtime    MEDICATIONS  (PRN):  acetaminophen   Tablet .. 650 milliGRAM(s) Oral every 6 hours PRN Temp greater or equal to 38C (100.4F), Mild Pain (1 - 3)  bisacodyl 5 milliGRAM(s) Oral every 12 hours PRN Constipation  bisacodyl Suppository 10 milliGRAM(s) Rectal daily PRN Constipation  morphine  - Injectable 2 milliGRAM(s) IV Push every 4 hours PRN Severe Pain (7 - 10)  ondansetron Injectable 4 milliGRAM(s) IV Push every 4 hours PRN Nausea and/or Vomiting  oxyCODONE    IR 5 milliGRAM(s) Oral every 3 hours PRN Moderate Pain (4 - 6)    Vital Signs Last 24 Hrs  T(C): 36.7 (20 Sep 2020 12:39), Max: 36.9 (19 Sep 2020 17:45)  T(F): 98.1 (20 Sep 2020 12:39), Max: 98.4 (19 Sep 2020 17:45)  HR: 90 (20 Sep 2020 12:39) (72 - 97)  BP: 118/72 (20 Sep 2020 12:39) (102/64 - 122/75)  BP(mean): --  RR: 16 (20 Sep 2020 12:39) (16 - 18)  SpO2: 98% (20 Sep 2020 12:39) (97% - 98%)    CONSTITUTIONAL: elderly, frail female in NAD  RESPIRATORY: Normal respiratory effort; lungs are clear to auscultation anteriorly   CARDIOVASCULAR:  S1/S2; No lower extremity edema  ABDOMEN: Nontender to palpation, normoactive bowel sounds, no rebound/guarding  MUSCULOSKELETAL: R hip dressing and drain intact   PSYCH: A+O to person, place, and time; affect appropriate  SKIN: No rashes; no palpable lesions    LABS:                          9.7    7.94  )-----------( 199      ( 20 Sep 2020 05:17 )             30.3   09-20    138  |  101  |  7   ----------------------------<  87  3.4<L>   |  24  |  0.34<L>    Ca    8.2<L>      20 Sep 2020 05:17  Phos  2.0       Mg     1.6     20              Urinalysis Basic - ( 12 Sep 2020 04:20 )    Color: LIGHT YELLOW / Appearance: Lt TURBID / S.010 / pH: 7.0  Gluc: NEGATIVE / Ketone: SMALL  / Bili: NEGATIVE / Urobili: NORMAL   Blood: SMALL / Protein: 10 / Nitrite: NEGATIVE   Leuk Esterase: NEGATIVE / RBC: 3-5 / WBC 0-2   Sq Epi: OCC / Non Sq Epi: x / Bacteria: NEGATIVE        Culture - Blood (collected 12 Sep 2020 06:58)  Source: .Blood  Preliminary Report (13 Sep 2020 07:01):    No growth to date.    Culture - Blood (collected 12 Sep 2020 06:58)  Source: .Blood  Preliminary Report (13 Sep 2020 07:01):    No growth to date.        RADIOLOGY & ADDITIONAL TESTS:  Results Reviewed: X  Imaging Personally Reviewed:  Electrocardiogram Personally Reviewed:    COORDINATION OF CARE:  Care Discussed with Consultants/Other Providers [Y/N]:  Prior or Outpatient Records Reviewed [Y/N]:

## 2020-09-20 NOTE — DIETITIAN INITIAL EVALUATION ADULT. - OTHER INFO
Pt 70 yo female, former smoker with recently diagnosed with Lung Ca in 8/2020 with mets to brain, spine, and Right hip s/p RT x 5 (last tx 9/2), not on chemo yet presented with sudden onset, severe Right hip pain, Right hip fracture s/p wide resection - per chart review.     At time of visit Pt appears alert, oriented. Pt speaks Malawian, Pt communicates in English as well. Per Pt her appetite not good for past 3 months 2/2 her sickness. Pt also stated she probably lost weight since admission, but unable to quantify. Of note Pt ate ~60-65% of lunch today per tray waste observation. Per Pt her height: 61" & her weight: ~110#. Pt never been a heavy person reported. No report of chewing or swallowing difficulty; no report of nausea, vomiting or diarrhea @ this time. +BM (9/20) fecal incontinence - per flow sheet. At home Pt eats regular food reported. No other food related concerns voiced at present. RDN remains available, Pt made aware.

## 2020-09-20 NOTE — PROGRESS NOTE ADULT - SUBJECTIVE AND OBJECTIVE BOX
Orthopaedic Surgery Progress Note    Subjective:   Patient seen and examined at bedside, no acute events overnight.  Complaining of hip pain but well-controlled.  No fevers/chills, nausea/vomiting, chest pain, SOB.    Objective:  T(C): 36.7 (09-20-20 @ 05:08), Max: 37 (09-19-20 @ 10:45)  HR: 74 (09-20-20 @ 05:08) (72 - 97)  BP: 114/77 (09-20-20 @ 05:08) (102/64 - 122/75)  RR: 16 (09-20-20 @ 05:08) (16 - 18)  SpO2: 97% (09-20-20 @ 05:08) (97% - 100%)  Wt(kg): --    09-18 @ 07:01  -  09-19 @ 07:00  --------------------------------------------------------  IN: 2609.8 mL / OUT: 2710 mL / NET: -100.2 mL    09-19 @ 07:01  -  09-20 @ 06:31  --------------------------------------------------------  IN: 1065 mL / OUT: 1235 mL / NET: -170 mL        Physical exam:  Gen: NAD  RLE:   Prevena vac C/D/I  HV intact with SS output  motor intact TA/GS/EHL/FHL  SILT S/S/SP/DP  No calf tenderness  WWP distally, cap refill <2s                          10.7   9.36  )-----------( 169      ( 19 Sep 2020 07:07 )             31.7     09-19    137  |  100  |  8   ----------------------------<  107<H>  3.6   |  25  |  0.42<L>    Ca    8.0<L>      19 Sep 2020 19:38  Phos  2.0     09-19  Mg     1.7     09-19            Assessment: 69F s/p wide resection JAYANT, POD3    Plan:  - HV drain removed at bedside today  - Pain control  - WBAT, PT/OT  - Anterior dislocation precautions  -ABduction pillow  - monitor labs/vitals, transfuse as needed

## 2020-09-20 NOTE — DIETITIAN INITIAL EVALUATION ADULT. - DIET TYPE
PO diet regular/supplement (specify)/Ensure Enlive 8oz. 2x daily (will provide additional ~700 Kcal, ~40 gm Protein)

## 2020-09-20 NOTE — DIETITIAN INITIAL EVALUATION ADULT. - PERTINENT LABORATORY DATA
(9/20) H/H 9.7/30.3 L, K 3.4 L, Creat 0.34 L, phosphorus 2.0 L;      (9/18) Albumin 2.9 L, AST 45 H, ALT 35 H;   (8/24) HbA1c 6.1% H

## 2020-09-20 NOTE — CHART NOTE - TREATMENT: THE FOLLOWING DIET HAS BEEN RECOMMENDED
1. PO diet rx: Regular; PO supplement: Ensure Enlive 8oz. 2x daily (will provide additional ~700 Kcal, ~40 gm Protein);  2. Encourage & assist Pt with meals; Monitor PO diet tolerance; Honor food preferences;   3. Monitor labs, weekly weights, hydration status;

## 2020-09-20 NOTE — DIETITIAN INITIAL EVALUATION ADULT. - PHYSICAL APPEARANCE
other (specify) Nutrition focused physical exam conducted - found signs of malnutrition [X] present Subcutaneous fat loss: [mild] Buccal fat region, [mild] Ribs region. Muscle wasting: [mild] Temples region, [mild] Clavicle region [mild] Shoulder region

## 2020-09-21 NOTE — PROGRESS NOTE ADULT - SUBJECTIVE AND OBJECTIVE BOX
INTERVAL HPI/OVERNIGHT EVENTS:  Patient seen at bedside.      VITAL SIGNS:  T(F): 98.3 (09-21-20 @ 05:13)  HR: 76 (09-21-20 @ 11:07)  BP: 110/70 (09-21-20 @ 11:07)  RR: 16 (09-21-20 @ 11:07)  SpO2: 99% (09-21-20 @ 11:07)  Wt(kg): --    PHYSICAL EXAM:    Constitutional: NAD, resting in bed comfortably  Eyes: EOMI, sclera non-icteric  Neck: supple, no LAD  Respiratory: CTA b/l, good air entry b/l, no wheezing, rhonchi or crackles  Cardiovascular: RRR, normal S1S2, no M/R/G  Gastrointestinal: soft, NTND  Extremities: no edema  Neurological: AAOx3, non focal  Skin: Normal temperature    MEDICATIONS  (STANDING):  acetaminophen   Tablet .. 975 milliGRAM(s) Oral every 8 hours  aspirin 325 milliGRAM(s) Oral two times a day  influenza   Vaccine 0.5 milliLiter(s) IntraMuscular once  lidocaine   Patch 1 Patch Transdermal daily  melatonin 3 milliGRAM(s) Oral at bedtime  metoprolol tartrate Injectable 5 milliGRAM(s) IV Push every 5 minutes  multivitamin 1 Tablet(s) Oral daily  pantoprazole    Tablet 40 milliGRAM(s) Oral before breakfast  polyethylene glycol 3350 17 Gram(s) Oral daily    MEDICATIONS  (PRN):  aluminum hydroxide/magnesium hydroxide/simethicone Suspension 30 milliLiter(s) Oral four times a day PRN Indigestion  bisacodyl 5 milliGRAM(s) Oral every 12 hours PRN Constipation  bisacodyl Suppository 10 milliGRAM(s) Rectal daily PRN Constipation  bisacodyl Suppository 10 milliGRAM(s) Rectal daily PRN If no bowel movement by POD#2  morphine  - Injectable 2 milliGRAM(s) IV Push every 3 hours PRN Severe Pain (7 - 10)  ondansetron Injectable 4 milliGRAM(s) IV Push every 6 hours PRN Nausea and/or Vomiting  oxyCODONE    IR 5 milliGRAM(s) Oral every 4 hours PRN Mild Pain (1 - 3)  oxyCODONE    IR 10 milliGRAM(s) Oral every 4 hours PRN Moderate Pain (4 - 6)  senna 2 Tablet(s) Oral at bedtime PRN Constipation      Allergies    No Known Allergies    Intolerances        LABS:                        10.3   8.05  )-----------( 242      ( 21 Sep 2020 05:44 )             32.6     09-21    138  |  101  |  7   ----------------------------<  95  3.6   |  25  |  0.32<L>    Ca    8.6      21 Sep 2020 05:44  Phos  2.0     09-21  Mg     1.6     09-21            RADIOLOGY & ADDITIONAL TESTS:  Studies reviewed.   INTERVAL HPI/OVERNIGHT EVENTS:  Patient seen at bedside.  Patient with appropriate R hip pain s/p JAYANT  Pain controlled with pain meds  Denies numbness or tingling of RLE  Denies dizziness or LOC      VITAL SIGNS:  T(F): 98.3 (09-21-20 @ 05:13)  HR: 76 (09-21-20 @ 11:07)  BP: 110/70 (09-21-20 @ 11:07)  RR: 16 (09-21-20 @ 11:07)  SpO2: 99% (09-21-20 @ 11:07)  Wt(kg): --    PHYSICAL EXAM:    In accordance with current standard limiting patient contact, deferred physical exam  2/2 COVID pandemic  Please refer to physical exam of primary team.    MEDICATIONS  (STANDING):  acetaminophen   Tablet .. 975 milliGRAM(s) Oral every 8 hours  aspirin 325 milliGRAM(s) Oral two times a day  influenza   Vaccine 0.5 milliLiter(s) IntraMuscular once  lidocaine   Patch 1 Patch Transdermal daily  melatonin 3 milliGRAM(s) Oral at bedtime  metoprolol tartrate Injectable 5 milliGRAM(s) IV Push every 5 minutes  multivitamin 1 Tablet(s) Oral daily  pantoprazole    Tablet 40 milliGRAM(s) Oral before breakfast  polyethylene glycol 3350 17 Gram(s) Oral daily    MEDICATIONS  (PRN):  aluminum hydroxide/magnesium hydroxide/simethicone Suspension 30 milliLiter(s) Oral four times a day PRN Indigestion  bisacodyl 5 milliGRAM(s) Oral every 12 hours PRN Constipation  bisacodyl Suppository 10 milliGRAM(s) Rectal daily PRN Constipation  bisacodyl Suppository 10 milliGRAM(s) Rectal daily PRN If no bowel movement by POD#2  morphine  - Injectable 2 milliGRAM(s) IV Push every 3 hours PRN Severe Pain (7 - 10)  ondansetron Injectable 4 milliGRAM(s) IV Push every 6 hours PRN Nausea and/or Vomiting  oxyCODONE    IR 5 milliGRAM(s) Oral every 4 hours PRN Mild Pain (1 - 3)  oxyCODONE    IR 10 milliGRAM(s) Oral every 4 hours PRN Moderate Pain (4 - 6)  senna 2 Tablet(s) Oral at bedtime PRN Constipation      Allergies    No Known Allergies    Intolerances        LABS:                        10.3   8.05  )-----------( 242      ( 21 Sep 2020 05:44 )             32.6     09-21    138  |  101  |  7   ----------------------------<  95  3.6   |  25  |  0.32<L>    Ca    8.6      21 Sep 2020 05:44  Phos  2.0     09-21  Mg     1.6     09-21            RADIOLOGY & ADDITIONAL TESTS:  Studies reviewed.

## 2020-09-21 NOTE — PROGRESS NOTE ADULT - SUBJECTIVE AND OBJECTIVE BOX
Edy Armas MD  Academic Hospitalist  Pager [phone]  Email: [email or web address]          PROGRESS NOTE:     Patient is a 69y old  Female who presents with a chief complaint of Right hip pain (21 Sep 2020 12:37)      SUBJECTIVE / OVERNIGHT EVENTS:  Patient seen and examined this morning. She clearly verbalized in English that that she would like me to speak with her children for an update and to serve for translation. She was able to refuse interpreters service in English.    ADDITIONAL REVIEW OF SYSTEMS:  No fevers or chills. +for ongoing weakness.     MEDICATIONS  (STANDING):  acetaminophen   Tablet .. 975 milliGRAM(s) Oral every 8 hours  aspirin 325 milliGRAM(s) Oral two times a day  influenza   Vaccine 0.5 milliLiter(s) IntraMuscular once  lidocaine   Patch 1 Patch Transdermal daily  melatonin 3 milliGRAM(s) Oral at bedtime  metoprolol tartrate Injectable 5 milliGRAM(s) IV Push every 5 minutes  multivitamin 1 Tablet(s) Oral daily  pantoprazole    Tablet 40 milliGRAM(s) Oral before breakfast  polyethylene glycol 3350 17 Gram(s) Oral daily    MEDICATIONS  (PRN):  aluminum hydroxide/magnesium hydroxide/simethicone Suspension 30 milliLiter(s) Oral four times a day PRN Indigestion  bisacodyl 5 milliGRAM(s) Oral every 12 hours PRN Constipation  bisacodyl Suppository 10 milliGRAM(s) Rectal daily PRN If no bowel movement by POD#2  bisacodyl Suppository 10 milliGRAM(s) Rectal daily PRN Constipation  morphine  - Injectable 2 milliGRAM(s) IV Push every 3 hours PRN Severe Pain (7 - 10)  ondansetron Injectable 4 milliGRAM(s) IV Push every 6 hours PRN Nausea and/or Vomiting  oxyCODONE    IR 5 milliGRAM(s) Oral every 4 hours PRN Mild Pain (1 - 3)  oxyCODONE    IR 10 milliGRAM(s) Oral every 4 hours PRN Moderate Pain (4 - 6)  senna 2 Tablet(s) Oral at bedtime PRN Constipation      CAPILLARY BLOOD GLUCOSE        I&O's Summary    20 Sep 2020 07:01  -  21 Sep 2020 07:00  --------------------------------------------------------  IN: 1660 mL / OUT: 2050 mL / NET: -390 mL        PHYSICAL EXAM:  Vital Signs Last 24 Hrs  T(C): 36.7 (21 Sep 2020 12:54), Max: 36.8 (20 Sep 2020 20:34)  T(F): 98 (21 Sep 2020 12:54), Max: 98.3 (21 Sep 2020 05:13)  HR: 80 (21 Sep 2020 12:54) (74 - 94)  BP: 127/90 (21 Sep 2020 12:54) (105/65 - 127/90)  BP(mean): --  RR: 16 (21 Sep 2020 12:54) (15 - 17)  SpO2: 100% (21 Sep 2020 12:54) (98% - 100%)    CONSTITUTIONAL: NAD, frail, in bed  RESPIRATORY: Normal respiratory effort; CTA anteriorly  CARDIOVASCULAR: Regular rate and rhythm, normal S1 and S2, no murmur/rub/gallop; No lower extremity edema;   ABDOMEN: Nontender to palpation, normoactive bowel sounds, no rebound/guarding;  MUSCLOSKELETAL: R. hip dressing noted  PSYCH: A+O to person, place, and time; affect appropriate, pleasant    LABS:                        10.3   8.05  )-----------( 242      ( 21 Sep 2020 05:44 )             32.6     09-21    138  |  101  |  7   ----------------------------<  95  3.6   |  25  |  0.32<L>    Ca    8.6      21 Sep 2020 05:44  Phos  2.0     09-21  Mg     1.6     09-21                  RADIOLOGY & ADDITIONAL TESTS:  Results Reviewed:   Imaging Personally Reviewed:  Electrocardiogram Personally Reviewed:    COORDINATION OF CARE:  Care Discussed with Consultants/Other Providers [Y/N]:  Prior or Outpatient Records Reviewed [Y/N]:

## 2020-09-21 NOTE — PROGRESS NOTE ADULT - PROBLEM SELECTOR PLAN 1
-s/p wide resection JAYANT on 9/17  -Course complicated by hypotension in PACU requiring pressors. Now off pressors and HD stable  - In the setting of known malignancy in Right hip and absence of trauma, suspect pathologic occult hip fracture.   - Pain currently controlled on current regimen.   - Ortho recs appreciated  - Already had RT to R hip last admission. Per [facility] no further plans for RT  -PT/OT per ortho  -Patient medically optimized for discharge. Will likely be going to rehab. After completion of rehab, would recommend follow up with oncology for further treatment. D/W patient's daughter, Veda  and patient's son at bedside via patient's cell phone.

## 2020-09-21 NOTE — PROGRESS NOTE ADULT - ASSESSMENT
69 y.o. Female smoker w/ recently diagnosed with NSCLC  in 8/2020 (VETEBRAL BODY, T11, CT GUIDED CORE BIOPSY 8/25/20: POSITIVE FOR MALIGNANT CELLS. Metastatic adenocarcinoma. IHC is positive for CK7, CK20(patchy) and CDX2. Rare tumor cells are positive for ELLY-3. Tumor cells are negative for PAX-8 and TTF-1. 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  - Blood Cx NGTD  -Plan for 5 day course. D5/5 today.  -IR deferring thoracentesis at current moment. Likely no therapeutic benefit to aspiration of small pleural effusion.,       #R hip pain  -Patient s/p RT to uppler/lower spine and R hip s/p RT x 5 ( last tx 9/2)  -CT pelvis: Large destructive right acetabular and iliac bone lytic lesion with smaller adjacent lesions within the more superior aspect of the iliac bone. No femoral neck fracture.  -Appreciate palliative recs for pain management  -Appreciate orthopedic consult, NWB RLE   - Already had RT to R hip last admission. Per [facility] no further plans for RT  - Pending Orthopaedic surgery tomorrow (Total hip replacement)  -NPO and IVF @ midnight      #NSCLC  -DX on 8/25/20 s/p  T11, CT GUIDED CORE BIOPSY 8/25/20: POSITIVE FOR MALIGNANT CELLS. Metastatic adenocarcinoma. IHC is positive for CK7, CK20(patchy) and CDX2. Rare tumor cells are positive for ELLY-3. Tumor cells are negative for PAX-8 and TTF-1. This immunoprofile is compatible with adenocarcinoma of primary gastro-intestinal or pancreato-biliary origin among others  -Awaiting PDL1 and CtDNA test result  -Patient is treatment naive  -Plan for outpatient PET/CT  -Patient will follow with Dr. Xie after discharge  -C/w Supportive care, pain control, Nutrition, PT, DVT ppx  -Oncology will continue to follow with you      Case d/w Dr. Kirby HO  Oncology Physician Assistant  [facility]  Pager [phone]    If after 5pm or weekends please page On-call Oncology Fellow       69 y.o. Female smoker w/ recently diagnosed with NSCLC  in 8/2020 (VETEBRAL BODY, T11, CT GUIDED CORE BIOPSY 8/25/20: POSITIVE FOR MALIGNANT CELLS. Metastatic adenocarcinoma. IHC is positive for CK7, CK20(patchy) and CDX2. Rare tumor cells are positive for ELLY-3. Tumor cells are negative for PAX-8 and TTF-1. 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    #R hip pain  -Patient s/p RT to uppler/lower spine and R hip s/p RT x 5 ( last tx 9/2)  -CT pelvis: Large destructive right acetabular and iliac bone lytic lesion with smaller adjacent lesions within the more superior aspect of the iliac bone. No femoral neck fracture.  -Appreciate palliative recs for pain management  -Appreciate orthopedic consult, s/p  wide resection Right Total Hip Arthoplasty  on 9/17  -Course complicated by hypotension in PACU requiring pressors. Now off pressors and HD stable  - In the setting of known malignancy in Right hip and absence of trauma, suspect pathologic occult hip fracture.   - Pain currently controlled on current regimen.   - Ortho recs appreciated  - Already had RT to R hip last admission. 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  - Blood Cx NGTD  -s/p 5 days of empiric antibiotics  -IR deferring thoracentesis at current moment. Likely no therapeutic benefit to aspiration of small pleural effusion.,         #NSCLC  -DX on 8/25/20 s/p  T11, CT GUIDED CORE BIOPSY 8/25/20: POSITIVE FOR MALIGNANT CELLS. Metastatic adenocarcinoma. IHC is positive for CK7, CK20(patchy) and CDX2. Rare tumor cells are positive for ELLY-3. Tumor cells are negative for PAX-8 and TTF-1. This immunoprofile is compatible with adenocarcinoma of primary gastro-intestinal or pancreato-biliary origin among others  -Awaiting PDL1 and CtDNA test result  -Patient is treatment naive  -Plan for outpatient PET/CT,  -Seen by PT rec [facility]ELASQUEZ for dispo  -Patient will follow with Dr. Xie after discharge  -C/w Supportive care, pain control, Nutrition, PT, DVT ppx  -Oncology will continue to follow with you      Case d/w Dr. Delia HO  Oncology Physician Assistant  [facility]  Pager [phone]    If after 5pm or weekends please page On-call Oncology Fellow

## 2020-09-21 NOTE — PROGRESS NOTE ADULT - NSHPATTENDINGPLANDISCUSS_GEN_ALL_CORE
PA. Agree with above A/P
patient/ patient's daughter / ACP Paul

## 2020-09-22 NOTE — CONSULT NOTE ADULT - ASSESSMENT
Full Note to Follow.    ·	ordered Morphine Sulfate 15mg PO q8h ATC  ·	c/w PRN pain as ordered for now  ·	GOC are to pursue further DMT, remains FULL CODE 70yo F with recently diagnosed Metastatic NSCLC (no treatment yet) p/w Hip pain and inability to ambulate s/p JAYANT. Palliative consulted for pain management in the setting of life-limiting illness.    ·	ordered Morphine Sulfate 15mg PO q8h ATC  ·	c/w PRN pain as ordered for now  ·	GOC are to pursue further DMT, remains FULL CODE

## 2020-09-22 NOTE — CONSULT NOTE ADULT - PROBLEM SELECTOR RECOMMENDATION 5
.  Recently diagnosed, no treatment initiated yet  -if DMT were not pursued, patient would likely be eligible for hospice

## 2020-09-22 NOTE — CONSULT NOTE ADULT - CONSULT REASON
Pain/Symptom Management and Complex Medical Decision Making/GOC in the setting of Metastatic Lung CA

## 2020-09-22 NOTE — CONSULT NOTE ADULT - PROBLEM SELECTOR RECOMMENDATION 3
.  Poor nutritional intake in the setting of advanced malignancy  -Nutrition following, supplements ordered  -encourage PO intake

## 2020-09-22 NOTE — PROGRESS NOTE ADULT - SUBJECTIVE AND OBJECTIVE BOX
Edy Armas MD  Academic Hospitalist  Pager [phone]  Email: [email or web address]          PROGRESS NOTE:     Patient is a 69y old  Female who presents with a chief complaint of Right hip pain (22 Sep 2020 11:55)      SUBJECTIVE / OVERNIGHT EVENTS:  Patient seen and examined this morning. Continues to complain of pain and requires PRN morphines. Case discussed with daughter Veda and son. Family and patient were told that the patient will require pain optimization and follow by discharge to rehab after which she will be seeing her oncologist. They were requesting PET CT, however they were told that this is not going to be possible. Family and patient verbalized understanding of the plan.     ADDITIONAL REVIEW OF SYSTEMS:    MEDICATIONS  (STANDING):  acetaminophen   Tablet .. 975 milliGRAM(s) Oral every 8 hours  aspirin 325 milliGRAM(s) Oral two times a day  influenza   Vaccine 0.5 milliLiter(s) IntraMuscular once  lidocaine   Patch 1 Patch Transdermal daily  melatonin 3 milliGRAM(s) Oral at bedtime  metoprolol tartrate Injectable 5 milliGRAM(s) IV Push every 5 minutes  morphine ER Tablet 15 milliGRAM(s) Oral every 8 hours  multivitamin 1 Tablet(s) Oral daily  pantoprazole    Tablet 40 milliGRAM(s) Oral before breakfast  polyethylene glycol 3350 17 Gram(s) Oral daily    MEDICATIONS  (PRN):  aluminum hydroxide/magnesium hydroxide/simethicone Suspension 30 milliLiter(s) Oral four times a day PRN Indigestion  bisacodyl 5 milliGRAM(s) Oral every 12 hours PRN Constipation  bisacodyl Suppository 10 milliGRAM(s) Rectal daily PRN If no bowel movement by POD#2  bisacodyl Suppository 10 milliGRAM(s) Rectal daily PRN Constipation  morphine  - Injectable 2 milliGRAM(s) IV Push every 3 hours PRN Severe Pain (7 - 10)  ondansetron Injectable 4 milliGRAM(s) IV Push every 6 hours PRN Nausea and/or Vomiting  oxyCODONE    IR 5 milliGRAM(s) Oral every 4 hours PRN Mild Pain (1 - 3)  oxyCODONE    IR 10 milliGRAM(s) Oral every 4 hours PRN Moderate Pain (4 - 6)  senna 2 Tablet(s) Oral at bedtime PRN Constipation      CAPILLARY BLOOD GLUCOSE        I&O's Summary    21 Sep 2020 07:01  -  22 Sep 2020 07:00  --------------------------------------------------------  IN: 900 mL / OUT: 1700 mL / NET: -800 mL    22 Sep 2020 07:01  -  22 Sep 2020 15:10  --------------------------------------------------------  IN: 437 mL / OUT: 0 mL / NET: 437 mL        PHYSICAL EXAM:  Vital Signs Last 24 Hrs  T(C): 36.8 (22 Sep 2020 12:02), Max: 36.8 (21 Sep 2020 20:05)  T(F): 98.3 (22 Sep 2020 12:02), Max: 98.3 (21 Sep 2020 20:05)  HR: 81 (22 Sep 2020 12:02) (72 - 88)  BP: 108/70 (22 Sep 2020 12:02) (103/60 - 124/76)  BP(mean): --  RR: 17 (22 Sep 2020 12:02) (16 - 18)  SpO2: 97% (22 Sep 2020 12:02) (97% - 100%)    CONSTITUTIONAL: NAD, frail, in bed  RESPIRATORY: Normal respiratory effort; CTA anteriorly  CARDIOVASCULAR: Regular rate and rhythm, normal S1 and S2, no murmur/rub/gallop; No lower extremity edema;   ABDOMEN: Nontender to palpation, normoactive bowel sounds, no rebound/guarding;  MUSCLOSKELETAL: R. hip dressing noted  PSYCH: A+O to person, place, and time; affect appropriate, pleasant    LABS:                        9.7    6.76  )-----------( 264      ( 22 Sep 2020 06:30 )             29.0     09-22    137  |  101  |  7   ----------------------------<  146<H>  3.4<L>   |  24  |  0.31<L>    Ca    8.4      22 Sep 2020 06:30  Phos  2.1     09-22  Mg     1.6     09-22                  RADIOLOGY & ADDITIONAL TESTS:  Results Reviewed:   Imaging Personally Reviewed:  Electrocardiogram Personally Reviewed:    COORDINATION OF CARE:  Care Discussed with Consultants/Other Providers [Y/N]:  Prior or Outpatient Records Reviewed [Y/N]:

## 2020-09-22 NOTE — PROGRESS NOTE ADULT - PROBLEM SELECTOR PLAN 1
-s/p wide resection JAYANT on 9/17  -Course complicated by hypotension in PACU requiring pressors. Now off pressors and HD stable  - In the setting of known malignancy in Right hip and absence of trauma, suspect pathologic occult hip fracture.   - Pain currently controlled on current regimen.   - Ortho recs appreciated  - Already had RT to R hip last admission. Per [facility] no further plans for RT  -PT/OT per ortho  -Patient medically optimized for discharge. Will likely be going to rehab. After completion of rehab, would recommend follow up with oncology for further treatment. D/W patient's daughter, Veda  and patient's son at bedside via patient's cell phone. -s/p wide resection JAYANT on 9/17  -Course complicated by hypotension in PACU requiring pressors. Now off pressors and HD stable  - In the setting of known malignancy in Right hip and absence of trauma, suspect pathologic occult hip fracture.   - Ortho recs appreciated  - Already had RT to R hip last admission. Per [facility] no further plans for RT  -PT/OT per ortho  Cancer associated pain- palliative care consulted, recs noted, will continue to monitor pain.    -Patient medically optimized for discharge. Will likely be going to rehab. After completion of rehab, would recommend follow up with oncology for further treatment after rehab stay. D/W patient's daughter, Veda  and patient's son at bedside via patient's cell phone.

## 2020-09-22 NOTE — CONSULT NOTE ADULT - PROBLEM SELECTOR RECOMMENDATION 9
.  -ordered Morphine Sulfate ER 15mg PO q8h ATC  -c/w PRN regimen as ordered for now  -bowel regimen in place: Miralax, Senna    Patient received Morphine 2mg IV PRNs x8 in past 24hrs, roughly equivalent to the above long-acting agent. Will monitor PRN use and adjust long-acting agent as needed. If patient is ready for discharge, would send on MS ER 15mg PO q8h ATC and Oxycodone 5 or 10mg PO q6h PRN for Moderate or Severe Pain. Ensure bowel regimen follows across care settings

## 2020-09-22 NOTE — CONSULT NOTE ADULT - PROBLEM SELECTOR RECOMMENDATION 6
.  Complex symptom management in the setting of advanced malignancy. Will continue to follow for ongoing GOC discussion / titration of pain regimen. Emotional support provided, questions answered.    Active Psychosocial Referrals: [facility]    For new or uncontrolled symptoms, please page the Palliative Medicine team ([facility] #[id number]). The service is available 24/7 (including nights & weekends) to provide symptom management recommendations over the phone as appropriate

## 2020-09-22 NOTE — CONSULT NOTE ADULT - SUBJECTIVE AND OBJECTIVE BOX
[facility] Geriatrics and Palliative Care  Gerald Hu, Palliative Care Attending  Contact Info: Page [phone] (including Nights/Weekend), message on Microsoft Teams (Gerald Hu), or leave VM at Palliative Office [phone] (Non-Urgent)      HPI:  69 y.o. Female smoker w/ recently diagnosed with Lung Ca in 8/2020 with mets to brain, Spine, and Right hip s/p RT x 5 ( last tx 9/2) , not on chemo yet p/w sudden onset, severe Right hip pain, unable to ambulate and fever. Patient says she has been on standing morphine since diagnosed with metastatic lung Ca last month but was unable to ambulate up 3 steps without severe 10 out of 10 pain despite the help of her son or daughter. She denies falling or trauma to her right hip. She has also had intractable nausea and vomiting over the last few days , left pleuritic cp, and today had a low grade fever. She denies SOB, cough, dysuria, abdominal pain or diarrhea. In ED she had a fever of 102.2. CT chest showed known RUL mass but new LLL infiltrate with associated small left pleural effusion and [id number] rib lesion. 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 On physical exam, her right leg was externally rotated and shortened. Xray of Right hip and pelvis showed known right hip lesion but no fracture. Patient was given Morphine 4mg IVP x 1 for hip and rib pain and pain now resolved.  (12 Sep 2020 10:30)    Patient seen and examined at bedside. Reports ongoing pain but is controlled with IV medication. Further pain assessment detailed below. Collateral obtained from daughter as well over the phone.    PERTINENT PM/SXH:   Lung cancer metastatic to bone  No significant past surgical history    FAMILY HISTORY:  FH: colon cancer  Family history of cervical cancer    ITEMS NOT CHECKED ARE NOT PRESENT    SOCIAL HISTORY:   Significant other/partner:  []  Children:  [x]  Anabaptism/Spirituality:  Substance hx:  []   Tobacco hx:  [x]   Alcohol hx: []   Home Opioid hx:  [x] I-Stop Reference No: [id number]  -Morphine ER 15mg PO q8h ATC  -Oxycodone IR 10mg PO q6h PRN  Living Situation: [x]Home  []Long term care  []Rehab []Other    ADVANCE DIRECTIVES:    DNR  []MOLST  []Living Will  DECISION MAKER(s):  [x] Health Care Proxy(s)  [] Surrogate(s)  [] Guardian           Name(s): Veda Gallegos            Phone Number(s): [phone]    BASELINE (I)ADL(s) (prior to admission):  [place]: []Total  [x] Moderate []Dependent    ALLERGIES:  No Known Allergies    MEDICATIONS  (STANDING):  acetaminophen   Tablet .. 975 milliGRAM(s) Oral every 8 hours  aspirin 325 milliGRAM(s) Oral two times a day  influenza   Vaccine 0.5 milliLiter(s) IntraMuscular once  lidocaine   Patch 1 Patch Transdermal daily  melatonin 3 milliGRAM(s) Oral at bedtime  metoprolol tartrate Injectable 5 milliGRAM(s) IV Push every 5 minutes  morphine ER Tablet 15 milliGRAM(s) Oral every 8 hours  multivitamin 1 Tablet(s) Oral daily  pantoprazole    Tablet 40 milliGRAM(s) Oral before breakfast  polyethylene glycol 3350 17 Gram(s) Oral daily    MEDICATIONS  (PRN):  aluminum hydroxide/magnesium hydroxide/simethicone Suspension 30 milliLiter(s) Oral four times a day PRN Indigestion  bisacodyl 5 milliGRAM(s) Oral every 12 hours PRN Constipation  bisacodyl Suppository 10 milliGRAM(s) Rectal daily PRN If no bowel movement by POD#2  bisacodyl Suppository 10 milliGRAM(s) Rectal daily PRN Constipation  morphine  - Injectable 2 milliGRAM(s) IV Push every 3 hours PRN Severe Pain (7 - 10)  ondansetron Injectable 4 milliGRAM(s) IV Push every 6 hours PRN Nausea and/or Vomiting  oxyCODONE    IR 5 milliGRAM(s) Oral every 4 hours PRN Mild Pain (1 - 3)  oxyCODONE    IR 10 milliGRAM(s) Oral every 4 hours PRN Moderate Pain (4 - 6)  senna 2 Tablet(s) Oral at bedtime PRN Constipation    PRESENT SYMPTOMS: []Unable to obtain due to poor mentation/encephalopathy  Source if other than patient:  []Family   []Team     Pain: [x] yes [ ] no  QOL impact -   Location -                    Aggravating factors -  Quality -  Radiation -  Timing -  Severity (0-10 scale):  Minimal acceptable level (0-10 scale):    PAIN AD Score:  http://geriatrictoolkit.[facility]/cog/painad.pdf (press ctrl +  left click to view)    Dyspnea:                           []Mild  []Moderate []Severe  Anxiety:                             []Mild []Moderate []Severe  Fatigue:                             []Mild []Moderate []Severe  Nausea:                             []Mild []Moderate []Severe  Loss of appetite:              []Mild []Moderate []Severe  Constipation:                    []Mild []Moderate []Severe    Other Symptoms:  [x]All other review of systems negative     Palliative Performance Status Version 2:  50%    http://[facility].org/files/news/palliative_performance_scale_ppsv2.pdf    PHYSICAL EXAM:  Vital Signs Last 24 Hrs  T(C): 36.8 (22 Sep 2020 12:02), Max: 36.8 (21 Sep 2020 20:05)  T(F): 98.3 (22 Sep 2020 12:02), Max: 98.3 (21 Sep 2020 20:05)  HR: 81 (22 Sep 2020 12:02) (72 - 88)  BP: 108/70 (22 Sep 2020 12:02) (103/60 - 124/76)  BP(mean): --  RR: 17 (22 Sep 2020 12:02) (16 - 18)  SpO2: 97% (22 Sep 2020 12:02) (97% - 100%) I&O's Summary    21 Sep 2020 07:01  -  22 Sep 2020 07:00  --------------------------------------------------------  IN: 900 mL / OUT: 1700 mL / NET: -800 mL    GENERAL:  []Alert  []Oriented x   []Lethargic  []Cachexia  []Unarousable  []Verbal  []Non-Verbal  Behavioral:   [] Anxiety  [] Delirium [] Agitation [] Other  HEENT:  []Normal   []Dry mouth   []ET Tube/Trach  []Oral lesions  PULMONARY:   []Clear []Tachypnea  []Audible excessive secretions   []Rhonchi        []Right []Left []Bilateral  []Crackles        []Right []Left []Bilateral  []Wheezing     []Right []Left []Bilateral  CARDIOVASCULAR:    []Regular []Irregular []Tachy  []Prasanth []Murmur []Other  GASTROINTESTINAL:  []Soft  []Distended   []+BS  []Non tender []Tender  []PEG []OGT/ NGT  Last BM:     GENITOURINARY:  []Normal [] Incontinent   []Oliguria/Anuria   []Zamorano  MUSCULOSKELETAL:   []Normal   []Weakness  []Bed/Wheelchair bound []Edema  NEUROLOGIC:   []No focal deficits  [] Cognitive impairment  [] Dysphagia []Dysarthria [] Paresis []Other   SKIN:   []Normal   []Pressure ulcer(s)  []Rash    CRITICAL CARE:  [ ] Shock Present  [ ]Septic [ ]Cardiogenic [ ]Neurologic [ ]Hypovolemic  [ ]  Vasopressors [ ]  Inotropes   [ ] Respiratory failure present [ ] Mechanical Ventilation [ ] Non-invasive ventilatory support [ ] High-Flow  [ ] Acute  [ ] Chronic [ ] Hypoxic  [ ] Hypercarbic [ ] Other  [ ] Other organ failure    LABS:                        9.7    6.76  )-----------( 264      ( 22 Sep 2020 06:30 )             29.0   09-22    137  |  101  |  7   ----------------------------<  146<H>  3.4<L>   |  24  |  0.31<L>    Ca    8.4      22 Sep 2020 06:30  Phos  2.1     09-22  Mg     1.6     09-22    RADIOLOGY & ADDITIONAL STUDIES:  < from: Xray Hip w/ Pelvis 1 View, Right (09.18.20 @ 00:39) >  Status post cup cage construct right total hip replacement with cement reconstruction of the right acetabular acetabulum and lower right iliac wing and with multiple reinforcing cannulated compression screws in the peripheral iliac bone.  Intact and aligned hardware components. Slightly offset medial right acetabular wall fracture again noted.  No periprosthetic fractures in right femoral region.  Postsurgical changes in the overlying soft tissues. Surgical skin staples and drain overlie the operative site.  Markedly osteopenic appearing but otherwise intact left hip and remaining imaged pelvic osseous structures.  Correlate with intraoperative findings.    < from: CT Pelvis Bony Only No Cont (09.13.20 @ 14:01) >  Large destructive right acetabular and iliac bone lytic lesion with smaller adjacent lesions within the more superior aspect of the iliac bone. No femoral neck fracture.    PROTEIN CALORIE MALNUTRITION PRESENT: [ ]mild [x]moderate [ ]severe [ ]underweight [ ]morbid obesity  []PPSV2 < or = to 30% []significant weight loss  [x]poor nutritional intake [x]catabolic state []anasarca     Albumin, Serum: 2.9 g/dL (09-18-20 @ 03:40)  Artificial Nutrition []     REFERRALS:   []Chaplaincy  []Hospice  []Child Life  [x]Social Work  [x]Case management []Holistic Therapy     Goals of Care Document:   Care Coordination Assessment 201 [C. Provider] (09-14-20 @ 19:03)   Progress Notes - Care Coordination [C. Provider] (09-22-20 @ 11:04)

## 2020-09-22 NOTE — CONSULT NOTE ADULT - PROBLEM SELECTOR RECOMMENDATION 2
.  PPSV = 50%, requires some assistance for some ADLs  -c/w supportive care  -PT recommended VELASQUEZ

## 2020-09-22 NOTE — CONSULT NOTE ADULT - PROBLEM SELECTOR RECOMMENDATION 4
.  Broached advanced directives with daughter and patient. Patient defers to daughter currently  -goals are to pursue DMT, remains full code

## 2020-09-23 NOTE — PROGRESS NOTE ADULT - ASSESSMENT
Full Note to Follow.    ·	continue pain regimen as ordered 68yo F with recently diagnosed Metastatic NSCLC (no treatment yet) p/w Hip pain and inability to ambulate s/p JAYANT. Palliative consulted for pain management in the setting of life-limiting illness.    ·	continue pain regimen as ordered, patient can be discharged on current regimen

## 2020-09-23 NOTE — PROGRESS NOTE ADULT - PROBLEM SELECTOR PLAN 5
Na 127 -> 135, improved  - Most likely due to dehydration from N/V. Vomiting may be due to radiation treatments.  - Improved with IVF, encourage PO intake    Hypokalemia - resolved today
-Resolved
-Mild at 134, monitor
-Mild at 134, monitor
-Resolved
DVT ppx: HSQ
Na 127 -> 135, improved  - Most likely due to dehydration from N/V. Vomiting may be due to radiation treatments.  - Improved with IVF, encourage PO intake    Hypokalemia - supplemented
.  Recently diagnosed, no treatment initiated yet  -if DMT were not pursued, patient would likely be eligible for hospice

## 2020-09-23 NOTE — PROGRESS NOTE ADULT - SUBJECTIVE AND OBJECTIVE BOX
Edy Armas MD  [employer] Hospitalist  Pager [phone]  Email: [email or web address]          PROGRESS NOTE:     Patient is a 69y old  Female who presents with a chief complaint of Right hip pain (23 Sep 2020 14:29)      SUBJECTIVE / OVERNIGHT EVENTS:  Patient seen and examined this morning. She states that she feels much better on her current pain regiment, as she was able to ambulate for the first time in a long time. Attempted to reach the patient daughter and son via patient's cell phone while with the patient, voicemail left.  ADDITIONAL REVIEW OF SYSTEMS:  Denies f/c/n/v    MEDICATIONS  (STANDING):  acetaminophen   Tablet .. 975 milliGRAM(s) Oral every 8 hours  aspirin 325 milliGRAM(s) Oral two times a day  influenza   Vaccine 0.5 milliLiter(s) IntraMuscular once  lidocaine   Patch 1 Patch Transdermal daily  melatonin 3 milliGRAM(s) Oral at bedtime  metoprolol tartrate Injectable 5 milliGRAM(s) IV Push every 5 minutes  morphine ER Tablet 15 milliGRAM(s) Oral every 8 hours  multivitamin 1 Tablet(s) Oral daily  pantoprazole    Tablet 40 milliGRAM(s) Oral before breakfast  polyethylene glycol 3350 17 Gram(s) Oral daily  sodium chloride 0.9%. 1000 milliLiter(s) (75 mL/Hr) IV Continuous <Continuous>    MEDICATIONS  (PRN):  aluminum hydroxide/magnesium hydroxide/simethicone Suspension 30 milliLiter(s) Oral four times a day PRN Indigestion  bisacodyl 5 milliGRAM(s) Oral every 12 hours PRN Constipation  bisacodyl Suppository 10 milliGRAM(s) Rectal daily PRN If no bowel movement by POD#2  bisacodyl Suppository 10 milliGRAM(s) Rectal daily PRN Constipation  ondansetron Injectable 4 milliGRAM(s) IV Push every 6 hours PRN Nausea and/or Vomiting  oxyCODONE    IR 5 milliGRAM(s) Oral every 4 hours PRN Mild Pain (1 - 3)  oxyCODONE    IR 10 milliGRAM(s) Oral every 4 hours PRN Moderate Pain (4 - 6)  senna 2 Tablet(s) Oral at bedtime PRN Constipation      CAPILLARY BLOOD GLUCOSE        I&O's Summary    22 Sep 2020 07:01  -  23 Sep 2020 07:00  --------------------------------------------------------  IN: 1287 mL / OUT: 1950 mL / NET: -663 mL        PHYSICAL EXAM:  Vital Signs Last 24 Hrs  T(C): 36.9 (23 Sep 2020 12:09), Max: 36.9 (23 Sep 2020 12:09)  T(F): 98.5 (23 Sep 2020 12:09), Max: 98.5 (23 Sep 2020 12:09)  HR: 87 (23 Sep 2020 13:00) (75 - 93)  BP: 93/64 (23 Sep 2020 13:00) (91/58 - 118/73)  BP(mean): --  RR: 17 (23 Sep 2020 12:09) (17 - 18)  SpO2: 99% (23 Sep 2020 12:09) (96% - 100%)    CONSTITUTIONAL: NAD, frail, in bed  RESPIRATORY: Normal respiratory effort; CTA anteriorly  CARDIOVASCULAR: Regular rate and rhythm, normal S1 and S2, no murmur/rub/gallop; No lower extremity edema;   ABDOMEN: Nontender to palpation, normoactive bowel sounds, no rebound/guarding;  MUSCLOSKELETAL: R. hip dressing noted  PSYCH: A+O to person, place, and time; affect appropriate, pleasant      LABS:                        8.7    5.10  )-----------( 250      ( 23 Sep 2020 05:06 )             27.1     09-23    140  |  102  |  8   ----------------------------<  104<H>  3.8   |  26  |  0.47<L>    Ca    8.5      23 Sep 2020 05:06  Phos  3.6     09-23  Mg     1.7     09-23                  RADIOLOGY & ADDITIONAL TESTS:  Results Reviewed:   Imaging Personally Reviewed:  Electrocardiogram Personally Reviewed:    COORDINATION OF CARE:  Care Discussed with Consultants/Other Providers [Y/N]:  Prior or Outpatient Records Reviewed [Y/N]:

## 2020-09-23 NOTE — PROGRESS NOTE ADULT - PROBLEM SELECTOR PROBLEM 3
Pneumonia, bacterial
Lung cancer metastatic to bone
Pneumonia, bacterial
Pre-op evaluation
Moderate protein-calorie malnutrition
Pre-op evaluation

## 2020-09-23 NOTE — DISCHARGE NOTE NURSING/CASE MANAGEMENT/SOCIAL WORK - NSDCFUADDAPPT_GEN_ALL_CORE_FT
Please follow up with your primary care provider in 1 to 2 weeks for further care. If you don't have a primary care provider please follow up at our Medicine Clinic at  [facility] [place] [address] [phone] or [phone]  (please call to make appointment)

## 2020-09-23 NOTE — PROGRESS NOTE ADULT - SUBJECTIVE AND OBJECTIVE BOX
[facility] Geriatrics and Palliative Care  Gerald Hu, Palliative Care Attending  Contact Info: Page [phone] (including Nights/Weekend), message on Microsoft Teams (Gerald Hu), or leave VM at Palliative Office [phone] (Non-Urgent)    SUBJECTIVE AND OBJECTIVE:  INTERVAL HPI/OVERNIGHT EVENTS: No acute events overnight. Patient required PRNs of Oxy IR x4 in past 24hrs. No adverse effects of opiates noted: no sedation/dizziness/nausea. She was able to ambulate around the room with PT. Planned for discharge to [facility] today.    DNR on chart:     Allergies    No Known Allergies    Intolerances    MEDICATIONS  (STANDING):  acetaminophen   Tablet .. 975 milliGRAM(s) Oral every 8 hours  aspirin 325 milliGRAM(s) Oral two times a day  influenza   Vaccine 0.5 milliLiter(s) IntraMuscular once  lidocaine   Patch 1 Patch Transdermal daily  melatonin 3 milliGRAM(s) Oral at bedtime  metoprolol tartrate Injectable 5 milliGRAM(s) IV Push every 5 minutes  morphine ER Tablet 15 milliGRAM(s) Oral every 8 hours  multivitamin 1 Tablet(s) Oral daily  pantoprazole    Tablet 40 milliGRAM(s) Oral before breakfast  polyethylene glycol 3350 17 Gram(s) Oral daily    MEDICATIONS  (PRN):  aluminum hydroxide/magnesium hydroxide/simethicone Suspension 30 milliLiter(s) Oral four times a day PRN Indigestion  bisacodyl 5 milliGRAM(s) Oral every 12 hours PRN Constipation  bisacodyl Suppository 10 milliGRAM(s) Rectal daily PRN If no bowel movement by POD#2  bisacodyl Suppository 10 milliGRAM(s) Rectal daily PRN Constipation  morphine  - Injectable 2 milliGRAM(s) IV Push every 3 hours PRN Severe Pain (7 - 10)  ondansetron Injectable 4 milliGRAM(s) IV Push every 6 hours PRN Nausea and/or Vomiting  oxyCODONE    IR 5 milliGRAM(s) Oral every 4 hours PRN Mild Pain (1 - 3)  oxyCODONE    IR 10 milliGRAM(s) Oral every 4 hours PRN Moderate Pain (4 - 6)  senna 2 Tablet(s) Oral at bedtime PRN Constipation    ITEMS UNCHECKED ARE NOT PRESENT    PRESENT SYMPTOMS: []Unable to obtain due to poor mentation   Source if other than patient:  []Family   []Team     Pain: [x] yes [ ] no  QOL impact - difficulty ambulating  Location - r Hip                     Aggravating factors - weight bearing  Quality - pressure, stabbing  Radiation - down to knee  Timing - constant  Severity (0-10 scale): 5  Minimal acceptable level (0-10 scale):    PAIN AD Score:  http://geriatrictoolkit.[email or web address]/painad.pdf (press ctrl +  left click to view)    Dyspnea:                           []Mild  []Moderate []Severe  Anxiety:                             []Mild []Moderate []Severe  Fatigue:                             []Mild []Moderate []Severe  Nausea:                             []Mild []Moderate []Severe  Loss of appetite:              []Mild []Moderate []Severe  Constipation:                    []Mild []Moderate []Severe    Other Symptoms:  [x]All other review of systems negative     Palliative Performance Status Version 2:  50%  http://npcrc.org/files/news/palliative_performance_scale_ppsv2.pdf    PHYSICAL EXAM:  Vital Signs Last 24 Hrs  T(C): 37 (23 Sep 2020 15:32), Max: 37 (23 Sep 2020 15:32)  T(F): 98.6 (23 Sep 2020 15:32), Max: 98.6 (23 Sep 2020 15:32)  HR: 82 (23 Sep 2020 15:32) (75 - 87)  BP: 101/57 (23 Sep 2020 15:32) (91/58 - 101/57)  BP(mean): --  RR: 18 (23 Sep 2020 15:32) (17 - 18)  SpO2: 100% (23 Sep 2020 15:32) (99% - 100%) I&O's Summary    GENERAL:  [x]Alert  [x]Oriented x3   []Lethargic  []Cachexia  []Unarousable  [x]Verbal  []Non-Verbal  Behavioral:   [] Anxiety  [] Delirium [] Agitation [] Other  HEENT:  [x]Normal   []Dry mouth   []ET Tube/Trach  []Oral lesions  PULMONARY:   [x]Clear []Tachypnea  []Audible excessive secretions   []Rhonchi        []Right []Left []Bilateral  []Crackles        []Right []Left []Bilateral  []Wheezing     []Right []Left []Bilateral  CARDIOVASCULAR:    [x]Regular []Irregular []Tachy  []Prasanth []Murmur []Other  GASTROINTESTINAL:  [x]Soft  []Distended   [x]+BS  [x]Non tender []Tender  []PEG []OGT/ NGT  Last BM:     GENITOURINARY:  [x]Normal [] Incontinent   []Oliguria/Anuria   []Zamorano  MUSCULOSKELETAL:   []Normal   [x]Weakness  []Bed/Wheelchair bound []Edema  NEUROLOGIC:   [x]No focal deficits  [] Cognitive impairment  [] Dysphagia []Dysarthria [] Paresis []Other   SKIN:   [x]Normal   []Pressure ulcer(s)  []Rash      LABS:              8.7    5.10  )-----------( 250      ( 23 Sep 2020 05:06 )             27.1   09-23    140  |  102  |  8   ----------------------------<  104<H>  3.8   |  26  |  0.47<L>    Ca    8.5      23 Sep 2020 05:06  Phos  3.6     09-23  Mg     1.7     09-23            RADIOLOGY & ADDITIONAL STUDIES: None new    PROTEIN CALORIE MALNUTRITION PRESENT: [ ]mild [x]moderate [ ]severe [ ]underweight [ ]morbid obesity  []PPSV2 < or = to 30% []significant weight loss  [x]poor nutritional intake [x]catabolic state []anasarca     Albumin, Serum: 2.9 g/dL (09-18-20 @ 03:40)   Artificial Nutrition []     REFERRALS:   []Chaplaincy  []Hospice  []Child Life  [x]Social Work  [x]Case management []Holistic Therapy [x]Physical Therapy []Dietary     Goals of Care Document:   Care Coordination Document: Progress Notes - Care Coordination [C. Provider] (09-23-20 @ 15:55)                                       Progress Notes    PROGRESS NOTE  Date & Time of Note   2020-09-23 15:49    Notes    Notes: Worker contacted by pt's dtr Veda this pm tel # [phone]. Family  chose [facility] address [address] [address] tel  # [phone]. Pt has a bed  available today. Ambulance arranged  for a 4pm   via [facility] tel # [phone]. Auth # [id number], level  1, 5 days provided by [facility] tel # [phone] fax # [phone]. Family  aware & in agreement with plan. No further SW intervention is needed at this  time.       Electronically signed by:  Lena Batres LCSW  Electronically signed on:  2020-09-23  15:55

## 2020-09-23 NOTE — PROGRESS NOTE ADULT - PROBLEM SELECTOR PLAN 4
Newly diagnosed adenocarcinoma last admission  - Supposed to follow with [facility]  - s/p RT x 5 ( last dose 9/2), Not on chemo yet  -Onc consult appreciated. Outpt PET scan recommended
Newly diagnosed adenocarcinoma last admission  - Supposed to follow with [facility]  - s/p RT x 5 ( last dose 9/2), Not on chemo yet  -Onc consult appreciated. Outpt PET scan recommended
Na 127 -> 135, improved  - Most likely due to dehydration from N/V. Vomiting may be due to radiation treatments.  - Improved with IVF, encourage PO intake
Newly diagnosed adenocarcinoma last admission  - Supposed to follow with [facility]  - s/p RT x 5 ( last dose 9/2), Not on chemo yet  -Onc consult appreciated. Outpt PET scan recommended
Newly diagnosed adenocarcinoma last admission  - Supposed to follow with [facility]  - s/p RT x 5 ( last dose 9/2), Not on chemo yet  -Onc consult appreciated. Outpt PET scan recommended
Newly diagnosed adenocarcinoma last admission  - Supposed to follow with [facility]  - s/p RT x 5 ( last dose 9/2), Not on chemo yet  -Onc consult appreciated. Outpt PET scan recommended
Newly diagnosed adenocarcinoma last admission  - Supposed to follow with [facility]  - s/p RT x 5 ( last dose 9/2), Not on chemo yet  -Onc consult appreciated. Outpt PET scan recommended
Newly diagnosed adenocarcinoma last admission  - Supposed to follow with [facility]  - s/p RT x 5 ( last dose 9/2), Not on chemo yet  -Onc consult appreciated. Outpt PET scan recommended
Newly diagnosed adenocarcinoma last admission  - Supposed to follow with [facility]  - s/p RT x 5 ( last dose 9/2), Not on chemo yet  -Onc consult appreciated. Outpt PET scan recommended
.  Broached advanced directives with daughter and patient. Patient defers to daughter currently  -goals are to pursue DMT, remains full code

## 2020-09-23 NOTE — PROGRESS NOTE ADULT - PROBLEM SELECTOR PLAN 2
-new onset afib post procedure  -monitor on tele, rate controlled currently.  -CHADVASC 2, on  BID  -If rate uncontrolled can call cards. Will hold of for now.  -PRN lopressor ordered for HR>120
X-ray of right hip and pelvis negative for fractures but visibly RLE externally rotated and shortened and patient can't bare weight.  - In the setting of known malignancy in Right hip and absence of trauma, suspect pathologic occult hip fracture.   - CT of Right hip and Pelvis pending  - Pain currently controlled on current regimen.   - Ortho recs appreciated, NWB RLE   - Already had RT to R hip last admission --> will need to c/s Rad Onc in AM to see if she will benefit from any additional RT
X-ray of right hip and pelvis negative for fractures but visibly RLE externally rotated and shortened and patient can't bare weight.  - In the setting of known malignancy in Right hip and absence of trauma, suspect pathologic occult hip fracture.   - Pain currently controlled on current regimen.   - Ortho recs appreciated, NWB RLE   - Already had RT to R hip last admission. Per [facility] no further plans for RT  -Surgical plans being discussed between ortho team and patient's family. Will await final decision.
X-ray of right hip and pelvis negative for fractures but visibly RLE externally rotated and shortened and patient can't bare weight.  - In the setting of known malignancy in Right hip and absence of trauma, suspect pathologic occult hip fracture.   - Pain currently controlled on current regimen.   - Ortho recs appreciated, NWB RLE   - Already had RT to R hip last admission. Per [facility] no further plans for RT  -Surgical plans being discussed between ortho team and patient's family. Will await final decision.
X-ray of right hip and pelvis negative for fractures but visibly RLE externally rotated and shortened and patient can't bare weight.  - In the setting of known malignancy in Right hip and absence of trauma, suspect pathologic occult hip fracture.   - Pain currently controlled on current regimen.   - Ortho recs appreciated, NWB RLE   - Already had RT to R hip last admission. Per [facility] no further plans for RT  -No surgical intervention planned per ortho
.  PPSV = 50%, requires some assistance for some ADLs  -c/w supportive care  -PT recommended VELASQUEZ, planned for discharge today
X-ray of right hip and pelvis negative for fractures but visibly RLE externally rotated and shortened and patient can't bare weight.  - In the setting of known malignancy in Right hip and absence of trauma, suspect pathologic occult hip fracture.   - Pain currently controlled on current regimen.   - Ortho recs appreciated, NWB RLE   - Already had RT to R hip last admission. Per [facility] no further plans for RT  -Plan for surgical intervention today per ortho

## 2020-09-23 NOTE — PROGRESS NOTE ADULT - PROBLEM SELECTOR PLAN 6
.  Complex symptom management in the setting of advanced malignancy. Will continue to follow for ongoing GOC discussion / titration of pain regimen. Emotional support provided, questions answered.    Active Psychosocial Referrals: FREYA HEAD    For new or uncontrolled symptoms, please page the Palliative Medicine team ([facility] #[id number]). The service is available 24/7 (including nights & weekends) to provide symptom management recommendations over the phone as appropriate

## 2020-09-23 NOTE — PROGRESS NOTE ADULT - PROBLEM SELECTOR PROBLEM 5
Hyponatremia
Need for prophylactic measure
Lung cancer metastatic to bone

## 2020-09-23 NOTE — DISCHARGE NOTE NURSING/CASE MANAGEMENT/SOCIAL WORK - PATIENT PORTAL LINK FT
You can access the FollowMyHealth Patient Portal offered by [facility] by registering at the following website: [email or web address]. By joining [facility]’s FollowMyHealth portal, you will also be able to view your health information using other applications (apps) compatible with our system.

## 2020-09-23 NOTE — PROGRESS NOTE ADULT - PROVIDER SPECIALTY LIST ADULT
Anesthesia
Heme/Onc
Heme/Onc
Hospitalist
Orthopedics
Palliative Care
Hospitalist
Hospitalist

## 2020-09-23 NOTE — PROGRESS NOTE ADULT - PROBLEM SELECTOR PROBLEM 2
Afib
R/O Closed fracture of right hip, initial encounter
Debility
R/O Closed fracture of right hip, initial encounter

## 2020-09-23 NOTE — PROGRESS NOTE ADULT - PROBLEM SELECTOR PLAN 3
-patient denies prior CAD, CHF, CVA  -No active chest pain or SOB  -EKG unremarkable. Patient with lung cancer however currently saturating well on RA and respiratory status stable. Treatment for PNA to be completed on 9/16  -RCRI with 3.9% risk of cardiovascular complication  -Patient is currently optimized for planned procedure. May proceed with intervention without further workup
New LLL infiltrate on Ct chest associated with fever, left pleuritic cp, and known lung cancer; No hypoxia or SOB.  -S/p 5 day course of zosyn. Now monitor off given patient is now afebrile, no leukocytosis and respiratory status is stable.   - Blood Cx NGTD  -Small left pleural effusion - not amenable to tap per ct surg and IR. Patient is clinically stable.
-patient denies prior CAD, CHF, CVA  -No active chest pain or SOB  -EKG unremarkable. Patient with lung cancer however currently saturating well on RA and respiratory status stable. Treatment for PNA to be completed on 9/16  -RCRI with 3.9% risk of cardiovascular complication  -Patient is currently optimized for planned procedure. May proceed with intervention without further workup
New LLL infiltrate on Ct chest associated with fever, left pleuritic cp, and known lung cancer; No hypoxia or SOB.  -S/p 5 day course of zosyn. Now monitor off given patient is now afebrile, no leukocytosis and respiratory status is stable.   - Blood Cx NGTD  -Small left pleural effusion - not amenable to tap per ct surg and IR. Patient is clinically stable.
Newly diagnosed adenocarcinoma last admission  - Supposed to follow with [facility]  - s/p RT x 5 ( last dose 9/2), Not on chemo yet  -Onc consult appreciated. Outpt PET scan recommended
Newly diagnosed adenocarcinoma last admission  - Supposed to follow with [facility]  - s/p RT x 5 ( last dose 9/2), Not on chemo yet  - Onc and Rad-onc needs to be informed of admission tomorrow AM
Newly diagnosed adenocarcinoma last admission  - Supposed to follow with [facility]  - s/p RT x 5 ( last dose 9/2), Not on chemo yet  -Onc consult called
.  Poor nutritional intake in the setting of advanced malignancy  -Nutrition following, supplements ordered  -encourage PO intake

## 2020-09-23 NOTE — PROGRESS NOTE ADULT - PROBLEM SELECTOR PROBLEM 1
Closed fracture of right hip, initial encounter
Pneumonia, bacterial
Neoplasm related pain
Pneumonia, bacterial
Closed fracture of right hip, initial encounter

## 2020-09-23 NOTE — PROGRESS NOTE ADULT - PROBLEM SELECTOR PROBLEM 4
Lung cancer metastatic to bone
Lung cancer metastatic to bone
Hyponatremia
Lung cancer metastatic to bone
Advanced care planning/counseling discussion

## 2020-09-23 NOTE — PROGRESS NOTE ADULT - PROBLEM SELECTOR PLAN 1
.  -c/w Morphine Sulfate ER 15mg PO q8h ATC  -c/w PRN regimen as ordered for now  -bowel regimen in place: Miralax, Senna

## 2020-09-23 NOTE — PROGRESS NOTE ADULT - NUTRITIONAL ASSESSMENT
This patient has been assessed with a concern for Malnutrition and has been determined to have a diagnosis/diagnoses of Moderate protein-calorie malnutrition.    This patient is being managed with:   Diet DASH/TLC-  Sodium & Cholesterol Restricted  Supplement Feeding Modality:  Oral  Ensure Enlive Cans or Servings Per Day:  1       Frequency:  Two Times a day  Entered: Sep 20 2020  4:25PM    

## 2020-09-23 NOTE — DISCHARGE NOTE NURSING/CASE MANAGEMENT/SOCIAL WORK - NSSCCARECORD_GEN_ALL_CORE
No lifting pushing or pulling over 10 lbs for 6 weeks.  No tampons douching or intercourse for 6 weeks.  You may shower but no tub bath or submersion in water for 6 weeks.  No driving for 2 weeks.  Notify your doctor for fever, chills, worsening abdominal pain, vaginal bleeding heavier than a period or passing clots, visual changes such as blurry or spotty vision., swelling in your hands face or feet,   See attached education sheets.     Community Resource

## 2020-09-23 NOTE — PROGRESS NOTE ADULT - PROBLEM SELECTOR PLAN 1
-s/p wide resection JAYANT on 9/17  -Course complicated by hypotension in PACU requiring pressors. Now off pressors and HD stable  - In the setting of known malignancy in Right hip and absence of trauma, suspect pathologic occult hip fracture.   - Ortho recs appreciated  - Already had RT to R hip last admission. Per [facility] no further plans for RT  -PT/OT per ortho  Cancer associated pain- palliative care consulted, started on morphine ER and oxycodone PRNs for breakthrough   -Patient medically optimized for discharge. Will likely be going to rehab. After completion of rehab, would recommend follow up with oncology for further treatment after rehab stay.

## 2020-09-23 NOTE — PROGRESS NOTE ADULT - REASON FOR ADMISSION
Right hip pain

## 2020-10-10 NOTE — H&P ADULT - NSHPLABSRESULTS_GEN_ALL_CORE
LABS:                           8.2    12.72 )-----------( 472      ( 10 Oct 2020 11:27 )             25.5     10-10    134<L>  |  103  |  12  ----------------------------<  87  3.4<L>   |  18<L>  |  0.33<L>    Ca    7.5<L>      10 Oct 2020 11:27    TPro  4.6<L>  /  Alb  2.1<L>  /  TBili  0.2  /  DBili  x   /  AST  20  /  ALT  23  /  AlkPhos  132<H>  10-10        PT/INR - ( 10 Oct 2020 11:27 )   PT: 12.5 SEC;   INR: 1.09          PTT - ( 10 Oct 2020 11:27 )  PTT:27.0 SEC          LIVER FUNCTIONS - ( 10 Oct 2020 11:27 )  Alb: 2.1 g/dL / Pro: 4.6 g/dL / ALK PHOS: 132 u/L / ALT: 23 u/L / AST: 20 u/L / GGT: x           I&O's Summary       / Troponin T, High Sensitivity: 20 ng/L (10-10-20 @ 14:30)  Troponin T, High Sensitivity: 22 ng/L (10-10-20 @ 11:27)      CAPILLARY BLOOD GLUCOSE    < from: CT Abdomen and Pelvis w/ IV Cont (10.10.20 @ 13:22) >      FINDINGS:  CHEST:  LUNGSAND LARGE AIRWAYS: Patent central airways. Again noted, is a spiculated right upper lobe mass, measuring 3.3 x 1.8 cm (2:35), unchanged since 9/12/2020. There is extension of the mass to the pleural surface. A 0.2 cm right upper lobe nodule (2:55) isunchanged. Compressive atelectasis in the lower lobes bilaterally.  PLEURA: Bilateral pleural effusions, left greater than right.  VESSELS: No pulmonary embolus. Mild atherosclerotic calcification of the aorta.  HEART: Heart size is normal. Moderate pericardial effusion, increased since 10/2/2020..  MEDIASTINUM AND OZIEL: Mediastinal and right hilar lymphadenopathy. For example, a right hilar lymph node (2:66) measures 1.5 x 1.4 cm, similar to PET/CT dated 10/2/2020. Right retrocrural lymphadenopathy is also noted..  CHEST WALL AND LOWER NECK: Hypodense left thyroid nodule, unchanged.    ABDOMEN AND PELVIS:  LIVER: Subcentimeter hypodense foci, too small to characterize.  BILE DUCTS: Normal caliber.  GALLBLADDER: Within normal limits.  SPLEEN:Within normal limits.  PANCREAS: Within normal limits.  ADRENALS: Heterogeneous 1.8 cm left adrenal nodule, unchanged.  KIDNEYS/URETERS: No hydronephrosis. Left renal cyst and bilateral subcentimeter hypodense foci, too small to characterize. A somewhat wedge-shaped area of decreased attenuation is noted in the lower pole of the left kidney (2:161).    BLADDER: Gas is seen within the urinary bladder, as on prior PET/CT. Correlation with any recent instrumentation is suggested.  REPRODUCTIVE ORGANS: Not well visualized.    BOWEL: Diffuse colonic wall thickening involving the entire colon and rectum, consistent with a proctocolitis, new since 10/2/2020. No bowel obstruction.  PERITONEUM: No ascites. Presacral edema.  VESSELS: Atherosclerotic calcification.  RETROPERITONEUM/LYMPH NODES: Small retroperitoneal lymph nodes.  ABDOMINAL WALL: Subcutaneous edema.  BONES: Again noted, are extensive lytic osseous metastases, particularly involving the left T6 and T7 vertebral bodies as well as the left T11 vertebral body, with associated compression deformity again noted. There appears to be associated encroachment upon the spinal canal and cord at these levels. Adjacent soft tissue density mass is noted involving the associated ribs and paraspinal musculature, particularly at the level of T11. Status post right total hip replacement and with orthopedic hardware in the right iliac bone again noted. Old fracture of the right inferior pubic ramus.    IMPRESSION:    < end of copied text >    < from: Xray Chest 1 View- PORTABLE-Urgent (10.10.20 @ 12:42) >    FINDINGS:  A circular metallic object projects over the right hemidiaphragm may be outside the body.  The lungs are clear.  No pleural effusion or pneumothorax.  Heart size is within normal limits.  No acute osseous abnormalities.      IMPRESSION: Clear lungs.          < end of copied text >                MICRO: LABS:                           8.2    12.72 )-----------( 472      ( 10 Oct 2020 11:27 )             25.5     10-10    134<L>  |  103  |  12  ----------------------------<  87  3.4<L>   |  18<L>  |  0.33<L>    Ca    7.5<L>      10 Oct 2020 11:27    TPro  4.6<L>  /  Alb  2.1<L>  /  TBili  0.2  /  DBili  x   /  AST  20  /  ALT  23  /  AlkPhos  132<H>  10-10        PT/INR - ( 10 Oct 2020 11:27 )   PT: 12.5 SEC;   INR: 1.09          PTT - ( 10 Oct 2020 11:27 )  PTT:27.0 SEC          LIVER FUNCTIONS - ( 10 Oct 2020 11:27 )  Alb: 2.1 g/dL / Pro: 4.6 g/dL / ALK PHOS: 132 u/L / ALT: 23 u/L / AST: 20 u/L / GGT: x           I&O's Summary       / Troponin T, High Sensitivity: 20 ng/L (10-10-20 @ 14:30)  Troponin T, High Sensitivity: 22 ng/L (10-10-20 @ 11:27)      CAPILLARY BLOOD GLUCOSE    < from: CT Abdomen and Pelvis w/ IV Cont (10.10.20 @ 13:22) >      FINDINGS:  CHEST:  LUNGSAND LARGE AIRWAYS: Patent central airways. Again noted, is a spiculated right upper lobe mass, measuring 3.3 x 1.8 cm (2:35), unchanged since 9/12/2020. There is extension of the mass to the pleural surface. A 0.2 cm right upper lobe nodule (2:55) isunchanged. Compressive atelectasis in the lower lobes bilaterally.  PLEURA: Bilateral pleural effusions, left greater than right.  VESSELS: No pulmonary embolus. Mild atherosclerotic calcification of the aorta.  HEART: Heart size is normal. Moderate pericardial effusion, increased since 10/2/2020..  MEDIASTINUM AND OZIEL: Mediastinal and right hilar lymphadenopathy. For example, a right hilar lymph node (2:66) measures 1.5 x 1.4 cm, similar to PET/CT dated 10/2/2020. Right retrocrural lymphadenopathy is also noted..  CHEST WALL AND LOWER NECK: Hypodense left thyroid nodule, unchanged.    ABDOMEN AND PELVIS:  LIVER: Subcentimeter hypodense foci, too small to characterize.  BILE DUCTS: Normal caliber.  GALLBLADDER: Within normal limits.  SPLEEN:Within normal limits.  PANCREAS: Within normal limits.  ADRENALS: Heterogeneous 1.8 cm left adrenal nodule, unchanged.  KIDNEYS/URETERS: No hydronephrosis. Left renal cyst and bilateral subcentimeter hypodense foci, too small to characterize. A somewhat wedge-shaped area of decreased attenuation is noted in the lower pole of the left kidney (2:161).    BLADDER: Gas is seen within the urinary bladder, as on prior PET/CT. Correlation with any recent instrumentation is suggested.  REPRODUCTIVE ORGANS: Not well visualized.    BOWEL: Diffuse colonic wall thickening involving the entire colon and rectum, consistent with a proctocolitis, new since 10/2/2020. No bowel obstruction.  PERITONEUM: No ascites. Presacral edema.  VESSELS: Atherosclerotic calcification.  RETROPERITONEUM/LYMPH NODES: Small retroperitoneal lymph nodes.  ABDOMINAL WALL: Subcutaneous edema.  BONES: Again noted, are extensive lytic osseous metastases, particularly involving the left T6 and T7 vertebral bodies as well as the left T11 vertebral body, with associated compression deformity again noted. There appears to be associated encroachment upon the spinal canal and cord at these levels. Adjacent soft tissue density mass is noted involving the associated ribs and paraspinal musculature, particularly at the level of T11. Status post right total hip replacement and with orthopedic hardware in the right iliac bone again noted. Old fracture of the right inferior pubic ramus.    IMPRESSION:    < end of copied text >    < from: Xray Chest 1 View- PORTABLE-Urgent (10.10.20 @ 12:42) >    FINDINGS:  A circular metallic object projects over the right hemidiaphragm may be outside the body.  The lungs are clear.  No pleural effusion or pneumothorax.  Heart size is within normal limits.  No acute osseous abnormalities.      IMPRESSION: Clear lungs.

## 2020-10-10 NOTE — H&P ADULT - HISTORY OF PRESENT ILLNESS
70yo F Hx Lung Ca in 8/2020 with mets to brain, Spine, and Right hip s/p RT x 5 ( last tx 9/2) presenting with complaints of hypotension. 70yo F Hx Lung Ca in 8/2020 with mets to brain, Spine, and Right hip s/p RT x 5 ( last tx 9/2) presenting with hypotension. Was recently admitted into the hospital for right hip pain s/p wide resection JAYANT on 9/17 c/b intubation and pneumonia. Patient was hypotensive to 90's systolic at rehab. Was given 3L of fluids and a dose of vancomycin/zosyn and sent to the ED. Collateral was obtained from family. According to them, patient was in usual health prior to symptoms; had constipation in the past week, but had a bowel movement yesterday after a enema. Patient endorsed some pain during the enema. Patient had received radiation therapy, but no chemotherapy - following at [facility] with Dr. Xie. Patient is full code according to family.     In the ED, patient was afebrile, BP was 70's systolic was given 3.5 L of fluid resuscitation and 50 mg of midodrine, satting well on nasal canula. Pressures continued to be labile despite adequate volume resuscitation. Patient was started on Levophed for pressure support. 68yo F Hx Lung Ca in 8/2020 with mets to brain, Spine, and Right hip s/p RT x 5 ( last tx 9/2) presenting with hypotension. Was recently admitted into the hospital for right hip pain s/p wide resection JAYANT on 9/17 c/b intubation and pneumonia. Patient was hypotensive to 90's systolic at rehab. Was given 3.5L of fluids and a dose of vancomycin/zosyn and sent to the ED. Collateral was obtained from family. According to them, patient was in usual health prior to symptoms; had constipation in the past week, but had a bowel movement yesterday after a enema. Patient endorsed some pain during the enema. Patient had received radiation therapy, but no chemotherapy - following at [facility] with Dr. Xie. Patient is full code according to family.     In the ED, patient was afebrile, BP was 70's systolic was given 2 L of fluid resuscitation and 20 mg + 30 mg of midodrine, satting well on nasal canula. Pressures continued to be labile despite adequate volume resuscitation. Patient was started on Levophed for pressure support. 70yo F Hx Lung Ca in 8/2020 with mets to brain, Spine, and Right hip s/p RT x 5 ( last tx 9/2) presenting with hypotension. Was recently admitted into the hospital for right hip pain s/p wide resection JAYANT on 9/17 c/b intubation and pneumonia. Patient was hypotensive to 90's systolic at rehab. Was given 3.5L of fluids and a dose of vancomycin/zosyn and sent to the ED. Collateral was obtained from family. According to them, patient was in usual health prior to symptoms; had constipation in the past week, but had a bowel movement yesterday after a enema. Patient endorsed some pain during the enema. Per patient, she had been experiencing decreased appetite for several weeks w/ nausea and intermittent coughing up clear sputum. Her main complaint is abdominal discomfort.  Patient had received radiation therapy in the past for her lung CA, but no chemotherapy - following at [facility] with Dr. Xie. Patient is full code according to family.     In the ED, patient was afebrile, BP was 70's systolic was given additional 2 L of fluid resuscitation and 20 mg + 30 mg of midodrine, satting well on nasal canula. Pressures continued to be labile despite adequate volume resuscitation. Patient was started on Levophed for pressure support and transferred to MICU.

## 2020-10-10 NOTE — H&P ADULT - NSHPREVIEWOFSYSTEMS_GEN_ALL_CORE

## 2020-10-10 NOTE — CHART NOTE - NSCHARTNOTEFT_GEN_A_CORE
This report was requested by:Jagjit Douglas|Reference #:[id number]    Patient Name:Deshaun Gallegos     YOB: 1950     10/02/2020 10/03/2020 oxycodone hcl 15 mg tablet  45 11 Anthony Hernandez MD [facility]   09/30/2020 10/01/2020 oxycodone hcl 5 mg tablet  60 7 Anthony Hernandez MD [facility]   10/01/2020 10/01/2020 oxycontin er 15 mg tablet  28 14 Anthony Hernandez MD [facility]   09/23/2020 09/23/2020 morphine sulf er 15 mg tablet  30 10 Anthony Hernandez MD [facility]   09/23/2020 09/23/2020 oxycodone hcl 5 mg tablet  30 3 Anthony Hernandez MD [facility]

## 2020-10-10 NOTE — ED PROVIDER NOTE - PROGRESS NOTE DETAILS
minesh charles PGY2: Pt with BP 89/42 received 3500cc prior to arrival receiving her 2nd L here and 20mg midodrine minesh charles PGY2: Pt with BP 89/42 received 3500cc prior to arrival receiving her 2nd L here and 20mg midodrine. pt still hypotensive at this time, mentating well. if no improvement with BP following 2nd liter and midodrine will require vaso. will consult MICU at this time. minesh charles PGY2: pt with continued hypotension 70s/40s. will start levo gtt, pt accepted to MICU.

## 2020-10-10 NOTE — H&P ADULT - ASSESSMENT
follow up sputum urine culture 70yo F Hx Lung Ca in 8/2020 with mets to brain, Spine, and Right hip s/p RT x 5 ( last tx 9/2) presenting with hypotension, now requiring Levophed for pressure support after failed volume resuscitation and PO midodrine.     #Neuro  -A/O x 3, patient is able to communicate and follow commands  - currently not on sedation    #Pulm  - currently on nasal cannula, saturating well on 2L  - hemodynamically stable, protecting airway  - Bilateral pleural effusions, left greater than right, with associated compressive atelectasis; spiculated right upper lobe mass, measuring 3.3 x 1.8 cm (2:35), unchanged; no evidence of PE    #Cardio  - Given approximately 5L of fluid resuscitation and oral midodrine with labile pressures, systolic ranging from 70-90's  - Start midodrine 30 mg TID   - Currently on Levophed, with plans to ween as possible  - follow up cortisol level r/o adrenal insufficiency    #GI  - Has been constipated for the past week, last bowel movement 1 day prior to admission with enema  - bowel regimen PRN, has been on oxycodone 15 mg at home  - endorsing diffuse abdominal pain, no evidence of free air or peritoneal symptoms   - imaging negative for bowel obstruction    #ID  - CT abdomen significant for proctocolitis, received one dose of vancomycin and zosyn   - patient has been aefebrile with elevated WBC   - start meropenem and vancomycin and flagyl for empiric abdominal infection coverage  - follow up blood, urine and sputum culture    #Heme/Onc  - elevated WBC in setting of infection  - continue to monitor daily CBC   - follows with Dr. Xie at [facility]  - shows metastatic extension of primary lung mass to vertebral bodies and extension to spinal cord    #DVT  - Lovenox 40 mg for DVT prophylaxis    #Endo   - A1c 6.1 in August    #Ethics  - FULL code according to family, spoke to son and daughter on phone 70yo F Hx Lung Ca in 8/2020 with mets to brain, Spine, and Right hip s/p RT x 5 ( last tx 9/2) presenting with hypotension, now requiring Levophed for pressure support after failed volume resuscitation and PO midodrine.     #Neuro  -A/O x 3, patient is able to communicate and follow commands  - currently not on sedation    #Pulm  - currently on nasal cannula, saturating well on 2L  - hemodynamically stable, protecting airway  - Bilateral pleural effusions, left greater than right, with associated compressive atelectasis; spiculated right upper lobe mass, measuring 3.3 x 1.8 cm (2:35), unchanged; no evidence of PE    #Cardio  - Given approximately 5L of fluid resuscitation and oral midodrine with labile pressures, systolic ranging from 70-90's  - Start midodrine 30 mg TID   - Currently on Levophed, with plans to ween as possible  - follow up cortisol level r/o adrenal insufficiency    #GI  - Has been constipated for the past week, last bowel movement 1 day prior to admission with enema  - bowel regimen PRN, has been on oxycodone 15 mg at home  - endorsing diffuse abdominal pain, no evidence of free air or peritoneal symptoms   - imaging negative for bowel obstruction    #ID  - CT abdomen significant for proctocolitis, received one dose of vancomycin and zosyn   - patient has been aefebrile with elevated WBC   - start meropenem and vancomycin for empiric abdominal infection coverage  - follow up blood, urine and sputum culture    #Heme/Onc  - elevated WBC in setting of infection  - continue to monitor daily CBC   - follows with Dr. Xie at [facility]  - shows metastatic extension of primary lung mass to vertebral bodies and extension to spinal cord    #DVT  - Lovenox 40 mg for DVT prophylaxis    #Endo   - A1c 6.1 in August    #Ethics  - FULL code according to family, spoke to son and daughter on phone 68yo F Hx Lung Ca in 8/2020 with mets to brain, Spine, and Right hip s/p RT x 5 ( last tx 9/2) presenting with hypotension, now requiring Levophed for pressure support after failed volume resuscitation and PO midodrine.     #Neuro  -A/O x 3, patient is able to communicate and follow commands  - currently not on sedation  - possible brain mets, however PET scan from 10/2 showed possible meningioma    #Pulm  - currently on nasal cannula, saturating well on 2L, wean as tolerated  - hemodynamically stable, protecting airway  - Bilateral pleural effusions, left greater than right, with associated compressive atelectasis; spiculated right upper lobe mass, measuring 3.3 x 1.8 cm (2:35), unchanged; no evidence of PE  - POCUS once in unit    #Cardio  - Given approximately 5.5L of fluid resuscitation and oral midodrine with labile pressures, systolic ranging from 70-90's  - Start midodrine 30 mg TID   - Currently on Levophed, with plans to wean as possible  - follow up cortisol level r/o adrenal insufficiency and TSH to r/o hypothyroidism    #GI  - Has been constipated for the past week, last bowel movement 1 day prior to admission with enema  - bowel regimen PRN, has been on oxycodone 15 mg at home  - endorsing diffuse abdominal pain, no evidence of free air or peritoneal symptoms   - imaging negative for bowel obstruction, however c/w proctocolitis which is new from last scan  - regular diet    #Renal  - Normal renal function  - CT noted wedge shaped infarction in L lower pole of kidney c/w infection vs infarct    #ID  - CT abdomen significant for proctocolitis, gas w/in bladder ddx infection vs fistula  - s/p received one dose of vancomycin and zosyn in nursing home  - patient has been afebrile with elevated WBC, trend fever curve and white count  - start meropenem and vancomycin for empiric abdominal infection coverage  - follow up blood, urine and sputum culture, MRSA swab pending      #Heme/Onc  - elevated WBC in setting of infection  - continue to monitor daily CBC   - follows with Dr. Xie at [facility]  - shows metastatic extension of primary lung mass to vertebral bodies and extension to spinal cord    #DVT  - Lovenox 40 mg for DVT prophylaxis    #Endo   - A1c 6.1 in August    #Ethics  - FULL code according to family, spoke to son and daughter on phone

## 2020-10-10 NOTE — ED PROVIDER NOTE - CARE PLAN
Principal Discharge DX:	Sepsis   Principal Discharge DX:	Septic shock  Secondary Diagnosis:	Colitis  Secondary Diagnosis:	Hypotension

## 2020-10-10 NOTE — ED ADULT NURSE NOTE - OBJECTIVE STATEMENT
Facilitator RN Note: Received pt into spot #22 via stretcher. Pt presents to ED from NH for hypotension. BP  68/44 initial BP while here in exam room. Pt A/O x 3 calm cooperative, no acute distress noted. Pt talking to Dr. Soto and Dr. hodge upon pt arrival to room. Placed on tele monitor SR HR 80s. #22 butterfly needle peripheral line noted to left forearm from NH. As per NH papers, pt received 3.5 L NS bolus for hypotension. #20 angio IV line placed to right hand, #20 angio IV line placed to left forearm. NS bolus 1 liter started as per Dr. Soto request. Excoriation noted to sacral area , appears as incontinence associated dermatitis. Sterile dressing applied. Pt incontinent loose brown stool. Incontinence care performed, rectal temp 99.1 F. Blood work obtained and sent as ordered. A pillow in place from NH. Pt had recent right hip surgery but pt c/o pain/discomfort to left hip. Pillow placed to left hip for comfort. MD aware. Report given to [facility].

## 2020-10-10 NOTE — H&P ADULT - NSHPPHYSICALEXAM_GEN_ALL_CORE
VITALS:   T(C): 37.3 (10-10-20 @ 11:49), Max: 37.3 (10-10-20 @ 11:49)  HR: 70 (10-10-20 @ 16:57) (70 - 98)  BP: 111/51 (10-10-20 @ 16:57) (68/44 - 111/51)  RR: 24 (10-10-20 @ 16:48) (18 - 24)  SpO2: 100% (10-10-20 @ 16:24) (100% - 100%)    PHYSICAL EXAM:     GENERAL: NAD, lying in bed comfortably  HEAD:  Atraumatic, Normocephalic  EYES: EOMI, PERRLA, conjunctiva and sclera clear  ENT: Moist mucous membranes  NECK: Supple, No JVD  CHEST/LUNG: Clear to auscultation bilaterally; No rales, rhonchi, wheezing, or rubs. Unlabored respirations  HEART: Regular rate and rhythm; No murmurs, rubs, or gallops  ABDOMEN: diffuse abdominal pain with hyperactive bowel sounds   EXTREMITIES:  2+ Peripheral Pulses, brisk capillary refill. No clubbing, cyanosis, or edema  Neurological:  A&Ox1 , no focal deficits   SKIN: No rashes or lesions VITALS:   T(C): 37.3 (10-10-20 @ 11:49), Max: 37.3 (10-10-20 @ 11:49)  HR: 70 (10-10-20 @ 16:57) (70 - 98)  BP: 111/51 (10-10-20 @ 16:57) (68/44 - 111/51)  RR: 24 (10-10-20 @ 16:48) (18 - 24)  SpO2: 100% (10-10-20 @ 16:24) (100% - 100%)    PHYSICAL EXAM:     GENERAL: NAD, lying in bed comfortably  HEAD:  Atraumatic, Normocephalic  EYES: EOMI, PERRLA, conjunctiva and sclera clear  ENT: Moist mucous membranes  NECK: Supple, No JVD  CHEST/LUNG: Clear to auscultation bilaterally; No rales, rhonchi, wheezing, or rubs. Unlabored respirations  HEART: Regular rate and rhythm; No murmurs, rubs, or gallops  ABDOMEN: diffuse abdominal pain with hyperactive bowel sounds   EXTREMITIES:  2+ Peripheral Pulses, brisk capillary refill. No clubbing, cyanosis, or edema  Neurological:  A&Ox3 , no focal deficits   SKIN: No rashes or lesions VITALS:   T(C): 37.3 (10-10-20 @ 11:49), Max: 37.3 (10-10-20 @ 11:49)  HR: 70 (10-10-20 @ 16:57) (70 - 98)  BP: 111/51 (10-10-20 @ 16:57) (68/44 - 111/51)  RR: 24 (10-10-20 @ 16:48) (18 - 24)  SpO2: 100% (10-10-20 @ 16:24) (100% - 100%)    PHYSICAL EXAM:     GENERAL: NAD, lying in bed comfortably  HEAD:  Atraumatic, Normocephalic  EYES: EOMI, PERRLA, conjunctiva and sclera clear  ENT: Moist mucous membranes  NECK: Supple, No JVD  CHEST/LUNG: Crackles bilaterally, no labored breathing, trachea midline  HEART: Regular rate and rhythm; No murmurs, rubs, or gallops  ABDOMEN: diffuse abdominal pain with hyperactive bowel sounds   EXTREMITIES:  2+ Peripheral Pulses, brisk capillary refill. No clubbing, cyanosis, or edema  Neurological:  A&Ox3 , no focal deficits   SKIN: No rashes or lesions VITALS:   T(C): 37.3 (10-10-20 @ 11:49), Max: 37.3 (10-10-20 @ 11:49)  HR: 70 (10-10-20 @ 16:57) (70 - 98)  BP: 111/51 (10-10-20 @ 16:57) (68/44 - 111/51)  RR: 24 (10-10-20 @ 16:48) (18 - 24)  SpO2: 100% (10-10-20 @ 16:24) (100% - 100%)    PHYSICAL EXAM:     GENERAL: NAD, lying in bed comfortably  HEAD:  Atraumatic, Normocephalic  EYES: EOMI, PERRLA, conjunctiva and sclera clear  ENT: Moist mucous membranes  NECK: Supple, No JVD  CHEST/LUNG: Crackles bilaterally, no labored breathing, trachea midline  HEART: Regular rate and rhythm; No murmurs, rubs, or gallops  ABDOMEN: diffuse abdominal pain with hyperactive bowel sounds   EXTREMITIES:  2+ Peripheral Pulses, brisk capillary refill. +LE pitting edema, No clubbing, cyanosis  Neurological:  A&Ox3 , no focal deficits   SKIN: No rashes or lesions

## 2020-10-10 NOTE — ED ADULT NURSE NOTE - NSIMPLEMENTINTERV_GEN_ALL_ED
Implemented All Fall with Harm Risk Interventions:  [place] to call system. Call bell, personal items and telephone within reach. Instruct patient to call for assistance. Room bathroom lighting operational. Non-slip footwear when patient is off stretcher. Physically safe environment: no spills, clutter or unnecessary equipment. Stretcher in lowest position, wheels locked, appropriate side rails in place. Provide visual cue, wrist band, yellow gown, etc. Monitor gait and stability. Monitor for mental status changes and reorient to person, place, and time. Review medications for side effects contributing to fall risk. Reinforce activity limits and safety measures with patient and family. Provide visual clues: red socks.

## 2020-10-10 NOTE — ED PROVIDER NOTE - OBJECTIVE STATEMENT
68yo F Hx Lung Ca in 8/2020 with mets to brain, Spine, and Right hip s/p RT x 5 ( last tx 9/2) presenting with complaints of hypotension. 70yo F Hx Lung Ca in 8/2020 with mets to brain, Spine, and Right hip s/p RT x 5 ( last tx 9/2) presenting with complaints of hypotension. Pt not yet started chemotherapy with hip replacement, has been in [facility] for rehab. yesterday had mild lower BP and today staff was called for hypotension 60s/40s. was given a dose of vanc, zosyn and 3500cc without response so was sent in. has been constipated due to narcotic regimen, with nausea and some abdominal pain, recently started on bowel regimen with some improvement. pt states that she has some lower abdominal pain, no cough, sob, cp, f/c or urinary symptoms.

## 2020-10-10 NOTE — ED PROVIDER NOTE - CLINICAL SUMMARY MEDICAL DECISION MAKING FREE TEXT BOX
68yo F Hx lung CA with metastasis presenting for rehab facility for hypotension. s/p vanc and zosyn dose as well as 3500cc still hypotensive and borderline tachycardia. no sob or cough to suggest pulm etiology. recent constipation started on bowel regimen with nausea and lower abdominal pain. will obtain sepsis bundle and CTAP, give additional fluids and a dose of midodrine. admit.

## 2020-10-10 NOTE — ED PROVIDER NOTE - ATTENDING CONTRIBUTION TO CARE
I performed a history and physical exam of the patient and discussed their management with the resident. I reviewed the resident's note and agree with the documented findings and plan of care. I have edited as appropriate. My medical decision making and observations are found above.  NAD, abd s/nt, patient in septic shock unclear source at this time, requiring pressors, admitted to MICU

## 2020-10-10 NOTE — ED ADULT NURSE REASSESSMENT NOTE - NS ED NURSE REASSESS COMMENT FT1
covering primary RN for break pt is in bed A and Ox 3 in NAD resting comfortably, on monitor with VS as noted. no change in pt's mental status, MICU eval at bedside. pending further orders

## 2020-10-10 NOTE — H&P ADULT - ATTENDING COMMENTS
As above.  69 year old female with recently diagnosed metastatic lung cancer with septic shock in setting of pan-colitis and possible UTI.  Admitted to MICU for vasopressor support.  Check blood, urine cultures.  Vanc/Meropenem.  Continue levophed.    Critical Care time: 35 minutes.

## 2020-10-10 NOTE — ED ADULT TRIAGE NOTE - CHIEF COMPLAINT QUOTE
[facility] from [facility], as per EMS pt was hypotensive given IV fluids without relief. Pt reports fatigue and N/V.

## 2020-10-11 NOTE — PROGRESS NOTE ADULT - ASSESSMENT
68yo F Hx Lung Ca in 8/2020 with mets to brain, Spine, and Right hip s/p RT x 5 ( last tx 9/2) presenting with hypotension, now requiring Levophed for pressure support after failed volume resuscitation and PO midodrine.     #Neuro  -A/O x 3, patient is able to communicate and follow commands  - currently not on sedation  - possible brain mets, however PET scan from 10/2 showed possible meningioma    #Pulm  - currently on nasal cannula, saturating well on 2L, wean as tolerated  - hemodynamically stable, protecting airway  - Bilateral pleural effusions, left greater than right, with associated compressive atelectasis; spiculated right upper lobe mass, measuring 3.3 x 1.8 cm (2:35), unchanged; no evidence of PE  - POCUS once in unit    #Cardio  - Given approximately 5.5L of fluid resuscitation and oral midodrine with labile pressures, systolic ranging from 70-90's  - Start midodrine 30 mg TID   - Currently on Levophed, with plans to wean as possible  - follow up cortisol level r/o adrenal insufficiency and TSH to r/o hypothyroidism    #GI  - Has been constipated for the past week, last bowel movement 1 day prior to admission with enema  - bowel regimen PRN, has been on oxycodone 15 mg at home  - endorsing diffuse abdominal pain, no evidence of free air or peritoneal symptoms   - imaging negative for bowel obstruction, however c/w proctocolitis which is new from last scan  - regular diet    #Renal  - Normal renal function  - CT noted wedge shaped infarction in L lower pole of kidney c/w infection vs infarct    #ID  - CT abdomen significant for proctocolitis, gas w/in bladder ddx infection vs fistula  - s/p received one dose of vancomycin and zosyn in nursing home  - patient has been afebrile with elevated WBC, trend fever curve and white count  - start meropenem and vancomycin for empiric abdominal infection coverage  - follow up blood, urine and sputum culture, MRSA swab pending      #Heme/Onc  - elevated WBC in setting of infection  - continue to monitor daily CBC   - follows with Dr. Xie at [facility]  - shows metastatic extension of primary lung mass to vertebral bodies and extension to spinal cord    #DVT  - Lovenox 40 mg for DVT prophylaxis    #Endo   - A1c 6.1 in August    #Ethics  - FULL code according to family, spoke to son and daughter on phone 70yo F Hx Lung Ca in 8/2020 with mets to brain, Spine, and Right hip s/p RT x 5 ( last tx 9/2) presenting with hypotension, now requiring Levophed for pressure support after failed volume resuscitation and PO midodrine.     #Neuro  -A/O x 3, patient is able to communicate and follow commands  - currently not on sedation  - possible brain mets, however PET scan from 10/2 showed possible meningioma    #Pulm  - satting well on room air  - hemodynamically stable, protecting airway  - Bilateral pleural effusions, left greater than right, with associated compressive atelectasis; spiculated right upper lobe mass, measuring 3.3 x 1.8 cm (2:35), unchanged; no evidence of PE      #Cardio  - Given approximately 5.5L of fluid resuscitation and oral midodrine with labile pressures, systolic ranging from 70-90's  - Start midodrine 30 mg TID   - Currently on Levophed, with plans to wean as possible  - follow up cortisol level r/o adrenal insufficiency and TSH to r/o hypothyroidism    #GI  - Has been constipated for the past week, last bowel movement 1 day prior to admission with enema  - bowel regimen PRN, has been on oxycodone 15 mg at home  - endorsing diffuse abdominal pain, no evidence of free air or peritoneal symptoms   - imaging negative for bowel obstruction, however c/w proctocolitis which is new from last scan  - regular diet    #Renal  - Normal renal function  - CT noted wedge shaped infarction in L lower pole of kidney c/w infection vs infarct    #ID  - CT abdomen significant for proctocolitis, gas w/in bladder ddx infection vs fistula  - s/p received one dose of vancomycin and zosyn in nursing home  - patient has been afebrile with elevated WBC, trend fever curve and white count  - start meropenem and vancomycin for empiric abdominal infection coverage  - follow up blood, urine and sputum culture, MRSA swab pending      #Heme/Onc  - elevated WBC in setting of infection  - continue to monitor daily CBC   - follows with Dr. Xie at [facility]  - shows metastatic extension of primary lung mass to vertebral bodies and extension to spinal cord    #DVT  - Lovenox 40 mg for DVT prophylaxis    #Endo   - A1c 6.1 in August    #Ethics  - FULL code according to family, spoke to son and daughter on phone 70yo F Hx Lung Ca in 8/2020 with mets to brain, Spine, and Right hip s/p RT x 5 ( last tx 9/2) presenting with hypotension, now requiring Levophed for pressure support after failed volume resuscitation and PO midodrine.     #Neuro  -A/O x 3, patient is able to communicate and follow commands  - currently not on sedation  - possible brain mets, however PET scan from 10/2 showed possible meningioma    #Pulm  - satting well on room air  - hemodynamically stable, protecting airway  - Bilateral pleural effusions, left greater than right, with associated compressive atelectasis; spiculated right upper lobe mass, measuring 3.3 x 1.8 cm (2:35), unchanged; no evidence of PE      #Cardio  - Given approximately 5.5L of fluid resuscitation and oral midodrine with labile pressures, systolic ranging from 70-90's  - c/w midodrine 30 mg TID   - Currently on Levophed, with plans to wean as tolerated  - adrenal insufficiency and hypothyroidism ruled out from cortisol and TSH    #GI  - Has been constipated for the past week, last bowel movement 1 day prior to admission with enema and has had several loose BMs since  - bowel regimen PRN, has been on oxycodone 15 mg at home  - endorsing diffuse abdominal pain, no evidence of free air or peritoneal symptoms   - imaging negative for bowel obstruction, however c/w proctocolitis which is new from last scan  -GI PCR negative, c diff pending, c/w empiric treatment for c diff  -Gen surg following for colo-vesicular fistula however not surgical candidate at this time  - cc diet per family    #Renal  - Normal renal function  - CT noted wedge shaped infarction in L lower pole of kidney c/w infection vs infarct    #ID  - CT abdomen significant for proctocolitis, gas w/in bladder ddx infection vs fistula  - s/p received one dose of vancomycin and zosyn in nursing home  - patient has been afebrile with elevated WBC, trend fever curve and white count  - start meropenem and vancomycin for empiric abdominal infection coverage  - follow up blood, urine and sputum culture, MRSA swab pending      #Heme/Onc  - elevated WBC in setting of infection  - continue to monitor daily CBC   - [facility]  - shows metastatic extension of primary lung mass to vertebral bodies and extension to spinal cord    #DVT  - Lovenox 40 mg for DVT prophylaxis    #Endo   - A1c 6.1 in August    #Ethics  - FULL code according to family, spoke to son and daughter on phone 70yo F Hx Lung Ca in 8/2020 with mets to brain, Spine, and Right hip s/p RT x 5 ( last tx 9/2) presenting with hypotension, now requiring Levophed for pressure support after failed volume resuscitation and PO midodrine.     #Neuro  -A/O x 3, patient is able to communicate and follow commands  - currently not on sedation  - possible brain mets, however PET scan from 10/2 showed possible meningioma    #Pulm  - satting well on room air  - hemodynamically stable, protecting airway  - Bilateral pleural effusions, left greater than right, with associated compressive atelectasis; spiculated right upper lobe mass, measuring 3.3 x 1.8 cm (2:35), unchanged; no evidence of PE      #Cardio  - Given approximately 5.5L of fluid resuscitation and oral midodrine with labile pressures, systolic ranging from 70-90's  - c/w midodrine 30 mg TID   - Currently on Levophed, with plans to wean as tolerated  - adrenal insufficiency and hypothyroidism ruled out from cortisol and TSH  -moderate pericardial effusion on CT scan, formal echo pending    #GI  - Has been constipated for the past week, last bowel movement 1 day prior to admission with enema and has had several loose BMs since  - bowel regimen PRN, has been on oxycodone 15 mg at home  - endorsing diffuse abdominal pain, no evidence of free air or peritoneal symptoms   - imaging negative for bowel obstruction, however c/w proctocolitis which is new from last scan  -GI PCR negative, c diff pending, c/w empiric treatment for c diff  -Gen surg following for colo-vesicular fistula however not surgical candidate at this time  - cc diet per family    #Renal  - Normal renal function  - CT noted wedge shaped infarction in L lower pole of kidney c/w infection vs infarct    #ID  - CT abdomen significant for proctocolitis, gas w/in bladder ddx infection vs fistula  - s/p received one dose of vancomycin and zosyn in nursing home  - patient has been afebrile with elevated WBC, trend fever curve and white count  - worsening white count today, will empirically treat for c diff, started PO vanc, IV flagyl, and meropenem  - follow up blood, urine and sputum culture, MRSA swab pending, GI PCR negative, c diff pending  -lactate in AM      #Heme/Onc  - elevated WBC in setting of infection, worsening  - continue to monitor daily CBC   - follows with Dr. Xie at [facility]  - shows metastatic extension of primary lung mass to vertebral bodies and extension to spinal cord, can consider spine consult once patient condition stabilizes    #DVT  - Lovenox 40 mg for DVT prophylaxis    #Endo   - A1c 6.1 in August    #Ethics  - FULL code according to family, spoke to son and daughter on phone

## 2020-10-11 NOTE — PROGRESS NOTE ADULT - SUBJECTIVE AND OBJECTIVE BOX
INTERVAL HPI/OVERNIGHT EVENTS:    SUBJECTIVE: Patient seen and examined at bedside.     OBJECTIVE:    VITAL SIGNS:  ICU Vital Signs Last 24 Hrs  T(C): 36.6 (11 Oct 2020 00:00), Max: 37.3 (10 Oct 2020 11:49)  T(F): 97.9 (11 Oct 2020 00:00), Max: 99.1 (10 Oct 2020 11:49)  HR: 58 (11 Oct 2020 06:00) (57 - 113)  BP: 124/56 (11 Oct 2020 06:00) (68/44 - 134/102)  BP(mean): 77 (11 Oct 2020 06:00) (49 - 109)  ABP: --  ABP(mean): --  RR: 17 (11 Oct 2020 06:00) (16 - 29)  SpO2: 100% (11 Oct 2020 06:00) (96% - 100%)        10-10 @ 07:01  -  10-11 @ 07:00  --------------------------------------------------------  IN: 996.8 mL / OUT: 350 mL / NET: 646.8 mL      CAPILLARY BLOOD GLUCOSE          PHYSICAL EXAM:    General: NAD  HEENT: NC/AT; PERRL, clear conjunctiva  Neck: supple  Respiratory: CTA b/l  Cardiovascular: +S1/S2; RRR  Abdomen: soft, NT/ND; +BS x4  Extremities: WWP, 2+ peripheral pulses b/l; no LE edema  Skin: normal color and turgor; no rash  Neurological:    MEDICATIONS:  MEDICATIONS  (STANDING):  acetaminophen   Tablet .. 500 milliGRAM(s) Oral every 4 hours  aspirin 325 milliGRAM(s) Oral daily  bisacodyl 5 milliGRAM(s) Oral at bedtime  chlorhexidine 4% Liquid 1 Application(s) Topical <User Schedule>  enoxaparin Injectable 40 milliGRAM(s) SubCutaneous daily  gabapentin 100 milliGRAM(s) Oral three times a day  lidocaine   Patch 1 Patch Transdermal every 24 hours  melatonin 3 milliGRAM(s) Oral at bedtime  meropenem  IVPB 1000 milliGRAM(s) IV Intermittent every 8 hours  midodrine 30 milliGRAM(s) Oral every 8 hours  norepinephrine Infusion 0.05 MICROgram(s)/kG/Min (6.09 mL/Hr) IV Continuous <Continuous>  ondansetron    Tablet 4 milliGRAM(s) Oral every 12 hours  pantoprazole    Tablet 40 milliGRAM(s) Oral before breakfast  potassium chloride    Tablet ER 40 milliEquivalent(s) Oral once  potassium phosphate / sodium phosphate Powder (PHOS-NaK) 1 Packet(s) Oral every 4 hours  vancomycin  IVPB 1000 milliGRAM(s) IV Intermittent every 12 hours    MEDICATIONS  (PRN):  oxyCODONE    IR 5 milliGRAM(s) Oral every 6 hours PRN Moderate Pain (4 - 6)      ALLERGIES:  Allergies    No Known Allergies    Intolerances        LABS:                        8.5    15.68 )-----------( 514      ( 11 Oct 2020 05:33 )             27.7     Hemoglobin: 8.5 g/dL (10-11 @ 05:33)  Hemoglobin: 8.2 g/dL (10-10 @ 11:27)    CBC Full  -  ( 11 Oct 2020 05:33 )  WBC Count : 15.68 K/uL  RBC Count : 3.24 M/uL  Hemoglobin : 8.5 g/dL  Hematocrit : 27.7 %  Platelet Count - Automated : 514 K/uL  Mean Cell Volume : 85.5 fL  Mean Cell Hemoglobin : 26.2 pg  Mean Cell Hemoglobin Concentration : 30.7 %  Auto Neutrophil # : 13.26 K/uL  Auto Lymphocyte # : 0.77 K/uL  Auto Monocyte # : 1.12 K/uL  Auto Eosinophil # : 0.01 K/uL  Auto Basophil # : 0.05 K/uL  Auto Neutrophil % : 84.6 %  Auto Lymphocyte % : 4.9 %  Auto Monocyte % : 7.1 %  Auto Eosinophil % : 0.1 %  Auto Basophil % : 0.3 %    10-11    135  |  100  |  10  ----------------------------<  87  3.4<L>   |  12<L>  |  0.33<L>    Ca    8.1<L>      11 Oct 2020 05:33  Phos  1.7     10-11  Mg     1.6     10-11    TPro  4.7<L>  /  Alb  2.0<L>  /  TBili  0.2  /  DBili  x   /  AST  19  /  ALT  17  /  AlkPhos  124<H>  10-11    Creatinine Trend: 0.33<--, 0.33<--, 0.47<--, 0.31<--, 0.32<--, 0.34<--  LIVER FUNCTIONS - ( 11 Oct 2020 05:33 )  Alb: 2.0 g/dL / Pro: 4.7 g/dL / ALK PHOS: 124 u/L / ALT: 17 u/L / AST: 19 u/L / GGT: x           PT/INR - ( 10 Oct 2020 11:27 )   PT: 12.5 SEC;   INR: 1.09          PTT - ( 10 Oct 2020 11:27 )  PTT:27.0 SEC    hs Troponin:        11:27 - VBG - pH: 7.39  | pCO2: 34    | pO2: 34    | Lactate: 1.2          CSF:                      EKG:   MICROBIOLOGY:    IMAGING:      Labs, imaging, EKG personally reviewed    RADIOLOGY & ADDITIONAL TESTS: Reviewed.     INTERVAL HPI/OVERNIGHT EVENTS:    SUBJECTIVE: Patient seen and examined at bedside. No acute issues overnight, GI PCR and c diff sent as patient having several loose stools i/s/o worsening white count. Patient reported that she is feeling mildly improved and would like to eat, denied abdominal pain however winced on exam. Otherwise no complaints    OBJECTIVE:    VITAL SIGNS:  ICU Vital Signs Last 24 Hrs  T(C): 36.6 (11 Oct 2020 00:00), Max: 37.3 (10 Oct 2020 11:49)  T(F): 97.9 (11 Oct 2020 00:00), Max: 99.1 (10 Oct 2020 11:49)  HR: 58 (11 Oct 2020 06:00) (57 - 113)  BP: 124/56 (11 Oct 2020 06:00) (68/44 - 134/102)  BP(mean): 77 (11 Oct 2020 06:00) (49 - 109)  ABP: --  ABP(mean): --  RR: 17 (11 Oct 2020 06:00) (16 - 29)  SpO2: 100% (11 Oct 2020 06:00) (96% - 100%)        10-10 @ 07:01  -  10-11 @ 07:00  --------------------------------------------------------  IN: 996.8 mL / OUT: 350 mL / NET: 646.8 mL      CAPILLARY BLOOD GLUCOSE      PHYSICAL EXAM:    General: NAD, resting in bed  HEENT: NC/AT; PERRL, clear conjunctiva  Neck: supple  Respiratory: crackles bilaterally, no labored breathing  Cardiovascular: +S1/S2; RRR  Abdomen: soft, BS present, nondistended, mildly tender to palpation  Extremities: WWP, 2+ peripheral pulses b/l; +pitting edema  Skin: normal color and turgor; no rash  Neurological: A&Ox3, no focal deficits    MEDICATIONS:  MEDICATIONS  (STANDING):  acetaminophen   Tablet .. 500 milliGRAM(s) Oral every 4 hours  aspirin 325 milliGRAM(s) Oral daily  bisacodyl 5 milliGRAM(s) Oral at bedtime  chlorhexidine 4% Liquid 1 Application(s) Topical <User Schedule>  enoxaparin Injectable 40 milliGRAM(s) SubCutaneous daily  gabapentin 100 milliGRAM(s) Oral three times a day  lidocaine   Patch 1 Patch Transdermal every 24 hours  melatonin 3 milliGRAM(s) Oral at bedtime  meropenem  IVPB 1000 milliGRAM(s) IV Intermittent every 8 hours  midodrine 30 milliGRAM(s) Oral every 8 hours  norepinephrine Infusion 0.05 MICROgram(s)/kG/Min (6.09 mL/Hr) IV Continuous <Continuous>  ondansetron    Tablet 4 milliGRAM(s) Oral every 12 hours  pantoprazole    Tablet 40 milliGRAM(s) Oral before breakfast  potassium chloride    Tablet ER 40 milliEquivalent(s) Oral once  potassium phosphate / sodium phosphate Powder (PHOS-NaK) 1 Packet(s) Oral every 4 hours  vancomycin  IVPB 1000 milliGRAM(s) IV Intermittent every 12 hours    MEDICATIONS  (PRN):  oxyCODONE    IR 5 milliGRAM(s) Oral every 6 hours PRN Moderate Pain (4 - 6)      ALLERGIES:  Allergies    No Known Allergies    Intolerances        LABS:                        8.5    15.68 )-----------( 514      ( 11 Oct 2020 05:33 )             27.7     Hemoglobin: 8.5 g/dL (10-11 @ 05:33)  Hemoglobin: 8.2 g/dL (10-10 @ 11:27)    CBC Full  -  ( 11 Oct 2020 05:33 )  WBC Count : 15.68 K/uL  RBC Count : 3.24 M/uL  Hemoglobin : 8.5 g/dL  Hematocrit : 27.7 %  Platelet Count - Automated : 514 K/uL  Mean Cell Volume : 85.5 fL  Mean Cell Hemoglobin : 26.2 pg  Mean Cell Hemoglobin Concentration : 30.7 %  Auto Neutrophil # : 13.26 K/uL  Auto Lymphocyte # : 0.77 K/uL  Auto Monocyte # : 1.12 K/uL  Auto Eosinophil # : 0.01 K/uL  Auto Basophil # : 0.05 K/uL  Auto Neutrophil % : 84.6 %  Auto Lymphocyte % : 4.9 %  Auto Monocyte % : 7.1 %  Auto Eosinophil % : 0.1 %  Auto Basophil % : 0.3 %    10-11    135  |  100  |  10  ----------------------------<  87  3.4<L>   |  12<L>  |  0.33<L>    Ca    8.1<L>      11 Oct 2020 05:33  Phos  1.7     10-11  Mg     1.6     10-11    TPro  4.7<L>  /  Alb  2.0<L>  /  TBili  0.2  /  DBili  x   /  AST  19  /  ALT  17  /  AlkPhos  124<H>  10-11    Creatinine Trend: 0.33<--, 0.33<--, 0.47<--, 0.31<--, 0.32<--, 0.34<--  LIVER FUNCTIONS - ( 11 Oct 2020 05:33 )  Alb: 2.0 g/dL / Pro: 4.7 g/dL / ALK PHOS: 124 u/L / ALT: 17 u/L / AST: 19 u/L / GGT: x           PT/INR - ( 10 Oct 2020 11:27 )   PT: 12.5 SEC;   INR: 1.09          PTT - ( 10 Oct 2020 11:27 )  PTT:27.0 SEC    hs Troponin:        11:27 - VBG - pH: 7.39  | pCO2: 34    | pO2: 34    | Lactate: 1.2        EKG:   MICROBIOLOGY:    IMAGING:      Labs, imaging, EKG personally reviewed    RADIOLOGY & ADDITIONAL TESTS: Reviewed.

## 2020-10-11 NOTE — CHART NOTE - NSCHARTNOTEFT_GEN_A_CORE
MICU Transfer Note    Transfer from: MICU  Transfer to:  ( x ) Medicine    (  ) Telemetry    (  ) RCU    (  ) Palliative    (  ) Stroke Unit    (  ) _______________  Accepting physician:    HPI:  68yo F Hx Lung Ca in 8/2020 with mets to brain, Spine, and Right hip s/p RT x 5 ( last tx 9/2) presenting with hypotension. Was recently admitted into the hospital for right hip pain s/p wide resection JAYANT on 9/17 c/b intubation and pneumonia. Patient was hypotensive to 90's systolic at rehab. Was given 3.5L of fluids and a dose of vancomycin/zosyn and sent to the ED. Collateral was obtained from family. According to them, patient was in usual health prior to symptoms; had constipation in the past week, but had a bowel movement yesterday after a enema. Patient endorsed some pain during the enema. Per patient, she had been experiencing decreased appetite for several weeks w/ nausea and intermittent coughing up clear sputum. Her main complaint is abdominal discomfort.  Patient had received radiation therapy in the past for her lung CA, but no chemotherapy - following at [facility] with Dr. Xie. Patient is full code according to family.     In the ED, patient was afebrile, BP was 70's systolic was given additional 2 L of fluid resuscitation and 20 mg + 30 mg of midodrine, satting well on nasal canula. Pressures continued to be labile despite adequate volume resuscitation. Patient was started on Levophed for pressure support and transferred to MICU.  (10 Oct 2020 16:16)    MICU COURSE:  Patient was admitted to MICU for hypotension requiring pressors likely in setting of septic shock. CT A/P showed protocolitis with gas in bladder, concern for enterovesical fistula. Surgery was consulted, no acute surgical intervention at this time. She was started on antibiotics vanc and griffin, switched to PO vanc and IV flagyl for Cdiff coverage. Meropenem was continued. Patient was started on midodrine 30 q8 and able to be weaned off levophed at 8PM on 10/11.  TTE ordered for moderate pericardial effusion seen on CT. Patient ordered for bowel regimen given constipation PTA 2/2 opioid pain medications. BCx NGTD (10/10), GI stool PCR negative. CDiff PCR pending.    ASSESSMENT & PLAN:   68yo F Hx Lung Ca in 8/2020 with mets to brain, Spine, and Right hip s/p RT x 5 ( last tx 9/2) presenting with hypotension, now requiring Levophed for pressure support after failed volume resuscitation and PO midodrine.     #Neuro  -A/O x 3, patient is able to communicate and follow commands  - currently not on sedation  - possible brain mets, however PET scan from 10/2 showed possible meningioma    #Pulm  - satting well on room air  - hemodynamically stable, protecting airway  - Bilateral pleural effusions, left greater than right, with associated compressive atelectasis; spiculated right upper lobe mass, measuring 3.3 x 1.8 cm (2:35), unchanged; no evidence of PE    #Cardio  - Given approximately 5.5L of fluid resuscitation and oral midodrine with labile pressures, systolic ranging from 70-90's  - c/w midodrine 30 mg TID   - Currently on Levophed, with plans to wean as tolerated  - adrenal insufficiency and hypothyroidism ruled out from cortisol and TSH  -moderate pericardial effusion on CT scan, formal echo pending    #GI  - Has been constipated for the past week, last bowel movement 1 day prior to admission with enema and has had several loose BMs since  - bowel regimen PRN, has been on oxycodone 15 mg at home  - endorsing diffuse abdominal pain, no evidence of free air or peritoneal symptoms   - imaging negative for bowel obstruction, however c/w proctocolitis which is new from last scan  -GI PCR negative, c diff pending, c/w empiric treatment for c diff  -Gen surg following for colo-vesicular fistula however not surgical candidate at this time  - cc diet per family    #Renal  - Normal renal function  - CT noted wedge shaped infarction in L lower pole of kidney c/w infection vs infarct    #ID  - CT abdomen significant for proctocolitis, gas w/in bladder ddx infection vs fistula  - s/p received one dose of vancomycin and zosyn in nursing home  - patient has been afebrile with elevated WBC, trend fever curve and white count  - worsening white count today, will empirically treat for c diff, started PO vanc, IV flagyl, and meropenem  - follow up blood, urine and sputum culture, MRSA swab pending, GI PCR negative, c diff pending  -lactate in AM    #Heme/Onc  - elevated WBC in setting of infection, worsening  - continue to monitor daily CBC   - follows with Dr. Xie at [facility]  - shows metastatic extension of primary lung mass to vertebral bodies and extension to spinal cord, can consider spine consult once patient condition stabilizes    #DVT  - Lovenox 40 mg for DVT prophylaxis    #Endo   - A1c 6.1 in August    #Ethics  - FULL code according to family, spoke to son and daughter on phone        For Follow-Up:  [ ] f/u UCx  [ ] f/u CDiff PCR   [ ] c/w abx (IV Griffin, IV Flagyl, and PO vanc); de-escalate as needed      Vital Signs Last 24 Hrs  T(C): 36.1 (11 Oct 2020 20:00), Max: 36.8 (11 Oct 2020 08:00)  T(F): 97 (11 Oct 2020 20:00), Max: 98.3 (11 Oct 2020 08:00)  HR: 77 (11 Oct 2020 21:00) (56 - 113)  BP: 103/60 (11 Oct 2020 21:00) (88/52 - 143/60)  BP(mean): 74 (11 Oct 2020 21:00) (39 - 105)  RR: 22 (11 Oct 2020 21:00) (16 - 38)  SpO2: 97% (11 Oct 2020 21:00) (96% - 100%)  I&O's Summary    10 Oct 2020 07:01  -  11 Oct 2020 07:00  --------------------------------------------------------  IN: 996.8 mL / OUT: 350 mL / NET: 646.8 mL    11 Oct 2020 07:01  -  11 Oct 2020 21:09  --------------------------------------------------------  IN: 807.6 mL / OUT: 0 mL / NET: 807.6 mL          MEDICATIONS  (STANDING):  acetaminophen   Tablet .. 500 milliGRAM(s) Oral every 4 hours  aspirin 325 milliGRAM(s) Oral daily  bisacodyl 5 milliGRAM(s) Oral at bedtime  chlorhexidine 4% Liquid 1 Application(s) Topical <User Schedule>  enoxaparin Injectable 40 milliGRAM(s) SubCutaneous daily  gabapentin 100 milliGRAM(s) Oral three times a day  melatonin 3 milliGRAM(s) Oral at bedtime  meropenem  IVPB 1000 milliGRAM(s) IV Intermittent every 8 hours  metroNIDAZOLE  IVPB      metroNIDAZOLE  IVPB 500 milliGRAM(s) IV Intermittent every 8 hours  midodrine 30 milliGRAM(s) Oral every 8 hours  norepinephrine Infusion 0.05 MICROgram(s)/kG/Min (6.09 mL/Hr) IV Continuous <Continuous>  ondansetron    Tablet 4 milliGRAM(s) Oral every 12 hours  pantoprazole    Tablet 40 milliGRAM(s) Oral before breakfast  vancomycin    Solution 500 milliGRAM(s) Oral every 6 hours    MEDICATIONS  (PRN):  ondansetron Injectable 4 milliGRAM(s) IV Push every 6 hours PRN Nausea and/or Vomiting  oxyCODONE    IR 5 milliGRAM(s) Oral every 6 hours PRN Moderate Pain (4 - 6)        LABS                                            8.5                   Neurophils% (auto):   84.6   (10-11 @ 05:33):    15.68)-----------(514          Lymphocytes% (auto):  4.9                                           27.7                   Eosinphils% (auto):   0.1      Manual%: Neutrophils x    ; Lymphocytes x    ; Eosinophils x    ; Bands%: x    ; Blasts x                                    132    |  103    |  9                   Calcium: 7.5   / iCa: x      (10-11 @ 14:00)    ----------------------------<  146       Magnesium: 1.5                              3.4     |  17     |  0.32             Phosphorous: 1.7      TPro  4.7    /  Alb  2.0    /  TBili  0.2    /  DBili  x      /  AST  19     /  ALT  17     /  AlkPhos  124    11 Oct 2020 05:33 MICU Transfer Note    Transfer from: MICU  Transfer to:  ( x ) Medicine    (  ) Telemetry    (  ) RCU    (  ) Palliative    (  ) Stroke Unit    (  ) _______________  Accepting physician:    HPI:  70yo F Hx Lung Ca in 8/2020 with mets to brain, Spine, and Right hip s/p RT x 5 ( last tx 9/2) presenting with hypotension. Was recently admitted into the hospital for right hip pain s/p wide resection JAYANT on 9/17 c/b intubation and pneumonia. Patient was hypotensive to 90's systolic at rehab. Was given 3.5L of fluids and a dose of vancomycin/zosyn and sent to the ED. Collateral was obtained from family. According to them, patient was in usual health prior to symptoms; had constipation in the past week, but had a bowel movement yesterday after a enema. Patient endorsed some pain during the enema. Per patient, she had been experiencing decreased appetite for several weeks w/ nausea and intermittent coughing up clear sputum. Her main complaint is abdominal discomfort.  Patient had received radiation therapy in the past for her lung CA, but no chemotherapy - following at [facility] with Dr. Xie. Patient is full code according to family.     In the ED, patient was afebrile, BP was 70's systolic was given additional 2 L of fluid resuscitation and 20 mg + 30 mg of midodrine, satting well on nasal canula. Pressures continued to be labile despite adequate volume resuscitation. Patient was started on Levophed for pressure support and transferred to MICU.  (10 Oct 2020 16:16)    MICU COURSE:  Patient was admitted to MICU for hypotension requiring pressors likely in setting of septic shock. CT A/P showed protocolitis with gas in bladder, concern for enterovesical fistula. Surgery was consulted, no acute surgical intervention at this time. She was started on antibiotics vanc and griffin, switched to PO vanc and IV flagyl for Cdiff coverage. Meropenem was continued. Patient was started on midodrine 30 q8 and able to be weaned off levophed at 8PM on 10/11.  TTE ordered for moderate pericardial effusion seen on CT. Patient ordered for bowel regimen given constipation PTA 2/2 opioid pain medications. BCx NGTD (10/10), GI stool PCR negative. CDiff PCR positive.    ASSESSMENT & PLAN:   70yo F Hx Lung Ca in 8/2020 with mets to brain, Spine, and Right hip s/p RT x 5 ( last tx 9/2) presenting with hypotension, now requiring Levophed for pressure support after failed volume resuscitation and PO midodrine.     #Neuro  -A/O x 3, patient is able to communicate and follow commands  - currently not on sedation  - possible brain mets, however PET scan from 10/2 showed possible meningioma    #Pulm  - satting well on room air  - hemodynamically stable, protecting airway  - Bilateral pleural effusions, left greater than right, with associated compressive atelectasis; spiculated right upper lobe mass, measuring 3.3 x 1.8 cm (2:35), unchanged; no evidence of PE    #Cardio  - Given approximately 5.5L of fluid resuscitation and oral midodrine with labile pressures, systolic ranging from 70-90's  - c/w midodrine 30 mg TID   - Currently on Levophed, with plans to wean as tolerated  - adrenal insufficiency and hypothyroidism ruled out from cortisol and TSH  -moderate pericardial effusion on CT scan, formal echo pending    #GI  - Has been constipated for the past week, last bowel movement 1 day prior to admission with enema and has had several loose BMs since  - bowel regimen PRN, has been on oxycodone 15 mg at home  - endorsing diffuse abdominal pain, no evidence of free air or peritoneal symptoms   - imaging negative for bowel obstruction, however c/w proctocolitis which is new from last scan  -GI PCR negative, c diff pending, c/w empiric treatment for c diff  -Gen surg following for colo-vesicular fistula however not surgical candidate at this time  - cc diet per family    #Renal  - Normal renal function  - CT noted wedge shaped infarction in L lower pole of kidney c/w infection vs infarct    #ID  - CT abdomen significant for proctocolitis, gas w/in bladder ddx infection vs fistula  - s/p received one dose of vancomycin and zosyn in nursing home  - patient has been afebrile with elevated WBC, trend fever curve and white count  - worsening white count today, will empirically treat for c diff, started PO vanc, IV flagyl, and meropenem  - follow up blood, urine and sputum culture, MRSA swab pending, GI PCR negative, c diff pending  -lactate in AM    #Heme/Onc  - elevated WBC in setting of infection, worsening  - continue to monitor daily CBC   - follows with Dr. Xie at [facility]  - shows metastatic extension of primary lung mass to vertebral bodies and extension to spinal cord, can consider spine consult once patient condition stabilizes    #DVT  - Lovenox 40 mg for DVT prophylaxis    #Endo   - A1c 6.1 in August    #Ethics  - FULL code according to family, spoke to son and daughter on phone        For Follow-Up:  [ ] f/u UCx  [ ] c/w abx (IV Griffin, IV Flagyl, and PO vanc); de-escalate as needed      Vital Signs Last 24 Hrs  T(C): 36.1 (11 Oct 2020 20:00), Max: 36.8 (11 Oct 2020 08:00)  T(F): 97 (11 Oct 2020 20:00), Max: 98.3 (11 Oct 2020 08:00)  HR: 77 (11 Oct 2020 21:00) (56 - 113)  BP: 103/60 (11 Oct 2020 21:00) (88/52 - 143/60)  BP(mean): 74 (11 Oct 2020 21:00) (39 - 105)  RR: 22 (11 Oct 2020 21:00) (16 - 38)  SpO2: 97% (11 Oct 2020 21:00) (96% - 100%)  I&O's Summary    10 Oct 2020 07:01  -  11 Oct 2020 07:00  --------------------------------------------------------  IN: 996.8 mL / OUT: 350 mL / NET: 646.8 mL    11 Oct 2020 07:01  -  11 Oct 2020 21:09  --------------------------------------------------------  IN: 807.6 mL / OUT: 0 mL / NET: 807.6 mL          MEDICATIONS  (STANDING):  acetaminophen   Tablet .. 500 milliGRAM(s) Oral every 4 hours  aspirin 325 milliGRAM(s) Oral daily  bisacodyl 5 milliGRAM(s) Oral at bedtime  chlorhexidine 4% Liquid 1 Application(s) Topical <User Schedule>  enoxaparin Injectable 40 milliGRAM(s) SubCutaneous daily  gabapentin 100 milliGRAM(s) Oral three times a day  melatonin 3 milliGRAM(s) Oral at bedtime  meropenem  IVPB 1000 milliGRAM(s) IV Intermittent every 8 hours  metroNIDAZOLE  IVPB      metroNIDAZOLE  IVPB 500 milliGRAM(s) IV Intermittent every 8 hours  midodrine 30 milliGRAM(s) Oral every 8 hours  norepinephrine Infusion 0.05 MICROgram(s)/kG/Min (6.09 mL/Hr) IV Continuous <Continuous>  ondansetron    Tablet 4 milliGRAM(s) Oral every 12 hours  pantoprazole    Tablet 40 milliGRAM(s) Oral before breakfast  vancomycin    Solution 500 milliGRAM(s) Oral every 6 hours    MEDICATIONS  (PRN):  ondansetron Injectable 4 milliGRAM(s) IV Push every 6 hours PRN Nausea and/or Vomiting  oxyCODONE    IR 5 milliGRAM(s) Oral every 6 hours PRN Moderate Pain (4 - 6)        LABS                                            8.5                   Neurophils% (auto):   84.6   (10-11 @ 05:33):    15.68)-----------(514          Lymphocytes% (auto):  4.9                                           27.7                   Eosinphils% (auto):   0.1      Manual%: Neutrophils x    ; Lymphocytes x    ; Eosinophils x    ; Bands%: x    ; Blasts x                                    132    |  103    |  9                   Calcium: 7.5   / iCa: x      (10-11 @ 14:00)    ----------------------------<  146       Magnesium: 1.5                              3.4     |  17     |  0.32             Phosphorous: 1.7      TPro  4.7    /  Alb  2.0    /  TBili  0.2    /  DBili  x      /  AST  19     /  ALT  17     /  AlkPhos  124    11 Oct 2020 05:33 MICU Transfer Note    Transfer from: MICU  Transfer to:  ( x ) Medicine    (  ) Telemetry    (  ) RCU    (  ) Palliative    (  ) Stroke Unit    (  ) _______________  Accepting physician:    HPI:  68yo F Hx Lung Ca in 8/2020 with mets to brain, Spine, and Right hip s/p RT x 5 ( last tx 9/2) presenting with hypotension. Was recently admitted into the hospital for right hip pain s/p wide resection JAYANT on 9/17 c/b intubation and pneumonia. Patient was hypotensive to 90's systolic at rehab. Was given 3.5L of fluids and a dose of vancomycin/zosyn and sent to the ED. Collateral was obtained from family. According to them, patient was in usual health prior to symptoms; had constipation in the past week, but had a bowel movement yesterday after a enema. Patient endorsed some pain during the enema. Per patient, she had been experiencing decreased appetite for several weeks w/ nausea and intermittent coughing up clear sputum. Her main complaint is abdominal discomfort.  Patient had received radiation therapy in the past for her lung CA, but no chemotherapy - following at [facility] with Dr. Xie. Patient is full code according to family.     In the ED, patient was afebrile, BP was 70's systolic was given additional 2 L of fluid resuscitation and 20 mg + 30 mg of midodrine, satting well on nasal canula. Pressures continued to be labile despite adequate volume resuscitation. Patient was started on Levophed for pressure support and transferred to MICU.  (10 Oct 2020 16:16)    MICU COURSE:  Patient was admitted to MICU for hypotension requiring pressors likely in setting of septic shock. CT A/P showed protocolitis with gas in bladder, concern for enterovesical fistula. Surgery was consulted, no acute surgical intervention at this time. She was started on antibiotics vanc and griffin, switched to PO vanc and IV flagyl for Cdiff coverage. Meropenem was continued. Patient was started on midodrine 30 q8 and able to be weaned off levophed at 8PM on 10/11.  TTE ordered for moderate pericardial effusion seen on CT. Patient ordered for bowel regimen given constipation PTA 2/2 opioid pain medications. BCx NGTD (10/10), GI stool PCR negative. CDiff PCR positive.    ASSESSMENT & PLAN:   68yo F Hx Lung Ca in 8/2020 with mets to brain, Spine, and Right hip s/p RT x 5 ( last tx 9/2) presenting with hypotension, now requiring Levophed for pressure support after failed volume resuscitation and PO midodrine.     #Neuro  -A/O x 3, patient is able to communicate and follow commands  - currently not on sedation  - possible brain mets, however PET scan from 10/2 showed possible meningioma    #Pulm  - satting well on room air  - hemodynamically stable, protecting airway  - Bilateral pleural effusions, left greater than right, with associated compressive atelectasis; spiculated right upper lobe mass, measuring 3.3 x 1.8 cm (2:35), unchanged; no evidence of PE    #Cardio  - Given approximately 5.5L of fluid resuscitation and oral midodrine with labile pressures, systolic ranging from 70-90's  - c/w midodrine 30 mg TID   - adrenal insufficiency and hypothyroidism ruled out from cortisol and TSH  -moderate pericardial effusion on CT scan, formal echo pending    #GI  - Has been constipated for the past week, last bowel movement 1 day prior to admission with enema and has had several loose BMs since  - bowel regimen PRN, has been on oxycodone 15 mg at home  - endorsing diffuse abdominal pain, no evidence of free air or peritoneal symptoms   - imaging negative for bowel obstruction, however c/w proctocolitis which is new from last scan  -GI PCR negative, c diff pending, c/w empiric treatment for c diff  -Gen surg following for colo-vesicular fistula however not surgical candidate at this time  - cc diet per family    #Renal  - Normal renal function  - CT noted wedge shaped infarction in L lower pole of kidney c/w infection vs infarct    #ID  - CT abdomen significant for proctocolitis, gas w/in bladder ddx infection vs fistula  - s/p received one dose of vancomycin and zosyn in nursing home  - patient has been afebrile with elevated WBC, trend fever curve and white count  - worsening white count today, will empirically treat for c diff, started PO vanc, IV flagyl, and meropenem  - follow up blood, urine and sputum culture, MRSA swab pending, GI PCR negative, c diff pending  -lactate in AM    #Heme/Onc  - elevated WBC in setting of infection, worsening  - continue to monitor daily CBC   - follows with Dr. Xie at [facility]  - shows metastatic extension of primary lung mass to vertebral bodies and extension to spinal cord, can consider spine consult once patient condition stabilizes    #DVT  - Lovenox 40 mg for DVT prophylaxis    #Endo   - A1c 6.1 in August    #Ethics  - FULL code according to family, spoke to son and daughter on phone        For Follow-Up:  [ ] f/u UCx  [ ] c/w abx (IV Griffin, IV Flagyl, and PO vanc); de-escalate as needed      Vital Signs Last 24 Hrs  T(C): 36.1 (11 Oct 2020 20:00), Max: 36.8 (11 Oct 2020 08:00)  T(F): 97 (11 Oct 2020 20:00), Max: 98.3 (11 Oct 2020 08:00)  HR: 77 (11 Oct 2020 21:00) (56 - 113)  BP: 103/60 (11 Oct 2020 21:00) (88/52 - 143/60)  BP(mean): 74 (11 Oct 2020 21:00) (39 - 105)  RR: 22 (11 Oct 2020 21:00) (16 - 38)  SpO2: 97% (11 Oct 2020 21:00) (96% - 100%)  I&O's Summary    10 Oct 2020 07:01  -  11 Oct 2020 07:00  --------------------------------------------------------  IN: 996.8 mL / OUT: 350 mL / NET: 646.8 mL    11 Oct 2020 07:01  -  11 Oct 2020 21:09  --------------------------------------------------------  IN: 807.6 mL / OUT: 0 mL / NET: 807.6 mL          MEDICATIONS  (STANDING):  acetaminophen   Tablet .. 500 milliGRAM(s) Oral every 4 hours  aspirin 325 milliGRAM(s) Oral daily  bisacodyl 5 milliGRAM(s) Oral at bedtime  chlorhexidine 4% Liquid 1 Application(s) Topical <User Schedule>  enoxaparin Injectable 40 milliGRAM(s) SubCutaneous daily  gabapentin 100 milliGRAM(s) Oral three times a day  melatonin 3 milliGRAM(s) Oral at bedtime  meropenem  IVPB 1000 milliGRAM(s) IV Intermittent every 8 hours  metroNIDAZOLE  IVPB      metroNIDAZOLE  IVPB 500 milliGRAM(s) IV Intermittent every 8 hours  midodrine 30 milliGRAM(s) Oral every 8 hours  norepinephrine Infusion 0.05 MICROgram(s)/kG/Min (6.09 mL/Hr) IV Continuous <Continuous>  ondansetron    Tablet 4 milliGRAM(s) Oral every 12 hours  pantoprazole    Tablet 40 milliGRAM(s) Oral before breakfast  vancomycin    Solution 500 milliGRAM(s) Oral every 6 hours    MEDICATIONS  (PRN):  ondansetron Injectable 4 milliGRAM(s) IV Push every 6 hours PRN Nausea and/or Vomiting  oxyCODONE    IR 5 milliGRAM(s) Oral every 6 hours PRN Moderate Pain (4 - 6)        LABS                                            8.5                   Neurophils% (auto):   84.6   (10-11 @ 05:33):    15.68)-----------(514          Lymphocytes% (auto):  4.9                                           27.7                   Eosinphils% (auto):   0.1      Manual%: Neutrophils x    ; Lymphocytes x    ; Eosinophils x    ; Bands%: x    ; Blasts x                                    132    |  103    |  9                   Calcium: 7.5   / iCa: x      (10-11 @ 14:00)    ----------------------------<  146       Magnesium: 1.5                              3.4     |  17     |  0.32             Phosphorous: 1.7      TPro  4.7    /  Alb  2.0    /  TBili  0.2    /  DBili  x      /  AST  19     /  ALT  17     /  AlkPhos  124    11 Oct 2020 05:33 MICU Transfer Note    Transfer from: MICU  Transfer to:  ( x ) Medicine    (  ) Telemetry    (  ) RCU    (  ) Palliative    (  ) Stroke Unit    (  ) _______________  Accepting physician:    HPI:  70yo F Hx Lung Ca in 8/2020 with mets to brain, Spine, and Right hip s/p RT x 5 ( last tx 9/2) presenting with hypotension. Was recently admitted into the hospital for right hip pain s/p wide resection JAYANT on 9/17 c/b intubation and pneumonia. Patient was hypotensive to 90's systolic at rehab. Was given 3.5L of fluids and a dose of vancomycin/zosyn and sent to the ED. Collateral was obtained from family. According to them, patient was in usual health prior to symptoms; had constipation in the past week, but had a bowel movement yesterday after a enema. Patient endorsed some pain during the enema. Per patient, she had been experiencing decreased appetite for several weeks w/ nausea and intermittent coughing up clear sputum. Her main complaint is abdominal discomfort.  Patient had received radiation therapy in the past for her lung CA, but no chemotherapy - following at [facility] with Dr. Xie. Patient is full code according to family.     In the ED, patient was afebrile, BP was 70's systolic was given additional 2 L of fluid resuscitation and 20 mg + 30 mg of midodrine, satting well on nasal canula. Pressures continued to be labile despite adequate volume resuscitation. Patient was started on Levophed for pressure support and transferred to MICU.  (10 Oct 2020 16:16)    MICU COURSE:  Patient was admitted to MICU for hypotension requiring pressors likely in setting of septic shock. CT A/P showed protocolitis with gas in bladder, concern for enterovesical fistula. Surgery was consulted, no acute surgical intervention at this time. She was started on antibiotics vanc and griffin, switched to PO vanc and IV flagyl for Cdiff coverage. Meropenem was continued. Patient was started on midodrine 30 q8 and able to be weaned off levophed at 8PM on 10/11.  TTE ordered for moderate pericardial effusion seen on CT. Patient ordered for bowel regimen given constipation PTA 2/2 opioid pain medications. BCx NGTD (10/10), GI stool PCR negative. CDiff PCR positive.    Vital Signs Last 24 Hrs  T(C): 36.1 (11 Oct 2020 20:00), Max: 36.8 (11 Oct 2020 08:00)  T(F): 97 (11 Oct 2020 20:00), Max: 98.3 (11 Oct 2020 08:00)  HR: 77 (11 Oct 2020 21:00) (56 - 113)  BP: 103/60 (11 Oct 2020 21:00) (88/52 - 143/60)  BP(mean): 74 (11 Oct 2020 21:00) (39 - 105)  RR: 22 (11 Oct 2020 21:00) (16 - 38)  SpO2: 97% (11 Oct 2020 21:00) (96% - 100%)  I&O's Summary    10 Oct 2020 07:01  -  11 Oct 2020 07:00  --------------------------------------------------------  IN: 996.8 mL / OUT: 350 mL / NET: 646.8 mL    11 Oct 2020 07:01  -  11 Oct 2020 21:09  --------------------------------------------------------  IN: 807.6 mL / OUT: 0 mL / NET: 807.6 mL          MEDICATIONS  (STANDING):  acetaminophen   Tablet .. 500 milliGRAM(s) Oral every 4 hours  aspirin 325 milliGRAM(s) Oral daily  bisacodyl 5 milliGRAM(s) Oral at bedtime  chlorhexidine 4% Liquid 1 Application(s) Topical <User Schedule>  enoxaparin Injectable 40 milliGRAM(s) SubCutaneous daily  gabapentin 100 milliGRAM(s) Oral three times a day  melatonin 3 milliGRAM(s) Oral at bedtime  meropenem  IVPB 1000 milliGRAM(s) IV Intermittent every 8 hours  metroNIDAZOLE  IVPB      metroNIDAZOLE  IVPB 500 milliGRAM(s) IV Intermittent every 8 hours  midodrine 30 milliGRAM(s) Oral every 8 hours  norepinephrine Infusion 0.05 MICROgram(s)/kG/Min (6.09 mL/Hr) IV Continuous <Continuous>  ondansetron    Tablet 4 milliGRAM(s) Oral every 12 hours  pantoprazole    Tablet 40 milliGRAM(s) Oral before breakfast  vancomycin    Solution 500 milliGRAM(s) Oral every 6 hours    MEDICATIONS  (PRN):  ondansetron Injectable 4 milliGRAM(s) IV Push every 6 hours PRN Nausea and/or Vomiting  oxyCODONE    IR 5 milliGRAM(s) Oral every 6 hours PRN Moderate Pain (4 - 6)        LABS                                            8.5                   Neurophils% (auto):   84.6   (10-11 @ 05:33):    15.68)-----------(514          Lymphocytes% (auto):  4.9                                           27.7                   Eosinphils% (auto):   0.1      Manual%: Neutrophils x    ; Lymphocytes x    ; Eosinophils x    ; Bands%: x    ; Blasts x                                    132    |  103    |  9                   Calcium: 7.5   / iCa: x      (10-11 @ 14:00)    ----------------------------<  146       Magnesium: 1.5                              3.4     |  17     |  0.32             Phosphorous: 1.7      TPro  4.7    /  Alb  2.0    /  TBili  0.2    /  DBili  x      /  AST  19     /  ALT  17     /  AlkPhos  124    11 Oct 2020 05:33      ASSESSMENT & PLAN:   70yo F Hx Lung Ca in 8/2020 with mets to brain, Spine, and Right hip s/p RT x 5 ( last tx 9/2) presenting with hypotension, now requiring Levophed for pressure support after failed volume resuscitation and PO midodrine.     #Neuro  - A/O x 3, patient is able to communicate and follow commands  - currently not on sedation  - Possible brain mets, however PET scan from 10/2 showed possible meningioma    #Pulm  - satting well on room air  - hemodynamically stable, protecting airway  - Bilateral pleural effusions, left greater than right, with associated compressive atelectasis; spiculated right upper lobe mass, measuring 3.3 x 1.8 cm (2:35), unchanged; no evidence of PE    #Cardio  - Given approximately 5.5L of fluid resuscitation and oral midodrine with labile pressures, systolic ranging from 70-90's  - c/w midodrine 30 mg TID   - Hemodynamically stable off of pressors  - adrenal insufficiency and hypothyroidism ruled out from cortisol and TSH  - Moderate pericardial effusion on CT scan, formal echo pending to assess for possible tamponade physiology    #GI  - Has been constipated for the past week, last bowel movement 1 day prior to admission with enema and has had several loose BMs since  - Bowel regimen PRN, has been on oxycodone 15 mg at home  - Endorsing diffuse abdominal pain, no evidence of free air or peritoneal symptoms   - Imaging negative for bowel obstruction, however c/w proctocolitis which is new from last scan  - GI PCR negative  - C. Diff positive, continue with vancomycin and metronidazole  - Gen surg following for colo-vesicular fistula however not surgical candidate at this time  - Consistent carb diet    #Renal  - Normal renal function  - CT noted wedge shaped infarction in L lower pole of kidney c/w infection vs infarct  - Monitor urinary output    #ID  - CT abdomen significant for proctocolitis, gas w/in bladder ddx infection vs fistula  - s/p received one dose of vancomycin and zosyn in nursing home  - patient has been afebrile with elevated WBC, trend fever curve and white count  - worsening white count today. C. Diff positive, c/w PO vanc, IV flagyl, and meropenem  - BCx NGTD; UCx - yeast like cells likely not Candida (no sensitivities sent)  - F/u sputum culture, MRSA swab pending, GI PCR negative  - Lactate WNL    #Heme/Onc  - Elevated WBC in setting of infection, worsening  - Continue to monitor daily CBC   - follows with Dr. Xie at [facility]  - Shows metastatic extension of primary lung mass to vertebral bodies and extension to spinal cord, can consider spine consult once patient condition stabilizes    #DVT  - Lovenox 40 mg for DVT prophylaxis    #Endo   - A1c 6.1 in August    #Ethics  - FULL code according to family, spoke to son and daughter on phone      For Follow-Up:  [ ] f/u UCx  [ ] c/w abx (IV Griffin, IV Flagyl, and PO vanc) as she is C. diff positive); de-escalate as needed  [ ] Oncology consult (patient follows Dr. Jesus Xie at [facility]) MICU Transfer Note    Transfer from: MICU  Transfer to:  ( x ) Medicine    (  ) Telemetry    (  ) RCU    (  ) Palliative    (  ) Stroke Unit    (  ) _______________  Accepting physician: Mitra    HPI:  70yo F Hx Lung Ca in 8/2020 with mets to brain, Spine, and Right hip s/p RT x 5 ( last tx 9/2) presenting with hypotension. Was recently admitted into the hospital for right hip pain s/p wide resection JAYANT on 9/17 c/b intubation and pneumonia. Patient was hypotensive to 90's systolic at rehab. Was given 3.5L of fluids and a dose of vancomycin/zosyn and sent to the ED. Collateral was obtained from family. According to them, patient was in usual health prior to symptoms; had constipation in the past week, but had a bowel movement yesterday after a enema. Patient endorsed some pain during the enema. Per patient, she had been experiencing decreased appetite for several weeks w/ nausea and intermittent coughing up clear sputum. Her main complaint is abdominal discomfort.  Patient had received radiation therapy in the past for her lung CA, but no chemotherapy - following at [facility] with Dr. Xie. Patient is full code according to family.     In the ED, patient was afebrile, BP was 70's systolic was given additional 2 L of fluid resuscitation and 20 mg + 30 mg of midodrine, satting well on nasal canula. Pressures continued to be labile despite adequate volume resuscitation. Patient was started on Levophed for pressure support and transferred to MICU.  (10 Oct 2020 16:16)    MICU COURSE:  Patient was admitted to MICU for hypotension requiring pressors likely in setting of septic shock. CT A/P showed protocolitis with gas in bladder, concern for enterovesical fistula. Surgery was consulted, no acute surgical intervention at this time. She was started on antibiotics vanc and griffin, switched to PO vanc and IV flagyl for Cdiff coverage. Meropenem was continued. Patient was started on midodrine 30 q8 and able to be weaned off levophed at 8PM on 10/11.  TTE ordered for moderate pericardial effusion seen on CT. Patient ordered for bowel regimen given constipation PTA 2/2 opioid pain medications. BCx NGTD (10/10), GI stool PCR negative. CDiff PCR positive.    Vital Signs Last 24 Hrs  T(C): 36.1 (11 Oct 2020 20:00), Max: 36.8 (11 Oct 2020 08:00)  T(F): 97 (11 Oct 2020 20:00), Max: 98.3 (11 Oct 2020 08:00)  HR: 77 (11 Oct 2020 21:00) (56 - 113)  BP: 103/60 (11 Oct 2020 21:00) (88/52 - 143/60)  BP(mean): 74 (11 Oct 2020 21:00) (39 - 105)  RR: 22 (11 Oct 2020 21:00) (16 - 38)  SpO2: 97% (11 Oct 2020 21:00) (96% - 100%)  I&O's Summary    10 Oct 2020 07:01  -  11 Oct 2020 07:00  --------------------------------------------------------  IN: 996.8 mL / OUT: 350 mL / NET: 646.8 mL    11 Oct 2020 07:01  -  11 Oct 2020 21:09  --------------------------------------------------------  IN: 807.6 mL / OUT: 0 mL / NET: 807.6 mL          MEDICATIONS  (STANDING):  acetaminophen   Tablet .. 500 milliGRAM(s) Oral every 4 hours  aspirin 325 milliGRAM(s) Oral daily  bisacodyl 5 milliGRAM(s) Oral at bedtime  chlorhexidine 4% Liquid 1 Application(s) Topical <User Schedule>  enoxaparin Injectable 40 milliGRAM(s) SubCutaneous daily  gabapentin 100 milliGRAM(s) Oral three times a day  melatonin 3 milliGRAM(s) Oral at bedtime  meropenem  IVPB 1000 milliGRAM(s) IV Intermittent every 8 hours  metroNIDAZOLE  IVPB      metroNIDAZOLE  IVPB 500 milliGRAM(s) IV Intermittent every 8 hours  midodrine 30 milliGRAM(s) Oral every 8 hours  norepinephrine Infusion 0.05 MICROgram(s)/kG/Min (6.09 mL/Hr) IV Continuous <Continuous>  ondansetron    Tablet 4 milliGRAM(s) Oral every 12 hours  pantoprazole    Tablet 40 milliGRAM(s) Oral before breakfast  vancomycin    Solution 500 milliGRAM(s) Oral every 6 hours    MEDICATIONS  (PRN):  ondansetron Injectable 4 milliGRAM(s) IV Push every 6 hours PRN Nausea and/or Vomiting  oxyCODONE    IR 5 milliGRAM(s) Oral every 6 hours PRN Moderate Pain (4 - 6)        LABS                                            8.5                   Neurophils% (auto):   84.6   (10-11 @ 05:33):    15.68)-----------(514          Lymphocytes% (auto):  4.9                                           27.7                   Eosinphils% (auto):   0.1      Manual%: Neutrophils x    ; Lymphocytes x    ; Eosinophils x    ; Bands%: x    ; Blasts x                                    132    |  103    |  9                   Calcium: 7.5   / iCa: x      (10-11 @ 14:00)    ----------------------------<  146       Magnesium: 1.5                              3.4     |  17     |  0.32             Phosphorous: 1.7      TPro  4.7    /  Alb  2.0    /  TBili  0.2    /  DBili  x      /  AST  19     /  ALT  17     /  AlkPhos  124    11 Oct 2020 05:33      ASSESSMENT & PLAN:   70yo F Hx Lung Ca in 8/2020 with mets to brain, Spine, and Right hip s/p RT x 5 ( last tx 9/2) presenting with hypotension, now requiring Levophed for pressure support after failed volume resuscitation and PO midodrine.     #Neuro  - A/O x 3, patient is able to communicate and follow commands  - currently not on sedation  - Possible brain mets, however PET scan from 10/2 showed possible meningioma    #Pulm  - satting well on room air  - hemodynamically stable, protecting airway  - Bilateral pleural effusions, left greater than right, with associated compressive atelectasis; spiculated right upper lobe mass, measuring 3.3 x 1.8 cm (2:35), unchanged; no evidence of PE    #Cardio  - Given approximately 5.5L of fluid resuscitation and oral midodrine with labile pressures, systolic ranging from 70-90's  - c/w midodrine 30 mg TID   - Hemodynamically stable off of pressors  - adrenal insufficiency and hypothyroidism ruled out from cortisol and TSH  - Moderate pericardial effusion on CT scan, formal echo pending to assess for possible tamponade physiology    #GI  - Has been constipated for the past week, last bowel movement 1 day prior to admission with enema and has had several loose BMs since  - Bowel regimen PRN, has been on oxycodone 15 mg at home  - Endorsing diffuse abdominal pain, no evidence of free air or peritoneal symptoms   - Imaging negative for bowel obstruction, however c/w proctocolitis which is new from last scan  - GI PCR negative  - C. Diff positive, continue with vancomycin and metronidazole  - Gen surg following for colo-vesicular fistula however not surgical candidate at this time  - Consistent carb diet    #Renal  - Normal renal function  - CT noted wedge shaped infarction in L lower pole of kidney c/w infection vs infarct  - Monitor urinary output    #ID  - CT abdomen significant for proctocolitis, gas w/in bladder ddx infection vs fistula  - s/p received one dose of vancomycin and zosyn in nursing home  - patient has been afebrile with elevated WBC, trend fever curve and white count  - worsening white count today. C. Diff positive, c/w PO vanc, IV flagyl, and meropenem  - BCx NGTD; UCx - yeast like cells likely not Candida (no sensitivities sent)  - F/u sputum culture, MRSA swab pending, GI PCR negative  - Lactate WNL    #Heme/Onc  - Elevated WBC in setting of infection, worsening  - Continue to monitor daily CBC   - follows with Dr. Xie at [facility]  - Shows metastatic extension of primary lung mass to vertebral bodies and extension to spinal cord, can consider spine consult once patient condition stabilizes    #DVT  - Lovenox 40 mg for DVT prophylaxis    #Endo   - A1c 6.1 in August    #Ethics  - FULL code according to family, spoke to son and daughter on phone      For Follow-Up:  [ ] f/u UCx  [ ] c/w abx (IV Griffin, IV Flagyl, and PO vanc) as she is C. diff positive); de-escalate as needed  [ ] Oncology consult (patient follows Dr. Jesus Xie at [facility])

## 2020-10-11 NOTE — CONSULT NOTE ADULT - SUBJECTIVE AND OBJECTIVE BOX
[facility] General Surgery Consultation     Patient is a 69y old  Female who presents with a chief complaint of hypotension (11 Oct 2020 07:40)      HPI:  69F Hx Lung Ca in 2020 with mets to brain, Spine, and Right hip s/p RT x 5 ( last tx ) presenting with hypotension. Was recently admitted into the hospital for right hip pain s/p wide resection JAYANT on  c/b intubation and pneumonia. Patient was noted to be hypotensive to 90's systolic at rehab and sent to [facility] for evaluation.     Patient reports she had constipation for 2 weeks but had enema 2 days ago and expelled 2lbs of stool. Her main complaint on admission was abdominal discomfort, which has not resolved. Ct scan of abdomen redemonstrated metastatic disease and proctocolitis of rectum and colon and air in bladder suspicious for colovesical fistula. Patient admitted to MICU for hemodynamic monitoring (currently on levophed 0.06) and sepsis workup given leukocytosis and hypotension.    Denies history of colonoscopy.    PAST MEDICAL & SURGICAL HISTORY:  Lung cancer metastatic to bone    Open appendectomy as child        FAMILY HISTORY:  FH: colon cancer    Family history of cervical cancer        SOCIAL HISTORY:    MEDICATIONS  (STANDING):  acetaminophen   Tablet .. 500 milliGRAM(s) Oral every 4 hours  aspirin 325 milliGRAM(s) Oral daily  bisacodyl 5 milliGRAM(s) Oral at bedtime  chlorhexidine 4% Liquid 1 Application(s) Topical <User Schedule>  enoxaparin Injectable 40 milliGRAM(s) SubCutaneous daily  gabapentin 100 milliGRAM(s) Oral three times a day  lidocaine   Patch 1 Patch Transdermal every 24 hours  melatonin 3 milliGRAM(s) Oral at bedtime  meropenem  IVPB 1000 milliGRAM(s) IV Intermittent every 8 hours  metroNIDAZOLE  IVPB      metroNIDAZOLE  IVPB 500 milliGRAM(s) IV Intermittent every 8 hours  midodrine 30 milliGRAM(s) Oral every 8 hours  norepinephrine Infusion 0.05 MICROgram(s)/kG/Min (6.09 mL/Hr) IV Continuous <Continuous>  ondansetron    Tablet 4 milliGRAM(s) Oral every 12 hours  pantoprazole    Tablet 40 milliGRAM(s) Oral before breakfast  potassium phosphate / sodium phosphate Powder (PHOS-NaK) 1 Packet(s) Oral every 4 hours  vancomycin    Solution 500 milliGRAM(s) Oral every 6 hours    MEDICATIONS  (PRN):  oxyCODONE    IR 5 milliGRAM(s) Oral every 6 hours PRN Moderate Pain (4 - 6)    Allergies    No Known Allergies    Intolerances        Vital Signs Last 24 Hrs  T(C): 36.8 (11 Oct 2020 08:00), Max: 37.3 (10 Oct 2020 17:15)  T(F): 98.3 (11 Oct 2020 08:00), Max: 99.1 (10 Oct 2020 17:15)  HR: 68 (11 Oct 2020 12:00) (57 - 113)  BP: 92/56 (11 Oct 2020 12:00) (74/39 - 134/102)  BP(mean): 68 (11 Oct 2020 12:00) (49 - 109)  RR: 18 (11 Oct 2020 12:00) (16 - 32)  SpO2: 100% (11 Oct 2020 12:00) (96% - 100%)  Daily Height in cm: 152.4 (10 Oct 2020 17:15)    Daily Weight in k.3 (11 Oct 2020 04:00)    General: NAD, well-nourished  HEENT: Atraumatic, EOMI  Resp: Breathing comfortably on RA  CV: Normal sinus rhythm  Abd: soft, ND, nontender  Ext: ROMIx4, motor strength intact x 4                          8.5    15.68 )-----------( 514      ( 11 Oct 2020 05:33 )             27.7     10-11    135  |  100  |  10  ----------------------------<  87  3.4<L>   |  12<L>  |  0.33<L>    Ca    8.1<L>      11 Oct 2020 05:33  Phos  1.7     10-11  Mg     1.6     10-11    TPro  4.7<L>  /  Alb  2.0<L>  /  TBili  0.2  /  DBili  x   /  AST  19  /  ALT  17  /  AlkPhos  124<H>  10-11    PT/INR - ( 10 Oct 2020 11:27 )   PT: 12.5 SEC;   INR: 1.09          PTT - ( 10 Oct 2020 11:27 )  PTT:27.0 SEC      Radiographic Findings:         < from: CT Abdomen and Pelvis w/ IV Cont (10.10.20 @ 13:22) >  IMPRESSION:  Right upper lobe spiculated mass, consistent with known lung carcinoma, unchanged. Right hilar and right retrocrural adenopathy is again noted.    Bilateral pleural effusions, left greater than right, with associated compressive atelectasis.    Moderate pericardial effusion, slightly increased since 10/2/2020.    New diffuse colonic wall thickening involving the entire colon and rectum, consistent with a proctocolitis, new since 10/2/2020.    Ill-defined somewhat wedge-shaped area of decreased attenuation in the lower pole of the left kidney. Differential diagnosis includes an area of ischemia and infection.    Extensive lytic osseous metastatic disease with involvement of the spine at multiple levels and encroachment upon the spinal canal as on prior PET/CT dated 10/2/2020.    Gas within the urinary bladder. Correlation with any history of recent instrumentation is recommended. In the absence of recent instrumentation, infection or fistula to bowel is considered.    < end of copied text >

## 2020-10-11 NOTE — CONSULT NOTE ADULT - ASSESSMENT
69F Hx Lung Ca in 8/2020 with mets to brain, Spine, and Right hip s/p RT x 5 ( last tx 9/2) presenting with sepsis and CT scan findings showing proctocolitis of rectum and colon and air in bladder suspicious for colovesical fistula.    - No acute surgical intervention at this time  - Consider GI consult given patients constipation and proctocolitis  - Continue antibiotic therapy  - Recommend comprehensive goals of care discussion given metastatic malignancy   - Surgery will follow  - Discussed with Dr. Castelan, PGY3  General Surgery (A Team Surgery)  [phone] with any questions

## 2020-10-11 NOTE — PROGRESS NOTE ADULT - ATTENDING COMMENTS
69 year old female with recently diagnosed metastatic lung cancer with septic shock in setting of pan-colitis and possible UTI.  Continue Meropenem. Add Flagyl/PO vanco for possible C Diff. D/C IV vancomycin.  Concerned for enterovesical fistula.   Continue IV vasopressor support.  Prognosis guarded.

## 2020-10-12 NOTE — PHYSICAL THERAPY INITIAL EVALUATION ADULT - RANGE OF MOTION EXAMINATION, REHAB EVAL
Hip precautions maintained/bilateral lower extremity ROM was WFL (within functional limits)/bilateral upper extremity ROM was WFL (within functional limits)

## 2020-10-12 NOTE — PROGRESS NOTE ADULT - ASSESSMENT
68yo F Hx Lung Ca in 8/2020 with mets to brain, Spine, and Right hip s/p RT x 5 ( last tx 9/2) presenting with hypotension, now requiring Levophed for pressure support after failed volume resuscitation and PO midodrine.     #Neuro  - A/O x 3, patient is able to communicate and follow commands  - currently not on sedation  - possible brain mets, however PET scan from 10/2 showed possible meningioma    #Pulm  - satting well on room air  - hemodynamically stable, protecting airway  - Bilateral pleural effusions, left greater than right, with associated compressive atelectasis; spiculated right upper lobe mass, measuring 3.3 x 1.8 cm (2:35), unchanged; no evidence of PE      #Cardio  - Given approximately 5.5L of fluid resuscitation and oral midodrine with labile pressures, systolic ranging from 70-90's  - c/w midodrine 30 mg TID   - Currently on Levophed, with plans to wean as tolerated  - adrenal insufficiency and hypothyroidism ruled out from cortisol and TSH  -moderate pericardial effusion on CT scan, formal echo pending    #GI  - Has been constipated for the past week, last bowel movement 1 day prior to admission with enema and has had several loose BMs since  - bowel regimen PRN, has been on oxycodone 15 mg at home  - endorsing diffuse abdominal pain, no evidence of free air or peritoneal symptoms   - imaging negative for bowel obstruction, however c/w proctocolitis which is new from last scan  -GI PCR negative, c diff pending, c/w empiric treatment for c diff  -Gen surg following for colo-vesicular fistula however not surgical candidate at this time  - cc diet per family    #Renal  - Normal renal function  - CT noted wedge shaped infarction in L lower pole of kidney c/w infection vs infarct    #ID  - CT abdomen significant for proctocolitis, gas w/in bladder ddx infection vs fistula  - s/p received one dose of vancomycin and zosyn in nursing home  - patient has been afebrile with elevated WBC, trend fever curve and white count  - worsening white count today, will empirically treat for c diff, started PO vanc, IV flagyl, and meropenem  - follow up blood, urine and sputum culture, MRSA swab pending, GI PCR negative, c diff pending  -lactate in AM      #Heme/Onc  - elevated WBC in setting of infection, worsening  - continue to monitor daily CBC   - follows with Dr. Xie at [facility]  - shows metastatic extension of primary lung mass to vertebral bodies and extension to spinal cord, can consider spine consult once patient condition stabilizes    #DVT  - Lovenox 40 mg for DVT prophylaxis    #Endo   - A1c 6.1 in August    #Ethics  - FULL code according to family, spoke to son and daughter on phone 68yo F Hx Lung Ca in 8/2020 with mets to brain, Spine, and Right hip s/p RT x 5 ( last tx 9/2) presenting with hypotension, now requiring Levophed for pressure support after failed volume resuscitation and PO midodrine, currently off pressors and hemodynamically stable for downgrade to regular medical floors.    #Neuro  - A/O x 3, patient is able to communicate and follow commands  - currently not on sedation  - Possible brain mets, however PET scan from 10/2 showed possible meningioma    #Pulm  - satting well on room air  - hemodynamically stable, protecting airway  - Bilateral pleural effusions, left greater than right, with associated compressive atelectasis; spiculated right upper lobe mass, measuring 3.3 x 1.8 cm (2:35), unchanged; no evidence of PE    #Cardio  - Given approximately 5.5L of fluid resuscitation and oral midodrine with labile pressures, systolic ranging from 70-90's  - c/w midodrine 30 mg TID   - Hemodynamically stable off of pressors  - adrenal insufficiency and hypothyroidism ruled out from cortisol and TSH  - Moderate pericardial effusion on CT scan, formal echo pending to assess for possible tamponade physiology    #GI  - Has been constipated for the past week, last bowel movement 1 day prior to admission with enema and has had several loose BMs since  - Bowel regimen PRN, has been on oxycodone 15 mg at home  - Endorsing diffuse abdominal pain, no evidence of free air or peritoneal symptoms   - Imaging negative for bowel obstruction, however c/w proctocolitis which is new from last scan  - GI PCR negative  - C. Diff positive, continue with vancomycin and metronidazole  - Gen surg following for colo-vesicular fistula however not surgical candidate at this time  - Consistent carb diet    #Renal  - Normal renal function  - CT noted wedge shaped infarction in L lower pole of kidney c/w infection vs infarct  - Monitor urinary output    #ID  - CT abdomen significant for proctocolitis, gas w/in bladder ddx infection vs fistula  - s/p received one dose of vancomycin and zosyn in nursing home  - patient has been afebrile with elevated WBC, trend fever curve and white count  - worsening white count today. C. Diff positive, c/w PO vanc, IV flagyl, and meropenem  - BCx NGTD; UCx - yeast like cells likely not Candida (no sensitivities sent)  - F/u sputum culture, MRSA swab pending, GI PCR negative  - Lactate WNL      #Heme/Onc  - Elevated WBC in setting of infection, worsening  - Continue to monitor daily CBC   - follows with Dr. Xie at [facility]  - Shows metastatic extension of primary lung mass to vertebral bodies and extension to spinal cord, can consider spine consult once patient condition stabilizes    #DVT  - Lovenox 40 mg for DVT prophylaxis    #Endo   - A1c 6.1 in August    #Ethics  - FULL code according to family, spoke to son and daughter on phone 70yo F Hx Lung Ca in 8/2020 with mets to brain, Spine, and Right hip s/p RT x 5 ( last tx 9/2) presenting with hypotension, now requiring Levophed for pressure support after failed volume resuscitation and PO midodrine, currently off pressors and hemodynamically stable for downgrade to regular medical floors.    #Neuro  - A/O x 3, patient is able to communicate and follow commands  - currently not on sedation  - Possible brain mets, however PET scan from 10/2 showed possible meningioma    #Pulm  - satting well on room air  - hemodynamically stable, protecting airway  - Bilateral pleural effusions, left greater than right, with associated compressive atelectasis; spiculated right upper lobe mass, measuring 3.3 x 1.8 cm (2:35), unchanged; no evidence of PE    #Cardio  - Given approximately 5.5L of fluid resuscitation and oral midodrine with labile pressures, systolic ranging from 70-90's  - c/w midodrine 30 mg TID   - Hemodynamically stable off of pressors  - adrenal insufficiency and hypothyroidism ruled out from cortisol and TSH  - Moderate pericardial effusion on CT scan, formal echo pending to assess for possible tamponade physiology    #GI  - Has been constipated for the past week, last bowel movement 1 day prior to admission with enema and has had several loose BMs since  - Bowel regimen PRN, has been on oxycodone 15 mg at home  - Endorsing diffuse abdominal pain, no evidence of free air or peritoneal symptoms   - Imaging negative for bowel obstruction, however c/w proctocolitis which is new from last scan  - GI PCR negative  - C. Diff positive, continue with vancomycin  - Gen surg following for colo-vesicular fistula however not surgical candidate at this time  - Consistent carb diet  - QTc < 500, can give antiemetics PRN    #Renal  - Normal renal function  - CT noted wedge shaped infarction in L lower pole of kidney c/w infection vs infarct  - Monitor urinary output    #ID  - CT abdomen significant for proctocolitis, gas w/in bladder ddx infection vs fistula  - s/p received one dose of vancomycin and zosyn in nursing home  - patient has been afebrile with elevated WBC, trend fever curve and white count  - worsening white count today. C. Diff positive, c/w PO vancomycin and meropenem  - BCx NGTD; UCx - yeast like cells likely not Candida (no sensitivities sent)  - F/u sputum culture, MRSA swab pending, GI PCR negative  - Lactate WNL      #Heme/Onc  - Elevated WBC in setting of infection, worsening  - Continue to monitor daily CBC   - follows with Dr[facility] Xie at [facility]  - Shows metastatic extension of primary lung mass to vertebral bodies and extension to spinal cord, can consider spine consult once patient condition stabilizes    #DVT  - Lovenox 40 mg for DVT prophylaxis    #Endo   - A1c 6.1 in August    #Ethics  - FULL code according to family, spoke to son and daughter on phone

## 2020-10-12 NOTE — CHART NOTE - NSCHARTNOTEFT_GEN_A_CORE
[facility]U Transfer Note    Transfer from: MICU  Transfer to:  ( c) Medicine    (  ) Telemetry    (  ) RCU    (  ) Palliative    (  ) Stroke Unit    (  ) _______________  Accepting physican:Dr. Thomason      [facility]:  Patient is a 69 y.o F w/ PMH lung ca (dx 8/2020) w/ mets to brain, spine, and R hip s/p radiation therapy x5 (last tx 9/2) who     **THIS NOTE IS INCOMPLTEE**          ASSESSMENT & PLAN:         For Follow-Up:          Vital Signs Last 24 Hrs  T(C): 36.5 (12 Oct 2020 16:00), Max: 36.7 (12 Oct 2020 12:00)  T(F): 97.7 (12 Oct 2020 16:00), Max: 98 (12 Oct 2020 12:00)  HR: 71 (12 Oct 2020 17:30) (53 - 83)  BP: 83/58 (12 Oct 2020 17:30) (83/58 - 135/64)  BP(mean): 68 (12 Oct 2020 17:30) (64 - 96)  RR: 20 (12 Oct 2020 17:30) (15 - 25)  SpO2: 95% (12 Oct 2020 17:30) (95% - 100%)  I&O's Summary    11 Oct 2020 07:01  -  12 Oct 2020 07:00  --------------------------------------------------------  IN: 1257.6 mL / OUT: 650 mL / NET: 607.6 mL    12 Oct 2020 07:01  -  12 Oct 2020 18:03  --------------------------------------------------------  IN: 1450 mL / OUT: 750 mL / NET: 700 mL          MEDICATIONS  (STANDING):  acetaminophen   Tablet .. 500 milliGRAM(s) Oral every 4 hours  aspirin 325 milliGRAM(s) Oral daily  gabapentin 100 milliGRAM(s) Oral three times a day  melatonin 3 milliGRAM(s) Oral at bedtime  meropenem  IVPB 1000 milliGRAM(s) IV Intermittent every 12 hours  metroNIDAZOLE  IVPB      metroNIDAZOLE  IVPB 500 milliGRAM(s) IV Intermittent every 8 hours  midodrine 30 milliGRAM(s) Oral every 8 hours  ondansetron    Tablet 4 milliGRAM(s) Oral every 12 hours  pantoprazole    Tablet 40 milliGRAM(s) Oral before breakfast  sodium chloride 0.9%. 1000 milliLiter(s) (75 mL/Hr) IV Continuous <Continuous>  vancomycin    Solution 500 milliGRAM(s) Oral every 6 hours    MEDICATIONS  (PRN):  ondansetron Injectable 4 milliGRAM(s) IV Push every 6 hours PRN Nausea and/or Vomiting  oxyCODONE    IR 5 milliGRAM(s) Oral every 6 hours PRN Moderate Pain (4 - 6)        LABS                                            8.9                   Neurophils% (auto):   85.1   (10-12 @ 03:00):    16.98)-----------(486          Lymphocytes% (auto):  3.3                                           27.5                   Eosinphils% (auto):   0.0      Manual%: Neutrophils x    ; Lymphocytes x    ; Eosinophils x    ; Bands%: x    ; Blasts x                                    132    |  103    |  10                  Calcium: 8.0   / iCa: x      (10-12 @ 03:00)    ----------------------------<  116       Magnesium: 1.5                              4.2     |  20     |  0.36             Phosphorous: 2.0      TPro  4.7    /  Alb  2.3    /  TBili  < 0.2  /  DBili  x      /  AST  21     /  ALT  18     /  AlkPhos  137    12 Oct 2020 03:00 [facility]U Transfer Note    Transfer from: MICU  Transfer to:  ( c) Medicine    (  ) Telemetry    (  ) RCU    (  ) Palliative    (  ) Stroke Unit    (  ) _______________  Accepting physican:Dr. Thomason      MICU COURSE:  Patient is a 69 y.o F w/ PMH lung ca (dx 8/2020) w/ mets to brain, spine, and R hip s/p radiation therapy x5 (last tx 9/2) who was initially sent from rehab for hypotensive to 90s s/p 3.5 L and vanc/zosyn x1. She was admitted to MICU for septic shock requiring levophed likely 2/2 proctocolitis w/ concern for enterovesical fistual as seen on CT abdomen/pelvis. Patient was started on meropenem and IV vancomycin. She was found  to be c. diff positive so she was transitioned to PO vanc and IV flagyl. She was weaned off of levophed on 10/11 w/ midodrine 30 q8h.           ASSESSMENT & PLAN:   Patient is a 69 y.o F w/ PMH lung ca (dx 8/2020) w/ mets to brain, spine, and R hip s/p radiation therapy x5 (last tx 9/2) who was admitted for septic shock likely 2/2 c. diff proctocolitis as well as concern for colovesical fistula as seen on CT abd/pelvis    #Sepsis  -Admitted w/ septic shock, now weaned off of levophed  -c/w PO vanc [10/11- ] and IV flagyl [10/11-  -c/w Iv meropenem  -W/ worsening leukocytosis, monitor CBC  -BCX NGTD, UCX w/ yeast  -Wean midodrine as tolerated; currently ta 30 q8h     #Proctocolitis  -See plan as italo    #Colovesical fistula  -On imaging, concern for colovesical fistula as air in the bladder  -Surgery consulted; no surgical intervention at this time  -c/w IV abx    #Lung ca w/ bony mets  -Followed by Dr. Xie at [facility]  -metastatic extension of primary lung mass to vertebral bodies and extension to spinal cord on current imaging, similar to as seen on prior PET        For Follow-Up:  [ ] c/w IV abx  [ ] f/u Surgery recs  [ ] consider Oncology consult   [ ] f/u UCX        Vital Signs Last 24 Hrs  T(C): 36.5 (12 Oct 2020 16:00), Max: 36.7 (12 Oct 2020 12:00)  T(F): 97.7 (12 Oct 2020 16:00), Max: 98 (12 Oct 2020 12:00)  HR: 71 (12 Oct 2020 17:30) (53 - 83)  BP: 83/58 (12 Oct 2020 17:30) (83/58 - 135/64)  BP(mean): 68 (12 Oct 2020 17:30) (64 - 96)  RR: 20 (12 Oct 2020 17:30) (15 - 25)  SpO2: 95% (12 Oct 2020 17:30) (95% - 100%)  I&O's Summary    11 Oct 2020 07:01  -  12 Oct 2020 07:00  --------------------------------------------------------  IN: 1257.6 mL / OUT: 650 mL / NET: 607.6 mL    12 Oct 2020 07:01  -  12 Oct 2020 18:03  --------------------------------------------------------  IN: 1450 mL / OUT: 750 mL / NET: 700 mL          MEDICATIONS  (STANDING):  acetaminophen   Tablet .. 500 milliGRAM(s) Oral every 4 hours  aspirin 325 milliGRAM(s) Oral daily  gabapentin 100 milliGRAM(s) Oral three times a day  melatonin 3 milliGRAM(s) Oral at bedtime  meropenem  IVPB 1000 milliGRAM(s) IV Intermittent every 12 hours  metroNIDAZOLE  IVPB      metroNIDAZOLE  IVPB 500 milliGRAM(s) IV Intermittent every 8 hours  midodrine 30 milliGRAM(s) Oral every 8 hours  ondansetron    Tablet 4 milliGRAM(s) Oral every 12 hours  pantoprazole    Tablet 40 milliGRAM(s) Oral before breakfast  sodium chloride 0.9%. 1000 milliLiter(s) (75 mL/Hr) IV Continuous <Continuous>  vancomycin    Solution 500 milliGRAM(s) Oral every 6 hours    MEDICATIONS  (PRN):  ondansetron Injectable 4 milliGRAM(s) IV Push every 6 hours PRN Nausea and/or Vomiting  oxyCODONE    IR 5 milliGRAM(s) Oral every 6 hours PRN Moderate Pain (4 - 6)        LABS                                            8.9                   Neurophils% (auto):   85.1   (10-12 @ 03:00):    16.98)-----------(486          Lymphocytes% (auto):  3.3                                           27.5                   Eosinphils% (auto):   0.0      Manual%: Neutrophils x    ; Lymphocytes x    ; Eosinophils x    ; Bands%: x    ; Blasts x                                    132    |  103    |  10                  Calcium: 8.0   / iCa: x      (10-12 @ 03:00)    ----------------------------<  116       Magnesium: 1.5                              4.2     |  20     |  0.36             Phosphorous: 2.0      TPro  4.7    /  Alb  2.3    /  TBili  < 0.2  /  DBili  x      /  AST  21     /  ALT  18     /  AlkPhos  137    12 Oct 2020 03:00

## 2020-10-12 NOTE — PHYSICAL THERAPY INITIAL EVALUATION ADULT - ADDITIONAL COMMENTS
Pt is from rehab. Prior to rehab pt lived in a private house with her daughter and grandchild with 4 steps to enter; (+)Bilateral handrails far apart, and a flight of stairs to negotiate to bedroom; although pt's daughter has now brought her bed to the first floor. Prior to rehab pt was ambulating independently using a rolling walker. Pt denies any recent falls.    Pt left comfortable in bed, NAD, all lines intact, all precautions maintained, with call bell in reach, and RN aware and present during PT evaluation.

## 2020-10-12 NOTE — PHYSICAL THERAPY INITIAL EVALUATION ADULT - MANUAL MUSCLE TESTING RESULTS, REHAB EVAL
grossly assessed due to/Cardiac/bone mets/spinal precautions; bilateral UE at least 3+/5, Left LE 3/5, and right hamstrings and quadriceps 3-/5, right anterior tibialis 3/5

## 2020-10-12 NOTE — PHYSICAL THERAPY INITIAL EVALUATION ADULT - DIAGNOSIS, PT EVAL
Pt admitted for Hypotension; pt presents with decreased strength, decreased balance, and difficulty with ambulation.

## 2020-10-12 NOTE — PROGRESS NOTE ADULT - SUBJECTIVE AND OBJECTIVE BOX
Contact Information:  Margo Chaudhry II, MD, MPH  PGY-2, Internal Medicine  Pager: [phone] ([facility]) /// [phone] ([facility])    MAGDALENE YOST, MRN-[id number]    Patient is a 69y old  Female who presents with a chief complaint of hypotension (11 Oct 2020 13:20)      INTERVAL/OVERNIGHT EVENTS:    SUBJECTIVE:    CONSTITUTIONAL: No weakness. No fatigue. No fever.  HEAD: No head trauma.   EYES: No vision changes.  ENT: No hearing changes or tinnitus. No ear pain. No changes in smell. No nasal congestion or discharge. No sore throat. No voice hoarseness.   NECK: No neck pain or stiffness. No lumps.  RESPIRATORY: No cough. No SOB. No wheezing. No hemoptysis.   CARDIOVASCULAR: No chest pain. No palpitations.   GASTROINTESTINAL: No dysphagia. No ABD pain. No distension. No constipation. No diarrhea. No pain with defecation. No hematemesis. No hematochezia or melena.  BACK: No back pain.  GENITOURINARY: No dysuria. No frequency or urgency. No hesitancy. No incontinence. No urinary retention. No suprapubic pain. No hematuria.  EXTREMITY: No swelling.  MUSCULOSKELETAL: No joint pain or swelling. No fractures. No stiffness.    SKIN: No rashes. No itching. No skin, hair, or nail changes.  NEUROLOGICAL: No weakness or paralysis. No lightheadedness or dizziness. No HA. No numbness or tingling.   PSYCHIATRIC: No depression.       OBJECTIVE:  ICU Vital Signs Last 24 Hrs  T(C): 36.1 (12 Oct 2020 04:00), Max: 36.8 (11 Oct 2020 08:00)  T(F): 96.9 (12 Oct 2020 04:00), Max: 98.3 (11 Oct 2020 08:00)  HR: 80 (12 Oct 2020 06:00) (53 - 83)  BP: 117/82 (12 Oct 2020 06:00) (88/52 - 143/60)  BP(mean): 91 (12 Oct 2020 06:00) (39 - 105)  ABP: --  ABP(mean): --  RR: 18 (12 Oct 2020 06:00) (15 - 38)  SpO2: 98% (12 Oct 2020 06:00) (96% - 100%)    Daily     Daily Weight in k.5 (12 Oct 2020 02:00)  I&O's Summary    10 Oct 2020 07:01  -  11 Oct 2020 07:00  --------------------------------------------------------  IN: 996.8 mL / OUT: 350 mL / NET: 646.8 mL    11 Oct 2020 07:01  -  12 Oct 2020 06:45  --------------------------------------------------------  IN: 1257.6 mL / OUT: 650 mL / NET: 607.6 mL      Adult Advanced Hemodynamics Last 24 Hrs  CVP(mm Hg): --  CVP(cm H2O): --  CO: --  CI: --  PA: --  PA(mean): --  PCWP: --  SVR: --  SVRI: --  PVR: --  PVRI: --      MEDICATIONS  (STANDING):  acetaminophen   Tablet .. 500 milliGRAM(s) Oral every 4 hours  aspirin 325 milliGRAM(s) Oral daily  chlorhexidine 4% Liquid 1 Application(s) Topical <User Schedule>  enoxaparin Injectable 40 milliGRAM(s) SubCutaneous daily  gabapentin 100 milliGRAM(s) Oral three times a day  melatonin 3 milliGRAM(s) Oral at bedtime  meropenem  IVPB 1000 milliGRAM(s) IV Intermittent every 8 hours  metroNIDAZOLE  IVPB      metroNIDAZOLE  IVPB 500 milliGRAM(s) IV Intermittent every 8 hours  midodrine 30 milliGRAM(s) Oral every 8 hours  ondansetron    Tablet 4 milliGRAM(s) Oral every 12 hours  pantoprazole    Tablet 40 milliGRAM(s) Oral before breakfast  vancomycin    Solution 500 milliGRAM(s) Oral every 6 hours    MEDICATIONS  (PRN):  ondansetron Injectable 4 milliGRAM(s) IV Push every 6 hours PRN Nausea and/or Vomiting  oxyCODONE    IR 5 milliGRAM(s) Oral every 6 hours PRN Moderate Pain (4 - 6)    Allergies    No Known Allergies    Intolerances        CONSTITUTIONAL: No acute distress. Awake and alert.  HEAD: No evidence of trauma. Structures WNL.  EYES: +PERRL. +EOMI. No scleral icterus. No conjunctival injection.  ENT: Moist oral mucosa. No erythema. No pharyngeal exudates.   NECK: Supple. Appropriate ROM. No stiffness. No masses or lymphadenopathy.  RESPIRATORY: CTAB. No wheezes, rales, or rhonchi. No accessory muscle use. No apparent respiratory distress.  CARDIOVASCULAR: +S1/S2. No audible S3/S4. Regular rate and rhythm. No murmurs, rubs, or gallops. 2+ radial pulses x b/l UE; 2+ DP pulses x b/l LE.   GASTROINTESTINAL: Soft, nontender, nondistended. +BS. No rebound or guarding.   BACK: No spinal or paraspinal tenderness. No CVA tenderness.  EXTREMITY: No LE swelling or edema. EXTs warm to touch.  MUSCULOSKELETAL: Spontaneous movement in all extremities.  DERMATOLOGICAL: No abnormal rashes or lesions.  NEUROLOGICAL: CN 2-12 grossly intact. No focal deficits. Sensation intact x 4EXT. A&Ox3 (oriented to person, place, and time).  PSYCHIATRIC: Appropriate affect.                            8.9    16.98 )-----------( 486      ( 12 Oct 2020 03:00 )             27.5     PT/INR - ( 10 Oct 2020 11:27 )   PT: 12.5 SEC;   INR: 1.09          PTT - ( 10 Oct 2020 11:27 )  PTT:27.0 SEC  10-12    132<L>  |  103  |  10  ----------------------------<  116<H>  4.2   |  20<L>  |  0.36<L>    Ca    8.0<L>      12 Oct 2020 03:00  Phos  2.0     10-12  Mg     1.5     1012    TPro  4.7<L>  /  Alb  2.3<L>  /  TBili  < 0.2<L>  /  DBili  x   /  AST  21  /  ALT  18  /  AlkPhos  137<H>  10-12    CAPILLARY BLOOD GLUCOSE        LIVER FUNCTIONS - ( 12 Oct 2020 03:00 )  Alb: 2.3 g/dL / Pro: 4.7 g/dL / ALK PHOS: 137 u/L / ALT: 18 u/L / AST: 21 u/L / GGT: x               Urinalysis Basic - ( 12 Oct 2020 02:10 )    Color: YELLOW / Appearance: Lt TURBID / S.038 / pH: 6.5  Gluc: NEGATIVE / Ketone: MODERATE  / Bili: NEGATIVE / Urobili: TRACE   Blood: TRACE / Protein: 70 / Nitrite: NEGATIVE   Leuk Esterase: SMALL / RBC: 6-10 / WBC 26-50   Sq Epi: MANY / Non Sq Epi: x / Bacteria: SMALL        GI PCR Panel, Stool (collected 11 Oct 2020 07:54)  Source: .Stool c&amp;s narayan  Final Report (11 Oct 2020 14:43):    GI PCR Results: NOT detected    *******Please Note:*******    GI panel PCR evaluates for:    Campylobacter, Plesiomonas shigelloides, Salmonella,    Vibrio, Yersinia enterocolitica, Enteroaggregative    Escherichia coli (EAEC), Enteropathogenic E.coli (EPEC),    Enterotoxigenic E. coli (ETEC) lt/st, Shiga-like    toxin-producing E. coli (STEC) stx1/stx2,    Shigella/ Enteroinvasive E. coli (EIEC), Cryptosporidium,    Cyclospora cayetanensis, Entamoeba histolytica,    Giardia lamblia, Adenovirus F 40/41, Astrovirus,    Norovirus GI/GII, Rotavirus A, Sapovirus    Culture - Urine (collected 10 Oct 2020 18:26)  Source: .Urine Clean Catch (Midstream)  Final Report (11 Oct 2020 23:30):    50,000 - 99,000 CFU/mL Yeast-like cells, presumptively not Candida    albicans "Susceptibilities not performed"    Culture - Blood (collected 10 Oct 2020 16:21)  Source: .Blood Blood-Venous  Preliminary Report (11 Oct 2020 17:01):    No growth to date.    Culture - Blood (collected 10 Oct 2020 16:21)  Source: .Blood Blood-Peripheral  Preliminary Report (11 Oct 2020 17:01):    No growth to date.      ABG - ( 11 Oct 2020 14:00 )  pH, Arterial: 7.43  pH, Blood: x     /  pCO2: 28    /  pO2: 76    / HCO3: 20    / Base Excess: -5.3  /  SaO2: 95.7                RADIOLOGY AND ADDITIONAL TESTS:    CONSULTANT NOTES REVIEWED:    CARE DISCUSSED WITH THE FOLLOWING CONSULTANTS/PROVIDERS: Contact Information:  Margo Chaudhry II, MD, MPH  PGY-2, Internal Medicine  Pager: [phone] ([facility]) /// [phone] ([facility])    MAGDALENE YOST, MRN-[id number]    Patient is a 69y old  Female who presents with a chief complaint of hypotension (11 Oct 2020 13:20)      INTERVAL/OVERNIGHT EVENTS: Urine culture - yeast like cells found, not Candida. Patient put out 350 from [employer].     SUBJECTIVE: Patient evaluated at bedside, complaining of left lower quadrant abdominal pain and a sensation of not being able to void as she wants (she has a [employer] in place). Otherwise, she denies any other complaints. No fever, lightheadedness, dizziness, SOB, CP, numbness/tingling, HA.     CONSTITUTIONAL: No weakness. No fatigue. No fever.  HEAD: No head trauma.   EYES: No vision changes.  ENT: No hearing changes or tinnitus. No ear pain. No changes in smell. No nasal congestion or discharge. No sore throat. No voice hoarseness.   NECK: No neck pain or stiffness. No lumps.  RESPIRATORY: No cough. No SOB. No wheezing. No hemoptysis.   CARDIOVASCULAR: No chest pain. No palpitations.   GASTROINTESTINAL: No dysphagia. +ABD pain. No distension. No constipation. +Watery diarrhea. No pain with defecation. No hematemesis. No hematochezia or melena.  BACK: No back pain.  GENITOURINARY: No dysuria. No frequency or urgency. No hesitancy. No incontinence. +Urinary retention. No suprapubic pain. No hematuria.  EXTREMITY: No swelling.  MUSCULOSKELETAL: No joint pain or swelling. No fractures. No stiffness.    SKIN: No rashes. No itching. No skin, hair, or nail changes.  NEUROLOGICAL: No weakness or paralysis. No lightheadedness or dizziness. No HA. No numbness or tingling.   PSYCHIATRIC: No depression.       OBJECTIVE:  ICU Vital Signs Last 24 Hrs  T(C): 36.1 (12 Oct 2020 04:00), Max: 36.8 (11 Oct 2020 08:00)  T(F): 96.9 (12 Oct 2020 04:00), Max: 98.3 (11 Oct 2020 08:00)  HR: 80 (12 Oct 2020 06:00) (53 - 83)  BP: 117/82 (12 Oct 2020 06:00) (88/52 - 143/60)  BP(mean): 91 (12 Oct 2020 06:00) (39 - 105)  ABP: --  ABP(mean): --  RR: 18 (12 Oct 2020 06:00) (15 - 38)  SpO2: 98% (12 Oct 2020 06:00) (96% - 100%)    Daily     Daily Weight in k.5 (12 Oct 2020 02:00)  I&O's Summary    10 Oct 2020 07:01  -  11 Oct 2020 07:00  --------------------------------------------------------  IN: 996.8 mL / OUT: 350 mL / NET: 646.8 mL    11 Oct 2020 07:01  -  12 Oct 2020 06:45  --------------------------------------------------------  IN: 1257.6 mL / OUT: 650 mL / NET: 607.6 mL      Adult Advanced Hemodynamics Last 24 Hrs  CVP(mm Hg): --  CVP(cm H2O): --  CO: --  CI: --  PA: --  PA(mean): --  PCWP: --  SVR: --  SVRI: --  PVR: --  PVRI: --      MEDICATIONS  (STANDING):  acetaminophen   Tablet .. 500 milliGRAM(s) Oral every 4 hours  aspirin 325 milliGRAM(s) Oral daily  chlorhexidine 4% Liquid 1 Application(s) Topical <User Schedule>  enoxaparin Injectable 40 milliGRAM(s) SubCutaneous daily  gabapentin 100 milliGRAM(s) Oral three times a day  melatonin 3 milliGRAM(s) Oral at bedtime  meropenem  IVPB 1000 milliGRAM(s) IV Intermittent every 8 hours  metroNIDAZOLE  IVPB      metroNIDAZOLE  IVPB 500 milliGRAM(s) IV Intermittent every 8 hours  midodrine 30 milliGRAM(s) Oral every 8 hours  ondansetron    Tablet 4 milliGRAM(s) Oral every 12 hours  pantoprazole    Tablet 40 milliGRAM(s) Oral before breakfast  vancomycin    Solution 500 milliGRAM(s) Oral every 6 hours    MEDICATIONS  (PRN):  ondansetron Injectable 4 milliGRAM(s) IV Push every 6 hours PRN Nausea and/or Vomiting  oxyCODONE    IR 5 milliGRAM(s) Oral every 6 hours PRN Moderate Pain (4 - 6)    Allergies    No Known Allergies    Intolerances        CONSTITUTIONAL: No acute distress. Awake and alert.  EYES: No scleral icterus. No conjunctival injection.  ENT: No erythema. No pharyngeal exudates.   RESPIRATORY: CTAB. No wheezes, rales, or rhonchi. No accessory muscle use. No apparent respiratory distress.  CARDIOVASCULAR: +S1/S2. No audible S3/S4. Regular rate and rhythm. No murmurs, rubs, or gallops.  GASTROINTESTINAL: Soft, nondistended, tenderness to palpation in the left upper quadrant. +BS. No rebound or guarding.   EXTREMITY: No LE swelling or edema. EXTs warm to touch.  MUSCULOSKELETAL: Spontaneous movement in all extremities.  NEUROLOGICAL: CN 2-12 grossly intact. No focal deficits. A&Ox3 (oriented to person, place, and time).                            8.9    16.98 )-----------( 486      ( 12 Oct 2020 03:00 )             27.5     PT/INR - ( 10 Oct 2020 11:27 )   PT: 12.5 SEC;   INR: 1.09          PTT - ( 10 Oct 2020 11:27 )  PTT:27.0 SEC  10-12    132<L>  |  103  |  10  ----------------------------<  116<H>  4.2   |  20<L>  |  0.36<L>    Ca    8.0<L>      12 Oct 2020 03:00  Phos  2.0     10-12  Mg     1.5     10-12    TPro  4.7<L>  /  Alb  2.3<L>  /  TBili  < 0.2<L>  /  DBili  x   /  AST  21  /  ALT  18  /  AlkPhos  137<H>  10-12    CAPILLARY BLOOD GLUCOSE        LIVER FUNCTIONS - ( 12 Oct 2020 03:00 )  Alb: 2.3 g/dL / Pro: 4.7 g/dL / ALK PHOS: 137 u/L / ALT: 18 u/L / AST: 21 u/L / GGT: x               Urinalysis Basic - ( 12 Oct 2020 02:10 )    Color: YELLOW / Appearance: Lt TURBID / S.038 / pH: 6.5  Gluc: NEGATIVE / Ketone: MODERATE  / Bili: NEGATIVE / Urobili: TRACE   Blood: TRACE / Protein: 70 / Nitrite: NEGATIVE   Leuk Esterase: SMALL / RBC: 6-10 / WBC 26-50   Sq Epi: MANY / Non Sq Epi: x / Bacteria: SMALL        GI PCR Panel, Stool (collected 11 Oct 2020 07:54)  Source: .Stool c&amp;s narayan  Final Report (11 Oct 2020 14:43):    GI PCR Results: NOT detected    *******Please Note:*******    GI panel PCR evaluates for:    Campylobacter, Plesiomonas shigelloides, Salmonella,    Vibrio, Yersinia enterocolitica, Enteroaggregative    Escherichia coli (EAEC), Enteropathogenic E.coli (EPEC),    Enterotoxigenic E. coli (ETEC) lt/st, Shiga-like    toxin-producing E. coli (STEC) stx1/stx2,    Shigella/ Enteroinvasive E. coli (EIEC), Cryptosporidium,    Cyclospora cayetanensis, Entamoeba histolytica,    Giardia lamblia, Adenovirus F 40/41, Astrovirus,    Norovirus GI/GII, Rotavirus A, Sapovirus    Culture - Urine (collected 10 Oct 2020 18:26)  Source: .Urine Clean Catch (Midstream)  Final Report (11 Oct 2020 23:30):    50,000 - 99,000 CFU/mL Yeast-like cells, presumptively not Candida    albicans "Susceptibilities not performed"    Culture - Blood (collected 10 Oct 2020 16:21)  Source: .Blood Blood-Venous  Preliminary Report (11 Oct 2020 17:01):    No growth to date.    Culture - Blood (collected 10 Oct 2020 16:21)  Source: .Blood Blood-Peripheral  Preliminary Report (11 Oct 2020 17:01):    No growth to date.      ABG - ( 11 Oct 2020 14:00 )  pH, Arterial: 7.43  pH, Blood: x     /  pCO2: 28    /  pO2: 76    / HCO3: 20    / Base Excess: -5.3  /  SaO2: 95.7                RADIOLOGY AND ADDITIONAL TESTS:    CONSULTANT NOTES REVIEWED:    CARE DISCUSSED WITH THE FOLLOWING CONSULTANTS/PROVIDERS:

## 2020-10-12 NOTE — PHYSICAL THERAPY INITIAL EVALUATION ADULT - PRECAUTIONS/LIMITATIONS, REHAB EVAL
fall precautions/BONE METS; Anterior hip precautions/right hip precautions/cardiac precautions/spinal precautions

## 2020-10-12 NOTE — PHYSICAL THERAPY INITIAL EVALUATION ADULT - PERTINENT HX OF CURRENT PROBLEM, REHAB EVAL
70yo F Hx Lung Ca in 8/2020 with mets to brain, Spine, and Right hip s/p RT x 5 ( last tx 9/2) presenting with hypotension, now requiring Levophed for pressure support after failed volume resuscitation and PO midodrine.

## 2020-10-12 NOTE — PHYSICAL THERAPY INITIAL EVALUATION ADULT - PATIENT PROFILE REVIEW, REHAB EVAL
yes/ACTIVITY: Increase as Vee yes/ACTIVITY: Increase as Tolerated; spoke with RN Lexie Joya prior to PT evaluation--> Pt OK for PT consult

## 2020-10-12 NOTE — PROGRESS NOTE ADULT - ATTENDING COMMENTS
Patient with known lung ca with mets now with septic shock due to proctocolitis, now doin well off pressors.  C/w abx for colitis, agree with also treating for c dif until c dif returns.  C/w midodrine.

## 2020-10-12 NOTE — PHYSICAL THERAPY INITIAL EVALUATION ADULT - GENERAL OBSERVATIONS, REHAB EVAL
Pt encountered in semisupine position, no distress, AxOx4, with +IV, +pouch for stool, +tele, +primafit, and hip abduction pillow.

## 2020-10-12 NOTE — PHYSICAL THERAPY INITIAL EVALUATION ADULT - GAIT DEVIATIONS NOTED, PT EVAL
decreased colin/decreased step length/decreased weight-shifting ability/increased time in double stance/decreased stride length

## 2020-10-13 NOTE — PROGRESS NOTE ADULT - PROBLEM SELECTOR PLAN 1
Admitted for septic shock, weaned off levophed   - c/w po vanc (10/11-), IV flagyl (10/11-), and meropenem  - BCx: NGTD, UCx w/ yeast; initially + for c. diff now negative   - wean midodrine 30mg q8h  - monitor WBC, fever spikes Admitted for septic shock, weaned off levophed   - c/w po vanc (10/11-), IV flagyl (10/11-), and meropenem  - BCx: NGTD, UCx w/ yeast; + for c. diff toxin  - wean midodrine 30mg q8h  - monitor WBC, fever spikes Admitted for septic shock, weaned off levophed   - c/w po vanc (10/11-), IV flagyl (10/11-)  - D/C Meropenem   - BCx: NGTD, UCx w/ yeast; + for c. diff toxin  - wean midodrine 30mg q8h  - monitor WBC, fever spikes  - Consult ID for Abx regimen considering C. diff proctocolitis and colovesicular fistula

## 2020-10-13 NOTE — CONSULT NOTE ADULT - SUBJECTIVE AND OBJECTIVE BOX
Masha Abdalla - [phone]    Patient is a 69y old  Female who presents with a chief complaint of hypotension (13 Oct 2020 14:26)    HPI:  68yo F Hx Lung Ca in 2020 with mets to brain, Spine, and Right hip s/p RT x 5 ( last tx ) presenting with hypotension. Was recently admitted into the hospital for right hip pain s/p wide resection JAYANT on  c/b intubation and pneumonia. Patient was hypotensive to 90's systolic at rehab. Was given 3.5L of fluids and a dose of vancomycin/zosyn and sent to the ED. Collateral was obtained from family. According to them, patient was in usual health prior to symptoms; had constipation in the past week, but had a bowel movement yesterday after a enema. Patient endorsed some pain during the enema. Per patient, she had been experiencing decreased appetite for several weeks w/ nausea and intermittent coughing up clear sputum. Her main complaint is abdominal discomfort.  Patient had received radiation therapy in the past for her lung CA, but no chemotherapy - following at [facility] with Dr. Xie. Patient is full code according to family.     In the ED, patient was afebrile, BP was 70's systolic was given additional 2 L of fluid resuscitation and 20 mg + 30 mg of midodrine, satting well on nasal canula. Pressures continued to be labile despite adequate volume resuscitation. Patient was started on Levophed for pressure support and transferred to MICU.  (10 Oct 2020 16:16)       prior hospital charts reviewed [  ]  primary team notes reviewed [  ]  other consultant notes reviewed [  ]    PAST MEDICAL & SURGICAL HISTORY:  Lung cancer metastatic to bone    HiP surgery        Allergies  No Known Allergies    ANTIMICROBIALS:  piperacillin/tazobactam IVPB (10/10 x1)  meropenem  IVPB (10/10-10/13)  metroNIDAZOLE  IVPB 500 every 8 hours (10/11-)  vancomycin    Solution 500 every 6 hours (10/10-)    MEDICATIONS  (STANDING):  acetaminophen   Tablet .. 500 every 4 hours  aspirin 325 daily  enoxaparin Injectable 40 daily  gabapentin 100 three times a day  melatonin 3 at bedtime  midodrine 30 every 8 hours  ondansetron    Tablet 4 every 12 hours  pantoprazole    Tablet 40 before breakfast  PRN  ondansetron Injectable 4 milliGRAM(s) IV Push every 6 hours PRN  oxyCODONE    IR 5 milliGRAM(s) Oral every 6 hours PRN    SOCIAL HISTORY:   from rehab    FAMILY HISTORY:  FH: colon cancer  Family history of cervical cancer    REVIEW OF SYSTEMS  [  ] ROS unobtainable because:    [  ] All other systems negative except as noted below:	    Constitutional:  [ ] fever [ ] chills  [ ] weight loss  [ ] weakness  Skin:  [ ] rash [ ] phlebitis	  Eyes: [ ] icterus [ ] pain  [ ] discharge	  ENMT: [ ] sore throat  [ ] thrush [ ] ulcers [ ] exudates  Respiratory: [ ] dyspnea [ ] hemoptysis [ ] cough [ ] sputum	  Cardiovascular:  [ ] chest pain [ ] palpitations [ ] edema	  Gastrointestinal:  [ ] nausea [ ] vomiting [ ] diarrhea [ ] constipation [ ] pain	  Genitourinary:  [ ] dysuria [ ] frequency [ ] hematuria [ ] discharge [ ] flank pain  [ ] incontinence  Musculoskeletal:  [ ] myalgias [ ] arthralgias [ ] arthritis  [ ] back pain  Neurological:  [ ] headache [ ] seizures  [ ] confusion/altered mental status  Psychiatric:  [ ] anxiety [ ] depression	  Hematology/Lymphatics:  [ ] lymphadenopathy  Endocrine:  [ ] adrenal [ ] thyroid  Allergic/Immunologic:	 [ ] transplant [ ] seasonal    Vital Signs Last 24 Hrs  T(F): 97.7 (10-13-20 @ 12:42), Max: 99.1 (10-10-20 @ 11:49)  Vital Signs Last 24 Hrs  HR: 77 (10-13-20 @ 13:57) (57 - 77)  BP: 91/60 (10-13-20 @ 13:57) (83/58 - 135/64)  RR: 16 (10-13-20 @ 12:42)  SpO2: 98% (10-13-20 @ 12:42) (92% - 99%)  Wt(kg): --    PHYSICAL EXAM:                              8.8    9.68  )-----------( 440      ( 13 Oct 2020 07:31 )             28.2     132<L>  |  106  |  12  ----------------------------<  116<H>  4.0   |  17<L>  |  0.33<L>    Ca    7.0<L>      13 Oct 2020 07:31  Phos  1.7     10-  Mg     1.8     10-13    TPro  4.1<L>  /  Alb  1.8<L>  /  TBili  < 0.2<L>  /  DBili  x   /  AST  30  /  ALT  18  /  AlkPhos  143<H>  10-13    Urinalysis Basic - ( 12 Oct 2020 02:10 )    Color: YELLOW / Appearance: Lt TURBID / S.038 / pH: 6.5  Gluc: NEGATIVE / Ketone: MODERATE  / Bili: NEGATIVE / Urobili: TRACE   Blood: TRACE / Protein: 70 / Nitrite: NEGATIVE   Leuk Esterase: SMALL / RBC: 6-10 / WBC 26-50   Sq Epi: MANY / Non Sq Epi: x / Bacteria: SMALL    MICROBIOLOGY:  Culture - Nose (collected 11 Oct 2020 19:49)  Source: .Nose Nose  Preliminary Report (12 Oct 2020 17:14):    Culture in progress    GI PCR Panel, Stool (collected 11 Oct 2020 07:54)  Source: .Stool c&amp;s narayan  Final Report (11 Oct 2020 14:43):    GI PCR Results: NOT detected     Clostridium difficile Toxin by PCR: Detected:     Culture - Urine (collected 10 Oct 2020 18:26)  Source: .Urine Clean Catch (Midstream)  Final Report (11 Oct 2020 23:30):    50,000 - 99,000 CFU/mL Yeast-like cells, presumptively not Candida    albicans "Susceptibilities not performed"    Culture - Blood (collected 10 Oct 2020 16:21)  Source: .Blood Blood-Venous  Preliminary Report (11 Oct 2020 17:01):    No growth to date.    Culture - Blood (collected 10 Oct 2020 16:21)  Source: .Blood Blood-Peripheral  Preliminary Report (11 Oct 2020 17:01):    No growth to date.    Rapid RVP Result: NotDetec (10-10 @ 12:16)    COVID-19 PCR: NotDetec (20 @ 16:48)  COVID-19 PCR: NotDetec (20 @ 02:37)  COVID-19 PCR: NotDetec (20 @ 13:48)  COVID-19 PCR: NotDetec (20 @ 23:41)    RADIOLOGY:  imaging below personally reviewed and agree with findings    Xray Chest 1 View- PORTABLE-Urgent (10.10.20 @ 12:42) >  IMPRESSION: Clear lungs.    CT Abdomen and Pelvis w/ IV Cont (10.10.20 @ 13:22) >  IMPRESSION:  Right upper lobe spiculated mass, consistent with known lung carcinoma, unchanged. Right hilar and right retrocrural adenopathy is again noted.  Bilateral pleural effusions, left greater than right, with associated compressive atelectasis.  Moderate pericardial effusion, slightly increased since 10/2/2020.  New diffuse colonic wall thickening involving the entire colon and rectum, consistent with a proctocolitis, new since 10/2/2020.  Ill-defined somewhat wedge-shaped area of decreased attenuation in the lower pole of the left kidney. Differential diagnosis includes an area of ischemia and infection.  Extensive lytic osseous metastatic disease with involvement of the spine at multiple levels and encroachment upon the spinal canal as on prior PET/CT dated 10/2/2020.  Gas within the urinary bladder. Correlation with any history of recent instrumentation is recommended. In the absence of recent instrumentation, infection or fistula to bowel is considered. Masha Abdalla - [phone]    Patient is a 69y old  Female who presents with a chief complaint of hypotension (13 Oct 2020 14:26)    HPI:  69F recently diagnosed with lung Ca in 2020 with mets to brain, Spine, and Right hip s/p RT x 5 (last tx ).  Recently underwent R hip open surgery for metastatic disease and placement of hardware on .  Gail-op antibiotics.  Was in rehab when she developed hypotension. Went to ICU for hypotension and resuscitation.  Was started on high dose midodrine and continues to be borderline hypotensive.  CT with pancolitis and is Cdiff (+).  Initially on broad spectrum antibiotics, she is now on vancomycin enterally and IV metronidazole.  She is being followed by colorectal surgery.  No evidence of toxic megacolon at this point.    ID asked to help management    prior hospital charts reviewed [ x ]  primary team notes reviewed [ x ]  other consultant notes reviewed [ x ]    PAST MEDICAL & SURGICAL HISTORY:  Lung cancer metastatic to bone  R open hip surgery     Allergies  No Known Allergies    ANTIMICROBIALS:  piperacillin/tazobactam IVPB (10/10 x1)  meropenem  IVPB (10/10-10/13)  metroNIDAZOLE  IVPB 500 every 8 hours (10/11-)  vancomycin    Solution 500 every 6 hours (10/10-)    MEDICATIONS  (STANDING):  acetaminophen   Tablet .. 500 every 4 hours  aspirin 325 daily  enoxaparin Injectable 40 daily  gabapentin 100 three times a day  melatonin 3 at bedtime  midodrine 30 every 8 hours  ondansetron    Tablet 4 every 12 hours  pantoprazole    Tablet 40 before breakfast  PRN  ondansetron Injectable 4 milliGRAM(s) IV Push every 6 hours PRN  oxyCODONE    IR 5 milliGRAM(s) Oral every 6 hours PRN    SOCIAL HISTORY:   from rehab    FAMILY HISTORY:  FH: colon cancer  Family history of cervical cancer    REVIEW OF SYSTEMS  [  ] ROS unobtainable because:    [ x ] All other systems negative except as noted below:	    Constitutional:  [ ] fever [ ] chills  [ ] weight loss  [x ] weakness  Skin:  [ ] rash [ ] phlebitis	  Eyes: [ ] icterus [ ] pain  [ ] discharge	  ENMT: [ ] sore throat  [ ] thrush [ ] ulcers [ ] exudates  Respiratory: [ ] dyspnea [ ] hemoptysis [ ] cough [ ] sputum	  Cardiovascular:  [ ] chest pain [ ] palpitations [ ] edema	  Gastrointestinal:  [ ] nausea [ ] vomiting [ ] diarrhea [ ] constipation [ x] pain	  Genitourinary:  [ ] dysuria [ ] frequency [ ] hematuria [ ] discharge [ ] flank pain  [ ] incontinence  Musculoskeletal:  [ ] myalgias [ ] arthralgias [ ] arthritis  [ ] back pain  Neurological:  [ ] headache [ ] seizures  [ ] confusion/altered mental status  Psychiatric:  [ ] anxiety [ ] depression	  Hematology/Lymphatics:  [ ] lymphadenopathy  Endocrine:  [ ] adrenal [ ] thyroid  Allergic/Immunologic:	 [ ] transplant [ ] seasonal    Vital Signs Last 24 Hrs  T(F): 97.7 (10-13-20 @ 12:42), Max: 99.1 (10-10-20 @ 11:49)  Vital Signs Last 24 Hrs  HR: 77 (10-13-20 @ 13:57) (57 - 77)  BP: 91/60 (10-13-20 @ 13:57) (83/58 - 135/64)  RR: 16 (10-13-20 @ 12:42)  SpO2: 98% (10-13-20 @ 12:42) (92% - 99%)  Wt(kg): --    PHYSICAL EXAM:  Constitutional: non-toxic, weak appearing  HEAD/EYES: anicteric  ENT:  supple  Cardiovascular:   normal S1, S2  Respiratory:  clear BS bilaterally  GI:  distended, tender diffusely, +BS  :  no andrea  Musculoskeletal:  no synovitis  Neurologic: awake and alert, normal strength, no focal findings  Skin:  R hip surgical site well healed  Psychiatric:  awake, alert, appropriate mood    WBC Count: 9.68 (10-13-20 @ 07:31)  WBC Count: 16.98 (10-12-20 @ 03:00)  WBC Count: 15.68 (10-11-20 @ 05:33)  WBC Count: 12.72 (10-10-20 @ 11:27)                       8.8    9.68  )-----------( 440      ( 13 Oct 2020 07:31 )             28.2     132<L>  |  106  |  12  ----------------------------<  116<H>  4.0   |  17<L>  |  0.33<L>    Ca    7.0<L>      13 Oct 2020 07:31  Phos  1.7     10-13  Mg     1.8     10-13    TPro  4.1<L>  /  Alb  1.8<L>  /  TBili  < 0.2<L>  /  DBili  x   /  AST  30  /  ALT  18  /  AlkPhos  143<H>  10-13    Urinalysis Basic - ( 12 Oct 2020 02:10 )  Color: YELLOW / Appearance: Lt TURBID / S.038 / pH: 6.5  Gluc: NEGATIVE / Ketone: MODERATE  / Bili: NEGATIVE / Urobili: TRACE   Blood: TRACE / Protein: 70 / Nitrite: NEGATIVE   Leuk Esterase: SMALL / RBC: 6-10 / WBC 26-50   Sq Epi: MANY / Non Sq Epi: x / Bacteria: SMALL    MICROBIOLOGY:  Culture - Nose (collected 11 Oct 2020 19:49)  Source: .Nose Nose  Preliminary Report (12 Oct 2020 17:14):    Culture in progress    GI PCR Panel, Stool (collected 11 Oct 2020 07:54)  Source: .Stool c&amp;s narayan  Final Report (11 Oct 2020 14:43):    GI PCR Results: NOT detected     Clostridium difficile Toxin by PCR: Detected:     Culture - Urine (collected 10 Oct 2020 18:26)  Source: .Urine Clean Catch (Midstream)  Final Report (11 Oct 2020 23:30):    50,000 - 99,000 CFU/mL Yeast-like cells, presumptively not Candida    albicans "Susceptibilities not performed"    Culture - Blood (collected 10 Oct 2020 16:21)  Source: .Blood Blood-Venous  Preliminary Report (11 Oct 2020 17:01):    No growth to date.    Culture - Blood (collected 10 Oct 2020 16:21)  Source: .Blood Blood-Peripheral  Preliminary Report (11 Oct 2020 17:01):    No growth to date.    Rapid RVP Result: NotDetec (10-10 @ 12:16)    COVID-19 PCR: NotDetec (20 @ 16:48)  COVID-19 PCR: NotDetec (20 @ 02:37)  COVID-19 PCR: NotDetec (20 @ 13:48)  COVID-19 PCR: NotDetec (20 @ 23:41)    RADIOLOGY:  imaging below personally reviewed and agree with findings    Xray Chest 1 View- PORTABLE-Urgent (10.10.20 @ 12:42) >  IMPRESSION: Clear lungs.    CT Abdomen and Pelvis w/ IV Cont (10.10.20 @ 13:22) >  IMPRESSION:  Right upper lobe spiculated mass, consistent with known lung carcinoma, unchanged. Right hilar and right retrocrural adenopathy is again noted.  Bilateral pleural effusions, left greater than right, with associated compressive atelectasis.  Moderate pericardial effusion, slightly increased since 10/2/2020.  New diffuse colonic wall thickening involving the entire colon and rectum, consistent with a proctocolitis, new since 10/2/2020.  Ill-defined somewhat wedge-shaped area of decreased attenuation in the lower pole of the left kidney. Differential diagnosis includes an area of ischemia and infection.  Extensive lytic osseous metastatic disease with involvement of the spine at multiple levels and encroachment upon the spinal canal as on prior PET/CT dated 10/2/2020.  Gas within the urinary bladder. Correlation with any history of recent instrumentation is recommended. In the absence of recent instrumentation, infection or fistula to bowel is considered.

## 2020-10-13 NOTE — PROGRESS NOTE ADULT - ASSESSMENT
69F Hx Lung Ca in 8/2020 with mets to brain, Spine, and Right hip s/p RT x 5 ( last tx 9/2) presenting with sepsis and CT scan findings showing proctocolitis of rectum and colon and air in bladder suspicious for colovesical fistula.    - No acute surgical intervention at this time  - Goals of care discussion  - Management of sepsis per primary team  - Please page [phone] with any further questions or concerns.      VISH Kohler, PGY3  General Surgery (A Team Surgery)  [phone] with any questions   69F Hx Lung Ca in 8/2020 with mets to brain, Spine, and Right hip s/p RT x 5 ( last tx 9/2) presenting with sepsis and CT scan findings showing proctocolitis of rectum and colon and air in bladder suspicious for colovesical fistula.    - No acute surgical intervention at this time  - Goals of care discussion  - Management of C. diff and sepsis per primary team  - Please page [phone] with any further questions or concerns.      VISH Kohler, PGY3  General Surgery (A Team Surgery)  [phone] with any questions

## 2020-10-13 NOTE — PROGRESS NOTE ADULT - SUBJECTIVE AND OBJECTIVE BOX
GENERAL SURGERY DAILY PROGRESS NOTE:       Subjective: Patient examined at bedside. No acute events overnight.       Objective:    Vital Signs Last 24 Hrs  T(C): 36.5 (13 Oct 2020 12:42), Max: 36.9 (13 Oct 2020 05:00)  T(F): 97.7 (13 Oct 2020 12:42), Max: 98.4 (13 Oct 2020 05:00)  HR: 77 (13 Oct 2020 13:57) (57 - 77)  BP: 91/60 (13 Oct 2020 13:57) (83/58 - 135/64)  BP(mean): 71 (12 Oct 2020 21:00) (67 - 84)  RR: 16 (13 Oct 2020 12:42) (15 - 25)  SpO2: 98% (13 Oct 2020 12:42) (92% - 99%)    I&O's Detail    12 Oct 2020 07:01  -  13 Oct 2020 07:00  --------------------------------------------------------  IN:    IV PiggyBack: 50 mL    Lactated Ringers Bolus: 500 mL    Oral Fluid: 300 mL    sodium chloride 0.9%: 825 mL  Total IN: 1675 mL    OUT:    Intermittent Catheterization - Urethral (mL): 600 mL    Rectal Tube (mL): 450 mL    Voided (mL): 0 mL  Total OUT: 1050 mL    Total NET: 625 mL      13 Oct 2020 07:01  -  13 Oct 2020 14:26  --------------------------------------------------------  IN:  Total IN: 0 mL    OUT:    Intermittent Catheterization - Urethral (mL): 250 mL  Total OUT: 250 mL    Total NET: -250 mL          Physical Exam:    General: NAD, well-nourished  HEENT: Atraumatic, EOMI  Resp: Breathing comfortably on RA  CV: Normal sinus rhythm  Abd: soft, nondistended, mildly tender in lower abdomen  Ext: ROMIx4, motor strength intact x 4      Labaratory Results:                          8.8    9.68  )-----------( 440      ( 13 Oct 2020 07:31 )             28.2     10-13    132<L>  |  106  |  12  ----------------------------<  116<H>  4.0   |  17<L>  |  0.33<L>    Ca    7.0<L>      13 Oct 2020 07:31  Phos  1.7     10-  Mg     1.8     10-13    TPro  4.1<L>  /  Alb  1.8<L>  /  TBili  < 0.2<L>  /  DBili  x   /  AST  30  /  ALT  18  /  AlkPhos  143<H>  10-      Urinalysis Basic - ( 12 Oct 2020 02:10 )    Color: YELLOW / Appearance: Lt TURBID / S.038 / pH: 6.5  Gluc: NEGATIVE / Ketone: MODERATE  / Bili: NEGATIVE / Urobili: TRACE   Blood: TRACE / Protein: 70 / Nitrite: NEGATIVE   Leuk Esterase: SMALL / RBC: 6-10 / WBC 26-50   Sq Epi: MANY / Non Sq Epi: x / Bacteria: SMALL        Radiology and Additional Tests:    Medications:    MEDICATIONS  (STANDING):  acetaminophen   Tablet .. 500 milliGRAM(s) Oral every 4 hours  aspirin 325 milliGRAM(s) Oral daily  enoxaparin Injectable 40 milliGRAM(s) SubCutaneous daily  gabapentin 100 milliGRAM(s) Oral three times a day  melatonin 3 milliGRAM(s) Oral at bedtime  metroNIDAZOLE  IVPB 500 milliGRAM(s) IV Intermittent every 8 hours  metroNIDAZOLE  IVPB      midodrine 30 milliGRAM(s) Oral every 8 hours  ondansetron    Tablet 4 milliGRAM(s) Oral every 12 hours  pantoprazole    Tablet 40 milliGRAM(s) Oral before breakfast  potassium phosphate / sodium phosphate Powder (PHOS-NaK) 1 Packet(s) Oral three times a day with meals  sodium chloride 0.9%. 1000 milliLiter(s) (75 mL/Hr) IV Continuous <Continuous>  vancomycin    Solution 500 milliGRAM(s) Oral every 6 hours    MEDICATIONS  (PRN):  ondansetron Injectable 4 milliGRAM(s) IV Push every 6 hours PRN Nausea and/or Vomiting  oxyCODONE    IR 5 milliGRAM(s) Oral every 6 hours PRN Moderate Pain (4 - 6)

## 2020-10-13 NOTE — PROGRESS NOTE ADULT - PROBLEM SELECTOR PLAN 4
- Followed by Dr. Xie at [facility] - Followed by Dr. Xie at [facility]: s/p 5 rounds RT (last 9/2/2020)   - consult Onc for management considering apparent progression of lung mets to multiple spinal areas.

## 2020-10-13 NOTE — PROGRESS NOTE ADULT - ASSESSMENT
68 y/o F hx/ Lung Ca 8/2020 w/ mets to brain, spine, R hip, s/p RT x5 (last 9/2) admitted to MICU for septic shock found to have C. Diff proctocolitis and colovesical fistula now off pressors doing well.

## 2020-10-13 NOTE — CONSULT NOTE ADULT - ASSESSMENT
69F with known metastatic lung cancer to brain, Spine, and Right hip s/p RT x 5 (last tx 9/2).  Recently underwent R hip open surgery for metastatic disease and placement of hardware on 9/18.  Gail-op antibiotics.  Was in rehab when she developed hypotension. Went to ICU for hypotension and resuscitation.  Was started on high dose midodrine and continues to be borderline hypotensive.  CT with pancolitis and is Cdiff (+).  Initially on broad spectrum antibiotics, she is now on vancomycin enterally and IV metronidazole.  She is being followed by colorectal surgery.  No evidence of toxic megacolon at this point.    Cdiff colitis  - continue vancomycin 500mg enterally q6  - intravenous metronidazole as well  - avoid other antibiotics if possible  - close f/u  - hydration as per primary team

## 2020-10-13 NOTE — PROCEDURE NOTE - NSPROCDETAILS_GEN_ALL_CORE
secured in place/blood seen on insertion/dressing applied/flushes easily/sterile technique, catheter placed/ultrasound utilization/location identified, draped/prepped, sterile technique used

## 2020-10-13 NOTE — PROGRESS NOTE ADULT - ATTENDING COMMENTS
Monitor stool outpt. FU ID recs   CT Imaging with extensive disease including lytic osseous metastases, particularly involving the left T6 and T7 vertebral bodies as well as the left T11 vertebral body, with associated compression deformity and associated encroachment upon the spinal canal and cord at these levels. Adjacent soft tissue density mass is noted involving the associated ribs and paraspinal musculature, particularly at the level of T11as on prior PET/CT dated 10/2/2020.  Neuro exam - Pain in Right hip radiating to LE,, sensations +, strength 3/5 due to pain   Onc consulted for futher recs if need for Radiation vs steroid therapy

## 2020-10-13 NOTE — PROGRESS NOTE ADULT - SUBJECTIVE AND OBJECTIVE BOX
Dr. Ildefonso THOMPSON:[id number]/NS: [phone]  After 7pm please page [phone] or [phone]    MAGDALENE YOST  69y  MRN: [id number]    Subjective:    Patient is a 69y old  Female who presents with a chief complaint of hypotension (12 Oct 2020 06:44)      MEDICATIONS  (STANDING):  acetaminophen   Tablet .. 500 milliGRAM(s) Oral every 4 hours  aspirin 325 milliGRAM(s) Oral daily  gabapentin 100 milliGRAM(s) Oral three times a day  melatonin 3 milliGRAM(s) Oral at bedtime  meropenem  IVPB 1000 milliGRAM(s) IV Intermittent every 12 hours  metroNIDAZOLE  IVPB      metroNIDAZOLE  IVPB 500 milliGRAM(s) IV Intermittent every 8 hours  midodrine 30 milliGRAM(s) Oral every 8 hours  ondansetron    Tablet 4 milliGRAM(s) Oral every 12 hours  pantoprazole    Tablet 40 milliGRAM(s) Oral before breakfast  sodium chloride 0.9%. 1000 milliLiter(s) (75 mL/Hr) IV Continuous <Continuous>  vancomycin    Solution 500 milliGRAM(s) Oral every 6 hours    MEDICATIONS  (PRN):  ondansetron Injectable 4 milliGRAM(s) IV Push every 6 hours PRN Nausea and/or Vomiting  oxyCODONE    IR 5 milliGRAM(s) Oral every 6 hours PRN Moderate Pain (4 - 6)        Objective:    Vitals: Vital Signs Last 24 Hrs  T(C): 36.9 (10-13-20 @ 05:00), Max: 36.9 (10-13-20 @ 05:00)  T(F): 98.4 (10-13-20 @ 05:00), Max: 98.4 (10-13-20 @ 05:00)  HR: 70 (10-13-20 @ 05:00) (53 - 73)  BP: 107/68 (10-13-20 @ 05:00) (83/58 - 135/64)  BP(mean): 71 (10-12-20 @ 21:00) (66 - 86)  RR: 18 (10-13-20 @ 05:00) (18 - 25)  SpO2: 97% (10-13-20 @ 05:00) (92% - 100%)            I&O's Summary    12 Oct 2020 07:01  -  13 Oct 2020 07:00  --------------------------------------------------------  IN: 1675 mL / OUT: 1050 mL / NET: 625 mL        PHYSICAL EXAM:  GENERAL: NAD, well-groomed, well-developed  HEAD:  Atraumatic, Normocephalic  EYES: EOMI, PERRLA, conjunctiva and sclera clear  ENMT: No tonsillar erythema, exudates, or enlargement; Moist mucous membranes, Good dentition, No lesions  NECK: Supple, No JVD, Normal thyroid  CHEST/LUNG: Clear to auscultation bilaterally; No rales, rhonchi, wheezing, or rubs  HEART: Regular rate and rhythm; No murmurs, rubs, or gallops  ABDOMEN: Soft, generalized Tenderness with suprapubic tenderness, Nondistended; Bowel sounds present  EXTREMITIES:  2+ Peripheral Pulses, No clubbing, cyanosis, or edema  LYMPH: No lymphadenopathy noted  SKIN: No rashes or lesions  NERVOUS SYSTEM:  Alert & Oriented X4, Good concentration    LABS:  10-13    132<L>  |  106  |  12  ----------------------------<  116<H>  4.0   |  17<L>  |  0.33<L>  10-12    132<L>  |  103  |  10  ----------------------------<  116<H>  4.2   |  20<L>  |  0.36<L>  10-11    132<L>  |  103  |  9   ----------------------------<  146<H>  3.4<L>   |  17<L>  |  0.32<L>    Ca    7.0<L>      13 Oct 2020 07:31  Ca    8.0<L>      12 Oct 2020 03:00  Ca    7.5<L>      11 Oct 2020 14:00  Phos  1.7     10-13  Mg     1.8     10-13    TPro  4.1<L>  /  Alb  1.8<L>  /  TBili  < 0.2<L>  /  DBili  x   /  AST  30  /  ALT  18  /  AlkPhos  143<H>  10-13  TPro  4.7<L>  /  Alb  2.3<L>  /  TBili  < 0.2<L>  /  DBili  x   /  AST  21  /  ALT  18  /  AlkPhos  137<H>  10-12  TPro  4.7<L>  /  Alb  2.0<L>  /  TBili  0.2  /  DBili  x   /  AST  19  /  ALT  17  /  AlkPhos  124<H>  10-11                    Urinalysis Basic - ( 12 Oct 2020 02:10 )    Color: YELLOW / Appearance: Lt TURBID / S.038 / pH: 6.5  Gluc: NEGATIVE / Ketone: MODERATE  / Bili: NEGATIVE / Urobili: TRACE   Blood: TRACE / Protein: 70 / Nitrite: NEGATIVE   Leuk Esterase: SMALL / RBC: 6-10 / WBC 26-50   Sq Epi: MANY / Non Sq Epi: x / Bacteria: SMALL      ABG - ( 11 Oct 2020 14:00 )  pH, Arterial: 7.43  pH, Blood: x     /  pCO2: 28    /  pO2: 76    / HCO3: 20    / Base Excess: -5.3  /  SaO2: 95.7                                    8.8    9.68  )-----------( 440      ( 13 Oct 2020 07:31 )             28.2                         8.9    16.98 )-----------( 486      ( 12 Oct 2020 03:00 )             27.5                         8.5    15.68 )-----------( 514      ( 11 Oct 2020 05:33 )             27.7     CAPILLARY BLOOD GLUCOSE              Dr. Ildefonso Tristan PGY1 Dr. Ildefonso THOMPSON:[id number]/NS: [phone]  After 7pm please page [phone] or [phone]    MAGDALENE YOST  69y  MRN: [id number]    Subjective:    Patient is a 69y old  Female who presents with a chief complaint of hypotension (12 Oct 2020 06:44)  She complains of generalized abdominal pain as per when she came into the hospital. No bouts of diarrhea.     MEDICATIONS  (STANDING):  acetaminophen   Tablet .. 500 milliGRAM(s) Oral every 4 hours  aspirin 325 milliGRAM(s) Oral daily  gabapentin 100 milliGRAM(s) Oral three times a day  melatonin 3 milliGRAM(s) Oral at bedtime  meropenem  IVPB 1000 milliGRAM(s) IV Intermittent every 12 hours  metroNIDAZOLE  IVPB      metroNIDAZOLE  IVPB 500 milliGRAM(s) IV Intermittent every 8 hours  midodrine 30 milliGRAM(s) Oral every 8 hours  ondansetron    Tablet 4 milliGRAM(s) Oral every 12 hours  pantoprazole    Tablet 40 milliGRAM(s) Oral before breakfast  sodium chloride 0.9%. 1000 milliLiter(s) (75 mL/Hr) IV Continuous <Continuous>  vancomycin    Solution 500 milliGRAM(s) Oral every 6 hours    MEDICATIONS  (PRN):  ondansetron Injectable 4 milliGRAM(s) IV Push every 6 hours PRN Nausea and/or Vomiting  oxyCODONE    IR 5 milliGRAM(s) Oral every 6 hours PRN Moderate Pain (4 - 6)        Objective:    Vitals: Vital Signs Last 24 Hrs  T(C): 36.9 (10-13-20 @ 05:00), Max: 36.9 (10-13-20 @ 05:00)  T(F): 98.4 (10-13-20 @ 05:00), Max: 98.4 (10-13-20 @ 05:00)  HR: 70 (10-13-20 @ 05:00) (53 - 73)  BP: 107/68 (10-13-20 @ 05:00) (83/58 - 135/64)  BP(mean): 71 (10-12-20 @ 21:00) (66 - 86)  RR: 18 (10-13-20 @ 05:00) (18 - 25)  SpO2: 97% (10-13-20 @ 05:00) (92% - 100%)            I&O's Summary    12 Oct 2020 07:01  -  13 Oct 2020 07:00  --------------------------------------------------------  IN: 1675 mL / OUT: 1050 mL / NET: 625 mL        PHYSICAL EXAM:  GENERAL: NAD, well-groomed, well-developed  HEAD:  Atraumatic, Normocephalic  EYES: EOMI, PERRLA, conjunctiva and sclera clear  ENMT: No tonsillar erythema, exudates, or enlargement; Moist mucous membranes, Good dentition, No lesions  NECK: Supple, No JVD, Normal thyroid  CHEST/LUNG: Clear to auscultation bilaterally; No rales, rhonchi, wheezing, or rubs  HEART: Regular rate and rhythm; No murmurs, rubs, or gallops  ABDOMEN: Soft, generalized Tenderness with suprapubic tenderness, Nondistended; Bowel sounds present  EXTREMITIES:  2+ Peripheral Pulses, No clubbing, cyanosis, or edema  LYMPH: No lymphadenopathy noted  SKIN: No rashes or lesions  NERVOUS SYSTEM:  Alert & Oriented X4, Good concentration    LABS:  10-13    132<L>  |  106  |  12  ----------------------------<  116<H>  4.0   |  17<L>  |  0.33<L>  10-12    132<L>  |  103  |  10  ----------------------------<  116<H>  4.2   |  20<L>  |  0.36<L>  10-11    132<L>  |  103  |  9   ----------------------------<  146<H>  3.4<L>   |  17<L>  |  0.32<L>    Ca    7.0<L>      13 Oct 2020 07:31  Ca    8.0<L>      12 Oct 2020 03:00  Ca    7.5<L>      11 Oct 2020 14:00  Phos  1.7     10-13  Mg     1.8     10-13    TPro  4.1<L>  /  Alb  1.8<L>  /  TBili  < 0.2<L>  /  DBili  x   /  AST  30  /  ALT  18  /  AlkPhos  143<H>  10  TPro  4.7<L>  /  Alb  2.3<L>  /  TBili  < 0.2<L>  /  DBili  x   /  AST  21  /  ALT  18  /  AlkPhos  137<H>  10-12  TPro  4.7<L>  /  Alb  2.0<L>  /  TBili  0.2  /  DBili  x   /  AST  19  /  ALT  17  /  AlkPhos  124<H>  10                    Urinalysis Basic - ( 12 Oct 2020 02:10 )    Color: YELLOW / Appearance: Lt TURBID / S.038 / pH: 6.5  Gluc: NEGATIVE / Ketone: MODERATE  / Bili: NEGATIVE / Urobili: TRACE   Blood: TRACE / Protein: 70 / Nitrite: NEGATIVE   Leuk Esterase: SMALL / RBC: 6-10 / WBC 26-50   Sq Epi: MANY / Non Sq Epi: x / Bacteria: SMALL      ABG - ( 11 Oct 2020 14:00 )  pH, Arterial: 7.43  pH, Blood: x     /  pCO2: 28    /  pO2: 76    / HCO3: 20    / Base Excess: -5.3  /  SaO2: 95.7                                    8.8    9.68  )-----------( 440      ( 13 Oct 2020 07:31 )             28.2                         8.9    16.98 )-----------( 486      ( 12 Oct 2020 03:00 )             27.5                         8.5    15.68 )-----------( 514      ( 11 Oct 2020 05:33 )             27.7     CAPILLARY BLOOD GLUCOSE              Dr. Ildefonso Tristan PGY1 Dr. Ildefonso THOMPSON:[id number]/NS: [phone]  After 7pm please page [phone] or [phone]    MAGDALENE YOST  69y  MRN: [id number]    Subjective:    Patient is a 69y old  Female who presents with a chief complaint of hypotension (12 Oct 2020 06:44)  She complains of generalized abdominal pain as per when she came into the hospital. No bouts of diarrhea.     MEDICATIONS  (STANDING):  acetaminophen   Tablet .. 500 milliGRAM(s) Oral every 4 hours  aspirin 325 milliGRAM(s) Oral daily  gabapentin 100 milliGRAM(s) Oral three times a day  melatonin 3 milliGRAM(s) Oral at bedtime  meropenem  IVPB 1000 milliGRAM(s) IV Intermittent every 12 hours  metroNIDAZOLE  IVPB      metroNIDAZOLE  IVPB 500 milliGRAM(s) IV Intermittent every 8 hours  midodrine 30 milliGRAM(s) Oral every 8 hours  ondansetron    Tablet 4 milliGRAM(s) Oral every 12 hours  pantoprazole    Tablet 40 milliGRAM(s) Oral before breakfast  sodium chloride 0.9%. 1000 milliLiter(s) (75 mL/Hr) IV Continuous <Continuous>  vancomycin    Solution 500 milliGRAM(s) Oral every 6 hours    MEDICATIONS  (PRN):  ondansetron Injectable 4 milliGRAM(s) IV Push every 6 hours PRN Nausea and/or Vomiting  oxyCODONE    IR 5 milliGRAM(s) Oral every 6 hours PRN Moderate Pain (4 - 6)        Objective:    Vitals: Vital Signs Last 24 Hrs  T(C): 36.9 (10-13-20 @ 05:00), Max: 36.9 (10-13-20 @ 05:00)  T(F): 98.4 (10-13-20 @ 05:00), Max: 98.4 (10-13-20 @ 05:00)  HR: 70 (10-13-20 @ 05:00) (53 - 73)  BP: 107/68 (10-13-20 @ 05:00) (83/58 - 135/64)  BP(mean): 71 (10-12-20 @ 21:00) (66 - 86)  RR: 18 (10-13-20 @ 05:00) (18 - 25)  SpO2: 97% (10-13-20 @ 05:00) (92% - 100%)            I&O's Summary    12 Oct 2020 07:01  -  13 Oct 2020 07:00  --------------------------------------------------------  IN: 1675 mL / OUT: 1050 mL / NET: 625 mL        PHYSICAL EXAM:  GENERAL: NAD, well-groomed, well-developed  HEAD:  Atraumatic, Normocephalic  EYES: EOMI, PERRLA, conjunctiva and sclera clear  ENMT: No tonsillar erythema, exudates, or enlargement; Moist mucous membranes, Good dentition, No lesions  NECK: Supple, No JVD, Normal thyroid  CHEST/LUNG: Clear to auscultation bilaterally; No rales, rhonchi, wheezing, or rubs  HEART: Regular rate and rhythm; No murmurs, rubs, or gallops  ABDOMEN: Soft, generalized Tenderness with suprapubic tenderness, Nondistended; Bowel sounds present  EXTREMITIES:  2+ Peripheral Pulses, No clubbing, cyanosis, or edema  LYMPH: No lymphadenopathy noted  SKIN: No rashes or lesions  NERVOUS SYSTEM:  Alert & Oriented X4, Good concentration    LABS:  10-13    132<L>  |  106  |  12  ----------------------------<  116<H>  4.0   |  17<L>  |  0.33<L>  10-12    132<L>  |  103  |  10  ----------------------------<  116<H>  4.2   |  20<L>  |  0.36<L>  10-11    132<L>  |  103  |  9   ----------------------------<  146<H>  3.4<L>   |  17<L>  |  0.32<L>    Ca    7.0<L>      13 Oct 2020 07:31  Ca    8.0<L>      12 Oct 2020 03:00  Ca    7.5<L>      11 Oct 2020 14:00  Phos  1.7     10-13  Mg     1.8     10-13    TPro  4.1<L>  /  Alb  1.8<L>  /  TBili  < 0.2<L>  /  DBili  x   /  AST  30  /  ALT  18  /  AlkPhos  143<H>  10  TPro  4.7<L>  /  Alb  2.3<L>  /  TBili  < 0.2<L>  /  DBili  x   /  AST  21  /  ALT  18  /  AlkPhos  137<H>  10-12  TPro  4.7<L>  /  Alb  2.0<L>  /  TBili  0.2  /  DBili  x   /  AST  19  /  ALT  17  /  AlkPhos  124<H>  10                    Urinalysis Basic - ( 12 Oct 2020 02:10 )    Color: YELLOW / Appearance: Lt TURBID / S.038 / pH: 6.5  Gluc: NEGATIVE / Ketone: MODERATE  / Bili: NEGATIVE / Urobili: TRACE   Blood: TRACE / Protein: 70 / Nitrite: NEGATIVE   Leuk Esterase: SMALL / RBC: 6-10 / WBC 26-50   Sq Epi: MANY / Non Sq Epi: x / Bacteria: SMALL      ABG - ( 11 Oct 2020 14:00 )  pH, Arterial: 7.43  pH, Blood: x     /  pCO2: 28    /  pO2: 76    / HCO3: 20    / Base Excess: -5.3  /  SaO2: 95.7                                    8.8    9.68  )-----------( 440      ( 13 Oct 2020 07:31 )             28.2                         8.9    16.98 )-----------( 486      ( 12 Oct 2020 03:00 )             27.5                         8.5    15.68 )-----------( 514      ( 11 Oct 2020 05:33 )             27.7     CAPILLARY BLOOD GLUCOSE    < from: CT Abdomen and Pelvis w/ IV Cont (10.10.20 @ 13:22) >  Right upper lobe spiculated mass, consistent with known lung carcinoma, unchanged. Right hilar and right retrocrural adenopathy is again noted.    Bilateral pleural effusions, left greater than right, with associated compressive atelectasis.    Moderate pericardial effusion, slightly increased since 10/2/2020.    New diffuse colonic wall thickening involving the entire colon and rectum, consistent with a proctocolitis, new since 10/2/2020.    Ill-defined somewhat wedge-shaped area of decreased attenuation in the lower pole of the left kidney. Differential diagnosis includes an area of ischemia and infection.    Extensive lytic osseous metastatic disease with involvement of the spine at multiple levels and encroachment upon the spinal canal as on prior PET/CT dated 10/2/2020.    Gas within the urinary bladder. Correlation with any history of recent instrumentation is recommended. In the absence of recent instrumentation, infection or fistula to bowel is considered.    < end of copied text >            Dr. Ildefonso Tristan PGY1

## 2020-10-14 NOTE — PROVIDER CONTACT NOTE (OTHER) - ASSESSMENT
asymptomatic patient. Utilied  Masha to educate in Iraqi. Asymptomatic patient. Utilized  Masha to educate in Sinhala.

## 2020-10-14 NOTE — CHART NOTE - NSCHARTNOTEFT_GEN_A_CORE
Earlier in morning, nurse called me since pt was having SOB. Pulse ox demonstrated an O2 sat in 80s, nurse placed pt on 4L of NC with improvement to 96%. On examination no wheezing noted on physical examination. Asked the pt about her sxs (: daughter), pt also admitted to chest discomfort. However pt refrained from providing further detail, perhaps from her SOB she was unable to.   Due to chest discomfort, with an unreliable description, and SOB, an EKG was ordered. Demonstrated NSR. Last saw pt, O2 sat 98% on 4L of NC. Patient presents with:  Cough: inrm 4     Chief Complaint Reviewed and Verified  Nursing Notes Reviewed and   Verified  Tobacco Reviewed  Allergies Reviewed  Medications Reviewed    Problem List Reviewed  Medical History Reviewed  Surgical History   Review 20 MG Oral Capsule Delayed Release Take 20 mg by mouth. Disp:  Rfl:    TraZODone HCl 50 MG Oral Tab Take 50 mg by mouth. Disp:  Rfl:    latanoprost 0.005 % Ophthalmic Solution Place 1 drop into both eyes nightly.  Disp: 2.5 mL Rfl: 5   Dorzolamide HCl-Timol adenopathy,no bruits  LUNGS: clear to auscultation  CARDIO: RRR without murmur  LYMPH: min ant cerv adenopathy    ASSESSMENT AND PLAN:     Acute rhinosinusitis  (primary encounter diagnosis)  Cough    Problem List Items Addressed This Visit     None      V

## 2020-10-14 NOTE — PROGRESS NOTE ADULT - PROBLEM SELECTOR PLAN 4
- Followed by Dr. Xie at [facility]: s/p 5 rounds RT (last 9/2/2020)   - consult Onc for management considering apparent progression of lung mets to multiple spinal areas.

## 2020-10-14 NOTE — CONSULT NOTE ADULT - ATTENDING COMMENTS
Patient seen and examined independently. Agree with Dr. Lundberg's assessment and plan as above.    -TTE with Normal LV/RV function, mild diastolic dysfunction, and Moderate sized pericardial effusion which is likely malignant. No clear evidence of tamponade physiology on TTE or clinically.  -Plan for repeat limited TTE for effusion in 7-10 days to re-evaluate pericardial effusion size.   -No urgent indication for pericardiocentesis at this time.  -Consider OAC with Apixaban 2.5 mg po BID if in line with family wishes/goals of care given increased thromboembolic risk in the setting of paroxysmal AFIB (noted after hip replacement), and active malignancy which further increases her thrombotic risk.    Pio Darnell MD  Cardiology Attending  [facility],  [facility]   Cell: [phone] Patient seen and examined with the Fellow. Agree with Dr. Lundberg's assessment and plan as above.    -TTE with Normal LV/RV function, mild diastolic dysfunction, and Moderate sized pericardial effusion which is mostly likely malignant. No clear evidence of tamponade physiology on TTE or clinically.  -No urgent indication for pericardiocentesis at this time. At this point the only indication would be for diagnostic purposes if requested by oncology.  -Plan for repeat limited TTE for effusion in 7-10 days to re-evaluate pericardial effusion size. If there is deterioration in clinical symptoms or evidence of hemodynamic compromise, would obtain repeat TTE on an urgent basis.  -If clinically stable and if repeat TTE with stable pericardial effusion size and, would plan for serial monitoring with repeat TTE in 1 month (and if again stable), repeat TTE in 3 months (and if again stable) repeat TTE in 6 months.    -In regards to atrial fibrillation, would Consider OAC with Apixaban 2.5 mg po BID if in line with family wishes/goals of care given increased thromboembolic risk in the setting of paroxysmal AFIB (noted after hip replacement), and active malignancy which further increases her thrombotic risk. If Apixaban is started, would DC aspirin at that time.     Cardiology will sign off at this time. Please re-consult as needed.  -Call [phone] with questions.  -Upon discharge, Please arrange for outpatient cardiology follow-up at the [facility] or [facility] by calling [phone].      Pio Darnell MD  Cardiology Attending  [facility],  [facility]   Cell: [phone]

## 2020-10-14 NOTE — CONSULT NOTE ADULT - ASSESSMENT
68 y/o F hx/ Lung Ca 8/2020 w/ mets to brain, spine, R hip, s/p RT x5 (last 9/2) admitted to MICU for septic shock found to have C. Diff proctocolitis and colovesical fistula now off pressors desatted last night on 4L NC responding appropriately. Cardiology consulted for pericardial effusion and post op afib.     Moderate pericardial effusion: 70 y/o F hx/ Lung Ca 8/2020 w/ mets to brain, spine, R hip, s/p RT x5 (last 9/2) admitted to MICU for septic shock found to have C. Diff proctocolitis and colovesical fistula now off pressors desatted last night on 4L NC responding appropriately. Cardiology consulted for pericardial effusion and post op afib.     Moderate pericardial effusion: Most likely 2/2 patient's malignancy. Currently without evidence of tamponade on echo.  - would not do pericardiocentesis at this time as it would most likely not   - if patient has any signs of worsening hypotension, can repeat echo to evaluate for tamponade    Post op afib: Patient's [id number] score is 2, however patient does have an active malignancy, so is at a higher risk of stroke. Patient does have an attenuation defect in the left kidney suspicious for infarct vs infection.   - would have a discussion with the patient and family about possibly starting anticoagulation in the setting of active malignancy and no contraindication to anticoagulation - if they are amenable, would consider eliquis 2.5 BID instead of aspirin 325    Marla Lundberg MD  Cardiology Fellow, PGY-4  [phone]  For all other Cardiology service contact information, go to [email or web address] and use "[email or web address]" to login.

## 2020-10-14 NOTE — CONSULT NOTE ADULT - SUBJECTIVE AND OBJECTIVE BOX
Patient seen and evaluated at bedside    Chief Complaint: SOB    HPI:  70yo F Hx Lung Ca in 8/2020 with mets to brain, Spine, and Right hip s/p RT x 5 ( last tx 9/2) presenting with hypotension. Was recently admitted into the hospital for right hip pain s/p wide resection JAYANT on 9/17 c/b intubation and pneumonia. Patient was hypotensive to 90's systolic at rehab. Was given 3.5L of fluids and a dose of vancomycin/zosyn and sent to the ED. Collateral was obtained from family. According to them, patient was in usual health prior to symptoms; had constipation in the past week, but had a bowel movement yesterday after a enema. Patient endorsed some pain during the enema. Per patient, she had been experiencing decreased appetite for several weeks w/ nausea and intermittent coughing up clear sputum. Her main complaint is abdominal discomfort.  Patient had received radiation therapy in the past for her lung CA, but no chemotherapy - following at [facility] with Dr. Xie. Patient is full code according to family.     In the ED, patient was afebrile, BP was 70's systolic was given additional 2 L of fluid resuscitation and 20 mg + 30 mg of midodrine, satting well on nasal canula. Pressures continued to be labile despite adequate volume resuscitation. Patient was started on Levophed for pressure support and transferred to MICU.  (10 Oct 2020 16:16)    Discussed with patient with a Citizen of [place] . The patient is no longer having shortness of breath. She denies any chest pain. She does have nausea.     PMHx:   Lung cancer metastatic to bone    PSHx:   No significant past surgical history    Allergies:  No Known Allergies    Home Meds:    Current Medications:   acetaminophen   Tablet .. 500 milliGRAM(s) Oral every 4 hours  apixaban 2.5 milliGRAM(s) Oral two times a day  gabapentin 100 milliGRAM(s) Oral three times a day  melatonin 3 milliGRAM(s) Oral at bedtime  metroNIDAZOLE  IVPB      metroNIDAZOLE  IVPB 500 milliGRAM(s) IV Intermittent every 8 hours  midodrine 30 milliGRAM(s) Oral every 8 hours  ondansetron    Tablet 4 milliGRAM(s) Oral every 12 hours  ondansetron Injectable 4 milliGRAM(s) IV Push every 6 hours PRN  oxyCODONE    IR 5 milliGRAM(s) Oral every 6 hours PRN  pantoprazole    Tablet 40 milliGRAM(s) Oral before breakfast  sodium chloride 0.9%. 1000 milliLiter(s) IV Continuous <Continuous>  vancomycin    Solution 500 milliGRAM(s) Oral every 6 hours    FAMILY HISTORY:  FH: colon cancer    Family history of cervical cancer    Social History:  Smoking History:  Alcohol Use:  Drug Use:    REVIEW OF SYSTEMS:  CONSTITUTIONAL: No weakness, fevers or chills  EYES/ENT: No visual changes;  No dysphagia  NECK: No pain or stiffness  RESPIRATORY: No cough, wheezing, hemoptysis; No shortness of breath  CARDIOVASCULAR: No chest pain or palpitations; No lower extremity edema  GASTROINTESTINAL: No abdominal or epigastric pain. + nausea, no vomiting, or hematemesis; No diarrhea or constipation. No melena or hematochezia.  BACK: No back pain  GENITOURINARY: No dysuria, frequency or hematuria  NEUROLOGICAL: No numbness or weakness  SKIN: No itching, burning, rashes, or lesions   All other review of systems is negative unless indicated above.    Physical Exam:  T(F): 98.1 (10-14), Max: 98.1 (10-14)  HR: 73 (10-14) (67 - 85)  BP: 107/65 (10-14) (92/56 - 116/71)  RR: 17 (10-14)  SpO2: 100% (10-14)  GENERAL: No acute distress, well-developed  HEAD:  Atraumatic, Normocephalic  ENT: EOMI, PERRLA, conjunctiva and sclera clear, Neck supple, No JVD, moist mucosa  CHEST/LUNG: Clear to auscultation bilaterally; No wheeze, equal breath sounds bilaterally   BACK: No spinal tenderness  HEART: Regular rate and rhythm; No murmurs, rubs, or gallops  ABDOMEN: Soft, Nontender, Nondistended; Bowel sounds present  EXTREMITIES:  No clubbing, cyanosis, 1+ bilateral LE pitting edema  PSYCH: Nl behavior, nl affect  NEUROLOGY: AAOx3, non-focal, cranial nerves intact  SKIN: Normal color, No rashes or lesions  LINES:    Cardiovascular Diagnostic Testing:    ECG: Personally reviewed:    Echo: Personally reviewed:  < from: Transthoracic Echocardiogram (10.12.20 @ 13:17) >  CONCLUSIONS:  1. Mitral annular calcification, otherwise normal mitral  valve. Minimal mitral regurgitation.  2. Mildly dilated left atrium.  LA volume index = 41 cc/m2.  3. Normal left ventricular internal dimensions and wall  thicknesses.  4. Normal left ventricular systolic function. No segmental  wall motion abnormalities.  5. Mild diastolic dysfunction (Stage I).  6. Normal right ventricular size and function.  7. Estimated right ventricular systolic pressure equals 35  mm Hg, assuming right atrial pressure equals 10 mm Hg,  consistent with borderline pulmonary hypertension.  8. Moderate pericardial effusion, measuring about  1.1 cm  adjacent to the right atrial free wall.  Small pericardial  effusion lateral to the left ventricleand adjacent to the  right ventricular free wall.  No RA or RV diastolic  collapse seen.  9. Bilateral pleural effusions.  *** No previous Echo exam.  ------------------------------------------------------------------------  Confirmed on  10/12/2020 - 15:26:57 by Chilo Dooley M.D.  ------------------------------------------------------------------------    < end of copied text >    Stress Testing:    Cath:    Imaging:    CXR: Personally reviewed    Labs: Personally reviewed                        9.8    16.50 )-----------( 502      ( 14 Oct 2020 06:06 )             31.0     10-14    133<L>  |  102  |  14  ----------------------------<  92  3.8   |  20<L>  |  0.39<L>    Ca    7.8<L>      14 Oct 2020 06:06  Phos  2.5     10-14  Mg     1.6     10-14    TPro  4.1<L>  /  Alb  1.8<L>  /  TBili  < 0.2<L>  /  DBili  x   /  AST  30  /  ALT  18  /  AlkPhos  143<H>  10-13    Thyroid Stimulating Hormone, Serum: 2.29 uIU/mL (10-11 @ 14:00)   Patient seen and evaluated at bedside    Translation service utilized: [employer] for [place]    Chief Complaint: SOB    HPI:  68yo F Hx Lung Ca in 8/2020 with mets to brain, Spine, and Right hip s/p RT x 5 ( last tx 9/2) presenting with hypotension. Was recently admitted into the hospital for right hip pain s/p wide resection JAYANT on 9/17 c/b intubation and pneumonia. Patient was hypotensive to 90's systolic at rehab. Was given 3.5L of fluids and a dose of vancomycin/zosyn and sent to the ED. Collateral was obtained from family. According to them, patient was in usual health prior to symptoms; had constipation in the past week, but had a bowel movement yesterday after a enema. Patient endorsed some pain during the enema. Per patient, she had been experiencing decreased appetite for several weeks w/ nausea and intermittent coughing up clear sputum. Her main complaint is abdominal discomfort.  Patient had received radiation therapy in the past for her lung CA, but no chemotherapy - following at [facility] with Dr. Xie. Patient is full code according to family.     In the ED, patient was afebrile, BP was 70's systolic was given additional 2 L of fluid resuscitation and 20 mg + 30 mg of midodrine, satting well on nasal canula. Pressures continued to be labile despite adequate volume resuscitation. Patient was started on Levophed for pressure support and transferred to MICU.  (10 Oct 2020 16:16)    Discussed with patient with a [place] . The patient is no longer having shortness of breath. She denies any chest pain. She does have nausea.     PMHx:   Lung cancer metastatic to bone    PSHx:   No significant past surgical history    Allergies:  No Known Allergies    Home Meds:    Current Medications:   acetaminophen   Tablet .. 500 milliGRAM(s) Oral every 4 hours  apixaban 2.5 milliGRAM(s) Oral two times a day  gabapentin 100 milliGRAM(s) Oral three times a day  melatonin 3 milliGRAM(s) Oral at bedtime  metroNIDAZOLE  IVPB      metroNIDAZOLE  IVPB 500 milliGRAM(s) IV Intermittent every 8 hours  midodrine 30 milliGRAM(s) Oral every 8 hours  ondansetron    Tablet 4 milliGRAM(s) Oral every 12 hours  ondansetron Injectable 4 milliGRAM(s) IV Push every 6 hours PRN  oxyCODONE    IR 5 milliGRAM(s) Oral every 6 hours PRN  pantoprazole    Tablet 40 milliGRAM(s) Oral before breakfast  sodium chloride 0.9%. 1000 milliLiter(s) IV Continuous <Continuous>  vancomycin    Solution 500 milliGRAM(s) Oral every 6 hours    FAMILY HISTORY:  FH: colon cancer    Family history of cervical cancer    Social History:  Smoking History:  Alcohol Use:  Drug Use:    REVIEW OF SYSTEMS:  CONSTITUTIONAL: No weakness, fevers or chills  EYES/ENT: No visual changes;  No dysphagia  NECK: No pain or stiffness  RESPIRATORY: No cough, wheezing, hemoptysis; No shortness of breath  CARDIOVASCULAR: No chest pain or palpitations; No lower extremity edema  GASTROINTESTINAL: No abdominal or epigastric pain. + nausea, no vomiting, or hematemesis; No diarrhea or constipation. No melena or hematochezia.  BACK: No back pain  GENITOURINARY: No dysuria, frequency or hematuria  NEUROLOGICAL: No numbness or weakness  SKIN: No itching, burning, rashes, or lesions   All other review of systems is negative unless indicated above.    Physical Exam:  T(F): 98.1 (10-14), Max: 98.1 (10-14)  HR: 73 (10-14) (67 - 85)  BP: 107/65 (10-14) (92/56 - 116/71)  RR: 17 (10-14)  SpO2: 100% (10-14)  GENERAL: No acute distress, well-developed  HEAD:  Atraumatic, Normocephalic  ENT: EOMI, PERRLA, conjunctiva and sclera clear, Neck supple, No JVD, moist mucosa  CHEST/LUNG: Clear to auscultation bilaterally; No wheeze, equal breath sounds bilaterally   BACK: No spinal tenderness  HEART: Regular rate and rhythm; No murmurs, rubs, or gallops  ABDOMEN: Soft, Nontender, Nondistended; Bowel sounds present  EXTREMITIES:  No clubbing, cyanosis, 1+ bilateral LE pitting edema  PSYCH: Nl behavior, nl affect  NEUROLOGY: AAOx3, non-focal, cranial nerves intact  SKIN: Normal color, No rashes or lesions  LINES:    Cardiovascular Diagnostic Testing:    ECG: Personally reviewed:    Echo: Personally reviewed:  < from: Transthoracic Echocardiogram (10.12.20 @ 13:17) >  CONCLUSIONS:  1. Mitral annular calcification, otherwise normal mitral  valve. Minimal mitral regurgitation.  2. Mildly dilated left atrium.  LA volume index = 41 cc/m2.  3. Normal left ventricular internal dimensions and wall  thicknesses.  4. Normal left ventricular systolic function. No segmental  wall motion abnormalities.  5. Mild diastolic dysfunction (Stage I).  6. Normal right ventricular size and function.  7. Estimated right ventricular systolic pressure equals 35  mm Hg, assuming right atrial pressure equals 10 mm Hg,  consistent with borderline pulmonary hypertension.  8. Moderate pericardial effusion, measuring about  1.1 cm  adjacent to the right atrial free wall.  Small pericardial  effusion lateral to the left ventricleand adjacent to the  right ventricular free wall.  No RA or RV diastolic  collapse seen.  9. Bilateral pleural effusions.  *** No previous Echo exam.  ------------------------------------------------------------------------  Confirmed on  10/12/2020 - 15:26:57 by Chilo Dooley M.D.  ------------------------------------------------------------------------    < end of copied text >    Stress Testing:    Cath:    Imaging:    CXR: Personally reviewed    Labs: Personally reviewed                        9.8    16.50 )-----------( 502      ( 14 Oct 2020 06:06 )             31.0     10-14    133<L>  |  102  |  14  ----------------------------<  92  3.8   |  20<L>  |  0.39<L>    Ca    7.8<L>      14 Oct 2020 06:06  Phos  2.5     10-14  Mg     1.6     10-14    TPro  4.1<L>  /  Alb  1.8<L>  /  TBili  < 0.2<L>  /  DBili  x   /  AST  30  /  ALT  18  /  AlkPhos  143<H>  10-13    Thyroid Stimulating Hormone, Serum: 2.29 uIU/mL (10-11 @ 14:00)

## 2020-10-14 NOTE — DISCHARGE NOTE PROVIDER - CARE PROVIDER_API CALL
Jesus Xie)  Hematology; Medical Oncology  [address] [place] [address]  Phone: [phone]  Fax: [phone]  Follow Up Time:

## 2020-10-14 NOTE — DISCHARGE NOTE PROVIDER - NSDCCPCAREPLAN_GEN_ALL_CORE_FT
PRINCIPAL DISCHARGE DIAGNOSIS  Diagnosis: Septic shock  Assessment and Plan of Treatment: You were admitted because your blood pressure was very low despite giving you fluids. You were found to have an inflammation of the colon with a connection to the bladder with no intervention from colorectal surgery because of your risk factors. You also had some fluid around your heart but at this time it was not deemed necesary to intervene nor was it a large concern. During your previous admission you had an operation and had an irregular heartbeat and so you should take your anticoagulation in order to prevent strokes. You were also found to have an infection called C. difficile and were given antibiotics and you will need to continue these antibiotics at home. You were also found to have progression of your known lung cancer to the spine and should follow up with your oncologist as soon as you can. If you feel lightheaded, dizzy, feverish, chest pain, difficulty breathing, or about to fall you should go to the emergency room as soon as possible.      SECONDARY DISCHARGE DIAGNOSES  Diagnosis: Hypotension  Assessment and Plan of Treatment:     Diagnosis: Colitis  Assessment and Plan of Treatment:      PRINCIPAL DISCHARGE DIAGNOSIS  Diagnosis: Septic shock  Assessment and Plan of Treatment: You were admitted because your blood pressure was very low despite giving you fluids. You were found to have an inflammation of the colon with a connection to the bladder with no intervention from colorectal surgery because of your risk factors. You also had some fluid around your heart but at this time it was not deemed necesary to intervene nor was it a large concern. During your previous admission you had an operation and had an irregular heartbeat and so you should take your anticoagulation in order to prevent strokes. You were also found to have an infection called C. difficile and were given antibiotics. You have completed the course and you will not need to continue these antibiotics at home. You were also found to have progression of your known lung cancer to the spine and should follow up with your oncologist Dr. Xie at [facility] as soon as you can. The Radiation Oncologists also saw you with no planned radiation treatment at this time. If you feel lightheaded, dizzy, feverish, chest pain, have difficulty breathing, or about to fall you should go to the emergency room as soon as possible.      SECONDARY DISCHARGE DIAGNOSES  Diagnosis: Hypotension  Assessment and Plan of Treatment:     Diagnosis: Colitis  Assessment and Plan of Treatment:      PRINCIPAL DISCHARGE DIAGNOSIS  Diagnosis: Septic shock  Assessment and Plan of Treatment: You were admitted because your blood pressure was very low despite giving you fluids. You were found to have an inflammation of the colon with a connection to the bladder with no intervention from colorectal surgery because of your risk factors. You also had some fluid around your heart but at this time it was not deemed necesary to intervene nor was it a large concern. During your previous admission you had an operation and had an irregular heartbeat and so you should take your anticoagulation in order to prevent strokes. You were also found to have an infection called C. difficile and were given antibiotics. You have completed the course and you will not need to continue these antibiotics at home. You were also found to have progression of your known lung cancer to the spine and should follow up with your oncologist Dr. Xie at [facility] as soon as you can. The Radiation Oncologists also saw you with no planned radiation treatment at this time. If you feel lightheaded, dizzy, feverish, chest pain, have difficulty breathing, or about to fall you should go to the emergency room as soon as possible.

## 2020-10-14 NOTE — DISCHARGE NOTE PROVIDER - NSDCMRMEDTOKEN_GEN_ALL_CORE_FT
aluminum hydroxide-magnesium hydroxide 200 mg-200 mg/5 mL oral suspension: 30 milliliter(s) orally 4 times a day, As needed, Indigestion  aspirin 325 mg oral tablet: 1 tab(s) orally 2 times a day  bisacodyl 10 mg rectal suppository: 1 suppository(ies) rectal once a day, As Needed -Constipation   bisacodyl 5 mg oral delayed release tablet: 1 tab(s) orally every 12 hours, As Needed -for constipation   lidocaine 5% topical film: Apply topically to affected area once a day, As Needed for back pain. MDD:1 patch. Maximum time on12 hours followed by 12 hours off.  melatonin 3 mg oral tablet: 1 tab(s) orally once a day (at bedtime)  Multiple Vitamins oral tablet: 1 tab(s) orally once a day  ondansetron 4 mg oral tablet: 1 tab(s) orally 2 times a day, As Needed -Nausea and/or Vomiting - for nausea   oxyCODONE 10 mg oral tablet: 0.5 tab(s) orally every 3 hours, As Needed - for severe pain MDD:4 full tabs (40mg)  polyethylene glycol 3350 oral powder for reconstitution: 17 gram(s) orally once a day  senna oral tablet: 2 tab(s) orally once a day (at bedtime)   aluminum hydroxide-magnesium hydroxide 200 mg-200 mg/5 mL oral suspension: 30 milliliter(s) orally 4 times a day, As needed, Indigestion  aspirin 325 mg oral tablet: 1 tab(s) orally 2 times a day  bisacodyl 10 mg rectal suppository: 1 suppository(ies) rectal once a day, As Needed -Constipation   bisacodyl 5 mg oral delayed release tablet: 1 tab(s) orally every 12 hours, As Needed -for constipation   lidocaine 5% topical film: Apply topically to affected area once a day, As Needed for back pain. MDD:1 patch. Maximum time on12 hours followed by 12 hours off.  melatonin 3 mg oral tablet: 1 tab(s) orally once a day (at bedtime)  Multiple Vitamins oral tablet: 1 tab(s) orally once a day  ondansetron 4 mg oral tablet: 1 tab(s) orally 2 times a day, As Needed -Nausea and/or Vomiting - for nausea   oxyCODONE 15 mg oral tablet: 0.5 tab(s) orally every 3 hours  OxyCONTIN 15 mg oral tablet, extended release: 1 tab(s) orally every 12 hours - for severe pain MDD:4 full tabs (40mg)  polyethylene glycol 3350 oral powder for reconstitution: 17 gram(s) orally once a day  senna oral tablet: 2 tab(s) orally once a day (at bedtime)   bisacodyl 10 mg rectal suppository: 1 suppository(ies) rectal once a day, As Needed -Constipation   bisacodyl 5 mg oral delayed release tablet: 1 tab(s) orally every 12 hours, As Needed -for constipation   gabapentin 100 mg oral capsule: 1 cap(s) orally 3 times a day  lidocaine 5% topical film: Apply topically to affected area once a day, As Needed for back pain. MDD:1 patch. Maximum time on12 hours followed by 12 hours off.  melatonin 3 mg oral tablet: 1 tab(s) orally once a day (at bedtime)  Multiple Vitamins oral tablet: 1 tab(s) orally once a day  ondansetron 4 mg oral tablet: 1 tab(s) orally 2 times a day, As Needed -Nausea and/or Vomiting - for nausea   oxyCODONE 15 mg oral tablet: 0.5 tab(s) orally every 3 hours  OxyCONTIN 15 mg oral tablet, extended release: 1 tab(s) orally every 12 hours - for severe pain MDD:4 full tabs (40mg)  polyethylene glycol 3350 oral powder for reconstitution: 17 gram(s) orally once a day  senna oral tablet: 2 tab(s) orally once a day (at bedtime)   apixaban 5 mg oral tablet: 2 tab(s) orally every 12 hours  bisacodyl 10 mg rectal suppository: 1 suppository(ies) rectal once a day, As Needed -Constipation   bisacodyl 5 mg oral delayed release tablet: 1 tab(s) orally every 12 hours, As Needed -for constipation   gabapentin 100 mg oral capsule: 1 cap(s) orally 3 times a day  lactobacillus acidophilus oral capsule:  orally   lidocaine 5% topical film: Apply topically to affected area once a day, As Needed for back pain. MDD:1 patch. Maximum time on12 hours followed by 12 hours off.  melatonin 3 mg oral tablet: 1 tab(s) orally once a day (at bedtime)  Multiple Vitamins oral tablet: 1 tab(s) orally once a day  ondansetron 4 mg oral tablet: 1 tab(s) orally 2 times a day, As Needed -Nausea and/or Vomiting - for nausea   oxyCODONE 15 mg oral tablet: 0.5 tab(s) orally every 3 hours  OxyCONTIN 15 mg oral tablet, extended release: 1 tab(s) orally every 12 hours - for severe pain MDD:4 full tabs (40mg)  pantoprazole 40 mg oral delayed release tablet: 1 tab(s) orally once a day (before a meal)  polyethylene glycol 3350 oral powder for reconstitution: 17 gram(s) orally once a day  senna oral tablet: 2 tab(s) orally once a day (at bedtime)   acetaminophen 500 mg oral tablet: 1 tab(s) orally every 4 hours  apixaban 5 mg oral tablet: 2 tab(s) orally every 12 hours  bisacodyl 10 mg rectal suppository: 1 suppository(ies) rectal once a day, As Needed -Constipation   bisacodyl 5 mg oral delayed release tablet: 1 tab(s) orally every 12 hours, As Needed -for constipation   gabapentin 100 mg oral capsule: 1 cap(s) orally 3 times a day  lactobacillus acidophilus oral capsule: 1 cap(s) orally once a day  lidocaine 5% topical film: Apply topically to affected area once a day, As Needed for back pain. MDD:1 patch. Maximum time on12 hours followed by 12 hours off.  melatonin 3 mg oral tablet: 1 tab(s) orally once a day (at bedtime)  menthol-benzocaine 3.6 mg-15 mg mucous membrane lozenge: 1 each mucous membrane 3 times a day, As Needed  Multiple Vitamins oral tablet: 1 tab(s) orally once a day  ondansetron 4 mg oral tablet: 1 tab(s) orally 2 times a day, As Needed -Nausea and/or Vomiting - for nausea   oxyCODONE 10 mg oral tablet: 1 tab(s) orally every 3 hours, As needed, Moderate Pain (4 - 6)  oxyCODONE 15 mg oral tablet, extended release: 1 tab(s) orally every 8 hours  pantoprazole 40 mg oral delayed release tablet: 1 tab(s) orally once a day (before a meal)   acetaminophen 500 mg oral tablet: 1 tab(s) orally every 4 hours  apixaban 5 mg oral tablet: 2 tab(s) orally every 12 hours (10/25/2020 - 10/31/2020)  bisacodyl 5 mg oral delayed release tablet: 1 tab(s) orally every 12 hours, As Needed -for constipation   Eliquis 5 mg oral tablet: 1 tab(s) orally every 12 hours (start 11/1/2020)  gabapentin 100 mg oral capsule: 1 cap(s) orally 3 times a day  lactobacillus acidophilus oral capsule: 1 cap(s) orally 2 times a day  melatonin 3 mg oral tablet: 1 tab(s) orally once a day (at bedtime)  menthol-benzocaine 3.6 mg-15 mg mucous membrane lozenge: 1 tab(s) orally every 4 hours, As Needed  Multiple Vitamins oral tablet: 1 tab(s) orally once a day  ondansetron 4 mg oral tablet: 1 tab(s) orally 2 times a day, As Needed -Nausea and/or Vomiting - for nausea   oxyCODONE 10 mg oral tablet: 1 tab(s) orally every 3 hours, As needed, Moderate Pain (4 - 6)  oxyCODONE 15 mg oral tablet, extended release: 1 tab(s) orally every 8 hours  pantoprazole 40 mg oral delayed release tablet: 1 tab(s) orally once a day (before a meal)   acetaminophen 500 mg oral tablet: 1 tab(s) orally every 4 hours  apixaban 5 mg oral tablet: 2 tab(s) orally every 12 hours (10/25/2020 - 10/28/2020)  bisacodyl 5 mg oral delayed release tablet: 1 tab(s) orally every 12 hours, As Needed -for constipation   Eliquis 5 mg oral tablet: 1 tab(s) orally every 12 hours (start 10/29/2020)  gabapentin 100 mg oral capsule: 1 cap(s) orally 3 times a day  lactobacillus acidophilus oral capsule: 1 cap(s) orally 2 times a day  melatonin 3 mg oral tablet: 1 tab(s) orally once a day (at bedtime)  menthol-benzocaine 3.6 mg-15 mg mucous membrane lozenge: 1 tab(s) orally every 4 hours, As Needed  Multiple Vitamins oral tablet: 1 tab(s) orally once a day  ondansetron 4 mg oral tablet: 1 tab(s) orally 2 times a day, As Needed -Nausea and/or Vomiting - for nausea   oxyCODONE 10 mg oral tablet: 1 tab(s) orally every 3 hours, As needed, Moderate Pain (4 - 6)  oxyCODONE 15 mg oral tablet, extended release: 1 tab(s) orally every 8 hours  pantoprazole 40 mg oral delayed release tablet: 1 tab(s) orally once a day (before a meal)   acetaminophen 500 mg oral tablet: 1 tab(s) orally every 4 hours  apixaban 5 mg oral tablet: 2 tab(s) orally every 12 hours (10/25/2020 - 10/28/2020)  bisacodyl 5 mg oral delayed release tablet: 1 tab(s) orally every 12 hours, As Needed -for constipation   Eliquis 5 mg oral tablet: 1 tab(s) orally every 12 hours (start 10/29/2020)  gabapentin 100 mg oral capsule: 1 cap(s) orally 3 times a day  lactobacillus acidophilus oral capsule: 1 cap(s) orally 2 times a day  melatonin 3 mg oral tablet: 1 tab(s) orally once a day (at bedtime)  menthol-benzocaine 3.6 mg-15 mg mucous membrane lozenge: 1 tab(s) orally every 4 hours, As Needed  Multiple Vitamins oral tablet: 1 tab(s) orally once a day  ondansetron 4 mg oral tablet: 1 tab(s) orally 2 times a day, As Needed -Nausea and/or Vomiting - for nausea   oxyCODONE 10 mg oral tablet: 1 tab(s) orally every 3 hours, As needed, Moderate Pain (4 - 6)  oxyCODONE 20 mg oral tablet, extended release: 1 tab(s) orally every 8 hours  pantoprazole 40 mg oral delayed release tablet: 1 tab(s) orally once a day (before a meal)  polyethylene glycol 3350 oral powder for reconstitution: 17 gram(s) orally once a day, As Needed   senna oral tablet: 2 tab(s) orally once a day (at bedtime)   acetaminophen 500 mg oral tablet: 1 tab(s) orally every 4 hours  bisacodyl 5 mg oral delayed release tablet: 1 tab(s) orally every 12 hours, As Needed -for constipation   Eliquis 5 mg oral tablet: 1 tab(s) orally every 12 hours (start 10/29/2020)  gabapentin 100 mg oral capsule: 1 cap(s) orally 3 times a day  lactobacillus acidophilus oral capsule: 1 cap(s) orally 2 times a day  melatonin 3 mg oral tablet: 1 tab(s) orally once a day (at bedtime)  menthol-benzocaine 3.6 mg-15 mg mucous membrane lozenge: 1 tab(s) orally every 4 hours, As Needed  Multiple Vitamins oral tablet: 1 tab(s) orally once a day  ondansetron 4 mg oral tablet: 1 tab(s) orally 2 times a day, As Needed -Nausea and/or Vomiting - for nausea   oxyCODONE 10 mg oral tablet: 1 tab(s) orally every 3 hours, As needed, Moderate Pain (4 - 6)  oxyCODONE 20 mg oral tablet, extended release: 1 tab(s) orally every 8 hours  pantoprazole 40 mg oral delayed release tablet: 1 tab(s) orally once a day (before a meal)  polyethylene glycol 3350 oral powder for reconstitution: 17 gram(s) orally once a day, As Needed   senna oral tablet: 2 tab(s) orally once a day (at bedtime)  simethicone 80 mg oral tablet, chewable: 1 tab(s) orally 3 times a day, As needed, Gas   acetaminophen 500 mg oral tablet: 1 tab(s) orally every 4 hours  bisacodyl 5 mg oral delayed release tablet: 1 tab(s) orally every 12 hours, As Needed -for constipation   Eliquis 5 mg oral tablet: 1 tab(s) orally every 12 hours  gabapentin 100 mg oral capsule: 1 cap(s) orally 3 times a day  lactobacillus acidophilus oral capsule: 1 cap(s) orally 2 times a day  melatonin 3 mg oral tablet: 1 tab(s) orally once a day (at bedtime)  menthol-benzocaine 3.6 mg-15 mg mucous membrane lozenge: 1 tab(s) orally every 4 hours, As Needed  Multiple Vitamins oral tablet: 1 tab(s) orally once a day  ondansetron 4 mg oral tablet: 1 tab(s) orally 2 times a day, As Needed -Nausea and/or Vomiting - for nausea   oxyCODONE 10 mg oral tablet: 1 tab(s) orally every 3 hours, As needed, Moderate Pain (4 - 6)  oxyCODONE 20 mg oral tablet, extended release: 1 tab(s) orally every 8 hours  pantoprazole 40 mg oral delayed release tablet: 1 tab(s) orally once a day (before a meal)  polyethylene glycol 3350 oral powder for reconstitution: 17 gram(s) orally once a day, As Needed   senna oral tablet: 2 tab(s) orally once a day (at bedtime)  simethicone 80 mg oral tablet, chewable: 1 tab(s) orally 3 times a day, As needed, Gas   acetaminophen 500 mg oral tablet: 1 tab(s) orally every 4 hours  bisacodyl 5 mg oral delayed release tablet: 1 tab(s) orally every 12 hours, As Needed -for constipation   Eliquis 5 mg oral tablet: 1 tab(s) orally every 12 hours  gabapentin 100 mg oral capsule: 1 cap(s) orally 3 times a day  lactobacillus acidophilus oral capsule: 1 cap(s) orally 2 times a day  lidocaine 5% topical film: Apply topically to affected area every 24 hours, As Needed  melatonin 3 mg oral tablet: 1 tab(s) orally once a day (at bedtime)  menthol-benzocaine 3.6 mg-15 mg mucous membrane lozenge: 1 tab(s) orally every 4 hours, As Needed  Multiple Vitamins oral tablet: 1 tab(s) orally once a day  ondansetron 4 mg oral tablet: 1 tab(s) orally 2 times a day, As Needed -Nausea and/or Vomiting - for nausea   oxyCODONE 10 mg oral tablet: 1 tab(s) orally every 3 hours, As needed, Moderate Pain (4 - 6)  oxyCODONE 20 mg oral tablet, extended release: 1 tab(s) orally every 8 hours  pantoprazole 40 mg oral delayed release tablet: 1 tab(s) orally once a day (before a meal)  polyethylene glycol 3350 oral powder for reconstitution: 17 gram(s) orally once a day, As Needed   senna oral tablet: 2 tab(s) orally once a day (at bedtime)  simethicone 80 mg oral tablet, chewable: 1 tab(s) orally 3 times a day, As needed, Gas

## 2020-10-14 NOTE — DISCHARGE NOTE PROVIDER - NSDCFUADDAPPT_GEN_ALL_CORE_FT
Please follow up with your primary care physician upon your discharge from rehab. Please follow up with your primary care physician in 1 week upon your discharge from rehab.

## 2020-10-14 NOTE — PROGRESS NOTE ADULT - ASSESSMENT
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 Diff proctocolitis and colovesical fistula now off pressors doing well. 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 Diff proctocolitis and colovesical fistula now off pressors desatted last night on 4L NC responding appropriately

## 2020-10-14 NOTE — DISCHARGE NOTE PROVIDER - HOSPITAL COURSE
70yo F Hx Lung Ca in 8/2020 with mets to brain, Spine, and Right hip s/p RT x 5 ( last tx 9/2) followed by Dr. Xie at [facility] presenting with hypotension. Was recently admitted into the hospital for right hip pain s/p wide resection JAYANT  w/ post op afib on 9/17 c/b intubation and pneumonia. Patient was hypotensive to 90's systolic at rehab. She was given 3.5L of fluids and a dose of vancomycin/zosyn and sent to the ED.  She was persistently hypotensive and required levophed and admitted to the MICU. CTAP showed Proctocolitis with colovesicular fistula. Colorectal surgery was consulted with no interventions at this time. She was placed on PO vanc, IV flagyl and meropenem with no other significant growth on BCx or Urine Cx. She was found to be C. diff positive. She was weaned off and transferred to the floors on 10/13 and kept on po vanc and iv flagyl as per ID consult. On CT Chest she was noted to have progressing lung mets to multiple spinal points without significant weakness. Oncology was consulted with no plan for Ca management inpatient. TTE showed moderate pericardial effusion about 1.1cm pocket deemed not causing tamponade and likely 2/2 metastasis as per Cardiology consult. Patient is stable to be discharged home. 70yo F Hx Lung Ca in 8/2020 with mets to brain, Spine, and Right hip s/p RT x 5 ( last tx 9/2) followed by Dr. Xie at [facility] presenting with hypotension. Was recently admitted into the hospital for right hip pain s/p wide resection JAYANT  w/ post op afib on 9/17 c/b intubation and pneumonia. Patient was hypotensive to 90's systolic at rehab. She was given 3.5L of fluids and a dose of vancomycin/zosyn and sent to the ED.  She was persistently hypotensive and required levophed and admitted to the MICU. CTAP showed Proctocolitis with colovesicular fistula. Colorectal surgery was consulted with no interventions at this time. She was placed on PO vanc, IV flagyl and meropenem with no other significant growth on BCx or Urine Cx. She was found to be C. diff positive. She was weaned off and transferred to the floors on 10/13 and kept on po vanc and iv flagyl as per ID consult. On CT Chest she was noted to have progressing lung mets to multiple spinal points without significant weakness. Oncology was consulted with no plan for Ca management inpatient. TTE showed moderate pericardial effusion about 1.1cm pocket deemed not causing tamponade and likely 2/2 metastasis as per Cardiology consult to be repeated in 3-6 months. Patient is stable to be discharged home. 70yo F Hx Lung Ca in 8/2020 with mets to brain, Spine, and Right hip s/p RT x 5 ( last tx 9/2) followed by Dr. Xie at [facility] presenting with hypotension. Was recently admitted into the hospital for right hip pain s/p wide resection JAYANT  w/ post op afib on 9/17 c/b intubation and pneumonia. Patient was hypotensive to 90's systolic at rehab. She was given 3.5L of fluids and a dose of vancomycin/zosyn and sent to the ED.  She was persistently hypotensive and required levophed and admitted to the MICU. CTAP showed Proctocolitis with colovesicular fistula. Colorectal surgery was consulted with no interventions at this time. She was placed on PO vanc, IV flagyl and meropenem with no other significant growth on BCx or Urine Cx. She was found to be C. diff positive. She was weaned off and transferred to the floors on 10/13 and kept on po vanc and iv flagyl as per ID consult. On CT Chest she was noted to have progressing lung mets to multiple spinal points without significant weakness. Oncology was consulted with no plan for Ca management inpatient. TTE showed moderate pericardial effusion about 1.1cm pocket deemed not causing tamponade and likely 2/2 metastasis as per Cardiology consult to be repeated in 3-6 months. MRI thoracic spine showed Tumoral encroachment is seen upon the dura and canal at the T6 and T7 levels as well as the T10 and T11 levels which appear similar to slightly worsened when compared to the prior MRI study. Similar-appearing mild pathologic compression deformity involving the T11 vertebral body with minimal worsening of bony retropulsion. Mild to moderate canal stenosis is seen at this level which is slightly worsened in the interim with no sign of cord compression. Radiotherapy treatment to be done outpatient. Patient is stable to be discharged to rehab. 68yo Slovak speaking F Hx Lung Ca in 8/2020 with mets to brain, Spine, and Right hip s/p RT x 5 ( last tx 9/2) followed by Dr. Xie at [facility] presenting with hypotension. Was recently admitted right hip pain s/p wide resection JAYANT w/ post op afib on 9/17 c/b intubation and pneumonia. Patient was hypotensive to 90's systolic at rehab. She was given 3.5L of fluids and a dose of vancomycin/zosyn and sent to the ED. She was persistently hypotensive and required levophed and admitted to the MICU. CTAP showed Proctocolitis with possible colovesicular fistula. Colorectal surgery was consulted with no interventions at this time. She was placed on PO vanc, IV flagyl and meropenem with no other significant growth on BCx or Urine Cx but found to be C. diff positive. She was weaned off and transferred to the floors on 10/13 and kept on po vanc and iv flagyl for the full 14/7 day course as per ID consult. On CT Chest she was noted to have progressing lung mets to multiple spinal points without significant weakness. Oncology was consulted with no plan for Ca management inpatient. TTE showed moderate pericardial effusion about 1.1cm pocket deemed not causing tamponade and likely 2/2 metastasis as per Cardiology consult to be repeated in 3-6 months. Anticoagulation on Eliquis was started, then changed to full dose as pt had positive LLE DVT w/ sob for presumed PE. Initial loading dose was interrupted for suspect of bleeding later deemed erroneous, passed heparin challenge, then transitioned back to Eliquis. MRI thoracic spine showed Tumor encroachment seen upon the dura and canal at the T6 and T7 levels as well as the T10 and T11 levels which appear similar to slightly worsened when compared to the prior MRI study. Similar-appearing mild pathologic compression deformity involving the T11 vertebral body with minimal worsening of bony retropulsion. Mild to moderate canal stenosis is seen at this level which is slightly worsened in the interim with no sign of cord compression. Radiotherapy treatment to be done outpatient. Patient is stable to be discharged to rehab pending on RT plan.    70yo Kyrgyz speaking F Hx Lung Ca in 8/2020 with mets to brain, Spine, and Right hip s/p RT x 5 ( last tx 9/2) followed by Dr. Xie at [facility] presenting with hypotension. Was recently admitted right hip pain s/p wide resection JAYANT w/ post op afib on 9/17 c/b intubation and pneumonia. Patient was hypotensive to 90's systolic at rehab. She was given 3.5L of fluids and a dose of vancomycin/zosyn and sent to the ED. She was persistently hypotensive and required levophed and admitted to the MICU. CTAP showed Proctocolitis with possible colovesicular fistula. Colorectal surgery was consulted with no interventions at this time. She was placed on PO vanc, IV flagyl and meropenem with no other significant growth on BCx or Urine Cx but found to be C. diff positive. She was weaned off and transferred to the floors on 10/13 and kept on po vanc and iv flagyl for the full 14/7 day course as per ID consult. On CT Chest she was noted to have progressing lung mets to multiple spinal points without significant weakness. Oncology was consulted with no plan for Ca management inpatient. TTE showed moderate pericardial effusion about 1.1cm pocket deemed not causing tamponade and likely 2/2 metastasis as per Cardiology consult to be repeated in 3-6 months. Anticoagulation on Eliquis was started, then changed to full dose as pt had positive LLE DVT w/ sob for presumed PE. Initial loading dose was interrupted for suspect of bleeding later deemed erroneous, passed heparin challenge, then transitioned back to Eliquis. MRI thoracic spine showed Tumor encroachment seen upon the dura and canal at the T6 and T7 levels as well as the T10 and T11 levels which appear similar to slightly worsened when compared to the prior MRI study. Similar-appearing mild pathologic compression deformity involving the T11 vertebral body with minimal worsening of bony retropulsion. Mild to moderate canal stenosis is seen at this level which is slightly worsened in the interim with no sign of cord compression. No inpatient radiotherapy treatment being offered. Plan to follow up with hem/onc physician at [facility] after rehab.  Patient is stable to be discharged to rehab.    70yo Macedonian speaking F Hx Lung Ca in 8/2020 with mets to brain, Spine, and Right hip s/p RT x 5 ( last tx 9/2) followed by Dr. Xie at [facility] presenting with hypotension. Was recently admitted right hip pain s/p wide resection JAYANT w/ post op afib on 9/17 c/b intubation and pneumonia. Patient was hypotensive to 90's systolic at rehab. She was given 3.5L of fluids and a dose of vancomycin/zosyn and sent to the ED. She was persistently hypotensive and required levophed and admitted to the MICU. Septic shock present on admission d/t Cdiff colitis. CTAP showed Proctocolitis with possible colovesicular fistula. Colorectal surgery was consulted with no interventions at this time. She was placed on PO vanc, IV flagyl and meropenem with no other significant growth on BCx or Urine Cx but found to be C. diff positive. She was weaned off pressors and transferred to the floors on 10/13 and kept on po vanc and iv flagyl for the full 14/7 day course as per ID consult. On CT Chest she was noted to have progressing lung mets to multiple spinal points without significant weakness. Oncology was consulted with no plan for Ca management inpatient. TTE showed moderate pericardial effusion about 1.1cm pocket deemed not causing tamponade and likely 2/2 metastasis as per Cardiology consult to be repeated in 3-6 months. Anticoagulation on Eliquis was started, then changed to full dose as pt had positive acute LLE DVT. Initial loading dose was interrupted for worsening anemia, later deemed erroneous. Patient responded well to 1 unit PRBC, rectal exam w/ brown stool. She passed heparin challenge, then transitioned back to Eliquis. MRI thoracic spine showed tumor encroachment seen upon the dura and canal at the T6 and T7 levels as well as the T10 and T11 levels which appear similar to slightly worsened when compared to the prior MRI study. Similar-appearing mild pathologic compression deformity involving the T11 vertebral body with minimal worsening of bony retropulsion. Mild to moderate canal stenosis is seen at this level which is slightly worsened in the interim with no sign of cord compression. No inpatient radiotherapy treatment being offered. Neoplasm related pain, pain regimen w/ Oxycontin ER and Oxycodone IR prn. Plan to follow up with hem/onc physician at [facility] after rehab.  Patient is stable to be discharged to rehab.

## 2020-10-14 NOTE — PROGRESS NOTE ADULT - SUBJECTIVE AND OBJECTIVE BOX
Dr. Ildefonso THOMPSON:[id number]/NS: [phone]  After 7pm please page [phone] or [phone]    MAGDALENE YOST  69y  MRN: [id number]    Subjective:    Patient is a 69y old  Female who presents with a chief complaint of hypotension (13 Oct 2020 14:40)  She was refusing medication last night due to shortness of breath and chest pain. O/N she desatted to 80s put on 4L NC and resopnded appropriately. EKG done was not concerning. Patient notes that the chest pain and SOB have resolved. she still has a rectal tube with noted diarrhea.     MEDICATIONS  (STANDING):  acetaminophen   Tablet .. 500 milliGRAM(s) Oral every 4 hours  aspirin 325 milliGRAM(s) Oral daily  enoxaparin Injectable 40 milliGRAM(s) SubCutaneous daily  gabapentin 100 milliGRAM(s) Oral three times a day  melatonin 3 milliGRAM(s) Oral at bedtime  metroNIDAZOLE  IVPB      metroNIDAZOLE  IVPB 500 milliGRAM(s) IV Intermittent every 8 hours  midodrine 30 milliGRAM(s) Oral every 8 hours  ondansetron    Tablet 4 milliGRAM(s) Oral every 12 hours  pantoprazole    Tablet 40 milliGRAM(s) Oral before breakfast  potassium phosphate / sodium phosphate Powder (PHOS-NaK) 1 Packet(s) Oral three times a day with meals  sodium chloride 0.9%. 1000 milliLiter(s) (75 mL/Hr) IV Continuous <Continuous>  vancomycin    Solution 500 milliGRAM(s) Oral every 6 hours    MEDICATIONS  (PRN):  ondansetron Injectable 4 milliGRAM(s) IV Push every 6 hours PRN Nausea and/or Vomiting  oxyCODONE    IR 5 milliGRAM(s) Oral every 6 hours PRN Moderate Pain (4 - 6)        Objective:    Vitals: Vital Signs Last 24 Hrs  T(C): 36.5 (10-14-20 @ 06:39), Max: 36.5 (10-13-20 @ 12:42)  T(F): 97.7 (10-14-20 @ 06:39), Max: 97.7 (10-13-20 @ 12:42)  HR: 67 (10-14-20 @ 06:39) (67 - 78)  BP: 92/56 (10-14-20 @ 06:39) (91/60 - 116/71)  BP(mean): --  RR: 18 (10-14-20 @ 06:39) (16 - 20)  SpO2: 99% (10-14-20 @ 06:39) (90% - 99%)            I&O's Summary    13 Oct 2020 07:01  -  14 Oct 2020 07:00  --------------------------------------------------------  IN: 0 mL / OUT: 250 mL / NET: -250 mL        PHYSICAL EXAM:  GENERAL: NAD, well-groomed, well-developed  HEAD:  Atraumatic, Normocephalic  EYES: EOMI, PERRLA, conjunctiva and sclera clear  CHEST/LUNG: Clear to auscultation bilaterally; No rales, rhonchi, wheezing, or rubs, s/p mastectomy   HEART: Regular rate and rhythm; No murmurs, rubs, or gallops  ABDOMEN: Soft, Mild tenderness in lower abd and suprapubic area, Nondistended; Bowel sounds present  EXTREMITIES:  2+ Peripheral Pulses, No clubbing, cyanosis, or edema  NERVOUS SYSTEM:  Alert & Oriented X4, Good concentration    LABS:  10-14    133<L>  |  102  |  14  ----------------------------<  92  3.8   |  20<L>  |  0.39<L>  10-13    132<L>  |  106  |  12  ----------------------------<  116<H>  4.0   |  17<L>  |  0.33<L>  10-12    132<L>  |  103  |  10  ----------------------------<  116<H>  4.2   |  20<L>  |  0.36<L>    Ca    7.8<L>      14 Oct 2020 06:06  Ca    7.0<L>      13 Oct 2020 07:31  Ca    8.0<L>      12 Oct 2020 03:00  Phos  2.5     10-14  Mg     1.6     10-14    TPro  4.1<L>  /  Alb  1.8<L>  /  TBili  < 0.2<L>  /  DBili  x   /  AST  30  /  ALT  18  /  AlkPhos  143<H>  10-13  TPro  4.7<L>  /  Alb  2.3<L>  /  TBili  < 0.2<L>  /  DBili  x   /  AST  21  /  ALT  18  /  AlkPhos  137<H>  10-12                                              9.8    16.50 )-----------( 502      ( 14 Oct 2020 06:06 )             31.0                         8.8    9.68  )-----------( 440      ( 13 Oct 2020 07:31 )             28.2                         8.9    16.98 )-----------( 486      ( 12 Oct 2020 03:00 )             27.5     CAPILLARY BLOOD GLUCOSE              Dr. Ildefonso Tristan PGY1 Dr. Ildefonso THOMPSON:[id number]/NS: [phone]  After 7pm please page [phone] or [phone]    MAGDALENE YOST  69y  MRN: [id number]    Subjective:    Patient is a 69y old  Female who presents with a chief complaint of hypotension (13 Oct 2020 14:40)   number: [id number]. She was refusing medication last night due to shortness of breath and chest pain. O/N she desatted to 80s put on 4L NC and resopnded appropriately. EKG done was not concerning. Patient notes that the chest pain and SOB have resolved. she still has a rectal tube with noted diarrhea.     MEDICATIONS  (STANDING):  acetaminophen   Tablet .. 500 milliGRAM(s) Oral every 4 hours  aspirin 325 milliGRAM(s) Oral daily  enoxaparin Injectable 40 milliGRAM(s) SubCutaneous daily  gabapentin 100 milliGRAM(s) Oral three times a day  melatonin 3 milliGRAM(s) Oral at bedtime  metroNIDAZOLE  IVPB      metroNIDAZOLE  IVPB 500 milliGRAM(s) IV Intermittent every 8 hours  midodrine 30 milliGRAM(s) Oral every 8 hours  ondansetron    Tablet 4 milliGRAM(s) Oral every 12 hours  pantoprazole    Tablet 40 milliGRAM(s) Oral before breakfast  potassium phosphate / sodium phosphate Powder (PHOS-NaK) 1 Packet(s) Oral three times a day with meals  sodium chloride 0.9%. 1000 milliLiter(s) (75 mL/Hr) IV Continuous <Continuous>  vancomycin    Solution 500 milliGRAM(s) Oral every 6 hours    MEDICATIONS  (PRN):  ondansetron Injectable 4 milliGRAM(s) IV Push every 6 hours PRN Nausea and/or Vomiting  oxyCODONE    IR 5 milliGRAM(s) Oral every 6 hours PRN Moderate Pain (4 - 6)        Objective:    Vitals: Vital Signs Last 24 Hrs  T(C): 36.5 (10-14-20 @ 06:39), Max: 36.5 (10-13-20 @ 12:42)  T(F): 97.7 (10-14-20 @ 06:39), Max: 97.7 (10-13-20 @ 12:42)  HR: 67 (10-14-20 @ 06:39) (67 - 78)  BP: 92/56 (10-14-20 @ 06:39) (91/60 - 116/71)  BP(mean): --  RR: 18 (10-14-20 @ 06:39) (16 - 20)  SpO2: 99% (10-14-20 @ 06:39) (90% - 99%)            I&O's Summary    13 Oct 2020 07:01  -  14 Oct 2020 07:00  --------------------------------------------------------  IN: 0 mL / OUT: 250 mL / NET: -250 mL        PHYSICAL EXAM:  GENERAL: NAD, well-groomed, well-developed  HEAD:  Atraumatic, Normocephalic  EYES: EOMI, PERRLA, conjunctiva and sclera clear  CHEST/LUNG: Clear to auscultation bilaterally; No rales, rhonchi, wheezing, or rubs, s/p mastectomy   HEART: Regular rate and rhythm; No murmurs, rubs, or gallops  ABDOMEN: Soft, Mild tenderness in lower abd and suprapubic area, Nondistended; Bowel sounds present  EXTREMITIES:  2+ Peripheral Pulses, No clubbing, cyanosis, or edema  NERVOUS SYSTEM:  Alert & Oriented X4, Good concentration    LABS:  10-14    133<L>  |  102  |  14  ----------------------------<  92  3.8   |  20<L>  |  0.39<L>  10-13    132<L>  |  106  |  12  ----------------------------<  116<H>  4.0   |  17<L>  |  0.33<L>  10-12    132<L>  |  103  |  10  ----------------------------<  116<H>  4.2   |  20<L>  |  0.36<L>    Ca    7.8<L>      14 Oct 2020 06:06  Ca    7.0<L>      13 Oct 2020 07:31  Ca    8.0<L>      12 Oct 2020 03:00  Phos  2.5     10-14  Mg     1.6     10-14    TPro  4.1<L>  /  Alb  1.8<L>  /  TBili  < 0.2<L>  /  DBili  x   /  AST  30  /  ALT  18  /  AlkPhos  143<H>  10-13  TPro  4.7<L>  /  Alb  2.3<L>  /  TBili  < 0.2<L>  /  DBili  x   /  AST  21  /  ALT  18  /  AlkPhos  137<H>  10-12                                              9.8    16.50 )-----------( 502      ( 14 Oct 2020 06:06 )             31.0                         8.8    9.68  )-----------( 440      ( 13 Oct 2020 07:31 )             28.2                         8.9    16.98 )-----------( 486      ( 12 Oct 2020 03:00 )             27.5     CAPILLARY BLOOD GLUCOSE              Dr. Ildefonso Tristan PGY1

## 2020-10-14 NOTE — CONSULT NOTE ADULT - SUBJECTIVE AND OBJECTIVE BOX
Patient is a 69y old  Female who presents with a chief complaint of hypotension (14 Oct 2020 09:03)      HPI:  70yo F Hx Lung Ca in 8/2020 with mets to brain, Spine, and Right hip s/p RT x 5 ( last tx 9/2) presenting with hypotension. Was recently admitted into the hospital for right hip pain s/p wide resection JAYANT on 9/17 c/b intubation and pneumonia. Patient was hypotensive to 90's systolic at rehab. Was given 3.5L of fluids and a dose of vancomycin/zosyn and sent to the ED. Collateral was obtained from family. According to them, patient was in usual health prior to symptoms; had constipation in the past week, but had a bowel movement yesterday after a enema. Patient endorsed some pain during the enema. Per patient, she had been experiencing decreased appetite for several weeks w/ nausea and intermittent coughing up clear sputum. Her main complaint is abdominal discomfort.  Patient had received radiation therapy in the past for her lung CA, but no chemotherapy - following at [facility] with Dr. Xie. Patient is full code according to family.     In the ED, patient was afebrile, BP was 70's systolic was given additional 2 L of fluid resuscitation and 20 mg + 30 mg of midodrine, satting well on nasal canula. Pressures continued to be labile despite adequate volume resuscitation. Patient was started on Levophed for pressure support and transferred to MICU.  (10 Oct 2020 16:16)       Oncologic History:      ROS: as above     PAST MEDICAL & SURGICAL HISTORY:  Lung cancer metastatic to bone    No significant past surgical history        SOCIAL HISTORY:    FAMILY HISTORY:  FH: colon cancer    Family history of cervical cancer        MEDICATIONS  (STANDING):  acetaminophen   Tablet .. 500 milliGRAM(s) Oral every 4 hours  aspirin 325 milliGRAM(s) Oral daily  enoxaparin Injectable 40 milliGRAM(s) SubCutaneous daily  gabapentin 100 milliGRAM(s) Oral three times a day  melatonin 3 milliGRAM(s) Oral at bedtime  metroNIDAZOLE  IVPB      metroNIDAZOLE  IVPB 500 milliGRAM(s) IV Intermittent every 8 hours  midodrine 30 milliGRAM(s) Oral every 8 hours  ondansetron    Tablet 4 milliGRAM(s) Oral every 12 hours  pantoprazole    Tablet 40 milliGRAM(s) Oral before breakfast  sodium chloride 0.9%. 1000 milliLiter(s) (75 mL/Hr) IV Continuous <Continuous>  vancomycin    Solution 500 milliGRAM(s) Oral every 6 hours    MEDICATIONS  (PRN):  ondansetron Injectable 4 milliGRAM(s) IV Push every 6 hours PRN Nausea and/or Vomiting  oxyCODONE    IR 5 milliGRAM(s) Oral every 6 hours PRN Moderate Pain (4 - 6)      Allergies    No Known Allergies    Intolerances        Vital Signs Last 24 Hrs  T(C): 36.7 (14 Oct 2020 10:37), Max: 36.7 (14 Oct 2020 10:37)  T(F): 98.1 (14 Oct 2020 10:37), Max: 98.1 (14 Oct 2020 10:37)  HR: 85 (14 Oct 2020 10:37) (67 - 85)  BP: 95/61 (14 Oct 2020 10:37) (91/60 - 116/71)  BP(mean): --  RR: 16 (14 Oct 2020 10:37) (16 - 20)  SpO2: 98% (14 Oct 2020 10:37) (90% - 99%)    PHYSICAL EXAM  General: adult in NAD  HEENT: clear oropharynx, anicteric sclera, pink conjunctiva  Neck: supple  CV: normal S1/S2 with no murmur rubs or gallops  Lungs: positive air movement b/l ant lungs, clear to auscultation, no wheezes, no rales  Abdomen: soft non-tender non-distended, no hepatosplenomegaly  Ext: no clubbing cyanosis or edema  Skin: no rashes and no petechiae  Neuro: alert and oriented X 3, none focal    LABS:                          9.8    16.50 )-----------( 502      ( 14 Oct 2020 06:06 )             31.0         Mean Cell Volume : 81.6 fL  Mean Cell Hemoglobin : 25.8 pg  Mean Cell Hemoglobin Concentration : 31.6 %  Auto Neutrophil # : 12.51 K/uL  Auto Lymphocyte # : 1.00 K/uL  Auto Monocyte # : 1.16 K/uL  Auto Eosinophil # : 0.03 K/uL  Auto Basophil # : 0.14 K/uL  Auto Neutrophil % : 75.8 %  Auto Lymphocyte % : 6.1 %  Auto Monocyte % : 7.0 %  Auto Eosinophil % : 0.2 %  Auto Basophil % : 0.8 %      Serial CBC's  10-14 @ 06:06  Hct-31.0 / Hgb-9.8 / Plat-502 / RBC-3.80 / WBC-16.50  Serial CBC's  10-13 @ 07:31  Hct-28.2 / Hgb-8.8 / Plat-440 / RBC-3.40 / WBC-9.68  Serial CBC's  10-12 @ 03:00  Hct-27.5 / Hgb-8.9 / Plat-486 / RBC-3.40 / WBC-16.98  Serial CBC's  10-11 @ 05:33  Hct-27.7 / Hgb-8.5 / Plat-514 / RBC-3.24 / WBC-15.68  Serial CBC's  10-10 @ 11:27  Hct-25.5 / Hgb-8.2 / Plat-472 / RBC-3.11 / WBC-12.72      10-14    133<L>  |  102  |  14  ----------------------------<  92  3.8   |  20<L>  |  0.39<L>    Ca    7.8<L>      14 Oct 2020 06:06  Phos  2.5     10-14  Mg     1.6     10-14    TPro  4.1<L>  /  Alb  1.8<L>  /  TBili  < 0.2<L>  /  DBili  x   /  AST  30  /  ALT  18  /  AlkPhos  143<H>  10-13                      RADIOLOGY & ADDITIONAL STUDIES:     Patient is a 69y old  Female who presents with a chief complaint of hypotension (14 Oct 2020 09:03)      HPI:  68yo F Hx Lung Ca in 8/2020 with mets to brain, Spine, and Right hip s/p RT x 5 ( last tx 9/2) presenting with hypotension. Was recently admitted into the hospital for right hip pain s/p wide resection JAYANT on 9/17 c/b intubation and pneumonia. Patient was hypotensive to 90's systolic at rehab. Was given 3.5L of fluids and a dose of vancomycin/zosyn and sent to the ED. Collateral was obtained from family. According to them, patient was in usual health prior to symptoms; had constipation in the past week, but had a bowel movement yesterday after a enema. Patient endorsed some pain during the enema. Per patient, she had been experiencing decreased appetite for several weeks w/ nausea and intermittent coughing up clear sputum. Her main complaint is abdominal discomfort.  Patient had received radiation therapy in the past for her lung CA, but no chemotherapy - following at [facility] with Dr. Xie. Patient is full code according to family.     In the ED, patient was afebrile, BP was 70's systolic was given additional 2 L of fluid resuscitation and 20 mg + 30 mg of midodrine, satting well on nasal canula. Pressures continued to be labile despite adequate volume resuscitation. Patient was started on Levophed for pressure support and transferred to MICU.  (10 Oct 2020 16:16)     ROS: as above     PAST MEDICAL & SURGICAL HISTORY:  Lung cancer metastatic to bone    No significant past surgical history        SOCIAL HISTORY:    FAMILY HISTORY:  FH: colon cancer    Family history of cervical cancer        MEDICATIONS  (STANDING):  acetaminophen   Tablet .. 500 milliGRAM(s) Oral every 4 hours  aspirin 325 milliGRAM(s) Oral daily  enoxaparin Injectable 40 milliGRAM(s) SubCutaneous daily  gabapentin 100 milliGRAM(s) Oral three times a day  melatonin 3 milliGRAM(s) Oral at bedtime  metroNIDAZOLE  IVPB      metroNIDAZOLE  IVPB 500 milliGRAM(s) IV Intermittent every 8 hours  midodrine 30 milliGRAM(s) Oral every 8 hours  ondansetron    Tablet 4 milliGRAM(s) Oral every 12 hours  pantoprazole    Tablet 40 milliGRAM(s) Oral before breakfast  sodium chloride 0.9%. 1000 milliLiter(s) (75 mL/Hr) IV Continuous <Continuous>  vancomycin    Solution 500 milliGRAM(s) Oral every 6 hours    MEDICATIONS  (PRN):  ondansetron Injectable 4 milliGRAM(s) IV Push every 6 hours PRN Nausea and/or Vomiting  oxyCODONE    IR 5 milliGRAM(s) Oral every 6 hours PRN Moderate Pain (4 - 6)      Allergies    No Known Allergies    Intolerances        Vital Signs Last 24 Hrs  T(C): 36.7 (14 Oct 2020 10:37), Max: 36.7 (14 Oct 2020 10:37)  T(F): 98.1 (14 Oct 2020 10:37), Max: 98.1 (14 Oct 2020 10:37)  HR: 85 (14 Oct 2020 10:37) (67 - 85)  BP: 95/61 (14 Oct 2020 10:37) (91/60 - 116/71)  BP(mean): --  RR: 16 (14 Oct 2020 10:37) (16 - 20)  SpO2: 98% (14 Oct 2020 10:37) (90% - 99%)    LABS:                          9.8    16.50 )-----------( 502      ( 14 Oct 2020 06:06 )             31.0         Mean Cell Volume : 81.6 fL  Mean Cell Hemoglobin : 25.8 pg  Mean Cell Hemoglobin Concentration : 31.6 %  Auto Neutrophil # : 12.51 K/uL  Auto Lymphocyte # : 1.00 K/uL  Auto Monocyte # : 1.16 K/uL  Auto Eosinophil # : 0.03 K/uL  Auto Basophil # : 0.14 K/uL  Auto Neutrophil % : 75.8 %  Auto Lymphocyte % : 6.1 %  Auto Monocyte % : 7.0 %  Auto Eosinophil % : 0.2 %  Auto Basophil % : 0.8 %      Serial CBC's  10-14 @ 06:06  Hct-31.0 / Hgb-9.8 / Plat-502 / RBC-3.80 / WBC-16.50  Serial CBC's  10-13 @ 07:31  Hct-28.2 / Hgb-8.8 / Plat-440 / RBC-3.40 / WBC-9.68  Serial CBC's  10-12 @ 03:00  Hct-27.5 / Hgb-8.9 / Plat-486 / RBC-3.40 / WBC-16.98  Serial CBC's  10-11 @ 05:33  Hct-27.7 / Hgb-8.5 / Plat-514 / RBC-3.24 / WBC-15.68  Serial CBC's  10-10 @ 11:27  Hct-25.5 / Hgb-8.2 / Plat-472 / RBC-3.11 / WBC-12.72      10-14    133<L>  |  102  |  14  ----------------------------<  92  3.8   |  20<L>  |  0.39<L>    Ca    7.8<L>      14 Oct 2020 06:06  Phos  2.5     10-14  Mg     1.6     10-14    TPro  4.1<L>  /  Alb  1.8<L>  /  TBili  < 0.2<L>  /  DBili  x   /  AST  30  /  ALT  18  /  AlkPhos  143<H>  10-13                      RADIOLOGY & ADDITIONAL STUDIES:

## 2020-10-14 NOTE — PROGRESS NOTE ADULT - SUBJECTIVE AND OBJECTIVE BOX
Patient is a 69y old  Female who presents with a chief complaint of hypotension (13 Oct 2020 14:26)    f/u cdiff colitis    Interval History/ROS:  upset that it took 3 hours to get help last night.  but otherwise, she feels better.  much less abdominal pain.  no n/v.  no dysuria.  Remainder of ROS otherwise negative.    PAST MEDICAL & SURGICAL HISTORY:  Lung cancer metastatic to bone  R open hip surgery     Allergies  No Known Allergies    ANTIMICROBIALS:  piperacillin/tazobactam IVPB (10/10 x1)  meropenem  IVPB (10/10-10/13)    active:  metroNIDAZOLE  IVPB 500 every 8 hours (10/11-)  vancomycin    Solution 500 every 6 hours (10/10-)    MEDICATIONS  (STANDING):  acetaminophen   Tablet .. 500 every 4 hours  aspirin 325 daily  enoxaparin Injectable 40 daily  gabapentin 100 three times a day  melatonin 3 at bedtime  midodrine 30 every 8 hours  ondansetron    Tablet 4 every 12 hours  pantoprazole    Tablet 40 before breakfast    Vital Signs Last 24 Hrs  T(F): 98.1 (10-14-20 @ 10:37), Max: 98.1 (10-14-20 @ 10:37)  HR: 85 (10-14-20 @ 10:37)  BP: 95/61 (10-14-20 @ 10:37)  RR: 16 (10-14-20 @ 10:37)  SpO2: 98% (10-14-20 @ 10:37) (90% - 99%)    PHYSICAL EXAM:  Constitutional: non-toxic, weak appearing  HEAD/EYES: anicteric  ENT:  supple  Cardiovascular:   normal S1, S2  Respiratory:  clear BS bilaterally  GI:  improved distension, less tender  :  no andrea  Musculoskeletal:  no synovitis  Neurologic: awake and alert, normal strength, no focal findings  Skin:  R hip surgical site well healed  Psychiatric:  awake, alert, appropriate mood                        9.8    16.50 )-----------( 502      ( 14 Oct 2020 06:06 )             31.0 10-    133  |  102  |  14  ----------------------------<  92  3.8   |  20  |  0.39  Ca    7.8      14 Oct 2020 06:06Phos  2.5     10-14Mg     1.6     10-  TPro  4.1  /  Alb  1.8  /  TBili  < 0.2  /  DBili  x   /  AST  30  /  ALT  18  /  AlkPhos  143  10-13    Urinalysis Basic - ( 12 Oct 2020 02:10 )  Color: YELLOW / Appearance: Lt TURBID / S.038 / pH: 6.5  Gluc: NEGATIVE / Ketone: MODERATE  / Bili: NEGATIVE / Urobili: TRACE   Blood: TRACE / Protein: 70 / Nitrite: NEGATIVE   Leuk Esterase: SMALL / RBC: 6-10 / WBC 26-50   Sq Epi: MANY / Non Sq Epi: x / Bacteria: SMALL    MICROBIOLOGY:  Culture - Nose (collected 11 Oct 2020 19:49)  Source: .Nose Nose  Preliminary Report (12 Oct 2020 17:14):    Culture in progress    GI PCR Panel, Stool (collected 11 Oct 2020 07:54)  Source: .Stool c&amp;s narayan  Final Report (11 Oct 2020 14:43):    GI PCR Results: NOT detected     Clostridium difficile Toxin by PCR: Detected:     Culture - Urine (collected 10 Oct 2020 18:26)  Source: .Urine Clean Catch (Midstream)  Final Report (11 Oct 2020 23:30):    50,000 - 99,000 CFU/mL Yeast-like cells, presumptively not Candida    albicans "Susceptibilities not performed"    Culture - Blood (collected 10 Oct 2020 16:21)  Source: .Blood Blood-Venous  Preliminary Report (11 Oct 2020 17:01):    No growth to date.    Culture - Blood (collected 10 Oct 2020 16:21)  Source: .Blood Blood-Peripheral  Preliminary Report (11 Oct 2020 17:01):    No growth to date.    Rapid RVP Result: NotDetec (10-10 @ 12:16)    COVID-19 PCR: NotDetec (20 @ 16:48)  COVID-19 PCR: NotDetec (20 @ 02:37)  COVID-19 PCR: NotDetec (20 @ 13:48)  COVID-19 PCR: NotDetec (20 @ 23:41)    RADIOLOGY:  imaging below personally reviewed and agree with findings    Xray Chest 1 View- PORTABLE-Urgent (10.10.20 @ 12:42) >  IMPRESSION: Clear lungs.    CT Abdomen and Pelvis w/ IV Cont (10.10.20 @ 13:22) >  IMPRESSION:  Right upper lobe spiculated mass, consistent with known lung carcinoma, unchanged. Right hilar and right retrocrural adenopathy is again noted.  Bilateral pleural effusions, left greater than right, with associated compressive atelectasis.  Moderate pericardial effusion, slightly increased since 10/2/2020.  New diffuse colonic wall thickening involving the entire colon and rectum, consistent with a proctocolitis, new since 10/2/2020.  Ill-defined somewhat wedge-shaped area of decreased attenuation in the lower pole of the left kidney. Differential diagnosis includes an area of ischemia and infection.  Extensive lytic osseous metastatic disease with involvement of the spine at multiple levels and encroachment upon the spinal canal as on prior PET/CT dated 10/2/2020.  Gas within the urinary bladder. Correlation with any history of recent instrumentation is recommended. In the absence of recent instrumentation, infection or fistula to bowel is considered.

## 2020-10-14 NOTE — PROGRESS NOTE ADULT - ATTENDING COMMENTS
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 Diff proctocolitis and colovesical fistula. s/p pressors     # Severe C Diff Colitis- On PO  Vanc and IV Flagyl. Monitor stool outpt  DW ID attending Dr Abdalla    [facility], spine, R hip, s/p RT x5 (last 9/2)-  CT with extensive lytic osseous metastatic disease with involvement of the spine at multiple levels and encroachment upon the spinal canal as on prior PET/CT dated 10/2/2020. Fu onc recs.  PainMx- On Perocet PRN, home med- Morphine ER on hold     # Moderate Pericardial effusion  as noted on ECHO -? Malignancy ? HF.  Hemodynamically stable. DW Cardiology Fellow- FU recs    # Wedge shaped attenuation in lower pole of left kidney- ? ischemia vs infection  # Pt developed new onset Afib post hip replacement and was ordered for AS 325mg BID. On this admission, pt was started for ASA 325mg daily. DW Cardiology recs regarding AC    # D/W patient's daughter, Veda . Had a lengthy conversation – updated pts condition, CT findings ( family was updated of  outpt PET scan results by Dr Xie) and echo findings. Daughter understands overall poor prognosis but is hopeful that pt will continue to receive intervention for her malignancy.  FULLCODE She had also  spoken with palliative during the previous admission

## 2020-10-14 NOTE — PROGRESS NOTE ADULT - PROBLEM SELECTOR PLAN 1
Admitted for septic shock, weaned off levophed   - c/w po vanc (10/11-), IV flagyl (10/11-)  - D/C Meropenem   - BCx: NGTD, UCx w/ yeast; + for c. diff toxin  - wean midodrine 30mg q8h  - monitor WBC, fever spikes  - Consult ID for Abx regimen considering C. diff proctocolitis and colovesicular fistula Admitted for septic shock, weaned off levophed   - c/w po vanc (10/11-), IV flagyl (10/11-)  - D/C Meropenem   - BCx: NGTD, UCx w/ yeast; + for c. diff toxin  - wean midodrine 30mg q8h, IVF   - monitor WBC, fever spikes  - ID following for Abx regimen considering C. diff proctocolitis and colovesicular fistula; c/w Abx

## 2020-10-14 NOTE — CONSULT NOTE ADULT - ASSESSMENT
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  Recent PET/CT 10/2/2020 with Hypermetabolic right upper lobe lung mass. Hypermetabolic right perihilar and right retrocrural lymph node metastases. Extensive hypermetabolic lytic osseous metastasis in axial skeleton. Lesions in the thoracic spine, extend into the epidural space, better evaluated on MRI. Small hypermetabolic focus in left lesser trochanter, difficult to delineate on CT, may represent osseous metastasis.    -ID input appreciated  -No inpatient oncologic interventions  -Will need to start treatment for metastatic NSCLC as outpatient, after infection resolves, if performance status allows  -Consider palliative care consult for symptom management  -Supportive care, pain control, Nutrition, PT, DVT ppx  -Outpatient oncology f/u    Will follow. Please do not hesitate to call back with questions.     Eliana Osullivan MD  Medical Oncology Attending  C: [phone]

## 2020-10-14 NOTE — PROGRESS NOTE ADULT - ASSESSMENT
69F with known metastatic lung cancer to brain, Spine, and Right hip s/p RT x 5 (last tx 9/2).  Recently underwent R hip open surgery for metastatic disease and placement of hardware on 9/18.  Gail-op antibiotics.  Was in rehab when she developed hypotension. Went to ICU for hypotension and resuscitation.  Was started on high dose midodrine and continues to be borderline hypotensive.  CT with pancolitis and is Cdiff (+).  Initially on broad spectrum antibiotics, she is now on vancomycin enterally and IV metronidazole.  She is being followed by colorectal surgery.  No evidence of toxic megacolon at this point.    Cdiff colitis  - continue vancomycin 500mg enterally q6  - intravenous metronidazole as well  - avoid other antibiotics if possible  - close f/u  - hydration as per primary team    I have discussed plan of care as detailed above with consulting team

## 2020-10-15 NOTE — PROGRESS NOTE ADULT - SUBJECTIVE AND OBJECTIVE BOX
Patient is a 69y old  Female who presents with a chief complaint of hypotension (13 Oct 2020 14:26)    f/u cdiff colitis    Interval History/ROS:  nausea overnight.  no vomiting.  diarrhea still but no abdominal pain. no dysuria.  Remainder of ROS otherwise negative.    PAST MEDICAL & SURGICAL HISTORY:  Lung cancer metastatic to bone  R open hip surgery     Allergies  No Known Allergies    ANTIMICROBIALS:  piperacillin/tazobactam IVPB (10/10 x1)  meropenem  IVPB (10/10-10/13)    active:  metroNIDAZOLE  IVPB 500 every 8 hours (10/11-)  vancomycin    Solution 500 every 6 hours (10/10-)    MEDICATIONS  (STANDING):  acetaminophen   Tablet .. 500 every 4 hours  aspirin 325 daily  enoxaparin Injectable 40 at bedtime  gabapentin 100 three times a day  melatonin 3 at bedtime  midodrine 30 every 8 hours  ondansetron    Tablet 4 every 12 hours  pantoprazole    Tablet 40 before breakfast    Vital Signs Last 24 Hrs  T(F): 98 (10-15-20 @ 05:00), Max: 98.2 (10-14-20 @ 17:05)  HR: 72 (10-15-20 @ 05:00)  BP: 111/70 (10-15-20 @ 05:00)  RR: 18 (10-15-20 @ 05:00)  SpO2: 99% (10-15-20 @ 05:00) (98% - 100%)    PHYSICAL EXAM:  Constitutional: non-toxic, weak appearing  HEAD/EYES: anicteric  ENT:  supple  Cardiovascular:   normal S1, S2  Respiratory:  clear BS bilaterally  GI:  no longer with distension, less tender  :  no andrea  Musculoskeletal:  no synovitis  Neurologic: awake and alert, normal strength, no focal findings  Skin:  R hip surgical site well healed  Psychiatric:  awake, alert, appropriate mood                          9.4    14.93 )-----------( 487      ( 15 Oct 2020 05:45 )             29.5 10-15    132  |  102  |  13  ----------------------------<  101  3.4   |  17  |  0.43  Ca    7.2      15 Oct 2020 05:45Phos  2.6     10-15Mg     1.9     10-15    WBC Count: 14.93 (10-15-20 @ 05:45)  WBC Count: 16.50 (10-14-20 @ 06:06)  WBC Count: 9.68 (10-13-20 @ 07:31)  WBC Count: 16.98 (10-12-20 @ 03:00)  WBC Count: 15.68 (10-11-20 @ 05:33)  WBC Count: 12.72 (10-10-20 @ 11:27)    Urinalysis Basic - ( 12 Oct 2020 02:10 )  Color: YELLOW / Appearance: Lt TURBID / S.038 / pH: 6.5  Gluc: NEGATIVE / Ketone: MODERATE  / Bili: NEGATIVE / Urobili: TRACE   Blood: TRACE / Protein: 70 / Nitrite: NEGATIVE   Leuk Esterase: SMALL / RBC: 6-10 / WBC 26-50   Sq Epi: MANY / Non Sq Epi: x / Bacteria: SMALL    MICROBIOLOGY:  Culture - Nose (collected 11 Oct 2020 19:49)  Source: .Nose Nose  Preliminary Report (12 Oct 2020 17:14):    Culture in progress    GI PCR Panel, Stool (collected 11 Oct 2020 07:54)  Source: .Stool c&amp;s narayan  Final Report (11 Oct 2020 14:43):    GI PCR Results: NOT detected     Clostridium difficile Toxin by PCR: Detected:     Culture - Urine (collected 10 Oct 2020 18:26)  Source: .Urine Clean Catch (Midstream)  Final Report (11 Oct 2020 23:30):    50,000 - 99,000 CFU/mL Yeast-like cells, presumptively not Candida    albicans "Susceptibilities not performed"    Culture - Blood (collected 10 Oct 2020 16:21)  Source: .Blood Blood-Venous  Preliminary Report (11 Oct 2020 17:01):    No growth to date.    Culture - Blood (collected 10 Oct 2020 16:21)  Source: .Blood Blood-Peripheral  Preliminary Report (11 Oct 2020 17:01):    No growth to date.    Rapid RVP Result: NotDetec (10-10 @ 12:16)    COVID-19 PCR: NotDetec (20 @ 16:48)  COVID-19 PCR: NotDetec (20 @ 02:37)  COVID-19 PCR: NotDetec (20 @ 13:48)  COVID-19 PCR: NotDetec (20 @ 23:41)    RADIOLOGY:  imaging below personally reviewed and agree with findings    Xray Chest 1 View- PORTABLE-Urgent (10.10.20 @ 12:42) >  IMPRESSION: Clear lungs.    CT Abdomen and Pelvis w/ IV Cont (10.10.20 @ 13:22) >  IMPRESSION:  Right upper lobe spiculated mass, consistent with known lung carcinoma, unchanged. Right hilar and right retrocrural adenopathy is again noted.  Bilateral pleural effusions, left greater than right, with associated compressive atelectasis.  Moderate pericardial effusion, slightly increased since 10/2/2020.  New diffuse colonic wall thickening involving the entire colon and rectum, consistent with a proctocolitis, new since 10/2/2020.  Ill-defined somewhat wedge-shaped area of decreased attenuation in the lower pole of the left kidney. Differential diagnosis includes an area of ischemia and infection.  Extensive lytic osseous metastatic disease with involvement of the spine at multiple levels and encroachment upon the spinal canal as on prior PET/CT dated 10/2/2020.  Gas within the urinary bladder. Correlation with any history of recent instrumentation is recommended. In the absence of recent instrumentation, infection or fistula to bowel is considered.

## 2020-10-15 NOTE — PROGRESS NOTE ADULT - SUBJECTIVE AND OBJECTIVE BOX
INTERVAL HPI/OVERNIGHT EVENTS:  Patient seen at bedside.      VITAL SIGNS:  T(F): 98.3 (10-15-20 @ 12:45)  HR: 76 (10-15-20 @ 12:45)  BP: 93/52 (10-15-20 @ 12:45)  RR: 19 (10-15-20 @ 12:45)  SpO2: 100% (10-15-20 @ 12:45)  Wt(kg): --    PHYSICAL EXAM:    Constitutional: NAD, resting in bed comfortably  Eyes: EOMI, sclera non-icteric  Neck: supple, no LAP  Respiratory: CTA b/l, good air entry b/l, no wheezing, rhonchi or crackels  Cardiovascular: RRR, normal S1S2, no M/R/G  Gastrointestinal: soft, NTND  Extremities: no edema  Neurological: AAOx3, non focal  Skin: Normal temperature    MEDICATIONS  (STANDING):  acetaminophen   Tablet .. 500 milliGRAM(s) Oral every 4 hours  aspirin 325 milliGRAM(s) Oral daily  enoxaparin Injectable 40 milliGRAM(s) SubCutaneous at bedtime  gabapentin 100 milliGRAM(s) Oral three times a day  melatonin 3 milliGRAM(s) Oral at bedtime  metroNIDAZOLE  IVPB      metroNIDAZOLE  IVPB 500 milliGRAM(s) IV Intermittent every 8 hours  midodrine 30 milliGRAM(s) Oral every 8 hours  ondansetron    Tablet 4 milliGRAM(s) Oral every 12 hours  ondansetron Injectable 4 milliGRAM(s) IV Push every 8 hours  pantoprazole    Tablet 40 milliGRAM(s) Oral before breakfast  sodium chloride 0.9%. 1000 milliLiter(s) (75 mL/Hr) IV Continuous <Continuous>  vancomycin    Solution 500 milliGRAM(s) Oral every 6 hours    MEDICATIONS  (PRN):  oxyCODONE    IR 5 milliGRAM(s) Oral every 6 hours PRN Moderate Pain (4 - 6)      Allergies    No Known Allergies    Intolerances        LABS:                        9.4    14.93 )-----------( 487      ( 15 Oct 2020 05:45 )             29.5     10-15    132<L>  |  102  |  13  ----------------------------<  101<H>  3.4<L>   |  17<L>  |  0.43<L>    Ca    7.2<L>      15 Oct 2020 05:45  Phos  2.6     10-15  Mg     1.9     10-15            RADIOLOGY & ADDITIONAL TESTS:  Studies reviewed.

## 2020-10-15 NOTE — PROGRESS NOTE ADULT - SUBJECTIVE AND OBJECTIVE BOX
Dr. Ildefonso THOMPSON:[id number]/NS: [phone]  After 7pm please page [phone] or [phone]    MAGDALENE YOST  69y  MRN: [id number]    Subjective:    Patient is a 69y old  Female who presents with a chief complaint of hypotension (14 Oct 2020 18:01)  No complaints overnight. No F/ha/N/V/D/SOb/CP/Dysuria    MEDICATIONS  (STANDING):  acetaminophen   Tablet .. 500 milliGRAM(s) Oral every 4 hours  gabapentin 100 milliGRAM(s) Oral three times a day  magnesium sulfate  IVPB 2 Gram(s) IV Intermittent once  melatonin 3 milliGRAM(s) Oral at bedtime  metroNIDAZOLE  IVPB      metroNIDAZOLE  IVPB 500 milliGRAM(s) IV Intermittent every 8 hours  midodrine 30 milliGRAM(s) Oral every 8 hours  ondansetron    Tablet 4 milliGRAM(s) Oral every 12 hours  pantoprazole    Tablet 40 milliGRAM(s) Oral before breakfast  sodium chloride 0.9%. 1000 milliLiter(s) (75 mL/Hr) IV Continuous <Continuous>  vancomycin    Solution 500 milliGRAM(s) Oral every 6 hours    MEDICATIONS  (PRN):  ondansetron Injectable 4 milliGRAM(s) IV Push every 6 hours PRN Nausea and/or Vomiting  oxyCODONE    IR 5 milliGRAM(s) Oral every 6 hours PRN Moderate Pain (4 - 6)        Objective:    Vitals: Vital Signs Last 24 Hrs  T(C): 36.7 (10-15-20 @ 05:00), Max: 36.8 (10-14-20 @ 17:05)  T(F): 98 (10-15-20 @ 05:00), Max: 98.2 (10-14-20 @ 17:05)  HR: 72 (10-15-20 @ 05:00) (65 - 85)  BP: 111/70 (10-15-20 @ 05:00) (95/61 - 115/66)  BP(mean): --  RR: 18 (10-15-20 @ 05:00) (16 - 18)  SpO2: 99% (10-15-20 @ 05:00) (98% - 100%)            I&O's Summary      PHYSICAL EXAM:  GENERAL: NAD, well-groomed, well-developed  HEAD:  Atraumatic, Normocephalic  EYES: EOMI, PERRLA, conjunctiva and sclera clear  NECK: Supple, No JVD, Normal thyroid  CHEST/LUNG: Clear to auscultation bilaterally; No rales, rhonchi, wheezing, or rubs, s/p mastectomy  HEART: Regular rate and rhythm; No murmurs, rubs, or gallops  ABDOMEN: Soft, mildly tender to lower abd and suprapubic, Nondistended; Bowel sounds present  EXTREMITIES:  2+ Peripheral Pulses, No clubbing, cyanosis, or edema  NERVOUS SYSTEM:  Alert & Oriented X4, Good concentration    LABS:  10-15    132<L>  |  102  |  13  ----------------------------<  101<H>  3.4<L>   |  17<L>  |  0.43<L>  10-14    133<L>  |  102  |  14  ----------------------------<  92  3.8   |  20<L>  |  0.39<L>  10-13    132<L>  |  106  |  12  ----------------------------<  116<H>  4.0   |  17<L>  |  0.33<L>    Ca    7.2<L>      15 Oct 2020 05:45  Ca    7.8<L>      14 Oct 2020 06:06  Ca    7.0<L>      13 Oct 2020 07:31  Phos  2.6     10-15  Mg     1.9     10-15    TPro  4.1<L>  /  Alb  1.8<L>  /  TBili  < 0.2<L>  /  DBili  x   /  AST  30  /  ALT  18  /  AlkPhos  143<H>  10-13                                              9.4    14.93 )-----------( 487      ( 15 Oct 2020 05:45 )             29.5                         9.8    16.50 )-----------( 502      ( 14 Oct 2020 06:06 )             31.0                         8.8    9.68  )-----------( 440      ( 13 Oct 2020 07:31 )             28.2     CAPILLARY BLOOD GLUCOSE              Dr. Ildefonso Tristan PGY1

## 2020-10-15 NOTE — PROGRESS NOTE ADULT - ASSESSMENT
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  Recent PET/CT 10/2/2020 with Hypermetabolic right upper lobe lung mass. Hypermetabolic right perihilar and right retrocrural lymph node metastases. Extensive hypermetabolic lytic osseous metastasis in axial skeleton. Lesions in the thoracic spine, extend into the epidural space, better evaluated on MRI. Small hypermetabolic focus in left lesser trochanter, difficult to delineate on CT, may represent osseous metastasis.    I spoke to the patient's daughter in the presence of the patient and explained the clinical situation and disease trajectory. Daughter explained that she is very concerned about the cancer spreading because pt has not received any treatments. I explained that to safely give cancer treatment, infection should be completely treated first and then we can discuss cancer treatment, however performance status will need to be assessed at that time to see if the patient will be a candidate to receive treatment. Patient and daughter understand this and remain hopeful that pt will get better and be able to receive cancer treatment.     -No inpatient oncologic interventions  -Pt is s/p RT to spine, please ask rad onc to assess to see if she might be a candidate for further RT to spine given lesions in the thoracic spine.   -Will need to start treatment for metastatic NSCLC as outpatient, after infection resolves, if performance status allows  -Consider palliative care consult for symptom management  -Supportive care, pain control, Nutrition, PT, DVT ppx  -Outpatient oncology f/u    Will follow. Please do not hesitate to call back with questions.     Eliana Osullivan MD  Medical Oncology Attending  C: [phone]

## 2020-10-15 NOTE — CHART NOTE - NSCHARTNOTEFT_GEN_A_CORE
discussed with dr Ildefonso Tristan- lesions at T6-& and T-11 previously treated, completed 9/3/2020- for any additional radiation to these sites, I would recommend outpatient SBRT at [facility]- office     Mert Burroughs MD  cell

## 2020-10-15 NOTE — PROGRESS NOTE ADULT - ATTENDING COMMENTS
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 Diff proctocolitis and colovesical fistula. s/p pressors     # Severe C Diff Colitis- On PO  Vanc and IV Flagyl. Monitor stool outpt  DW ID attending Dr Abdalla    # H/o  [facility] with  w/ mets to brain, spine, R hip, s/p RT x5 (last 9/2)- CT with extensive lytic osseous metastatic disease with involvement of the spine at multiple levels and encroachment upon the spinal canal as on prior PET/CT dated 10/2/2020. OSCAR Alvarez Attarian- Advise rad onc to assess to see if she might be a candidate for further RT to spine given lesions in the thoracic spine. PainMx- On Perocet PRN, home med- Morphine ER on hold     # Moderate Pericardial effusion  as noted on ECHO –likely secondary to  Malignancy Hemodynamically stable. Appreciate Cardiology- Currently without evidence of tamponade on echo. Defer  pericardiocentesis. If patient has any signs of worsening hypotension, can repeat echo to evaluate for tamponade, otherwise, plan for repeat limited TTE for effusion in 7-10 days followed by serial monitoring   # Wedge shaped attenuation in lower pole of left kidney- ? ischemia vs infection  # Pt developed new onset Afib post hip replacement and was ordered for AS 325mg BID  On this admission, pt was started for ASA 325mg daily. Appreciate Cardiology- Cardiology recommending Eliquis 2.5 mg BID     # D/W patient's daughter, Veda . Had a lengthy conversation – updated pts condition, CT findings ( family was updated of  outpt PET scan results by Dr Xie) and echo findings. Daughter understands overall poor prognosis but is hopeful that pt will continue to receive intervention for her malignancy.  FULLCODE She had also  spoken with palliative during the previous admission

## 2020-10-15 NOTE — PROGRESS NOTE ADULT - PROBLEM SELECTOR PLAN 6
Code: Full  Diet: Full  DVT PPX:   Dispo: Likely Home Code: Full  Diet: Full  DVT PPX: pending family decision   Dispo: Likely Home

## 2020-10-15 NOTE — PROGRESS NOTE ADULT - PROBLEM SELECTOR PLAN 1
Admitted for septic shock, weaned off levophed   - c/w po vanc (10/11-), IV flagyl (10/11-)  - BCx: NGTD, UCx w/ yeast; + for c. diff toxin  - wean midodrine 30mg q8h, IVF   - monitor WBC, fever spikes  - ID following for Abx regimen considering C. diff proctocolitis and colovesicular fistula; c/w Abx

## 2020-10-15 NOTE — PROGRESS NOTE ADULT - PROBLEM SELECTOR PLAN 5
Hx of post op afib with pericardial effusion  - considering fibrillation and high risk for thrombosis 2/2 malginancy, can start apixaban 2.5 bid after discussion with family as per cardiology.  - no concern for tamponade but can repeat TTE as clinically indicated then repeat in 3-6 months. Hx of post op afib with pericardial effusion  - considering fibrillation and high risk for thrombosis 2/2 malginancy, can start apixaban 2.5 bid after discussion with family as per cardiology.  - no concern for tamponade but can repeat TTE as clinically indicated then repeat in 3-6 months.  - RT treated T3-T6, T9-L1, f/u for outpatient radiosurgery after surgery as it is a recurrence.

## 2020-10-15 NOTE — PROGRESS NOTE ADULT - ASSESSMENT
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 Diff proctocolitis and colovesical fistula now off pressors doing well.

## 2020-10-15 NOTE — PROGRESS NOTE ADULT - PROBLEM SELECTOR PLAN 4
- Followed by Dr. Xie at [facility]: s/p 5 rounds RT (last 9/2/2020)   - consult Onc for management considering apparent progression of lung mets to multiple spinal areas. - Followed by Dr. Xie at [facility]: s/p 5 rounds RT (last 9/2/2020)   - Onc: no current management considering apparent progression of lung mets to multiple spinal areas; f/u w/ RT regarding which sites were irradiated.  - zofran TID for nausea - Followed by Dr. Xie at [facility]: s/p 5 rounds RT (last 9/2/2020)   - Onc: no current management considering apparent progression of lung mets to multiple spinal areas; f/u w/ RT regarding which sites were irradiated per Dr. Burroughs  - nu TID for nausea

## 2020-10-15 NOTE — PROGRESS NOTE ADULT - ASSESSMENT
69F with known metastatic lung cancer to brain, Spine, and Right hip s/p RT x 5 (last tx 9/2).  Recently underwent R hip open surgery for metastatic disease and placement of hardware on 9/18.  Gail-op antibiotics.  Was in rehab when she developed hypotension. Went to ICU for hypotension and resuscitation.  Was started on high dose midodrine and continues to be borderline hypotensive.  CT with pancolitis and is Cdiff (+).  Initially on broad spectrum antibiotics, she is now on vancomycin enterally and IV metronidazole.  She is being followed by colorectal surgery.  No evidence of toxic megacolon at this point.    Cdiff colitis  - continue vancomycin 500mg enterally q6 through 10/24  - can stop Intravenous metronidazole which may be contributing to patient's nausea  - avoid other antibiotics if possible  - close f/u  - hydration as per primary team    D/c planning once diarrhea improves    I have discussed plan of care as detailed above with consulting team    Please call Infectious Diseases if there is a change in status.  Thank you.  [phone].

## 2020-10-15 NOTE — CHART NOTE - NSCHARTNOTEFT_GEN_A_CORE
Spoke with patient's daughter (Veda) and son (Agusto). Explained that cardiology saw patient yesterday regarding the patient's pericardial effusion and history of post-op atrial fibrillation.   Explained that cardiology recommends anticoagulation for a-fib given patient's active malignancy. Explained risks and benefits of AC and not anticoagulating. Son and daughter both express understanding, and prefer to continue to think about this.     Called Agusto later in the day, who states that he still has not been able to speak with his sister about this decision. Explained that we can discuss further tomorrow, and for tonight will keep patient on ASA and prophylactic dose Lovenox. He is in agreement with this plan.     Srinivas Aguirre MD PGY-2  Internal Medicine  Pager [phone] / [phone]

## 2020-10-16 NOTE — PROGRESS NOTE ADULT - PROBLEM SELECTOR PLAN 1
Admitted for septic shock, weaned off levophed   - c/w po vanc (10/11-), IV flagyl (10/11-)  - BCx: NGTD, UCx w/ yeast; + for c. diff toxin  - wean midodrine 30mg q8h, IVF   - monitor WBC, fever spikes  - ID following for Abx regimen considering C. diff proctocolitis and colovesicular fistula; c/w Abx Admitted for septic shock, weaned off levophed   - c/w po vanc (10/11-), IV flagyl (10/11-)  - BCx: NGTD, UCx w/ yeast; + for c. diff toxin  - wean midodrine 30mg->20mg q8h, IVF   - monitor WBC, fever spikes  - ID following for Abx regimen considering C. diff proctocolitis and colovesicular fistula; c/w Abx. Possible D/C Rectal tube.

## 2020-10-16 NOTE — PROGRESS NOTE ADULT - PROBLEM SELECTOR PLAN 5
Hx of post op afib with pericardial effusion  - considering fibrillation and high risk for thrombosis 2/2 malginancy, can start apixaban 2.5 bid after discussion with family as per cardiology: currently undecided  - no concern for tamponade but can repeat TTE as clinically indicated then repeat in 3-6 months.  - RT treated T3-T6, T9-L1, f/u for outpatient radiosurgery after surgery as it is a recurrence.

## 2020-10-16 NOTE — CHART NOTE - NSCHARTNOTEFT_GEN_A_CORE
Spoke with Wendi regarding anticoagulation. Both had agreed that after consulted with their oncologist, the best course of action would be to start anticoagulation given post-op afib, age, mobility, and malignancy as risk factors for PE/Stroke. Both siblings had approached the topic regarding their mother's mild left leg swelling and pain while walking which provider evaluated and sent for a LLE doppler to assess for DVT.

## 2020-10-16 NOTE — PROGRESS NOTE ADULT - SUBJECTIVE AND OBJECTIVE BOX
Dr. Ildefonso THOMPSON:[id number]/NS: [phone]  After 7pm please page [phone] or [phone]    MAGDALENE YOST  69y  MRN: [id number]    Subjective:    Patient is a 69y old  Female who presents with a chief complaint of hypotension (15 Oct 2020 13:38)      MEDICATIONS  (STANDING):  acetaminophen   Tablet .. 500 milliGRAM(s) Oral every 4 hours  aspirin 325 milliGRAM(s) Oral daily  enoxaparin Injectable 40 milliGRAM(s) SubCutaneous at bedtime  gabapentin 100 milliGRAM(s) Oral three times a day  melatonin 3 milliGRAM(s) Oral at bedtime  metroNIDAZOLE  IVPB      metroNIDAZOLE  IVPB 500 milliGRAM(s) IV Intermittent every 8 hours  midodrine 30 milliGRAM(s) Oral every 8 hours  ondansetron    Tablet 4 milliGRAM(s) Oral every 12 hours  pantoprazole    Tablet 40 milliGRAM(s) Oral before breakfast  sodium chloride 0.9%. 1000 milliLiter(s) (75 mL/Hr) IV Continuous <Continuous>  vancomycin    Solution 500 milliGRAM(s) Oral every 6 hours    MEDICATIONS  (PRN):  oxyCODONE    IR 5 milliGRAM(s) Oral every 6 hours PRN Moderate Pain (4 - 6)        Objective:    Vitals: Vital Signs Last 24 Hrs  T(C): 36.7 (10-16-20 @ 05:00), Max: 36.9 (10-15-20 @ 17:19)  T(F): 98 (10-16-20 @ 05:00), Max: 98.4 (10-15-20 @ 17:19)  HR: 71 (10-16-20 @ 05:00) (71 - 80)  BP: 105/55 (10-16-20 @ 05:00) (93/52 - 118/66)  BP(mean): --  RR: 17 (10-16-20 @ 05:00) (16 - 19)  SpO2: 97% (10-16-20 @ 05:00) (97% - 100%)            I&O's Summary    15 Oct 2020 07:01  -  16 Oct 2020 06:53  --------------------------------------------------------  IN: 0 mL / OUT: 200 mL / NET: -200 mL        PHYSICAL EXAM:  GENERAL: NAD, well-groomed, well-developed  HEAD:  Atraumatic, Normocephalic  EYES: EOMI, PERRLA, conjunctiva and sclera clear  ENMT: No tonsillar erythema, exudates, or enlargement; Moist mucous membranes, Good dentition, No lesions  NECK: Supple, No JVD, Normal thyroid  CHEST/LUNG: Clear to auscultation bilaterally; No rales, rhonchi, wheezing, or rubs  HEART: Regular rate and rhythm; No murmurs, rubs, or gallops  ABDOMEN: Soft, Nontender, Nondistended; Bowel sounds present  EXTREMITIES:  2+ Peripheral Pulses, No clubbing, cyanosis, or edema  LYMPH: No lymphadenopathy noted  SKIN: No rashes or lesions  NERVOUS SYSTEM:  Alert & Oriented X4, Good concentration    LABS:  10-15    132<L>  |  102  |  13  ----------------------------<  101<H>  3.4<L>   |  17<L>  |  0.43<L>  10-14    133<L>  |  102  |  14  ----------------------------<  92  3.8   |  20<L>  |  0.39<L>  10-13    132<L>  |  106  |  12  ----------------------------<  116<H>  4.0   |  17<L>  |  0.33<L>    Ca    7.2<L>      15 Oct 2020 05:45  Ca    7.8<L>      14 Oct 2020 06:06  Ca    7.0<L>      13 Oct 2020 07:31  Phos  2.6     10-15  Mg     1.9     10-15    TPro  4.1<L>  /  Alb  1.8<L>  /  TBili  < 0.2<L>  /  DBili  x   /  AST  30  /  ALT  18  /  AlkPhos  143<H>  10-13                                              9.4    14.93 )-----------( 487      ( 15 Oct 2020 05:45 )             29.5                         9.8    16.50 )-----------( 502      ( 14 Oct 2020 06:06 )             31.0                         8.8    9.68  )-----------( 440      ( 13 Oct 2020 07:31 )             28.2     CAPILLARY BLOOD GLUCOSE              Dr. Ildefonso Tristan PGY1 Dr. Ildefonso THOMPSON:[id number]/NS: [phone]  After 7pm please page [phone] or [phone]    MAGDALENE YOST  69y  MRN: [id number]    Subjective:    Patient is a 69y old  Female who presents with a chief complaint of hypotension (15 Oct 2020 13:38)  Complains of subjective shortness of breath. Still mild abd pain. No Ha/F/N/V/D/dysuria/swelling. Rectal output 200cc    MEDICATIONS  (STANDING):  acetaminophen   Tablet .. 500 milliGRAM(s) Oral every 4 hours  aspirin 325 milliGRAM(s) Oral daily  enoxaparin Injectable 40 milliGRAM(s) SubCutaneous at bedtime  gabapentin 100 milliGRAM(s) Oral three times a day  melatonin 3 milliGRAM(s) Oral at bedtime  metroNIDAZOLE  IVPB      metroNIDAZOLE  IVPB 500 milliGRAM(s) IV Intermittent every 8 hours  midodrine 30 milliGRAM(s) Oral every 8 hours  ondansetron    Tablet 4 milliGRAM(s) Oral every 12 hours  pantoprazole    Tablet 40 milliGRAM(s) Oral before breakfast  sodium chloride 0.9%. 1000 milliLiter(s) (75 mL/Hr) IV Continuous <Continuous>  vancomycin    Solution 500 milliGRAM(s) Oral every 6 hours    MEDICATIONS  (PRN):  oxyCODONE    IR 5 milliGRAM(s) Oral every 6 hours PRN Moderate Pain (4 - 6)        Objective:    Vitals: Vital Signs Last 24 Hrs  T(C): 36.7 (10-16-20 @ 05:00), Max: 36.9 (10-15-20 @ 17:19)  T(F): 98 (10-16-20 @ 05:00), Max: 98.4 (10-15-20 @ 17:19)  HR: 71 (10-16-20 @ 05:00) (71 - 80)  BP: 105/55 (10-16-20 @ 05:00) (93/52 - 118/66)  BP(mean): --  RR: 17 (10-16-20 @ 05:00) (16 - 19)  SpO2: 97% (10-16-20 @ 05:00) (97% - 100%)            I&O's Summary    15 Oct 2020 07:01  -  16 Oct 2020 06:53  --------------------------------------------------------  IN: 0 mL / OUT: 200 mL / NET: -200 mL        PHYSICAL EXAM:  GENERAL: NAD, well-groomed, well-developed, on 2L NC satting 95%  HEAD:  Atraumatic, Normocephalic  EYES: EOMI, PERRLA, conjunctiva and sclera clear  ENMT: No tonsillar erythema, exudates, or enlargement; Moist mucous membranes, Good dentition, No lesions  NECK: Supple, No JVD, Normal thyroid  CHEST/LUNG: Clear to auscultation bilaterally; mild crackles B/L   HEART: Regular rate and rhythm; No murmurs, rubs, or gallops  ABDOMEN: Soft, Nontender, Nondistended; Bowel sounds present  EXTREMITIES:  2+ Peripheral Pulses, No clubbing, cyanosis, or edema    LABS:  10-15    132<L>  |  102  |  13  ----------------------------<  101<H>  3.4<L>   |  17<L>  |  0.43<L>  10-14    133<L>  |  102  |  14  ----------------------------<  92  3.8   |  20<L>  |  0.39<L>  10-13    132<L>  |  106  |  12  ----------------------------<  116<H>  4.0   |  17<L>  |  0.33<L>    Ca    7.2<L>      15 Oct 2020 05:45  Ca    7.8<L>      14 Oct 2020 06:06  Ca    7.0<L>      13 Oct 2020 07:31  Phos  2.6     10-15  Mg     1.9     10-15    TPro  4.1<L>  /  Alb  1.8<L>  /  TBili  < 0.2<L>  /  DBili  x   /  AST  30  /  ALT  18  /  AlkPhos  143<H>  10-13                                              9.4    14.93 )-----------( 487      ( 15 Oct 2020 05:45 )             29.5                         9.8    16.50 )-----------( 502      ( 14 Oct 2020 06:06 )             31.0                         8.8    9.68  )-----------( 440      ( 13 Oct 2020 07:31 )             28.2     CAPILLARY BLOOD GLUCOSE              Dr. Ildefonso Tristan PGY1

## 2020-10-16 NOTE — PROGRESS NOTE ADULT - ATTENDING COMMENTS
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 Diff proctocolitis and colovesical fistula. s/p pressors     # Severe C Diff Colitis- On PO  Vanc and IV Flagyl. 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  PainMx- On Perocet PRN, home med- Morphine ER on hold   Rad on/Onc on board - Lesions at T6-& and T-11 previously treated, completed 9/3/2020. For any additional radiation to these sites, recommend outpatient SBRT at [facility]  # Moderate Pericardial effusion  as noted on ECHO –likely secondary to  Malignancy Hemodynamically stable. Appreciate Cardiology- Currently without evidence of tamponade on echo. Defer  pericardiocentesis. If patient has any signs of worsening hypotension, can repeat echo to evaluate for tamponade, otherwise, plan for repeat limited TTE for effusion in 7-10 days followed by serial monitoring     # Wedge shaped attenuation in lower pole of left kidney- ? ischemia vs infection    # Pt developed new onset Afib post hip replacement and was ordered for AS 325mg BID  On this admission, pt was started for ASA 325mg daily. Appreciate Cardiology- Cardiology recommending Eliquis 2.5 mg BID. Family still deciding about AC    # D/W patient's daughter, Veda . Had a lengthy conversation – updated pts condition, CT findings ( family was updated of  outpt PET scan results by Dr Xie) and echo findings. Daughter understands overall poor prognosis but is hopeful that pt will continue to receive intervention for her malignancy.  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 Diff proctocolitis and colovesical fistula. s/p pressors     # Severe C Diff Colitis- CT scan findings showing proctocolitis of rectum and colon and air in bladder suspicious for colovesical fistula.  Surg Onc-  No acute surgical intervention at this time .On PO  Vanc and IV Flagyl. 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  PainMx- On Perocet PRN, home med- Morphine ER on hold   Rad on/Onc on board - Lesions at T6-& and T-11 previously treated, completed 9/3/2020. For any additional radiation to these sites, recommend outpatient SBRT at [facility]    # Moderate Pericardial effusion  as noted on ECHO –likely secondary to  Malignancy Hemodynamically stable. Appreciate Cardiology- Currently without evidence of tamponade on echo. Defer  pericardiocentesis. If patient has any signs of worsening hypotension, can repeat echo to evaluate for tamponade, otherwise, plan for repeat limited TTE for effusion in 7-10 days followed by serial monitoring     # Wedge shaped attenuation in lower pole of left kidney- ? ischemia vs infection.plan for AC    # Pt developed new onset Afib post hip replacement and was ordered for AS 325mg BID  On this admission, pt was started for ASA 325mg daily. Appreciate Cardiology- Cardiology recommending Eliquis 2.5 mg BID. Family  deciding about AC    # D/W patient's daughter, Veda . Had a lengthy conversation – updated pts condition, CT findings ( family was updated of  outpt PET scan results by Dr Xie) and echo findings. Daughter understands overall poor prognosis but is hopeful that pt will continue to receive intervention for her malignancy.  FULLCODE She had also  spoken with palliative during the previous admission   # Dispo Anticipate dc early next week once Diarrhea resolves

## 2020-10-16 NOTE — PROGRESS NOTE ADULT - ASSESSMENT
68 y/o F hx/ Lung Ca 8/2020 w/ mets to brain, spine, R hip, s/p RT x5 (last 9/2) admitted to MICU for septic shock found to have C. Diff proctocolitis and colovesical fistula now off pressors doing well. 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 Diff proctocolitis and colovesical fistula now off pressors with mild SOB satting 95% on 2L NC.

## 2020-10-16 NOTE — PROGRESS NOTE ADULT - PROBLEM SELECTOR PLAN 4
- Followed by Dr. Xie at [facility]: s/p 5 rounds RT (last 9/2/2020)   - Onc: no current management considering apparent progression of lung mets to multiple spinal areas; f/u w/ RT regarding which sites were irradiated per Dr. Burroughs  - nu TID for nausea - Followed by Dr. Xie at [facility]: s/p 5 rounds RT (last 9/2/2020)   - Onc: no current management considering apparent progression of lung mets to multiple spinal areas; f/u w/ RT regarding which sites were irradiated per Dr. Burroughs  - zofran TID for nausea change to prn tomorrow

## 2020-10-17 NOTE — PROGRESS NOTE ADULT - PROBLEM SELECTOR PLAN 1
Admitted for septic shock 2/2 c.diff proctocolitis  weaned off levophed   - c/w po vanc (10/11-), IV flagyl (10/11-)  - BCx: NGTD, UCx w/ yeast; + for c. diff toxin  - wean midodrine 20mg q8h, IVF   - monitor WBC, fever spikes  - ID following for Abx regimen considering C. diff proctocolitis and colovesicular fistula?; c/w Abx. D/C Rectal tube.

## 2020-10-17 NOTE — PROGRESS NOTE ADULT - ATTENDING COMMENTS
Patient seen and examined, case d/w house staff.    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 diff colitis and colovesical fistula. s/p pressors and ICU stay.    # Severe C Diff Colitis- CT scan findings showing proctocolitis of rectum and colon and air in bladder suspicious for colovesical fistula.  Surg Onc-  No acute surgical intervention at this time. 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    Pain control on percocet prn;  Per rad/onc, Lesions at T6-& and T-11 previously treated, completed 9/3/2020. For any additional radiation to these sites, recommend outpatient SBRT at [facility]    # Moderate Pericardial effusion  as noted on ECHO –likely secondary to  Malignancy Hemodynamically stable, no tamponade physiology on echo. Defer  pericardiocentesis. Close monitor.    # Wedge shaped attenuation in lower pole of left kidney- ? ischemia vs infection, already on eliquis    # Postop new Afib: started on eliquis 2.5 mg bid per cardiology  # dispo: inpt, full code. d/c planning to rehab when hemodynamics and diarrhea improves Patient seen and examined, case d/w house staff.    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 diff colitis and colovesical fistula. s/p pressors and ICU stay.    # Severe C Diff Colitis- CT scan findings showing proctocolitis of rectum and colon and air in bladder suspicious for colovesical fistula.  Surg Onc-  No acute surgical intervention at this time. 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    Pain control on percocet prn;  Per rad/onc, Lesions at T6-& and T-11 previously treated, completed 9/3/2020. For any additional radiation to these sites, recommend outpatient SBRT at [facility]. Outpt oncologist Dr. Xie.     # Moderate Pericardial effusion  as noted on ECHO –likely secondary to  Malignancy Hemodynamically stable, no tamponade physiology on echo. Defer  pericardiocentesis. Close monitor.    # Wedge shaped attenuation in lower pole of left kidney- ? ischemia vs infection, already on eliquis    # Postop new Afib: started on eliquis 2.5 mg bid per cardiology, also pending leg doppler  # dispo: inpt, full code. d/c planning to rehab when hemodynamics and diarrhea improves

## 2020-10-17 NOTE — PROGRESS NOTE ADULT - ASSESSMENT
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 Diff proctocolitis and colovesical fistula now off pressors with mild SOB satting 95% on 2L NC awaiting Doppler for r/o LLE DVT.

## 2020-10-17 NOTE — PROGRESS NOTE ADULT - PROBLEM SELECTOR PLAN 2
- on 2LNC satting 98% with subjective SOB  - Likely 2/2 pleural effusions 2/2 malignancy   - + Left LE Marichuy Sign, given malignancy and age risk factors, f/u LLE Doppler

## 2020-10-17 NOTE — PROGRESS NOTE ADULT - PROBLEM SELECTOR PLAN 5
Hx of post op afib with pericardial effusion  - considering fibrillation and high risk for thrombosis 2/2 malginancy, can start apixaban 2.5 bid after discussion with family as per cardiology:  - no concern for tamponade but can repeat TTE as clinically indicated then repeat in 3-6 months.

## 2020-10-17 NOTE — PROGRESS NOTE ADULT - PROBLEM SELECTOR PLAN 3
- No surgical intervention at this time per patient's risk and comorbidities.  - A/w proctocolitis? though likely air bubble 2/2 instrumentation on previous admission

## 2020-10-17 NOTE — PROGRESS NOTE ADULT - PROBLEM SELECTOR PLAN 4
- Followed by Dr. Xie at [facility]: s/p 5 rounds RT (last 9/2/2020)   - Onc: no current management considering apparent progression of lung mets to multiple spinal areas  - RT treated T3-T6, T9-L1, f/u for outpatient radiosurgery after d/c  - Nausea: zofran change to prn q6

## 2020-10-17 NOTE — PROGRESS NOTE ADULT - SUBJECTIVE AND OBJECTIVE BOX
Dr. Ildefonso THOMPSON:[id number]/NS: [phone]  After 7pm please page [phone] or [phone]    MAGDALENE YOST  69y  MRN: [id number]    Subjective:    Patient is a 69y old  Female who presents with a chief complaint of hypotension (16 Oct 2020 06:53)      MEDICATIONS  (STANDING):  acetaminophen   Tablet .. 500 milliGRAM(s) Oral every 4 hours  apixaban 2.5 milliGRAM(s) Oral every 12 hours  gabapentin 100 milliGRAM(s) Oral three times a day  melatonin 3 milliGRAM(s) Oral at bedtime  metroNIDAZOLE  IVPB      metroNIDAZOLE  IVPB 500 milliGRAM(s) IV Intermittent every 8 hours  midodrine 20 milliGRAM(s) Oral every 8 hours  ondansetron    Tablet 4 milliGRAM(s) Oral every 12 hours  pantoprazole    Tablet 40 milliGRAM(s) Oral before breakfast  sodium chloride 0.9%. 1000 milliLiter(s) (75 mL/Hr) IV Continuous <Continuous>  vancomycin    Solution 500 milliGRAM(s) Oral every 6 hours    MEDICATIONS  (PRN):  oxyCODONE    IR 5 milliGRAM(s) Oral every 6 hours PRN Moderate Pain (4 - 6)        Objective:    Vitals: Vital Signs Last 24 Hrs  T(C): 36.7 (10-17-20 @ 05:13), Max: 36.8 (10-16-20 @ 17:34)  T(F): 98 (10-17-20 @ 05:13), Max: 98.3 (10-16-20 @ 17:34)  HR: 81 (10-17-20 @ 05:13) (59 - 89)  BP: 104/65 (10-17-20 @ 05:13) (93/58 - 116/59)  BP(mean): --  RR: 18 (10-17-20 @ 05:13) (16 - 18)  SpO2: 98% (10-17-20 @ 05:13) (95% - 99%)            I&O's Summary    16 Oct 2020 07:01  -  17 Oct 2020 07:00  --------------------------------------------------------  IN: 0 mL / OUT: 900 mL / NET: -900 mL        PHYSICAL EXAM:  GENERAL: NAD, well-groomed, well-developed  HEAD:  Atraumatic, Normocephalic  EYES: EOMI, PERRLA, conjunctiva and sclera clear  ENMT: No tonsillar erythema, exudates, or enlargement; Moist mucous membranes, Good dentition, No lesions  NECK: Supple, No JVD, Normal thyroid  CHEST/LUNG: Clear to auscultation bilaterally; No rales, rhonchi, wheezing, or rubs  HEART: Regular rate and rhythm; No murmurs, rubs, or gallops  ABDOMEN: Soft, Nontender, Nondistended; Bowel sounds present  EXTREMITIES:  2+ Peripheral Pulses, No clubbing, cyanosis, or edema  LYMPH: No lymphadenopathy noted  SKIN: No rashes or lesions  NERVOUS SYSTEM:  Alert & Oriented X4, Good concentration    LABS:  10-16    131<L>  |  102  |  16  ----------------------------<  117<H>  3.9   |  20<L>  |  0.51  10-15    132<L>  |  102  |  13  ----------------------------<  101<H>  3.4<L>   |  17<L>  |  0.43<L>    Ca    7.2<L>      16 Oct 2020 20:46  Ca    7.2<L>      15 Oct 2020 05:45  Phos  2.6     10-16  Mg     1.9     10-16                                                8.9    13.31 )-----------( 475      ( 16 Oct 2020 20:46 )             27.9                         9.4    14.93 )-----------( 487      ( 15 Oct 2020 05:45 )             29.5     CAPILLARY BLOOD GLUCOSE      POCT Blood Glucose.: 103 mg/dL (16 Oct 2020 17:10)          Dr. Ildefonso Tristan PGY1 Dr. Ildefonso THOMPSON:[id number]/NS: [phone]  After 7pm please page [phone] or [phone]    MAGDALENE YOST  69y  MRN: [id number]    Subjective:    Patient is a 69y old  Female who presents with a chief complaint of hypotension (16 Oct 2020 06:53)  Mild complaints of abdominal pain. No ha/n/v/cp/sob/dysuria. Stool has decreased to 2Bm with pasty consistency    MEDICATIONS  (STANDING):  acetaminophen   Tablet .. 500 milliGRAM(s) Oral every 4 hours  apixaban 2.5 milliGRAM(s) Oral every 12 hours  gabapentin 100 milliGRAM(s) Oral three times a day  melatonin 3 milliGRAM(s) Oral at bedtime  metroNIDAZOLE  IVPB      metroNIDAZOLE  IVPB 500 milliGRAM(s) IV Intermittent every 8 hours  midodrine 20 milliGRAM(s) Oral every 8 hours  ondansetron    Tablet 4 milliGRAM(s) Oral every 12 hours  pantoprazole    Tablet 40 milliGRAM(s) Oral before breakfast  sodium chloride 0.9%. 1000 milliLiter(s) (75 mL/Hr) IV Continuous <Continuous>  vancomycin    Solution 500 milliGRAM(s) Oral every 6 hours    MEDICATIONS  (PRN):  oxyCODONE    IR 5 milliGRAM(s) Oral every 6 hours PRN Moderate Pain (4 - 6)        Objective:    Vitals: Vital Signs Last 24 Hrs  T(C): 36.7 (10-17-20 @ 05:13), Max: 36.8 (10-16-20 @ 17:34)  T(F): 98 (10-17-20 @ 05:13), Max: 98.3 (10-16-20 @ 17:34)  HR: 81 (10-17-20 @ 05:13) (59 - 89)  BP: 104/65 (10-17-20 @ 05:13) (93/58 - 116/59)  BP(mean): --  RR: 18 (10-17-20 @ 05:13) (16 - 18)  SpO2: 98% (10-17-20 @ 05:13) (95% - 99%)            I&O's Summary    16 Oct 2020 07:01  -  17 Oct 2020 07:00  --------------------------------------------------------  IN: 0 mL / OUT: 900 mL / NET: -900 mL        PHYSICAL EXAM:  GENERAL: NAD, well-groomed, well-developed  HEAD:  Atraumatic, Normocephalic  EYES: EOMI, PERRLA, conjunctiva and sclera clear  ENMT: No tonsillar erythema, exudates, or enlargement; Moist mucous membranes, Good dentition, No lesions  NECK: Supple, No JVD, Normal thyroid  CHEST/LUNG: Clear to auscultation bilaterally;  crackles at b/l lung bases  HEART: Regular rate and rhythm; No murmurs, rubs, or gallops  ABDOMEN: Soft, mildly tender, Nondistended; Bowel sounds present  EXTREMITIES:  2+ Peripheral Pulses, No clubbing, cyanosis, or edema  NERVOUS SYSTEM:  Alert & Oriented X4, Good concentration    LABS:  10-16    131<L>  |  102  |  16  ----------------------------<  117<H>  3.9   |  20<L>  |  0.51  10-15    132<L>  |  102  |  13  ----------------------------<  101<H>  3.4<L>   |  17<L>  |  0.43<L>    Ca    7.2<L>      16 Oct 2020 20:46  Ca    7.2<L>      15 Oct 2020 05:45  Phos  2.6     10-16  Mg     1.9     10-16                                                8.9    13.31 )-----------( 475      ( 16 Oct 2020 20:46 )             27.9                         9.4    14.93 )-----------( 487      ( 15 Oct 2020 05:45 )             29.5     CAPILLARY BLOOD GLUCOSE      POCT Blood Glucose.: 103 mg/dL (16 Oct 2020 17:10)          Dr. Ildefonso Tristan PGY1

## 2020-10-18 NOTE — PROGRESS NOTE ADULT - PROBLEM SELECTOR PLAN 2
- on 2LNC satting 98% with subjective SOB  - Likely 2/2 pleural effusions 2/2 malignancy   - + Left LE Marichuy Sign, given malignancy and age risk factors  - LLE Doppler with DVT in posterior tibial and peroneal veins

## 2020-10-18 NOTE — PROGRESS NOTE ADULT - ASSESSMENT
68 y/o F hx/ Lung Ca 8/2020 w/ mets to brain, spine, R hip, s/p RT x5 (last 9/2) admitted to MICU for septic shock found to have C. Diff proctocolitis and colovesical fistula now off pressors with mild SOB satting 95% on 2L NC awaiting Doppler for r/o LLE DVT.

## 2020-10-18 NOTE — PROGRESS NOTE ADULT - PROBLEM SELECTOR PLAN 4
- Followed by Dr. Xie at [facility]: s/p 5 rounds RT (last 9/2/2020)   - Onc: no current management considering apparent progression of lung mets to multiple spinal areas  - RT treated T3-T6, T9-L1, f/u for outpatient radiosurgery after d/c  - Nausea: zofran change to prn q6

## 2020-10-18 NOTE — PROGRESS NOTE ADULT - ATTENDING COMMENTS
Patient seen and examined, case d/w house staff.    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 diff colitis and colovesical fistula. s/p pressors and ICU stay.    # Severe C Diff Colitis- CT scan findings showing proctocolitis of rectum and colon and air in bladder suspicious for colovesical fistula.  Surg Onc-  No acute surgical intervention at this time. 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    Pain control on percocet prn;  Per rad/onc, Lesions at T6-& and T-11 previously treated, completed 9/3/2020. For any additional radiation to these sites, recommend outpatient SBRT at [facility]. Outpt oncologist Dr. Xie.     # Moderate Pericardial effusion  as noted on Echo –likely secondary to  Malignancy Hemodynamically stable, no tamponade physiology on echo. Defer  pericardiocentesis. Close monitor.    # Wedge shaped attenuation in lower pole of left kidney- ? ischemia vs infection, already on eliquis    # Postop new Afib: therapeutic eliquis     # dispo: inpt, full code. d/c planning to rehab when hemodynamics and diarrhea improves. COVID swab.

## 2020-10-18 NOTE — PROGRESS NOTE ADULT - PROBLEM SELECTOR PLAN 1
Admitted for septic shock 2/2 c. diff proctocolitis  weaned off levophed   - c/w po vanc (10/11-), IV flagyl (10/11-)  - BCx: NGTD, UCx w/ yeast; + for c. diff toxin  - wean midodrine 20mg q8h, IVF   - monitor WBC, fever spikes  - ID following for Abx regimen considering C. diff proctocolitis and colovesicular fistula?; c/w Abx. s/p Rectal tube.

## 2020-10-18 NOTE — PROGRESS NOTE ADULT - PROBLEM SELECTOR PLAN 5
Hx of post op afib with pericardial effusion  - considering fibrillation and high risk for thrombosis 2/2 malignancy, can start apixaban 2.5 bid after discussion with family as per cardiology:  - no concern for tamponade but can repeat TTE as clinically indicated then repeat in 3-6 months.

## 2020-10-18 NOTE — PROGRESS NOTE ADULT - SUBJECTIVE AND OBJECTIVE BOX
Patient is a 69y old  Female who presents with a chief complaint of hypotension (17 Oct 2020 07:16)      SUBJECTIVE / OVERNIGHT EVENTS:  There were no acute events overnight.    MEDICATIONS  (STANDING):  acetaminophen   Tablet .. 500 milliGRAM(s) Oral every 4 hours  apixaban 2.5 milliGRAM(s) Oral every 12 hours  gabapentin 100 milliGRAM(s) Oral three times a day  lactobacillus acidophilus 1 Tablet(s) Oral two times a day  melatonin 3 milliGRAM(s) Oral at bedtime  midodrine 20 milliGRAM(s) Oral every 8 hours  ondansetron    Tablet 4 milliGRAM(s) Oral every 12 hours  pantoprazole    Tablet 40 milliGRAM(s) Oral before breakfast  sodium chloride 0.9%. 1000 milliLiter(s) (75 mL/Hr) IV Continuous <Continuous>  vancomycin    Solution 500 milliGRAM(s) Oral every 6 hours    MEDICATIONS  (PRN):  benzocaine 15 mG/menthol 3.6 mG (Sugar-Free) Lozenge 1 Lozenge Oral every 3 hours PRN Sore Throat  ondansetron Injectable 4 milliGRAM(s) IV Push every 6 hours PRN Nausea and/or Vomiting  oxyCODONE    IR 5 milliGRAM(s) Oral every 6 hours PRN Moderate Pain (4 - 6)      Vital Signs Last 24 Hrs  T(C): 36.8 (18 Oct 2020 10:21), Max: 36.8 (17 Oct 2020 21:05)  T(F): 98.2 (18 Oct 2020 10:21), Max: 98.2 (17 Oct 2020 21:05)  HR: 81 (18 Oct 2020 10:21) (76 - 85)  BP: 105/59 (18 Oct 2020 10:21) (90/51 - 135/81)  BP(mean): --  RR: 16 (18 Oct 2020 10:21) (16 - 18)  SpO2: 97% (18 Oct 2020 10:21) (96% - 98%)  CAPILLARY BLOOD GLUCOSE        I&O's Summary      PHYSICAL EXAM:  GENERAL: NAD, well-developed  HEAD:  Atraumatic, Normocephalic  EYES: EOMI, PERRLA, conjunctiva and sclera clear  NECK: Supple, No JVD  CHEST/LUNG: Clear to auscultation bilaterally; No wheeze  HEART: Regular rate and rhythm; No murmurs, rubs, or gallops  ABDOMEN: Soft, Nontender, Nondistended; Bowel sounds present  EXTREMITIES:  2+ Peripheral Pulses, No clubbing, cyanosis, or edema  PSYCH: AAOx3  NEUROLOGY: non-focal  SKIN: No rashes or lesions    LABS:                        8.8    15.73 )-----------( 447      ( 18 Oct 2020 06:30 )             27.9     10-18    134<L>  |  102  |  8   ----------------------------<  122<H>  3.3<L>   |  21<L>  |  0.36<L>    Ca    7.7<L>      18 Oct 2020 06:30  Phos  2.6     10-18  Mg     1.7     10-18                RADIOLOGY & ADDITIONAL TESTS:    Imaging Personally Reviewed:    Consultant(s) Notes Reviewed:      Care Discussed with Consultants/Other Providers:

## 2020-10-19 NOTE — PROGRESS NOTE ADULT - PROBLEM SELECTOR PLAN 5
Hx of post op afib with pericardial effusion  - considering fibrillation and high risk for thrombosis 2/2 malignancy, can start apixaban 2.5 bid after discussion with family as per cardiology:  - no concern for tamponade but can repeat TTE as clinically indicated then repeat in 3-6 months. - Followed by Dr. Xie at [facility]: s/p 5 rounds RT (last 9/2/2020)   - Onc: no current management considering apparent progression of lung mets to multiple spinal areas  - RT treated T3-T6, T9-L1, f/u for outpatient radiosurgery after d/c  - Nausea: zofran change to prn q6

## 2020-10-19 NOTE — PROGRESS NOTE ADULT - PROBLEM SELECTOR PLAN 1
Admitted for septic shock 2/2 c. diff proctocolitis  weaned off levophed   - c/w po vanc (10/11-), IV flagyl (10/11-)  - BCx: NGTD, UCx w/ yeast; + for c. diff toxin  - wean midodrine 20mg q8h, IVF   - monitor WBC, fever spikes  - ID following for Abx regimen considering C. diff proctocolitis and colovesicular fistula?; c/w Abx. s/p Rectal tube. Admitted for septic shock 2/2 c. diff proctocolitis  weaned off levophed   - c/w po vanc (10/11-), IV flagyl (10/11-)  - BCx: NGTD, UCx w/ yeast; + for c. diff toxin  - wean midodrine 20mg q8h, IVF   - monitor WBC, fever spikes  - ID following for Abx regimen considering C. diff proctocolitis and colovesicular fistula?; c/w Abx. s/p Rectal tube now removed

## 2020-10-19 NOTE — PROGRESS NOTE ADULT - PROBLEM SELECTOR PLAN 4
- Followed by Dr. Xie at [facility]: s/p 5 rounds RT (last 9/2/2020)   - Onc: no current management considering apparent progression of lung mets to multiple spinal areas  - RT treated T3-T6, T9-L1, f/u for outpatient radiosurgery after d/c  - Nausea: zofran change to prn q6 No prior history per daughter, however was getting straight cath at rehab  - TOV tonight  - May need long term andrea, possibly 2/2 spinal met

## 2020-10-19 NOTE — DIETITIAN INITIAL EVALUATION ADULT. - ADD RECOMMEND
1) Monitor weights, PO intake/diet tolerance, skin integrity, pertinent labs. 2) Continue Lactobacillus Acidophilus to promote gut fxn / GI crystal.

## 2020-10-19 NOTE — PROGRESS NOTE ADULT - SUBJECTIVE AND OBJECTIVE BOX
INTERVAL HPI/OVERNIGHT EVENTS:  Patient seen at bedside.  Patient with complaints of abdominal pain  especially when she is urinating  Abdominal is constant and concerning for her  Says that the diarrhea isnt bothering her so much anymore  Denies fever chills, nausea or vomitting  Explained to patient recent dx of LE DVT and appropriate tx (AC)   being given  Patient exhibited  understanding     [place]    ID #: [id number]   Language: Arabic    VITAL SIGNS:  T(F): 98 (10-19-20 @ 13:55)  HR: 94 (10-19-20 @ 13:55)  BP: 98/58 (10-19-20 @ 13:55)  RR: 15 (10-19-20 @ 13:55)  SpO2: 95% (10-19-20 @ 13:55)  Wt(kg): --    PHYSICAL EXAM:    In accordance with current standard limiting patient contact, deferred physical exam  2/2 COVID pandemic  Please refer to physical exam of primary team.    MEDICATIONS  (STANDING):  acetaminophen   Tablet .. 500 milliGRAM(s) Oral every 4 hours  apixaban 10 milliGRAM(s) Oral two times a day  gabapentin 100 milliGRAM(s) Oral three times a day  lactobacillus acidophilus 1 Tablet(s) Oral two times a day  melatonin 3 milliGRAM(s) Oral at bedtime  midodrine 10 milliGRAM(s) Oral every 8 hours  oxyCODONE  ER Tablet 10 milliGRAM(s) Oral every 12 hours  pantoprazole    Tablet 40 milliGRAM(s) Oral before breakfast  sodium chloride 0.9%. 1000 milliLiter(s) (75 mL/Hr) IV Continuous <Continuous>  vancomycin    Solution 500 milliGRAM(s) Oral every 6 hours    MEDICATIONS  (PRN):  benzocaine 15 mG/menthol 3.6 mG (Sugar-Free) Lozenge 1 Lozenge Oral every 3 hours PRN Sore Throat  ondansetron Injectable 4 milliGRAM(s) IV Push every 6 hours PRN Nausea and/or Vomiting  oxyCODONE    IR 5 milliGRAM(s) Oral every 4 hours PRN Severe Pain (7 - 10)      Allergies    No Known Allergies    Intolerances        LABS:                        8.6    15.54 )-----------( 446      ( 19 Oct 2020 06:00 )             27.0     10-19    134<L>  |  103  |  7   ----------------------------<  84  3.7   |  22  |  0.38<L>    Ca    7.5<L>      19 Oct 2020 06:00  Phos  2.4     10-19  Mg     1.5     10-19            RADIOLOGY & ADDITIONAL TESTS:  Studies reviewed.

## 2020-10-19 NOTE — PROGRESS NOTE ADULT - PROBLEM SELECTOR PLAN 2
- on 2LNC satting 98% with subjective SOB  - Likely 2/2 pleural effusions 2/2 malignancy   - + Left LE Marichuy Sign, given malignancy and age risk factors  - LLE Doppler with DVT in posterior tibial and peroneal veins LLE DVT in peroneal and posterior tibial veins  - Tx dose Eliquis

## 2020-10-19 NOTE — PROGRESS NOTE ADULT - PROBLEM SELECTOR PLAN 6
Code: Full  Diet: Full  DVT PPX: Apixaban 2.5mg BID  Dispo: Likely Home Hx of post op afib with pericardial effusion  - considering fibrillation and high risk for thrombosis 2/2 malignancy, can start apixaban 2.5 bid after discussion with family as per cardiology: however, now with DVT in malignancy, will treat with full dose AC  - no concern for tamponade but can repeat TTE as clinically indicated then repeat in 3-6 months.

## 2020-10-19 NOTE — DIETITIAN INITIAL EVALUATION ADULT. - OTHER INFO
RD visited wit patient for length of stay nutrition assessment.  Fair intake of meal was observed.  Patient admits to reduced appetite PTA.  Patient denies any chewing or swallowing difficulty.  Denies nausea, vomiting; reported she was having diarrhea previously. Pasty, loose stool noted today.  No food allergies being reported. Patient indicated that she was not following a therapeutic diet at home; currently on Consistent CHO diet with evening snacks; reviewed diet, but limited interest in discussing @ time of visit.  Patient previously admitted last month --- weight 9/17 - 49.9kg.  Current inpatient weights 10/10 - 51.7kg; 10/12 - 55.2kg. Weights increased from last admission, possibly in part related to edema (2+ edema to b/l legs & LE). No food allergies.

## 2020-10-19 NOTE — DIETITIAN INITIAL EVALUATION ADULT. - PERTINENT LABORATORY DATA
10-19 Na134 mmol/L<L> Glu 84 mg/dL K+ 3.7 mmol/L Cr  0.38 mg/dL<L> BUN 7 mg/dL 10-19 Phos 2.4 mg/dL<L> 10-13 Alb 1.8 g/dL<L>    A1C with Estimated Average Glucose: 6.1: High Risk (prediabetic)    5.7 - 6.4 %  Diabetic, diagnostic           > 6.5 %  ADA diabetic treatment goal    < 7.0 %  HbA1C values may not accurately reflect mean blood glucose  in patients with Hb variants.  Suggest clinical correlation. % (08.24.20 @ 05:30)

## 2020-10-19 NOTE — PROGRESS NOTE ADULT - SUBJECTIVE AND OBJECTIVE BOX
***INCOMPLETE NOTE***  Srinivas Aguirre MD PGY-2  Internal Medicine  Pager [phone] / [phone]     Patient is a 69y old  Female who presents with a chief complaint of hypotension (18 Oct 2020 10:52)      SUBJECTIVE / OVERNIGHT EVENTS:    MEDICATIONS  (STANDING):  acetaminophen   Tablet .. 500 milliGRAM(s) Oral every 4 hours  apixaban 5 milliGRAM(s) Oral two times a day  gabapentin 100 milliGRAM(s) Oral three times a day  lactobacillus acidophilus 1 Tablet(s) Oral two times a day  melatonin 3 milliGRAM(s) Oral at bedtime  midodrine 20 milliGRAM(s) Oral every 8 hours  pantoprazole    Tablet 40 milliGRAM(s) Oral before breakfast  sodium chloride 0.9%. 1000 milliLiter(s) (75 mL/Hr) IV Continuous <Continuous>  vancomycin    Solution 500 milliGRAM(s) Oral every 6 hours    MEDICATIONS  (PRN):  benzocaine 15 mG/menthol 3.6 mG (Sugar-Free) Lozenge 1 Lozenge Oral every 3 hours PRN Sore Throat  ketorolac   Injectable 30 milliGRAM(s) IV Push every 6 hours PRN Severe Pain (7 - 10)  ondansetron Injectable 4 milliGRAM(s) IV Push every 6 hours PRN Nausea and/or Vomiting  oxyCODONE    IR 5 milliGRAM(s) Oral every 6 hours PRN Moderate Pain (4 - 6)      CAPILLARY BLOOD GLUCOSE        I&O's Summary    18 Oct 2020 07:01  -  19 Oct 2020 07:00  --------------------------------------------------------  IN: 0 mL / OUT: 1200 mL / NET: -1200 mL        PHYSICAL EXAM:  Vital Signs Last 24 Hrs  T(C): 36.8 (10-19-20 @ 06:05)  T(F): 98.3 (10-19-20 @ 06:05), Max: 98.4 (10-18-20 @ 14:13)  HR: 91 (10-19-20 @ 06:05) (81 - 95)  BP: 101/58 (10-19-20 @ 06:05)  BP(mean): --  RR: 17 (10-19-20 @ 06:05) (16 - 17)  SpO2: 97% (10-19-20 @ 06:05) (97% - 100%)  Wt(kg): --    10-18 @ 07:01  -  10-19 @ 07:00  --------------------------------------------------------  IN: 0 mL / OUT: 1200 mL / NET: -1200 mL      Constitutional: NAD, awake and alert  EYES: EOMI  ENT:  Normal Hearing, no tonsillar exudates   Neck: Soft and supple , no thyromegaly   Respiratory: Breath sounds are clear bilaterally, No wheezing, rales or rhonchi  Cardiovascular: S1 and S2, regular rate and rhythm, no Murmurs, gallops or rubs, no JVD,    Gastrointestinal: Bowel Sounds present, soft, nontender, nondistended, no guarding, no rebound  Extremities: No cyanosis or clubbing; warm to touch  Vascular: 2+ peripheral pulses lower ex  Neurological: No focal deficits, CN II-XII intact bilaterally, sensation to light touch intact in all extremities, gait intact. Pupils are equally reactive to light and symmetrical in size.   Musculoskeletal: 5/5 strength b/l upper and lower extremities; no joint swelling.  Skin: No rashes  Psych: no depression or anhedonia, AAOx3  HEME: no bruises, no nose bleeds      LABS:                        8.8    15.73 )-----------( 447      ( 18 Oct 2020 06:30 )             27.9     10-18    134<L>  |  102  |  8   ----------------------------<  122<H>  3.3<L>   |  21<L>  |  0.36<L>    Ca    7.7<L>      18 Oct 2020 06:30  Phos  2.6     10-18  Mg     1.7     10-18                RADIOLOGY & ADDITIONAL TESTS:    Imaging Personally Reviewed:    Consultant(s) Notes Reviewed:      Care Discussed with Consultants/Other Providers:   Srinivas Aguirre MD PGY-2  Internal Medicine  Pager [phone] / [phone]     Patient is a 69y old  Female who presents with a chief complaint of hypotension (18 Oct 2020 10:52)    SUBJECTIVE / OVERNIGHT EVENTS:  LLE below the knee DVT noted. Straight cath x2 overnight. 2 BMs this AM, soft, not liquid. Complains of back pain this AM. Complains of phlegm in throat. Nausea now improved/  Denies nausea, vomiting, fevers, chills, chest pain.    MEDICATIONS  (STANDING):  acetaminophen   Tablet .. 500 milliGRAM(s) Oral every 4 hours  apixaban 5 milliGRAM(s) Oral two times a day  gabapentin 100 milliGRAM(s) Oral three times a day  lactobacillus acidophilus 1 Tablet(s) Oral two times a day  melatonin 3 milliGRAM(s) Oral at bedtime  midodrine 20 milliGRAM(s) Oral every 8 hours  pantoprazole    Tablet 40 milliGRAM(s) Oral before breakfast  sodium chloride 0.9%. 1000 milliLiter(s) (75 mL/Hr) IV Continuous <Continuous>  vancomycin    Solution 500 milliGRAM(s) Oral every 6 hours    MEDICATIONS  (PRN):  benzocaine 15 mG/menthol 3.6 mG (Sugar-Free) Lozenge 1 Lozenge Oral every 3 hours PRN Sore Throat  ketorolac   Injectable 30 milliGRAM(s) IV Push every 6 hours PRN Severe Pain (7 - 10)  ondansetron Injectable 4 milliGRAM(s) IV Push every 6 hours PRN Nausea and/or Vomiting  oxyCODONE    IR 5 milliGRAM(s) Oral every 6 hours PRN Moderate Pain (4 - 6)      CAPILLARY BLOOD GLUCOSE        I&O's Summary    18 Oct 2020 07:01  -  19 Oct 2020 07:00  --------------------------------------------------------  IN: 0 mL / OUT: 1200 mL / NET: -1200 mL        PHYSICAL EXAM:  Vital Signs Last 24 Hrs  T(C): 36.8 (10-19-20 @ 06:05)  T(F): 98.3 (10-19-20 @ 06:05), Max: 98.4 (10-18-20 @ 14:13)  HR: 91 (10-19-20 @ 06:05) (81 - 95)  BP: 101/58 (10-19-20 @ 06:05)  BP(mean): --  RR: 17 (10-19-20 @ 06:05) (16 - 17)  SpO2: 97% (10-19-20 @ 06:05) (97% - 100%)  Wt(kg): --    10-18 @ 07:01  -  10-19 @ 07:00  --------------------------------------------------------  IN: 0 mL / OUT: 1200 mL / NET: -1200 mL    GENERAL: NAD  HEAD:  Atraumatic, Normocephalic  EYES: EOMI, PERRLA, conjunctiva and sclera clear  ENMT: No tonsillar erythema, exudates, or enlargement; Moist mucous membranes  NECK: Supple, No JVD, Normal thyroid  CHEST/LUNG: Clear to auscultation bilaterally  HEART: Regular rate and rhythm; No murmurs, rubs, or gallops  ABDOMEN: Soft, mildly tender, Nondistended; Bowel sounds present  BACK: tender to palpation in low thoracic upper lumbar area  EXTREMITIES:  2+ Peripheral Pulses, No clubbing, cyanosis, or edema  NERVOUS SYSTEM:  Alert & Oriented X4, Good concentration    LABS:                        8.8    15.73 )-----------( 447      ( 18 Oct 2020 06:30 )             27.9     10-18    134<L>  |  102  |  8   ----------------------------<  122<H>  3.3<L>   |  21<L>  |  0.36<L>    Ca    7.7<L>      18 Oct 2020 06:30  Phos  2.6     10-18  Mg     1.7     10-18      RADIOLOGY & ADDITIONAL TESTS:    Imaging Personally Reviewed:    Consultant(s) Notes Reviewed:    Heme Onc, dietitian    Care Discussed with Consultants/Other Providers:

## 2020-10-19 NOTE — PROGRESS NOTE ADULT - ATTENDING COMMENTS
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 Diff proctocolitis and colovesical fistula. s/p pressors     # LLE DVT- On Eliquis      # Severe C Diff Colitis- On PO  Vanc. OSCAR RN- pt had pasty BM 2 this AM and 2 overnight  # Hypotension- was on pressor in the MICU,s/p  Midodrine 30mg TID, attempt to wean down Midodrine.     # H/o  NSCLSC with  w/ mets to brain, spine, R hip, s/p RT x5 (last 9/2)-  CT with extensive lytic osseous metastatic disease with involvement of the spine at multiple levels and encroachment upon the spinal canal as on prior PET/CT dated 10/2/2020. PainMx- On Oxy IR  PRN, will restart home med Oxy ED 10mg BID    Rad on/Onc on board - Lesions at T6-& and T-11 previously treated, completed 9/3/2020. For any additional radiation to these sites, recommend outpatient SBRT at [facility]  # Moderate Pericardial effusion  as noted on ECHO –likely secondary to  Malignancy Hemodynamically stable. Appreciate Cardiology- Currently without evidence of tamponade on echo. Defer  pericardiocentesis. If patient has any signs of worsening hypotension, can repeat echo to evaluate for tamponade, otherwise, plan for repeat limited TTE for effusion in 7-10 days followed by serial monitoring   # Wedge shaped attenuation in lower pole of left kidney- ? ischemia vs infection  # New onset Afib post hip – On Eliquis     # D/W patient's daughter, Veda . Had a lengthy conversation – updated pts condition, CT findings ( family was updated of  outpt PET scan results by Dr Xie) and echo findings. Daughter understands overall poor prognosis but is hopeful that pt will continue to receive intervention for her malignancy.  FULLCODE She had also  spoken with palliative during the previous admission

## 2020-10-19 NOTE — PROGRESS NOTE ADULT - PROBLEM SELECTOR PLAN 3
- No surgical intervention at this time per patient's risk and comorbidities.  - A/w proctocolitis? though likely air bubble 2/2 instrumentation on previous admission - No surgical intervention at this time per patient's risk and comorbidities.  - Possibly 2/2 instrumentation

## 2020-10-19 NOTE — PROGRESS NOTE ADULT - ASSESSMENT
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  Recent PET/CT 10/2/2020 with Hypermetabolic right upper lobe lung mass. Hypermetabolic right perihilar and right retrocrural lymph node metastases. Extensive hypermetabolic lytic osseous metastasis in axial skeleton. Lesions in the thoracic spine, extend into the epidural space, better evaluated on MRI. Small hypermetabolic focus in left lesser trochanter, difficult to delineate on CT, may represent osseous metastasis.    10/15: Oncology spoke to the patient's daughter in the presence of the patient and explained the clinical situation and disease trajectory. Daughter explained that she is very concerned about the cancer spreading because pt has not received any treatments. explained that to safely give cancer treatment, infection should be completely treated first and then we can discuss cancer treatment, however performance status will need to be assessed at that time to see if the patient will be a candidate to receive treatment. Patient and daughter understand this and remain hopeful that pt will get better and be able to receive cancer treatment.     -No inpatient oncologic interventions  -Pt is s/p RT to spine, please ask rad onc to assess to see if she might be a candidate for further RT to spine given lesions in the thoracic spine.  -10/16: started on apixaban given history of pAF  -10/18:  + DVT on LE dopplers  -Will need to start treatment for metastatic NSCLC as outpatient, after infection resolves, if performance status allows  -Please obtain palliative care consult for symptom management  - Obtain Physical therapy consult when medically stable  -Patient to followup with Dr. Xie ([facility]) upon discharge  -C/w Supportive care, pain control, Nutrition, PT, DVT ppx  -Oncology will continue to follow with you      Case d/w Dr. Abran HO  Oncology Physician Assistant  [facility]  Pager [phone]    If after 5pm or weekends please page On-call Oncology Fellow

## 2020-10-20 NOTE — CONSULT NOTE ADULT - PROBLEM SELECTOR RECOMMENDATION 6
.  Current goals are to seek DMT at [facility] with folll-up  -patient would be hospice appropriate if no DMT was being pursued/offered

## 2020-10-20 NOTE — PROGRESS NOTE ADULT - PROBLEM SELECTOR PLAN 4
No prior history per daughter, however was getting straight cath at rehab  - TOV tonight  - May need long term andrea, possibly 2/2 spinal met No prior history per daughter, however was getting straight cath at rehab  - given patient voids completely, it is likely that she is uncomfortable with voiding by herself and is holding in her urine unless prompted.

## 2020-10-20 NOTE — PROGRESS NOTE ADULT - PROBLEM SELECTOR PLAN 3
- No surgical intervention at this time per patient's risk and comorbidities.  - Possibly 2/2 instrumentation

## 2020-10-20 NOTE — PROGRESS NOTE ADULT - PROBLEM SELECTOR PLAN 1
Admitted for septic shock 2/2 c. diff proctocolitis  weaned off levophed   - c/w po vanc (10/11-), IV flagyl (10/11-)  - BCx: NGTD, UCx w/ yeast; + for c. diff toxin  - wean midodrine 20mg q8h, IVF   - monitor WBC, fever spikes  - ID following for Abx regimen considering C. diff proctocolitis and colovesicular fistula?; c/w Abx. s/p Rectal tube now removed Admitted for septic shock 2/2 c. diff proctocolitis  weaned off levophed   - c/w po vanc (14d) (10/11-), IV flagyl (7 day)   - BCx: NGTD, UCx w/ yeast; + for c. diff toxin  - midodrine 20mg q8h, IVF   - monitor WBC, fever spikes  - ID following for Abx regimen considering C. diff proctocolitis and colovesicular fistula?; c/w Abx. s/p Rectal tube now removed

## 2020-10-20 NOTE — CONSULT NOTE ADULT - PROBLEM SELECTOR RECOMMENDATION 2
.  Endorsed nausea when initially taking Oxy but now subsiding as infection resolves  -c/w Zofran 4mg IV q6h PRN

## 2020-10-20 NOTE — PROGRESS NOTE ADULT - PROBLEM SELECTOR PLAN 5
- Followed by Dr. Xie at [facility]: s/p 5 rounds RT (last 9/2/2020)   - Onc: no current management considering apparent progression of lung mets to multiple spinal areas  - RT treated T3-T6, T9-L1, f/u for outpatient radiosurgery after d/c  - Nausea: zofran change to prn q6

## 2020-10-20 NOTE — PROGRESS NOTE ADULT - ASSESSMENT
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 Diff proctocolitis and colovesical fistula now off pressors with mild SOB satting 95% on 2L NC awaiting Doppler for r/o LLE DVT. 68 y/o F hx/ Lung Ca 8/2020 w/ mets to brain, spine, R hip, s/p RT x5 (last 9/2) admitted to MICU for septic shock found to have C. Diff proctocolitis and colovesical fistula now off pressors w/ LE DVT on therapeutic Eliquis pending rehab placement.

## 2020-10-20 NOTE — CONSULT NOTE ADULT - PROBLEM SELECTOR RECOMMENDATION 9
.  -ordered Oxycodone ER 10mg PO q8h ATC  -c/w Oxy IR 5mg PO q4h PRN for Pain    Patient was previously on Morphine ER 15mg PO q8h ATC, this is roughly equivalent to Oxy ER 30mg PO total. Unclear why patient's long-acting agent was changed prior to admission

## 2020-10-20 NOTE — PROGRESS NOTE ADULT - ATTENDING COMMENTS
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 Diff proctocolitis and colovesical fistula. s/p pressors     # LLE DVT- On Eliquis . monitor HH      # Severe C Diff Colitis- On PO  Vanc. 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   # H/o  NSCLSC with  w/ mets to brain, spine, R hip, s/p RT x5 (last 9/2)-  CT with extensive lytic osseous metastatic disease with involvement of the spine at multiple levels and encroachment upon the spinal canal as on prior PET/CT dated 10/2/2020.  PainMx- On Oxy IR  PRN, will restart home med Oxy ED 10mg BID    Rad on/Onc on board - Lesions at T6-& and T-11 previously treated, completed 9/3/2020. For any additional radiation to these sites,recommend outpatient SBRT at [facility] . Will dw Radiation re outpt XRT plan   # Urinary retention- Pt with episodes of urinary retention, now requiring Zamorano   Likely secondary to poor mobility.  # Moderate Pericardial effusion  as noted on ECHO –likely secondary to  Malignancy Hemodynamically stable. Appreciate Cardiology- Currently without evidence of tamponade on echo. Defer  pericardiocentesis. If patient has any signs of worsening hypotension, can repeat echo to evaluate for tamponade, otherwise, plan for repeat limited TTE for effusion in 7-10 days followed by serial monitoring   # Wedge shaped attenuation in lower pole of left kidney- ? ischemia vs infection  # New onset Afib post hip – On Eliquis     # D/W patient's daughter, Veda . Had a lengthy conversation – updated pts condition, CT findings ( family was updated of  outpt PET scan results by Dr Xie) and echo findings. Daughter understands overall poor prognosis but is hopeful that pt will continue to receive intervention for her malignancy.  FULLCODE. FU palliative consult

## 2020-10-20 NOTE — CONSULT NOTE ADULT - PROBLEM SELECTOR RECOMMENDATION 8
.  Complex symptom management in the setting of metastatic lung cancer.  Will continue to follow for ongoing titration of pain regimen.   Emotional support provided, questions answered.  Active Psychosocial Referrals: [facility]    For new or uncontrolled symptoms, please page the Palliative Medicine team ([facility] #[phone]).   The service is available 24/7 (including nights & weekends) to provide symptom management recommendations over the phone as appropriate

## 2020-10-20 NOTE — PROGRESS NOTE ADULT - PROBLEM SELECTOR PLAN 6
Hx of post op afib with pericardial effusion  - considering fibrillation and high risk for thrombosis 2/2 malignancy, can start apixaban 2.5 bid after discussion with family as per cardiology: however, now with DVT in malignancy, will treat with full dose AC  - no concern for tamponade but can repeat TTE as clinically indicated then repeat in 3-6 months.

## 2020-10-20 NOTE — CONSULT NOTE ADULT - PROBLEM SELECTOR RECOMMENDATION 7
.  Will continue to build rapport while managing symptoms  -can GOC if necessary based on clinical course

## 2020-10-20 NOTE — CONSULT NOTE ADULT - ASSESSMENT
Full Note to Follow.    ·	increased frequency to Oxy ER to 10mg PO q8h ATC  ·	family is amenable to Palliative following for ongoing symptom management 68yo F with recently diagnosed Metastatic NSCLC (no treatment yet) p/w hypotension due to C. diff after recent JAYANT. Palliative consulted for pain management in the setting of life-limiting illness.    ·	increased frequency to Oxy ER to 10mg PO q8h ATC  ·	family is amenable to Palliative following for ongoing symptom management

## 2020-10-20 NOTE — PROGRESS NOTE ADULT - SUBJECTIVE AND OBJECTIVE BOX
Dr. Ildefonso THOMPSON:[id number]/NS: [phone]  After 7pm please page [phone] or [phone]    MAGDALENE YOST  69y  MRN: [id number]    Subjective:    Patient is a 69y old  Female who presents with a chief complaint of hypotension (19 Oct 2020 15:25)      MEDICATIONS  (STANDING):  acetaminophen   Tablet .. 500 milliGRAM(s) Oral every 4 hours  apixaban 10 milliGRAM(s) Oral two times a day  gabapentin 100 milliGRAM(s) Oral three times a day  lactobacillus acidophilus 1 Tablet(s) Oral two times a day  melatonin 3 milliGRAM(s) Oral at bedtime  midodrine 10 milliGRAM(s) Oral every 8 hours  oxyCODONE  ER Tablet 10 milliGRAM(s) Oral every 12 hours  pantoprazole    Tablet 40 milliGRAM(s) Oral before breakfast  sodium chloride 0.9%. 1000 milliLiter(s) (75 mL/Hr) IV Continuous <Continuous>  vancomycin    Solution 500 milliGRAM(s) Oral every 6 hours    MEDICATIONS  (PRN):  benzocaine 15 mG/menthol 3.6 mG (Sugar-Free) Lozenge 1 Lozenge Oral every 3 hours PRN Sore Throat  ondansetron Injectable 4 milliGRAM(s) IV Push every 6 hours PRN Nausea and/or Vomiting  oxyCODONE    IR 5 milliGRAM(s) Oral every 4 hours PRN Severe Pain (7 - 10)        Objective:    Vitals: Vital Signs Last 24 Hrs  T(C): 36.4 (10-20-20 @ 05:48), Max: 36.8 (10-19-20 @ 18:17)  T(F): 97.6 (10-20-20 @ 05:48), Max: 98.2 (10-19-20 @ 18:17)  HR: 98 (10-20-20 @ 05:48) (70 - 106)  BP: 97/60 (10-20-20 @ 05:48) (91/58 - 100/63)  BP(mean): --  RR: 16 (10-20-20 @ 05:48) (15 - 17)  SpO2: 98% (10-20-20 @ 05:48) (92% - 99%)            I&O's Summary    18 Oct 2020 07:01  -  19 Oct 2020 07:00  --------------------------------------------------------  IN: 0 mL / OUT: 1200 mL / NET: -1200 mL    19 Oct 2020 07:01  -  20 Oct 2020 06:43  --------------------------------------------------------  IN: 0 mL / OUT: 780 mL / NET: -780 mL        PHYSICAL EXAM:  GENERAL: NAD, well-groomed, well-developed  HEAD:  Atraumatic, Normocephalic  EYES: EOMI, PERRLA, conjunctiva and sclera clear  ENMT: No tonsillar erythema, exudates, or enlargement; Moist mucous membranes, Good dentition, No lesions  NECK: Supple, No JVD, Normal thyroid  CHEST/LUNG: Clear to auscultation bilaterally; No rales, rhonchi, wheezing, or rubs  HEART: Regular rate and rhythm; No murmurs, rubs, or gallops  ABDOMEN: Soft, Nontender, Nondistended; Bowel sounds present  EXTREMITIES:  2+ Peripheral Pulses, No clubbing, cyanosis, or edema  LYMPH: No lymphadenopathy noted  SKIN: No rashes or lesions  NERVOUS SYSTEM:  Alert & Oriented X4, Good concentration    LABS:  10-19    134<L>  |  103  |  7   ----------------------------<  84  3.7   |  22  |  0.38<L>  10-18    134<L>  |  102  |  8   ----------------------------<  122<H>  3.3<L>   |  21<L>  |  0.36<L>    Ca    7.5<L>      19 Oct 2020 06:00  Ca    7.7<L>      18 Oct 2020 06:30  Phos  2.4     10-19  Mg     1.5     10-19                                                8.6    15.54 )-----------( 446      ( 19 Oct 2020 06:00 )             27.0                         8.8    15.73 )-----------( 447      ( 18 Oct 2020 06:30 )             27.9     CAPILLARY BLOOD GLUCOSE              Dr. Ildefonso Tristan PGY1 Dr. Ildefonso THOMPSON:[id number]/NS: [phone]  After 7pm please page [phone] or [phone]    MAGDALENE YOST  69y  MRN: [id number]    Subjective:    Patient is a 69y old  Female who presents with a chief complaint of hypotension (19 Oct 2020 15:25)  Semiformed stools overnight. She notes less abd pain. No ha/n/v/cp/sob/dysuria/     MEDICATIONS  (STANDING):  acetaminophen   Tablet .. 500 milliGRAM(s) Oral every 4 hours  apixaban 10 milliGRAM(s) Oral two times a day  gabapentin 100 milliGRAM(s) Oral three times a day  lactobacillus acidophilus 1 Tablet(s) Oral two times a day  melatonin 3 milliGRAM(s) Oral at bedtime  midodrine 10 milliGRAM(s) Oral every 8 hours  oxyCODONE  ER Tablet 10 milliGRAM(s) Oral every 12 hours  pantoprazole    Tablet 40 milliGRAM(s) Oral before breakfast  sodium chloride 0.9%. 1000 milliLiter(s) (75 mL/Hr) IV Continuous <Continuous>  vancomycin    Solution 500 milliGRAM(s) Oral every 6 hours    MEDICATIONS  (PRN):  benzocaine 15 mG/menthol 3.6 mG (Sugar-Free) Lozenge 1 Lozenge Oral every 3 hours PRN Sore Throat  ondansetron Injectable 4 milliGRAM(s) IV Push every 6 hours PRN Nausea and/or Vomiting  oxyCODONE    IR 5 milliGRAM(s) Oral every 4 hours PRN Severe Pain (7 - 10)        Objective:    Vitals: Vital Signs Last 24 Hrs  T(C): 36.4 (10-20-20 @ 05:48), Max: 36.8 (10-19-20 @ 18:17)  T(F): 97.6 (10-20-20 @ 05:48), Max: 98.2 (10-19-20 @ 18:17)  HR: 98 (10-20-20 @ 05:48) (70 - 106)  BP: 97/60 (10-20-20 @ 05:48) (91/58 - 100/63)  BP(mean): --  RR: 16 (10-20-20 @ 05:48) (15 - 17)  SpO2: 98% (10-20-20 @ 05:48) (92% - 99%)            I&O's Summary    18 Oct 2020 07:01  -  19 Oct 2020 07:00  --------------------------------------------------------  IN: 0 mL / OUT: 1200 mL / NET: -1200 mL    19 Oct 2020 07:01  -  20 Oct 2020 06:43  --------------------------------------------------------  IN: 0 mL / OUT: 780 mL / NET: -780 mL        PHYSICAL EXAM:  GENERAL: NAD, well-groomed, well-developed  HEAD:  Atraumatic, Normocephalic  EYES: EOMI, PERRLA, conjunctiva and sclera clear  ENMT: No tonsillar erythema, exudates, or enlargement; Moist mucous membranes, Good dentition, No lesions  NECK: Supple, No JVD, Normal thyroid  CHEST/LUNG: Clear to auscultation bilaterally; Crackles throughout   HEART: Regular rate and rhythm; No murmurs, rubs, or gallops  ABDOMEN: Soft, Nontender, Nondistended; Bowel sounds present  EXTREMITIES:  2+ Peripheral Pulses, No clubbing, cyanosis, or edema  NERVOUS SYSTEM:  Alert & Oriented X4, Good concentration    LABS:  10-19    134<L>  |  103  |  7   ----------------------------<  84  3.7   |  22  |  0.38<L>  10-18    134<L>  |  102  |  8   ----------------------------<  122<H>  3.3<L>   |  21<L>  |  0.36<L>    Ca    7.5<L>      19 Oct 2020 06:00  Ca    7.7<L>      18 Oct 2020 06:30  Phos  2.4     10-19  Mg     1.5     10-19                                                8.6    15.54 )-----------( 446      ( 19 Oct 2020 06:00 )             27.0                         8.8    15.73 )-----------( 447      ( 18 Oct 2020 06:30 )             27.9     CAPILLARY BLOOD GLUCOSE              Dr. Ildefonso Tristan PGY1

## 2020-10-21 NOTE — PROGRESS NOTE ADULT - PROBLEM SELECTOR PLAN 1
Admitted for septic shock 2/2 c. diff proctocolitis  weaned off levophed   - c/w po vanc (14d) (10/11-), IV flagyl (7 day)   - BCx: NGTD, UCx w/ yeast; + for c. diff toxin  - midodrine 20mg q8h, IVF   - monitor WBC, fever spikes  - ID following for Abx regimen considering C. diff proctocolitis and colovesicular fistula?; c/w Abx. s/p Rectal tube now removed

## 2020-10-21 NOTE — PROGRESS NOTE ADULT - PROBLEM SELECTOR PLAN 5
- Followed by Dr. Xie at [facility]: s/p 5 rounds RT (last 9/2/2020)   - Onc: no current management considering apparent progression of lung mets to multiple spinal areas  - RT treated T3-T6, T9-L1, f/u for outpatient radiosurgery after d/c  - Nausea: zofran change to prn q6 - Followed by Dr. Xie at [facility]: s/p 5 rounds RT (last 9/2/2020)   - Onc: no current management considering apparent progression of lung mets to multiple spinal areas  - RT treated T3-T6, T9-L1, f/u for outpatient radiosurgery after d/c if within 2 weeks; if not, f/u MRI thoracic and lumbar   - Nausea: zofran change to prn q6

## 2020-10-21 NOTE — PROGRESS NOTE ADULT - SUBJECTIVE AND OBJECTIVE BOX
Dr. Ildefonso THOMPSON:[id number]/NS: [phone]  After 7pm please page [phone] or [phone]    MAGDALENE YOST  69y  MRN: [id number]    Subjective:    Patient is a 69y old  Female who presents with a chief complaint of hypotension (20 Oct 2020 11:34)      MEDICATIONS  (STANDING):  acetaminophen   Tablet .. 500 milliGRAM(s) Oral every 4 hours  apixaban 10 milliGRAM(s) Oral two times a day  gabapentin 100 milliGRAM(s) Oral three times a day  lactobacillus acidophilus 1 Tablet(s) Oral two times a day  melatonin 3 milliGRAM(s) Oral at bedtime  midodrine 10 milliGRAM(s) Oral every 8 hours  oxyCODONE  ER Tablet 10 milliGRAM(s) Oral every 8 hours  pantoprazole    Tablet 40 milliGRAM(s) Oral before breakfast  sodium chloride 0.9%. 1000 milliLiter(s) (75 mL/Hr) IV Continuous <Continuous>  vancomycin    Solution 500 milliGRAM(s) Oral every 6 hours    MEDICATIONS  (PRN):  benzocaine 15 mG/menthol 3.6 mG (Sugar-Free) Lozenge 1 Lozenge Oral every 3 hours PRN Sore Throat  ondansetron Injectable 4 milliGRAM(s) IV Push every 6 hours PRN Nausea and/or Vomiting  oxyCODONE    IR 5 milliGRAM(s) Oral every 4 hours PRN Severe Pain (7 - 10)        Objective:    Vitals: Vital Signs Last 24 Hrs  T(C): 36.9 (10-21-20 @ 06:00), Max: 37 (10-20-20 @ 21:12)  T(F): 98.5 (10-21-20 @ 06:00), Max: 98.6 (10-20-20 @ 21:12)  HR: 100 (10-21-20 @ 06:00) (94 - 100)  BP: 110/65 (10-21-20 @ 06:00) (93/53 - 110/65)  BP(mean): --  RR: 18 (10-21-20 @ 06:00) (16 - 18)  SpO2: 96% (10-21-20 @ 06:00) (96% - 96%)            I&O's Summary    20 Oct 2020 07:01  -  21 Oct 2020 07:00  --------------------------------------------------------  IN: 0 mL / OUT: 1400 mL / NET: -1400 mL        PHYSICAL EXAM:  GENERAL: NAD, well-groomed, well-developed  HEAD:  Atraumatic, Normocephalic  EYES: EOMI, PERRLA, conjunctiva and sclera clear  ENMT: No tonsillar erythema, exudates, or enlargement; Moist mucous membranes, Good dentition, No lesions  NECK: Supple, No JVD, Normal thyroid  CHEST/LUNG: Clear to auscultation bilaterally; No rales, rhonchi, wheezing, or rubs  HEART: Regular rate and rhythm; No murmurs, rubs, or gallops  ABDOMEN: Soft, Nontender, Nondistended; Bowel sounds present  EXTREMITIES:  2+ Peripheral Pulses, No clubbing, cyanosis, or edema  LYMPH: No lymphadenopathy noted  SKIN: No rashes or lesions  NERVOUS SYSTEM:  Alert & Oriented X4, Good concentration    LABS:  10-20    133<L>  |  101  |  8   ----------------------------<  105<H>  3.6   |  22  |  0.38<L>  10-19    134<L>  |  103  |  7   ----------------------------<  84  3.7   |  22  |  0.38<L>    Ca    7.9<L>      20 Oct 2020 06:10  Ca    7.5<L>      19 Oct 2020 06:00  Phos  2.7     10-20  Mg     1.8     10-20                                                9.1    15.12 )-----------( 486      ( 20 Oct 2020 06:10 )             29.2                         8.6    15.54 )-----------( 446      ( 19 Oct 2020 06:00 )             27.0     CAPILLARY BLOOD GLUCOSE              Dr. Ildefonso Tristan PGY1 Dr. Ildefonso THOMPSON:[id number]/NS: [phone]  After 7pm please page [phone] or [phone]    MAGDALENE YOST  69y  MRN: [id number]    Subjective:    Patient is a 69y old  Female who presents with a chief complaint of hypotension (20 Oct 2020 11:34)  2 loose to semi formed BM last night. No ha/n/v/cp/sob/d/dysuria.     MEDICATIONS  (STANDING):  acetaminophen   Tablet .. 500 milliGRAM(s) Oral every 4 hours  apixaban 10 milliGRAM(s) Oral two times a day  gabapentin 100 milliGRAM(s) Oral three times a day  lactobacillus acidophilus 1 Tablet(s) Oral two times a day  melatonin 3 milliGRAM(s) Oral at bedtime  midodrine 10 milliGRAM(s) Oral every 8 hours  oxyCODONE  ER Tablet 10 milliGRAM(s) Oral every 8 hours  pantoprazole    Tablet 40 milliGRAM(s) Oral before breakfast  sodium chloride 0.9%. 1000 milliLiter(s) (75 mL/Hr) IV Continuous <Continuous>  vancomycin    Solution 500 milliGRAM(s) Oral every 6 hours    MEDICATIONS  (PRN):  benzocaine 15 mG/menthol 3.6 mG (Sugar-Free) Lozenge 1 Lozenge Oral every 3 hours PRN Sore Throat  ondansetron Injectable 4 milliGRAM(s) IV Push every 6 hours PRN Nausea and/or Vomiting  oxyCODONE    IR 5 milliGRAM(s) Oral every 4 hours PRN Severe Pain (7 - 10)        Objective:    Vitals: Vital Signs Last 24 Hrs  T(C): 36.9 (10-21-20 @ 06:00), Max: 37 (10-20-20 @ 21:12)  T(F): 98.5 (10-21-20 @ 06:00), Max: 98.6 (10-20-20 @ 21:12)  HR: 100 (10-21-20 @ 06:00) (94 - 100)  BP: 110/65 (10-21-20 @ 06:00) (93/53 - 110/65)  BP(mean): --  RR: 18 (10-21-20 @ 06:00) (16 - 18)  SpO2: 96% (10-21-20 @ 06:00) (96% - 96%)            I&O's Summary    20 Oct 2020 07:01  -  21 Oct 2020 07:00  --------------------------------------------------------  IN: 0 mL / OUT: 1400 mL / NET: -1400 mL        PHYSICAL EXAM:  GENERAL: NAD, well-groomed, well-developed  HEAD:  Atraumatic, Normocephalic  EYES: EOMI, PERRLA, conjunctiva and sclera clear  ENMT: No tonsillar erythema, exudates, or enlargement; Moist mucous membranes, Good dentition, No lesions  NECK: Supple, No JVD, Normal thyroid  CHEST/LUNG: Clear to auscultation bilaterally; No rales, rhonchi, wheezing, or rubs  HEART: Regular rate and rhythm; No murmurs, rubs, or gallops  ABDOMEN: Soft, Nontender, Nondistended; Bowel sounds present  EXTREMITIES:  2+ Peripheral Pulses, No clubbing, cyanosis, or edema  LYMPH: No lymphadenopathy noted  SKIN: No rashes or lesions  NERVOUS SYSTEM:  Alert & Oriented X4, Good concentration    LABS:  10-20    133<L>  |  101  |  8   ----------------------------<  105<H>  3.6   |  22  |  0.38<L>  10-19    134<L>  |  103  |  7   ----------------------------<  84  3.7   |  22  |  0.38<L>    Ca    7.9<L>      20 Oct 2020 06:10  Ca    7.5<L>      19 Oct 2020 06:00  Phos  2.7     10-20  Mg     1.8     10-20                                                9.1    15.12 )-----------( 486      ( 20 Oct 2020 06:10 )             29.2                         8.6    15.54 )-----------( 446      ( 19 Oct 2020 06:00 )             27.0     CAPILLARY BLOOD GLUCOSE              Dr. Ildefonso Tristan PGY1

## 2020-10-21 NOTE — PROGRESS NOTE ADULT - SUBJECTIVE AND OBJECTIVE BOX
INTERVAL HPI/OVERNIGHT EVENTS:  Patient seen at bedside.  Patient with improved abdominal pain  on current pain regimen  Patient s/p rectal tube and urinary catheter  Voiding spontaneously      VITAL SIGNS:  T(F): 98.1 (10-21-20 @ 13:52)  HR: 100 (10-21-20 @ 13:52)  BP: 86/53 (10-21-20 @ 13:52)  RR: 16 (10-21-20 @ 13:52)  SpO2: 96% (10-21-20 @ 13:52)  Wt(kg): --    PHYSICAL EXAM:  In accordance with current standard limiting patient contact, deferred physical exam  2/2 COVID pandemic  Please refer to physical exam of primary team.    MEDICATIONS  (STANDING):  acetaminophen   Tablet .. 500 milliGRAM(s) Oral every 4 hours  apixaban 10 milliGRAM(s) Oral two times a day  gabapentin 100 milliGRAM(s) Oral three times a day  lactobacillus acidophilus 1 Tablet(s) Oral two times a day  melatonin 3 milliGRAM(s) Oral at bedtime  midodrine 10 milliGRAM(s) Oral every 8 hours  oxyCODONE  ER Tablet 10 milliGRAM(s) Oral every 8 hours  pantoprazole    Tablet 40 milliGRAM(s) Oral before breakfast  sodium chloride 0.9%. 1000 milliLiter(s) (75 mL/Hr) IV Continuous <Continuous>  vancomycin    Solution 500 milliGRAM(s) Oral every 6 hours    MEDICATIONS  (PRN):  benzocaine 15 mG/menthol 3.6 mG (Sugar-Free) Lozenge 1 Lozenge Oral every 3 hours PRN Sore Throat  ondansetron Injectable 4 milliGRAM(s) IV Push every 6 hours PRN Nausea and/or Vomiting  oxyCODONE    IR 5 milliGRAM(s) Oral every 4 hours PRN Severe Pain (7 - 10)      Allergies    No Known Allergies    Intolerances        LABS:                        8.6    11.17 )-----------( 463      ( 21 Oct 2020 06:10 )             27.9     10-21    134<L>  |  101  |  8   ----------------------------<  96  3.6   |  25  |  0.36<L>    Ca    7.7<L>      21 Oct 2020 06:10  Phos  2.9     10-21  Mg     1.7     10-21            RADIOLOGY & ADDITIONAL TESTS:  Studies reviewed.

## 2020-10-21 NOTE — PROGRESS NOTE ADULT - PROBLEM SELECTOR PLAN 4
No prior history per daughter, however was getting straight cath at rehab  - given patient voids completely, it is likely that she is uncomfortable with voiding by herself and is holding in her urine unless prompted.

## 2020-10-21 NOTE — PROGRESS NOTE ADULT - ASSESSMENT
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  Recent PET/CT 10/2/2020 with Hypermetabolic right upper lobe lung mass. Hypermetabolic right perihilar and right retrocrural lymph node metastases. Extensive hypermetabolic lytic osseous metastasis in axial skeleton. Lesions in the thoracic spine, extend into the epidural space, better evaluated on MRI. Small hypermetabolic focus in left lesser trochanter, difficult to delineate on CT, may represent osseous metastasis.    10/15: Oncology spoke to the patient's daughter in the presence of the patient and explained the clinical situation and disease trajectory. Daughter explained that she is very concerned about the cancer spreading because pt has not received any treatments. explained that to safely give cancer treatment, infection should be completely treated first and then we can discuss cancer treatment, however performance status will need to be assessed at that time to see if the patient will be a candidate to receive treatment. Patient and daughter understand this and remain hopeful that pt will get better and be able to receive cancer treatment.     -No inpatient oncologic interventions  -Pt is s/p RT to spine, please ask rad onc to assess to see if she might be a candidate for further RT to spine given lesions in the thoracic spine.  -10/16: started on apixaban given history of pAF  -10/18:  + DVT on LE dopplers  -Will need to start treatment for metastatic NSCLC as outpatient, after infection resolves, if performance status allows  -Appreciate palliative care consult recs for symptom management  - Would rec Physical therapy re eval when medically stable  -Patient to followup with Dr. Xie ([facility]) upon discharge  -C/w Supportive care, pain control, Nutrition, PT, DVT ppx  -Oncology will continue to follow with you      Case d/w Dr. Abran Brambila PA  Oncology Physician Assistant  [facility]  Pager [phone]    If after 5pm or weekends please page On-call Oncology Fellow     69f with untreated metastatic NSCLC PDL-1 1%, no actionable mutations, TYE, TMB 9, with mets to spine, right hip and possible brain, s/p RT to upper and lower spine and right hip, s/p right hip arthroplasty 9/2020 (however bx of T11 vertebral body compatible with adenocarcinoma of primary gastro-intestinal or pancreato-biliary origin among others), presenting with hypotension, found to have pan-colitis on CT and is cdiff+.  Recent PET/CT 10/2/2020 with Hypermetabolic right upper lobe lung mass. Hypermetabolic right perihilar and right retrocrural lymph node metastases. Extensive hypermetabolic lytic osseous metastasis in axial skeleton. Lesions in the thoracic spine, extend into the epidural space, better evaluated on MRI. Small hypermetabolic focus in left lesser trochanter, difficult to delineate on CT, may represent osseous metastasis.    10/15: Oncology spoke to the patient's daughter in the presence of the patient and explained the clinical situation and disease trajectory. Daughter explained that she is very concerned about the cancer spreading because pt has not received any treatments. explained that to safely give cancer treatment, infection should be completely treated first and then we can discuss cancer treatment, however performance status will need to be assessed at that time to see if the patient will be a candidate to receive treatment. Patient and daughter understand this and remain hopeful that pt will get better and be able to receive cancer treatment.     -No inpatient oncologic interventions  -10/16: started on apixaban given history of pAF  -10/18:  + DVT on LE dopplers  -Will need to start treatment for metastatic NSCLC as outpatient, after infection resolves, if performance status allows  -Appreciate palliative care consult recs for symptom management  - Would rec Physical therapy re eval when medically stable  -Patient to followup with Dr. Xie ([facility]) upon discharge  -C/w Supportive care, pain control, Nutrition, PT, DVT ppx  -Oncology will continue to follow with you      Case d/w Dr. Abran HO  Oncology Physician Assistant  [facility]  Pager [phone]    If after 5pm or weekends please page On-call Oncology Fellow

## 2020-10-21 NOTE — PROGRESS NOTE ADULT - ATTENDING COMMENTS
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 Diff proctocolitis and colovesical fistula. s/p pressors     # H/o  NSCLSC with  w/ mets to brain, spine, R hip, s/p RT x5 (last 9/2)-CT with extensive lytic osseous metastatic disease with involvement of the spine at multiple levels and encroachment upon the spinal canal as on prior PET/CT dated 10/2/2020.  PainMx- On Oxy IR  PRN, Restarted on home med Oxy ED 10mg BID    Rad on/Onc on board - Lesions at T6-& and T-11 previously treated, completed 9/3/2020. For any additional radiation to these sites,r ecommend outpatient SBRT at [facility] .   Team dw Dr Burroughs- Await MRI  Lumbar and sacral spine    # LLE DVT- On Eliquis . monitor HH      # Severe C Diff Colitis- On PO  Vanc. DW RN-Semi formed stool      # Hypotension- was on pressor in the MICU, On   Midodrine 10mg TID, attempt to wean off  Midodrine.   # Urinary retention- Pt with episodes of urinary retention, now requiring Zamorano . FU MRI   Likely secondary to poor mobility.  # Moderate Pericardial effusion  as noted on ECHO –likely secondary to  Malignancy Hemodynamically stable. Appreciate Cardiology- Currently without evidence of tamponade on echo. Defer  pericardiocentesis. If patient has any signs of worsening hypotension, can repeat echo to evaluate for tamponade, otherwise, plan for repeat limited TTE for effusion in 7-10 days followed by serial monitoring   # Wedge shaped attenuation in lower pole of left kidney- ? ischemia vs infection  # New onset Afib post hip – On Eliquis     #Team updating pts daughter, Veda . As per previos discussion, daughter understands overall poor prognosis but is hopeful that pt will continue to receive intervention for her malignancy.  FULLCODE. FU palliative consult

## 2020-10-21 NOTE — PROGRESS NOTE ADULT - ASSESSMENT
68 y/o F hx/ Lung Ca 8/2020 w/ mets to brain, spine, R hip, s/p RT x5 (last 9/2) admitted to MICU for septic shock found to have C. Diff proctocolitis and colovesical fistula now off pressors w/ LE DVT on therapeutic Eliquis pending rehab placement. 68 y/o F hx/ Lung Ca 8/2020 w/ mets to brain, spine, R hip, s/p RT x5 (last 9/2) admitted to MICU for septic shock found to have C. Diff proctocolitis and colovesical fistula now off pressors w/ LE DVT on therapeutic Eliquis pending MRI thoracic and lumbar spine and rehab placement.

## 2020-10-22 NOTE — PROGRESS NOTE ADULT - PROBLEM SELECTOR PLAN 8
Walk in .  Complex symptom management in the setting of metastatic lung cancer.  Will continue to follow for ongoing titration of pain regimen.   Emotional support provided, questions answered.  Active Psychosocial Referrals: [facility]    For new or uncontrolled symptoms, please page the Palliative Medicine team ([facility] #[phone]).   The service is available 24/7 (including nights & weekends) to provide symptom management recommendations over the phone as appropriate

## 2020-10-22 NOTE — PROGRESS NOTE ADULT - PROBLEM SELECTOR PLAN 7
.  Will continue to build rapport while managing symptoms  -can GOC if necessary based on clinical course but based on previous conversations with daughter/son they remain hopeful that patient will be able to receive DMT in hopes of prolonging her life

## 2020-10-22 NOTE — PROGRESS NOTE ADULT - PROBLEM SELECTOR PLAN 1
.  -c/w Oxy ER 10mg PO q8h  -will hold off on increasing dose as patient has not had any further PRNs use today and the new dose may finally be at steady state  -if patient uses 2-3PRNs in 24hrs by tomorrow AM, then will consider increase to Oxy ER 15mg PO q8h no urinary symptoms

## 2020-10-22 NOTE — PROGRESS NOTE ADULT - ATTENDING COMMENTS
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 Diff proctocolitis and colovesical fistula.     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  PainMx- On Oxy IR  PRN, Restarted on home med Oxy ED 10mg BID    Rad on/Onc on board - Lesions at T6-& and T-11 previously treated, completed 9/3/2020. For any additional radiation to these sites,recommend outpatient SBRT at [facility] . Team dw Dr Burroughs- Await MRI  Lumbar and sacral spine  for definitive RT plan     # LLE DVT- On Eliquis . monitor HH    Noted downtrend in HH this AM, No active bleeding. Suspect dilutional  Will repeat CBC     # Severe C Diff Colitis- On PO  Vanc. DW RN-Semi formed stool     # Hypotension- was on pressor in the MICU, On   Midodrine 10mg TID, attempt to wean off  Midodrine.   # Urinary retention- Pt with episodes of urinary retention, s/p  Zamorano.Likely secondary to poor mobility.  FU MRI of spine   # Moderate Pericardial effusion  as noted on ECHO –likely secondary to  Malignancy Hemodynamically stable. Appreciate Cardiology- Currently without evidence of tamponade on echo. Defer  pericardiocentesis. If patient has any signs of worsening hypotension, can repeat echo to evaluate for tamponade, otherwise, plan for repeat limited TTE for effusion in 7-10 days followed by serial monitoring   # Wedge shaped attenuation in lower pole of left kidney- ? ischemia vs infection  # New onset Afib post hip – On Eliquis     #Team updating pts Veda oliveira  on routine basis. As per previos discussion, daughter understands overall poor prognosis but is hopeful that pt will continue to receive intervention for her malignancy.  FULLCODE. FU palliative consult

## 2020-10-22 NOTE — PROGRESS NOTE ADULT - PROBLEM SELECTOR PLAN 6
.  Current goals are to seek DMT at [facility] with follow-up  -patient would be hospice appropriate if no DMT was being pursued/offered

## 2020-10-22 NOTE — PROGRESS NOTE ADULT - SUBJECTIVE AND OBJECTIVE BOX
[facility]  Gerald Hu, Palliative Care Attending  Contact Info: Page [phone] (including Nights/Weekend), message on Microsoft Teams (Gerald Hu), or leave VM at Palliative Office [phone] (Non-Urgent)    SUBJECTIVE AND OBJECTIVE:  INTERVAL HPI/OVERNIGHT EVENTS: No acute events overnight. Patient required PRNs of Oxy x3 in past 24hrs. No adverse effects of opiates noted: no sedation/dizziness/nausea. Patient reports improved pain control.     Allergies  No Known Allergies    MEDICATIONS  (STANDING):  acetaminophen   Tablet .. 500 milliGRAM(s) Oral every 4 hours  apixaban 10 milliGRAM(s) Oral two times a day  gabapentin 100 milliGRAM(s) Oral three times a day  lactobacillus acidophilus 1 Tablet(s) Oral two times a day  melatonin 3 milliGRAM(s) Oral at bedtime  midodrine 10 milliGRAM(s) Oral every 8 hours  oxyCODONE  ER Tablet 10 milliGRAM(s) Oral every 8 hours  pantoprazole    Tablet 40 milliGRAM(s) Oral before breakfast  vancomycin    Solution 500 milliGRAM(s) Oral every 6 hours    MEDICATIONS  (PRN):  benzocaine 15 mG/menthol 3.6 mG (Sugar-Free) Lozenge 1 Lozenge Oral every 3 hours PRN Sore Throat  ondansetron Injectable 4 milliGRAM(s) IV Push every 6 hours PRN Nausea and/or Vomiting  oxyCODONE    IR 5 milliGRAM(s) Oral every 4 hours PRN Severe Pain (7 - 10)      ITEMS UNCHECKED ARE NOT PRESENT    PRESENT SYMPTOMS: []Unable to obtain due to poor mentation   Source if other than patient:  []Family   []Team     Pain: [x] yes [ ] no  QOL impact - bothersome, debilitating  Location - R Hip, lower abdomen                   Aggravating factors - certain movements,   Quality - sharp, crampy  Radiation - across abdomen  Timing - constant  Severity (0-10 scale): 4  Minimal acceptable level (0-10 scale): 4    PAIN AD Score:  [email or web address]/painad.pdf (press ctrl +  left click to view)    Dyspnea:                           [x]Mild  []Moderate []Severe  Anxiety:                             []Mild []Moderate []Severe  Fatigue:                             []Mild []Moderate []Severe  Nausea:                             [x]Mild []Moderate []Severe  Loss of appetite:              []Mild []Moderate []Severe  Constipation:                    []Mild []Moderate []Severe    Other Symptoms:  [x]All other review of systems negative     Palliative Performance Status Version 2:  40%  [email or web address]/files/news/[email or web address]_performance_scale_[email or web address]    PHYSICAL EXAM:  Vital Signs Last 24 Hrs  T(C): 36.7 (22 Oct 2020 13:40), Max: 37.6 (21 Oct 2020 17:37)  T(F): 98 (22 Oct 2020 13:40), Max: 99.6 (21 Oct 2020 17:37)  HR: 100 (22 Oct 2020 13:40) (95 - 105)  BP: 100/58 (22 Oct 2020 13:40) (100/58 - 116/66)  BP(mean): --  RR: 16 (22 Oct 2020 13:40) (16 - 18)  SpO2: 95% (22 Oct 2020 13:40) (95% - 96%) I&O's Summary      GENERAL:  [x]Alert  [x]Oriented x3   []Lethargic  []Cachexia  []Unarousable  [x]Verbal  []Non-Verbal  Behavioral:   [] Anxiety  [] Delirium [] Agitation [] Other  HEENT:  [x]Normal   []Dry mouth   []ET Tube/Trach  []Oral lesions  PULMONARY:   [x]Clear []Tachypnea  []Audible excessive secretions   []Rhonchi        []Right []Left []Bilateral  [x]Crackles        []Right []Left [x]Bilateral  []Wheezing     []Right []Left []Bilateral  CARDIOVASCULAR:    [x]Regular []Irregular []Tachy  []Prasanth []Murmur []Other  GASTROINTESTINAL:  [x]Soft  [x]Distended   [x]+BS  []Non tender [x]Tender  []PEG [x]OGT/ NGT  Last BM: 10/22  GENITOURINARY:  [x]Normal [] Incontinent   []Oliguria/Anuria   []Zamorano  MUSCULOSKELETAL:   []Normal   [x]Weakness  []Bed/Wheelchair bound []Edema  NEUROLOGIC:   [x]No focal deficits  [] Cognitive impairment  [] Dysphagia []Dysarthria [] Paresis []Other   SKIN:   []Normal   [x]Pressure ulcer(s)  []Rash    LABS:                        7.4    6.25  )-----------( 386      ( 22 Oct 2020 06:10 )             23.6   10-22    133<L>  |  100  |  7   ----------------------------<  86  3.5   |  26  |  0.32<L>    Ca    7.8<L>      22 Oct 2020 06:10  Phos  2.9     10-22  Mg     1.6     10-22    RADIOLOGY & ADDITIONAL STUDIES: None new    PROTEIN CALORIE MALNUTRITION PRESENT: [ ]mild [x]moderate [ ]severe [ ]underweight [ ]morbid obesity  []PPSV2 < or = to 30% []significant weight loss  [x]poor nutritional intake [x]catabolic state []anasarca  Albumin, Serum: 1.8 g/dL (10-13-20 @ 07:31)   Artificial Nutrition []     REFERRALS:   []Chaplaincy  []Hospice  []Child Life  [x]Social Work  [x]Case management []Holistic Therapy []Physical Therapy []Dietary     Goals of Care Document:   Care Coordination Document: Progress Notes - Care Coordination [C. Provider] (10-22-20 @ 11:46)                                       Progress Notes    PROGRESS NOTE  Date & Time of Note   2020-10-22 11:44    Notes    Notes: SW role ongoing. ADILENE obtained auth from [facility] for VELASQUEZ placement to  [facility], 5days, level 1 , Auth# [id number], FREYA Allan [phone]  fax [phone]. As per medical team patient is not cleared for discharge,  patient is pending a MRI. SW to continue to follow to ensure a safe discharge.       Electronically signed by:  Evelin Morin  Electronically signed on:  2020-10-22  11:46

## 2020-10-22 NOTE — PROGRESS NOTE ADULT - ASSESSMENT
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 Diff proctocolitis and colovesical fistula now off pressors w/ LE DVT on therapeutic Eliquis pending MRI thoracic and lumbar spine and rehab placement.

## 2020-10-22 NOTE — PROGRESS NOTE ADULT - ASSESSMENT
70yo F with recently diagnosed Metastatic NSCLC (no treatment yet) p/w hypotension due to C. diff after recent JAYANT. Palliative consulted for pain management in the setting of life-limiting illness.    ·	c/w current Oxy ER regimen  ·	no further PRN use today so will hold off on increasing, if patient requires 2-3 PRNs again

## 2020-10-22 NOTE — PROGRESS NOTE ADULT - PROBLEM SELECTOR PLAN 5
- Followed by Dr. Xie at [facility]: s/p 5 rounds RT (last 9/2/2020)   - Onc: no current management considering apparent progression of lung mets to multiple spinal areas  - RT treated T3-T6, T9-L1, f/u for outpatient radiosurgery after d/c if within 2 weeks; if not, f/u MRI thoracic and lumbar   - Nausea: zofran change to prn q6 - Followed by Dr. Xie at [facility]: s/p 5 rounds RT (last 9/2/2020)   - Onc: no current management considering apparent progression of lung mets to multiple spinal areas  - RT treated T3-T6, T9-L1, f/u for outpatient radiosurgery after d/c if within 2 weeks; if not, f/u MRI thoracic and lumbar   - Nausea: zofran change to prn q6  - Noted drop in hgb 8.6->7; will repeat CBC if true drop and d/c anticoag if necessary

## 2020-10-22 NOTE — PROGRESS NOTE ADULT - SUBJECTIVE AND OBJECTIVE BOX
Dr. Ildefonso THOMPSON:[id number]/NS: [phone]  After 7pm please page [phone] or [phone]    MAGDALENE YOST  69y  MRN: [id number]    Subjective:    Patient is a 69y old  Female who presents with a chief complaint of hypotension (21 Oct 2020 15:00)      MEDICATIONS  (STANDING):  acetaminophen   Tablet .. 500 milliGRAM(s) Oral every 4 hours  apixaban 10 milliGRAM(s) Oral two times a day  gabapentin 100 milliGRAM(s) Oral three times a day  lactobacillus acidophilus 1 Tablet(s) Oral two times a day  melatonin 3 milliGRAM(s) Oral at bedtime  midodrine 10 milliGRAM(s) Oral every 8 hours  oxyCODONE  ER Tablet 10 milliGRAM(s) Oral every 8 hours  pantoprazole    Tablet 40 milliGRAM(s) Oral before breakfast  sodium chloride 0.9%. 1000 milliLiter(s) (75 mL/Hr) IV Continuous <Continuous>  vancomycin    Solution 500 milliGRAM(s) Oral every 6 hours    MEDICATIONS  (PRN):  benzocaine 15 mG/menthol 3.6 mG (Sugar-Free) Lozenge 1 Lozenge Oral every 3 hours PRN Sore Throat  ondansetron Injectable 4 milliGRAM(s) IV Push every 6 hours PRN Nausea and/or Vomiting  oxyCODONE    IR 5 milliGRAM(s) Oral every 4 hours PRN Severe Pain (7 - 10)        Objective:    Vitals: Vital Signs Last 24 Hrs  T(C): 37.1 (10-22-20 @ 06:20), Max: 37.6 (10-21-20 @ 17:37)  T(F): 98.7 (10-22-20 @ 06:20), Max: 99.6 (10-21-20 @ 17:37)  HR: 104 (10-22-20 @ 06:20) (94 - 105)  BP: 116/57 (10-22-20 @ 06:20) (86/53 - 116/66)  BP(mean): --  RR: 17 (10-22-20 @ 06:20) (16 - 18)  SpO2: 95% (10-22-20 @ 06:20) (95% - 97%)            I&O's Summary      PHYSICAL EXAM:  GENERAL: NAD, well-groomed, well-developed  HEAD:  Atraumatic, Normocephalic  EYES: EOMI, PERRLA, conjunctiva and sclera clear  ENMT: No tonsillar erythema, exudates, or enlargement; Moist mucous membranes, Good dentition, No lesions  NECK: Supple, No JVD, Normal thyroid  CHEST/LUNG: Clear to auscultation bilaterally; No rales, rhonchi, wheezing, or rubs  HEART: Regular rate and rhythm; No murmurs, rubs, or gallops  ABDOMEN: Soft, Nontender, Nondistended; Bowel sounds present  EXTREMITIES:  2+ Peripheral Pulses, No clubbing, cyanosis, or edema  LYMPH: No lymphadenopathy noted  SKIN: No rashes or lesions  NERVOUS SYSTEM:  Alert & Oriented X4, Good concentration    LABS:  10-21    134<L>  |  101  |  8   ----------------------------<  96  3.6   |  25  |  0.36<L>  10-20    133<L>  |  101  |  8   ----------------------------<  105<H>  3.6   |  22  |  0.38<L>    Ca    7.7<L>      21 Oct 2020 06:10  Ca    7.9<L>      20 Oct 2020 06:10  Phos  2.9     10-21  Mg     1.7     10-21                                                8.6    11.17 )-----------( 463      ( 21 Oct 2020 06:10 )             27.9                         9.1    15.12 )-----------( 486      ( 20 Oct 2020 06:10 )             29.2     CAPILLARY BLOOD GLUCOSE              Dr. Ildefonso Tristan PGY1 Dr. Ildefonso THOMPSON:[id number]/NS: [phone]  After 7pm please page [phone] or [phone]    MAGDALENE YOST  69y  MRN: [id number]    Subjective:    Patient is a 69y old  Female who presents with a chief complaint of hypotension (21 Oct 2020 15:00)  Two BM pasty and soft overnight. No ha/f/n/v/d/cp/sob/    MEDICATIONS  (STANDING):  acetaminophen   Tablet .. 500 milliGRAM(s) Oral every 4 hours  apixaban 10 milliGRAM(s) Oral two times a day  gabapentin 100 milliGRAM(s) Oral three times a day  lactobacillus acidophilus 1 Tablet(s) Oral two times a day  melatonin 3 milliGRAM(s) Oral at bedtime  midodrine 10 milliGRAM(s) Oral every 8 hours  oxyCODONE  ER Tablet 10 milliGRAM(s) Oral every 8 hours  pantoprazole    Tablet 40 milliGRAM(s) Oral before breakfast  sodium chloride 0.9%. 1000 milliLiter(s) (75 mL/Hr) IV Continuous <Continuous>  vancomycin    Solution 500 milliGRAM(s) Oral every 6 hours    MEDICATIONS  (PRN):  benzocaine 15 mG/menthol 3.6 mG (Sugar-Free) Lozenge 1 Lozenge Oral every 3 hours PRN Sore Throat  ondansetron Injectable 4 milliGRAM(s) IV Push every 6 hours PRN Nausea and/or Vomiting  oxyCODONE    IR 5 milliGRAM(s) Oral every 4 hours PRN Severe Pain (7 - 10)        Objective:    Vitals: Vital Signs Last 24 Hrs  T(C): 37.1 (10-22-20 @ 06:20), Max: 37.6 (10-21-20 @ 17:37)  T(F): 98.7 (10-22-20 @ 06:20), Max: 99.6 (10-21-20 @ 17:37)  HR: 104 (10-22-20 @ 06:20) (94 - 105)  BP: 116/57 (10-22-20 @ 06:20) (86/53 - 116/66)  BP(mean): --  RR: 17 (10-22-20 @ 06:20) (16 - 18)  SpO2: 95% (10-22-20 @ 06:20) (95% - 97%)            I&O's Summary      PHYSICAL EXAM:  GENERAL: NAD, well-groomed, well-developed  HEAD:  Atraumatic, Normocephalic  EYES: EOMI, PERRLA, conjunctiva and sclera clear  ENMT: No tonsillar erythema, exudates, or enlargement; Moist mucous membranes, Good dentition, No lesions  NECK: Supple, No JVD, Normal thyroid  CHEST/LUNG: Crackles in lower lung fields improved. No rales, rhonchi, wheezing, or rubs  HEART: Regular rate and rhythm; No murmurs, rubs, or gallops  ABDOMEN: Soft, mild tenderness in lower quadrants, Nondistended; Bowel sounds present  EXTREMITIES:  2+ Peripheral Pulses, No clubbing, cyanosis, or edema  LYMPH: No lymphadenopathy noted  SKIN: No rashes or lesions  NERVOUS SYSTEM:  Alert & Oriented X4, Good concentration    LABS:  10-21    134<L>  |  101  |  8   ----------------------------<  96  3.6   |  25  |  0.36<L>  10-20    133<L>  |  101  |  8   ----------------------------<  105<H>  3.6   |  22  |  0.38<L>    Ca    7.7<L>      21 Oct 2020 06:10  Ca    7.9<L>      20 Oct 2020 06:10  Phos  2.9     10-21  Mg     1.7     10-21                                                8.6    11.17 )-----------( 463      ( 21 Oct 2020 06:10 )             27.9                         9.1    15.12 )-----------( 486      ( 20 Oct 2020 06:10 )             29.2     CAPILLARY BLOOD GLUCOSE              Dr. Ildefonso Tristan PGY1

## 2020-10-22 NOTE — PROGRESS NOTE ADULT - PROBLEM SELECTOR PLAN 4
No prior history per daughter, however was getting straight cath at rehab  - given patient voids completely, it is likely that she is uncomfortable with voiding by herself and is holding in her urine unless prompted.  - d/c catheter and bladder scans

## 2020-10-23 NOTE — PROGRESS NOTE ADULT - PROBLEM SELECTOR PLAN 5
- Followed by Dr. Xie at [facility]: s/p 5 rounds RT (last 9/2/2020)   - Onc: no current management considering apparent progression of lung mets to multiple spinal areas  - RT treated T3-T6, T9-L1, f/u for outpatient radiosurgery after d/c if within 2 weeks; if not, f/u MRI thoracic and lumbar   - Nausea: zofran change to prn q6  - Noted drop in hgb 8.6->7; will repeat CBC if true drop and d/c anticoag if necessary - Followed by Dr. Xie at [facility]: s/p 5 rounds RT (last 9/2/2020)   - Onc: no current management considering apparent progression of lung mets to multiple spinal areas  - RT treated T3-T6, T9-L1, f/u for outpatient radiosurgery after d/c if within 2 weeks; if not, f/u MRI thoracic and lumbar to determine treatment  - Nausea: zofran change to prn q6  - Noted drop in hgb 8.6->7; will repeat CBC if true drop and d/c anticoag if necessary

## 2020-10-23 NOTE — PROGRESS NOTE ADULT - PROBLEM SELECTOR PLAN 1
.  -c/w Oxy ER 10mg PO q8h  -will hold off on increasing dose as patient has not had any further PRNs use today and the new dose may finally be at steady state  -if patient uses 2-3PRNs in 24hrs by tomorrow AM, then will consider increase to Oxy ER 15mg PO q8h .  -c/w Oxy ER 10mg PO q8h  -ordered Oxy IR 5mg PO q4h PRN for Moderate Pain  -ordered Oxy IR 10mg PO q4h PRN for Severe Pain

## 2020-10-23 NOTE — PROGRESS NOTE ADULT - PROBLEM SELECTOR PLAN 6
.  Current goals are to seek DMT at [facility] with follow-up  -patient would be hospice appropriate if no DMT was being pursued/offered

## 2020-10-23 NOTE — PROGRESS NOTE ADULT - SUBJECTIVE AND OBJECTIVE BOX
Dr. Ildefonso THOMPSON:[id number]/NS: [phone]  After 7pm please page [phone] or [phone]    MAGDALENE YOST  69y  MRN: [id number]    Subjective:    Patient is a 69y old  Female who presents with a chief complaint of hypotension (22 Oct 2020 15:53)      MEDICATIONS  (STANDING):  acetaminophen   Tablet .. 500 milliGRAM(s) Oral every 4 hours  gabapentin 100 milliGRAM(s) Oral three times a day  lactobacillus acidophilus 1 Tablet(s) Oral two times a day  melatonin 3 milliGRAM(s) Oral at bedtime  midodrine 10 milliGRAM(s) Oral every 8 hours  oxyCODONE  ER Tablet 10 milliGRAM(s) Oral every 8 hours  pantoprazole    Tablet 40 milliGRAM(s) Oral before breakfast  vancomycin    Solution 500 milliGRAM(s) Oral every 6 hours    MEDICATIONS  (PRN):  benzocaine 15 mG/menthol 3.6 mG (Sugar-Free) Lozenge 1 Lozenge Oral every 3 hours PRN Sore Throat  ondansetron Injectable 4 milliGRAM(s) IV Push every 6 hours PRN Nausea and/or Vomiting  oxyCODONE    IR 5 milliGRAM(s) Oral every 4 hours PRN Severe Pain (7 - 10)        Objective:    Vitals: Vital Signs Last 24 Hrs  T(C): 36.9 (10-23-20 @ 05:10), Max: 36.9 (10-22-20 @ 21:40)  T(F): 98.5 (10-23-20 @ 05:10), Max: 98.5 (10-22-20 @ 21:40)  HR: 100 (10-23-20 @ 05:10) (91 - 100)  BP: 126/75 (10-23-20 @ 05:10) (100/58 - 126/75)  BP(mean): --  RR: 18 (10-23-20 @ 05:10) (16 - 18)  SpO2: 95% (10-23-20 @ 05:10) (95% - 96%)            I&O's Summary      PHYSICAL EXAM:  GENERAL: NAD, well-groomed, well-developed  HEAD:  Atraumatic, Normocephalic  EYES: EOMI, PERRLA, conjunctiva and sclera clear  ENMT: No tonsillar erythema, exudates, or enlargement; Moist mucous membranes, Good dentition, No lesions  NECK: Supple, No JVD, Normal thyroid  CHEST/LUNG: Clear to auscultation bilaterally; No rales, rhonchi, wheezing, or rubs  HEART: Regular rate and rhythm; No murmurs, rubs, or gallops  ABDOMEN: Soft, Nontender, Nondistended; Bowel sounds present  EXTREMITIES:  2+ Peripheral Pulses, No clubbing, cyanosis, or edema  LYMPH: No lymphadenopathy noted  SKIN: No rashes or lesions  NERVOUS SYSTEM:  Alert & Oriented X4, Good concentration    LABS:  10-23    135  |  97<L>  |  5<L>  ----------------------------<  101<H>  3.2<L>   |  28  |  0.29<L>  10-22    133<L>  |  100  |  7   ----------------------------<  86  3.5   |  26  |  0.32<L>  10-21    134<L>  |  101  |  8   ----------------------------<  96  3.6   |  25  |  0.36<L>    Ca    7.9<L>      23 Oct 2020 03:20  Ca    7.8<L>      22 Oct 2020 06:10  Ca    7.7<L>      21 Oct 2020 06:10  Phos  2.9     10-23  Mg     1.5     10-23    TPro  4.9<L>  /  Alb  1.9<L>  /  TBili  0.3  /  DBili  x   /  AST  22  /  ALT  13  /  AlkPhos  110  10-23      PT/INR - ( 23 Oct 2020 03:20 )   PT: 17.1 SEC;   INR: 1.52          PTT - ( 23 Oct 2020 03:20 )  PTT:32.0 SEC                                        8.5    6.38  )-----------( 348      ( 23 Oct 2020 03:20 )             26.4                         6.6    5.23  )-----------( 374      ( 22 Oct 2020 15:25 )             21.0                         7.4    6.25  )-----------( 386      ( 22 Oct 2020 06:10 )             23.6     CAPILLARY BLOOD GLUCOSE              Dr. Ildefonso Tristan PGY1 Dr. Ildefonso THOMPSON:[id number]/NS: [phone]  After 7pm please page [phone] or [phone]    MAGDALENE YOST  69y  MRN: [id number]    Subjective:    Patient is a 69y old  Female who presents with a chief complaint of hypotension (22 Oct 2020 15:53)  Yesterday Hgb dropped from 9 to 6.6, Physical exam negative for costovertebral ecchymoses. Negative rectal exam: brown stool. Stopped eliquis. Patient is s/p 1pRBC. Clinically she had mild shortness of breath at time of exam now since resolved. NO Ha/f/n/v/dysuria     MEDICATIONS  (STANDING):  acetaminophen   Tablet .. 500 milliGRAM(s) Oral every 4 hours  gabapentin 100 milliGRAM(s) Oral three times a day  lactobacillus acidophilus 1 Tablet(s) Oral two times a day  melatonin 3 milliGRAM(s) Oral at bedtime  midodrine 10 milliGRAM(s) Oral every 8 hours  oxyCODONE  ER Tablet 10 milliGRAM(s) Oral every 8 hours  pantoprazole    Tablet 40 milliGRAM(s) Oral before breakfast  vancomycin    Solution 500 milliGRAM(s) Oral every 6 hours    MEDICATIONS  (PRN):  benzocaine 15 mG/menthol 3.6 mG (Sugar-Free) Lozenge 1 Lozenge Oral every 3 hours PRN Sore Throat  ondansetron Injectable 4 milliGRAM(s) IV Push every 6 hours PRN Nausea and/or Vomiting  oxyCODONE    IR 5 milliGRAM(s) Oral every 4 hours PRN Severe Pain (7 - 10)        Objective:    Vitals: Vital Signs Last 24 Hrs  T(C): 36.9 (10-23-20 @ 05:10), Max: 36.9 (10-22-20 @ 21:40)  T(F): 98.5 (10-23-20 @ 05:10), Max: 98.5 (10-22-20 @ 21:40)  HR: 100 (10-23-20 @ 05:10) (91 - 100)  BP: 126/75 (10-23-20 @ 05:10) (100/58 - 126/75)  BP(mean): --  RR: 18 (10-23-20 @ 05:10) (16 - 18)  SpO2: 95% (10-23-20 @ 05:10) (95% - 96%)            I&O's Summary      PHYSICAL EXAM:  GENERAL: NAD, well-groomed, well-developed  HEAD:  Atraumatic, Normocephalic  EYES: EOMI, PERRLA, conjunctiva and sclera clear  ENMT: No tonsillar erythema, exudates, or enlargement; Moist mucous membranes, Good dentition, No lesions  NECK: Supple, No JVD, Normal thyroid  CHEST/LUNG: Clear to auscultation bilaterally; Crackles B/L  HEART: Regular rate and rhythm; No murmurs, rubs, or gallops  ABDOMEN: Soft, Nontender, Nondistended; Bowel sounds present  EXTREMITIES:  2+ Peripheral Pulses, No clubbing, cyanosis, or edema  LYMPH: No lymphadenopathy noted  SKIN: No rashes or lesions  NERVOUS SYSTEM:  Alert & Oriented X4, Good concentration    LABS:  10-23    135  |  97<L>  |  5<L>  ----------------------------<  101<H>  3.2<L>   |  28  |  0.29<L>  10-22    133<L>  |  100  |  7   ----------------------------<  86  3.5   |  26  |  0.32<L>  10-21    134<L>  |  101  |  8   ----------------------------<  96  3.6   |  25  |  0.36<L>    Ca    7.9<L>      23 Oct 2020 03:20  Ca    7.8<L>      22 Oct 2020 06:10  Ca    7.7<L>      21 Oct 2020 06:10  Phos  2.9     10-23  Mg     1.5     10-23    TPro  4.9<L>  /  Alb  1.9<L>  /  TBili  0.3  /  DBili  x   /  AST  22  /  ALT  13  /  AlkPhos  110  10-23      PT/INR - ( 23 Oct 2020 03:20 )   PT: 17.1 SEC;   INR: 1.52          PTT - ( 23 Oct 2020 03:20 )  PTT:32.0 SEC                                        8.5    6.38  )-----------( 348      ( 23 Oct 2020 03:20 )             26.4                         6.6    5.23  )-----------( 374      ( 22 Oct 2020 15:25 )             21.0                         7.4    6.25  )-----------( 386      ( 22 Oct 2020 06:10 )             23.6     CAPILLARY BLOOD GLUCOSE              Dr. Ildefonso Tristan PGY1

## 2020-10-23 NOTE — PROGRESS NOTE ADULT - PROBLEM SELECTOR PLAN 8
.  Complex symptom management in the setting of metastatic lung cancer.  Will continue to follow for ongoing titration of pain regimen.   Emotional support provided, questions answered.  Active Psychosocial Referrals: FREYA HEAD    For new or uncontrolled symptoms, please page the Palliative Medicine team ([facility] #[phone]).   The service is available 24/7 (including nights & weekends) to provide symptom management recommendations over the phone as appropriate

## 2020-10-23 NOTE — PROGRESS NOTE ADULT - SUBJECTIVE AND OBJECTIVE BOX
[facility] Geriatrics and Palliative Care  Gerald Hu, Palliative Care Attending  Contact Info: Page [phone] (including Nights/Weekend), message on Microsoft Teams (Gerald Hu), or leave VM at Palliative Office [phone] (Non-Urgent)    SUBJECTIVE AND OBJECTIVE:  INTERVAL HPI/OVERNIGHT EVENTS: No acute events overnight. Interval events noted. Patient required PRNs of  in past 24hrs. No adverse effects of opiates noted: no sedation/dizziness/nausea.    Allergies    No Known Allergies    Intolerances    MEDICATIONS  (STANDING):  acetaminophen   Tablet .. 500 milliGRAM(s) Oral every 4 hours  gabapentin 100 milliGRAM(s) Oral three times a day  heparin  Infusion.  Unit(s)/Hr (10 mL/Hr) IV Continuous <Continuous>  HYDROmorphone  Injectable 0.5 milliGRAM(s) IV Push once  lactobacillus acidophilus 1 Tablet(s) Oral two times a day  melatonin 3 milliGRAM(s) Oral at bedtime  oxyCODONE  ER Tablet 10 milliGRAM(s) Oral every 8 hours  pantoprazole    Tablet 40 milliGRAM(s) Oral before breakfast  vancomycin    Solution 500 milliGRAM(s) Oral every 6 hours    MEDICATIONS  (PRN):  benzocaine 15 mG/menthol 3.6 mG (Sugar-Free) Lozenge 1 Lozenge Oral every 3 hours PRN Sore Throat  heparin   Injectable 4000 Unit(s) IV Push every 6 hours PRN For aPTT less than 40  heparin   Injectable 2000 Unit(s) IV Push every 6 hours PRN For aPTT between 40 - 57  ondansetron Injectable 4 milliGRAM(s) IV Push every 6 hours PRN Nausea and/or Vomiting  oxyCODONE    IR 5 milliGRAM(s) Oral every 4 hours PRN Moderate Pain (4 - 6)  oxyCODONE    IR 10 milliGRAM(s) Oral every 4 hours PRN Severe Pain (7 - 10)      ITEMS UNCHECKED ARE NOT PRESENT    PRESENT SYMPTOMS: []Unable to obtain due to poor mentation   Source if other than patient:  []Family   []Team      %  [email or web address]    PHYSICAL EXAM:  Vital Signs Last 24 Hrs  T(C): 36.9 (23 Oct 2020 16:00), Max: 36.9 (22 Oct 2020 21:40)  T(F): 98.5 (23 Oct 2020 16:00), Max: 98.5 (22 Oct 2020 21:40)  HR: 98 (23 Oct 2020 16:00) (92 - 100)  BP: 103/62 (23 Oct 2020 16:00) (103/62 - 126/75)  BP(mean): --  RR: 18 (23 Oct 2020 16:00) (16 - 18)  SpO2: 96% (23 Oct 2020 16:00) (95% - 96%) I&O's Summary          LABS: None new                        8.5    6.38  )-----------( 348      ( 23 Oct 2020 03:20 )             26.4   10-23    135  |  97<L>  |  5<L>  ----------------------------<  101<H>  3.2<L>   |  28  |  0.29<L>    Ca    7.9<L>      23 Oct 2020 03:20  Phos  2.9     10-23  Mg     1.5     10-23    TPro  4.9<L>  /  Alb  1.9<L>  /  TBili  0.3  /  DBili  x   /  AST  22  /  ALT  13  /  AlkPhos  110  10-23  PT/INR - ( 23 Oct 2020 03:20 )   PT: 17.1 SEC;   INR: 1.52          PTT - ( 23 Oct 2020 03:20 )  PTT:32.0 SEC        RADIOLOGY & ADDITIONAL STUDIES: None new    PROTEIN CALORIE MALNUTRITION PRESENT: [ ]mild [ ]moderate [ ]severe [ ]underweight [ ]morbid obesity  [] PPSV2 < or = 30% [] significant weight loss [] poor nutritional intake [] anasarca [] catabolic state   Albumin, Serum: 1.9 g/dL (10-23-20 @ 03:20)   Artificial Nutrition []     REFERRALS:   []Chaplaincy  []Hospice  []Child Life  [x]Social Work  []Case management []Holistic Therapy []Physical Therapy []Dietary     Goals of Care Document:   Care Coordination Document: Progress Notes - Care Coordination [C. Provider] (10-22-20 @ 11:46)                                       Progress Notes    PROGRESS NOTE  Date & Time of Note   2020-10-22 11:44    Notes    Notes: ADILENE role ongoing. [facility]ENE [facility] to  Yo Gonzalez, 5days, level 1 , Auth# [id number], FREYA EstevesSanjana [phone]/  fax [phone]. As per medical team patient is not cleared for discharge,  patient is pending a MRI. SW to continue to follow to ensure a safe discharge.       Electronically signed by:  Evelin Morin  Electronically signed on:  2020-10-22  11:46       [facility] Geriatrics and Palliative Care  Gerald Hu, Palliative Care Attending  Contact Info: Page [phone] (including Nights/Weekend), message on Microsoft Teams (Gerald Hu), or leave VM at Palliative Office [phone] (Non-Urgent)    SUBJECTIVE AND OBJECTIVE:  INTERVAL HPI/OVERNIGHT EVENTS: No acute events overnight. Interval events noted. Patient required PRNs of  in past 24hrs. No adverse effects of opiates noted: no sedation/dizziness/nausea.    Allergies    No Known Allergies    Intolerances    MEDICATIONS  (STANDING):  acetaminophen   Tablet .. 500 milliGRAM(s) Oral every 4 hours  gabapentin 100 milliGRAM(s) Oral three times a day  heparin  Infusion.  Unit(s)/Hr (10 mL/Hr) IV Continuous <Continuous>  HYDROmorphone  Injectable 0.5 milliGRAM(s) IV Push once  lactobacillus acidophilus 1 Tablet(s) Oral two times a day  melatonin 3 milliGRAM(s) Oral at bedtime  oxyCODONE  ER Tablet 10 milliGRAM(s) Oral every 8 hours  pantoprazole    Tablet 40 milliGRAM(s) Oral before breakfast  vancomycin    Solution 500 milliGRAM(s) Oral every 6 hours    MEDICATIONS  (PRN):  benzocaine 15 mG/menthol 3.6 mG (Sugar-Free) Lozenge 1 Lozenge Oral every 3 hours PRN Sore Throat  heparin   Injectable 4000 Unit(s) IV Push every 6 hours PRN For aPTT less than 40  heparin   Injectable 2000 Unit(s) IV Push every 6 hours PRN For aPTT between 40 - 57  ondansetron Injectable 4 milliGRAM(s) IV Push every 6 hours PRN Nausea and/or Vomiting  oxyCODONE    IR 5 milliGRAM(s) Oral every 4 hours PRN Moderate Pain (4 - 6)  oxyCODONE    IR 10 milliGRAM(s) Oral every 4 hours PRN Severe Pain (7 - 10)      ITEMS UNCHECKED ARE NOT PRESENT    PRESENT SYMPTOMS: []Unable to obtain due to poor mentation   Source if other than patient:  []Family   []Team      %  [email or web address]    PHYSICAL EXAM:  Vital Signs Last 24 Hrs  T(C): 36.9 (23 Oct 2020 16:00), Max: 36.9 (22 Oct 2020 21:40)  T(F): 98.5 (23 Oct 2020 16:00), Max: 98.5 (22 Oct 2020 21:40)  HR: 98 (23 Oct 2020 16:00) (92 - 100)  BP: 103/62 (23 Oct 2020 16:00) (103/62 - 126/75)  BP(mean): --  RR: 18 (23 Oct 2020 16:00) (16 - 18)  SpO2: 96% (23 Oct 2020 16:00) (95% - 96%) I&O's Summary          LABS: None new                        8.5    6.38  )-----------( 348      ( 23 Oct 2020 03:20 )             26.4   10-23    135  |  97<L>  |  5<L>  ----------------------------<  101<H>  3.2<L>   |  28  |  0.29<L>    Ca    7.9<L>      23 Oct 2020 03:20  Phos  2.9     10-23  Mg     1.5     10-23    TPro  4.9<L>  /  Alb  1.9<L>  /  TBili  0.3  /  DBili  x   /  AST  22  /  ALT  13  /  AlkPhos  110  10-23  PT/INR - ( 23 Oct 2020 03:20 )   PT: 17.1 SEC;   INR: 1.52          PTT - ( 23 Oct 2020 03:20 )  PTT:32.0 SEC        RADIOLOGY & ADDITIONAL STUDIES: None new    PROTEIN CALORIE MALNUTRITION PRESENT: [ ]mild [x]moderate [ ]severe [ ]underweight [ ]morbid obesity  []PPSV2 < or = to 30% []significant weight loss  [x]poor nutritional intake [x]catabolic state []anasarca     Albumin, Serum: 1.9 g/dL (10-23-20 @ 03:20)   Artificial Nutrition []     REFERRALS:   []Chaplaincy  []Hospice  []Child Life  [x]Social Work  [x]Case management []Holistic Therapy [x]Physical Therapy []Dietary     Goals of Care Document:   Care Coordination Document: Progress Notes - Care Coordination [C. Provider] (10-22-20 @ 11:46)                                       Progress Notes    PROGRESS NOTE  Date & Time of Note   2020-10-22 11:44    Notes    Notes: ADILENE role ongoing. ADILENE obtained auth from [employer] for VELASQUEZ placement to  [facility], 5days, level 1 , Auth# [id number], FREYA Allan [phone]/  fax [phone]. As per medical team patient is not cleared for discharge,  patient is pending a MRI. SW to continue to follow to ensure a safe discharge.       Electronically signed by:  Evelin Morin  Electronically signed on:  2020-10-22  11:46       [facility] Geriatrics and Palliative Care  Gerald Hu, Palliative Care Attending  Contact Info: Page [phone] (including Nights/Weekend), message on Microsoft Teams (Gerald Hu), or leave VM at Palliative Office [phone] (Non-Urgent)    SUBJECTIVE AND OBJECTIVE:  INTERVAL HPI/OVERNIGHT EVENTS: Interval events noted. Patient required PRNs of Oxy IR x1 in past 24hrs. No adverse effects of opiates noted: no sedation/dizziness/nausea.    Allergies    No Known Allergies    Intolerances    MEDICATIONS  (STANDING):  acetaminophen   Tablet .. 500 milliGRAM(s) Oral every 4 hours  gabapentin 100 milliGRAM(s) Oral three times a day  heparin  Infusion.  Unit(s)/Hr (10 mL/Hr) IV Continuous <Continuous>  HYDROmorphone  Injectable 0.5 milliGRAM(s) IV Push once  lactobacillus acidophilus 1 Tablet(s) Oral two times a day  melatonin 3 milliGRAM(s) Oral at bedtime  oxyCODONE  ER Tablet 10 milliGRAM(s) Oral every 8 hours  pantoprazole    Tablet 40 milliGRAM(s) Oral before breakfast  vancomycin    Solution 500 milliGRAM(s) Oral every 6 hours    MEDICATIONS  (PRN):  benzocaine 15 mG/menthol 3.6 mG (Sugar-Free) Lozenge 1 Lozenge Oral every 3 hours PRN Sore Throat  heparin   Injectable 4000 Unit(s) IV Push every 6 hours PRN For aPTT less than 40  heparin   Injectable 2000 Unit(s) IV Push every 6 hours PRN For aPTT between 40 - 57  ondansetron Injectable 4 milliGRAM(s) IV Push every 6 hours PRN Nausea and/or Vomiting  oxyCODONE    IR 5 milliGRAM(s) Oral every 4 hours PRN Moderate Pain (4 - 6)  oxyCODONE    IR 10 milliGRAM(s) Oral every 4 hours PRN Severe Pain (7 - 10)      ITEMS UNCHECKED ARE NOT PRESENT    PRESENT SYMPTOMS: []Unable to obtain due to poor mentation   Source if other than patient:  []Family   []Team     Pain: [x] yes [ ] no  QOL impact - bothersome, debilitating  Location - R Hip, lower abdomen                   Aggravating factors - certain movements,   Quality - sharp, crampy  Radiation - across abdomen  Timing - constant  Severity (0-10 scale): 4  Minimal acceptable level (0-10 scale): 4    PAIN AD Score:  [email or web address].[email or web address]/painad.pdf (press ctrl +  left click to view)    Dyspnea:                           [x]Mild  []Moderate []Severe  Anxiety:                             []Mild []Moderate []Severe  Fatigue:                             []Mild []Moderate []Severe  Nausea:                             [x]Mild []Moderate []Severe  Loss of appetite:              []Mild []Moderate []Severe  Constipation:                    []Mild []Moderate []Severe    Other Symptoms:  [x]All other review of systems negative     Palliative Performance Status Version 2:  40  %  [email or web address]    PHYSICAL EXAM:  Vital Signs Last 24 Hrs  T(C): 36.9 (23 Oct 2020 16:00), Max: 36.9 (22 Oct 2020 21:40)  T(F): 98.5 (23 Oct 2020 16:00), Max: 98.5 (22 Oct 2020 21:40)  HR: 98 (23 Oct 2020 16:00) (92 - 100)  BP: 103/62 (23 Oct 2020 16:00) (103/62 - 126/75)  BP(mean): --  RR: 18 (23 Oct 2020 16:00) (16 - 18)  SpO2: 96% (23 Oct 2020 16:00) (95% - 96%) I&O's Summary    GENERAL:  [x]Alert  [x]Oriented x3   []Lethargic  []Cachexia  []Unarousable  [x]Verbal  []Non-Verbal  Behavioral:   [] Anxiety  [] Delirium [] Agitation [] Other  HEENT:  [x]Normal   []Dry mouth   []ET Tube/Trach  []Oral lesions  PULMONARY:   [x]Clear []Tachypnea  []Audible excessive secretions   []Rhonchi        []Right []Left []Bilateral  [x]Crackles        []Right []Left [x]Bilateral  []Wheezing     []Right []Left []Bilateral  CARDIOVASCULAR:    [x]Regular []Irregular []Tachy  []Prasanth []Murmur []Other  GASTROINTESTINAL:  [x]Soft  [x]Distended   [x]+BS  []Non tender [x]Tender  []PEG [x]OGT/ NGT  Last BM: 10/22  GENITOURINARY:  [x]Normal [] Incontinent   []Oliguria/Anuria   []Zamorano  MUSCULOSKELETAL:   []Normal   [x]Weakness  []Bed/Wheelchair bound []Edema  NEUROLOGIC:   [x]No focal deficits  [] Cognitive impairment  [] Dysphagia []Dysarthria [] Paresis []Other   SKIN:   []Normal   [x]Pressure ulcer(s)  []Rash      LABS:                       8.5    6.38  )-----------( 348      ( 23 Oct 2020 03:20 )             26.4   10-23    135  |  97<L>  |  5<L>  ----------------------------<  101<H>  3.2<L>   |  28  |  0.29<L>    Ca    7.9<L>      23 Oct 2020 03:20  Phos  2.9     10-23  Mg     1.5     10-23    TPro  4.9<L>  /  Alb  1.9<L>  /  TBili  0.3  /  DBili  x   /  AST  22  /  ALT  13  /  AlkPhos  110  10-23  PT/INR - ( 23 Oct 2020 03:20 )   PT: 17.1 SEC;   INR: 1.52          PTT - ( 23 Oct 2020 03:20 )  PTT:32.0 SEC        RADIOLOGY & ADDITIONAL STUDIES: None new    PROTEIN CALORIE MALNUTRITION PRESENT: [ ]mild [x]moderate [ ]severe [ ]underweight [ ]morbid obesity  []PPSV2 < or = to 30% []significant weight loss  [x]poor nutritional intake [x]catabolic state []anasarca     Albumin, Serum: 1.9 g/dL (10-23-20 @ 03:20)   Artificial Nutrition []     REFERRALS:   []Chaplaincy  []Hospice  []Child Life  [x]Social Work  [x]Case management []Holistic Therapy [x]Physical Therapy []Dietary     Goals of Care Document:   Care Coordination Document: Progress Notes - Care Coordination [C. Provider] (10-22-20 @ 11:46)                                       Progress Notes    PROGRESS NOTE  Date & Time of Note   2020-10-22 11:44    Notes    Notes: ADILENE role ongoing. ADILENE obtained auth from [facility] for VELASQUEZ placement to  [facility], 5days, level 1 , Auth# [id number], FREYA Allan [phone]/  fax [phone]. As per medical team patient is not cleared for discharge,  patient is pending a MRI. SW to continue to follow to ensure a safe discharge.       Electronically signed by:  Evelin Morin  Electronically signed on:  2020-10-22  11:46       [facility] Geriatrics and Palliative Care  eGrald Hu, Palliative Care Attending  Contact Info: Page [phone] (including Nights/Weekend), message on Microsoft Teams (Gerald Hu), or leave VM at Palliative Office [phone] (Non-Urgent)    SUBJECTIVE AND OBJECTIVE:  INTERVAL HPI/OVERNIGHT EVENTS: Interval events noted. Patient required PRNs of Oxy IR x1 in past 24hrs. No adverse effects of opiates noted: no sedation/dizziness/nausea. Patient examined in the afternoon and endorsing acute severe back pain, she believes it is due to positioning. Patient repositioned in the bed with some relief.    Allergies  No Known Allergies    MEDICATIONS  (STANDING):  acetaminophen   Tablet .. 500 milliGRAM(s) Oral every 4 hours  gabapentin 100 milliGRAM(s) Oral three times a day  heparin  Infusion.  Unit(s)/Hr (10 mL/Hr) IV Continuous <Continuous>  HYDROmorphone  Injectable 0.5 milliGRAM(s) IV Push once  lactobacillus acidophilus 1 Tablet(s) Oral two times a day  melatonin 3 milliGRAM(s) Oral at bedtime  oxyCODONE  ER Tablet 10 milliGRAM(s) Oral every 8 hours  pantoprazole    Tablet 40 milliGRAM(s) Oral before breakfast  vancomycin    Solution 500 milliGRAM(s) Oral every 6 hours    MEDICATIONS  (PRN):  benzocaine 15 mG/menthol 3.6 mG (Sugar-Free) Lozenge 1 Lozenge Oral every 3 hours PRN Sore Throat  heparin   Injectable 4000 Unit(s) IV Push every 6 hours PRN For aPTT less than 40  heparin   Injectable 2000 Unit(s) IV Push every 6 hours PRN For aPTT between 40 - 57  ondansetron Injectable 4 milliGRAM(s) IV Push every 6 hours PRN Nausea and/or Vomiting  oxyCODONE    IR 5 milliGRAM(s) Oral every 4 hours PRN Moderate Pain (4 - 6)  oxyCODONE    IR 10 milliGRAM(s) Oral every 4 hours PRN Severe Pain (7 - 10)      ITEMS UNCHECKED ARE NOT PRESENT    PRESENT SYMPTOMS: []Unable to obtain due to poor mentation   Source if other than patient:  []Family   []Team     Pain: [x] yes [ ] no  QOL impact - bothersome, debilitating  Location - R Hip, lower abdomen, back                Aggravating factors - certain movements,   Quality - sharp, crampy  Radiation - across abdomen  Timing - constant  Severity (0-10 scale): 7  Minimal acceptable level (0-10 scale): 4    PAIN AD Score:  http://geriatrictoolkit.[facility]/cog/painad.pdf (press ctrl +  left click to view)    Dyspnea:                           [x]Mild  []Moderate []Severe  Anxiety:                             []Mild []Moderate []Severe  Fatigue:                             []Mild []Moderate []Severe  Nausea:                             [x]Mild []Moderate []Severe  Loss of appetite:              []Mild []Moderate []Severe  Constipation:                    []Mild []Moderate []Severe    Other Symptoms:  [x]All other review of systems negative     Palliative Performance Status Version 2:  40  %  http://[facility].org/files/news/palliative_performance_scale_[email or web address].pdf    PHYSICAL EXAM:  Vital Signs Last 24 Hrs  T(C): 36.9 (23 Oct 2020 16:00), Max: 36.9 (22 Oct 2020 21:40)  T(F): 98.5 (23 Oct 2020 16:00), Max: 98.5 (22 Oct 2020 21:40)  HR: 98 (23 Oct 2020 16:00) (92 - 100)  BP: 103/62 (23 Oct 2020 16:00) (103/62 - 126/75)  BP(mean): --  RR: 18 (23 Oct 2020 16:00) (16 - 18)  SpO2: 96% (23 Oct 2020 16:00) (95% - 96%) I&O's Summary    GENERAL:  [x]Alert  [x]Oriented x3   []Lethargic  []Cachexia  []Unarousable  [x]Verbal  []Non-Verbal  Behavioral:   [] Anxiety  [] Delirium [] Agitation [] Other  HEENT:  [x]Normal   []Dry mouth   []ET Tube/Trach  []Oral lesions  PULMONARY:   [x]Clear []Tachypnea  []Audible excessive secretions   []Rhonchi        []Right []Left []Bilateral  [x]Crackles        []Right []Left [x]Bilateral  []Wheezing     []Right []Left []Bilateral  CARDIOVASCULAR:    [x]Regular []Irregular []Tachy  []Prasanth []Murmur []Other  GASTROINTESTINAL:  [x]Soft  [x]Distended   [x]+BS  []Non tender [x]Tender  []PEG [x]OGT/ NGT  Last BM: 10/22  GENITOURINARY:  [x]Normal [] Incontinent   []Oliguria/Anuria   []Zamorano  MUSCULOSKELETAL:   []Normal   [x]Weakness  []Bed/Wheelchair bound []Edema  NEUROLOGIC:   [x]No focal deficits  [] Cognitive impairment  [] Dysphagia []Dysarthria [] Paresis []Other   SKIN:   []Normal   [x]Pressure ulcer(s)  []Rash      LABS:                       8.5    6.38  )-----------( 348      ( 23 Oct 2020 03:20 )             26.4   10-23    135  |  97<L>  |  5<L>  ----------------------------<  101<H>  3.2<L>   |  28  |  0.29<L>    Ca    7.9<L>      23 Oct 2020 03:20  Phos  2.9     10-23  Mg     1.5     10-23    TPro  4.9<L>  /  Alb  1.9<L>  /  TBili  0.3  /  DBili  x   /  AST  22  /  ALT  13  /  AlkPhos  110  10-23  PT/INR - ( 23 Oct 2020 03:20 )   PT: 17.1 SEC;   INR: 1.52          PTT - ( 23 Oct 2020 03:20 )  PTT:32.0 SEC        RADIOLOGY & ADDITIONAL STUDIES: None new    PROTEIN CALORIE MALNUTRITION PRESENT: [ ]mild [x]moderate [ ]severe [ ]underweight [ ]morbid obesity  []PPSV2 < or = to 30% []significant weight loss  [x]poor nutritional intake [x]catabolic state []anasarca     Albumin, Serum: 1.9 g/dL (10-23-20 @ 03:20)   Artificial Nutrition []     REFERRALS:   []Chaplaincy  []Hospice  []Child Life  [x]Social Work  [x]Case management []Holistic Therapy [x]Physical Therapy []Dietary     Goals of Care Document:   Care Coordination Document: Progress Notes - Care Coordination [C. Provider] (10-22-20 @ 11:46)                                       Progress Notes    PROGRESS NOTE  Date & Time of Note   2020-10-22 11:44    Notes    Notes: ADILENE role ongoing. [facility] to  [facility], 5days, level 1 , Auth# [id number], FREYA EstevesSanjana [phone]/  fax [phone]. As per medical team patient is not cleared for discharge,  patient is pending a MRI. SW to continue to follow to ensure a safe discharge.       Electronically signed by:  Evelin Morin  Electronically signed on:  2020-10-22  11:46

## 2020-10-23 NOTE — PROGRESS NOTE ADULT - PROBLEM SELECTOR PLAN 7
Code: Full  Diet: Full  DVT: Apixaban 10mg BID  Dispo: Rehab Code: Full  Diet: Full  DVT: Heparin full dose  Dispo: Rehab

## 2020-10-23 NOTE — PROGRESS NOTE ADULT - PROBLEM SELECTOR PLAN 2
LLE DVT in peroneal and posterior tibial veins  - Tx dose Eliquis 10mg bid LLE DVT in peroneal and posterior tibial veins  - d/c Eliquis, will do heparin challenge

## 2020-10-23 NOTE — PROGRESS NOTE ADULT - ASSESSMENT
Full Note to Follow.    Patient without significant PRN use in past 24hrs but upon examining in the afternoon, patient was endorsing new severe pain    ·	ordered Oxy IR 5mg PO q4h PRN for Moderate Pain  ·	ordered Oxy IR 10mg PO q4h PRN for Severe Pain 70yo F with recently diagnosed Metastatic NSCLC (no treatment yet) p/w hypotension due to C. diff after recent JAYANT. Palliative consulted for pain management in the setting of life-limiting illness.    Patient without significant PRN use in past 24hrs but upon examining in the afternoon, patient was endorsing new severe pain    ·	ordered Oxy IR 5mg PO q4h PRN for Moderate Pain  ·	ordered Oxy IR 10mg PO q4h PRN for Severe Pain

## 2020-10-23 NOTE — PROGRESS NOTE ADULT - ATTENDING COMMENTS
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 Diff proctocolitis and colovesical fistula. s/p pressors     # Acute Anemia- HH dropped to 6.6 yday. s/p 1 unit PRBC. Pt on Eliquis for DVT .Eliquis dose held. No active bleeding. Iron studies cw AOCD, No evidence of hemolysis. Rectal exam brown stool. Fu Stool occult. Will  start heparin gtt challenge. Monitor CBC while on AC      # H/o  NSCLSC with  w/ mets to brain, spine, R hip, s/p RT x5 (last 9/2)-CT with extensive lytic osseous metastatic disease with involvement of the spine at multiple levels and encroachment upon the spinal canal as on prior PET/CT dated 10/2/2020.  PainMx- On Oxy IR  PRN, Restarted on home med Oxy ED 10mg BID    Rad on/Onc on board - Lesions at T6-& and T-11 previously treated, completed 9/3/2020. For any additional radiation to these sites, recommend outpatient SBRT at [facility] . Team dw Dr Burroughs- Await MRI  Lumbar and sacral spine    # LLE DVT- Eliquis on hold as HH decreased. monitor HH    Given history of active malignancy, and high thromboembolic risk, will  start heparin gtt challenge. Family consented. Monitor CBC while on AC       # Severe C Diff Colitis- On PO  Vanc till 10/24. Pt now with semi -formed stool       # Hypotension- was on pressor in the MICU, On   Midodrine 10mg TID, will dc Midodrine on 10/23 as SBP in 110s.  # Urinary retention- Pt with episodes of urinary retention, s/p  Zamorano. Likely secondary to poor mobility. . FU MRI  # Moderate Pericardial effusion  as noted on ECHO –likely secondary to  Malignancy Hemodynamically stable. Appreciate Cardiology- Currently without evidence of tamponade on echo. Defer  pericardiocentesis. If patient has any signs of worsening hypotension, can repeat echo to evaluate for tamponade, otherwise, plan for repeat limited TTE for effusion in 7-10 days followed by serial monitoring   # Wedge shaped attenuation in lower pole of left kidney- ? ischemia vs infection  # New onset Afib post hip –Eliquis on hold      #Team updating pts roxanne, Veda  on routine basis. As per previos discussion, daughter understands overall poor prognosis but is hopeful that pt will continue to receive intervention for her malignancy.  FULLCODE. Palliative on board

## 2020-10-23 NOTE — PROGRESS NOTE ADULT - ASSESSMENT
68 y/o F hx/ Lung Ca 8/2020 w/ mets to brain, spine, R hip, s/p RT x5 (last 9/2) admitted to MICU for septic shock found to have C. Diff proctocolitis and colovesical fistula now off pressors w/ LE DVT on therapeutic Eliquis pending MRI thoracic and lumbar spine and rehab placement. 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 Diff proctocolitis and colovesical fistula now off pressors w/ LE DVT on therapeutic Eliquis pending MRI thoracic and lumbar spine s/p 1pRBC.

## 2020-10-23 NOTE — PROGRESS NOTE ADULT - PROBLEM SELECTOR PLAN 1
Admitted for septic shock 2/2 c. diff proctocolitis  weaned off levophed   - c/w po vanc (14d) (10/11-), IV flagyl (7 day)   - BCx: NGTD, UCx w/ yeast; + for c. diff toxin  - midodrine 20mg q8h, IVF   - monitor WBC, fever spikes  - ID following for Abx regimen considering C. diff proctocolitis and colovesicular fistula?; c/w Abx. s/p Rectal tube now removed Admitted for septic shock 2/2 c. diff proctocolitis  weaned off levophed   - c/w po vanc (14d) (10/11-24), IV flagyl (7 day)   - BCx: NGTD, UCx w/ yeast; + for c. diff toxin  - midodrine 20mg q8h, IVF   - monitor WBC, fever spikes  - ID following for Abx regimen considering C. diff proctocolitis and colovesicular fistula?; c/w Abx. s/p Rectal tube now removed

## 2020-10-23 NOTE — PROGRESS NOTE ADULT - PROBLEM SELECTOR PLAN 6
Hx of post op afib with pericardial effusion  - considering fibrillation and high risk for thrombosis 2/2 malignancy, can start apixaban 2.5 bid after discussion with family as per cardiology: however, now with DVT in malignancy, will treat with full dose AC  - no concern for tamponade but can repeat TTE as clinically indicated then repeat in 3-6 months. Hx of post op afib with pericardial effusion  - considering fibrillation and high risk for thrombosis 2/2 malignancy, can start apixaban 2.5 bid after discussion with family as per cardiology: however, now with DVT in malignancy, will treat with full dose Heparin challenge  - no concern for tamponade but can repeat TTE as clinically indicated then repeat in 3-6 months.

## 2020-10-24 NOTE — PROGRESS NOTE ADULT - SUBJECTIVE AND OBJECTIVE BOX
Dr. Ildefonso THOMPSON:[id number]/NS: [phone]  After 7pm please page [phone] or [phone]    MAGDALNEE YOST  69y  MRN: [id number]    Subjective:    Patient is a 69y old  Female who presents with a chief complaint of hypotension (23 Oct 2020 16:42)  No complaints overnight. One Bm this morning, loose. Hgb stable 8.5 with no signs of bleeding on heparin challenge.     MEDICATIONS  (STANDING):  acetaminophen   Tablet .. 500 milliGRAM(s) Oral every 4 hours  gabapentin 100 milliGRAM(s) Oral three times a day  heparin  Infusion.  Unit(s)/Hr (10 mL/Hr) IV Continuous <Continuous>  HYDROmorphone  Injectable 0.5 milliGRAM(s) IV Push once  lactobacillus acidophilus 1 Tablet(s) Oral two times a day  melatonin 3 milliGRAM(s) Oral at bedtime  oxyCODONE  ER Tablet 10 milliGRAM(s) Oral every 8 hours  pantoprazole    Tablet 40 milliGRAM(s) Oral before breakfast  vancomycin    Solution 500 milliGRAM(s) Oral every 6 hours    MEDICATIONS  (PRN):  benzocaine 15 mG/menthol 3.6 mG (Sugar-Free) Lozenge 1 Lozenge Oral every 3 hours PRN Sore Throat  heparin   Injectable 4000 Unit(s) IV Push every 6 hours PRN For aPTT less than 40  heparin   Injectable 2000 Unit(s) IV Push every 6 hours PRN For aPTT between 40 - 57  ondansetron Injectable 4 milliGRAM(s) IV Push every 6 hours PRN Nausea and/or Vomiting  oxyCODONE    IR 5 milliGRAM(s) Oral every 4 hours PRN Moderate Pain (4 - 6)  oxyCODONE    IR 10 milliGRAM(s) Oral every 4 hours PRN Severe Pain (7 - 10)        Objective:    Vitals: Vital Signs Last 24 Hrs  T(C): 36.9 (10-23-20 @ 22:00), Max: 36.9 (10-23-20 @ 16:00)  T(F): 98.4 (10-23-20 @ 22:00), Max: 98.5 (10-23-20 @ 16:00)  HR: 95 (10-23-20 @ 22:00) (95 - 98)  BP: 121/70 (10-23-20 @ 22:00) (103/62 - 121/70)  BP(mean): --  RR: 18 (10-23-20 @ 22:00) (18 - 18)  SpO2: 99% (10-23-20 @ 22:00) (96% - 99%)            I&O's Summary      PHYSICAL EXAM:  GENERAL: NAD, well-groomed, well-developed  HEAD:  Atraumatic, Normocephalic  EYES: EOMI, PERRLA, conjunctiva and sclera clear  ENMT: No tonsillar erythema, exudates, or enlargement; Moist mucous membranes, Good dentition, No lesions  NECK: Supple, No JVD, Normal thyroid  CHEST/LUNG: Mild crackles in b/l lung bases  HEART: Regular rate and rhythm; No murmurs, rubs, or gallops  ABDOMEN: Soft, Nontender, Nondistended; Bowel sounds present  EXTREMITIES:  2+ Peripheral Pulses, No clubbing, cyanosis, or edema  NERVOUS SYSTEM:  Alert & Oriented X4, Good concentration    LABS:  10-24    134<L>  |  97<L>  |  5<L>  ----------------------------<  101<H>  3.4<L>   |  27  |  0.27<L>  10-23    135  |  97<L>  |  5<L>  ----------------------------<  101<H>  3.2<L>   |  28  |  0.29<L>  10-22    133<L>  |  100  |  7   ----------------------------<  86  3.5   |  26  |  0.32<L>    Ca    7.8<L>      24 Oct 2020 02:35  Ca    7.9<L>      23 Oct 2020 03:20  Ca    7.8<L>      22 Oct 2020 06:10  Phos  3.0     10-24  Mg     1.8     10-24    TPro  4.8<L>  /  Alb  2.0<L>  /  TBili  < 0.2<L>  /  DBili  x   /  AST  22  /  ALT  13  /  AlkPhos  123<H>  10-24  TPro  4.9<L>  /  Alb  1.9<L>  /  TBili  0.3  /  DBili  x   /  AST  22  /  ALT  13  /  AlkPhos  110  10-23      PT/INR - ( 23 Oct 2020 03:20 )   PT: 17.1 SEC;   INR: 1.52          PTT - ( 24 Oct 2020 04:13 )  PTT:127.6 SEC                                        8.5    6.08  )-----------( 358      ( 24 Oct 2020 02:35 )             26.5                         8.5    6.38  )-----------( 348      ( 23 Oct 2020 03:20 )             26.4                         6.6    5.23  )-----------( 374      ( 22 Oct 2020 15:25 )             21.0     CAPILLARY BLOOD GLUCOSE    < from: MR Thoracic Spine w/wo IV Cont (10.23.20 @ 14:00) >  Evidence of diffuse osseous metastatic disease with bulky tumoral involvement within the thoracic vertebral body levels.    Tumoral encroachment is seen upon the dura and canal at the T6 and T7 levels as well as the T10 and T11 levels which appear similar to slightly worsened when compared to the prior MRI study.    Similar-appearing mild pathologic compression deformity involving the T11 vertebral body with minimal worsening of bony retropulsion. Mild to moderate canal stenosis is seen at this level which is slightly worsened in the interim.    No abnormal cord or cauda equina compression is seen.    < end of copied text >            Dr. Ildefonso Tristan PGY1

## 2020-10-24 NOTE — PROGRESS NOTE ADULT - PROBLEM SELECTOR PLAN 2
LLE DVT in peroneal and posterior tibial veins  - d/c Eliquis, will do heparin challenge  - no clinical signs of bleeding, hgb stable 8.5 LLE DVT in peroneal and posterior tibial veins  - d/c Eliquis, will do heparin challenge, plan to restart tomorrow  - no clinical signs of bleeding, hgb stable 8.5

## 2020-10-24 NOTE — PROGRESS NOTE ADULT - PROBLEM SELECTOR PLAN 6
Hx of post op afib with pericardial effusion  - considering fibrillation and high risk for thrombosis 2/2 malignancy, will treat with full dose Heparin challenge given DVT w/ presumed PE and transition back to Eliquis   - no concern for tamponade but can repeat TTE as clinically indicated then repeat in 3-6 months.

## 2020-10-24 NOTE — PROGRESS NOTE ADULT - PROBLEM SELECTOR PLAN 1
Admitted for septic shock 2/2 c. diff proctocolitis weaned off levophed   - s/p po vanc (14d) (10/11-24), IV flagyl (7 day)   - BCx: NGTD, UCx w/ yeast; + for c. diff toxin  - decrease midodrine 20mg-->10mg q8h, IVF   - monitor WBC, fever spikes  - ID following for Abx regimen considering C. diff proctocolitis and colovesicular fistula?; c/w Abx. s/p Rectal tube now removed Admitted for septic shock 2/2 c. diff proctocolitis weaned off levophed and midodrine  - s/p po vanc (14d) (10/11-24), IV flagyl (7 day)   - BCx: NGTD, UCx w/ yeast; + for c. diff toxin  - monitor WBC, fever spikes  - ID following for Abx regimen considering C. diff proctocolitis and colovesicular fistula?; c/w Abx. s/p Rectal tube now removed

## 2020-10-24 NOTE — PROGRESS NOTE ADULT - ATTENDING COMMENTS
Sepsis d/t Cdiff colitis, sepsis resolved, c/w PO Vanco, diarrhea improved   Acute on chronic anemia likely anemia of chronic disease, no bleeding, s/p 1 unit PRBC 10/23 w/ appropriate response, monitor CBC   LLE DVT, on heparin gtt, can transition back to Eliquis in AM if hgb stable   Metastatic NSCLSC, MRI spine w/ tumoral encroachment upon the dura and canal at T6-T7 and T10-T11 levels that appear similar to prior MRI, similar appearing pathologic compression deformity of T11 vertebral body w/ minimal worsening of bony retropulsion, no cord compression, pain control w/ Oxycontin/Oxycodone, s/p radiation to spine lesion in the past, plan for outpatient SBRT

## 2020-10-24 NOTE — PROVIDER CONTACT NOTE (OTHER) - BACKGROUND
admitted with hypotension
pt is 70 y/o female admit diagnosis sepsis pmh includes lung cancer
69 year old female, with C-diff contact precautions, admitted with sepsis, colitis, and hypotension.

## 2020-10-24 NOTE — PROGRESS NOTE ADULT - PROBLEM SELECTOR PLAN 4
No prior history per daughter, however was getting straight cath at rehab  - given patient voids completely, it is likely that she is uncomfortable with voiding by herself and is holding in her urine unless prompted.  - d/c catheter and bladder scan, on primafit

## 2020-10-24 NOTE — PROGRESS NOTE ADULT - PROBLEM SELECTOR PLAN 5
- Followed by Dr. Xie at [facility]: s/p 5 rounds RT (last 9/2/2020)   - Onc: no current management considering apparent progression of lung mets to multiple spinal areas  - RT treated T3-T6, T9-L1, f/u for outpatient radiosurgery after d/c if within 2 weeks; MRI thoracic and lumbar: shows encroachment but no spinal compression  - Nausea: zofran change to prn q6

## 2020-10-24 NOTE — PROGRESS NOTE ADULT - ASSESSMENT
68 y/o F hx/ Lung Ca 8/2020 w/ mets to brain, spine, R hip, s/p RT x5 (last 9/2) admitted to MICU for septic shock found to have C. Diff proctocolitis and colovesical fistula now off pressors w/ LE DVT on therapeutic Eliquis s/p MRI thoracic and lumbar spine tolerating heparin challenge.

## 2020-10-25 NOTE — PROGRESS NOTE ADULT - PROBLEM SELECTOR PLAN 2
LLE DVT in peroneal and posterior tibial veins  - no clinical signs of bleeding, hgb stable 8.5  - passed heparin challenge--> eliquis

## 2020-10-25 NOTE — PROGRESS NOTE ADULT - SUBJECTIVE AND OBJECTIVE BOX
Dr. Ildefonso THOMPSON:[id number]/NS: [phone]  After 7pm please page [phone] or [phone]    MAGDALENE YOST  69y  MRN: [id number]    Subjective:    Patient is a 69y old  Female who presents with a chief complaint of hypotension (24 Oct 2020 10:01)      MEDICATIONS  (STANDING):  acetaminophen   Tablet .. 500 milliGRAM(s) Oral every 4 hours  gabapentin 100 milliGRAM(s) Oral three times a day  heparin  Infusion.  Unit(s)/Hr (10 mL/Hr) IV Continuous <Continuous>  HYDROmorphone  Injectable 0.5 milliGRAM(s) IV Push once  lactobacillus acidophilus 1 Tablet(s) Oral two times a day  melatonin 3 milliGRAM(s) Oral at bedtime  oxyCODONE  ER Tablet 10 milliGRAM(s) Oral every 8 hours  pantoprazole    Tablet 40 milliGRAM(s) Oral before breakfast    MEDICATIONS  (PRN):  benzocaine 15 mG/menthol 3.6 mG (Sugar-Free) Lozenge 1 Lozenge Oral every 3 hours PRN Sore Throat  heparin   Injectable 4000 Unit(s) IV Push every 6 hours PRN For aPTT less than 40  heparin   Injectable 2000 Unit(s) IV Push every 6 hours PRN For aPTT between 40 - 57  ondansetron Injectable 4 milliGRAM(s) IV Push every 6 hours PRN Nausea and/or Vomiting  oxyCODONE    IR 10 milliGRAM(s) Oral every 3 hours PRN Moderate Pain (4 - 6)        Objective:    Vitals: Vital Signs Last 24 Hrs  T(C): 36.8 (10-25-20 @ 05:02), Max: 36.9 (10-24-20 @ 12:19)  T(F): 98.2 (10-25-20 @ 05:02), Max: 98.5 (10-24-20 @ 21:00)  HR: 99 (10-25-20 @ 05:02) (89 - 99)  BP: 138/89 (10-25-20 @ 05:02) (120/69 - 138/89)  BP(mean): --  RR: 18 (10-25-20 @ 05:02) (16 - 18)  SpO2: 97% (10-25-20 @ 05:02) (97% - 99%)            I&O's Summary      PHYSICAL EXAM:  GENERAL: NAD, well-groomed, well-developed  HEAD:  Atraumatic, Normocephalic  EYES: EOMI, PERRLA, conjunctiva and sclera clear  ENMT: No tonsillar erythema, exudates, or enlargement; Moist mucous membranes, Good dentition, No lesions  NECK: Supple, No JVD, Normal thyroid  CHEST/LUNG: Clear to auscultation bilaterally; No rales, rhonchi, wheezing, or rubs  HEART: Regular rate and rhythm; No murmurs, rubs, or gallops  ABDOMEN: Soft, Nontender, Nondistended; Bowel sounds present  EXTREMITIES:  2+ Peripheral Pulses, No clubbing, cyanosis, or edema  LYMPH: No lymphadenopathy noted  SKIN: No rashes or lesions  NERVOUS SYSTEM:  Alert & Oriented X4, Good concentration    LABS:  10-24    134<L>  |  97<L>  |  5<L>  ----------------------------<  101<H>  3.4<L>   |  27  |  0.27<L>  10-23    135  |  97<L>  |  5<L>  ----------------------------<  101<H>  3.2<L>   |  28  |  0.29<L>    Ca    7.8<L>      24 Oct 2020 02:35  Ca    7.9<L>      23 Oct 2020 03:20  Phos  3.0     10-24  Mg     1.8     10-24    TPro  4.8<L>  /  Alb  2.0<L>  /  TBili  < 0.2<L>  /  DBili  x   /  AST  22  /  ALT  13  /  AlkPhos  123<H>  10-24  TPro  4.9<L>  /  Alb  1.9<L>  /  TBili  0.3  /  DBili  x   /  AST  22  /  ALT  13  /  AlkPhos  110  10-23      PTT - ( 25 Oct 2020 00:59 )  PTT:74.0 SEC                                        8.5    6.08  )-----------( 358      ( 24 Oct 2020 02:35 )             26.5                         8.5    6.38  )-----------( 348      ( 23 Oct 2020 03:20 )             26.4                         6.6    5.23  )-----------( 374      ( 22 Oct 2020 15:25 )             21.0     CAPILLARY BLOOD GLUCOSE              Dr. Ildefonso Tristan PGY1 Dr. Ildefonso THOMPSON:[id number]/NS: [phone]  After 7pm please page [phone] or [phone]    MAGDALENE YOST  69y  MRN: [id number]    Subjective:    Patient is a 69y old  Female who presents with a chief complaint of hypotension (24 Oct 2020 10:01)  2 BM overnight w/ apple sauce consistency. No further complaints.    MEDICATIONS  (STANDING):  acetaminophen   Tablet .. 500 milliGRAM(s) Oral every 4 hours  gabapentin 100 milliGRAM(s) Oral three times a day  heparin  Infusion.  Unit(s)/Hr (10 mL/Hr) IV Continuous <Continuous>  HYDROmorphone  Injectable 0.5 milliGRAM(s) IV Push once  lactobacillus acidophilus 1 Tablet(s) Oral two times a day  melatonin 3 milliGRAM(s) Oral at bedtime  oxyCODONE  ER Tablet 10 milliGRAM(s) Oral every 8 hours  pantoprazole    Tablet 40 milliGRAM(s) Oral before breakfast    MEDICATIONS  (PRN):  benzocaine 15 mG/menthol 3.6 mG (Sugar-Free) Lozenge 1 Lozenge Oral every 3 hours PRN Sore Throat  heparin   Injectable 4000 Unit(s) IV Push every 6 hours PRN For aPTT less than 40  heparin   Injectable 2000 Unit(s) IV Push every 6 hours PRN For aPTT between 40 - 57  ondansetron Injectable 4 milliGRAM(s) IV Push every 6 hours PRN Nausea and/or Vomiting  oxyCODONE    IR 10 milliGRAM(s) Oral every 3 hours PRN Moderate Pain (4 - 6)        Objective:    Vitals: Vital Signs Last 24 Hrs  T(C): 36.8 (10-25-20 @ 05:02), Max: 36.9 (10-24-20 @ 12:19)  T(F): 98.2 (10-25-20 @ 05:02), Max: 98.5 (10-24-20 @ 21:00)  HR: 99 (10-25-20 @ 05:02) (89 - 99)  BP: 138/89 (10-25-20 @ 05:02) (120/69 - 138/89)  BP(mean): --  RR: 18 (10-25-20 @ 05:02) (16 - 18)  SpO2: 97% (10-25-20 @ 05:02) (97% - 99%)            I&O's Summary      PHYSICAL EXAM:  GENERAL: NAD, well-groomed, well-developed  HEAD:  Atraumatic, Normocephalic  EYES: EOMI, PERRLA, conjunctiva and sclera clear  ENMT: No tonsillar erythema, exudates, or enlargement; Moist mucous membranes, Good dentition, No lesions  NECK: Supple, No JVD, Normal thyroid  CHEST/LUNG: Clear to auscultation bilaterally; No rales, rhonchi, wheezing, or rubs  HEART: Regular rate and rhythm; No murmurs, rubs, or gallops  ABDOMEN: Soft, Nontender, Nondistended; Bowel sounds present  EXTREMITIES:  2+ Peripheral Pulses, No clubbing, cyanosis, or edema  LYMPH: No lymphadenopathy noted  SKIN: No rashes or lesions  NERVOUS SYSTEM:  Alert & Oriented X4, Good concentration    LABS:  10-24    134<L>  |  97<L>  |  5<L>  ----------------------------<  101<H>  3.4<L>   |  27  |  0.27<L>  10-23    135  |  97<L>  |  5<L>  ----------------------------<  101<H>  3.2<L>   |  28  |  0.29<L>    Ca    7.8<L>      24 Oct 2020 02:35  Ca    7.9<L>      23 Oct 2020 03:20  Phos  3.0     10-24  Mg     1.8     10-24    TPro  4.8<L>  /  Alb  2.0<L>  /  TBili  < 0.2<L>  /  DBili  x   /  AST  22  /  ALT  13  /  AlkPhos  123<H>  10-24  TPro  4.9<L>  /  Alb  1.9<L>  /  TBili  0.3  /  DBili  x   /  AST  22  /  ALT  13  /  AlkPhos  110  10-23      PTT - ( 25 Oct 2020 00:59 )  PTT:74.0 SEC                                        8.5    6.08  )-----------( 358      ( 24 Oct 2020 02:35 )             26.5                         8.5    6.38  )-----------( 348      ( 23 Oct 2020 03:20 )             26.4                         6.6    5.23  )-----------( 374      ( 22 Oct 2020 15:25 )             21.0     CAPILLARY BLOOD GLUCOSE              Dr. Ildefonso Tristan PGY1

## 2020-10-25 NOTE — PROGRESS NOTE ADULT - PROBLEM SELECTOR PLAN 6
Hx of post op afib with pericardial effusion  -  Transition back to Eliquis 10mg BID  - no concern for tamponade but can repeat TTE as clinically indicated then repeat in 3-6 months.

## 2020-10-25 NOTE — PROGRESS NOTE ADULT - ASSESSMENT
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 Diff proctocolitis and colovesical fistula now off pressors w/ LE DVT on therapeutic Eliquis s/p MRI thoracic and lumbar spine switching heparin to full dose eliquis

## 2020-10-25 NOTE — PROGRESS NOTE ADULT - ATTENDING COMMENTS
Sepsis d/t Cdiff colitis, sepsis resolved, s/p course of PO Vanco, diarrhea improved   Acute on chronic anemia likely anemia of chronic disease, no bleeding, s/p 1 unit PRBC 10/23 w/ appropriate response, rectal exam w/ brown stool, monitoring CBC   LLE DVT, on heparin gtt, hgb stable and will transition back to Eliquis   Metastatic NSCLSC, MRI spine w/ tumoral encroachment upon the dura and canal at T6-T7 and T10-T11 levels that appear similar to prior MRI, similar appearing pathologic compression deformity of T11 vertebral body w/ minimal worsening of bony retropulsion, no cord compression, pain control w/ Oxycontin/Oxycodone, s/p radiation to spine lesion in the past, plan for outpatient SBRT

## 2020-10-25 NOTE — PROGRESS NOTE ADULT - PROBLEM SELECTOR PLAN 1
Admitted for septic shock 2/2 c. diff proctocolitis weaned off levophed and midodrine; still with apple sauce consistency stools.  - s/p po vanc (14d) (10/11-24), IV flagyl (7 day)   - BCx: NGTD, UCx w/ yeast; + for c. diff toxin  - monitor WBC, fever spikes  - ID following for Abx regimen considering C. diff proctocolitis and colovesicular fistula?; c/w Abx. s/p Rectal tube now removed

## 2020-10-25 NOTE — PROGRESS NOTE ADULT - PROBLEM SELECTOR PLAN 5
- Followed by Dr. Xie at [facility]: s/p 5 rounds RT (last 9/2/2020)   - Onc: no current management considering apparent progression of lung mets to multiple spinal areas  - RT treated T3-T6, T9-L1, f/u for outpatient radiosurgery after d/c if within 2 weeks; MRI thoracic and lumbar: shows encroachment but no spinal compression  - f/u w/ Dr. Burroughs (RT) 10/26 for treatment plan to determine rehab placement  - Nausea: zofran change to prn q6

## 2020-10-26 NOTE — PROGRESS NOTE ADULT - PROBLEM SELECTOR PLAN 8
.  Complex symptom management in the setting of metastatic lung cancer.  Will continue to follow for ongoing titration of pain regimen.   Emotional support provided, questions answered.  Active Psychosocial Referrals: [facility]    For new or uncontrolled symptoms, please page the Palliative Medicine team ([facility] #[phone]).   The service is available 24/7 (including nights & weekends) to provide symptom management recommendations over the phone as appropriate

## 2020-10-26 NOTE — PROGRESS NOTE ADULT - ASSESSMENT
70yo F with recently diagnosed Metastatic NSCLC (no treatment yet) p/w hypotension due to C. diff after recent JAYANT. Palliative consulted for pain management in the setting of life-limiting illness.    Patient without significant PRN use in past 24hrs but upon examining in the afternoon, patient was endorsing new severe pain    ·	increased dose of long-acting agent: Oxycodone ER 15mg PO q8h ATC

## 2020-10-26 NOTE — PROGRESS NOTE ADULT - PROBLEM SELECTOR PLAN 2
LLE DVT in peroneal and posterior tibial veins  - no clinical signs of bleeding, hgb stable 8.5  - passed heparin challenge--> eliquis LLE DVT in peroneal and posterior tibial veins  - no clinical signs of bleeding, hgb stable 8.5  - passed heparin challenge--> eliquis 10

## 2020-10-26 NOTE — PROGRESS NOTE ADULT - ATTENDING COMMENTS
Sepsis d/t Cdiff colitis, sepsis resolved, s/p course of PO Vanco, diarrhea improved   Acute on chronic anemia likely anemia of chronic disease, no bleeding, s/p 1 unit PRBC 10/23 w/ appropriate response, rectal exam w/ brown stool, monitoring CBC   LLE DVT, transitioned back to Eliquis   Metastatic NSCLSC, MRI spine w/ tumoral encroachment upon the dura and canal at T6-T7 and T10-T11 levels that appear similar to prior MRI, similar appearing pathologic compression deformity of T11 vertebral body w/ minimal worsening of bony retropulsion, no cord compression, pain control w/ Oxycontin/Oxycodone, s/p radiation to spine lesion in the past, plan for outpatient SBRT  DC planning to rehab

## 2020-10-26 NOTE — PROGRESS NOTE ADULT - SUBJECTIVE AND OBJECTIVE BOX
incomplete DAINA YOSTPRINCESS  69y  Female    Jojo Zelaya MD PGY1 [phone]    Subjective: Seen and examined at bedside. Reports no new concerns.    O/N Events: 1 BM, formed stool per nurse.    REVIEW OF SYSTEMS:  CONSTITUTIONAL: No fevers or chills  EYES/ENT: No visual changes;  No vertigo or throat pain   NECK: No pain or stiffness  RESPIRATORY: + chronic productive cough, no wheezing, hemoptysis; No shortness of breath  CARDIOVASCULAR: No chest pain or palpitations  GASTROINTESTINAL: +left side pain, reports is chronic. No nausea, vomiting, or hematemesis; No diarrhea or constipation. No melena or hematochezia.  GENITOURINARY: No dysuria, frequency or hematuria  NEUROLOGICAL: No numbness or weakness    MEDICATIONS  (STANDING):  acetaminophen   Tablet .. 500 milliGRAM(s) Oral every 4 hours  apixaban 10 milliGRAM(s) Oral every 12 hours  gabapentin 100 milliGRAM(s) Oral three times a day  lactobacillus acidophilus 1 Tablet(s) Oral two times a day  melatonin 3 milliGRAM(s) Oral at bedtime  oxyCODONE  ER Tablet 10 milliGRAM(s) Oral every 8 hours  pantoprazole    Tablet 40 milliGRAM(s) Oral before breakfast    MEDICATIONS  (PRN):  benzocaine 15 mG/menthol 3.6 mG (Sugar-Free) Lozenge 1 Lozenge Oral every 3 hours PRN Sore Throat  ondansetron Injectable 4 milliGRAM(s) IV Push every 6 hours PRN Nausea and/or Vomiting  oxyCODONE    IR 10 milliGRAM(s) Oral every 3 hours PRN Moderate Pain (4 - 6)      Vital Signs Last 24 Hrs  T(C): 36.6 (26 Oct 2020 05:00), Max: 36.9 (25 Oct 2020 13:00)  T(F): 97.8 (26 Oct 2020 05:00), Max: 98.5 (25 Oct 2020 13:00)  HR: 98 (26 Oct 2020 05:00) (92 - 98)  BP: 127/81 (26 Oct 2020 05:00) (121/77 - 127/81)  BP(mean): --  RR: 16 (26 Oct 2020 05:00) (15 - 17)  SpO2: 99% (26 Oct 2020 05:00) (95% - 100%)    PHYSICAL EXAM:  GENERAL: NAD, cachectic  CHEST/LUNG: Clear to auscultation bilaterally; No rales, rhonchi, wheezing  HEART: Regular rate and rhythm; No murmurs, rubs, or gallops  ABDOMEN: Soft, Nontender, Nondistended; Bowel sounds present  EXTREMITIES:  2+ Peripheral Pulses, No clubbing, cyanosis, or edema  NEURO:  No Focal deficits, sensory and motor intact  SKIN: No rashes or lesions    Consultant(s) Notes Reviewed:  [x ] YES  [ ] NO  Care Discussed with Consultants/Other Providers [ x] YES  [ ] NO      10-26    134<L>  |  94<L>  |  7   ----------------------------<  98  3.6   |  31  |  0.26<L>  10-25    133<L>  |  94<L>  |  5<L>  ----------------------------<  97  3.4<L>   |  29  |  0.27<L>  10-24    134<L>  |  97<L>  |  5<L>  ----------------------------<  101<H>  3.4<L>   |  27  |  0.27<L>    Ca    8.5      26 Oct 2020 06:39  Ca    8.2<L>      25 Oct 2020 07:25  Ca    7.8<L>      24 Oct 2020 02:35  Phos  3.4     10-26  Mg     1.7     10-26    TPro  5.3<L>  /  Alb  2.5<L>  /  TBili  < 0.2<L>  /  DBili  x   /  AST  22  /  ALT  9   /  AlkPhos  154<H>  10-26  TPro  4.8<L>  /  Alb  2.0<L>  /  TBili  < 0.2<L>  /  DBili  x   /  AST  22  /  ALT  13  /  AlkPhos  123<H>  10-24    Magnesium, Serum: 1.7 mg/dL (10-26-20 @ 06:39)  Magnesium, Serum: 1.9 mg/dL (10-25-20 @ 07:25)  Magnesium, Serum: 1.8 mg/dL (10-24-20 @ 02:35)    Phosphorus Level, Serum: 3.4 mg/dL (10-26-20 @ 06:39)  Phosphorus Level, Serum: 3.2 mg/dL (10-25-20 @ 07:25)  Phosphorus Level, Serum: 3.0 mg/dL (10-24-20 @ 02:35)      PTT - ( 25 Oct 2020 07:25 )  PTT:69.6 SEC                                        9.2    6.08  )-----------( 373      ( 26 Oct 2020 06:39 )             28.6                         9.2    6.76  )-----------( 389      ( 25 Oct 2020 07:25 )             28.7                         8.5    6.08  )-----------( 358      ( 24 Oct 2020 02:35 )             26.5     LIVER FUNCTIONS - ( 26 Oct 2020 06:39 )  Alb: 2.5 g/dL / Pro: 5.3 g/dL / ALK PHOS: 154 u/L / ALT: 9 u/L / AST: 22 u/L / GGT: x           LIVER FUNCTIONS - ( 26 Oct 2020 06:39 )  Alb: 2.5 g/dL / Pro: 5.3 g/dL / ALK PHOS: 154 u/L / ALT: 9 u/L / AST: 22 u/L / GGT: x

## 2020-10-26 NOTE — PROGRESS NOTE ADULT - SUBJECTIVE AND OBJECTIVE BOX
[facility] Geriatrics and Palliative Care  Gerald Hu, Palliative Care Attending  Contact Info: Page [phone] (including Nights/Weekend), message on Microsoft Teams (Gerald Hu), or leave VM at Palliative Office [phone] (Non-Urgent)    SUBJECTIVE AND OBJECTIVE:  INTERVAL HPI/OVERNIGHT EVENTS: Interval events noted. Diarrhea is resolving as per RN. Patient required PRNs of Oxy IR 10mg x3 in past 24hrs. No adverse effects of opiates noted: no sedation/dizziness/nausea.    Allergies  No Known Allergies    MEDICATIONS  (STANDING):  acetaminophen   Tablet .. 500 milliGRAM(s) Oral every 4 hours  apixaban 10 milliGRAM(s) Oral every 12 hours  chlorhexidine 4% Liquid 1 Application(s) Topical daily  gabapentin 100 milliGRAM(s) Oral three times a day  lactobacillus acidophilus 1 Tablet(s) Oral two times a day  melatonin 3 milliGRAM(s) Oral at bedtime  oxyCODONE  ER Tablet 15 milliGRAM(s) Oral every 8 hours  pantoprazole    Tablet 40 milliGRAM(s) Oral before breakfast    MEDICATIONS  (PRN):  benzocaine 15 mG/menthol 3.6 mG (Sugar-Free) Lozenge 1 Lozenge Oral every 3 hours PRN Sore Throat  ondansetron Injectable 4 milliGRAM(s) IV Push every 6 hours PRN Nausea and/or Vomiting  oxyCODONE    IR 10 milliGRAM(s) Oral every 3 hours PRN Moderate Pain (4 - 6)      ITEMS UNCHECKED ARE NOT PRESENT    PRESENT SYMPTOMS: []Unable to obtain due to poor mentation   Source if other than patient:  []Family   []Team     Pain: [x] yes [ ] no  QOL impact - bothersome, debilitating  Location - R Hip, lower abdomen, back                Aggravating factors - certain movements,   Quality - sharp, crampy  Radiation - across abdomen  Timing - constant  Severity (0-10 scale): 4  Minimal acceptable level (0-10 scale): 4    PAIN AD Score:  http://geriatrictoolkit.[place].[facility]/cog/painad.pdf (press ctrl +  left click to view)    Dyspnea:                           [x]Mild  []Moderate []Severe  Anxiety:                             []Mild []Moderate []Severe  Fatigue:                             []Mild []Moderate []Severe  Nausea:                             [x]Mild []Moderate []Severe  Loss of appetite:              []Mild []Moderate []Severe  Constipation:                    []Mild []Moderate []Severe    Other Symptoms:  [x]All other review of systems negative     Palliative Performance Status Version 2:  40%  http://npcrc.org/files/news/palliative_performance_scale_ppsv2.pdf    PHYSICAL EXAM:  Vital Signs Last 24 Hrs  T(C): 36.7 (26 Oct 2020 13:09), Max: 36.8 (25 Oct 2020 22:25)  T(F): 98 (26 Oct 2020 13:09), Max: 98.2 (25 Oct 2020 22:25)  HR: 105 (26 Oct 2020 13:09) (97 - 105)  BP: 128/76 (26 Oct 2020 13:09) (121/77 - 128/76)  BP(mean): --  RR: 19 (26 Oct 2020 13:09) (16 - 19)  SpO2: 97% (26 Oct 2020 13:09) (95% - 99%) I&O's Summary    25 Oct 2020 07:01  -  26 Oct 2020 07:00  --------------------------------------------------------  IN: 700 mL / OUT: 1100 mL / NET: -400 mL      GENERAL:  [x]Alert  [x]Oriented x3   []Lethargic  []Cachexia  []Unarousable  [x]Verbal  []Non-Verbal  Behavioral:   [] Anxiety  [] Delirium [] Agitation [] Other  HEENT:  [x]Normal   []Dry mouth   []ET Tube/Trach  []Oral lesions  PULMONARY:   [x]Clear []Tachypnea  []Audible excessive secretions   []Rhonchi        []Right []Left []Bilateral  [x]Crackles        []Right []Left [x]Bilateral  []Wheezing     []Right []Left []Bilateral  CARDIOVASCULAR:    [x]Regular []Irregular []Tachy  []Prasanth []Murmur []Other  GASTROINTESTINAL:  [x]Soft  [x]Distended   [x]+BS  []Non tender [x]Tender  []PEG [x]OGT/ NGT  Last BM: 10/25  GENITOURINARY:  [x]Normal [] Incontinent   []Oliguria/Anuria   []Zamorano  MUSCULOSKELETAL:   []Normal   [x]Weakness  []Bed/Wheelchair bound []Edema  NEUROLOGIC:   [x]No focal deficits  [] Cognitive impairment  [] Dysphagia []Dysarthria [] Paresis []Other   SKIN:   []Normal   [x]Pressure ulcer(s)  []Rash    LABS:                       9.2    6.08  )-----------( 373      ( 26 Oct 2020 06:39 )             28.6   10-26    134<L>  |  94<L>  |  7   ----------------------------<  98  3.6   |  31  |  0.26<L>    Ca    8.5      26 Oct 2020 06:39  Phos  3.4     10-26  Mg     1.7     10-26    TPro  5.3<L>  /  Alb  2.5<L>  /  TBili  < 0.2<L>  /  DBili  x   /  AST  22  /  ALT  9   /  AlkPhos  154<H>  10-26  PTT - ( 25 Oct 2020 07:25 )  PTT:69.6 SEC    RADIOLOGY & ADDITIONAL STUDIES: None new    PROTEIN CALORIE MALNUTRITION PRESENT: [ ]mild [x]moderate [ ]severe [ ]underweight [ ]morbid obesity  []PPSV2 < or = to 30% []significant weight loss  [x]poor nutritional intake [x]catabolic state []anasarca   Albumin, Serum: 2.5 g/dL (10-26-20 @ 06:39)   Artificial Nutrition []     REFERRALS:   []Chaplaincy  []Hospice  []Child Life  [x]Social Work  [x]Case management []Holistic Therapy [x]Physical Therapy []Dietary     Goals of Care Document:   Care Coordination Document: Progress Notes - Care Coordination [C. Provider] (10-26-20 @ 09:30)  Notes: FREYA NOTE:KATIE mcclain, s copy on chart, SW informed.       Electronically signed by:  Shawanda Baig RN Case Manager  Electronically signed on:  2020-10-26  09:30

## 2020-10-26 NOTE — PROGRESS NOTE ADULT - PROBLEM SELECTOR PLAN 6
.  Current goals are to seek DMT at [facility] with follow-up  -patient would be hospice appropriate if no DMT was being pursued/offered

## 2020-10-26 NOTE — PROGRESS NOTE ADULT - PROBLEM SELECTOR PLAN 1
Admitted for septic shock 2/2 c. diff proctocolitis weaned off levophed and midodrine; still with apple sauce consistency stools.  - s/p po vanc (14d) (10/11-24), IV flagyl (7 day)   - BCx: NGTD, UCx w/ yeast; + for c. diff toxin  - monitor WBC, fever spikes  - ID following for Abx regimen considering C. diff proctocolitis and colovesicular fistula?; c/w Abx. s/p Rectal tube now removed Admitted for septic shock 2/2 c. diff proctocolitis weaned off levophed and midodrine; still with apple sauce consistency stools.  - s/p po vanc (14d) (10/11-24), IV flagyl (7 day)   - BCx: NGTD, UCx w/ yeast; + for c. diff toxin  - monitor WBC, fever spikes  - ID following for Abx regimen considering C. diff proctocolitis and colovesicular fistula?; off Abx. s/p Rectal tube now removed

## 2020-10-26 NOTE — PROGRESS NOTE ADULT - ASSESSMENT
68 y/o F hx/ Lung Ca 8/2020 w/ mets to brain, spine, R hip, s/p RT x5 (last 9/2) admitted to MICU for septic shock found to have C. Diff proctocolitis and colovesical fistula now off pressors w/ LE DVT on therapeutic Eliquis s/p MRI thoracic and lumbar spine switching heparin to full dose eliquis

## 2020-10-26 NOTE — PROGRESS NOTE ADULT - PROBLEM SELECTOR PLAN 5
- Followed by Dr. Xie at [facility]: s/p 5 rounds RT (last 9/2/2020)   - Onc: no current management considering apparent progression of lung mets to multiple spinal areas  - RT treated T3-T6, T9-L1, f/u for outpatient radiosurgery after d/c if within 2 weeks; MRI thoracic and lumbar: shows encroachment but no spinal compression  - f/u w/ Dr. Burroughs (RT) 10/26 for treatment plan to determine rehab placement  - Nausea: zofran change to prn q6 - Followed by Dr. Xie at [facility]: s/p 5 rounds RT (last 9/2/2020)   - Onc: no current management considering apparent progression of lung mets to multiple spinal areas  - RT treated T3-T6, T9-L1, f/u for outpatient radiosurgery after d/c if within 2 weeks; MRI thoracic and lumbar: shows encroachment but no spinal compression  - f/u w/ Dr. Burroughs (RT) 10/26 for treatment plan to determine rehab placement  - Nausea: zofran change to prn q6  - Pain: palliative following

## 2020-10-27 NOTE — PROGRESS NOTE ADULT - PROBLEM SELECTOR PLAN 7
.  Extensive conversation on the phone with daughter and son. They openly accepted information about hospice services but they feel the patient has continued to insist on making functional improvements. They are not ready to take away the option of DMT and want to be able to follow-up as an outpatient for potential treatment. Discussed prognosis and treatment options at family's request, explained that without treatment patient fulfills hospice criteria of <6 months to live.  -ultimately they will be moving forward with discharge to [facility] but they understand hospice is an option if patient declines further

## 2020-10-27 NOTE — PROGRESS NOTE ADULT - PROBLEM SELECTOR PLAN 8
.  Complex symptom management in the setting of metastatic lung cancer.  Will continue to follow for ongoing titration of pain regimen.   Emotional support provided, questions answered.  Active Psychosocial Referrals: [facility] HEAD    For new or uncontrolled symptoms, please page the Palliative Medicine team ([facility] #[phone]).   The service is available 24/7 (including nights & weekends) to provide symptom management recommendations over the phone as appropriate

## 2020-10-27 NOTE — PROGRESS NOTE ADULT - PROBLEM SELECTOR PLAN 1
Admitted for septic shock 2/2 c. diff proctocolitis weaned off levophed and midodrine; still with apple sauce consistency stools.  - s/p po vanc (14d) (10/11-24), IV flagyl (7 day)   - BCx: NGTD, UCx w/ yeast; + for c. diff toxin  - monitor WBC, fever spikes  - ID following for Abx regimen considering C. diff proctocolitis and colovesicular fistula?; off Abx. s/p Rectal tube now removed

## 2020-10-27 NOTE — PROGRESS NOTE ADULT - SUBJECTIVE AND OBJECTIVE BOX
MAGDALENE YOST  69y  Female    Jojo Zelaya MD PGY1 [phone]    Subjective: Seen and examined at bedside. Reports continued pain in same location, above left hip, also continues to have cough.    O/N Events: Using PRNs appropriately. No BM overnight.    REVIEW OF SYSTEMS:  CONSTITUTIONAL: No weakness, fevers or chills  EYES/ENT: No visual changes;  No vertigo or throat pain   NECK: No pain or stiffness  RESPIRATORY: No cough, wheezing, hemoptysis; No shortness of breath  CARDIOVASCULAR: No chest pain or palpitations  GASTROINTESTINAL: +left lower abdomen/side pain. No nausea, vomiting, or hematemesis; No diarrhea or constipation. No melena or hematochezia.  GENITOURINARY: No dysuria, frequency or hematuria  NEUROLOGICAL: No numbness or weakness  SKIN: No itching, rashes      MEDICATIONS  (STANDING):  acetaminophen   Tablet .. 500 milliGRAM(s) Oral every 4 hours  apixaban 10 milliGRAM(s) Oral every 12 hours  chlorhexidine 4% Liquid 1 Application(s) Topical daily  gabapentin 100 milliGRAM(s) Oral three times a day  lactobacillus acidophilus 1 Tablet(s) Oral two times a day  melatonin 3 milliGRAM(s) Oral at bedtime  oxyCODONE  ER Tablet 15 milliGRAM(s) Oral every 8 hours  pantoprazole    Tablet 40 milliGRAM(s) Oral before breakfast    MEDICATIONS  (PRN):  benzocaine 15 mG/menthol 3.6 mG (Sugar-Free) Lozenge 1 Lozenge Oral every 3 hours PRN Sore Throat  ondansetron Injectable 4 milliGRAM(s) IV Push every 6 hours PRN Nausea and/or Vomiting  oxyCODONE    IR 10 milliGRAM(s) Oral every 3 hours PRN Moderate Pain (4 - 6)      Vital Signs Last 24 Hrs  T(C): 36.9 (27 Oct 2020 05:09), Max: 36.9 (26 Oct 2020 22:12)  T(F): 98.4 (27 Oct 2020 05:09), Max: 98.4 (26 Oct 2020 22:12)  HR: 94 (27 Oct 2020 05:09) (92 - 105)  BP: 131/80 (27 Oct 2020 05:09) (107/72 - 131/80)  BP(mean): --  RR: 20 (27 Oct 2020 05:09) (19 - 20)  SpO2: 94% (27 Oct 2020 05:09) (94% - 97%)    PHYSICAL EXAM:  GENERAL: NAD, cachectic  CHEST/LUNG: Clear to auscultation bilaterally; No rales, rhonchi, wheezing  HEART: Regular rate and rhythm; No murmurs, rubs, or gallops  ABDOMEN: Soft, Nontender, Nondistended; Bowel sounds present  EXTREMITIES:  2+ Peripheral Pulses, No clubbing, cyanosis, or edema  NEURO:  No Focal deficits, sensory and motor intact  SKIN: No rashes or lesionslesions    Consultant(s) Notes Reviewed:  [x ] YES  [ ] NO  Care Discussed with Consultants/Other Providers [ x] YES  [ ] NO      10-27    135  |  96<L>  |  9   ----------------------------<  86  3.6   |  30  |  0.34<L>  10-26    134<L>  |  94<L>  |  7   ----------------------------<  98  3.6   |  31  |  0.26<L>  10-25    133<L>  |  94<L>  |  5<L>  ----------------------------<  97  3.4<L>   |  29  |  0.27<L>    Ca    8.5      27 Oct 2020 05:15  Ca    8.5      26 Oct 2020 06:39  Ca    8.2<L>      25 Oct 2020 07:25  Phos  3.4     10-27  Mg     1.7     10-27    TPro  5.2<L>  /  Alb  2.5<L>  /  TBili  < 0.2<L>  /  DBili  x   /  AST  30  /  ALT  13  /  AlkPhos  156<H>  10-27  TPro  5.3<L>  /  Alb  2.5<L>  /  TBili  < 0.2<L>  /  DBili  x   /  AST  22  /  ALT  9   /  AlkPhos  154<H>  10-26    Magnesium, Serum: 1.7 mg/dL (10-27-20 @ 05:15)  Magnesium, Serum: 1.7 mg/dL (10-26-20 @ 06:39)  Magnesium, Serum: 1.9 mg/dL (10-25-20 @ 07:25)    Phosphorus Level, Serum: 3.4 mg/dL (10-27-20 @ 05:15)  Phosphorus Level, Serum: 3.4 mg/dL (10-26-20 @ 06:39)  Phosphorus Level, Serum: 3.2 mg/dL (10-25-20 @ 07:25)                                              9.1    6.02  )-----------( 334      ( 27 Oct 2020 05:15 )             29.2                         9.2    6.08  )-----------( 373      ( 26 Oct 2020 06:39 )             28.6                         9.2    6.76  )-----------( 389      ( 25 Oct 2020 07:25 )             28.7     LIVER FUNCTIONS - ( 27 Oct 2020 05:15 )  Alb: 2.5 g/dL / Pro: 5.2 g/dL / ALK PHOS: 156 u/L / ALT: 13 u/L / AST: 30 u/L / GGT: x           LIVER FUNCTIONS - ( 27 Oct 2020 05:15 )  Alb: 2.5 g/dL / Pro: 5.2 g/dL / ALK PHOS: 156 u/L / ALT: 13 u/L / AST: 30 u/L / GGT: x

## 2020-10-27 NOTE — PROGRESS NOTE ADULT - PROBLEM SELECTOR PLAN 1
.  -c/w Oxy ER 15mg PO q8h  -c/w Oxy IR 10mg PO q4h PRN for Moderate Pain    Recommend discharging to [facility] on this regimen, they can adjust further if needed. Significant portion of pain seems attributable to immobility so rehab will likely help with pain control.

## 2020-10-27 NOTE — PROGRESS NOTE ADULT - PROBLEM SELECTOR PLAN 5
- Followed by Dr. Xie at [facility]: s/p 5 rounds RT (last 9/2/2020)   - Onc: no current management considering apparent progression of lung mets to multiple spinal areas  - RT treated T3-T6, T9-L1, f/u for outpatient radiosurgery after d/c if within 2 weeks; MRI thoracic and lumbar: shows encroachment but no spinal compression  - f/u w/ Dr. Burroughs (RT) 10/26 for treatment plan to determine rehab placement  - Nausea: zofran change to prn q6  - Pain: palliative following, upped ER oxy from 10mg q8 to 15mg q8, c/w 10mg oxy IR prn - Followed by Dr. Xie at [facility]: s/p 5 rounds RT (last 9/2/2020)   - Onc: no current management considering apparent progression of lung mets to multiple spinal areas  - RT treated T3-T6, T9-L1, f/u for outpatient radiosurgery after d/c if within 2 weeks; MRI thoracic and lumbar: shows encroachment but no spinal compression  - Per Rad Onc, no further DMT being offered inpatient and unlikely outpatient. Will start conversation with family about pursuing hospice.  - Nausea: zofran change to prn q6  - Pain: palliative following, upped ER oxy from 10mg q8 to 15mg q8, c/w 10mg oxy IR prn

## 2020-10-27 NOTE — PROGRESS NOTE ADULT - PROBLEM SELECTOR PLAN 6
.  Current goals are to seek DMT at [facility] with follow-up  -patient would be hospice appropriate if no DMT was being pursued/offered

## 2020-10-27 NOTE — PROGRESS NOTE ADULT - ASSESSMENT
Full Note to Follow.    ·	discussed hospice services with daughter and son extensively, they are not ready to take away the option of DMT from the patient  ·	they plan to proceed with [facility] but they accepted the information for hospice if circumstances change 70yo F with recently diagnosed Metastatic NSCLC (no treatment yet) p/w hypotension due to C. diff after recent JAYANT. Palliative consulted for pain management in the setting of life-limiting illness.    ·	discussed hospice services with daughter and son extensively, they are not ready to take away the option of DMT from the patient  ·	they plan to proceed with [facility] but they accepted the information for hospice if circumstances change

## 2020-10-27 NOTE — DISCHARGE NOTE NURSING/CASE MANAGEMENT/SOCIAL WORK - PATIENT PORTAL LINK FT
You can access the FollowMyHealth Patient Portal offered by [facility] by registering at the following website: [email or web address]. By joining [employer]’s FollowMyHealth portal, you will also be able to view your health information using other applications (apps) compatible with our system.

## 2020-10-27 NOTE — PROGRESS NOTE ADULT - SUBJECTIVE AND OBJECTIVE BOX
[facility] Geriatrics and Palliative Care  Gerald Hu, Palliative Care Attending  Contact Info: Page [phone] (including Nights/Weekend), message on Microsoft Teams (Gerald Hu), or leave VM at Palliative Office [phone] (Non-Urgent)    SUBJECTIVE AND OBJECTIVE:  INTERVAL HPI/OVERNIGHT EVENTS: No acute events overnight. Intermittent back pain continues, partially attributes to immobility. Patient required PRNs of Oxy IR x4 in past 24hrs. No adverse effects of opiates noted: no sedation/dizziness/nausea. Extensive telephone discussion with daughter and son re: hospice, treatment options, discharge planning.    Allergies  No Known Allergies    MEDICATIONS  (STANDING):  acetaminophen   Tablet .. 500 milliGRAM(s) Oral every 4 hours  apixaban 10 milliGRAM(s) Oral every 12 hours  chlorhexidine 4% Liquid 1 Application(s) Topical daily  gabapentin 100 milliGRAM(s) Oral three times a day  lactobacillus acidophilus 1 Tablet(s) Oral two times a day  melatonin 3 milliGRAM(s) Oral at bedtime  oxyCODONE  ER Tablet 15 milliGRAM(s) Oral every 8 hours  pantoprazole    Tablet 40 milliGRAM(s) Oral before breakfast    MEDICATIONS  (PRN):  benzocaine 15 mG/menthol 3.6 mG (Sugar-Free) Lozenge 1 Lozenge Oral every 3 hours PRN Sore Throat  ondansetron Injectable 4 milliGRAM(s) IV Push every 6 hours PRN Nausea and/or Vomiting  oxyCODONE    IR 10 milliGRAM(s) Oral every 3 hours PRN Moderate Pain (4 - 6)      ITEMS UNCHECKED ARE NOT PRESENT    PRESENT SYMPTOMS: []Unable to obtain due to poor mentation   Source if other than patient:  []Family   []Team     Pain: [x] yes [ ] no  QOL impact - bothersome, debilitating  Location - R Hip, lower abdomen, back                Aggravating factors - certain movements,   Quality - sharp, crampy  Radiation - across abdomen  Timing - constant  Severity (0-10 scale): 4  Minimal acceptable level (0-10 scale): 4    PAIN AD Score:  http://[email or web address]/painad.pdf (press ctrl +  left click to view)    Dyspnea:                           [x]Mild  []Moderate []Severe  Anxiety:                             []Mild []Moderate []Severe  Fatigue:                             []Mild []Moderate []Severe  Nausea:                             [x]Mild []Moderate []Severe  Loss of appetite:              []Mild []Moderate []Severe  Constipation:                    []Mild []Moderate []Severe    Other Symptoms:  [x]All other review of systems negative     Palliative Performance Status Version 2:  40%  http://[email or web address].org/files/news/palliative_performance_scale_[email or web address].pdf    PHYSICAL EXAM:  Vital Signs Last 24 Hrs  T(C): 36.7 (27 Oct 2020 11:58), Max: 36.9 (26 Oct 2020 22:12)  T(F): 98.1 (27 Oct 2020 11:58), Max: 98.4 (26 Oct 2020 22:12)  HR: 94 (27 Oct 2020 11:58) (92 - 98)  BP: 123/76 (27 Oct 2020 11:58) (107/72 - 131/80)  BP(mean): --  RR: 17 (27 Oct 2020 11:58) (17 - 20)  SpO2: 96% (27 Oct 2020 11:58) (94% - 96%) I&O's Summary    GENERAL:  [x]Alert  [x]Oriented x3   []Lethargic  []Cachexia  []Unarousable  [x]Verbal  []Non-Verbal  Behavioral:   [] Anxiety  [] Delirium [] Agitation [] Other  HEENT:  [x]Normal   []Dry mouth   []ET Tube/Trach  []Oral lesions  PULMONARY:   [x]Clear []Tachypnea  []Audible excessive secretions   []Rhonchi        []Right []Left []Bilateral  [x]Crackles        []Right []Left [x]Bilateral  []Wheezing     []Right []Left []Bilateral  CARDIOVASCULAR:    [x]Regular []Irregular []Tachy  []Prasanth []Murmur []Other  GASTROINTESTINAL:  [x]Soft  [x]Distended   [x]+BS  []Non tender [x]Tender  []PEG [x]OGT/ NGT  Last BM: 10/25  GENITOURINARY:  [x]Normal [] Incontinent   []Oliguria/Anuria   []Zamorano  MUSCULOSKELETAL:   []Normal   [x]Weakness  []Bed/Wheelchair bound []Edema  NEUROLOGIC:   [x]No focal deficits  [] Cognitive impairment  [] Dysphagia []Dysarthria [] Paresis []Other   SKIN:   []Normal   [x]Pressure ulcer(s)  []Rash    LABS:              9.1    6.02  )-----------( 334      ( 27 Oct 2020 05:15 )             29.2   10-27    135  |  96<L>  |  9   ----------------------------<  86  3.6   |  30  |  0.34<L>    Ca    8.5      27 Oct 2020 05:15  Phos  3.4     10-27  Mg     1.7     10-27    TPro  5.2<L>  /  Alb  2.5<L>  /  TBili  < 0.2<L>  /  DBili  x   /  AST  30  /  ALT  13  /  AlkPhos  156<H>  10-27      RADIOLOGY & ADDITIONAL STUDIES: None new    PROTEIN CALORIE MALNUTRITION PRESENT: [ ]mild [x]moderate [ ]severe [ ]underweight [ ]morbid obesity  []PPSV2 < or = to 30% []significant weight loss  [x]poor nutritional intake [x]catabolic state []anasarca   Albumin, Serum: 2.5 g/dL (10-27-20 @ 05:15)   Artificial Nutrition []     REFERRALS:   []Chaplaincy  []Hospice  []Child Life  [x]Social Work  [x]Case management []Holistic Therapy [x]Physical Therapy []Dietary     Goals of Care Document:   Care Coordination Document: Progress Notes - Care Coordination [C. Provider] (10-27-20 @ 16:57)                                       Progress Notes    PROGRESS NOTE  Date & Time of Note   2020-10-27 04:51    Notes    Notes: Medical team 4, identified patient as medically cleared for discharge to  subacute rehab.  Patient and son and daughter Veda , Frank  [phone]  are in agreement with subacute rehab discharge plan.  Patient  medically accepted to the familys primary subacute rehab choice of [facility], [address], [place]  [address], [phone] and they accepted the bed for todays discharge.  [facility] , [facility]# [id number], FREYA Allan [phone]/  fax [phone]. ADILENE scheduled [facility] transportation with  transportation department. Transportation department to call unit with  transport information.  SW updated medical, nursing, patient and family in  regards to the above.

## 2020-10-27 NOTE — PROGRESS NOTE ADULT - ATTENDING COMMENTS
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

## 2020-10-27 NOTE — CHART NOTE - NSCHARTNOTEFT_GEN_A_CORE
69y.o. F, h/o mets NSCLC, life limited illness, recently treated with RT :     [1][1][1][1]Last Tx[1]Dose  Rx:T9-L1 spine: 9/03/202o 2,000/2,000 cGy  Rx:T3-T6 verte: 8/27/2020: 800/800 cGy  Rx:Right aceta: 9/03/2020 2,000/2,000 cGy      MRI T/L spine: 10/23/2020  IMPRESSION: Evidence of diffuse osseous metastatic disease with bulky tumoral involvement within the thoracic vertebral body levels.    Tumoral encroachment is seen upon the dura and canal at the T6 and T7 levels as well as the T10 and T11 levels which appear similar to slightly worsened when compared to the prior MRI study.    Similar-appearing mild pathologic compression deformity involving the T11 vertebral body with minimal worsening of bony retropulsion. Mild to moderate canal stenosis is seen at this level which is slightly worsened in the interim.    No abnormal cord or cauda equina compression is seen.    A/P :   69 y.o. F, h/o mets NSCLS to the spine s/p multiple sites of radiation in September of 2020.    We were asked about her candidacy for further RT.  Given her disease progression, and her recent spine treatment to the same sites of disease on repeat MRI, she is not a candidate  for further inpatient RT now.  Case discussed with Dr. Hernandez.  She is hospice appropriate. 69y.o. F, h/o mets NSCLC to spine, life limited illness, recently treated with RT :     [1][1][1][1]Last Tx[1]Dose  Rx:T9-L1 spine: 9/03/202o 2,000/2,000 cGy  Rx:T3-T6 verte: 8/27/2020: 800/800 cGy  Rx:Right aceta: 9/03/2020 2,000/2,000 cGy    Also spoke with the daughter by phone at bedside.  Ms. Farrell is in an isolated room being treated for C. diff, does c/o pain 9/10 when the medications wear off.  She denies any improvement since the last radiation treatment in pain symptoms.   KPS 50      MRI T/L spine: 10/23/2020  IMPRESSION: Evidence of diffuse osseous metastatic disease with bulky tumoral involvement within the thoracic vertebral body levels.    Tumoral encroachment is seen upon the dura and canal at the T6 and T7 levels as well as the T10 and T11 levels which appear similar to slightly worsened when compared to the prior MRI study.    Similar-appearing mild pathologic compression deformity involving the T11 vertebral body with minimal worsening of bony retropulsion. Mild to moderate canal stenosis is seen at this level which is slightly worsened in the interim.    No abnormal cord or cauda equina compression is seen.    A/P :   69 y.o. F, h/o mets NSCLS to the spine s/p multiple sites of radiation in September of 2020.    We were asked about her candidacy for further RT.  Given her disease progression, and her recent spine treatment to the same sites of disease on repeat MRI, she is not a candidate  for further inpatient RT now.  Case discussed with Dr. Hernandez.  She is hospice appropriate.  I spoke with the daughter about  palliative care and pain management as the family wants everything to be done and would like for her to go to a rehab. facility soon.

## 2020-10-27 NOTE — PROGRESS NOTE ADULT - PROBLEM SELECTOR PLAN 2
LLE DVT in peroneal and posterior tibial veins  - no clinical signs of bleeding, hgb stable 8.5  - passed heparin challenge--> eliquis 10

## 2020-10-28 NOTE — PROGRESS NOTE ADULT - SUBJECTIVE AND OBJECTIVE BOX
incomplete MAGDALENE YOST  69y  Female    Jojo Zelaya MD PGY1 [phone]    Subjective: Seen and examined at bedside. Reports continued pain in same location, above left hip, also continues to have cough. c/o constipation, will add senna.     O/N Events: Using PRNs appropriately. No BM overnight.    REVIEW OF SYSTEMS:  CONSTITUTIONAL: No weakness, fevers or chills  EYES/ENT: No visual changes;  No vertigo or throat pain   NECK: No pain or stiffness  RESPIRATORY: No cough, wheezing, hemoptysis; No shortness of breath  CARDIOVASCULAR: No chest pain or palpitations  GASTROINTESTINAL: +left lower abdomen/side pain. No nausea, vomiting, or hematemesis; No diarrhea or constipation. No melena or hematochezia.  GENITOURINARY: No dysuria, frequency or hematuria  NEUROLOGICAL: No numbness or weakness  SKIN: No itching, rashes      MEDICATIONS  (STANDING):  acetaminophen   Tablet .. 500 milliGRAM(s) Oral every 4 hours  apixaban 10 milliGRAM(s) Oral every 12 hours  chlorhexidine 4% Liquid 1 Application(s) Topical daily  gabapentin 100 milliGRAM(s) Oral three times a day  lactobacillus acidophilus 1 Tablet(s) Oral two times a day  melatonin 3 milliGRAM(s) Oral at bedtime  oxyCODONE  ER Tablet 15 milliGRAM(s) Oral every 8 hours  pantoprazole    Tablet 40 milliGRAM(s) Oral before breakfast    MEDICATIONS  (PRN):  benzocaine 15 mG/menthol 3.6 mG (Sugar-Free) Lozenge 1 Lozenge Oral every 3 hours PRN Sore Throat  ondansetron Injectable 4 milliGRAM(s) IV Push every 6 hours PRN Nausea and/or Vomiting  oxyCODONE    IR 10 milliGRAM(s) Oral every 3 hours PRN Moderate Pain (4 - 6)      Vital Signs Last 24 Hrs  T(C): 36.7 (28 Oct 2020 12:30), Max: 37.2 (28 Oct 2020 05:15)  T(F): 98.1 (28 Oct 2020 12:30), Max: 98.9 (28 Oct 2020 05:15)  HR: 99 (28 Oct 2020 12:30) (64 - 99)  BP: 120/70 (28 Oct 2020 12:30) (108/72 - 132/84)  BP(mean): --  RR: 18 (28 Oct 2020 12:30) (18 - 18)  SpO2: 98% (28 Oct 2020 12:30) (98% - 98%)    PHYSICAL EXAM:  GENERAL: NAD, cachectic  CHEST/LUNG: Clear to auscultation bilaterally; No rales, rhonchi, wheezing  HEART: Regular rate and rhythm; No murmurs, rubs, or gallops  ABDOMEN: Soft, Nontender, Nondistended; Bowel sounds present  EXTREMITIES:  2+ Peripheral Pulses, No clubbing, cyanosis, or edema  NEURO:  No Focal deficits, sensory and motor intact  SKIN: No rashes or lesions    Consultant(s) Notes Reviewed:  [x ] YES  [ ] NO  Care Discussed with Consultants/Other Providers [ x] YES  [ ] NO      10-27    135  |  96<L>  |  9   ----------------------------<  86  3.6   |  30  |  0.34<L>  10-26    134<L>  |  94<L>  |  7   ----------------------------<  98  3.6   |  31  |  0.26<L>  10-25    133<L>  |  94<L>  |  5<L>  ----------------------------<  97  3.4<L>   |  29  |  0.27<L>    Ca    8.5      27 Oct 2020 05:15  Ca    8.5      26 Oct 2020 06:39  Ca    8.2<L>      25 Oct 2020 07:25  Phos  3.4     10-27  Mg     1.7     10-27    TPro  5.2<L>  /  Alb  2.5<L>  /  TBili  < 0.2<L>  /  DBili  x   /  AST  30  /  ALT  13  /  AlkPhos  156<H>  10-27  TPro  5.3<L>  /  Alb  2.5<L>  /  TBili  < 0.2<L>  /  DBili  x   /  AST  22  /  ALT  9   /  AlkPhos  154<H>  10-26    Magnesium, Serum: 1.7 mg/dL (10-27-20 @ 05:15)  Magnesium, Serum: 1.7 mg/dL (10-26-20 @ 06:39)  Magnesium, Serum: 1.9 mg/dL (10-25-20 @ 07:25)    Phosphorus Level, Serum: 3.4 mg/dL (10-27-20 @ 05:15)  Phosphorus Level, Serum: 3.4 mg/dL (10-26-20 @ 06:39)  Phosphorus Level, Serum: 3.2 mg/dL (10-25-20 @ 07:25)                                              9.1    6.02  )-----------( 334      ( 27 Oct 2020 05:15 )             29.2                         9.2    6.08  )-----------( 373      ( 26 Oct 2020 06:39 )             28.6                         9.2    6.76  )-----------( 389      ( 25 Oct 2020 07:25 )             28.7     LIVER FUNCTIONS - ( 27 Oct 2020 05:15 )  Alb: 2.5 g/dL / Pro: 5.2 g/dL / ALK PHOS: 156 u/L / ALT: 13 u/L / AST: 30 u/L / GGT: x           LIVER FUNCTIONS - ( 27 Oct 2020 05:15 )  Alb: 2.5 g/dL / Pro: 5.2 g/dL / ALK PHOS: 156 u/L / ALT: 13 u/L / AST: 30 u/L / GGT: x

## 2020-10-28 NOTE — PROGRESS NOTE ADULT - ATTENDING COMMENTS
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

## 2020-10-28 NOTE — PROGRESS NOTE ADULT - SUBJECTIVE AND OBJECTIVE BOX
[facility] Geriatrics and Palliative Care  Gerald Hu, Palliative Care Attending  Contact Info: Page [phone] (including Nights/Weekend), message on Microsoft Teams (Gerald Hu), or leave VM at Palliative Office [phone] (Non-Urgent)    SUBJECTIVE AND OBJECTIVE:  INTERVAL HPI/OVERNIGHT EVENTS: Interval events noted, planned for discharge to [facility]. Patient required PRNs of Oxy IR x5 in past 24hrs. No adverse effects of opiates noted: no sedation/dizziness/nausea. Patient is amenable to leaving for rehab.    Allergies  No Known Allergies    MEDICATIONS  (STANDING):  acetaminophen   Tablet .. 500 milliGRAM(s) Oral every 4 hours  apixaban 10 milliGRAM(s) Oral every 12 hours  chlorhexidine 4% Liquid 1 Application(s) Topical daily  gabapentin 100 milliGRAM(s) Oral three times a day  lactobacillus acidophilus 1 Tablet(s) Oral two times a day  melatonin 3 milliGRAM(s) Oral at bedtime  oxyCODONE  ER Tablet 15 milliGRAM(s) Oral every 8 hours  pantoprazole    Tablet 40 milliGRAM(s) Oral before breakfast  senna 2 Tablet(s) Oral at bedtime    MEDICATIONS  (PRN):  benzocaine 15 mG/menthol 3.6 mG (Sugar-Free) Lozenge 1 Lozenge Oral every 3 hours PRN Sore Throat  ondansetron Injectable 4 milliGRAM(s) IV Push every 6 hours PRN Nausea and/or Vomiting  oxyCODONE    IR 10 milliGRAM(s) Oral every 3 hours PRN Moderate Pain (4 - 6)      ITEMS UNCHECKED ARE NOT PRESENT    PRESENT SYMPTOMS: []Unable to obtain due to poor mentation   Source if other than patient:  []Family   []Team     Pain: [x] yes [ ] no  QOL impact - bothersome, debilitating  Location - R Hip, lower abdomen, back                Aggravating factors - certain movements,   Quality - sharp, crampy  Radiation - across abdomen  Timing - constant  Severity (0-10 scale): 4  Minimal acceptable level (0-10 scale): 4    PAIN AD Score:  http://geriatrictoolkit.[email or web address]/painad.pdf (press ctrl +  left click to view)    Dyspnea:                           [x]Mild  []Moderate []Severe  Anxiety:                             []Mild []Moderate []Severe  Fatigue:                             []Mild []Moderate []Severe  Nausea:                             [x]Mild []Moderate []Severe  Loss of appetite:              []Mild []Moderate []Severe  Constipation:                    []Mild []Moderate []Severe    Other Symptoms:  [x]All other review of systems negative     Palliative Performance Status Version 2:  40%  http://[email or web address]/files/news/palliative_performance_scale_[email or web address].pdf    PHYSICAL EXAM:  Vital Signs Last 24 Hrs  T(C): 36.7 (28 Oct 2020 12:30), Max: 37.2 (28 Oct 2020 05:15)  T(F): 98.1 (28 Oct 2020 12:30), Max: 98.9 (28 Oct 2020 05:15)  HR: 99 (28 Oct 2020 12:30) (64 - 99)  BP: 120/70 (28 Oct 2020 12:30) (108/72 - 132/84)  BP(mean): --  RR: 18 (28 Oct 2020 12:30) (18 - 18)  SpO2: 98% (28 Oct 2020 12:30) (98% - 98%) I&O's Summary      GENERAL:  [x]Alert  [x]Oriented x3   []Lethargic  []Cachexia  []Unarousable  [x]Verbal  []Non-Verbal  Behavioral:   [] Anxiety  [] Delirium [] Agitation [] Other  HEENT:  [x]Normal   []Dry mouth   []ET Tube/Trach  []Oral lesions  PULMONARY:   [x]Clear []Tachypnea  []Audible excessive secretions   []Rhonchi        []Right []Left []Bilateral  [x]Crackles        []Right []Left [x]Bilateral  []Wheezing     []Right []Left []Bilateral  CARDIOVASCULAR:    [x]Regular []Irregular []Tachy  []Prasanth []Murmur []Other  GASTROINTESTINAL:  [x]Soft  [x]Distended   [x]+BS  []Non tender [x]Tender  []PEG [x]OGT/ NGT  Last BM: 10/25  GENITOURINARY:  [x]Normal [] Incontinent   []Oliguria/Anuria   []Zamorano  MUSCULOSKELETAL:   []Normal   [x]Weakness  []Bed/Wheelchair bound []Edema  NEUROLOGIC:   [x]No focal deficits  [] Cognitive impairment  [] Dysphagia []Dysarthria [] Paresis []Other   SKIN:   []Normal   [x]Pressure ulcer(s)  []Rash    LABS:                        9.1    6.02  )-----------( 334      ( 27 Oct 2020 05:15 )             29.2   10-27    135  |  96<L>  |  9   ----------------------------<  86  3.6   |  30  |  0.34<L>    Ca    8.5      27 Oct 2020 05:15  Phos  3.4     10-27  Mg     1.7     10-27    TPro  5.2<L>  /  Alb  2.5<L>  /  TBili  < 0.2<L>  /  DBili  x   /  AST  30  /  ALT  13  /  AlkPhos  156<H>  10-27    RADIOLOGY & ADDITIONAL STUDIES: None new    PROTEIN CALORIE MALNUTRITION PRESENT: [ ]mild [x]moderate [ ]severe [ ]underweight [ ]morbid obesity  []PPSV2 < or = to 30% []significant weight loss  [x]poor nutritional intake [x]catabolic state []anasarca Albumin, Serum: 2.5 g/dL (10-27-20 @ 05:15)   Artificial Nutrition []     REFERRALS:   []Chaplaincy  []Hospice  []Child Life  [x]Social Work  [x]Case management []Holistic Therapy []Physical Therapy []Dietary     Goals of Care Document:   Care Coordination Document: Progress Notes - Care Coordination [C. Provider] (10-28-20 @ 09:40)                                       Progress Notes    PROGRESS NOTE  Date & Time of Note   2020-10-28 09:37    Notes    Notes: ADILENE role ongoing. Patient was not discharged yesterday due to late  transportation . ADILENE was informed by [email or web address]  updated clinical  paper work would need to be sent in for a new auth. ADILENE faxed clinical's to  [phone] for review for auth to [facility]. ADILENE to continue to follow to ensure a safe discharge.       Electronically signed by:  Evelin Morin  Electronically signed on:  2020-10-28  09:40

## 2020-10-28 NOTE — PROGRESS NOTE ADULT - PROBLEM SELECTOR PLAN 5
- Followed by Dr. Xie at [facility]: s/p 5 rounds RT (last 9/2/2020)   - Onc: no current management considering apparent progression of lung mets to multiple spinal areas  - RT treated T3-T6, T9-L1, f/u for outpatient radiosurgery after d/c if within 2 weeks; MRI thoracic and lumbar: shows encroachment but no spinal compression  - Per Rad Onc, no further DMT being offered inpatient and unlikely outpatient. Will start conversation with family about pursuing hospice.  - Nausea: zofran change to prn q6  - Pain: palliative following, upped ER oxy from 10mg q8 to 15mg q8, c/w 10mg oxy IR prn

## 2020-10-28 NOTE — PROGRESS NOTE ADULT - PROBLEM SELECTOR PLAN 1
.  -increase to Oxy ER 20mg PO q8h  -c/w Oxy IR 10mg PO q4h PRN for Moderate Pain    Recommend discharging to [facility] on this regimen, they can adjust further if needed. Significant portion of pain seems attributable to immobility so rehab will likely help with pain control.

## 2020-10-28 NOTE — PROGRESS NOTE ADULT - ASSESSMENT
Full Note to Follow.    ·	recommend increasing long-acting agent to Oxycodone ER 20mg PO q8h ATC 68yo F with recently diagnosed Metastatic NSCLC (no treatment yet) p/w hypotension due to C. diff after recent JAYANT. Palliative consulted for pain management in the setting of life-limiting illness.    ·	recommend increasing long-acting agent to Oxycodone ER 20mg PO q8h ATC

## 2020-10-28 NOTE — PROGRESS NOTE ADULT - PROBLEM SELECTOR PLAN 6
.  Current goals are to seek DMT at [facility] with follow-up  -patient would be hospice appropriate if no DMT was being pursued/offered

## 2020-10-28 NOTE — PROGRESS NOTE ADULT - PROBLEM SELECTOR PLAN 7
.  Extensive conversation on the phone with daughter and son. They openly accepted information about hospice services but they feel the patient has continued to insist on making functional improvements. They are not ready to take away the option of DMT and want to be able to follow-up as an outpatient for potential treatment. Discussed prognosis and treatment options at family's request, explained that without treatment patient fulfills hospice criteria of <6 months to live.  -ultimately they will be moving forward with discharge to [facility] but they understand hospice is an option if patient declines further

## 2020-10-28 NOTE — PROGRESS NOTE ADULT - PROBLEM SELECTOR PLAN 8
.  Complex symptom management in the setting of metastatic lung cancer.  Emotional support provided, questions answered.  Active Psychosocial Referrals: ADILENE, FREYA    For new or uncontrolled symptoms, please page the Palliative Medicine team ([facility] #[id number]).   The service is available 24/7 (including nights & weekends) to provide symptom management recommendations over the phone as appropriate

## 2020-10-29 NOTE — PROGRESS NOTE ADULT - ATTENDING COMMENTS
Sepsis d/t Cdiff colitis, sepsis resolved, s/p course of PO Vanco, diarrhea improved   Acute on chronic anemia likely anemia of chronic disease, no bleeding, s/p 1 unit PRBC 10/23 w/ appropriate response, rectal exam w/ brown stool, monitored CBC and hgb stable   LLE DVT, transitioned back to Eliquis   Metastatic NSCLSC, MRI spine w/ progression of metastatic disease despite previous RT, not a candidate for further RT, possible outpatient chemo if performance status improves   Neoplasm related pain, pain control as per palliative   DC planning to rehab, d/c time 34 minutes.

## 2020-10-29 NOTE — PROGRESS NOTE ADULT - SUBJECTIVE AND OBJECTIVE BOX
incomplete MAGDALENE YOST  69y  Female    Jojo Zelaya MD PGY1 [phone]    Subjective: Seen and examined at bedside. Pt still has pain in her side, not controlled by increasing doses of pain medication.    O/N Events: No acute events.    REVIEW OF SYSTEMS:  CONSTITUTIONAL: No weakness, fevers or chills  EYES/ENT: No visual changes;  No vertigo or throat pain   NECK: No pain or stiffness  RESPIRATORY: +chronic cough, no wheezing, hemoptysis; No shortness of breath  CARDIOVASCULAR: No chest pain or palpitations  GASTROINTESTINAL: +pain in her side. No nausea, vomiting, or hematemesis; No diarrhea or constipation. No melena or hematochezia.  GENITOURINARY: No dysuria, frequency or hematuria  NEUROLOGICAL: No numbness or weakness  SKIN: No itching, rashes      MEDICATIONS  (STANDING):  acetaminophen   Tablet .. 500 milliGRAM(s) Oral every 4 hours  apixaban 5 milliGRAM(s) Oral every 12 hours  chlorhexidine 4% Liquid 1 Application(s) Topical daily  gabapentin 100 milliGRAM(s) Oral three times a day  lactobacillus acidophilus 1 Tablet(s) Oral two times a day  melatonin 3 milliGRAM(s) Oral at bedtime  oxyCODONE  ER Tablet 20 milliGRAM(s) Oral every 8 hours  pantoprazole    Tablet 40 milliGRAM(s) Oral before breakfast  senna 2 Tablet(s) Oral at bedtime    MEDICATIONS  (PRN):  benzocaine 15 mG/menthol 3.6 mG (Sugar-Free) Lozenge 1 Lozenge Oral every 3 hours PRN Sore Throat  ondansetron Injectable 4 milliGRAM(s) IV Push every 6 hours PRN Nausea and/or Vomiting  oxyCODONE    IR 10 milliGRAM(s) Oral every 3 hours PRN Moderate Pain (4 - 6)      Vital Signs Last 24 Hrs  T(C): 36.9 (29 Oct 2020 05:41), Max: 36.9 (29 Oct 2020 05:41)  T(F): 98.4 (29 Oct 2020 05:41), Max: 98.4 (29 Oct 2020 05:41)  HR: 88 (29 Oct 2020 05:41) (88 - 99)  BP: 121/75 (29 Oct 2020 05:41) (120/70 - 126/67)  BP(mean): 69 (28 Oct 2020 21:28) (69 - 69)  RR: 18 (29 Oct 2020 05:41) (18 - 18)  SpO2: 97% (29 Oct 2020 05:41) (97% - 98%)    PHYSICAL EXAM:  GENERAL: NAD, cachectic  CHEST/LUNG: Clear to auscultation bilaterally; No rales, rhonchi, wheezing  HEART: Regular rate and rhythm; No murmurs, rubs, or gallops  ABDOMEN: Soft, Nontender, Nondistended; Bowel sounds present  EXTREMITIES:  2+ Peripheral Pulses, No clubbing, cyanosis, or edema  NEURO:  No Focal deficits, sensory and motor intact  SKIN: No rashes or lesions    Consultant(s) Notes Reviewed:  [x ] YES  [ ] NO  Care Discussed with Consultants/Other Providers [ x] YES  [ ] NO      10-27    135  |  96<L>  |  9   ----------------------------<  86  3.6   |  30  |  0.34<L>    Ca    8.5      27 Oct 2020 05:15    TPro  5.2<L>  /  Alb  2.5<L>  /  TBili  < 0.2<L>  /  DBili  x   /  AST  30  /  ALT  13  /  AlkPhos  156<H>  10-27    Magnesium, Serum: 1.7 mg/dL (10-27-20 @ 05:15)    Phosphorus Level, Serum: 3.4 mg/dL (10-27-20 @ 05:15)               9.1    6.02  )-----------( 334      ( 27 Oct 2020 05:15 )             29.2

## 2020-10-29 NOTE — PROGRESS NOTE ADULT - PROBLEM SELECTOR PLAN 5
- Followed by Dr. Xie at [facility]: s/p 5 rounds RT (last 9/2/2020)   - Onc: no current management considering apparent progression of lung mets to multiple spinal areas  - RT treated T3-T6, T9-L1, f/u for outpatient radiosurgery after d/c if within 2 weeks; MRI thoracic and lumbar: shows encroachment but no spinal compression  - Per Rad Onc, no further DMT being offered inpatient and unlikely outpatient. Will start conversation with family about pursuing hospice.  - Nausea: zofran change to prn q6  - Pain: palliative following, upped ER oxy from 10mg q8 to 15mg q8, c/w 10mg oxy IR prn - Followed by Dr. Xie at [facility]: s/p 5 rounds RT (last 9/2/2020)   - Onc: no current management considering apparent progression of lung mets to multiple spinal areas  - RT treated T3-T6, T9-L1, f/u for outpatient radiosurgery after d/c if within 2 weeks; MRI thoracic and lumbar: shows encroachment but no spinal compression  - Per Rad Onc, no further DMT being offered inpatient and unlikely outpatient. Will start conversation with family about pursuing hospice.  - Nausea: zofran change to prn q6  - Pain: palliative following, increased ER oxy from 10mg ->15mg ->20mg po q8h c/w 10mg oxy IR prn q3

## 2020-10-29 NOTE — PROGRESS NOTE ADULT - ASSESSMENT
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  Recent PET/CT 10/2/2020 with Hypermetabolic right upper lobe lung mass. Hypermetabolic right perihilar and right retrocrural lymph node metastases. Extensive hypermetabolic lytic osseous metastasis in axial skeleton. Lesions in the thoracic spine, extend into the epidural space, better evaluated on MRI. Small hypermetabolic focus in left lesser trochanter, difficult to delineate on CT, may represent osseous metastasis.    10/15: Oncology spoke to the patient's daughter in the presence of the patient and explained the clinical situation and disease trajectory. Daughter explained that she is very concerned about the cancer spreading because pt has not received any treatments. explained that to safely give cancer treatment, infection should be completely treated first and then we can discuss cancer treatment, however performance status will need to be assessed at that time to see if the patient will be a candidate to receive treatment. Patient and daughter understand this and remain hopeful that pt will get better and be able to receive cancer treatment.     -No inpatient oncologic interventions  -10/16: started on apixaban given history of pAF  -10/18:  + DVT on LE dopplers  -Will need to start treatment for metastatic NSCLC as outpatient, after infection resolves, if performance status allows  -Appreciate palliative care consult recs for symptom management  -Dispo: likely [facility], pending insurance auth  -Patient to followup with Dr. Xie ([facility]) upon discharge  -C/w Supportive care, pain control, Nutrition, PT, DVT ppx  -Oncology will continue to follow with you      Case d/w Dr. Abran HO  Oncology Physician Assistant  Denis THOMPSON/[facility]  Pager [phone]    If after 5pm or weekends please page On-call Oncology Fellow

## 2020-10-29 NOTE — PROGRESS NOTE ADULT - SUBJECTIVE AND OBJECTIVE BOX
INTERVAL HPI/OVERNIGHT EVENTS:  Patient seen at bedside.  Patient complaining of back pain  Controlled with current pain meds  Denies abdominal pain or any further symptoms of diarrhea  Urinating well, via Primafit, s/p andrea  Denies any other acute complaints    VITAL SIGNS:  T(F): 98.5 (10-29-20 @ 12:10)  HR: 86 (10-29-20 @ 12:10)  BP: 119/77 (10-29-20 @ 12:10)  RR: 19 (10-29-20 @ 12:10)  SpO2: 99% (10-29-20 @ 12:10)  Wt(kg): --    PHYSICAL EXAM:    In accordance with current standard limiting patient contact, deferred physical exam  2/2 COVID pandemic  Please refer to physical exam of primary team.    MEDICATIONS  (STANDING):  acetaminophen   Tablet .. 500 milliGRAM(s) Oral every 4 hours  apixaban 5 milliGRAM(s) Oral every 12 hours  chlorhexidine 4% Liquid 1 Application(s) Topical daily  gabapentin 100 milliGRAM(s) Oral three times a day  lactobacillus acidophilus 1 Tablet(s) Oral two times a day  melatonin 3 milliGRAM(s) Oral at bedtime  oxyCODONE  ER Tablet 20 milliGRAM(s) Oral every 8 hours  pantoprazole    Tablet 40 milliGRAM(s) Oral before breakfast  senna 2 Tablet(s) Oral at bedtime    MEDICATIONS  (PRN):  benzocaine 15 mG/menthol 3.6 mG (Sugar-Free) Lozenge 1 Lozenge Oral every 3 hours PRN Sore Throat  ondansetron Injectable 4 milliGRAM(s) IV Push every 6 hours PRN Nausea and/or Vomiting  oxyCODONE    IR 10 milliGRAM(s) Oral every 3 hours PRN Moderate Pain (4 - 6)      Allergies    No Known Allergies    Intolerances        LABS:                RADIOLOGY & ADDITIONAL TESTS:  Studies reviewed.

## 2020-10-29 NOTE — PROGRESS NOTE ADULT - PROVIDER SPECIALTY LIST ADULT
Chief Complaint   Patient presents with    Well Child     Here with mom for his yearly physical.  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 He does not play any sports. No concerns at this time. 1. Have you been to the ER, urgent care clinic since your last visit? Hospitalized since your last visit? No    2. Have you seen or consulted any other health care providers outside of the [address] [place] [address] since your last visit? Include any pap smears or colon screening.  No Internal Medicine

## 2020-10-29 NOTE — CHART NOTE - NSCHARTNOTEFT_GEN_A_CORE
patient reports having diffuse abdominal burning sensation that started 2-3 days ago. Non radiating. Reports last BM was 3-4 days ago. Reports feeling bloated, passing gas, mild nausea, but is able to tolerate diet.     PHYSICAL EXAM  Afebrile, vital signs stable  GEN: Laying in bed in no acute distress, non-toxic appearing  ABD: Soft, non-distended, mild TTP diffusely, hyperactive BS     A/P  Patient has been on oxycodone for cancer pain control, abd pain/discomfort likely 2/2 constipation/gas.  -Benign abd exam, no acute abdomen.  -Patient is already on senna qd  -Will add on miralax BID  -Ducolax suppository PRN  -Simethicone PRN TID Gu[place]ese : ID [id number], Zaria  patient reports having diffuse abdominal burning sensation that started 2-3 days ago. Non radiating. Reports last BM was 3-4 days ago. Reports feeling bloated, passing gas, mild nausea, but is able to tolerate diet.     PHYSICAL EXAM  Afebrile, vital signs stable  GEN: Laying in bed in no acute distress, non-toxic appearing  ABD: Soft, non-distended, mild TTP diffusely, hyperactive BS     A/P  Patient has been on oxycodone for cancer pain control, abd pain/discomfort likely 2/2 constipation/gas.  -Benign abd exam, no acute abdomen.  -Patient is already on senna qd  -Will add on miralax BID  -Ducolax suppository PRN  -Simethicone PRN TID

## 2020-10-29 NOTE — PROGRESS NOTE ADULT - PROBLEM SELECTOR PLAN 2
LLE DVT in peroneal and posterior tibial veins  - no clinical signs of bleeding, hgb stable 8.5  - passed heparin challenge--> eliquis 10 non-distended

## 2020-10-29 NOTE — PROGRESS NOTE ADULT - ASSESSMENT
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 Diff proctocolitis and colovesical fistula now off pressors w/ LE DVT on therapeutic Eliquis s/p MRI thoracic and lumbar spine switching heparin to full dose eliquis 70 y/o F hx/ Lung Ca 8/2020 w/ mets to brain, spine, R hip, s/p RT x5 (last 9/2) admitted to MICU for septic shock found to have C. Diff proctocolitis and colovesical fistula now off pressors w/ LE DVT on therapeutic Eliquis s/p MRI thoracic and lumbar spine on eliquis pending dc

## 2020-10-30 NOTE — PROGRESS NOTE ADULT - SUBJECTIVE AND OBJECTIVE BOX
incomplete TAWANDAJAMIROSITOJANEMAGDALENE WILSON  69y  Female    Jojo Zelaya MD PGY1 [phone]    Subjective: Seen and examined at bedside. Pt still has pain in her side, not controlled by increasing doses of pain medication.    O/N Events: No acute events.    REVIEW OF SYSTEMS:  CONSTITUTIONAL: No weakness, fevers or chills  EYES/ENT: No visual changes;  No vertigo or throat pain   NECK: No pain or stiffness  RESPIRATORY: +chronic cough, no wheezing, hemoptysis; No shortness of breath  CARDIOVASCULAR: No chest pain or palpitations  GASTROINTESTINAL: +pain in her side. No nausea, vomiting, or hematemesis; No diarrhea or constipation. No melena or hematochezia.  GENITOURINARY: No dysuria, frequency or hematuria  NEUROLOGICAL: No numbness or weakness  SKIN: No itching, rashes    MEDICATIONS  (STANDING):  acetaminophen   Tablet .. 500 milliGRAM(s) Oral every 4 hours  apixaban 5 milliGRAM(s) Oral every 12 hours  chlorhexidine 4% Liquid 1 Application(s) Topical daily  gabapentin 100 milliGRAM(s) Oral three times a day  lactobacillus acidophilus 1 Tablet(s) Oral two times a day  melatonin 3 milliGRAM(s) Oral at bedtime  oxyCODONE  ER Tablet 20 milliGRAM(s) Oral every 8 hours  pantoprazole    Tablet 40 milliGRAM(s) Oral before breakfast  polyethylene glycol 3350 17 Gram(s) Oral two times a day  senna 2 Tablet(s) Oral at bedtime    MEDICATIONS  (PRN):  benzocaine 15 mG/menthol 3.6 mG (Sugar-Free) Lozenge 1 Lozenge Oral every 3 hours PRN Sore Throat  bisacodyl Suppository 10 milliGRAM(s) Rectal daily PRN Constipation  ondansetron Injectable 4 milliGRAM(s) IV Push every 6 hours PRN Nausea and/or Vomiting  oxyCODONE    IR 10 milliGRAM(s) Oral every 3 hours PRN Severe Pain (7 - 10)  simethicone 80 milliGRAM(s) Chew three times a day PRN Gas      Vital Signs Last 24 Hrs  T(C): 36.8 (30 Oct 2020 06:12), Max: 36.9 (29 Oct 2020 12:10)  T(F): 98.2 (30 Oct 2020 06:12), Max: 98.5 (29 Oct 2020 12:10)  HR: 100 (30 Oct 2020 06:12) (86 - 100)  BP: 117/72 (30 Oct 2020 06:12) (108/69 - 133/81)  BP(mean): --  RR: 18 (30 Oct 2020 06:12) (17 - 19)  SpO2: 100% (30 Oct 2020 06:12) (98% - 100%)    PHYSICAL EXAM:  GENERAL: NAD, cachectic  CHEST/LUNG: Clear to auscultation bilaterally; No rales, rhonchi, wheezing  HEART: Regular rate and rhythm; No murmurs, rubs, or gallops  ABDOMEN: Soft, Nontender, Nondistended; Bowel sounds present  EXTREMITIES:  2+ Peripheral Pulses, No clubbing, cyanosis, or edema  NEURO:  No Focal deficits, sensory and motor intact  SKIN: No rashes or lesions    Consultant(s) Notes Reviewed:  [x ] YES  [ ] NO  Care Discussed with Consultants/Other Providers [ x] YES  [ ] NO

## 2020-10-30 NOTE — PROGRESS NOTE ADULT - PROBLEM SELECTOR PLAN 5
- Followed by Dr. Xie at [facility]: s/p 5 rounds RT (last 9/2/2020)   - Onc: no current management considering apparent progression of lung mets to multiple spinal areas  - RT treated T3-T6, T9-L1, f/u for outpatient radiosurgery after d/c if within 2 weeks; MRI thoracic and lumbar: shows encroachment but no spinal compression  - Per Rad Onc, no further DMT being offered inpatient and unlikely outpatient. Will start conversation with family about pursuing hospice.  - Nausea: zofran change to prn q6  - Pain: palliative following, increased ER oxy from 10mg ->15mg ->20mg po q8h c/w 10mg oxy IR prn q3

## 2020-10-30 NOTE — PROGRESS NOTE ADULT - ASSESSMENT
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 Diff proctocolitis and colovesical fistula now off pressors w/ LE DVT on therapeutic Eliquis s/p MRI thoracic and lumbar spine on eliquis pending dc

## 2020-10-30 NOTE — PROGRESS NOTE ADULT - ATTENDING COMMENTS
Sepsis d/t Cdiff colitis, sepsis resolved, s/p course of PO Vanco, diarrhea improved   Acute on chronic anemia likely anemia of chronic disease, no bleeding, s/p 1 unit PRBC 10/23 w/ appropriate response, rectal exam w/ brown stool, monitored CBC and hgb stable   LLE DVT, transitioned back to Eliquis   Metastatic NSCLSC, MRI spine w/ progression of metastatic disease despite previous RT but no cord compression, not a candidate for further RT, possible outpatient chemo if performance status improves   Neoplasm related pain, pain control as per palliative   DC planning to rehab, d/c time 34 minutes.

## 2020-10-31 NOTE — PROGRESS NOTE ADULT - SUBJECTIVE AND OBJECTIVE BOX
Patient is a 69y old  Female who presents with a chief complaint of hypotension (31 Oct 2020 09:05)    Contact:  [facility] Pager: [phone]  [facility] Pager: [phone]    Irish : [phone]    SUBJECTIVE / OVERNIGHT EVENTS:  No acute events reported overnight. Pt seen and examined at bedside.  Pt conveys that her pain is controlled with medications but the medications wear off intermittently until she gets her pain medications again and she feels better. Pt complains of some abdominal fullness. Her last BM was yesterday, though she reports a small amount of stool.     MEDICATIONS  (STANDING):  acetaminophen   Tablet .. 500 milliGRAM(s) Oral every 4 hours  apixaban 5 milliGRAM(s) Oral every 12 hours  chlorhexidine 4% Liquid 1 Application(s) Topical daily  gabapentin 100 milliGRAM(s) Oral three times a day  lactobacillus acidophilus 1 Tablet(s) Oral two times a day  melatonin 3 milliGRAM(s) Oral at bedtime  oxyCODONE  ER Tablet 20 milliGRAM(s) Oral every 8 hours  pantoprazole    Tablet 40 milliGRAM(s) Oral before breakfast  polyethylene glycol 3350 17 Gram(s) Oral two times a day  potassium chloride    Tablet ER 40 milliEquivalent(s) Oral once  senna 2 Tablet(s) Oral at bedtime    MEDICATIONS  (PRN):  benzocaine 15 mG/menthol 3.6 mG (Sugar-Free) Lozenge 1 Lozenge Oral every 3 hours PRN Sore Throat  bisacodyl Suppository 10 milliGRAM(s) Rectal daily PRN Constipation  ondansetron Injectable 4 milliGRAM(s) IV Push every 6 hours PRN Nausea and/or Vomiting  oxyCODONE    IR 10 milliGRAM(s) Oral every 3 hours PRN moderate and severe pain  simethicone 80 milliGRAM(s) Chew three times a day PRN Gas      Vital Signs Last 24 Hrs  T(C): 36.8 (31 Oct 2020 06:00), Max: 37.1 (30 Oct 2020 22:23)  T(F): 98.3 (31 Oct 2020 06:00), Max: 98.8 (30 Oct 2020 22:23)  HR: 95 (31 Oct 2020 06:00) (92 - 95)  BP: 124/74 (31 Oct 2020 06:00) (105/61 - 124/74)  BP(mean): --  RR: 19 (31 Oct 2020 06:00) (17 - 19)  SpO2: 95% (31 Oct 2020 06:00) (95% - 99%)    CAPILLARY BLOOD GLUCOSE        I&O's Summary      PHYSICAL EXAM:  GENERAL: NAD, cachectic  CHEST/LUNG: Clear to auscultation bilaterally; No rales, rhonchi, wheezing  HEART: Regular rate and rhythm; No murmurs, rubs, or gallops  ABDOMEN: +mild diffuse abdominal tenderness, no guarding or rebound  EXTREMITIES:  2+ Peripheral Pulses, No clubbing, cyanosis, or edema  NEURO:  No Focal deficits, sensory and motor intact  SKIN: No rashes or lesions    LABS:                        9.0    9.01  )-----------( 409      ( 31 Oct 2020 05:58 )             28.9     Auto Eosinophil # x     / Auto Eosinophil % x     / Auto Neutrophil # x     / Auto Neutrophil % x     / BANDS % x        10-31    135  |  96<L>  |  12  ----------------------------<  87  3.3<L>   |  29  |  0.31<L>    Ca    8.6      31 Oct 2020 05:58  Mg     1.7     10-31  Phos  3.6     10-31                RESPIRATORY  VENT:    ABG:     VBG:     RADIOLOGY & ADDITIONAL TESTS:  (Imaging Personally Reviewed): SUNITHA    Consultant(s) Notes Reviewed:  SUNITHA    Care Discussed with Consultants/Other Providers: SUNITHA

## 2020-10-31 NOTE — PROGRESS NOTE ADULT - PROBLEM SELECTOR PLAN 5
- Followed by Dr. Xie at [facility]: s/p 5 rounds RT (last 9/2/2020)   - Onc: no current management considering apparent progression of lung mets to multiple spinal areas  - RT treated T3-T6, T9-L1, f/u for outpatient radiosurgery after d/c if within 2 weeks; MRI thoracic and lumbar: shows encroachment but no spinal compression  - Per Rad Onc, no further DMT being offered inpatient and unlikely outpatient. Will start conversation with family about pursuing hospice.  - Nausea: zofran change to prn q6  - Pain: palliative following, increased ER oxy from 10mg ->15mg ->20mg po q8h c/w 10mg oxy IR prn q3 - Followed by Dr. Xie at [facility]: s/p 5 rounds RT (last 9/2/2020)   - Onc: no current management considering apparent progression of lung mets to multiple spinal areas  - RT treated T3-T6, T9-L1, f/u for outpatient radiosurgery after d/c if within 2 weeks; MRI thoracic and lumbar: shows encroachment but no spinal compression  - Per Rad Onc, no further DMT being offered inpatient and unlikely outpatient. Will start conversation with family about pursuing hospice.  - Nausea: zofran change to prn q6  - Pain: palliative following, increased ER oxy from 10mg ->15mg ->20mg po q8h c/w 10mg oxy IR prn q3  -as per RN, pt doesn't always request prn pain medications but when she gets the medications she says they help

## 2020-10-31 NOTE — PROGRESS NOTE ADULT - PROBLEM SELECTOR PLAN 2
LLE DVT in peroneal and posterior tibial veins  - no clinical signs of bleeding, hgb stable 8.5  - passed heparin challenge--> eliquis 10 LLE DVT in peroneal and posterior tibial veins  - no clinical signs of bleeding, hgb stable 9  - passed heparin challenge--> eliquis 10

## 2020-10-31 NOTE — PROGRESS NOTE ADULT - ATTENDING COMMENTS
Patient seen and examined by myself , case discussed  with resident ,agree with the above finding and plan  Sepsis d/t Cdiff colitis, sepsis resolved, s/p course of PO Vanco, diarrhea improved   Acute on chronic anemia likely anemia of chronic disease, no bleeding, s/p 1 unit PRBC 10/23 w/ appropriate response, rectal exam w/ brown stool, monitored CBC and hgb stable   LLE DVT, transitioned back to Eliquis   Metastatic NSCLSC, MRI spine w/ progression of metastatic disease despite previous RT but no cord compression, not a candidate for further RT, possible outpatient chemo if performance status improves   Neoplasm related pain, pain control as per palliative , pt instructed [place] nursing staff to ask for pain medications for breakthrough pain   Hypokalemia : will supplement , monitor BMP

## 2020-10-31 NOTE — PROGRESS NOTE ADULT - PROBLEM SELECTOR PLAN 1
Admitted for septic shock 2/2 c. diff proctocolitis weaned off levophed and midodrine; still with apple sauce consistency stools.  - s/p po vanc (14d) (10/11-24), IV flagyl (7 day)   - BCx: NGTD, UCx w/ yeast; + for c. diff toxin  - monitor WBC, fever spikes  - ID following for Abx regimen considering C. diff proctocolitis and colovesicular fistula?; off Abx. s/p Rectal tube now removed Admitted for septic shock 2/2 c. diff proctocolitis weaned off levophed and midodrine; still with apple sauce consistency stools.  - s/p po vanc (14d) (10/11-24), IV flagyl (7 day)   - BCx: NGTD, UCx w/ yeast; + for c. diff toxin  - monitor WBC, fever spikes  - ID following for Abx regimen considering C. diff proctocolitis and colovesicular fistula?; off Abx. s/p Rectal tube now removed  -pt HD stable at this time

## 2020-11-01 NOTE — PROGRESS NOTE ADULT - PROBLEM SELECTOR PLAN 5
- Followed by Dr. Xie at [facility]: s/p 5 rounds RT (last 9/2/2020)   - Onc: no current management considering apparent progression of lung mets to multiple spinal areas  - RT treated T3-T6, T9-L1, f/u for outpatient radiosurgery after d/c if within 2 weeks; MRI thoracic and lumbar: shows encroachment but no spinal compression  - Per Rad Onc, no further DMT being offered inpatient and unlikely outpatient. Will start conversation with family about pursuing hospice.  - Nausea: zofran change to prn q6  - Pain: palliative following, increased ER oxy from 10mg ->15mg ->20mg po q8h c/w 10mg oxy IR prn q3  -as per RN, pt doesn't always request prn pain medications but when she gets the medications she says they help

## 2020-11-01 NOTE — PROGRESS NOTE ADULT - PROBLEM SELECTOR PLAN 1
Admitted for septic shock 2/2 c. diff proctocolitis weaned off levophed and midodrine; still with apple sauce consistency stools.  - s/p po vanc (14d) (10/11-24), IV flagyl (7 day)   - BCx: NGTD, UCx w/ yeast; + for c. diff toxin  - monitor WBC, fever spikes  - ID following for Abx regimen considering C. diff proctocolitis and colovesicular fistula?; off Abx. s/p Rectal tube now removed  -pt HD stable at this time

## 2020-11-01 NOTE — PROGRESS NOTE ADULT - PROBLEM SELECTOR PLAN 2
LLE DVT in peroneal and posterior tibial veins  - no clinical signs of bleeding, hgb stable 9  - passed heparin challenge--> eliquis 10 LLE DVT in peroneal and posterior tibial veins  - no clinical signs of bleeding, hgb stable   - passed heparin challenge--> eliquis 10

## 2020-11-01 NOTE — PROGRESS NOTE ADULT - PROBLEM SELECTOR PLAN 4
No prior history per daughter, however was getting straight cath at rehab  - given patient voids completely, it is likely that she is uncomfortable with voiding by herself and is holding in her urine unless prompted.  - d/c catheter and bladder scan, on primafit resolved. No prior history per daughter, however was getting straight cath at rehab  - given patient voids completely, it is likely that she is uncomfortable with voiding by herself and is holding in her urine unless prompted.  - d/c catheter and bladder scan, on primafit

## 2020-11-01 NOTE — PROGRESS NOTE ADULT - ATTENDING COMMENTS
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

## 2020-11-01 NOTE — PROGRESS NOTE ADULT - PROBLEM SELECTOR PLAN 7
Code: Full  Diet: Full  DVT: Eliquis  Dispo: Rehab Hx of post op afib with pericardial effusion  -  Transition back to Eliquis 10mg BID --> 11/29-present on 5mg BID  - no concern for tamponade but can repeat TTE as clinically indicated then repeat in 3-6 months.

## 2020-11-01 NOTE — PROGRESS NOTE ADULT - PROBLEM SELECTOR PLAN 6
Hx of post op afib with pericardial effusion  -  Transition back to Eliquis 10mg BID  - no concern for tamponade but can repeat TTE as clinically indicated then repeat in 3-6 months. Hx of post op afib with pericardial effusion  -  Transition back to Eliquis 10mg BID --> now on 5mg BID  - no concern for tamponade but can repeat TTE as clinically indicated then repeat in 3-6 months. Pt was put on standing senna and miralax bid due to constipation likely 2/2 opioid therapy for neoplasm-related pain, with prn bisacodyl suppository if needed.   -On 10/31, Pt had multiple BM o/n so converted miralax standing -> prn. and d/c'd bisacodyl.

## 2020-11-01 NOTE — PROGRESS NOTE ADULT - SUBJECTIVE AND OBJECTIVE BOX
MAGDALENE YOST  69y  Female    Jojo Zelaya MD PGY1 [phone]    Subjective: Seen and examined at bedside. Pt still has pain in her side, has been better over the last day, has used less of her PRNs.    O/N Events: No acute events, pt reports several episodes of soft BM o/n.     REVIEW OF SYSTEMS:  CONSTITUTIONAL: No weakness, fevers or chills  EYES/ENT: No visual changes;  No vertigo or throat pain   NECK: No pain or stiffness  RESPIRATORY: +chronic cough, no wheezing, hemoptysis; No shortness of breath  CARDIOVASCULAR: No chest pain or palpitations  GASTROINTESTINAL: +pain in her side. No nausea, vomiting, or hematemesis; + multiple bowel movements.  GENITOURINARY: No dysuria, frequency or hematuria    MEDICATIONS  (STANDING):  acetaminophen   Tablet .. 500 milliGRAM(s) Oral every 4 hours  apixaban 5 milliGRAM(s) Oral every 12 hours  chlorhexidine 4% Liquid 1 Application(s) Topical daily  gabapentin 100 milliGRAM(s) Oral three times a day  lactobacillus acidophilus 1 Tablet(s) Oral two times a day  melatonin 3 milliGRAM(s) Oral at bedtime  oxyCODONE  ER Tablet 20 milliGRAM(s) Oral every 8 hours  pantoprazole    Tablet 40 milliGRAM(s) Oral before breakfast  polyethylene glycol 3350 17 Gram(s) Oral two times a day  senna 2 Tablet(s) Oral at bedtime    MEDICATIONS  (PRN):  benzocaine 15 mG/menthol 3.6 mG (Sugar-Free) Lozenge 1 Lozenge Oral every 3 hours PRN Sore Throat  bisacodyl Suppository 10 milliGRAM(s) Rectal daily PRN Constipation  ondansetron Injectable 4 milliGRAM(s) IV Push every 6 hours PRN Nausea and/or Vomiting  oxyCODONE    IR 10 milliGRAM(s) Oral every 3 hours PRN moderate and severe pain  simethicone 80 milliGRAM(s) Chew three times a day PRN Gas      Vital Signs Last 24 Hrs  T(C): 36.9 (01 Nov 2020 05:31), Max: 36.9 (31 Oct 2020 12:15)  T(F): 98.5 (01 Nov 2020 05:31), Max: 98.5 (31 Oct 2020 12:15)  HR: 100 (01 Nov 2020 05:31) (100 - 102)  BP: 110/65 (01 Nov 2020 05:31) (102/69 - 110/65)  BP(mean): --  RR: 18 (01 Nov 2020 05:31) (18 - 19)  SpO2: 97% (01 Nov 2020 05:31) (96% - 97%)    PHYSICAL EXAM:  GENERAL: NAD, cachectic  CHEST/LUNG: Clear to auscultation bilaterally; No rales, rhonchi, wheezing  HEART: Regular rate and rhythm; No murmurs, rubs, or gallops  ABDOMEN: Soft, Nontender, Nondistended; Bowel sounds present  EXTREMITIES:  2+ Peripheral Pulses, No clubbing, cyanosis, or edema  NEURO:  No Focal deficits, sensory and motor intact  SKIN: No rashes or lesions    Consultant(s) Notes Reviewed:  [x ] YES  [ ] NO  Care Discussed with Consultants/Other Providers [ x] YES  [ ] NO      10-31    135  |  96<L>  |  12  ----------------------------<  87  3.3<L>   |  29  |  0.31<L>    Ca    8.6      31 Oct 2020 05:58  Phos  3.6     10-31  Mg     1.7     10-31      Magnesium, Serum: 1.7 mg/dL (10-31-20 @ 05:58)    Phosphorus Level, Serum: 3.6 mg/dL (10-31-20 @ 05:58)                          9.0    9.01  )-----------( 409      ( 31 Oct 2020 05:58 )             28.9

## 2020-11-02 NOTE — PROGRESS NOTE ADULT - SUBJECTIVE AND OBJECTIVE BOX
PRIYANK MAGDALENE  69y  Female    Jojo Zelaya MD PGY1 [phone]    Subjective: Seen and examined at bedside. No acute concerns.     O/N Events: None    REVIEW OF SYSTEMS:  CONSTITUTIONAL: No weakness, fevers or chills  EYES/ENT: No visual changes;  No vertigo or throat pain   NECK: No pain or stiffness  RESPIRATORY: No cough, wheezing, hemoptysis; No shortness of breath  CARDIOVASCULAR: No chest pain or palpitations  GASTROINTESTINAL: +Left-sided pain, chronic.   GENITOURINARY: No dysuria, frequency. Pt reports brown urine d/t stool and urine mixing.    MEDICATIONS  (STANDING):  acetaminophen   Tablet .. 500 milliGRAM(s) Oral every 4 hours  apixaban 5 milliGRAM(s) Oral every 12 hours  chlorhexidine 4% Liquid 1 Application(s) Topical daily  gabapentin 100 milliGRAM(s) Oral three times a day  lactobacillus acidophilus 1 Tablet(s) Oral two times a day  melatonin 3 milliGRAM(s) Oral at bedtime  oxyCODONE  ER Tablet 20 milliGRAM(s) Oral every 8 hours  pantoprazole    Tablet 40 milliGRAM(s) Oral before breakfast  senna 2 Tablet(s) Oral at bedtime    MEDICATIONS  (PRN):  benzocaine 15 mG/menthol 3.6 mG (Sugar-Free) Lozenge 1 Lozenge Oral every 3 hours PRN Sore Throat  ondansetron Injectable 4 milliGRAM(s) IV Push every 6 hours PRN Nausea and/or Vomiting  oxyCODONE    IR 10 milliGRAM(s) Oral every 3 hours PRN moderate and severe pain  polyethylene glycol 3350 17 Gram(s) Oral daily PRN Constipation  simethicone 80 milliGRAM(s) Chew three times a day PRN Gas      Vital Signs Last 24 Hrs  T(C): 36.8 (02 Nov 2020 05:25), Max: 37.1 (01 Nov 2020 21:22)  T(F): 98.3 (02 Nov 2020 05:25), Max: 98.8 (01 Nov 2020 21:22)  HR: 89 (02 Nov 2020 05:25) (89 - 107)  BP: 117/69 (02 Nov 2020 05:25) (102/63 - 117/69)  BP(mean): --  RR: 18 (02 Nov 2020 05:25) (18 - 19)  SpO2: 97% (02 Nov 2020 05:25) (97% - 99%)    PHYSICAL EXAM:  GENERAL: NAD, cachectic  CHEST/LUNG: Clear to auscultation bilaterally; No rales, rhonchi, wheezing  HEART: Regular rate and rhythm; No murmurs, rubs, or gallops  ABDOMEN: Soft, ttp left side, Nondistended; Bowel sounds present  EXTREMITIES:  2+ Peripheral Pulses, No clubbing, cyanosis, or edema  NEURO:  No Focal deficits, sensory and motor intact  SKIN: No rashes or lesions    Consultant(s) Notes Reviewed:  [x ] YES  [ ] NO  Care Discussed with Consultants/Other Providers [ x] YES  [ ] NO      10-31    135  |  96<L>  |  12  ----------------------------<  87  3.3<L>   |  29  |  0.31<L>    Ca    8.6      31 Oct 2020 05:58      Magnesium, Serum: 1.7 mg/dL (10-31-20 @ 05:58)    Phosphorus Level, Serum: 3.6 mg/dL (10-31-20 @ 05:58)                         9.0    9.01  )-----------( 409      ( 31 Oct 2020 05:58 )             28.9

## 2020-11-02 NOTE — PROGRESS NOTE ADULT - PROBLEM SELECTOR PLAN 5
- Followed by Dr. Xie at [facility]: s/p 5 rounds RT (last 9/2/2020)   - Onc: no current management considering apparent progression of lung mets to multiple spinal areas  - RT treated T3-T6, T9-L1, f/u for outpatient radiosurgery after d/c if within 2 weeks; MRI thoracic and lumbar: shows encroachment but no spinal compression  - Per Rad Onc, no further DMT being offered inpatient and unlikely outpatient. Will start conversation with family about pursuing hospice.  - Nausea: zofran change to prn q6  - Pain: palliative following, increased ER oxy from 10mg ->15mg ->20mg po q8h c/w 10mg oxy IR prn q3  -as per RN, pt doesn't always request prn pain medications but when she gets the medications she says they help

## 2020-11-02 NOTE — PROGRESS NOTE ADULT - PROBLEM SELECTOR PLAN 7
Hx of post op afib with pericardial effusion  -  Transition back to Eliquis 10mg BID --> 11/29-present on 5mg BID  - no concern for tamponade but can repeat TTE as clinically indicated then repeat in 3-6 months.

## 2020-11-02 NOTE — PROGRESS NOTE ADULT - NSHPATTENDINGPLANDISCUSS_GEN_ALL_CORE
GEORGIA Brambila
resident/fellow/nurse
patient and resident
patient and HS4

## 2020-11-02 NOTE — PROGRESS NOTE ADULT - PROBLEM SELECTOR PLAN 4
resolved. No prior history per daughter, however was getting straight cath at rehab  - given patient voids completely, it is likely that she is uncomfortable with voiding by herself and is holding in her urine unless prompted.  - d/c catheter and bladder scan, on primafit

## 2020-11-02 NOTE — PROGRESS NOTE ADULT - PROBLEM SELECTOR PLAN 6
Pt was put on standing senna and miralax bid due to constipation likely 2/2 opioid therapy for neoplasm-related pain, with prn bisacodyl suppository if needed.   -On 10/31, Pt had multiple BM o/n so converted miralax standing -> prn. and d/c'd bisacodyl.

## 2020-11-02 NOTE — PROGRESS NOTE ADULT - PROBLEM SELECTOR PLAN 2
LLE DVT in peroneal and posterior tibial veins  - no clinical signs of bleeding, hgb stable   - passed heparin challenge--> eliquis 10

## 2020-11-02 NOTE — PROGRESS NOTE ADULT - SUBJECTIVE AND OBJECTIVE BOX
INTERVAL HPI/OVERNIGHT EVENTS:  Patient seen at bedside.  Patient's dtr Darline was available by telephone  who provided translation per pt request  Patient complaining of baseline back pain  Patient and family anticipating discharge to [facility] soon      VITAL SIGNS:  T(F): 98.3 (11-02-20 @ 05:25)  HR: 89 (11-02-20 @ 05:25)  BP: 117/69 (11-02-20 @ 05:25)  RR: 18 (11-02-20 @ 05:25)  SpO2: 97% (11-02-20 @ 05:25)  Wt(kg): --    PHYSICAL EXAM:    In accordance with current standard limiting patient contact, deferred physical exam  2/2 COVID pandemic  Please refer to physical exam of primary team.    MEDICATIONS  (STANDING):  acetaminophen   Tablet .. 500 milliGRAM(s) Oral every 4 hours  apixaban 5 milliGRAM(s) Oral every 12 hours  chlorhexidine 4% Liquid 1 Application(s) Topical daily  gabapentin 100 milliGRAM(s) Oral three times a day  lactobacillus acidophilus 1 Tablet(s) Oral two times a day  melatonin 3 milliGRAM(s) Oral at bedtime  oxyCODONE  ER Tablet 20 milliGRAM(s) Oral every 8 hours  pantoprazole    Tablet 40 milliGRAM(s) Oral before breakfast  senna 2 Tablet(s) Oral at bedtime    MEDICATIONS  (PRN):  benzocaine 15 mG/menthol 3.6 mG (Sugar-Free) Lozenge 1 Lozenge Oral every 3 hours PRN Sore Throat  ondansetron Injectable 4 milliGRAM(s) IV Push every 6 hours PRN Nausea and/or Vomiting  oxyCODONE    IR 10 milliGRAM(s) Oral every 3 hours PRN moderate and severe pain  polyethylene glycol 3350 17 Gram(s) Oral daily PRN Constipation  simethicone 80 milliGRAM(s) Chew three times a day PRN Gas      Allergies    No Known Allergies    Intolerances        LABS:                RADIOLOGY & ADDITIONAL TESTS:  Studies reviewed.

## 2020-11-02 NOTE — PROGRESS NOTE ADULT - ATTENDING COMMENTS
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

## 2020-11-02 NOTE — PROGRESS NOTE ADULT - ASSESSMENT
68 y/o F hx/ Lung Ca 8/2020 w/ mets to brain, spine, R hip, s/p RT x5 (last 9/2) admitted to MICU for septic shock found to have C. Diff proctocolitis and colovesical fistula now off pressors w/ LE DVT on therapeutic Eliquis s/p MRI thoracic and lumbar spine on eliquis pending dc

## 2020-11-03 NOTE — PROGRESS NOTE ADULT - SUBJECTIVE AND OBJECTIVE BOX
PRIYANK MAGDALENE  69y  Female    Jojo Zelaya MD PGY1 [phone]    Subjective: Seen and examined at bedside. No acute concerns.     O/N Events: None    REVIEW OF SYSTEMS:  CONSTITUTIONAL: No weakness, fevers or chills  EYES/ENT: No visual changes;  No vertigo or throat pain   NECK: No pain or stiffness  RESPIRATORY: No cough, wheezing, hemoptysis; No shortness of breath  CARDIOVASCULAR: No chest pain or palpitations  GASTROINTESTINAL: +Left-sided pain, chronic.   GENITOURINARY: No dysuria, frequency. Pt reports brown urine d/t stool and urine mixing.    MEDICATIONS  (STANDING):  acetaminophen   Tablet .. 500 milliGRAM(s) Oral every 4 hours  apixaban 5 milliGRAM(s) Oral every 12 hours  chlorhexidine 4% Liquid 1 Application(s) Topical daily  gabapentin 100 milliGRAM(s) Oral three times a day  lactobacillus acidophilus 1 Tablet(s) Oral two times a day  melatonin 3 milliGRAM(s) Oral at bedtime  oxyCODONE  ER Tablet 20 milliGRAM(s) Oral every 8 hours  pantoprazole    Tablet 40 milliGRAM(s) Oral before breakfast  senna 2 Tablet(s) Oral at bedtime    MEDICATIONS  (PRN):  benzocaine 15 mG/menthol 3.6 mG (Sugar-Free) Lozenge 1 Lozenge Oral every 3 hours PRN Sore Throat  ondansetron Injectable 4 milliGRAM(s) IV Push every 6 hours PRN Nausea and/or Vomiting  oxyCODONE    IR 10 milliGRAM(s) Oral every 3 hours PRN moderate and severe pain  polyethylene glycol 3350 17 Gram(s) Oral daily PRN Constipation  simethicone 80 milliGRAM(s) Chew three times a day PRN Gas      Vital Signs Last 24 Hrs  T(C): 36.8 (02 Nov 2020 05:25), Max: 37.1 (01 Nov 2020 21:22)  T(F): 98.3 (02 Nov 2020 05:25), Max: 98.8 (01 Nov 2020 21:22)  HR: 89 (02 Nov 2020 05:25) (89 - 107)  BP: 117/69 (02 Nov 2020 05:25) (102/63 - 117/69)  BP(mean): --  RR: 18 (02 Nov 2020 05:25) (18 - 19)  SpO2: 97% (02 Nov 2020 05:25) (97% - 99%)    PHYSICAL EXAM:  GENERAL: NAD, cachectic  CHEST/LUNG: Clear to auscultation bilaterally; No rales, rhonchi, wheezing  HEART: Regular rate and rhythm; No murmurs, rubs, or gallops  ABDOMEN: Soft, ttp left side, Nondistended; Bowel sounds present  EXTREMITIES:  2+ Peripheral Pulses, No clubbing, cyanosis, or edema  NEURO:  No Focal deficits, sensory and motor intact  SKIN: No rashes or lesions    Consultant(s) Notes Reviewed:  [x ] YES  [ ] NO  Care Discussed with Consultants/Other Providers [ x] YES  [ ] NO      10-31    135  |  96<L>  |  12  ----------------------------<  87  3.3<L>   |  29  |  0.31<L>    Ca    8.6      31 Oct 2020 05:58      Magnesium, Serum: 1.7 mg/dL (10-31-20 @ 05:58)    Phosphorus Level, Serum: 3.6 mg/dL (10-31-20 @ 05:58)                         9.0    9.01  )-----------( 409      ( 31 Oct 2020 05:58 )             28.9      DAINA YOSTPRINCESS  69y  Female    Jojo Zelaya MD PGY1 [phone]    Subjective: Seen and examined at bedside. No acute concerns. concerned about bed moving, explained this is pressure bed.     O/N Events: None    REVIEW OF SYSTEMS:  CONSTITUTIONAL: No weakness, fevers or chills  EYES/ENT: No visual changes;  No vertigo or throat pain   NECK: No pain or stiffness  RESPIRATORY: No cough, wheezing, hemoptysis; No shortness of breath  CARDIOVASCULAR: No chest pain or palpitations  GASTROINTESTINAL: +Left-sided pain, chronic.   GENITOURINARY: No dysuria, frequency. Pt reports brown urine d/t stool and urine mixing.    MEDICATIONS  (STANDING):  acetaminophen   Tablet .. 500 milliGRAM(s) Oral every 4 hours  apixaban 5 milliGRAM(s) Oral every 12 hours  chlorhexidine 4% Liquid 1 Application(s) Topical daily  gabapentin 100 milliGRAM(s) Oral three times a day  lactobacillus acidophilus 1 Tablet(s) Oral two times a day  melatonin 3 milliGRAM(s) Oral at bedtime  oxyCODONE  ER Tablet 20 milliGRAM(s) Oral every 8 hours  pantoprazole    Tablet 40 milliGRAM(s) Oral before breakfast  senna 2 Tablet(s) Oral at bedtime    MEDICATIONS  (PRN):  benzocaine 15 mG/menthol 3.6 mG (Sugar-Free) Lozenge 1 Lozenge Oral every 3 hours PRN Sore Throat  ondansetron Injectable 4 milliGRAM(s) IV Push every 6 hours PRN Nausea and/or Vomiting  oxyCODONE    IR 10 milliGRAM(s) Oral every 3 hours PRN moderate and severe pain  polyethylene glycol 3350 17 Gram(s) Oral daily PRN Constipation  simethicone 80 milliGRAM(s) Chew three times a day PRN Gas      Vital Signs Last 24 Hrs  T(C): 36.8 (02 Nov 2020 05:25), Max: 37.1 (01 Nov 2020 21:22)  T(F): 98.3 (02 Nov 2020 05:25), Max: 98.8 (01 Nov 2020 21:22)  HR: 89 (02 Nov 2020 05:25) (89 - 107)  BP: 117/69 (02 Nov 2020 05:25) (102/63 - 117/69)  BP(mean): --  RR: 18 (02 Nov 2020 05:25) (18 - 19)  SpO2: 97% (02 Nov 2020 05:25) (97% - 99%)    PHYSICAL EXAM:  GENERAL: NAD, cachectic  CHEST/LUNG: Clear to auscultation bilaterally; No rales, rhonchi, wheezing  HEART: Regular rate and rhythm; No murmurs, rubs, or gallops  ABDOMEN: Soft, ttp left side, Nondistended; Bowel sounds present  EXTREMITIES:  2+ Peripheral Pulses, No clubbing, cyanosis, or edema  NEURO:  No Focal deficits, sensory and motor intact  SKIN: No rashes or lesions    Consultant(s) Notes Reviewed:  [x ] YES  [ ] NO  Care Discussed with Consultants/Other Providers [ x] YES  [ ] NO      10-31    135  |  96<L>  |  12  ----------------------------<  87  3.3<L>   |  29  |  0.31<L>    Ca    8.6      31 Oct 2020 05:58      Magnesium, Serum: 1.7 mg/dL (10-31-20 @ 05:58)    Phosphorus Level, Serum: 3.6 mg/dL (10-31-20 @ 05:58)                         9.0    9.01  )-----------( 409      ( 31 Oct 2020 05:58 )             28.9

## 2020-11-03 NOTE — PROGRESS NOTE ADULT - PROBLEM SELECTOR PLAN 5
- Followed by Dr. Xei at [facility]: s/p 5 rounds RT (last 9/2/2020)   - Onc: no current management considering apparent progression of lung mets to multiple spinal areas  - RT treated T3-T6, T9-L1, f/u for outpatient radiosurgery after d/c if within 2 weeks; MRI thoracic and lumbar: shows encroachment but no spinal compression  - Per Rad Onc, no further DMT being offered inpatient and unlikely outpatient. Will start conversation with family about pursuing hospice.  - Nausea: zofran change to prn q6  - Pain: palliative following, increased ER oxy from 10mg ->15mg ->20mg po q8h c/w 10mg oxy IR prn q3  -as per RN, pt doesn't always request prn pain medications but when she gets the medications she says they help

## 2020-11-03 NOTE — PROGRESS NOTE ADULT - ATTENDING COMMENTS
Sepsis d/t Cdiff colitis, sepsis resolved, s/p course of PO Vanco, diarrhea improved   Acute on chronic anemia likely anemia of chronic disease, no bleeding, s/p 1 unit PRBC 10/23 w/ appropriate response, rectal exam w/ brown stool, monitored CBC and hgb stable   LLE DVT, c/w Eliquis   Metastatic NSCLSC, MRI spine w/ progression of metastatic disease despite previous RT but no cord compression, not a candidate for further RT, possible outpatient chemo if performance status improves   Neoplasm related pain, pain control as per palliative   DC planning to rehab, d/c time 34 minutes

## 2020-11-04 NOTE — PROGRESS NOTE ADULT - PROBLEM SELECTOR PROBLEM 3
Colovesical fistula
C. difficile colitis
C. difficile colitis
Colovesical fistula
C. difficile colitis
Colovesical fistula

## 2020-11-04 NOTE — PROGRESS NOTE ADULT - PROBLEM SELECTOR PLAN 5
- Followed by Dr. Xie at [facility]: s/p 5 rounds RT (last 9/2/2020)   - Onc: no current management considering apparent progression of lung mets to multiple spinal areas  - RT treated T3-T6, T9-L1, f/u for outpatient radiosurgery after d/c if within 2 weeks; MRI thoracic and lumbar: shows encroachment but no spinal compression  - Per Rad Onc, no further DMT being offered inpatient and unlikely outpatient. Will start conversation with family about pursuing hospice.  - Nausea: zofran change to prn q6  - Pain: palliative following, increased ER oxy from 10mg ->15mg ->20mg po q8h c/w 10mg oxy IR prn q3  -as per RN, pt doesn't always request prn pain medications but when she gets the medications she says they help - Followed by Dr. Xie at [facility]: s/p 5 rounds RT (last 9/2/2020)   - Onc: no current management considering apparent progression of lung mets to multiple spinal areas  - RT treated T3-T6, T9-L1, f/u for outpatient radiosurgery after d/c if within 2 weeks; MRI thoracic and lumbar: shows encroachment but no spinal compression  - Per Rad Onc, no further DMT being offered inpatient and unlikely outpatient. Will start conversation with family about pursuing hospice.  - Nausea: zofran change to prn q6  - Pain: palliative following, increased ER oxy from 10mg ->15mg ->20mg -> 25mg po q8h c/w 10mg oxy IR prn q3  -as per RN, pt doesn't always request prn pain medications but when she gets the medications she says they help

## 2020-11-04 NOTE — PROGRESS NOTE ADULT - SUBJECTIVE AND OBJECTIVE BOX
MAGDALENE YOST  69y  Female    Jojo Zelaya MD PGY1 [phone]    Subjective: Seen and examined at bedside. States pain medication effect is wearing out and she is in pain. reports she is having small bowel movements.    O/N Events: None    REVIEW OF SYSTEMS:  CONSTITUTIONAL: No weakness, fevers or chills  EYES/ENT: No visual changes;  No vertigo or throat pain   NECK: No pain or stiffness  RESPIRATORY: No cough, wheezing, hemoptysis; No shortness of breath  CARDIOVASCULAR: No chest pain or palpitations  GASTROINTESTINAL: +Left-sided pain, chronic.   GENITOURINARY: No dysuria, frequency. Pt reports brown urine d/t stool and urine mixing.    MEDICATIONS  (STANDING):  acetaminophen   Tablet .. 500 milliGRAM(s) Oral every 4 hours  apixaban 5 milliGRAM(s) Oral every 12 hours  chlorhexidine 4% Liquid 1 Application(s) Topical daily  gabapentin 100 milliGRAM(s) Oral three times a day  lactobacillus acidophilus 1 Tablet(s) Oral two times a day  melatonin 3 milliGRAM(s) Oral at bedtime  oxyCODONE  ER Tablet 20 milliGRAM(s) Oral every 8 hours  pantoprazole    Tablet 40 milliGRAM(s) Oral before breakfast  senna 2 Tablet(s) Oral at bedtime    MEDICATIONS  (PRN):  benzocaine 15 mG/menthol 3.6 mG (Sugar-Free) Lozenge 1 Lozenge Oral every 3 hours PRN Sore Throat  ondansetron Injectable 4 milliGRAM(s) IV Push every 6 hours PRN Nausea and/or Vomiting  oxyCODONE    IR 10 milliGRAM(s) Oral every 3 hours PRN moderate and severe pain  polyethylene glycol 3350 17 Gram(s) Oral daily PRN Constipation  simethicone 80 milliGRAM(s) Chew three times a day PRN Gas      Vital Signs Last 24 Hrs  T(C): 36.8 (02 Nov 2020 05:25), Max: 37.1 (01 Nov 2020 21:22)  T(F): 98.3 (02 Nov 2020 05:25), Max: 98.8 (01 Nov 2020 21:22)  HR: 89 (02 Nov 2020 05:25) (89 - 107)  BP: 117/69 (02 Nov 2020 05:25) (102/63 - 117/69)  BP(mean): --  RR: 18 (02 Nov 2020 05:25) (18 - 19)  SpO2: 97% (02 Nov 2020 05:25) (97% - 99%)    PHYSICAL EXAM:  GENERAL: NAD, cachectic  CHEST/LUNG: Clear to auscultation bilaterally; No rales, rhonchi, wheezing  HEART: Regular rate and rhythm; No murmurs, rubs, or gallops  ABDOMEN: Soft, ttp left side, Nondistended; Bowel sounds present, constipation  EXTREMITIES:  2+ Peripheral Pulses, No clubbing, cyanosis, or edema  NEURO:  No Focal deficits, sensory and motor intact  SKIN: No rashes or lesions    Consultant(s) Notes Reviewed:  [x ] YES  [ ] NO  Care Discussed with Consultants/Other Providers [ x] YES  [ ] NO      10-31    135  |  96<L>  |  12  ----------------------------<  87  3.3<L>   |  29  |  0.31<L>    Ca    8.6      31 Oct 2020 05:58      Magnesium, Serum: 1.7 mg/dL (10-31-20 @ 05:58)    Phosphorus Level, Serum: 3.6 mg/dL (10-31-20 @ 05:58)                         9.0    9.01  )-----------( 409      ( 31 Oct 2020 05:58 )             28.9

## 2020-11-04 NOTE — PROGRESS NOTE ADULT - REASON FOR ADMISSION
hypotension

## 2020-11-04 NOTE — PROGRESS NOTE ADULT - PROBLEM SELECTOR PLAN 8
Code: Full  Diet: Full  DVT: Eliquis  Dispo: Rehab Code: Full  Diet: Full  DVT: Eliquis  Dispo: Rehab, awaiting prior auth

## 2020-11-04 NOTE — PROGRESS NOTE ADULT - PROBLEM SELECTOR PROBLEM 8
Discharge planning issues
Encounter for palliative care
Discharge planning issues

## 2020-11-04 NOTE — PROGRESS NOTE ADULT - PROBLEM SELECTOR PROBLEM 1
Septic shock
Neoplasm related pain
Neoplasm related pain
Septic shock
Neoplasm related pain
Septic shock

## 2020-11-04 NOTE — PROGRESS NOTE ADULT - NUTRITIONAL ASSESSMENT
This patient has been assessed with a concern for Malnutrition and has been determined to have a diagnosis/diagnoses of Moderate protein-calorie malnutrition.    This patient is being managed with:   Diet Consistent Carbohydrate w/Evening Snack-  Supplement Feeding Modality:  Oral  Glucerna Shake Cans or Servings Per Day:  1       Frequency:  Three Times a day  Entered: Oct 19 2020  4:04PM    

## 2020-11-15 NOTE — H&P ADULT - ASSESSMENT
68yo F Hx Lung Ca in 8/2020 with mets to brain, Spine, and Right hip s/p RT x 5 ( last tx 9/2) presenting with hypotension, now requiring Levophed for pressure support after failed volume resuscitation and PO midodrine.     #Neuro  -A/O x 3, patient is able to communicate and follow commands  - currently not on sedation  - possible brain mets, however PET scan from 10/2 showed possible meningioma    #Pulm  - currently on nasal cannula, saturating well on 2L, wean as tolerated  - hemodynamically stable, protecting airway  - Bilateral pleural effusions, left greater than right, with associated compressive atelectasis; spiculated right upper lobe mass, measuring 3.3 x 1.8 cm (2:35), unchanged; no evidence of PE  - POCUS once in unit    #Cardio  - Given approximately 5.5L of fluid resuscitation and oral midodrine with labile pressures, systolic ranging from 70-90's  - Start midodrine 30 mg TID   - Currently on Levophed, with plans to wean as possible  - follow up cortisol level r/o adrenal insufficiency and TSH to r/o hypothyroidism    #GI  - Has been constipated for the past week, last bowel movement 1 day prior to admission with enema  - bowel regimen PRN, has been on oxycodone 15 mg at home  - endorsing diffuse abdominal pain, no evidence of free air or peritoneal symptoms   - imaging negative for bowel obstruction, however c/w proctocolitis which is new from last scan  - regular diet    #Renal  - Normal renal function  - CT noted wedge shaped infarction in L lower pole of kidney c/w infection vs infarct    #ID  - CT abdomen significant for proctocolitis, gas w/in bladder ddx infection vs fistula  - s/p received one dose of vancomycin and zosyn in nursing home  - patient has been afebrile with elevated WBC, trend fever curve and white count  - start meropenem and vancomycin for empiric abdominal infection coverage  - follow up blood, urine and sputum culture, MRSA swab pending      #Heme/Onc  - elevated WBC in setting of infection  - continue to monitor daily CBC   - follows with Dr. Xie at [facility]  - shows metastatic extension of primary lung mass to vertebral bodies and extension to spinal cord    #DVT  - Lovenox 40 mg for DVT prophylaxis    #Endo   - A1c 6.1 in August    #Ethics  - FULL code according to family, spoke to son and daughter on phone 69 F Hx Lung Ca in 8/2020 with mets to brain, Spine, and Right hip s/p RT x 5 ( last tx 9/2), dementia, DVT, Afib on Eliqius, presenting with hypotension found to be in septic shock 2/2 pancolitis, most likely C.diff. Admitted to MICU for pressure support.     #Neuro  -A/O x 1, patient is able to communicate and follow commands  - currently not on sedation  - possible brain mets, however PET scan from 10/2 showed possible meningioma    #Pulm  - hemodynamically stable, protecting airway, satting well on room air   - CXR shows Bilateral patchy patchy right sided and more confluent left lower lobe opacities may represent multifocal pneumonia    #Cardio  - patient found to be hypotensive with SBP in the 70s 2/2 septic shock   - remains hypotensive s/p 3L and midodrine 20X1  - started on levophed for pressure support, wean down as tolerated   - TTE (10/12/20) shows normal LV systolic function, mild diastolic dysfunction (stage 1)    #GI  - Patient presented with diarrhea, recently admitted to MICU for C.diff pancolitis  - CT A/P results pending  - continue po vanc for possible C.diff colitis  - f/u C.diff results   - keep NPO for now  - concern for lower GI bleed from pancolitis  - trend CBC q8, transfuse for Hgb <7    #Renal  - Normal renal function  - hyponatremic to 126 most likely 2/2 hypovolemia   - monitor electrolytes, Is&Os    #ID  - significant leukocytosis to 29.59 in the setting of pancolitis  - CT A/P shows interval improvement of pancolitis with mild pancolitis remaining   - continue po vanc, IV flagyl for C.diff colitis  - concern for possible PNA on CXR vs mets  - f/u urine legionella  - UA positive, f/u UCx   - f/u blood cultures    #Heme/Onc  - elevated WBC in setting of infection  - continue to monitor daily CBC   - Hgb dropped to 5.7 most likely GI bleed from pancolitis/C.diff  - s/p 1 unit pRBC, goal Hgb >7  - follows with Dr. Xie at [facility]    #DVT  - SCDs    #Endo   - A1c 6.1 in August  - monitors FS while NPO    #Ethics  - FULL code according to family

## 2020-11-15 NOTE — ED PROVIDER NOTE - ATTENDING CONTRIBUTION TO CARE
70 year old with lung ca presents with elevated wbc and hypotension. patient rec'd zosyn at NH prior to arrival as well as 1000 ml NS.  will give vanc and 1 more liter NS to complete 30 /kg bolus. also concern for gi bleed and c diff give hx. concern for sepsis. ct abdomen for source of infection. ua for uti

## 2020-11-15 NOTE — ED PROVIDER NOTE - PROGRESS NOTE DETAILS
Francisco PGY-3:  Signout from [facility] pgy2:  69yo F hx hx lugn cancer w/ mets to spine, brain recently admitted for c.diff collitis to MICU now here with hypotension. Found to have odd smelling red stool, concern for rpt c.diff, will culture, Tx empirically and admit for further care  Full code confirmed with daughter  On eliquis

## 2020-11-15 NOTE — H&P ADULT - NSHPLABSRESULTS_GEN_ALL_CORE
5.7    26.21 )-----------( 313      ( 15 Nov 2020 21:30 )             17.8     11-15    126<L>  |  91<L>  |  32<H>  ----------------------------<  100<H>  4.0   |  21<L>  |  0.43<L>    Ca    7.6<L>      15 Nov 2020 18:51  Phos  3.6     11-15  Mg     1.6     11-15    TPro  4.4<L>  /  Alb  2.0<L>  /  TBili  0.3  /  DBili  x   /  AST  33<H>  /  ALT  19  /  AlkPhos  192<H>  11-15    Urinalysis Basic - ( 15 Nov 2020 19:30 )    Color: YELLOW / Appearance: Lt TURBID / S.019 / pH: 6.0  Gluc: NEGATIVE / Ketone: NEGATIVE  / Bili: NEGATIVE / Urobili: NORMAL   Blood: SMALL / Protein: 20 / Nitrite: NEGATIVE   Leuk Esterase: LARGE / RBC: 4-6 / WBC 26-50   Sq Epi: OCC / Non Sq Epi: x / Bacteria: MODERATE    PT/INR - ( 15 Nov 2020 18:51 )   PT: 18.5 SEC;   INR: 1.65       PTT - ( 15 Nov 2020 18:51 )  PTT:34.7 SEC    Lactate Trend    CAPILLARY BLOOD GLUCOSE 5.7    26. )-----------( 313      ( 15 Nov 2020 21:30 )             17.8     11-15    126<L>  |  91<L>  |  32<H>  ----------------------------<  100<H>  4.0   |  21<L>  |  0.43<L>    Ca    7.6<L>      15 Nov 2020 18:51  Phos  3.6     11-15  Mg     1.6     11-15    TPro  4.4<L>  /  Alb  2.0<L>  /  TBili  0.3  /  DBili  x   /  AST  33<H>  /  ALT  19  /  AlkPhos  192<H>  11-15    Urinalysis Basic - ( 15 Nov 2020 19:30 )    Color: YELLOW / Appearance: Lt TURBID / S.019 / pH: 6.0  Gluc: NEGATIVE / Ketone: NEGATIVE  / Bili: NEGATIVE / Urobili: NORMAL   Blood: SMALL / Protein: 20 / Nitrite: NEGATIVE   Leuk Esterase: LARGE / RBC: 4-6 / WBC 26-50   Sq Epi: OCC / Non Sq Epi: x / Bacteria: MODERATE    PT/INR - ( 15 Nov 2020 18:51 )   PT: 18.5 SEC;   INR: 1.65       PTT - ( 15 Nov 2020 18:51 )  PTT:34.7 SEC    Lactate Trend    < from: Xray Chest 1 View- PORTABLE-Urgent (Xray Chest 1 View- PORTABLE-Urgent .) (11.15.20 @ 20:13) >      Small bilateral pleural effusions. Left lower lobe opacity obscures the hemidiaphragm consistent with pneumonia/atelectasis. Patchy right upper lung opacity. No pneumothorax.  Cardiac size cannot be accurately assessed in this projection. No acute osseous abnormality.    IMPRESSION: Bilateral patchy patchy right sided and more confluent left lower lobe opacities may represent multifocal pneumonia.    < end of copied text >    < from: CT Abdomen and Pelvis w/ IV Cont (11.15.20 @ 21:06) >    IMPRESSION:  1. Interval improvement apancolitis with mild pancolitis remaining.  2. Mild common bile duct dilatation with question of a 4 mm intraluminal focus within the mid common bile duct. Correlate with LFTs and if clinically warranted, an MRCP.  3. Osseous metastatic disease with interval worsening of a right iliac wing lesion.    < end of copied text >

## 2020-11-15 NOTE — ED ADULT TRIAGE NOTE - CHIEF COMPLAINT QUOTE
Called in as a notification from [facility]: Pt brought to ED by senior [facility] for r/o septic shock. Pt's BP in field was 84/51, HR 70. Pt brought directly to trauma C.

## 2020-11-15 NOTE — H&P ADULT - ATTENDING COMMENTS
Pt with h/o metastatic lung ca p/w septic shock likely 2/2 to c diff and pancolitis c/b GIB  s/p 3 liters and 1 unit of PRBCs  vasopressor support  serial cbc  midodrine  GOC

## 2020-11-15 NOTE — ED PROVIDER NOTE - PHYSICAL EXAMINATION
gen: frail  Mentation: AAO x 2  psych: mood appropriate  ENT: airway patent  Eyes: conjunctivae clear bilaterally  Cardio: RRR, no m/r/g  Resp: normal BS b/l  GI: diffuse tenderness; melanic stool noted in diaper  : no CVA tenderness  Neuro: sensation and motor function intact  Skin: No evidence of rash  MSK: normal movement of all extremities  Lymph/Vasc: no LE edema

## 2020-11-15 NOTE — H&P ADULT - NSHPREVIEWOFSYSTEMS_GEN_ALL_CORE
REVIEW OF SYSTEMS:    CONSTITUTIONAL: no fevers, chills  EYES/ENT: No visual changes;  No vertigo or throat pain   NECK: No pain or stiffness  RESPIRATORY: No cough, wheezing, hemoptysis; No shortness of breath  CARDIOVASCULAR: No chest pain or palpitations  GASTROINTESTINAL: + abdominal pain, diarrhea  GENITOURINARY: No dysuria, frequency or hematuria  NEUROLOGICAL: No numbness or weakness  SKIN: No itching, burning, rashes, or lesions   All other review of systems is negative unless indicated above.

## 2020-11-15 NOTE — ED PROVIDER NOTE - CLINICAL SUMMARY MEDICAL DECISION MAKING FREE TEXT BOX
concern for sepsis with septic shock, unclear source but favor gi given hx of c diff or pulm given hx of ca.  labs 30ml/kg fluid bolus, rec'd 1 liter by ems 1 liter by 1 in ED.  bp improved with fluids.  mikel ct for gi source. patient sating well on NC 2L

## 2020-11-15 NOTE — H&P ADULT - HISTORY OF PRESENT ILLNESS
70yo F Hx Lung Ca in 8/2020 with mets to brain, Spine, and Right hip s/p RT x 5 ( last tx 9/2), dementia, DVT, Afib on Eliqius, presenting with hypotension. Patient was recently admitted to MICU for sepsis 2/2 C.diff colitis and required pressors. Patient completed course of antibiotics and was discharged, however started having diarrhea for past one day. No fevers, chills, chest pain, or shortness of breath. Patient has some lower abdominal discomfort. No dysuria.     In the ED, patient was found to be hypotensive to 72/48. She received total 3L of IVF, po vanc, 20 midodrine X1.

## 2020-11-15 NOTE — ED ADULT NURSE NOTE - OBJECTIVE STATEMENT
Pt presents to ED from [facility] for hypotension that started today. pt AxOx3, bedbound, primarily Maori speaking. Pt c/o pain all over body at this time. Pt undressed and placed on cardiac monitor NSR. Pt breathing even and unlabored on room air with an O2 sat of 100%. Abd soft and nontender. Melena noted in diaper. 7x7 ulcer noted to sacral area with eschar. 20g IVL placed in the L arm. Pt hypotensive at this time. Will continue to monitor.

## 2020-11-15 NOTE — H&P ADULT - NSHPPHYSICALEXAM_GEN_ALL_CORE
ICU Vital Signs Last 24 Hrs  T(C): --  T(F): --  HR: 79 (15 Nov 2020 21:35) (79 - 101)  BP: 76/51 (15 Nov 2020 22:06) (72/48 - 100/71)  BP(mean): 64 (15 Nov 2020 21:35) (52 - 78)  RR: 16 (15 Nov 2020 21:35) (14 - 20)  SpO2: 98% (15 Nov 2020 21:35) (98% - 100%)    General: No acute distress.  HEENT: No scleral icterus or injection.  Dry MM.  No oropharyngeal exudates.    Neck: Supple.  Full ROM.   Heart: RRR.  Normal S1 and S2.  No murmurs, rubs, or gallops.   Lungs: CTAB. No wheezes, crackles, or rhonchi.    Abdomen: BS+, soft, slightly tender to palpation of lower abdomen  Skin: Warm and dry.  No rashes.  Extremities: mild b/l pitting edema   Musculoskeletal: No deformities.   Neuro: A&Ox1 to person. Following commands, moving all extremities.

## 2020-11-15 NOTE — ED PROVIDER NOTE - SKILLED NURSING FACILITY NOTE SUMMARY FREE TEXT FOR MDM OBTAINED AND REVIEWED OLD RECORDS QUESTION
wbc 29k. patient already on ctx for past 3 days wbc 29k. patient already on ctx for past 3 days    69 y/o F presenting from nursing  home concern for severe sepsis, labile BP here during eval. Consider complicated c dif infection with toxic megacolon given melanic stool and diffuse tenderness, elevated WBC to 29. Will give empiric abx, sepsis w/u, CT AP to eval. Will require admission - Sheldon Rojas,  PGY-2

## 2020-11-15 NOTE — ED ADULT NURSE NOTE - INTERVENTIONS DEFINITIONS
Stretcher in lowest position, wheels locked, appropriate side rails in place/Room bathroom lighting operational/Call bell, personal items and telephone within reach/Instruct patient to call for assistance/Monitor for mental status changes and reorient to person, place, and time/Reinforce activity limits and safety measures with patient and family/Monitor gait and stability/Review medications for side effects contributing to fall risk/[place] to call system/Non-slip footwear when patient is off stretcher/Physically safe environment: no spills, clutter or unnecessary equipment

## 2020-11-15 NOTE — CONSULT NOTE ADULT - ASSESSMENT
70yo F Hx Lung Ca in 8/2020 with mets to brain, Spine, and Right hip s/p RT x 5 ( last tx 9/2), dementia, DVT, Afib on Eliqius, presenting with septic shock 2/2 colitis, likely C.diff. MICU consulted for hypotension.     # Hypotension  - patient hypotensive 2/2 sepsis, most likely 2/2 C.diff colitis. CXR concerning for multifocal PNA, UA positive  - blood pressures improving s/p IVF, midodrine 20X1  - recommend trial of 250cc bolus of 5% albumin given low albumin    ** INCOMPLETE**

## 2020-11-15 NOTE — ED PROVIDER NOTE - OBJECTIVE STATEMENT
71 y/o F with PMH of dementia, lung ca with mets to brain, spine s/p radiation, DVT, afib on eliquisrecent admission to MICU for septic shock 2/2 to c. dif BIBEMS from nursing home for elevated WBC and hypotension. Patient at this time only complaining of pain but is unable to localize.  Spoke to daughter on phone who states patient had no complaints prior, has chronic bone pain 2/2 to mets

## 2020-11-15 NOTE — ED ADULT NURSE REASSESSMENT NOTE - NS ED NURSE REASSESS COMMENT FT1
16F andrea inserted, draining clear yellow urine at this time. Unstageable wound noted to the sacrum, redressed at this time. MD Harrison made aware. Will continue to monitor.
pt cleaned of stool, c. diff sample sent to lab. new diapers, linens provided. pressure ulcer covered with dressing.
report received from ARTI Diallo, pt awake and alert, primarily Syrian speaking but able to make needs known and communicate some in English. pt here for hypotension, GI bleed and diarrhea, on c. diff contact precautions. pt hypotensive, medicated per MD order, enroute to CT. per MD Harrison pt does not need RN/ED tech or ZOLL for CT transport.

## 2020-11-15 NOTE — CONSULT NOTE ADULT - SUBJECTIVE AND OBJECTIVE BOX
CHIEF COMPLAINT: diarrhea     HPI:  70yo F Hx Lung Ca in 2020 with mets to brain, Spine, and Right hip s/p RT x 5 ( last tx ), dementia, DVT, Afib on Eliqius, presenting with hypotension. Patient was recently admitted to MICU for sepsis 2/2 C.diff colitis and required pressors. Patient completed course of antibiotics and was discharged, however started having diarrhea for past one day. No fevers, chills, chest pain, or shortness of breath. Patient has some lower abdominal discomfort. No dysuria.     In the ED, patient was found to be hypotensive to 72/48. She received total 3L of IVF, po vanc, 20 midodrine X1.     PAST MEDICAL & SURGICAL HISTORY:  Lung cancer metastatic to bone    No significant past surgical history        FAMILY HISTORY:  FH: colon cancer    Family history of cervical cancer        SOCIAL HISTORY:  Smoking: __ packs x ___ years  EtOH Use:  Marital Status:  Occupation:  Recent Travel:  Country of Birth:  Advance Directives:    Allergies    No Known Allergies    Intolerances    HOME MEDICATIONS:  REVIEW OF SYSTEMS:    CONSTITUTIONAL: no fevers, chills  EYES/ENT: No visual changes;  No vertigo or throat pain   NECK: No pain or stiffness  RESPIRATORY: No cough, wheezing, hemoptysis; No shortness of breath  CARDIOVASCULAR: No chest pain or palpitations  GASTROINTESTINAL: + abdominal pain, diarrhea  GENITOURINARY: No dysuria, frequency or hematuria  NEUROLOGICAL: No numbness or weakness  SKIN: No itching, burning, rashes, or lesions   All other review of systems is negative unless indicated above.      OBJECTIVE:  ICU Vital Signs Last 24 Hrs  T(C): --  T(F): --  HR: 79 (15 Nov 2020 21:35) (79 - 101)  BP: 76/51 (15 Nov 2020 22:06) (72/48 - 100/71)  BP(mean): 64 (15 Nov 2020 21:35) (52 - 78)  ABP: --  ABP(mean): --  RR: 16 (15 Nov 2020 21:35) (14 - 20)  SpO2: 98% (15 Nov 2020 21:35) (98% - 100%)    PHYSICAL EXAM:  General: No acute distress.  HEENT: No scleral icterus or injection.  Dry MM.  No oropharyngeal exudates.    Neck: Supple.  Full ROM.   Heart: RRR.  Normal S1 and S2.  No murmurs, rubs, or gallops.   Lungs: CTAB. No wheezes, crackles, or rhonchi.    Abdomen: BS+, soft, slightly tender to palpation of lower abdomen  Skin: Warm and dry.  No rashes.  Extremities: mild b/l pitting edema   Musculoskeletal: No deformities.   Neuro: A&Ox1 to person. Following commands, moving all extremities.       HOSPITAL MEDICATIONS:  MEDICATIONS  (STANDING):    MEDICATIONS  (PRN):      LABS:                        5.7    26.21 )-----------( 313      ( 15 Nov 2020 21:30 )             17.8     -15    126<L>  |  91<L>  |  32<H>  ----------------------------<  100<H>  4.0   |  21<L>  |  0.43<L>    Ca    7.6<L>      15 Nov 2020 18:51  Phos  3.6     11-15  Mg     1.6     11-15    TPro  4.4<L>  /  Alb  2.0<L>  /  TBili  0.3  /  DBili  x   /  AST  33<H>  /  ALT  19  /  AlkPhos  192<H>  11-15    PT/INR - ( 15 Nov 2020 18:51 )   PT: 18.5 SEC;   INR: 1.65          PTT - ( 15 Nov 2020 18:51 )  PTT:34.7 SEC  Urinalysis Basic - ( 15 Nov 2020 19:30 )    Color: YELLOW / Appearance: Lt TURBID / S.019 / pH: 6.0  Gluc: NEGATIVE / Ketone: NEGATIVE  / Bili: NEGATIVE / Urobili: NORMAL   Blood: SMALL / Protein: 20 / Nitrite: NEGATIVE   Leuk Esterase: LARGE / RBC: 4-6 / WBC 26-50   Sq Epi: OCC / Non Sq Epi: x / Bacteria: MODERATE        Venous Blood Gas:  11-15 @ 20:30  7.37/41/< 24/22/25.9  VBG Lactate: 3.0  Venous Blood Gas:  11-15 @ 18:51  7.41/41/19/25/22.7  VBG Lactate: 2.3      MICROBIOLOGY:     RADIOLOGY:  [ ] Reviewed and interpreted by me    EKG:

## 2020-11-16 NOTE — PHARMACOTHERAPY INTERVENTION NOTE - COMMENTS
As discussed with MICU team, patient with serum magnesium 1.6 mg/dL (11/16/2020).    Plan:  1.  Recommend repletion with 2g magnesium sulfate IVPB x1 dose.      Eris Jimenez, PharmD, BCPS  Clinical Pharmacy Coordinator  Medical Intensive Care Unit - [facility]   Spectra [phone]

## 2020-11-16 NOTE — PROGRESS NOTE ADULT - ASSESSMENT
69 F Hx Lung Ca in 8/2020 with mets to brain, Spine, and Right hip s/p RT x 5 ( last tx 9/2), dementia, DVT, Afib on Eliqius, presenting with hypotension found to be in septic shock 2/2 pancolitis, most likely C.diff. Admitted to MICU for pressure support.     #Neuro  -A/O x 1, patient is able to communicate and follow commands  - currently not on sedation  - possible brain mets, however PET scan from 10/2 showed possible meningioma    #Pulm  - hemodynamically stable, protecting airway, satting well on room air   - CXR shows Bilateral patchy patchy right sided and more confluent left lower lobe opacities may represent multifocal pneumonia    #Cardio  - patient found to be hypotensive with SBP in the 70s 2/2 septic shock   - remains hypotensive s/p 3L and midodrine 20X1  - started on levophed for pressure support, wean down as tolerated   - TTE (10/12/20) shows normal LV systolic function, mild diastolic dysfunction (stage 1)    #GI  - Patient presented with diarrhea, recently admitted to MICU for C.diff pancolitis  - CT A/P results pending  - continue po vanc for possible C.diff colitis  - f/u C.diff results   - keep NPO for now  - concern for lower GI bleed from pancolitis  - trend CBC q8, transfuse for Hgb <7    #Renal  - Normal renal function  - hyponatremic to 126 most likely 2/2 hypovolemia   - monitor electrolytes, Is&Os    #ID  - significant leukocytosis to 29.59 in the setting of pancolitis  - CT A/P shows interval improvement of pancolitis with mild pancolitis remaining   - continue po vanc, IV flagyl for C.diff colitis  - concern for possible PNA on CXR vs mets  - f/u urine legionella  - UA positive, f/u UCx   - f/u blood cultures    #Heme/Onc  - elevated WBC in setting of infection  - continue to monitor daily CBC   - Hgb dropped to 5.7 most likely GI bleed from pancolitis/C.diff  - s/p 1 unit pRBC, goal Hgb >7  - follows with Dr. Xie at [facility]    #DVT  - SCDs    #Endo   - A1c 6.1 in August  - monitors FS while NPO    #Ethics  - FULL code according to family 69 F Hx Lung Ca in 8/2020 with mets to brain, Spine, and Right hip s/p RT x 5 ( last tx 9/2), dementia, DVT, Afib on Eliqius, presenting with hypotension found to be in septic shock 2/2 possible pancolitis, most likely C.diff. Admitted to MICU for pressure support.     #Neuro  - patient is able to communicate and follow commands  - currently not on sedation  - possible brain mets, however PET scan from 10/2 showed possible meningioma    #Pulm  - hemodynamically stable, protecting airway, satting well on room air   - CXR shows Bilateral patchy patchy right sided and more confluent left lower lobe opacities may represent multifocal pneumonia    #Cardio  - patient found to be hypotensive with SBP in the 70s 2/2 septic shock   - remains hypotensive s/p 3L and midodrine 20X1  - on levophed for pressure support, wean down as tolerated   - start Midodrine 10 q8h   -  over 4 hrs  - TTE (10/12/20) shows normal LV systolic function, mild diastolic dysfunction (stage 1)    #GI  - Patient presented with diarrhea, recently admitted to MICU for C.diff pancolitis  - CT A/P results pending  - continue po vanc 500 q6h for possible C.diff colitis  - ID recs appreciated c/w flagyl 500 q8h, start tigecycline 100mg IVP x1, then 50mg IV BID (11/16- )  - f/u C.diff results   - keep NPO for now  - concern for lower GI bleed from pancolitis  - trend CBC q8, transfuse for Hgb <7    #Renal  - Normal renal function  - hyponatremic to 126 most likely 2/2 hypovolemia   - monitor electrolytes, Is&Os    #ID  - significant leukocytosis to 29.59 in the setting of pancolitis  - CT A/P shows interval improvement of pancolitis with mild pancolitis remaining   - continue po vanc, IV flagyl, start tigecycline as above for C.diff colitis  - concern for possible PNA on CXR vs mets  - f/u urine legionella, on azithromycin (11/16- ) for now  - UA positive, f/u UCx   - f/u blood cultures    #Heme/Onc  - elevated WBC in setting of infection  - continue to monitor daily CBC   - Hgb dropped to 5.7 most likely GI bleed from pancolitis/C.diff  - s/p 1 unit pRBC, goal Hgb >7  - follows with Dr. Xie at [facility]    #DVT  - SCDs    #Endo   - A1c 6.1 in August  - monitors FS while NPO    #Ethics  - FULL code according to family

## 2020-11-16 NOTE — CONSULT NOTE ADULT - ASSESSMENT
69W with recent severe Cdiff,  Lung Ca since  8/2020 with mets to brain, Spine, and Right hip s/p RT x 5 ( last tx 9/2), dementia, DVT, Afib on Eliqius [id number] with hypotension, diarrhea, leukocytosis.  Imaging suggests at least mild colitis, LLL atelectasis,    Antibiotic History  IV Vanco 9/12 --> 13; 10/10 -->11; 11/15  Zosyn 9/12 --> 16; 10/10; 11/16-->  Azithro 9/12; 11/16  Cefazolin 9/18  Metronidazole 10/10 --> 10/17; 11/16  Meropenem 10/10 --> 10/13  Vanco enteral solution 10/11 -->24; 11/15    Patient appears to have recurrent Cdiff as cause of septic shock. Previously noted possible colovesicular fistula and GNR sepsis is possibility    Suggest  Discontinue Zosyn, Do not redose IV Vanco  Continue Enteral Vanco solution 500 every 6 hours  Continue Intravenous Metronidazole  ADD Tigecyline 100 mg IV x 1 then 50 mg IV every 12 hours  await Cdiff testing and culture results    discussed with ICU residents

## 2020-11-16 NOTE — CONSULT NOTE ADULT - SUBJECTIVE AND OBJECTIVE BOX
Patient is a 70y old  Female who presents with a chief complaint of Pancolitis, septic shock (16 Nov 2020 07:32)    HPI:  68yo F Hx Lung Ca in 8/2020 with mets to brain, Spine, and Right hip s/p RT x 5 ( last tx 9/2), dementia, DVT, Afib on Eliqius, presenting with hypotension. Patient was recently hospitalized and was addmitted to MICU for sepsis 2/2 C.diff colitis and required pressors. Patient completed course of antibiotics and was discharged, however started having diarrhea for past one day. No fevers, chills, chest pain, or shortness of breath. Patient has some lower abdominal discomfort. No dysuria.     In the ED, patient was found to be hypotensive to 72/48. She received total 3L of IVF, po vanc, 20 midodrine X1. (15 Nov 2020 22:54)    Hospitalized 10/10 --> 11/4 with severe Cdiff  Patient had diarrhea earlier today       PAST MEDICAL & SURGICAL HISTORY:  Lung cancer metastatic to bone    No significant past surgical history    Social history:      unable to obtain due to mental status  FAMILY HISTORY:        "  FH: colon cancer  Family history of cervical cancer  REVIEW OF SYSTEMS:   "    Allergies  No Known Allergies    Antimicrobials:  azithromycin  IVPB      piperacillin/tazobactam IVPB.. 3.375 Gram(s) IV Intermittent every 8 hours  vancomycin    Solution 500 milliGRAM(s) Oral every 6 hours      Vital Signs Last 24 Hrs  T(C): 36.3 (16 Nov 2020 12:00), Max: 36.5 (16 Nov 2020 00:10)  T(F): 97.4 (16 Nov 2020 12:00), Max: 97.7 (16 Nov 2020 00:10)  HR: 80 (16 Nov 2020 12:15) (69 - 111)  BP: 97/61 (16 Nov 2020 12:15) (66/49 - 112/51)  BP(mean): 73 (16 Nov 2020 12:15) (52 - 97)  RR: 15 (16 Nov 2020 12:15) (13 - 33)  SpO2: 100% (16 Nov 2020 12:15) (98% - 100%)    PHYSICAL EXAM:  General: thin NAD, Non-toxic  Neurology: somnolent  Respiratory: Clear to auscultation bilaterally  CV: RRR, S1S2, no murmurs, rubs or gallops  Abdominal: Soft, Non-tender, non-distended  Extremities: No edema  Line Sites: Clear  Skin: No rash                        8.7    29.44 )-----------( 310      ( 16 Nov 2020 11:40 )             25.4   WBC Count: 29.44 (11-16 @ 11:40)  WBC Count: 35.90 (11-16 @ 02:45)  WBC Count: 26.21 (11-15 @ 21:30)  WBC Count: 29.59 (11-15 @ 18:51)      11-16    127<L>  |  95<L>  |  27<H>  ----------------------------<  117<H>  3.5   |  19<L>  |  0.44<L>    Ca    7.7<L>      16 Nov 2020 02:45  Phos  3.5     11-16  Mg     1.6     11-16    TPro  4.2<L>  /  Alb  1.9<L>  /  TBili  0.6  /  DBili  x   /  AST  25  /  ALT  20  /  AlkPhos  197<H>  11-16    MICROBIOLOGY:    Radiology:  < from: CT Abdomen and Pelvis w/ IV Cont (11.15.20 @ 21:06) >  BOWEL: No bowel obstruction. Appendix is not visualized. No evidence of inflammation in the pericecal region. Interval improvement of pancolitis with mild diffuse residual wall hyperemia suggestive of mild residual pancolitis.  PERITONEUM: No ascites.  VESSELS: Atherosclerotic changes.  RETROPERITONEUM/LYMPH NODES: Small retroperitoneal lymph nodes.  ABDOMINAL WALL: Generalized anasarca.  BONES: Scattered osseous destructive lesions including the posterior left 11th rib, lateral left 8th rib, spine and pelvis compatible with metastatic disease. The metastatic disease within the right pelvis (2, 59) has progressed since the prior examination with increased osseous destruction and soft tissue extension through the cortex. Partially visualized right hip arthroplasty and plate, multiple screws and intraosseous cement in the right iliac wing. Chronic fracture of the right inferior pubic ramus. Redemonstration of a T11 vertebral body compression fracture deformity with associated lytic and sclerotic lesions likely representing metastatic disease with a pathologic fracture.    IMPRESSION:  1. Interval improvement apancolitis with mild pancolitis remaining.  2. Mild common bile duct dilatation with question of a 4 mm intraluminal focus within the mid common bile duct. Correlate with LFTs and if clinically warranted, an MRCP.  3. Osseous metastatic disease with interval worsening of a right iliac wing lesion.    < end of copied text >      Herb Cabezas MD; Division of Infectious Disease; Pager: [phone]; nights and weekends: [phone]

## 2020-11-16 NOTE — PROGRESS NOTE ADULT - ATTENDING COMMENTS
69 year old woman with dementia and widely metastatic lung ca presents in shock requiring vasopressor support, shock most likely due to sepsis C. diff colitis vs possible pneumonia    continue Abx, follow up cultures wean down vasopressors as tolerated    Critically ill patient requiring frequent bedside visits with therapeutic changes.

## 2020-11-16 NOTE — PROGRESS NOTE ADULT - SUBJECTIVE AND OBJECTIVE BOX
Jazmín Fofana (Connor) PGY-2  Pager: NS) [phone]/ (YNV) [phone]    Patient is a 70y old  Female who presents with a chief complaint of Pancolitis, septic shock (15 Nov 2020 22:54)      SUBJECTIVE / OVERNIGHT EVENTS:  No acute complaints over night. Denies any fevers/chills, headache, CP, SOB, abd pain, N/V/D, constipation, or leg swelling.       MEDICATIONS  (STANDING):  chlorhexidine 4% Liquid 1 Application(s) Topical <User Schedule>  melatonin 3 milliGRAM(s) Oral at bedtime  metroNIDAZOLE  IVPB 500 milliGRAM(s) IV Intermittent every 8 hours  multivitamin 1 Tablet(s) Oral daily  norepinephrine Infusion 0.05 MICROgram(s)/kG/Min (5.63 mL/Hr) IV Continuous <Continuous>  pantoprazole    Tablet 40 milliGRAM(s) Oral before breakfast  vancomycin    Solution 500 milliGRAM(s) Oral every 6 hours    MEDICATIONS  (PRN):  oxyCODONE    IR 10 milliGRAM(s) Oral every 3 hours PRN Moderate Pain (4 - 6)          OBJECTIVE:  Vital Signs Last 24 Hrs  T(C): 36.1 (2020 04:00), Max: 36.5 (2020 00:10)  T(F): 97 (2020 04:00), Max: 97.7 (2020 00:10)  HR: 97 (2020 06:00) (69 - 111)  BP: 94/58 (2020 06:00) (66/49 - 112/51)  BP(mean): 70 (2020 06:00) (52 - 97)  RR: 25 (2020 06:00) (13 - 33)  SpO2: 100% (2020 06:00) (98% - 100%)  PHYSICAL EXAM:  GENERAL: NAD, well-developed  HEAD:  Atraumatic, Normocephalic  EYES: EOMI, PERRLA, conjunctiva and sclera clear  NECK: Supple, No JVD  CHEST/LUNG: Clear to auscultation bilaterally; No wheeze  HEART: Regular rate and rhythm; No murmurs, rubs, or gallops  ABDOMEN: Soft, Nontender, Nondistended; Bowel sounds present  EXTREMITIES:  2+ Peripheral Pulses, No clubbing, cyanosis, or edema  PSYCH: AAOx3  NEUROLOGY: non-focal  SKIN: No rashes or lesions    CAPILLARY BLOOD GLUCOSE      POCT Blood Glucose.: 112 mg/dL (2020 05:38)    I&O's Summary    15 Nov 2020 07:01  -  2020 07:00  --------------------------------------------------------  IN: 100 mL / OUT: 1005 mL / NET: -905 mL              LABS:                        9.5    35.90 )-----------( 367      ( 2020 02:45 )             28.4     11-16    127<L>  |  95<L>  |  27<H>  ----------------------------<  117<H>  3.5   |  19<L>  |  0.44<L>    Ca    7.7<L>      2020 02:45  Phos  3.5     -  Mg     1.6     -    TPro  4.2<L>  /  Alb  1.9<L>  /  TBili  0.6  /  DBili  x   /  AST  25  /  ALT  20  /  AlkPhos  197<H>  11-16    PT/INR - ( 2020 02:45 )   PT: 17.2 SEC;   INR: 1.53          PTT - ( 2020 02:45 )  PTT:36.4 SEC      Urinalysis Basic - ( 15 Nov 2020 19:30 )    Color: YELLOW / Appearance: Lt TURBID / S.019 / pH: 6.0  Gluc: NEGATIVE / Ketone: NEGATIVE  / Bili: NEGATIVE / Urobili: NORMAL   Blood: SMALL / Protein: 20 / Nitrite: NEGATIVE   Leuk Esterase: LARGE / RBC: 4-6 / WBC 26-50   Sq Epi: OCC / Non Sq Epi: x / Bacteria: MODERATE          RADIOLOGY & ADDITIONAL TESTS:       Jazmín Fofana (Connor) PGY-2  Pager: NS) [phone]/ (SMJ) [phone]    Patient is a 70y old  Female who presents with a chief complaint of Pancolitis, septic shock (15 Nov 2020 22:54)      SUBJECTIVE / OVERNIGHT EVENTS:  no acute events overnight. Feels well this AM.       MEDICATIONS  (STANDING):  chlorhexidine 4% Liquid 1 Application(s) Topical <User Schedule>  melatonin 3 milliGRAM(s) Oral at bedtime  metroNIDAZOLE  IVPB 500 milliGRAM(s) IV Intermittent every 8 hours  multivitamin 1 Tablet(s) Oral daily  norepinephrine Infusion 0.05 MICROgram(s)/kG/Min (5.63 mL/Hr) IV Continuous <Continuous>  pantoprazole    Tablet 40 milliGRAM(s) Oral before breakfast  vancomycin    Solution 500 milliGRAM(s) Oral every 6 hours    MEDICATIONS  (PRN):  oxyCODONE    IR 10 milliGRAM(s) Oral every 3 hours PRN Moderate Pain (4 - 6)          OBJECTIVE:  Vital Signs Last 24 Hrs  T(C): 36.1 (2020 04:00), Max: 36.5 (2020 00:10)  T(F): 97 (2020 04:00), Max: 97.7 (2020 00:10)  HR: 97 (2020 06:00) (69 - 111)  BP: 94/58 (2020 06:00) (66/49 - 112/51)  BP(mean): 70 (2020 06:00) (52 - 97)  RR: 25 (2020 06:00) (13 - 33)  SpO2: 100% (2020 06:00) (98% - 100%)  PHYSICAL EXAM:  GENERAL: NAD, well-developed  HEAD:  Atraumatic, Normocephalic  NECK: Supple, No JVD  CHEST/LUNG: Clear to auscultation bilaterally; No wheeze  HEART: Regular rate and rhythm; No murmurs, rubs, or gallops  ABDOMEN: Soft, Nondistended; Bowel sounds present  EXTREMITIES:  No clubbing, cyanosis, or edema  PSYCH: AAOx3  NEUROLOGY: non-focal  SKIN: No rashes or lesions    CAPILLARY BLOOD GLUCOSE      POCT Blood Glucose.: 112 mg/dL (2020 05:38)    I&O's Summary    15 Nov 2020 07:01  -  2020 07:00  --------------------------------------------------------  IN: 100 mL / OUT: 1005 mL / NET: -905 mL              LABS:                        9.5    35.90 )-----------( 367      ( 2020 02:45 )             28.4     11-    127<L>  |  95<L>  |  27<H>  ----------------------------<  117<H>  3.5   |  19<L>  |  0.44<L>    Ca    7.7<L>      2020 02:45  Phos  3.5       Mg     1.6         TPro  4.2<L>  /  Alb  1.9<L>  /  TBili  0.6  /  DBili  x   /  AST  25  /  ALT  20  /  AlkPhos  197<H>  -16    PT/INR - ( 2020 02:45 )   PT: 17.2 SEC;   INR: 1.53          PTT - ( 2020 02:45 )  PTT:36.4 SEC      Urinalysis Basic - ( 15 Nov 2020 19:30 )    Color: YELLOW / Appearance: Lt TURBID / S.019 / pH: 6.0  Gluc: NEGATIVE / Ketone: NEGATIVE  / Bili: NEGATIVE / Urobili: NORMAL   Blood: SMALL / Protein: 20 / Nitrite: NEGATIVE   Leuk Esterase: LARGE / RBC: 4-6 / WBC 26-50   Sq Epi: OCC / Non Sq Epi: x / Bacteria: MODERATE          RADIOLOGY & ADDITIONAL TESTS:

## 2020-11-17 NOTE — DIETITIAN INITIAL EVALUATION ADULT. - OTHER INFO
69 F Hx Lung Ca in 8/2020 with mets to ?brain, Spine, and Right hip s/p RT x 5 ( last tx 9/2), dementia, DVT, Afib on Eliqius, presenting with hypotension found to be in septic shock 2/2 possible pancolitis, most likely C.diff vs possible PNA. Admitted to MICU for pressure support. Pt. maintained NPO , per nursing pt. with pressure ulcer - sacral unstageable .

## 2020-11-17 NOTE — CONSULT NOTE ADULT - CONSULT REASON
Pain/Symptom Management and Complex Medical Decision Making/GOC in the setting of treatment-naive Metastatic Lung CA

## 2020-11-17 NOTE — PROGRESS NOTE ADULT - ASSESSMENT
69W with recent severe Cdiff,  Lung Ca since  8/2020 with mets to brain, Spine, and Right hip s/p RT x 5 ( last tx 9/2), dementia, DVT, Afib on Eliqius [id number] with hypotension, diarrhea, leukocytosis.  Imaging suggests at least mild colitis, LLL atelectasis,    Antibiotic History  IV Vanco 9/12 --> 13; 10/10 -->11; 11/15  Zosyn 9/12 --> 16; 10/10; 11/16  Azithro 9/12; 11/16  Cefazolin 9/18  Metronidazole 10/10 --> 10/17; 11/16  Meropenem 10/10 --> 10/13  Vanco enteral solution 10/11 -->24; 11/15-->    recurrent Cdiff confirmed  episode of melena  lung opacities - likely metatstatic disease    Suggest  Continue Enteral Vanco solution 500 every 6 hours  Continue Intravenous Metronidazole  Continue Tigecyline 100 mg IV x 1 then 50 mg IV every 12 hours  await culture results    (if blood cultures negative and patient continues to improve will discontinue Tigecyline in 1-2 days;  if improves over next 4-5 days, will discontinue flagyl, decrease vanco dose to 125 mg 4times daily and eventual extended vanco taper)

## 2020-11-17 NOTE — CONSULT NOTE ADULT - PROBLEM SELECTOR RECOMMENDATION 5
.  Discussed the role of hospice services, explained this would be the best way to get patient home after the hospital with support  -family's goal is to bring the patient home so they were receptive to hospice referral when patient is out of the ICU  -they still remain hopeful that DMT can be pursued but understand that hsopcie service cannot be active concurrently  -advised the patient's children to discuss code status, recommended DNR/DNI but they will discuss

## 2020-11-17 NOTE — CONSULT NOTE ADULT - ASSESSMENT
Full Note to Follow.    ·	consider Oxy ER 20mg BID (should affect hemodynamics minimally)  ·	left VM for daughter to discuss GOC; prior to discharge on last admission, I had spoken to patient's children about hospice and they were not ready to take away the opportunity to be evaluated for DMT at that time  ·	will try to emphasize that the patient has been given every opportunity to try and recover but it appears that DMT is unrealistic Full Note to Follow.    ·	consider Oxy ER 20mg BID (should affect hemodynamics minimally)  ·	prior to discharge on last admission, I had spoken to patient's children about hospice and they were not ready to take away the opportunity to be evaluated for DMT at that time  ·	coordinating a time to speak with patient's children tomorrow, timing TBD  ·	will try to emphasize that the patient has been given every opportunity to try and recover but it appears that DMT is unrealistic Full Note to Follow.    ·	c/w Oxy IR 10mg q3h PRN for Pain  ·	had a telephone conversation with patient's children: advised them to think about code status and discussed hospice services  ·	their goals are to get the patient home and they understand home hospice services would be conducive  ·	at this moment, they are accepting of all medical interventions and would still consider treatment of underlying malignancy if offered but they were open to hospice services at discharge 69yo F with recently diagnosed Metastatic NSCLC (no treatment yet) p/w shock due to recurrent C. diff after recent JAYANT. Palliative consulted for pain management in the setting of life-limiting illness.    ·	c/w Oxy IR 10mg q3h PRN for Pain  ·	had a telephone conversation with patient's children: advised them to think about code status and discussed hospice services  ·	their goals are to get the patient home and they understand home hospice services would be conducive  ·	at this moment, they are accepting of all medical interventions and would still consider treatment of underlying malignancy if offered but they were open to hospice services at discharge

## 2020-11-17 NOTE — CONSULT NOTE ADULT - PROBLEM SELECTOR RECOMMENDATION 9
.  -c/w Oxy IR 10mg PO q3h PRN for Severe Pain  -can transition back to Oxy ER pending hemodynamic stability    Family was concerned that patient was overmedicated at [facility]

## 2020-11-17 NOTE — PROGRESS NOTE ADULT - ASSESSMENT
69 F Hx Lung Ca in 8/2020 with mets to brain, Spine, and Right hip s/p RT x 5 ( last tx 9/2), dementia, DVT, Afib on Eliqius, presenting with hypotension found to be in septic shock 2/2 possible pancolitis, most likely C.diff. Admitted to MICU for pressure support.     #Neuro  - patient is able to communicate and follow commands  - currently not on sedation  - possible brain mets, however PET scan from 10/2 showed possible meningioma    #Pulm  - hemodynamically stable, protecting airway, satting well on room air   - CXR shows Bilateral patchy patchy right sided and more confluent left lower lobe opacities may represent multifocal pneumonia    #Cardio  - patient found to be hypotensive with SBP in the 70s 2/2 septic shock   - remains hypotensive s/p 3L and midodrine 20X1  - on levophed for pressure support, wean down as tolerated   - start Midodrine 10 q8h   -  over 4 hrs  - TTE (10/12/20) shows normal LV systolic function, mild diastolic dysfunction (stage 1)    #GI  - Patient presented with diarrhea, recently admitted to MICU for C.diff pancolitis  - CT A/P results pending  - continue po vanc 500 q6h for possible C.diff colitis  - ID recs appreciated c/w flagyl 500 q8h, start tigecycline 100mg IVP x1, then 50mg IV BID (11/16- )  - f/u C.diff results   - keep NPO for now  - concern for lower GI bleed from pancolitis  - trend CBC q8, transfuse for Hgb <7    #Renal  - Normal renal function  - hyponatremic to 126 most likely 2/2 hypovolemia   - monitor electrolytes, Is&Os    #ID  - significant leukocytosis to 29.59 in the setting of pancolitis  - CT A/P shows interval improvement of pancolitis with mild pancolitis remaining   - continue po vanc, IV flagyl, start tigecycline as above for C.diff colitis  - concern for possible PNA on CXR vs mets  - f/u urine legionella, on azithromycin (11/16- ) for now  - UA positive, f/u UCx   - f/u blood cultures    #Heme/Onc  - elevated WBC in setting of infection  - continue to monitor daily CBC   - Hgb dropped to 5.7 most likely GI bleed from pancolitis/C.diff  - s/p 1 unit pRBC, goal Hgb >7  - follows with Dr. Xie at [facility]    #DVT  - SCDs    #Endo   - A1c 6.1 in August  - monitors FS while NPO    #Ethics  - FULL code according to family 69 F Hx Lung Ca in 8/2020 with mets to ?brain, Spine, and Right hip s/p RT x 5 ( last tx 9/2), dementia, DVT, Afib on Eliqius, presenting with hypotension found to be in septic shock 2/2 possible pancolitis, most likely C.diff vs possible PNA. Admitted to MICU for pressure support.     #Neuro  - patient is able to communicate and follow commands  - currently not on sedation  - possible brain mets, however PET scan from 10/2 & MRI 8/2020 showed possible meningioma    #Pulm  - hemodynamically stable, protecting airway, satting well on room air   - CXR shows Bilateral patchy patchy right sided and more confluent left lower lobe opacities may represent multifocal pneumonia; likely pulmonary mets    #Cardio  - patient found to be hypotensive with SBP in the 70s 2/2 septic shock   - s/p 3L and midodrine 20X1  - c/w levophed for pressure support  - Midodrine 20 q8h   - 1 L LR x 10 hrs for today   - TTE (10/12/20) shows normal LV systolic function, mild diastolic dysfunction (stage 1)    #GI  - Patient presented with diarrhea, recently admitted to MICU for C.diff pancolitis (Cdiff POS)  - continue po vanc 500 q6h for C.diff colitis  - ID recs appreciated c/w flagyl 500 q8h, start tigecycline 100mg IVP x1, then 50mg IV BID (11/16- )  - C.diff results   - keep NPO for now  - concern for lower GI bleed from pancolitis  - trend CBC q12, transfuse for Hgb <7    #Renal  #AG metabolic Acidosis; likely 2/2 to elevated BUN & hypovolemia  - Fluids as above  #Hyponatremic most likely 2/2 hypovolemia   - monitor electrolytes, Is&Os    #ID  - significant leukocytosis in the setting of pancolitis, improving  - CT A/P shows interval improvement of pancolitis with mild pancolitis remaining   - continue po vanc, IV flagyl, start tigecycline as above for C.diff colitis  - concern for possible PNA on CXR vs mets  - f/u urine legionella, s/p azithromycin (11/16-11/17)   - UA positive, UCx 11/15 pending  - blood cultures 11/15 pending    #Heme/Onc  #Metastatic Lung Cancer  - follows with Dr. Xie at [facility]  - palliative care consulted  #Anemia  - Hgb dropped to 5.7 most likely GI bleed from pancolitis/C.diff  - s/p 1 unit pRBC, goal Hgb >7    #DVT  - hx of DVT left posterior tibial and peroneal veins 10/18  - US/ Duplex to eval for progression  - SCDs to be put on upper extremities     #Endo   - A1c 6.1 in August  - monitors FS while NPO    #Ethics  - FULL code according to family  - Palliative care consulted

## 2020-11-17 NOTE — CONSULT NOTE ADULT - PROBLEM SELECTOR RECOMMENDATION 4
.  Treatment-naive metastatic disease  -patient has bounced back and forth between the hospital and [facility], the hope was to improve her functional status to be evaluated for systemic treatment but this has not been possible  -discussed with patient's children that DMT is not possible at this time and seems unlikely given the patient's recurrent decompensations  -if no DMT were pursued, then patient would be eligible for hospice

## 2020-11-17 NOTE — PROGRESS NOTE ADULT - ATTENDING COMMENTS
69 year old woman with dementia and widely metastatic lung CA presents in shock requiring vasopressor support, shock due to sepsis C. diff colitis     Continue Abx, wean down vasopressors as tolerated  GOC discussions with the family    Critically ill patient requiring frequent bedside visits with therapeutic changes.

## 2020-11-17 NOTE — PROGRESS NOTE ADULT - SUBJECTIVE AND OBJECTIVE BOX
Jazmín Fofana (Connor) PGY-2  Pager: NS) [phone]/ (OIU) [phone]    Patient is a 70y old  Female who presents with a chief complaint of Pancolitis, septic shock (2020 13:04)      SUBJECTIVE / OVERNIGHT EVENTS:  No acute complaints over night. Denies any fevers/chills, headache, CP, SOB, abd pain, N/V/D, constipation, or leg swelling.       MEDICATIONS  (STANDING):  azithromycin  IVPB      azithromycin  IVPB 500 milliGRAM(s) IV Intermittent every 24 hours  chlorhexidine 4% Liquid 1 Application(s) Topical <User Schedule>  melatonin 3 milliGRAM(s) Oral at bedtime  metroNIDAZOLE  IVPB 500 milliGRAM(s) IV Intermittent every 8 hours  midodrine 20 milliGRAM(s) Oral every 8 hours  multivitamin 1 Tablet(s) Oral daily  norepinephrine Infusion 0.05 MICROgram(s)/kG/Min (5.63 mL/Hr) IV Continuous <Continuous>  tigecycline IVPB      tigecycline IVPB 50 milliGRAM(s) IV Intermittent every 12 hours  vancomycin    Solution 500 milliGRAM(s) Oral every 6 hours    MEDICATIONS  (PRN):  oxyCODONE    IR 10 milliGRAM(s) Oral every 3 hours PRN Moderate Pain (4 - 6)          OBJECTIVE:  Vital Signs Last 24 Hrs  T(C): 36.4 (2020 04:00), Max: 36.4 (2020 00:00)  T(F): 97.5 (2020 04:00), Max: 97.6 (2020 00:00)  HR: 79 (2020 06:20) (68 - 110)  BP: 110/69 (2020 06:20) (80/49 - 131/70)  BP(mean): 78 (2020 06:20) (59 - 91)  RR: 13 (2020 06:20) (11 - 25)  SpO2: 100% (2020 06:20) (100% - 100%)  PHYSICAL EXAM:  GENERAL: NAD, well-developed  HEAD:  Atraumatic, Normocephalic  EYES: EOMI, PERRLA, conjunctiva and sclera clear  NECK: Supple, No JVD  CHEST/LUNG: Clear to auscultation bilaterally; No wheeze  HEART: Regular rate and rhythm; No murmurs, rubs, or gallops  ABDOMEN: Soft, Nontender, Nondistended; Bowel sounds present  EXTREMITIES:  2+ Peripheral Pulses, No clubbing, cyanosis, or edema  PSYCH: AAOx3  NEUROLOGY: non-focal  SKIN: No rashes or lesions    CAPILLARY BLOOD GLUCOSE        I&O's Summary    2020 07:01  -  2020 07:00  --------------------------------------------------------  IN: 2291.5 mL / OUT: 935 mL / NET: 1356.5 mL              LABS:                        9.2    29.16 )-----------( 337      ( 2020 02:20 )             27.6     11-17    129<L>  |  97<L>  |  24<H>  ----------------------------<  134<H>  3.5   |  19<L>  |  0.43<L>    Ca    7.8<L>      2020 02:20  Phos  3.6     11-  Mg     2.2     -    TPro  4.1<L>  /  Alb  1.8<L>  /  TBili  0.4  /  DBili  x   /  AST  18  /  ALT  13  /  AlkPhos  194<H>  11-17    PT/INR - ( 2020 02:20 )   PT: 17.5 SEC;   INR: 1.57          PTT - ( 2020 02:20 )  PTT:36.1 SEC      Urinalysis Basic - ( 15 Nov 2020 19:30 )    Color: YELLOW / Appearance: Lt TURBID / S.019 / pH: 6.0  Gluc: NEGATIVE / Ketone: NEGATIVE  / Bili: NEGATIVE / Urobili: NORMAL   Blood: SMALL / Protein: 20 / Nitrite: NEGATIVE   Leuk Esterase: LARGE / RBC: 4-6 / WBC 26-50   Sq Epi: OCC / Non Sq Epi: x / Bacteria: MODERATE          RADIOLOGY & ADDITIONAL TESTS:       Jazmín Fofana (Connor) PGY-2  Pager: NS) [phone]/ (UKW) [phone]    Patient is a 70y old  Female who presents with a chief complaint of Pancolitis, septic shock (2020 13:04)      SUBJECTIVE / OVERNIGHT EVENTS:  Soft BPs this AM, given 500cc bolus x1. As per nurse, had 1 episode of melena. patient feels well otherwise. mild back pain is controlled.       MEDICATIONS  (STANDING):  azithromycin  IVPB      azithromycin  IVPB 500 milliGRAM(s) IV Intermittent every 24 hours  chlorhexidine 4% Liquid 1 Application(s) Topical <User Schedule>  melatonin 3 milliGRAM(s) Oral at bedtime  metroNIDAZOLE  IVPB 500 milliGRAM(s) IV Intermittent every 8 hours  midodrine 20 milliGRAM(s) Oral every 8 hours  multivitamin 1 Tablet(s) Oral daily  norepinephrine Infusion 0.05 MICROgram(s)/kG/Min (5.63 mL/Hr) IV Continuous <Continuous>  tigecycline IVPB      tigecycline IVPB 50 milliGRAM(s) IV Intermittent every 12 hours  vancomycin    Solution 500 milliGRAM(s) Oral every 6 hours    MEDICATIONS  (PRN):  oxyCODONE    IR 10 milliGRAM(s) Oral every 3 hours PRN Moderate Pain (4 - 6)          OBJECTIVE:  Vital Signs Last 24 Hrs  T(C): 36.4 (2020 04:00), Max: 36.4 (2020 00:00)  T(F): 97.5 (2020 04:00), Max: 97.6 (2020 00:00)  HR: 79 (2020 06:20) (68 - 110)  BP: 110/69 (2020 06:20) (80/49 - 131/70)  BP(mean): 78 (2020 06:20) (59 - 91)  RR: 13 (2020 06:20) (11 - 25)  SpO2: 100% (2020 06:20) (100% - 100%)    PHYSICAL EXAM:  GENERAL: NAD  HEAD:  Atraumatic, Normocephalic  NECK: Supple, No JVD  CHEST/LUNG: Clear to auscultation bilaterally; No wheeze  HEART: Regular rate and rhythm; No murmurs, rubs, or gallops  ABDOMEN: Soft, Nontender, Nondistended; Bowel sounds present  EXTREMITIES:  trace edema x4 ext  PSYCH: AAOx3      CAPILLARY BLOOD GLUCOSE        I&O's Summary    2020 07:01  -  2020 07:00  --------------------------------------------------------  IN: 2291.5 mL / OUT: 935 mL / NET: 1356.5 mL              LABS:                        9.2    29.16 )-----------( 337      ( 2020 02:20 )             27.6     11    129<L>  |  97<L>  |  24<H>  ----------------------------<  134<H>  3.5   |  19<L>  |  0.43<L>    Ca    7.8<L>      2020 02:20  Phos  3.6       Mg     2.2         TPro  4.1<L>  /  Alb  1.8<L>  /  TBili  0.4  /  DBili  x   /  AST  18  /  ALT  13  /  AlkPhos  194<H>      PT/INR - ( 2020 02:20 )   PT: 17.5 SEC;   INR: 1.57          PTT - ( 2020 02:20 )  PTT:36.1 SEC      Urinalysis Basic - ( 15 Nov 2020 19:30 )    Color: YELLOW / Appearance: Lt TURBID / S.019 / pH: 6.0  Gluc: NEGATIVE / Ketone: NEGATIVE  / Bili: NEGATIVE / Urobili: NORMAL   Blood: SMALL / Protein: 20 / Nitrite: NEGATIVE   Leuk Esterase: LARGE / RBC: 4-6 / WBC 26-50   Sq Epi: OCC / Non Sq Epi: x / Bacteria: MODERATE          RADIOLOGY & ADDITIONAL TESTS:

## 2020-11-17 NOTE — CONSULT NOTE ADULT - PROBLEM SELECTOR RECOMMENDATION 6
.  Complex medical decision making / symptom management in the setting of .      Will continue to follow for ongoing GOC discussion / titration of pain regimen.   Emotional support provided, questions answered.  Active Psychosocial Referrals: FREYA HEAD    For new or uncontrolled symptoms, please page the Palliative Medicine team (LIREGINALD #[id number]).   The service is available 24/7 (including nights & weekends) to provide symptom management recommendations over the phone as appropriate

## 2020-11-17 NOTE — CONSULT NOTE ADULT - SUBJECTIVE AND OBJECTIVE BOX
[facility] and Palliative Care  Gerald Hu, Palliative Care Attending  Contact Info: Page [phone] (including Nights/Weekend), message on Microsoft Teams (Gerald Hu), or leave VM at Palliative Office [phone] (Non-Urgent)  HPI:  70yo F Hx Lung Ca in 2020 with mets to brain, Spine, and Right hip s/p RT x 5 ( last tx ), dementia, DVT, Afib on Eliqius, presenting with hypotension. Patient was recently admitted to MICU for sepsis 2/2 C.diff colitis and required pressors. Patient completed course of antibiotics and was discharged, however started having diarrhea for past one day. No fevers, chills, chest pain, or shortness of breath. Patient has some lower abdominal discomfort. No dysuria. In the ED, patient was found to be hypotensive to 72/48. She received total 3L of IVF, po vanc, 20 midodrine X1. (15 Nov 2020 22:54)    PERTINENT PM/SXH:   Lung cancer metastatic to bone  No significant past surgical history    FAMILY HISTORY:  FH: colon cancer  Family history of cervical cancer    ITEMS NOT CHECKED ARE NOT PRESENT    SOCIAL HISTORY:   Significant other/partner:  []  Children:  [x]  Mu-ism/Spirituality:  Substance hx:  []   Tobacco hx:  [x]   Alcohol hx: []   Home Opioid hx:  [] I-Stop Reference No: [id number]  10/02/2020 10/03/2020 oxycodone hcl 15 mg tablet  45 11 Anthony Hernandez MD [employer]   2020 10/01/2020 oxycodone hcl 5 mg tablet  60 7 Anthony Hernandez MD [employer]   10/01/2020 10/01/2020 oxycontin er 15 mg tablet  28 14 Anthony Hernandez MD [employer] [employer]   2020 morphine sulf er 15 mg tablet  30 10 Anthony Hernandez MD [facility]   2020 oxycodone hcl 5 mg tablet  30 3 Anthony Hernandez MD [employer] [employer].  Living Situation: []Home  []Long term care  [x]Rehab []Other    ADVANCE DIRECTIVES:    DNR  []MOLST  []Living Will  DECISION MAKER(s):  [x] Health Care Proxy(s)  [] Surrogate(s)  [] Guardian           Name(s): Veda Gallegos            Phone Number(s): [phone]    BASELINE (I)ADL(s) (prior to admission):  [place]: []Total  [] Moderate [x]Dependent    ALLERGIES:  No Known Allergies    MEDICATIONS  (STANDING):  chlorhexidine 4% Liquid 1 Application(s) Topical <User Schedule>  lactated ringers. 1000 milliLiter(s) (100 mL/Hr) IV Continuous <Continuous>  melatonin 3 milliGRAM(s) Oral at bedtime  metroNIDAZOLE  IVPB 500 milliGRAM(s) IV Intermittent every 8 hours  midodrine 20 milliGRAM(s) Oral every 8 hours  multivitamin 1 Tablet(s) Oral daily  norepinephrine Infusion 0.062 MICROgram(s)/kG/Min (5.63 mL/Hr) IV Continuous <Continuous>  tigecycline IVPB 50 milliGRAM(s) IV Intermittent every 12 hours  tigecycline IVPB      vancomycin    Solution 500 milliGRAM(s) Oral every 6 hours    MEDICATIONS  (PRN):  oxyCODONE    IR 10 milliGRAM(s) Oral every 3 hours PRN Moderate Pain (4 - 6)    PRESENT SYMPTOMS: []Unable to obtain due to poor mentation/encephalopathy  Source if other than patient:  []Family   []Team     Pain: [x] yes [ ] no  QOL impact - bothersome, debilitating  Location - R Hip, lower abdomen                   Aggravating factors - certain movements,   Quality - sharp, crampy  Radiation - across abdomen  Timing - constant  Severity (0-10 scale): 7  Minimal acceptable level (0-10 scale): 4    PAIN AD Score:  http://geriatrictoolkit.[place].[email or web address]/painad.pdf (press ctrl +  left click to view)    Dyspnea:                           [x]Mild  []Moderate []Severe  Anxiety:                             []Mild []Moderate []Severe  Fatigue:                             []Mild []Moderate []Severe  Nausea:                             []Mild []Moderate []Severe  Loss of appetite:              []Mild []Moderate []Severe  Constipation:                    []Mild []Moderate []Severe    Other Symptoms:  [x]All other review of systems negative     Palliative Performance Status Version 2:  30%    http://npcrc.org/files/news/palliative_performance_scale_ppsv2.pdf    PHYSICAL EXAM:  Vital Signs Last 24 Hrs  T(C): 36.2 (2020 16:00), Max: 36.6 (2020 08:00)  T(F): 97.2 (2020 16:00), Max: 97.8 (2020 08:00)  HR: 70 (2020 17:00) (61 - 110)  BP: 102/66 (:00) (78/54 - 131/70)  BP(mean): 77 (:00) (59 - 91)  RR: 17 (2020 17:00) (12 - 25)  SpO2: 100% (2020 17:00) (96% - 100%) I&O's Summary    2020 07:01  -  2020 07:00  --------------------------------------------------------  IN: 2291.5 mL / OUT: 935 mL / NET: 1356.5 mL    2020 07:01  -  2020 17:57  --------------------------------------------------------  IN: 1334 mL / OUT: 175 mL / NET: 1159 mL    GENERAL:  [x]Alert  [x]Oriented x3   []Lethargic  []Cachexia  []Unarousable  [x]Verbal  []Non-Verbal  Behavioral:   [] Anxiety  [] Delirium [] Agitation [] Other  HEENT:  [x]Normal   []Dry mouth   []ET Tube/Trach  []Oral lesions  PULMONARY:   [x]Clear []Tachypnea  []Audible excessive secretions   []Rhonchi        []Right []Left []Bilateral  [x]Crackles        []Right []Left [x]Bilateral  []Wheezing     []Right []Left []Bilateral  CARDIOVASCULAR:    [x]Regular []Irregular []Tachy  []Prasanth []Murmur []Other  GASTROINTESTINAL:  [x]Soft  [x]Distended   [x]+BS  []Non tender [x]Tender  []PEG [x]OGT/ NGT  Last BM:   GENITOURINARY:  [x]Normal [] Incontinent   []Oliguria/Anuria   []Zamorano  MUSCULOSKELETAL:   []Normal   [x]Weakness  []Bed/Wheelchair bound []Edema  NEUROLOGIC:   [x]No focal deficits  [] Cognitive impairment  [] Dysphagia []Dysarthria [] Paresis []Other   SKIN:   []Normal   [x]Pressure ulcer(s)  []Rash    CRITICAL CARE:  [x] Shock Present  [x]Septic [ ]Cardiogenic [ ]Neurologic [ ]Hypovolemic  [x]  Vasopressors [ ]  Inotropes   [ ] Respiratory failure present [ ] Mechanical Ventilation [ ] Non-invasive ventilatory support [ ] High-Flow  [ ] Acute  [ ] Chronic [ ] Hypoxic  [ ] Hypercarbic [ ] Other  [ ] Other organ failure    LABS:                        8.5    24.12 )-----------( 284      ( 2020 09:58 )             25.1       129<L>  |  97<L>  |  24<H>  ----------------------------<  134<H>  3.5   |  19<L>  |  0.43<L>    Ca    7.8<L>      2020 02:20  Phos  3.6       Mg     2.2         TPro  4.1<L>  /  Alb  1.8<L>  /  TBili  0.4  /  DBili  x   /  AST  18  /  ALT  13  /  AlkPhos  194<H>    PT/INR - ( 2020 02:20 )   PT: 17.5 SEC;   INR: 1.57     PTT - ( 2020 02:20 )  PTT:36.1 SEC    Urinalysis Basic - ( 15 Nov 2020 19:30 )  Color: YELLOW / Appearance: Lt TURBID / S.019 / pH: 6.0  Gluc: NEGATIVE / Ketone: NEGATIVE  / Bili: NEGATIVE / Urobili: NORMAL   Blood: SMALL / Protein: 20 / Nitrite: NEGATIVE   Leuk Esterase: LARGE / RBC: 4-6 / WBC 26-50   Sq Epi: OCC / Non Sq Epi: x / Bacteria: MODERATE    RADIOLOGY & ADDITIONAL STUDIES:  < from: CT Abdomen and Pelvis w/ IV Cont (11.15.20 @ 21:06) >  KIDNEYS/URETERS: Bilateral renal subcentimeter hypodense lesion, too small to characterize. Left renal cyst. Indeterminate 1.7 cm right renal midpole low-attenuation lesion which is not significantly changed. No hydronephrosis. Redemonstration of a peripheral area of decreased enhancement in the anterior inferior left renal lower pole with some increase in cortical enhancement which could be secondary to a renal infarct.    BLADDER: Collapsed around a Zamorano catheter balloon.  REPRODUCTIVE ORGANS: Evaluation is limited due to streak artifact from the right pelvis and right hip orthopedic hardware. Uterus appears atrophic.    BOWEL: No bowel obstruction. Appendix is not visualized. No evidence of inflammation in the pericecal region. Interval improvement of pancolitis with mild diffuse residual wall hyperemia suggestive of mild residual pancolitis.  PERITONEUM: No ascites.  VESSELS: Atherosclerotic changes.  RETROPERITONEUM/LYMPH NODES: Small retroperitoneal lymph nodes.  ABDOMINAL WALL: Generalized anasarca.  BONES: Scattered osseous destructive lesions including the posterior left 11th rib, lateral left 8th rib, spine and pelvis compatible with metastatic disease. The metastatic disease within the right pelvis (2, 59) has progressed since the prior examination with increased osseous destruction and soft tissue extension through the cortex. Partially visualized right hip arthroplasty and plate, multiple screws and intraosseous cement in the right iliac wing. Chronic fracture of the right inferior pubic ramus. Redemonstration of a T11 vertebral body compression fracture deformity with associated lytic and sclerotic lesions likely representing metastatic disease with a pathologic fracture.    IMPRESSION:  1. Interval improvement apancolitis with mild pancolitis remaining.  2. Mild common bile duct dilatation with question of a 4 mm intraluminal focus within the mid common bile duct. Correlate with LFTs and if clinically warranted, an MRCP.  3. Osseous metastatic disease with interval worsening of a right iliac wing lesion.    PROTEIN CALORIE MALNUTRITION PRESENT: [ ]mild [x ]moderate [ ]severe [ ]underweight [ ]morbid obesity  [x]PPSV2 < or = to 30% []significant weight loss  [x]poor nutritional intake [x]catabolic state []anasarca     Albumin, Serum: 1.8 g/dL (20 @ 02:20)  Artificial Nutrition []     REFERRALS:   []Chaplaincy  []Hospice  []Child Life  [x]Social Work  []Case management []Holistic Therapy     Goals of Care Document:

## 2020-11-17 NOTE — CHART NOTE - TREATMENT: THE FOLLOWING DIET HAS BEEN RECOMMENDED
Diet, NPO:   Except Medications     Special Instructions for Nursing:  Except Medications (11-16-20 @ 00:19) [Active]

## 2020-11-17 NOTE — CONSULT NOTE ADULT - ASSESSMENT
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  Patient was admitted recently to [facility] and discharged 11/4 with the same picture.     Goals of care and possible treatment options were discussed with patient and her daughter at that time. Performance status at that time would preclude her from systemic cancer modifying therapies, however daughter and patient remained hopeful that patient will get better and will be able to receive therapy. They were made aware that this is unlikely and patient is an appropriate candidate for hospice services.  Patient was also seen by palliative care, Dr Hu and GOC was held, patient and family were not interested in hospice services at that time.     -Appreciate excellent MICU care for Cdiff colitis and sepsis.  -Will need ongoing goals of care discussions and hospice discussions  -Palliative care consult  -Supportive care, pain control, Nutrition, PT, DVT ppx    Will follow. Please do not hesitate to call back with questions.     Eliana Osullivan MD  Medical Oncology Attending  C: [phone]

## 2020-11-17 NOTE — PROGRESS NOTE ADULT - SUBJECTIVE AND OBJECTIVE BOX
Follow Up:  septic shock, cdiff    Interval History/ROS: patient more interactive, notes moderate abd pain, RN at bedside notes improvement, less confusion, continued diarrhea    Allergies  No Known Allergies      ANTIMICROBIALS:  metroNIDAZOLE  IVPB 500 every 8 hours  tigecycline IVPB    tigecycline IVPB 50 every 12 hours  vancomycin    Solution 500 every 6 hours      OTHER MEDS:  MEDICATIONS  (STANDING):  melatonin 3 at bedtime  midodrine 20 every 8 hours  norepinephrine Infusion 0.062 <Continuous>  ondansetron Injectable 4 once  oxyCODONE    IR 10 every 3 hours PRN      Vital Signs Last 24 Hrs  T(C): 36.6 (2020 08:00), Max: 36.6 (2020 08:00)  T(F): 97.8 (2020 08:00), Max: 97.8 (2020 08:00)  HR: 75 (2020 12:00) (62 - 110)  BP: 96/62 (2020 12:00) (80/49 - 131/70)  BP(mean): 73 (2020 12:00) (59 - 91)  RR: 17 (2020 12:00) (11 - 25)  SpO2: 100% (2020 12:00) (99% - 100%)    PHYSICAL EXAM:  General: ill appearing,  Non-toxic  Neurology: responsive  Respiratory: Clear to auscultation bilaterally  CV: RRR, S1S2, no murmurs, rubs or gallops  Abdominal: Soft, Non-tender, non-distended,  Extremities: No edema,  Line Sites: Clear  Skin: No rash                        8.5    24.12 )-----------( 284      ( 2020 09:58 )             25.1   WBC Count: 24.12 ( @ 09:58)  WBC Count: 29.16 ( @ 02:20)  WBC Count: 29.92 ( @ 20:15)  WBC Count: 29.44 ( @ 11:40)  WBC Count: 35.90 ( @ 02:45)  WBC Count: 26.21 (11-15 @ 21:30)  WBC Count: 29.59 (11-15 @ 18:51)         129<L>  |  97<L>  |  24<H>  ----------------------------<  134<H>  3.5   |  19<L>  |  0.43<L>    Ca    7.8<L>      2020 02:20  Phos  3.6       Mg     2.2         TPro  4.1<L>  /  Alb  1.8<L>  /  TBili  0.4  /  DBili  x   /  AST  18  /  ALT  13  /  AlkPhos  194<H>        Urinalysis Basic - ( 15 Nov 2020 19:30 )    Color: YELLOW / Appearance: Lt TURBID / S.019 / pH: 6.0  Gluc: NEGATIVE / Ketone: NEGATIVE  / Bili: NEGATIVE / Urobili: NORMAL   Blood: SMALL / Protein: 20 / Nitrite: NEGATIVE   Leuk Esterase: LARGE / RBC: 4-6 / WBC 26-50   Sq Epi: OCC / Non Sq Epi: x / Bacteria: MODERATE      MICROBIOLOGY:  .Blood Blood-Peripheral  11-15-20   No growth to date.  --  --    11.15.20 @ 22:13) Clostridium difficile Toxin by PCR: Detected:   RADIOLOGY:  d< from: CT Abdomen and Pelvis w/ IV Cont (11.15.20 @ 21:06) >    IMPRESSION:  1. Interval improvement apancolitis with mild pancolitis remaining.  2. Mild common bile duct dilatation with question of a 4 mm intraluminal focus within the mid common bile duct. Correlate with LFTs and if clinically warranted, an MRCP.  3. Osseous metastatic disease with interval worsening of a right iliac wing lesion.    < end of copied text >      Herb Cabezas MD; Division of Infectious Disease; Pager: [phone]; nights and weekends: [phone]

## 2020-11-17 NOTE — CONSULT NOTE ADULT - SUBJECTIVE AND OBJECTIVE BOX
Patient is a 70y old  Female who presents with a chief complaint of Pancolitis, septic shock (17 Nov 2020 12:54)      HPI:  69f with metastatic NSCLC, diagnosed 8/2020, with mets to brain, Spine, and Right hip s/p RT x 5 (last tx 9/2), dementia, DVT, Afib on Eliqius, presenting with hypotension. Patient was recently admitted to MICU for sepsis 2/2 C.diff colitis and required pressors. Patient completed course of antibiotics and was discharged, however started having diarrhea for past one day. No fevers, chills, chest pain, or shortness of breath. Patient has some lower abdominal discomfort. No dysuria.     In the ED, patient was found to be hypotensive to 72/48. She received total 3L of IVF, po vanc, 20 midodrine X1. (15 Nov 2020 22:54)       ROS: as above     PAST MEDICAL & SURGICAL HISTORY:  Lung cancer metastatic to bone    No significant past surgical history        SOCIAL HISTORY:    FAMILY HISTORY:  FH: colon cancer    Family history of cervical cancer        MEDICATIONS  (STANDING):  chlorhexidine 4% Liquid 1 Application(s) Topical <User Schedule>  lactated ringers. 1000 milliLiter(s) (100 mL/Hr) IV Continuous <Continuous>  melatonin 3 milliGRAM(s) Oral at bedtime  metroNIDAZOLE  IVPB 500 milliGRAM(s) IV Intermittent every 8 hours  midodrine 20 milliGRAM(s) Oral every 8 hours  multivitamin 1 Tablet(s) Oral daily  norepinephrine Infusion 0.062 MICROgram(s)/kG/Min (5.63 mL/Hr) IV Continuous <Continuous>  ondansetron Injectable 4 milliGRAM(s) IV Push once  potassium chloride  10 mEq/100 mL IVPB 10 milliEquivalent(s) IV Intermittent every 1 hour  tigecycline IVPB      tigecycline IVPB 50 milliGRAM(s) IV Intermittent every 12 hours  vancomycin    Solution 500 milliGRAM(s) Oral every 6 hours    MEDICATIONS  (PRN):  oxyCODONE    IR 10 milliGRAM(s) Oral every 3 hours PRN Moderate Pain (4 - 6)      Allergies    No Known Allergies    Intolerances        Vital Signs Last 24 Hrs  T(C): 36.6 (17 Nov 2020 08:00), Max: 36.6 (17 Nov 2020 08:00)  T(F): 97.8 (17 Nov 2020 08:00), Max: 97.8 (17 Nov 2020 08:00)  HR: 75 (17 Nov 2020 12:00) (62 - 110)  BP: 96/62 (17 Nov 2020 12:00) (80/49 - 131/70)  BP(mean): 73 (17 Nov 2020 12:00) (59 - 91)  RR: 17 (17 Nov 2020 12:00) (11 - 25)  SpO2: 100% (17 Nov 2020 12:00) (99% - 100%)    PHYSICAL EXAM  General: resting comfortably in bed      LABS:                          8.5    24.12 )-----------( 284      ( 17 Nov 2020 09:58 )             25.1         Mean Cell Volume : 80.4 fL  Mean Cell Hemoglobin : 27.2 pg  Mean Cell Hemoglobin Concentration : 33.9 %  Auto Neutrophil # : 21.39 K/uL  Auto Lymphocyte # : 0.92 K/uL  Auto Monocyte # : 1.33 K/uL  Auto Eosinophil # : 0.00 K/uL  Auto Basophil # : 0.04 K/uL  Auto Neutrophil % : 88.7 %  Auto Lymphocyte % : 3.8 %  Auto Monocyte % : 5.5 %  Auto Eosinophil % : 0.0 %  Auto Basophil % : 0.2 %      Serial CBC's  11-17 @ 09:58  Hct-25.1 / Hgb-8.5 / Plat-284 / RBC-3.12 / WBC-24.12  Serial CBC's  11-17 @ 02:20  Hct-27.6 / Hgb-9.2 / Plat-337 / RBC-3.44 / WBC-29.16  Serial CBC's  11-16 @ 20:15  Hct-27.4 / Hgb-9.1 / Plat-363 / RBC-3.42 / WBC-29.92  Serial CBC's  11-16 @ 11:40  Hct-25.4 / Hgb-8.7 / Plat-310 / RBC-3.16 / WBC-29.44  Serial CBC's  11-16 @ 02:45  Hct-28.4 / Hgb-9.5 / Plat-367 / RBC-3.47 / WBC-35.90  Serial CBC's  11-15 @ 21:30  Hct-17.8 / Hgb-5.7 / Plat-313 / RBC-2.20 / WBC-26.21  Serial CBC's  11-15 @ 18:51  Hct-22.9 / Hgb-7.6 / Plat-423 / RBC-2.92 / WBC-29.59      11-17    129<L>  |  97<L>  |  24<H>  ----------------------------<  134<H>  3.5   |  19<L>  |  0.43<L>    Ca    7.8<L>      17 Nov 2020 02:20  Phos  3.6     11-17  Mg     2.2     11-17    TPro  4.1<L>  /  Alb  1.8<L>  /  TBili  0.4  /  DBili  x   /  AST  18  /  ALT  13  /  AlkPhos  194<H>  11-17      PT/INR - ( 17 Nov 2020 02:20 )   PT: 17.5 SEC;   INR: 1.57          PTT - ( 17 Nov 2020 02:20 )  PTT:36.1 SEC                RADIOLOGY & ADDITIONAL STUDIES:

## 2020-11-18 NOTE — PROGRESS NOTE ADULT - ASSESSMENT
69 F Hx Lung Ca in 8/2020 with mets to ?brain, Spine, and Right hip s/p RT x 5 ( last tx 9/2), dementia, DVT, Afib on Eliqius, presenting with hypotension found to be in septic shock 2/2 possible pancolitis, most likely C.diff vs possible PNA. Admitted to MICU for pressure support.     #Neuro  - patient is able to communicate and follow commands  - currently not on sedation  - possible brain mets, however PET scan from 10/2 & MRI 8/2020 showed possible meningioma    #Pulm  - hemodynamically stable, protecting airway, satting well on room air   - CXR shows Bilateral patchy patchy right sided and more confluent left lower lobe opacities may represent multifocal pneumonia; likely pulmonary mets    #Cardio  - patient found to be hypotensive with SBP in the 70s 2/2 septic shock   - s/p 3L and midodrine 20X1  - c/w levophed for pressure support  - Midodrine 20 q8h   - 1 L LR x 10 hrs for today   - TTE (10/12/20) shows normal LV systolic function, mild diastolic dysfunction (stage 1)    #GI  - Patient presented with diarrhea, recently admitted to MICU for C.diff pancolitis (Cdiff POS)  - continue po vanc 500 q6h for C.diff colitis  - ID recs appreciated c/w flagyl 500 q8h, start tigecycline 100mg IVP x1, then 50mg IV BID (11/16- )  - C.diff results   - keep NPO for now  - concern for lower GI bleed from pancolitis  - trend CBC q12, transfuse for Hgb <7    #Renal  #AG metabolic Acidosis; likely 2/2 to elevated BUN & hypovolemia  - Fluids as above  #Hyponatremic most likely 2/2 hypovolemia   - monitor electrolytes, Is&Os    #ID  - significant leukocytosis in the setting of pancolitis, improving  - CT A/P shows interval improvement of pancolitis with mild pancolitis remaining   - continue po vanc, IV flagyl, start tigecycline as above for C.diff colitis  - concern for possible PNA on CXR vs mets  - f/u urine legionella, s/p azithromycin (11/16-11/17)   - UA positive, UCx 11/15 pending  - blood cultures 11/15 pending    #Heme/Onc  #Metastatic Lung Cancer  - follows with Dr. Xie at [facility]  - palliative care consulted  #Anemia  - Hgb dropped to 5.7 most likely GI bleed from pancolitis/C.diff  - s/p 1 unit pRBC, goal Hgb >7    #DVT  - hx of DVT left posterior tibial and peroneal veins 10/18  - US/ Duplex to eval for progression  - SCDs to be put on upper extremities     #Endo   - A1c 6.1 in August  - monitors FS while NPO    #Ethics  - FULL code according to family  - Palliative care consulted   69 F Hx Lung Ca in 8/2020 with mets to ?brain, Spine, and Right hip s/p RT x 5 ( last tx 9/2), dementia, DVT, Afib on Eliqius, presenting with hypotension found to be in septic shock 2/2 cdiff pancolitis. Admitted to MICU for pressure support.     #Neuro  - AOx3, no active issues  - possible brain mets, however PET scan from 10/2 & MRI 8/2020 showed possible meningioma    #Pulm  - hemodynamically stable, protecting airway, satting well on room air   - CXR shows Bilateral patchy patchy right sided and more confluent left lower lobe opacities may represent multifocal pneumonia; likely pulmonary mets    #Cardio  Septic shock   - s/p 3L and midodrine 20X1  - c/w levophed for pressure support  - Midodrine 20 q8h - > Midodrine 30 q8h   - TTE (10/12/20) shows normal LV systolic function, mild diastolic dysfunction (stage 1)    #GI  - Patient presented with diarrhea, recently admitted to MICU for C.diff pancolitis (Cdiff POS)  - continue po vanc 500 q6h for C.diff colitis  - ID recs appreciated c/w flagyl 500 q8h, tigecycline 50mg IV BID (11/16- )  - concern for lower GI bleed from pancolitis  - trend CBC q12, transfuse for Hgb <7, Hb has been stable    #Renal  #AG metabolic Acidosis; likely 2/2 to elevated BUN & hypovolemia  - Diet CLD  #Hyponatremic most likely 2/2 hypovolemia   - monitor electrolytes, Is&Os    #ID  UCx 11/15: VRE s/s to linezolid  - started linezolid 600 q12h (11/18 - )  - f/u urine legionella, s/p azithromycin (11/16-11/17)     Cdiff colitis: improving  - CT A/P shows interval improvement of pancolitis with mild pancolitis remaining   - continue po vanc, IV flagyl, start tigecycline as above for C.diff colitis  - blood cultures 11/15 NGDT  - ID recs appreciated    #Heme/Onc  #Metastatic Lung Cancer  - follows with Dr. Xie at [facility]  - palliative care consulted  #Anemia  - Hgb dropped to 5.7 most likely GI bleed from pancolitis/C.diff  - s/p 1 unit pRBC, goal Hgb >7, stable    #DVT  - hx of DVT left posterior tibial and peroneal veins 10/18  - US/ Duplex to eval for progression  - SCDs to be put on upper extremities     #Endo   - A1c 6.1 in August    #Ethics  - FULL code according to family  - Palliative care recs appreciated   69 F Hx Lung Ca in 8/2020 with mets to ?brain, Spine, and Right hip s/p RT x 5 ( last tx 9/2), dementia, DVT, Afib on Eliqius, presenting with hypotension found to be in septic shock 2/2 cdiff pancolitis. Admitted to MICU for pressure support.     #Neuro  - AOx3, no active issues  - possible brain mets, however PET scan from 10/2 & MRI 8/2020 showed possible meningioma    #Pulm  - hemodynamically stable, protecting airway, satting well on room air   - CXR shows Bilateral patchy patchy right sided and more confluent left lower lobe opacities may represent multifocal pneumonia; likely pulmonary mets    #Cardio  Septic shock   - s/p 3L and midodrine 20X1  - c/w levophed for pressure support  - Midodrine 20 q8h - > Midodrine 30 q8h   - TTE (10/12/20) shows normal LV systolic function, mild diastolic dysfunction (stage 1)    #GI  - Patient presented with diarrhea, recently admitted to MICU for C.diff pancolitis (Cdiff POS)  - continue po vanc 500 q6h for C.diff colitis  - ID recs appreciated c/w flagyl 500 q8h, tigecycline 50mg IV BID (11/16- )  - concern for lower GI bleed from pancolitis  - trend CBC q12, transfuse for Hgb <7, Hb has been stable    #Renal  #AG metabolic Acidosis; likely 2/2 to elevated BUN & hypovolemia  - Diet CLD  #Hyponatremic most likely 2/2 hypovolemia   - monitor electrolytes, Is&Os    #ID  UCx 11/15: VRE s/s to linezolid  - started linezolid 600 q12h (11/18 - )  - f/u urine legionella, s/p azithromycin (11/16-11/17)     Cdiff colitis: improving  - CT A/P shows interval improvement of pancolitis with mild pancolitis remaining   - continue po vanc, IV flagyl, start tigecycline as above for C.diff colitis  - blood cultures 11/15 NGDT  - ID recs appreciated    #Heme/Onc  #Metastatic Lung Cancer w/ mets to bone  - follows with Dr. Xie at [facility]  - palliative care consulted  - Pain control: Back Pain 2/2 mets  - restarted home dose oxy 20 ER TID. c/w Oxy IR 10 q3hs PRN    #Anemia  - Hgb dropped to 5.7 most likely GI bleed from pancolitis/C.diff  - s/p 1 unit pRBC, goal Hgb >7, stable    #DVT  - hx of DVT left posterior tibial and peroneal veins 10/18  - US/ Duplex to eval for progression  - SCDs to be put on upper extremities     #Endo   - A1c 6.1 in August    #Ethics  - FULL code according to family  - Palliative care recs appreciated

## 2020-11-18 NOTE — PROGRESS NOTE ADULT - ASSESSMENT
69W with recent severe Cdiff,  Lung Ca since  8/2020 with mets to brain, Spine, and Right hip s/p RT x 5 ( last tx 9/2), dementia, DVT, Afib on Eliqius [id number]/15 with hypotension, diarrhea, leukocytosis.  Imaging suggests at least mild colitis, LLL atelectasis,    Antibiotic History  IV Vanco 9/12 --> 13; 10/10 -->11; 11/15  Zosyn 9/12 --> 16; 10/10; 11/16  Azithro 9/12; 11/16  Cefazolin 9/18  Metronidazole 10/10 --> 10/17; 11/16  Meropenem 10/10 --> 10/13  Vanco enteral solution 10/11 -->24; 11/15-->    recurrent Cdiff   episode of melena  lung opacities - likely metatstatic disease  VRE bacteruria notes -though overall improved and without urinary complaints    Suggest  Continue Enteral Vanco solution 500 every 6 hours  Continue Intravenous Metronidazole  Continue Tigecyline 100 mg IV x 1 then 50 mg IV every 12 hours  await culture results  discontinue Linezolid    if stable with negative blood cultures, discontinue Tigecyline 11/19  if continue to improve, please discontinue flagyl, decrease vanco dose to 125 mg 4times daily and eventual extended vanco taper in 1-2 days    discussed with MICU residents

## 2020-11-18 NOTE — PROGRESS NOTE ADULT - ASSESSMENT
71yo F with recently diagnosed Metastatic NSCLC (no treatment yet) p/w shock due to recurrent C. diff after recent JAYANT. Palliative consulted for pain management in the setting of life-limiting illness.    ·	consider Oxy ER 20mg PO q12h ATC  ·	Hospice Referral sent; would be for home but logistics can be clarified once patient is on the floor

## 2020-11-18 NOTE — PROGRESS NOTE ADULT - SUBJECTIVE AND OBJECTIVE BOX
[facility] Geriatrics and Palliative Care  Gerald Hu, Palliative Care Attending  Contact Info: Page [phone] (including Nights/Weekend), message on Microsoft Teams (Gerald Hu), or leave VM at Palliative Office [phone] (Non-Urgent)    SUBJECTIVE AND OBJECTIVE:  INTERVAL HPI/OVERNIGHT EVENTS: No acute events overnight. Patient required PRNs of Oxy IR x4 in past 24hrs. No adverse effects of opiates noted: no sedation/dizziness/nausea.    Allergies  No Known Allergies    MEDICATIONS  (STANDING):  chlorhexidine 4% Liquid 1 Application(s) Topical <User Schedule>  lidocaine   Patch 2 Patch Transdermal daily  linezolid  IVPB      linezolid  IVPB 600 milliGRAM(s) IV Intermittent every 12 hours  melatonin 3 milliGRAM(s) Oral at bedtime  metroNIDAZOLE  IVPB 500 milliGRAM(s) IV Intermittent every 8 hours  midodrine 30 milliGRAM(s) Oral every 8 hours  multivitamin 1 Tablet(s) Oral daily  norepinephrine Infusion 0.062 MICROgram(s)/kG/Min (5.63 mL/Hr) IV Continuous <Continuous>  oxyCODONE  ER Tablet 20 milliGRAM(s) Oral every 8 hours  tigecycline IVPB      tigecycline IVPB 50 milliGRAM(s) IV Intermittent every 12 hours  vancomycin    Solution 500 milliGRAM(s) Oral every 6 hours    MEDICATIONS  (PRN):  oxyCODONE    IR 10 milliGRAM(s) Oral every 3 hours PRN Moderate Pain (4 - 6)    ITEMS UNCHECKED ARE NOT PRESENT    PRESENT SYMPTOMS: []Unable to obtain due to poor mentation   Source if other than patient:  []Family   []Team     Pain: [x] yes [ ] no  QOL impact - bothersome, debilitating  Location - R Hip, lower abdomen                   Aggravating factors - certain movements,   Quality - sharp, crampy  Radiation - across abdomen  Timing - constant  Severity (0-10 scale): 7  Minimal acceptable level (0-10 scale): 4    PAIN AD Score:  [email or web address].pdf (press ctrl +  left click to view)    Dyspnea:                           [x]Mild  []Moderate []Severe  Anxiety:                             []Mild []Moderate []Severe  Fatigue:                             []Mild []Moderate []Severe  Nausea:                             []Mild []Moderate []Severe  Loss of appetite:              []Mild []Moderate []Severe  Constipation:                    []Mild []Moderate []Severe    Other Symptoms:  [x]All other review of systems negative     Palliative Performance Status Version 2:  30%  [email or web address]/[email or web address]_scale_[email or web address]    PHYSICAL EXAM:  Vital Signs Last 24 Hrs  T(C): 36.1 (18 Nov 2020 04:00), Max: 36.7 (17 Nov 2020 20:00)  T(F): 97 (18 Nov 2020 04:00), Max: 98 (17 Nov 2020 20:00)  HR: 84 (18 Nov 2020 15:00) (56 - 100)  BP: 112/72 (18 Nov 2020 15:00) (79/62 - 118/65)  BP(mean): 84 (18 Nov 2020 15:00) (62 - 86)  RR: 14 (18 Nov 2020 15:00) (11 - 23)  SpO2: 99% (18 Nov 2020 15:00) (95% - 100%) I&O's Summary    17 Nov 2020 07:01  -  18 Nov 2020 07:00  --------------------------------------------------------  IN: 3359.8 mL / OUT: 1230 mL / NET: 2129.8 mL    18 Nov 2020 07:01  -  18 Nov 2020 15:45  --------------------------------------------------------  IN: 1107.1 mL / OUT: 185 mL / NET: 922.1 mL      GENERAL:  [x]Alert  [x]Oriented x3   []Lethargic  []Cachexia  []Unarousable  [x]Verbal  []Non-Verbal  Behavioral:   [] Anxiety  [] Delirium [] Agitation [] Other  HEENT:  [x]Normal   []Dry mouth   []ET Tube/Trach  []Oral lesions  PULMONARY:   [x]Clear []Tachypnea  []Audible excessive secretions   []Rhonchi        []Right []Left []Bilateral  [x]Crackles        []Right []Left [x]Bilateral  []Wheezing     []Right []Left []Bilateral  CARDIOVASCULAR:    [x]Regular []Irregular []Tachy  []Prasanth []Murmur []Other  GASTROINTESTINAL:  [x]Soft  [x]Distended   [x]+BS  []Non tender [x]Tender  []PEG [x]OGT/ NGT  Last BM: 11/17  GENITOURINARY:  [x]Normal [] Incontinent   []Oliguria/Anuria   []Zamorano  MUSCULOSKELETAL:   []Normal   [x]Weakness  []Bed/Wheelchair bound []Edema  NEUROLOGIC:   [x]No focal deficits  [] Cognitive impairment  [] Dysphagia []Dysarthria [] Paresis []Other   SKIN:   []Normal   [x]Pressure ulcer(s)  []Rash    CRITICAL CARE:  [x] Shock Present  [x]Septic [ ]Cardiogenic [ ]Neurologic [ ]Hypovolemic  [x]  Vasopressors [ ]  Inotropes   [ ] Respiratory failure present [ ] Mechanical Ventilation [ ] Non-invasive ventilatory support [ ] High-Flow  [ ] Acute  [ ] Chronic [ ] Hypoxic  [ ] Hypercarbic [ ] Other  [ ] Other organ failure    LABS:              8.4    24.48 )-----------( 248      ( 18 Nov 2020 04:30 )             25.5   11-18    127<L>  |  96<L>  |  22  ----------------------------<  100<H>  4.2   |  18<L>  |  0.36<L>    Ca    7.6<L>      18 Nov 2020 04:30  Phos  2.9     11-18  Mg     1.6     11-18    TPro  3.4<L>  /  Alb  1.5<L>  /  TBili  0.4  /  DBili  x   /  AST  16  /  ALT  9   /  AlkPhos  167<H>  11-18  PT/INR - ( 18 Nov 2020 04:30 )   PT: 20.8 SEC;   INR: 1.86     PTT - ( 18 Nov 2020 04:30 )  PTT:41.9 SEC    RADIOLOGY & ADDITIONAL STUDIES: None new    PROTEIN CALORIE MALNUTRITION PRESENT: [ ]mild [x ]moderate [ ]severe [ ]underweight [ ]morbid obesity  [x]PPSV2 < or = to 30% []significant weight loss  [x]poor nutritional intake [x]catabolic state []anasarca   Albumin, Serum: 1.5 g/dL (11-18-20 @ 04:30)   Artificial Nutrition []     REFERRALS:   []Chaplaincy  [x]Hospice  []Child Life  [x]Social Work  []Case management []Holistic Therapy []Physical Therapy []Dietary     Goals of Care Document:   Care Coordination Document:

## 2020-11-18 NOTE — PROGRESS NOTE ADULT - PROBLEM SELECTOR PLAN 4
.  Treatment-naive metastatic disease  -patient has bounced back and forth between the hospital and [facility], the hope was to improve her functional status to be evaluated for systemic treatment but this has not been possible  -discussed with patient's children that DMT is not possible at this time and seems unlikely given the patient's recurrent decompensations  -if no DMT were pursued, then patient would be eligible for hospice

## 2020-11-18 NOTE — PROGRESS NOTE ADULT - PROBLEM SELECTOR PLAN 5
.  Discussed the role of hospice services, explained this would be the best way to get patient home after the hospital with support  -family's goal is to bring the patient home so they were receptive to hospice referral when patient is out of the ICU  -they still remain hopeful that DMT can be pursued but understand that hospice service cannot be active concurrently  -advised the patient's children to discuss code status, will follow-up

## 2020-11-18 NOTE — PROGRESS NOTE ADULT - SUBJECTIVE AND OBJECTIVE BOX
Follow Up:  cdiff    Interval History/ROS:  notes chronic back pain    Allergies  No Known Allergies    ANTIMICROBIALS:  linezolid  IVPB    linezolid  IVPB 600 every 12 hours  metroNIDAZOLE  IVPB 500 every 8 hours  tigecycline IVPB    tigecycline IVPB 50 every 12 hours  vancomycin    Solution 500 every 6 hours    OTHER MEDS:  MEDICATIONS  (STANDING):  melatonin 3 at bedtime  midodrine 30 every 8 hours  norepinephrine Infusion 0.062 <Continuous>  oxyCODONE    IR 10 every 3 hours PRN  oxyCODONE  ER Tablet 20 every 8 hours      Vital Signs Last 24 Hrs  T(C): 36.1 (18 Nov 2020 04:00), Max: 36.7 (17 Nov 2020 20:00)  T(F): 97 (18 Nov 2020 04:00), Max: 98 (17 Nov 2020 20:00)  HR: 84 (18 Nov 2020 15:00) (56 - 100)  BP: 112/72 (18 Nov 2020 15:00) (79/62 - 118/65)  BP(mean): 84 (18 Nov 2020 15:00) (62 - 86)  RR: 14 (18 Nov 2020 15:00) (11 - 23)  SpO2: 99% (18 Nov 2020 15:00) (95% - 100%)    PHYSICAL EXAM:  General: ill appearing NAD, Non-toxic  Neurology: fatigued  Respiratory: Clear to auscultation bilaterally  CV: RRR, S1S2, no murmurs, rubs or gallops  Abdominal: Soft, Non-tender, non-distended,   Extremities: No edema,  Line Sites: Clear  Skin: No rash                        8.4    24.48 )-----------( 248      ( 18 Nov 2020 04:30 )             25.5   WBC Count: 24.48 (11-18 @ 04:30)  WBC Count: 21.11 (11-17 @ 22:30)  WBC Count: 24.12 (11-17 @ 09:58)  WBC Count: 29.16 (11-17 @ 02:20)  WBC Count: 29.92 (11-16 @ 20:15)  WBC Count: 29.44 (11-16 @ 11:40)  WBC Count: 35.90 (11-16 @ 02:45)  WBC Count: 26.21 (11-15 @ 21:30)  WBC Count: 29.59 (11-15 @ 18:51)    11-18    127<L>  |  96<L>  |  22  ----------------------------<  100<H>  4.2   |  18<L>  |  0.36<L>    Ca    7.6<L>      18 Nov 2020 04:30  Phos  2.9     11-18  Mg     1.6     11-18    TPro  3.4<L>  /  Alb  1.5<L>  /  TBili  0.4  /  DBili  x   /  AST  16  /  ALT  9   /  AlkPhos  167<H>  11-18      MICROBIOLOGY:  .Urine Catheterized  11-15-20   >100,000 CFU/ml Enterococcus faecium (vancomycin resistant)  <10,000 CFU/ml Normal Urogenital crystal present  --  Enterococcus faecium (vancomycin resistant)      .Blood Blood-Peripheral  11-15-20   No growth to date.  --  --      RADIOLOGY:  < from: CT Abdomen and Pelvis w/ IV Cont (11.15.20 @ 21:06) >  KIDNEYS/URETERS: Bilateral renal subcentimeter hypodense lesion, too small to characterize. Left renal cyst. Indeterminate 1.7 cm right renal midpole low-attenuation lesion which is not significantly changed. No hydronephrosis. Redemonstration of a peripheral area of decreased enhancement in the anterior inferior left renal lower pole with some increase in cortical enhancement which could be secondary to a renal infarct.    BLADDER: Collapsed around a Zamorano catheter balloon.  REPRODUCTIVE ORGANS: Evaluation is limited due to streak artifact from the right pelvis and right hip orthopedic hardware. Uterus appears atrophic.    BOWEL: No bowel obstruction. Appendix is not visualized. No evidence of inflammation in the pericecal region. Interval improvement of pancolitis with mild diffuse residual wall hyperemia suggestive of mild residual pancolitis.  PERITONEUM: No ascites.  VESSELS: Atherosclerotic changes.  RETROPERITONEUM/LYMPH NODES: Small retroperitoneal lymph nodes.  ABDOMINAL WALL: Generalized anasarca.  BONES: Scattered osseous destructive lesions including the posterior left 11th rib, lateral left 8th rib, spine and pelvis compatible with metastatic disease. The metastatic disease within the right pelvis (2, 59) has progressed since the prior examination with increased osseous destruction and soft tissue extension through the cortex. Partially visualized right hip arthroplasty and plate, multiple screws and intraosseous cement in the right iliac wing. Chronic fracture of the right inferior pubic ramus. Redemonstration of a T11 vertebral body compression fracture deformity with associated lytic and sclerotic lesions likely representing metastatic disease with a pathologic fracture.    IMPRESSION:  1. Interval improvement apancolitis with mild pancolitis remaining.  2. Mild common bile duct dilatation with question of a 4 mm intraluminal focus within the mid common bile duct. Correlate with LFTs and if clinically warranted, an MRCP.  3. Osseous metastatic disease with interval worsening of a right iliac wing lesion.    < end of copied text >      Herb Cabezas MD; Division of Infectious Disease; Pager: [phone]; nights and weekends: [phone]

## 2020-11-18 NOTE — PROGRESS NOTE ADULT - PROBLEM SELECTOR PLAN 6
.  Complex medical decision making / symptom management in the setting of .      Will continue to follow for ongoing GOC discussion / titration of pain regimen.   Emotional support provided, questions answered.  Active Psychosocial Referrals: FREYA HEAD    For new or uncontrolled symptoms, please page the Palliative Medicine team (LIREGINAL[facility] #[id number]).   The service is available 24/7 (including nights & weekends) to provide symptom management recommendations over the phone as appropriate

## 2020-11-18 NOTE — PROGRESS NOTE ADULT - SUBJECTIVE AND OBJECTIVE BOX
Jazmín Fofana (Connor) PGY-2  Pager: NS) [phone]/ ([facility]) [phone]    Patient is a 70y old  Female who presents with a chief complaint of Pancolitis, septic shock (17 Nov 2020 16:17)      SUBJECTIVE / OVERNIGHT EVENTS:  No acute complaints over night. Denies any fevers/chills, headache, CP, SOB, abd pain, N/V/D, constipation, or leg swelling.       MEDICATIONS  (STANDING):  chlorhexidine 4% Liquid 1 Application(s) Topical <User Schedule>  lactated ringers. 1000 milliLiter(s) (100 mL/Hr) IV Continuous <Continuous>  magnesium sulfate  IVPB 2 Gram(s) IV Intermittent once  melatonin 3 milliGRAM(s) Oral at bedtime  metroNIDAZOLE  IVPB 500 milliGRAM(s) IV Intermittent every 8 hours  midodrine 20 milliGRAM(s) Oral every 8 hours  multivitamin 1 Tablet(s) Oral daily  norepinephrine Infusion 0.062 MICROgram(s)/kG/Min (5.63 mL/Hr) IV Continuous <Continuous>  tigecycline IVPB      tigecycline IVPB 50 milliGRAM(s) IV Intermittent every 12 hours  vancomycin    Solution 500 milliGRAM(s) Oral every 6 hours    MEDICATIONS  (PRN):  oxyCODONE    IR 10 milliGRAM(s) Oral every 3 hours PRN Moderate Pain (4 - 6)          OBJECTIVE:  Vital Signs Last 24 Hrs  T(C): 36.1 (18 Nov 2020 04:00), Max: 36.7 (17 Nov 2020 20:00)  T(F): 97 (18 Nov 2020 04:00), Max: 98 (17 Nov 2020 20:00)  HR: 60 (18 Nov 2020 07:00) (56 - 93)  BP: 101/69 (18 Nov 2020 07:00) (78/54 - 118/65)  BP(mean): 79 (18 Nov 2020 07:00) (62 - 82)  RR: 13 (18 Nov 2020 07:00) (12 - 21)  SpO2: 99% (18 Nov 2020 07:00) (95% - 100%)  PHYSICAL EXAM:  GENERAL: NAD, well-developed  HEAD:  Atraumatic, Normocephalic  EYES: EOMI, PERRLA, conjunctiva and sclera clear  NECK: Supple, No JVD  CHEST/LUNG: Clear to auscultation bilaterally; No wheeze  HEART: Regular rate and rhythm; No murmurs, rubs, or gallops  ABDOMEN: Soft, Nontender, Nondistended; Bowel sounds present  EXTREMITIES:  2+ Peripheral Pulses, No clubbing, cyanosis, or edema  PSYCH: AAOx3  NEUROLOGY: non-focal  SKIN: No rashes or lesions    CAPILLARY BLOOD GLUCOSE        I&O's Summary    17 Nov 2020 07:01  -  18 Nov 2020 07:00  --------------------------------------------------------  IN: 3359.8 mL / OUT: 1230 mL / NET: 2129.8 mL              LABS:                        8.4    24.48 )-----------( 248      ( 18 Nov 2020 04:30 )             25.5     11-18    127<L>  |  96<L>  |  22  ----------------------------<  100<H>  4.2   |  18<L>  |  0.36<L>    Ca    7.6<L>      18 Nov 2020 04:30  Phos  2.9     11-18  Mg     1.6     11-18    TPro  3.4<L>  /  Alb  1.5<L>  /  TBili  0.4  /  DBili  x   /  AST  16  /  ALT  9   /  AlkPhos  167<H>  11-18    PT/INR - ( 18 Nov 2020 04:30 )   PT: 20.8 SEC;   INR: 1.86          PTT - ( 18 Nov 2020 04:30 )  PTT:41.9 SEC            RADIOLOGY & ADDITIONAL TESTS:       Jazmín Fofana (Connor) PGY-2  Pager: NS) [phone]/ ([facility]) [phone]    Patient is a 70y old  Female who presents with a chief complaint of Pancolitis, septic shock (17 Nov 2020 16:17)      SUBJECTIVE / OVERNIGHT EVENTS:  no events overnight. given fent 25 IVP for pain and zofran x1 for nausea  Pain uncontrolled      MEDICATIONS  (STANDING):  chlorhexidine 4% Liquid 1 Application(s) Topical <User Schedule>  lactated ringers. 1000 milliLiter(s) (100 mL/Hr) IV Continuous <Continuous>  magnesium sulfate  IVPB 2 Gram(s) IV Intermittent once  melatonin 3 milliGRAM(s) Oral at bedtime  metroNIDAZOLE  IVPB 500 milliGRAM(s) IV Intermittent every 8 hours  midodrine 20 milliGRAM(s) Oral every 8 hours  multivitamin 1 Tablet(s) Oral daily  norepinephrine Infusion 0.062 MICROgram(s)/kG/Min (5.63 mL/Hr) IV Continuous <Continuous>  tigecycline IVPB      tigecycline IVPB 50 milliGRAM(s) IV Intermittent every 12 hours  vancomycin    Solution 500 milliGRAM(s) Oral every 6 hours    MEDICATIONS  (PRN):  oxyCODONE    IR 10 milliGRAM(s) Oral every 3 hours PRN Moderate Pain (4 - 6)          OBJECTIVE:  Vital Signs Last 24 Hrs  T(C): 36.1 (18 Nov 2020 04:00), Max: 36.7 (17 Nov 2020 20:00)  T(F): 97 (18 Nov 2020 04:00), Max: 98 (17 Nov 2020 20:00)  HR: 60 (18 Nov 2020 07:00) (56 - 93)  BP: 101/69 (18 Nov 2020 07:00) (78/54 - 118/65)  BP(mean): 79 (18 Nov 2020 07:00) (62 - 82)  RR: 13 (18 Nov 2020 07:00) (12 - 21)  SpO2: 99% (18 Nov 2020 07:00) (95% - 100%)    PHYSICAL EXAM:  GENERAL: NAD, well-developed  HEAD:  Atraumatic, Normocephalic  EYES: conjunctiva and sclera clear  NECK: Supple, No JVD  CHEST/LUNG: Clear to auscultation bilaterally; No wheeze  HEART: Regular rate and rhythm; No murmurs, rubs, or gallops  ABDOMEN: Soft, Nontender, Nondistended; Bowel sounds present  EXTREMITIES: No clubbing, cyanosis, or edema  PSYCH: AAOx3    CAPILLARY BLOOD GLUCOSE        I&O's Summary    17 Nov 2020 07:01  -  18 Nov 2020 07:00  --------------------------------------------------------  IN: 3359.8 mL / OUT: 1230 mL / NET: 2129.8 mL              LABS:                        8.4    24.48 )-----------( 248      ( 18 Nov 2020 04:30 )             25.5     11-18    127<L>  |  96<L>  |  22  ----------------------------<  100<H>  4.2   |  18<L>  |  0.36<L>    Ca    7.6<L>      18 Nov 2020 04:30  Phos  2.9     11-18  Mg     1.6     11-18    TPro  3.4<L>  /  Alb  1.5<L>  /  TBili  0.4  /  DBili  x   /  AST  16  /  ALT  9   /  AlkPhos  167<H>  11-18    PT/INR - ( 18 Nov 2020 04:30 )   PT: 20.8 SEC;   INR: 1.86          PTT - ( 18 Nov 2020 04:30 )  PTT:41.9 SEC            RADIOLOGY & ADDITIONAL TESTS:

## 2020-11-19 NOTE — PROGRESS NOTE ADULT - ASSESSMENT
Full Note to Follow.    ·	recommend Decadron 4mg IV BID as non-opiate adjunct  ·	would escalate Severe PRN to Morphine 4mg IV q3h PRN for Severe Pain (Oxy IR 10mg PO would be equivalent to Morphine 3-5mg IV depending on cross-tolerance reduction) Full Note to Follow.    ·	recommend Decadron 4mg IV BID as non-opiate adjunct  ·	would escalate Severe PRN to Morphine 4mg IV q3h PRN for Severe Pain if desired (Oxy IR 10mg PO would be equivalent to Morphine 3-5mg IV depending on cross-tolerance reduction) 71yo F with recently diagnosed Metastatic NSCLC (no treatment yet) p/w shock due to recurrent C. diff after recent JAYANT. Palliative consulted for pain management in the setting of life-limiting illness.    ·	consider Decadron 4mg IV BID as non-opiate pain adjunct if no contraindications  ·	would escalate Severe PRN to Morphine 4mg IV q3h PRN for Severe Pain if desired (Oxy IR 10mg PO would be equivalent to Morphine 3-5mg IV depending on cross-tolerance reduction)

## 2020-11-19 NOTE — PROGRESS NOTE ADULT - ASSESSMENT
69W with recent severe Cdiff,  Lung Ca since  8/2020 with mets to brain, Spine, and Right hip s/p RT x 5 ( last tx 9/2), dementia, DVT, Afib on Eliqius [id number] with hypotension, diarrhea, leukocytosis.  Imaging suggests at least mild colitis, LLL atelectasis,    Antibiotic History  IV Vanco 9/12 --> 13; 10/10 -->11; 11/15  Zosyn 9/12 --> 16; 10/10; 11/16  Azithro 9/12; 11/16  Cefazolin 9/18  Metronidazole 10/10 --> 10/17; 11/16  Meropenem 10/10 --> 10/13  Vanco enteral solution 10/11 -->24; 11/15-->    recurrent Cdiff   episode of melena  lung opacities - likely metatstatic disease  VRE bacteruria noted -though  without urinary complaints  increased leukocytosis despite antibiotics - malignancy may be contributing to increased wbc      antibiotics other than po vanco have potential to exacerbate Cdiff  leukocytosis increased despite addition of Linezolid    discussed with MICU resident: MICU to consider discontinuing Linezolid, Tygacil tomorrow

## 2020-11-19 NOTE — PROGRESS NOTE ADULT - PROBLEM SELECTOR PLAN 6
.  Complex medical decision making / symptom management in the setting of .      Will continue to follow for ongoing GOC discussion / titration of pain regimen.   Emotional support provided, questions answered.  Active Psychosocial Referrals: FREYA HEAD    For new or uncontrolled symptoms, please page the Palliative Medicine team (LIREGINAL[facility] #[id number]).   The service is available 24/7 (including nights & weekends) to provide symptom management recommendations over the phone as appropriate

## 2020-11-19 NOTE — PROGRESS NOTE ADULT - SUBJECTIVE AND OBJECTIVE BOX
Jazmín Fofana (Connor) PGY-2  Pager: NS) [phone]/ (PDU) [phone]    Patient is a 70y old  Female who presents with a chief complaint of Pancolitis, septic shock (18 Nov 2020 15:44)      SUBJECTIVE / OVERNIGHT EVENTS:  No acute complaints over night. Denies any fevers/chills, headache, CP, SOB, abd pain, N/V/D, constipation, or leg swelling.       MEDICATIONS  (STANDING):  chlorhexidine 4% Liquid 1 Application(s) Topical <User Schedule>  lidocaine   Patch 2 Patch Transdermal daily  linezolid  IVPB      linezolid  IVPB 600 milliGRAM(s) IV Intermittent every 12 hours  melatonin 3 milliGRAM(s) Oral at bedtime  metroNIDAZOLE  IVPB 500 milliGRAM(s) IV Intermittent every 8 hours  midodrine 30 milliGRAM(s) Oral every 8 hours  multivitamin 1 Tablet(s) Oral daily  norepinephrine Infusion 0.062 MICROgram(s)/kG/Min (5.63 mL/Hr) IV Continuous <Continuous>  oxyCODONE  ER Tablet 20 milliGRAM(s) Oral every 8 hours  tigecycline IVPB      tigecycline IVPB 50 milliGRAM(s) IV Intermittent every 12 hours  vancomycin    Solution 500 milliGRAM(s) Oral every 6 hours    MEDICATIONS  (PRN):  morphine  - Injectable 1 milliGRAM(s) IV Push every 3 hours PRN Severe Pain (7 - 10)  oxyCODONE    IR 10 milliGRAM(s) Oral every 3 hours PRN Moderate Pain (4 - 6)          OBJECTIVE:  Vital Signs Last 24 Hrs  T(C): 36.1 (19 Nov 2020 04:00), Max: 36.1 (19 Nov 2020 00:00)  T(F): 97 (19 Nov 2020 04:00), Max: 97 (19 Nov 2020 00:00)  HR: 77 (19 Nov 2020 06:00) (59 - 100)  BP: 107/88 (19 Nov 2020 06:00) (79/62 - 112/72)  BP(mean): 96 (19 Nov 2020 06:00) (67 - 96)  RR: 15 (19 Nov 2020 06:00) (9 - 23)  SpO2: 100% (19 Nov 2020 06:00) (95% - 100%)  PHYSICAL EXAM:  GENERAL: NAD, well-developed  HEAD:  Atraumatic, Normocephalic  EYES: EOMI, PERRLA, conjunctiva and sclera clear  NECK: Supple, No JVD  CHEST/LUNG: Clear to auscultation bilaterally; No wheeze  HEART: Regular rate and rhythm; No murmurs, rubs, or gallops  ABDOMEN: Soft, Nontender, Nondistended; Bowel sounds present  EXTREMITIES:  2+ Peripheral Pulses, No clubbing, cyanosis, or edema  PSYCH: AAOx3  NEUROLOGY: non-focal  SKIN: No rashes or lesions    CAPILLARY BLOOD GLUCOSE        I&O's Summary    17 Nov 2020 07:01  -  18 Nov 2020 07:00  --------------------------------------------------------  IN: 3359.8 mL / OUT: 1230 mL / NET: 2129.8 mL    18 Nov 2020 07:01  -  19 Nov 2020 06:34  --------------------------------------------------------  IN: 3020.1 mL / OUT: 530 mL / NET: 2490.1 mL              LABS:                        8.9    34.04 )-----------( 205      ( 19 Nov 2020 01:10 )             27.0     11-19    123<L>  |  90<L>  |  26<H>  ----------------------------<  150<H>  3.8   |  20<L>  |  0.48<L>    Ca    8.0<L>      19 Nov 2020 01:10  Phos  3.0     11-19  Mg     2.1     11-19    TPro  3.8<L>  /  Alb  1.8<L>  /  TBili  0.3  /  DBili  x   /  AST  26  /  ALT  16  /  AlkPhos  189<H>  11-19    PT/INR - ( 19 Nov 2020 01:10 )   PT: 21.5 SEC;   INR: 1.93          PTT - ( 19 Nov 2020 01:10 )  PTT:41.0 SEC  CARDIAC MARKERS ( 18 Nov 2020 16:00 )  x     / x     / 80 u/L / x     / x                RADIOLOGY & ADDITIONAL TESTS:       Jazmín Fofana (Connor) PGY-2  Pager: NS) [phone]/ ([facility]) [phone]    Patient is a 70y old  Female who presents with a chief complaint of Pancolitis, septic shock (18 Nov 2020 15:44)      SUBJECTIVE / OVERNIGHT EVENTS:  had low BPs 86/59, given 500cc x1, added morphine 1mg q3hrs PRN. Last BM was yesterday, watery as per nurse.       MEDICATIONS  (STANDING):  chlorhexidine 4% Liquid 1 Application(s) Topical <User Schedule>  lidocaine   Patch 2 Patch Transdermal daily  linezolid  IVPB      linezolid  IVPB 600 milliGRAM(s) IV Intermittent every 12 hours  melatonin 3 milliGRAM(s) Oral at bedtime  metroNIDAZOLE  IVPB 500 milliGRAM(s) IV Intermittent every 8 hours  midodrine 30 milliGRAM(s) Oral every 8 hours  multivitamin 1 Tablet(s) Oral daily  norepinephrine Infusion 0.062 MICROgram(s)/kG/Min (5.63 mL/Hr) IV Continuous <Continuous>  oxyCODONE  ER Tablet 20 milliGRAM(s) Oral every 8 hours  tigecycline IVPB      tigecycline IVPB 50 milliGRAM(s) IV Intermittent every 12 hours  vancomycin    Solution 500 milliGRAM(s) Oral every 6 hours    MEDICATIONS  (PRN):  morphine  - Injectable 1 milliGRAM(s) IV Push every 3 hours PRN Severe Pain (7 - 10)  oxyCODONE    IR 10 milliGRAM(s) Oral every 3 hours PRN Moderate Pain (4 - 6)          OBJECTIVE:  Vital Signs Last 24 Hrs  T(C): 36.1 (19 Nov 2020 04:00), Max: 36.1 (19 Nov 2020 00:00)  T(F): 97 (19 Nov 2020 04:00), Max: 97 (19 Nov 2020 00:00)  HR: 77 (19 Nov 2020 06:00) (59 - 100)  BP: 107/88 (19 Nov 2020 06:00) (79/62 - 112/72)  BP(mean): 96 (19 Nov 2020 06:00) (67 - 96)  RR: 15 (19 Nov 2020 06:00) (9 - 23)  SpO2: 100% (19 Nov 2020 06:00) (95% - 100%)    PHYSICAL EXAM:  GENERAL: NAD  EYES: conjunctiva and sclera clear  NECK: Supple, No JVD  CHEST/LUNG: Clear to auscultation bilaterally; No wheeze  HEART: Regular rate and rhythm; No murmurs, rubs, or gallops  ABDOMEN: Soft, Nontender, Nondistended; Bowel sounds present  EXTREMITIES: No clubbing, cyanosis, or edema  PSYCH: AAOx3  NEUROLOGY: non-focal  SKIN: No rashes or lesions    CAPILLARY BLOOD GLUCOSE        I&O's Summary    17 Nov 2020 07:01  -  18 Nov 2020 07:00  --------------------------------------------------------  IN: 3359.8 mL / OUT: 1230 mL / NET: 2129.8 mL    18 Nov 2020 07:01  -  19 Nov 2020 06:34  --------------------------------------------------------  IN: 3020.1 mL / OUT: 530 mL / NET: 2490.1 mL              LABS:                        8.9    34.04 )-----------( 205      ( 19 Nov 2020 01:10 )             27.0     11-19    123<L>  |  90<L>  |  26<H>  ----------------------------<  150<H>  3.8   |  20<L>  |  0.48<L>    Ca    8.0<L>      19 Nov 2020 01:10  Phos  3.0     11-19  Mg     2.1     11-19    TPro  3.8<L>  /  Alb  1.8<L>  /  TBili  0.3  /  DBili  x   /  AST  26  /  ALT  16  /  AlkPhos  189<H>  11-19    PT/INR - ( 19 Nov 2020 01:10 )   PT: 21.5 SEC;   INR: 1.93          PTT - ( 19 Nov 2020 01:10 )  PTT:41.0 SEC  CARDIAC MARKERS ( 18 Nov 2020 16:00 )  x     / x     / 80 u/L / x     / x                RADIOLOGY & ADDITIONAL TESTS:

## 2020-11-19 NOTE — PROGRESS NOTE ADULT - SUBJECTIVE AND OBJECTIVE BOX
Follow Up:  recurrent cdiff    Interval History/ROS: indicates diffuse malaise    Allergies  No Known Allergies    ANTIMICROBIALS:  linezolid  IVPB    linezolid  IVPB 600 every 12 hours  metroNIDAZOLE  IVPB 500 every 8 hours  tigecycline IVPB    tigecycline IVPB 50 every 12 hours  vancomycin    Solution 500 every 6 hours    OTHER MEDS:  MEDICATIONS  (STANDING):  melatonin 3 at bedtime  midodrine 30 every 8 hours  morphine  - Injectable 1 every 3 hours PRN  norepinephrine Infusion 0.062 <Continuous>  oxyCODONE    IR 10 every 3 hours PRN  oxyCODONE  ER Tablet 20 every 8 hours      Vital Signs Last 24 Hrs  T(C): 35.6 (19 Nov 2020 16:00), Max: 36.1 (19 Nov 2020 00:00)  T(F): 96.1 (19 Nov 2020 16:00), Max: 97 (19 Nov 2020 00:00)  HR: 102 (19 Nov 2020 18:00) (59 - 114)  BP: 87/64 (19 Nov 2020 18:00) (70/49 - 112/69)  BP(mean): 72 (19 Nov 2020 18:00) (57 - 96)  RR: 13 (19 Nov 2020 18:00) (10 - 24)  SpO2: 94% (19 Nov 2020 18:00) (86% - 100%)    PHYSICAL EXAM:  General: ill appearing  Neurology: Awake, dysyphoric  Respiratory: Clear to auscultation bilaterally  CV: RRR, S1S2, no murmurs, rubs or gallops  Abdominal: Soft, Non-tender, distended,  Extremities: No edema  Line Sites: Clear  Skin: No rash                        8.9    34.04 )-----------( 205      ( 19 Nov 2020 01:10 )             27.0   WBC Count: 34.04 (11-19 @ 01:10)  WBC Count: 29.32 (11-18 @ 16:00)  WBC Count: 24.48 (11-18 @ 04:30)  WBC Count: 21.11 (11-17 @ 22:30)  WBC Count: 24.12 (11-17 @ 09:58)  WBC Count: 29.16 (11-17 @ 02:20)  WBC Count: 29.92 (11-16 @ 20:15)  WBC Count: 29.44 (11-16 @ 11:40)  WBC Count: 35.90 (11-16 @ 02:45)  WBC Count: 26.21 (11-15 @ 21:30)  WBC Count: 29.59 (11-15 @ 18:51)    11-19    121<L>  |  91<L>  |  24<H>  ----------------------------<  120<H>  4.0   |  18<L>  |  0.44<L>    Ca    8.0<L>      19 Nov 2020 17:20  Phos  3.0     11-19  Mg     2.1     11-19    TPro  3.8<L>  /  Alb  1.8<L>  /  TBili  0.3  /  DBili  x   /  AST  26  /  ALT  16  /  AlkPhos  189<H>  11-19      MICROBIOLOGY:  .Urine Catheterized  11-15-20   >100,000 CFU/ml Enterococcus faecium (vancomycin resistant)  <10,000 CFU/ml Normal Urogenital crystal present  --  Enterococcus faecium (vancomycin resistant)    .Blood Blood-Peripheral  11-15-20   No growth to date.  --  --    RADIOLOGY:  d< from: CT Abdomen and Pelvis w/ IV Cont (11.15.20 @ 21:06) >  IMPRESSION:  1. Interval improvement apancolitis with mild pancolitis remaining.  2. Mild common bile duct dilatation with question of a 4 mm intraluminal focus within the mid common bile duct. Correlate with LFTs and if clinically warranted, an MRCP.  3. Osseous metastatic disease with interval worsening of a right iliac wing lesion.    < end of copied text >      Herb Cabezas MD; Division of Infectious Disease; Pager: [phone]; nights and weekends: [phone]

## 2020-11-19 NOTE — PROGRESS NOTE ADULT - SUBJECTIVE AND OBJECTIVE BOX
[facility] Geriatrics and Palliative Care  Gerald Hu, Palliative Care Attending  Contact Info: Page [phone] (including Nights/Weekend), message on Microsoft Teams (Gerald Hu), or leave VM at Palliative Office [phone] (Non-Urgent)    SUBJECTIVE AND OBJECTIVE:  INTERVAL HPI/OVERNIGHT EVENTS: Interval events noted, stable pressor requirements. Patient required PRNs of Oxy IR x4 in past 24hrs. No adverse effects of opiates noted: no sedation/dizziness/nausea. Patient slightly confused. Started on Oxy ER yesterday.    Allergies  No Known Allergies    MEDICATIONS  (STANDING):  chlorhexidine 4% Liquid 1 Application(s) Topical <User Schedule>  lidocaine   Patch 2 Patch Transdermal daily  linezolid  IVPB      linezolid  IVPB 600 milliGRAM(s) IV Intermittent every 12 hours  melatonin 3 milliGRAM(s) Oral at bedtime  metroNIDAZOLE  IVPB 500 milliGRAM(s) IV Intermittent every 8 hours  midodrine 30 milliGRAM(s) Oral every 8 hours  multivitamin 1 Tablet(s) Oral daily  norepinephrine Infusion 0.062 MICROgram(s)/kG/Min (5.63 mL/Hr) IV Continuous <Continuous>  oxyCODONE  ER Tablet 20 milliGRAM(s) Oral every 8 hours  tigecycline IVPB      tigecycline IVPB 50 milliGRAM(s) IV Intermittent every 12 hours  vancomycin    Solution 500 milliGRAM(s) Oral every 6 hours    MEDICATIONS  (PRN):  morphine  - Injectable 1 milliGRAM(s) IV Push every 3 hours PRN Severe Pain (7 - 10)  oxyCODONE    IR 10 milliGRAM(s) Oral every 3 hours PRN Moderate Pain (4 - 6)      ITEMS UNCHECKED ARE NOT PRESENT    PRESENT SYMPTOMS: []Unable to obtain due to poor mentation   Source if other than patient:  []Family   []Team     Pain: [x] yes [ ] no  QOL impact - bothersome, debilitating  Location - R Hip, lower abdomen                   Aggravating factors - certain movements,   Quality - sharp, crampy  Radiation - across abdomen  Timing - constant  Severity (0-10 scale): 7  Minimal acceptable level (0-10 scale): 4    PAIN AD Score:  [email or web address].pdf (press ctrl +  left click to view)    Dyspnea:                           [x]Mild  []Moderate []Severe  Anxiety:                             []Mild []Moderate []Severe  Fatigue:                             []Mild []Moderate []Severe  Nausea:                             []Mild []Moderate []Severe  Loss of appetite:              []Mild []Moderate []Severe  Constipation:                    []Mild []Moderate []Severe    Other Symptoms:  [x]All other review of systems negative     Palliative Performance Status Version 2:  30%  [email or web address]_[email or web address]    PHYSICAL EXAM:  Vital Signs Last 24 Hrs  T(C): 35.6 (19 Nov 2020 16:00), Max: 36.1 (19 Nov 2020 00:00)  T(F): 96.1 (19 Nov 2020 16:00), Max: 97 (19 Nov 2020 00:00)  HR: 112 (19 Nov 2020 16:45) (59 - 114)  BP: 112/69 (19 Nov 2020 16:45) (70/49 - 112/69)  BP(mean): 82 (19 Nov 2020 16:45) (57 - 96)  RR: 16 (19 Nov 2020 16:45) (9 - 24)  SpO2: 99% (19 Nov 2020 14:00) (86% - 100%) I&O's Summary    18 Nov 2020 07:01  -  19 Nov 2020 07:00  --------------------------------------------------------  IN: 3020.1 mL / OUT: 530 mL / NET: 2490.1 mL    19 Nov 2020 07:01  -  19 Nov 2020 17:06  --------------------------------------------------------  IN: 1360.3 mL / OUT: 375 mL / NET: 985.3 mL      GENERAL:  [x]Alert  [x]Oriented x3   []Lethargic  []Cachexia  []Unarousable  [x]Verbal  []Non-Verbal  Behavioral:   [] Anxiety  [] Delirium [] Agitation [] Other  HEENT:  [x]Normal   []Dry mouth   []ET Tube/Trach  []Oral lesions  PULMONARY:   [x]Clear []Tachypnea  []Audible excessive secretions   []Rhonchi        []Right []Left []Bilateral  [x]Crackles        []Right []Left [x]Bilateral  []Wheezing     []Right []Left []Bilateral  CARDIOVASCULAR:    [x]Regular []Irregular []Tachy  []Prasanth []Murmur []Other  GASTROINTESTINAL:  [x]Soft  [x]Distended   [x]+BS  []Non tender [x]Tender  []PEG [x]OGT/ NGT  Last BM: 11/17  GENITOURINARY:  [x]Normal [] Incontinent   []Oliguria/Anuria   []Zamorano  MUSCULOSKELETAL:   []Normal   [x]Weakness  []Bed/Wheelchair bound []Edema  NEUROLOGIC:   [x]No focal deficits  [] Cognitive impairment  [] Dysphagia []Dysarthria [] Paresis []Other   SKIN:   []Normal   [x]Pressure ulcer(s)  []Rash    CRITICAL CARE:  [x] Shock Present  [x]Septic [ ]Cardiogenic [ ]Neurologic [ ]Hypovolemic  [x]  Vasopressors [ ]  Inotropes   [ ] Respiratory failure present [ ] Mechanical Ventilation [ ] Non-invasive ventilatory support [ ] High-Flow  [ ] Acute  [ ] Chronic [ ] Hypoxic  [ ] Hypercarbic [ ] Other  [ ] Other organ failure      LABS:                       8.9    34.04 )-----------( 205      ( 19 Nov 2020 01:10 )             27.0   11-19    123<L>  |  90<L>  |  26<H>  ----------------------------<  150<H>  3.8   |  20<L>  |  0.48<L>    Ca    8.0<L>      19 Nov 2020 01:10  Phos  3.0     11-19  Mg     2.1     11-19    TPro  3.8<L>  /  Alb  1.8<L>  /  TBili  0.3  /  DBili  x   /  AST  26  /  ALT  16  /  AlkPhos  189<H>  11-19  PT/INR - ( 19 Nov 2020 01:10 )   PT: 21.5 SEC;   INR: 1.93     PTT - ( 19 Nov 2020 01:10 )  PTT:41.0 SEC    RADIOLOGY & ADDITIONAL STUDIES: None new    PROTEIN CALORIE MALNUTRITION PRESENT: [ ]mild [x ]moderate [ ]severe [ ]underweight [ ]morbid obesity  [x]PPSV2 < or = to 30% []significant weight loss  [x]poor nutritional intake [x]catabolic state []anasarca   Albumin, Serum: 1.8 g/dL (11-19-20 @ 01:10)   Artificial Nutrition []     REFERRALS:   []Chaplaincy  [x]Hospice  []Child Life  [x]Social Work  []Case management []Holistic Therapy []Physical Therapy []Dietary     Goals of Care Document:   Care Coordination Document:

## 2020-11-19 NOTE — PROGRESS NOTE ADULT - PROBLEM SELECTOR PLAN 1
.  -c/w Oxy IR 10mg PO q3h PRN for Severe Pain  -c/w Oxy ER 20mg PO q8h ATC  -consider Decadron 4mg IV BID for bony met pain

## 2020-11-19 NOTE — PROGRESS NOTE ADULT - PROBLEM SELECTOR PLAN 4
.  Treatment-naive metastatic disease  -patient has bounced back and forth between the hospital and [facility], the hope was to improve her functional status to be evaluated for systemic treatment but this has not been possible  -discussed with patient's children that DMT is not possible at this time and seems unlikely given the patient's recurrent decompensations  -if no DMT were pursued, then patient would be eligible for hospice

## 2020-11-19 NOTE — PROGRESS NOTE ADULT - ASSESSMENT
69 F Hx Lung Ca in 8/2020 with mets to ?brain, Spine, and Right hip s/p RT x 5 ( last tx 9/2), dementia, DVT, Afib on Eliqius, presenting with hypotension found to be in septic shock 2/2 cdiff pancolitis. Admitted to MICU for pressure support.     #Neuro  - AOx3, no active issues  - possible brain mets, however PET scan from 10/2 & MRI 8/2020 showed possible meningioma    #Pulm  - satting well on room air   - CXR shows Bilateral patchy patchy right sided and more confluent left lower lobe opacities may represent multifocal pneumonia; likely pulmonary mets    #Cardio  Septic shock   - s/p 3L and midodrine 20X1  - c/w levophed for pressure support  - Midodrine 30 q8h   - TTE (10/12/20) shows normal LV systolic function, mild diastolic dysfunction (stage 1)    #GI  - Patient presented with diarrhea, recently admitted to MICU for C.diff pancolitis (Cdiff POS)  - continue po vanc 500 q6h for C.diff colitis  - ID recs appreciated c/w flagyl 500 q8h, tigecycline 50mg IV BID (11/16- )  - concern for lower GI bleed from pancolitis  - trend CBC q12, transfuse for Hgb <7, Hb has been stable    #Renal  #AG metabolic Acidosis; likely 2/2 to elevated BUN & hypovolemia  - Diet CLD  #Hyponatremic most likely 2/2 hypovolemia   - monitor electrolytes, Is&Os  - s/p 500cc NS this AM    #ID  UCx 11/15: VRE s/s to linezolid  - started linezolid 600 q12h (11/18 - )  - f/u urine legionella, s/p azithromycin (11/16-11/17)     Cdiff colitis  - CT A/P shows interval improvement of pancolitis with mild pancolitis remaining   - continue po vanc, IV flagyl, start tigecycline as above for C.diff colitis  - blood cultures 11/15 NGDT  - ID recs appreciated    #Heme/Onc  #Metastatic Lung Cancer w/ mets to bone  - follows with Dr. Xie at [facility]  - palliative care consulted  - Pain control: Back Pain 2/2 mets  - restarted home dose oxy 20 ER TID. c/w Oxy IR 10 q3hs PRN  - Morphine 1mg IVP q3h PRN     #Anemia  - Hgb dropped to 5.7 most likely GI bleed from pancolitis/C.diff  - s/p 1 unit pRBC, goal Hgb >7, stable    #DVT  - hx of DVT left posterior tibial and peroneal veins 10/18  - US/ Duplex to eval for progression  - SCDs to be put on upper extremities     #Endo   - A1c 6.1 in August    #Ethics  - FULL code according to family  - Palliative care recs appreciated   69 F Hx Lung Ca in 8/2020 with mets to ?brain, Spine, and Right hip s/p RT x 5 ( last tx 9/2), dementia, DVT, Afib on Eliqius, presenting with hypotension found to be in septic shock 2/2 cdiff pancolitis. Admitted to MICU for pressure support.     #Neuro  - AOx3, no active issues  - possible brain mets, however PET scan from 10/2 & MRI 8/2020 showed possible meningioma    #Pulm  - satting well on room air   - CXR shows Bilateral patchy patchy right sided and more confluent left lower lobe opacities may represent multifocal pneumonia; likely pulmonary mets    #Cardio  Septic shock   - s/p 3L and midodrine 20X1  - c/w levophed for pressure support  - Midodrine 30 q8h   - TTE (10/12/20) shows normal LV systolic function, mild diastolic dysfunction (stage 1)    #GI  - Patient presented with diarrhea, recently admitted to MICU for C.diff pancolitis (Cdiff POS)  - continue po vanc 500 q6h for C.diff colitis  - ID recs appreciated c/w flagyl 500 q8h, tigecycline 50mg IV BID (11/16- )  - concern for lower GI bleed from pancolitis  - trend CBC q12, transfuse for Hgb <7, Hb has been stable    #Renal  #AG metabolic Acidosis; likely 2/2 to elevated BUN & hypovolemia  - Diet CLD  #Hyponatremic most likely 2/2 hypovolemia   - monitor electrolytes, Is&Os  - s/p 500cc NS this AM  - repeat BMP at noon and reassess    #ID  UCx 11/15: VRE s/s to linezolid  - started linezolid 600 q12h (11/18 - )  - f/u urine legionella, s/p azithromycin (11/16-11/17)     Cdiff colitis  - CT A/P shows interval improvement of pancolitis with mild pancolitis remaining   - continue po vanc, IV flagyl, start tigecycline as above for C.diff colitis  - blood cultures 11/15 NGDT  - ID recs appreciated    #Heme/Onc  #Metastatic Lung Cancer w/ mets to bone  - follows with Dr. Xie at [facility]  - palliative care consulted  - Pain control: Back Pain 2/2 mets  - restarted home dose oxy 20 ER TID. c/w Oxy IR 10 q3hs PRN  - Morphine 1mg IVP q3h PRN     #Anemia  - Hgb dropped to 5.7 most likely GI bleed from pancolitis/C.diff  - s/p 1 unit pRBC, goal Hgb >7, stable    #DVT  - hx of DVT left posterior tibial and peroneal veins 10/18  - US/ Duplex to eval for progression  - SCDs to be put on upper extremities     #Endo   - A1c 6.1 in August    #Ethics  - FULL code according to family  - Palliative care recs appreciated

## 2020-11-19 NOTE — PROGRESS NOTE ADULT - ATTENDING COMMENTS
69 year old woman with dementia and widely metastatic lung CA presents in shock requiring vasopressor support, shock due to sepsis C. diff colitis and VRE UTI     Continue Abx, wean down vasopressors as tolerated  pain control  family has expressed interest for home hospice     Critically ill patient requiring frequent bedside visits

## 2020-11-20 NOTE — PROGRESS NOTE ADULT - SUBJECTIVE AND OBJECTIVE BOX
Follow Up:  c diff    Interval History/ROS: malaise, abd pain    Allergies  No Known Allergies    ANTIMICROBIALS:  linezolid  IVPB    linezolid  IVPB 600 every 12 hours  metroNIDAZOLE  IVPB 500 every 8 hours  tigecycline IVPB    tigecycline IVPB 50 every 12 hours  vancomycin    Solution 500 every 6 hours    OTHER MEDS:  MEDICATIONS  (STANDING):  apixaban 5 two times a day  midodrine 30 every 8 hours  morphine  - Injectable 1 every 3 hours PRN  norepinephrine Infusion 0.062 <Continuous>  oxyCODONE    IR 10 every 3 hours PRN  oxyCODONE  ER Tablet 20 every 8 hours      Vital Signs Last 24 Hrs  T(C): 35.1 (20 Nov 2020 12:00), Max: 35.6 (19 Nov 2020 20:00)  T(F): 95.2 (20 Nov 2020 12:00), Max: 96 (19 Nov 2020 20:00)  HR: 118 (20 Nov 2020 14:30) (74 - 139)  BP: 130/94 (20 Nov 2020 14:30) (58/39 - 147/133)  BP(mean): 106 (20 Nov 2020 14:30) (47 - 139)  RR: 19 (20 Nov 2020 14:30) (8 - 41)  SpO2: 97% (20 Nov 2020 13:00) (91% - 100%)    PHYSICAL EXAM:  General: WN/WD NAD, Non-toxic  Neurology: A&Ox3, nonfocal  Respiratory: Clear to auscultation bilaterally  CV: RRR, S1S2, no murmurs, rubs or gallops  Abdominal:  doughy distended  Extremities: 1+ edema,   Line Sites: Clear  Skin: No rash                        9.2    39.72 )-----------( 231      ( 20 Nov 2020 07:00 )             27.6   WBC Count: 39.72 (11-20 @ 07:00)  WBC Count: 35.03 (11-20 @ 02:00)  WBC Count: 34.04 (11-19 @ 01:10)  WBC Count: 29.32 (11-18 @ 16:00)  WBC Count: 24.48 (11-18 @ 04:30)  WBC Count: 21.11 (11-17 @ 22:30)  WBC Count: 24.12 (11-17 @ 09:58)  WBC Count: 29.16 (11-17 @ 02:20)  WBC Count: 29.92 (11-16 @ 20:15)  WBC Count: 29.44 (11-16 @ 11:40)  WBC Count: 35.90 (11-16 @ 02:45)  WBC Count: 26.21 (11-15 @ 21:30)  WBC Count: 29.59 (11-15 @ 18:51)        11-20    122<L>  |  92<L>  |  24<H>  ----------------------------<  127<H>  4.8   |  18<L>  |  0.47<L>    Ca    8.3<L>      20 Nov 2020 07:00  Phos  3.2     11-20  Mg     1.8     11-20    TPro  3.9<L>  /  Alb  1.8<L>  /  TBili  0.6  /  DBili  x   /  AST  53<H>  /  ALT  38<H>  /  AlkPhos  220<H>  11-20 11.20.20 @ 02:00) Lactate, Blood: 3.7:       MICROBIOLOGY:  .Urine Catheterized  11-15-20   >100,000 CFU/ml Enterococcus faecium (vancomycin resistant)  <10,000 CFU/ml Normal Urogenital crystal present  --  Enterococcus faecium (vancomycin resistant)      .Blood Blood-Peripheral  11-15-20   No growth to date.  --  --    RADIOLOGY:  ra< from: CT Abdomen and Pelvis w/ IV Cont (11.20.20 @ 09:45) >  INDINGS:  LOWER CHEST: Large bilateral pleural effusions with adjacent compressive atelectasis, which has progressed since 11/15/2020. Additional peripheral opacities are seen bilaterally. Redemonstration of a nodular soft tissue mass centered on the posterior left 11 rib with associated osseous destruction of the rib, pedicle, lamina, and spinous process. Small pericardial effusion.    LIVER: Several subcentimeter hypoattenuating foci, too small to characterize.  BILE DUCTS: Normal caliber.  GALLBLADDER:Within normal limits.  SPLEEN: Within normal limits.  PANCREAS: Within normal limits.  ADRENALS: Unchanged left adrenal nodule, indeterminate but suspicious for metastasis.  KIDNEYS/URETERS: Right renal infarcts involving the mid to lower poles with capsular enhancement. 2.4 cm left renal cyst. Additional subcentimeter hypodense foci, too small to characterize.    BLADDER: Zamorano catheter.  REPRODUCTIVE ORGANS: Uterus and adnexa within normal limits.    BOWEL: No bowel obstruction. Normal enhancement of the bowel wall.  PERITONEUM: Moderate amount of peritoneal and pelvic ascites.  VESSELS: Atherosclerotic changes. The SMA, celiac axis, and DARION are patent.  RETROPERITONEUM/LYMPH NODES: No lymphadenopathy.  ABDOMINAL WALL:  Diffuse anasarca.  BONES: Redemonstration of scattered osseous destructive lesions in the ribs, spine, and pelvis compatible with metastatic disease. Redemonstration of T11 vertebral body compression fracture deformity with associated lytic and sclerotic lesions, likely representing metastatic disease with a pathologic fracture. Partially visualized right hip arthroplasty and plate with multiple screws and intraosseous cement in the right iliac wing. Chronic fracture of the right inferior pubic ramus.    IMPRESSION:  1.  Right renal infarcts involving the mid to lower poles.  2.  No evidence of bowel ischemia.  3.  Redemonstration of osseous metastatic disease. Stable indeterminate left adrenal nodule, probably metastasis.    < end of copied text >      Herb Cabezas MD; Division of Infectious Disease; Pager: [phone]; nights and weekends: [phone]

## 2020-11-20 NOTE — PROGRESS NOTE ADULT - ASSESSMENT
69W with recent severe Cdiff,  Lung Ca since  8/2020 with mets to brain, Spine, and Right hip s/p RT x 5 ( last tx 9/2), dementia, DVT, Afib on Eliqius [id number] with hypotension, diarrhea, leukocytosis.    Antibiotic History  IV Vanco 9/12 --> 13; 10/10 -->11; 11/15  Zosyn 9/12 --> 16; 10/10; 11/16  Azithro 9/12; 11/16  Cefazolin 9/18  Metronidazole 10/10 --> 10/17; 11/16  Meropenem 10/10 --> 10/13  Vanco enteral solution 10/11 -->24; 11/15-->    recurrent Cdiff   episode of melena  lung opacities - likely metatstatic disease  VRE bacteruria noted -though  without urinary complaints  marked  leukemoid reaction  - patient has no diarrhea - 11/20 abd ct without bowel abnormality    tumor burden, renal infarct, malnourished states, anasarca with pleural effusions with associated atelectasis  poor outlook  no clear infectious cause, Cdiff appears improved    discussed with MICU resident:

## 2020-11-20 NOTE — PROGRESS NOTE ADULT - ATTENDING COMMENTS
69 year old woman with dementia and widely metastatic lung CA presents in shock requiring vasopressor support, shock due to sepsis C. diff colitis and VRE UTI     patient with increased vasopressor needs over night, somewhat fluid responsive  increasing WBC planned for CT abd/pelvis to assess for source of worsening sepsis  Echo with pericardial effusion with signs of tamponade, cardiology evaluation for possible pericardiocentesis  pain control    Critically ill patient requiring frequent bedside visits

## 2020-11-20 NOTE — PROGRESS NOTE ADULT - ASSESSMENT
69 F Hx Lung Ca in 8/2020 with mets to ?brain, Spine, and Right hip s/p RT x 5 ( last tx 9/2), dementia, DVT, Afib on Eliqius, presenting with hypotension found to be in septic shock 2/2 cdiff pancolitis. Admitted to MICU for pressure support.     #Neuro  - AOx3, no active issues  - possible brain mets, however PET scan from 10/2 & MRI 8/2020 showed possible meningioma    #Pulm  - satting well on room air   - CXR shows Bilateral patchy patchy right sided and more confluent left lower lobe opacities may represent multifocal pneumonia; likely pulmonary mets    #Cardio  Septic shock: worsening  - s/p 2L bolus overnight, on mIVF now  - c/w levophed for pressure support  - c/w Midodrine 30 q8h   - TTE (10/12/20) shows normal LV systolic function, mild diastolic dysfunction (stage 1)    #GI  - Patient presented with diarrhea, recently admitted to MICU for C.diff pancolitis (Cdiff POS)  - continue po vanc 500 q6h for C.diff colitis  - ID recs appreciated c/w flagyl 500 q8h, tigecycline 50mg IV BID (11/16- )  - concern for lower GI bleed from pancolitis  - trend CBC q12, transfuse for Hgb <7, Hb has been stable  - Repeat CT a/p for worsening colitis    #Renal  #AG metabolic Acidosis; likely 2/2 to elevated BUN & hypovolemia  - Diet CLD  #Hyponatremic most likely 2/2 hypovolemia   - monitor electrolytes, Is&Os  - vol resus as above    #ID  UCx 11/15: VRE s/s to linezolid  - started linezolid 600 q12h (11/18 - )  - f/u urine legionella, s/p azithromycin (11/16-11/17)     Cdiff colitis  - CT A/P shows interval improvement of pancolitis with mild pancolitis remaining   - continue po vanc, IV flagyl, start tigecycline as above for C.diff colitis  - blood cultures 11/15 NGDT  - ID recs appreciated    #Heme/Onc  #Metastatic Lung Cancer w/ mets to bone  - follows with Dr. Xie at [facility]  - palliative care consulted  - Pain control: Back Pain 2/2 mets  - restarted home dose oxy 20 ER TID. c/w Oxy IR 10 q3hs PRN  - Morphine 1mg IVP q3h PRN     #Anemia  - Hgb dropped to 5.7 most likely GI bleed from pancolitis/C.diff  - s/p 1 unit pRBC, goal Hgb >7, stable    #DVT  - hx of DVT left posterior tibial and peroneal veins 10/18  - US/ Duplex to eval for progression  - SCDs to be put on upper extremities     #Endo   - A1c 6.1 in August    #Ethics  - FULL code according to family  - Palliative care recs appreciated   69 F Hx Lung Ca in 8/2020 with mets to ?brain, Spine, and Right hip s/p RT x 5 ( last tx 9/2), dementia, DVT, Afib on Eliqius, presenting with hypotension found to be in septic shock 2/2 cdiff pancolitis. Admitted to MICU for pressure support.     #Neuro  - AOx3, no active issues  - possible brain mets, however PET scan from 10/2 & MRI 8/2020 showed possible meningioma    #Pulm  - satting well on room air   - CXR shows Bilateral patchy patchy right sided and more confluent left lower lobe opacities may represent multifocal pneumonia; likely pulmonary mets    #Cardio  Septic shock: worsening  - s/p 2L bolus overnight, on mIVF now  - c/w levophed for pressure support  - c/w Midodrine 30 q8h   - TTE (10/12/20) shows normal LV systolic function, mild diastolic dysfunction (stage 1)  - awaiting repeat TTE to rule out endocarditis/thrombus    #GI  - Patient presented with diarrhea, recently admitted to MICU for C.diff pancolitis (Cdiff POS)  - continue po vanc 500 q6h for C.diff colitis  - ID recs appreciated c/w flagyl 500 q8h, tigecycline 50mg IV BID (11/16- )  - trend CBC q12, transfuse for Hgb <7, Hb has been stable  - Repeat CT a/p: no ischemia bowel, renal infarct likely 2/2 pAF off AC    #Renal  #AG metabolic Acidosis; likely 2/2 to elevated BUN & hypovolemia  - Diet CLD  #Hyponatremic most likely 2/2 hypovolemia   - monitor electrolytes, Is&Os  - vol resus as above    #ID  UCx 11/15: VRE s/s to linezolid  - started linezolid 600 q12h (11/18 - )  - f/u urine legionella, s/p azithromycin (11/16-11/17)     Cdiff colitis  - CT A/P shows interval improvement of pancolitis with mild pancolitis remaining   - continue po vanc, IV flagyl, start tigecycline as above for C.diff colitis  - blood cultures 11/15 NGTD  - ID recs appreciated  - Started methnylnaltrexone 8mg x1 today    #Heme/Onc  #Metastatic Lung Cancer w/ mets to bone  - [facility]  - palliative care consulted  - Pain control: Back Pain 2/2 mets  - restarted home dose oxy 20 ER TID. c/w Oxy IR 10 q3hs PRN  - Morphine 1mg IVP q3h PRN     #Anemia  - Hgb dropped to 5.7 most likely GI bleed from pancolitis/C.diff  - s/p 1 unit pRBC, goal Hgb >7, stable    #DVT  - hx of DVT left posterior tibial and peroneal veins 10/18  - on eliquis 5 BID    #Endo   - A1c 6.1 in August    #Ethics  - FULL code according to family  - Palliative care recs appreciated

## 2020-11-20 NOTE — CONSULT NOTE ADULT - SUBJECTIVE AND OBJECTIVE BOX
HISTORY OF PRESENT ILLNESS:    69 YO F hx metastatic Lung Ca, DVT, dementia here for C. diff and sepsis. Had been treated in MICU with improvement in pressor requirements. Now having worsening prognosis Palliative following and planned for home hospice if can be made stable enough.   Allergies    No Known Allergies    Intolerances    	    MEDICATIONS:  apixaban 5 milliGRAM(s) Oral two times a day  midodrine 30 milliGRAM(s) Oral every 8 hours  norepinephrine Infusion 0.062 MICROgram(s)/kG/Min IV Continuous <Continuous>    linezolid  IVPB      linezolid  IVPB 600 milliGRAM(s) IV Intermittent every 12 hours  metroNIDAZOLE  IVPB 500 milliGRAM(s) IV Intermittent every 8 hours  tigecycline IVPB      tigecycline IVPB 50 milliGRAM(s) IV Intermittent every 12 hours  vancomycin    Solution 500 milliGRAM(s) Oral every 6 hours      morphine  - Injectable 1 milliGRAM(s) IV Push every 3 hours PRN  oxyCODONE    IR 10 milliGRAM(s) Oral every 3 hours PRN  oxyCODONE  ER Tablet 20 milliGRAM(s) Oral every 8 hours        chlorhexidine 4% Liquid 1 Application(s) Topical <User Schedule>  lactated ringers. 1000 milliLiter(s) IV Continuous <Continuous>  lidocaine   Patch 2 Patch Transdermal daily  multivitamin 1 Tablet(s) Oral daily      PAST MEDICAL & SURGICAL HISTORY:  Lung cancer metastatic to bone    No significant past surgical history        FAMILY HISTORY:  FH: colon cancer    Family history of cervical cancer        SOCIAL HISTORY:    [ ] Non-smoker  [ ] Smoker  [ ] Alcohol      REVIEW OF SYSTEMS:  General: no fatigue/malaise, weight loss/gain.  Skin: no rashes.  Ophthalmologic: no blurred vision, no loss of vision. 	  ENT: no sore throat, rhinorrhea, sinus congestion.  Respiratory: no SOB, cough or wheeze.  Gastrointestinal:  no N/V/D, no melena/hematemesis/hematochezia.  Genitourinary: no dysuria/hesitancy or hematuria.  Musculoskeletal: no myalgias or arthralgias.  Neurological: no changes in vision or hearing, no lightheadedness/dizziness, no syncope/near syncope	  Psychiatric: no unusual stress/anxiety.   Hematology/Lymphatics: no unusual bleeding, bruising and no lymphadenopathy.  Endocrine: no unusual thirst.   All others negative except as stated above and in HPI.    PHYSICAL EXAM:  T(C): 35.1 (11-20-20 @ 12:00), Max: 35.6 (11-19-20 @ 20:00)  HR: 117 (11-20-20 @ 16:00) (74 - 139)  BP: 95/81 (11-20-20 @ 16:00) (58/39 - 147/133)  RR: 19 (11-20-20 @ 16:00) (8 - 41)  SpO2: 97% (11-20-20 @ 13:00) (91% - 100%)  Wt(kg): --  I&O's Summary    19 Nov 2020 07:01  -  20 Nov 2020 07:00  --------------------------------------------------------  IN: 4547.8 mL / OUT: 750 mL / NET: 3797.8 mL    20 Nov 2020 07:01  -  20 Nov 2020 16:45  --------------------------------------------------------  IN: 1480.3 mL / OUT: 240 mL / NET: 1240.3 mL        Appearance: Normal	  HEENT:   Normal oral mucosa, PERRL, EOMI	  Lymphatic: No lymphadenopathy  Cardiovascular: Normal S1 S2, No JVD, No murmurs, No edema  Respiratory: Lungs clear to auscultation	  Psychiatry: A & O x 3, Mood & affect appropriate  Gastrointestinal:  Soft, Non-tender, + BS	  Skin: No rashes, No ecchymoses, No cyanosis	  Neurologic: Non-focal  Extremities: Normal range of motion, No clubbing, cyanosis or edema  Vascular: Peripheral pulses palpable 2+ bilaterally        LABS:	 	    CBC Full  -  ( 20 Nov 2020 07:00 )  WBC Count : 39.72 K/uL  Hemoglobin : 9.2 g/dL  Hematocrit : 27.6 %  Platelet Count - Automated : 231 K/uL  Mean Cell Volume : 82.4 fL  Mean Cell Hemoglobin : 27.5 pg  Mean Cell Hemoglobin Concentration : 33.3 %  Auto Neutrophil # : x  Auto Lymphocyte # : x  Auto Monocyte # : x  Auto Eosinophil # : x  Auto Basophil # : x  Auto Neutrophil % : x  Auto Lymphocyte % : x  Auto Monocyte % : x  Auto Eosinophil % : x  Auto Basophil % : x    11-20    122<L>  |  92<L>  |  24<H>  ----------------------------<  127<H>  4.8   |  18<L>  |  0.47<L>  11-20    118<LL>  |  89<L>  |  23  ----------------------------<  146<H>  3.8   |  17<L>  |  0.48<L>    Ca    8.3<L>      20 Nov 2020 07:00  Ca    8.1<L>      20 Nov 2020 02:00  Phos  3.2     11-20  Phos  3.0     11-20  Mg     1.8     11-20  Mg     1.7     11-20    TPro  3.9<L>  /  Alb  1.8<L>  /  TBili  0.6  /  DBili  x   /  AST  53<H>  /  ALT  38<H>  /  AlkPhos  220<H>  11-20  TPro  3.7<L>  /  Alb  1.7<L>  /  TBili  0.5  /  DBili  x   /  AST  50<H>  /  ALT  36<H>  /  AlkPhos  203<H>  11-20      proBNP:   Lipid Profile:   HgA1c:   TSH:       CARDIAC MARKERS:        TELEMETRY: 	    ECG:  	  RADIOLOGY:  OTHER: 	    PREVIOUS DIAGNOSTIC TESTING:    [ ] Echocardiogram:  [ ]  Catheterization:  [ ] Stress Test:  	  	  ASSESSMENT/PLAN: 	      Jean Pink  Cardiology Fellow  [phone]  All Cardiology service information can be found 24/7 on [email or web address], password: [email or web address]   HISTORY OF PRESENT ILLNESS:    71 YO F hx metastatic Lung Ca, DVT, dementia here for C. diff and sepsis. Had been treated in MICU with improvement in pressor requirements. Now having worsening prognosis Palliative following and planned for home hospice if can be made stable enough. Today on echo found to have pericardial effusion with signs of tamponade. Cardiology consulted for assistance in management      Allergies    No Known Allergies    Intolerances    	    MEDICATIONS:  apixaban 5 milliGRAM(s) Oral two times a day  midodrine 30 milliGRAM(s) Oral every 8 hours  norepinephrine Infusion 0.062 MICROgram(s)/kG/Min IV Continuous <Continuous>    linezolid  IVPB      linezolid  IVPB 600 milliGRAM(s) IV Intermittent every 12 hours  metroNIDAZOLE  IVPB 500 milliGRAM(s) IV Intermittent every 8 hours  tigecycline IVPB      tigecycline IVPB 50 milliGRAM(s) IV Intermittent every 12 hours  vancomycin    Solution 500 milliGRAM(s) Oral every 6 hours      morphine  - Injectable 1 milliGRAM(s) IV Push every 3 hours PRN  oxyCODONE    IR 10 milliGRAM(s) Oral every 3 hours PRN  oxyCODONE  ER Tablet 20 milliGRAM(s) Oral every 8 hours        chlorhexidine 4% Liquid 1 Application(s) Topical <User Schedule>  lactated ringers. 1000 milliLiter(s) IV Continuous <Continuous>  lidocaine   Patch 2 Patch Transdermal daily  multivitamin 1 Tablet(s) Oral daily      PAST MEDICAL & SURGICAL HISTORY:  Lung cancer metastatic to bone    No significant past surgical history        FAMILY HISTORY:  FH: colon cancer    Family history of cervical cancer        SOCIAL HISTORY:    [ ] Non-smoker  [ ] Smoker  [ ] Alcohol      REVIEW OF SYSTEMS:  General: no fatigue/malaise, weight loss/gain.  Skin: no rashes.  Ophthalmologic: no blurred vision, no loss of vision. 	  ENT: no sore throat, rhinorrhea, sinus congestion.  Respiratory: no SOB, cough or wheeze.  Gastrointestinal:  no N/V/D, no melena/hematemesis/hematochezia.  Genitourinary: no dysuria/hesitancy or hematuria.  Musculoskeletal: no myalgias or arthralgias.  Neurological: no changes in vision or hearing, no lightheadedness/dizziness, no syncope/near syncope	  Psychiatric: no unusual stress/anxiety.   Hematology/Lymphatics: no unusual bleeding, bruising and no lymphadenopathy.  Endocrine: no unusual thirst.   All others negative except as stated above and in HPI.    PHYSICAL EXAM:  T(C): 35.1 (11-20-20 @ 12:00), Max: 35.6 (11-19-20 @ 20:00)  HR: 117 (11-20-20 @ 16:00) (74 - 139)  BP: 95/81 (11-20-20 @ 16:00) (58/39 - 147/133)  RR: 19 (11-20-20 @ 16:00) (8 - 41)  SpO2: 97% (11-20-20 @ 13:00) (91% - 100%)  Wt(kg): --  I&O's Summary    19 Nov 2020 07:01  -  20 Nov 2020 07:00  --------------------------------------------------------  IN: 4547.8 mL / OUT: 750 mL / NET: 3797.8 mL    20 Nov 2020 07:01  -  20 Nov 2020 16:45  --------------------------------------------------------  IN: 1480.3 mL / OUT: 240 mL / NET: 1240.3 mL        Appearance: ill appearing	  HEENT:   Normal oral mucosa, PERRL, EOMI	  Lymphatic: No lymphadenopathy  Cardiovascular: Normal S1 S2, elevated JVD, No murmurs, pitting edema   Respiratory: Lungs clear to auscultation	  Psychiatry: unable to assess  Gastrointestinal:  Soft, Non-tender, + BS	  Skin: No rashes, No ecchymoses, No cyanosis	  Neurologic: moving all 4 extremities  Extremities: Normal range of motion, No clubbing, cyanosis or edema  Vascular: Peripheral pulses palpable 2+ bilaterally        LABS:	 	    CBC Full  -  ( 20 Nov 2020 07:00 )  WBC Count : 39.72 K/uL  Hemoglobin : 9.2 g/dL  Hematocrit : 27.6 %  Platelet Count - Automated : 231 K/uL  Mean Cell Volume : 82.4 fL  Mean Cell Hemoglobin : 27.5 pg  Mean Cell Hemoglobin Concentration : 33.3 %  Auto Neutrophil # : x  Auto Lymphocyte # : x  Auto Monocyte # : x  Auto Eosinophil # : x  Auto Basophil # : x  Auto Neutrophil % : x  Auto Lymphocyte % : x  Auto Monocyte % : x  Auto Eosinophil % : x  Auto Basophil % : x    11-20    122<L>  |  92<L>  |  24<H>  ----------------------------<  127<H>  4.8   |  18<L>  |  0.47<L>  11-20    118<LL>  |  89<L>  |  23  ----------------------------<  146<H>  3.8   |  17<L>  |  0.48<L>    Ca    8.3<L>      20 Nov 2020 07:00  Ca    8.1<L>      20 Nov 2020 02:00  Phos  3.2     11-20  Phos  3.0     11-20  Mg     1.8     11-20  Mg     1.7     11-20    TPro  3.9<L>  /  Alb  1.8<L>  /  TBili  0.6  /  DBili  x   /  AST  53<H>  /  ALT  38<H>  /  AlkPhos  220<H>  11-20  TPro  3.7<L>  /  Alb  1.7<L>  /  TBili  0.5  /  DBili  x   /  AST  50<H>  /  ALT  36<H>  /  AlkPhos  203<H>  11-20      proBNP:   Lipid Profile:   HgA1c:   TSH:       CARDIAC MARKERS:        TELEMETRY: 	    ECG:  	  RADIOLOGY:  OTHER: 	    PREVIOUS DIAGNOSTIC TESTING:    [ ] Echocardiogram: < from: Transthoracic Echocardiogram (11.20.20 @ 10:53) >  CONCLUSIONS:  1. Mitral annular calcification,otherwise normal mitral  valve. Minimal mitral regurgitation.  2. Normal left ventricular internal dimensions and wall  thicknesses.  3. Normal left ventricular systolic function. No segmental  wall motion abnormalities.  4. Normal right ventricular size and function.  5. Moderate pericardial effusion (about  1.1 cm) anterior  to the right ventricle.  Moderate pericardial effusion  (about  1.5 cm) around the LV apex.  Moderate pericardial  effusion (about  1.3 cm) adjacent to the RV free wall.  Small to moderate pericardial effusion (about  0.9 cm)  lateral to the left ventricle.  Small pericardial effusion  posterior to the left ventricle and superior to the right  atrium.  Invagination of the right atrial free wall is  seen.  In addition, significant respirophasic variability  in the transmitral and transtricuspid spectral Doppler  signals is seen.  These findings may reflect the presence  of early tamponade physiology.  6. Bilateral pleural effusions.  *** Compared with echocardiogram of 10/12/2020, the  pericardial effusion has increased in size.  In addition,  early cardiac tamponade physiology is now noted.    < end of copied text >  < from: Transthoracic Echocardiogram (10.12.20 @ 13:17) >  CONCLUSIONS:  1. Mitral annular calcification, otherwise normal mitral  valve. Minimal mitral regurgitation.  2. Mildly dilated left atrium.  LA volume index = 41 cc/m2.  3. Normal left ventricular internal dimensions and wall  thicknesses.  4. Normal left ventricular systolic function. No segmental  wall motion abnormalities.  5. Mild diastolic dysfunction (Stage I).  6. Normal right ventricular size and function.  7. Estimated right ventricular systolic pressure equals 35  mm Hg, assuming right atrial pressure equals 10 mm Hg,  consistent with borderline pulmonary hypertension.  8. Moderate pericardial effusion, measuring about  1.1 cm  adjacent to the right atrial free wall.  Small pericardial  effusion lateral to the left ventricleand adjacent to the  right ventricular free wall.  No RA or RV diastolic  collapse seen.  9. Bilateral pleural effusions.  *** No previous Echo exam.    < end of copied text >    [ ]  Catheterization:  [ ] Stress Test:  	  	  ASSESSMENT/PLAN: 	  67 YO F with metastatic Lung CA recurrent C.Diff here for sepsis now having increasing pressor requirements. Found to have tamponade on echo. Cardiology consulted for assistance with management     Hypotension  Likely sepsis mediated  Tamponade likely playing role in hypotension  Likely malignant effusion with high likelihood of recurrence.   Pt. also on DOAC which would dramatically increase risk of bleeding and procedural complications  At this time patient is not having shortness of breath or chest pain  If pressor requirements improve can reconsider at later date  Please call cardiology for questions or changes in clinical status      Jaen Pink  Cardiology Fellow  [phone]  All Cardiology service information can be found 24/7 on [email or web address], password: [email or web address]

## 2020-11-20 NOTE — CONSULT NOTE ADULT - REASON FOR ADMISSION
Pancolitis, septic shock
hypotension

## 2020-11-20 NOTE — CONSULT NOTE ADULT - ATTENDING COMMENTS
Asked to evaluate for the role of pericardiocentesis in the setting of sepsis, hypotension, and TTE with pericardial effusion and tamponade physiology. Pressor requirement has increased.    TTE personally reviewed with evidence of diastolic RA invagination, significant respirophasic variation in MV inflows. IVC collapsible. Pericardial effusion is moderate in size.    Pericardial effusion likely contributing to the pt's hypotension but may not be the main contributor. There has been progression since the last echocardiogram. IVC collapsibility in this setting may also suggest vasodilation in the setting of sepsis. Pt also on DOAC.    Given the relative size of the effusion, current use of systemic AC, high rate of recurrent pericardial effusion, and other likely contributors to the pt's hypotension, there is a low likelihood that a pericardiocentesis would be of sufficient therapeutic effect to stabilize the patient for discharge out of the hospital at this time i.e. harm outweighs any potential benefit.

## 2020-11-21 NOTE — PROGRESS NOTE ADULT - ASSESSMENT
69 F Hx Lung Ca in 8/2020 with mets to ?brain, Spine, and Right hip s/p RT x 5 ( last tx 9/2), dementia, DVT, Afib on Eliqius, presenting with hypotension found to be in septic shock 2/2 cdiff pancolitis now with early tamponade physiology. Admitted to MICU for pressure support.     #Neuro  - AOx3, no active issues  - possible brain mets, however PET scan from 10/2 & MRI 8/2020 showed possible meningioma    #Pulm  - satting well on room air   - CXR shows Bilateral patchy patchy right sided and more confluent left lower lobe opacities may represent multifocal pneumonia; likely pulmonary mets    #Cardio  Shock state: Multifactorial: septic/obstructive  - mIVF 100cc/hr  - c/w levophed for pressure support  - c/w Midodrine 30 q8h   - TTE (10/12/20) shows normal LV systolic function, mild diastolic dysfunction (stage 1)  - repeat TTE 11/20: increased pericardial effusion, with early tamponade physiology  - Cards recs appreciated: no role for pericardiocentesis at this time, risks>benefits    #GI  C.diff pancolitis (Cdiff POS): improving  - Po vanc 500 q6h (11/15-11/21)  - Po Vanc 125 q6h (11/21- )  - Flagyl 500 q8h, tigecycline 50mg IV BID (11/16- 11/21)   - Repeat CT a/p 11/20: no ischemia bowel, renal infarct likely 2/2 pAF off AC    #Renal  #AG metabolic Acidosis; likely 2/2 to elevated BUN & hypovolemia  - Diet: adv to soft today  #Hyponatremic most likely 2/2 hypovolemia: improving  - monitor electrolytes, Is&Os  - vol resus as above    #ID  UCx 11/15: VRE s/s to linezolid  - started linezolid 600 q12h (11/18 - 11/22) last dose tomorrow  - s/p azithromycin (11/16-11/17)     Cdiff colitis  - CT A/P shows interval improvement of pancolitis with mild pancolitis remaining   - management as above  - blood cultures 11/15 NGTD  - ID recs appreciated  - s/p methnylnaltrexone 8mg x1 11/21, with 1 large BM 11/20    #Heme/Onc  #Metastatic Lung Cancer w/ mets to bone  - follows with Dr. Xie at [facility]  - palliative care consulted  - Pain control: Back Pain 2/2 mets  - home dose oxy 20 ER TID. c/w Oxy IR 10 q3hs PRN  - Morphine 1mg IVP q3h PRN     #Anemia  - Hgb dropped to 5.7 most likely GI bleed from pancolitis/C.diff  - s/p 1 unit pRBC, goal Hgb >7, stable    #DVT  - hx of DVT left posterior tibial and peroneal veins 10/18  - on eliquis 5 BID, switched to lovenox 50 BID    #Endo   - A1c 6.1 in August    #Ethics  - FULL code according to family  - Palliative care recs appreciated

## 2020-11-21 NOTE — PROGRESS NOTE ADULT - SUBJECTIVE AND OBJECTIVE BOX
Jazmín Fofana (Connor) PGY-2  Pager: NS) [phone]/ (LOP) [phone]    Patient is a 70y old  Female who presents with a chief complaint of Pancolitis, septic shock (20 Nov 2020 16:45)      SUBJECTIVE / OVERNIGHT EVENTS:  No acute complaints over night. Denies any fevers/chills, headache, CP, SOB, abd pain, N/V/D, constipation, or leg swelling.       MEDICATIONS  (STANDING):  apixaban 5 milliGRAM(s) Oral two times a day  chlorhexidine 4% Liquid 1 Application(s) Topical <User Schedule>  lactated ringers. 1000 milliLiter(s) (100 mL/Hr) IV Continuous <Continuous>  lidocaine   Patch 2 Patch Transdermal daily  linezolid  IVPB      linezolid  IVPB 600 milliGRAM(s) IV Intermittent every 12 hours  metroNIDAZOLE  IVPB 500 milliGRAM(s) IV Intermittent every 8 hours  midodrine 30 milliGRAM(s) Oral every 8 hours  multivitamin 1 Tablet(s) Oral daily  norepinephrine Infusion 0.062 MICROgram(s)/kG/Min (5.63 mL/Hr) IV Continuous <Continuous>  oxyCODONE  ER Tablet 20 milliGRAM(s) Oral every 8 hours  tigecycline IVPB      tigecycline IVPB 50 milliGRAM(s) IV Intermittent every 12 hours  vancomycin    Solution 500 milliGRAM(s) Oral every 6 hours    MEDICATIONS  (PRN):  morphine  - Injectable 1 milliGRAM(s) IV Push every 3 hours PRN Severe Pain (7 - 10)  oxyCODONE    IR 10 milliGRAM(s) Oral every 3 hours PRN Moderate Pain (4 - 6), severe pain          OBJECTIVE:  Vital Signs Last 24 Hrs  T(C): 36.8 (21 Nov 2020 04:00), Max: 37.1 (20 Nov 2020 20:00)  T(F): 98.3 (21 Nov 2020 04:00), Max: 98.8 (21 Nov 2020 00:00)  HR: 116 (21 Nov 2020 06:00) (106 - 138)  BP: 113/94 (21 Nov 2020 06:00) (87/55 - 143/101)  BP(mean): 101 (21 Nov 2020 06:00) (59 - 112)  RR: 21 (21 Nov 2020 06:00) (15 - 27)  SpO2: 100% (21 Nov 2020 06:00) (83% - 100%)  PHYSICAL EXAM:  GENERAL: NAD, well-developed  HEAD:  Atraumatic, Normocephalic  EYES: EOMI, PERRLA, conjunctiva and sclera clear  NECK: Supple, No JVD  CHEST/LUNG: Clear to auscultation bilaterally; No wheeze  HEART: Regular rate and rhythm; No murmurs, rubs, or gallops  ABDOMEN: Soft, Nontender, Nondistended; Bowel sounds present  EXTREMITIES:  2+ Peripheral Pulses, No clubbing, cyanosis, or edema  PSYCH: AAOx3  NEUROLOGY: non-focal  SKIN: No rashes or lesions    CAPILLARY BLOOD GLUCOSE        I&O's Summary    20 Nov 2020 07:01  -  21 Nov 2020 07:00  --------------------------------------------------------  IN: 3088.3 mL / OUT: 850 mL / NET: 2238.3 mL              LABS:                        8.1    27.58 )-----------( 170      ( 21 Nov 2020 03:02 )             24.4     11-21    123<L>  |  93<L>  |  22  ----------------------------<  122<H>  3.9   |  19<L>  |  0.46<L>    Ca    8.0<L>      21 Nov 2020 03:02  Phos  3.0     11-21  Mg     1.7     11-21    TPro  3.5<L>  /  Alb  1.7<L>  /  TBili  0.5  /  DBili  x   /  AST  38<H>  /  ALT  32  /  AlkPhos  193<H>  11-21    PT/INR - ( 21 Nov 2020 03:02 )   PT: 21.0 SEC;   INR: 1.90          PTT - ( 21 Nov 2020 03:02 )  PTT:43.4 SEC  CARDIAC MARKERS ( 21 Nov 2020 03:02 )  x     / x     / 104 u/L / x     / x                RADIOLOGY & ADDITIONAL TESTS:       Jazmín Fofana (Connor) PGY-2  Pager: NS) [phone]/ (ZXG) [phone]    Patient is a 70y old  Female who presents with a chief complaint of Pancolitis, septic shock (20 Nov 2020 16:45)      SUBJECTIVE / OVERNIGHT EVENTS:  no acute events overnight. Had large BM yesterday, watery, nonbloody as per RN      MEDICATIONS  (STANDING):  apixaban 5 milliGRAM(s) Oral two times a day  chlorhexidine 4% Liquid 1 Application(s) Topical <User Schedule>  lactated ringers. 1000 milliLiter(s) (100 mL/Hr) IV Continuous <Continuous>  lidocaine   Patch 2 Patch Transdermal daily  linezolid  IVPB      linezolid  IVPB 600 milliGRAM(s) IV Intermittent every 12 hours  metroNIDAZOLE  IVPB 500 milliGRAM(s) IV Intermittent every 8 hours  midodrine 30 milliGRAM(s) Oral every 8 hours  multivitamin 1 Tablet(s) Oral daily  norepinephrine Infusion 0.062 MICROgram(s)/kG/Min (5.63 mL/Hr) IV Continuous <Continuous>  oxyCODONE  ER Tablet 20 milliGRAM(s) Oral every 8 hours  tigecycline IVPB      tigecycline IVPB 50 milliGRAM(s) IV Intermittent every 12 hours  vancomycin    Solution 500 milliGRAM(s) Oral every 6 hours    MEDICATIONS  (PRN):  morphine  - Injectable 1 milliGRAM(s) IV Push every 3 hours PRN Severe Pain (7 - 10)  oxyCODONE    IR 10 milliGRAM(s) Oral every 3 hours PRN Moderate Pain (4 - 6), severe pain          OBJECTIVE:  Vital Signs Last 24 Hrs  T(C): 36.8 (21 Nov 2020 04:00), Max: 37.1 (20 Nov 2020 20:00)  T(F): 98.3 (21 Nov 2020 04:00), Max: 98.8 (21 Nov 2020 00:00)  HR: 116 (21 Nov 2020 06:00) (106 - 138)  BP: 113/94 (21 Nov 2020 06:00) (87/55 - 143/101)  BP(mean): 101 (21 Nov 2020 06:00) (59 - 112)  RR: 21 (21 Nov 2020 06:00) (15 - 27)  SpO2: 100% (21 Nov 2020 06:00) (83% - 100%)    PHYSICAL EXAM:  GENERAL: NAD  HEAD:  Atraumatic, Normocephalic  EYES: conjunctiva and sclera clear  NECK: Supple, No JVD  CHEST/LUNG: Clear to auscultation bilaterally; No wheeze  HEART: Regular rate and rhythm; No murmurs, rubs, or gallops  ABDOMEN: Soft, Nontender,; Bowel sounds present  EXTREMITIES:  mild edema b/l LE  PSYCH: AAOx3    CAPILLARY BLOOD GLUCOSE        I&O's Summary    20 Nov 2020 07:01  -  21 Nov 2020 07:00  --------------------------------------------------------  IN: 3088.3 mL / OUT: 850 mL / NET: 2238.3 mL              LABS:                        8.1    27.58 )-----------( 170      ( 21 Nov 2020 03:02 )             24.4     11-21    123<L>  |  93<L>  |  22  ----------------------------<  122<H>  3.9   |  19<L>  |  0.46<L>    Ca    8.0<L>      21 Nov 2020 03:02  Phos  3.0     11-21  Mg     1.7     11-21    TPro  3.5<L>  /  Alb  1.7<L>  /  TBili  0.5  /  DBili  x   /  AST  38<H>  /  ALT  32  /  AlkPhos  193<H>  11-21    PT/INR - ( 21 Nov 2020 03:02 )   PT: 21.0 SEC;   INR: 1.90          PTT - ( 21 Nov 2020 03:02 )  PTT:43.4 SEC  CARDIAC MARKERS ( 21 Nov 2020 03:02 )  x     / x     / 104 u/L / x     / x                RADIOLOGY & ADDITIONAL TESTS:

## 2020-11-21 NOTE — PROGRESS NOTE ADULT - SUBJECTIVE AND OBJECTIVE BOX
Follow Up:  cdiff    Interval History/ROS: dysphoric    Allergies  No Known Allergies    ANTIMICROBIALS:  linezolid  IVPB    linezolid  IVPB 600 every 12 hours  metroNIDAZOLE  IVPB 500 every 8 hours  tigecycline IVPB    tigecycline IVPB 50 every 12 hours  vancomycin    Solution 500 every 6 hours    OTHER MEDS:  MEDICATIONS  (STANDING):  dronabinol 2.5 daily  enoxaparin Injectable 50 two times a day  midodrine 30 every 8 hours  morphine  - Injectable 1 every 3 hours PRN  norepinephrine Infusion 0.062 <Continuous>  oxyCODONE    IR 10 every 3 hours PRN  oxyCODONE  ER Tablet 20 every 8 hours  polyethylene glycol 3350 17 two times a day      Vital Signs Last 24 Hrs  T(C): 35.3 (21 Nov 2020 08:00), Max: 37.1 (20 Nov 2020 20:00)  T(F): 95.5 (21 Nov 2020 08:00), Max: 98.8 (21 Nov 2020 00:00)  HR: 125 (21 Nov 2020 09:24) (106 - 138)  BP: 110/84 (21 Nov 2020 09:24) (70/54 - 143/101)  BP(mean): 94 (21 Nov 2020 09:24) (54 - 112)  RR: 26 (21 Nov 2020 09:24) (15 - 26)  SpO2: 90% (21 Nov 2020 09:00) (83% - 100%)    PHYSICAL EXAM:  General: ill appearing  Neurology: awake and responsive  Respiratory: Clear to auscultation bilaterally  CV: RRR, S1S2, no murmurs, rubs or gallops  Abdominal: "doughy"  non-distended  Extremities: No edema,  Line Sites: Clear  Skin: No rash                          8.1    27.58 )-----------( 170      ( 21 Nov 2020 03:02 )             24.4   WBC Count: 27.58 (11-21 @ 03:02)  WBC Count: 39.72 (11-20 @ 07:00)  WBC Count: 35.03 (11-20 @ 02:00)  WBC Count: 34.04 (11-19 @ 01:10)  WBC Count: 29.32 (11-18 @ 16:00)  WBC Count: 24.48 (11-18 @ 04:30)  WBC Count: 21.11 (11-17 @ 22:30)  WBC Count: 24.12 (11-17 @ 09:58)  WBC Count: 29.16 (11-17 @ 02:20)  WBC Count: 29.92 (11-16 @ 20:15)  WBC Count: 29.44 (11-16 @ 11:40)      11-21    123<L>  |  93<L>  |  22  ----------------------------<  122<H>  3.9   |  19<L>  |  0.46<L>    Ca    8.0<L>      21 Nov 2020 03:02  Phos  3.0     11-21  Mg     1.7     11-21    TPro  3.5<L>  /  Alb  1.7<L>  /  TBili  0.5  /  DBili  x   /  AST  38<H>  /  ALT  32  /  AlkPhos  193<H>  11-21      MICROBIOLOGY:  .Urine Catheterized  11-15-20   >100,000 CFU/ml Enterococcus faecium (vancomycin resistant)  <10,000 CFU/ml Normal Urogenital crystal present  --  Enterococcus faecium (vancomycin resistant)      .Blood Blood-Peripheral  11-15-20   No Growth Final  --  --      RADIOLOGY:  11/20 Abd CT  IMPRESSION:  1.  Right renal infarcts involving the mid to lower poles.  2.  No evidence of bowel ischemia.  3.  Redemonstration of osseous metastatic disease. Stable indeterminate left adrenal nodule, probably metastasis.      Herb Cabezas MD; Division of Infectious Disease; Pager: [phone]; nights and weekends: [phone]

## 2020-11-21 NOTE — PROGRESS NOTE ADULT - ATTENDING COMMENTS
69 year old woman with dementia and widely metastatic lung CA presents in shock requiring vasopressor support, shock due to sepsis C. diff colitis and VRE UTI     remains on vasopressors   leukocytosis improving  found to have a renal infarct   as per cardiology too high risk for pericardiocentesis  pain control  continued GOC discussions with family     Critically ill patient requiring frequent bedside visits

## 2020-11-21 NOTE — PROGRESS NOTE ADULT - ASSESSMENT
69W with recent severe Cdiff,  Lung Ca since  8/2020 with mets to brain, Spine, and Right hip s/p RT x 5 ( last tx 9/2), dementia, DVT, Afib on Eliqius [id number] with hypotension, diarrhea, leukocytosis.    Antibiotic History  IV Vanco 9/12 --> 13; 10/10 -->11; 11/15  Zosyn 9/12 --> 16; 10/10; 11/16  Azithro 9/12; 11/16  Cefazolin 9/18  Metronidazole 10/10 --> 10/17; 11/16  Meropenem 10/10 --> 10/13  Vanco enteral solution 10/11 -->24; 11/15-->    recurrent Cdiff   episode of melena  lung opacities - likely metatstatic disease  VRE bacteruria noted -though  without urinary complaints  marked  leukemoid reaction  - patient has no diarrhea - 11/20 abd ct without bowel abnormality  11/20: decreased wbc, hyponatremia    tumor burden, renal infarct, malnourished states, anasarca with pleural effusions with associated atelectasis  poor outlook  no clear infectious cause, Cdiff appears improved    discussed with MICU resident:    Suggest  Discontinue /Linezolid/Tygacil/Flagyl  Decrease po vanco 125 mg 4 times daily  monitor on reduced antibiotics

## 2020-11-22 NOTE — PROGRESS NOTE ADULT - ATTENDING COMMENTS
69 year old woman with dementia and widely metastatic lung CA presents in shock requiring vasopressor support, shock due to sepsis C. diff colitis and VRE UTI     remains on vasopressors   leukocytosis improving  found to have a renal infarct   as per cardiology too high risk for pericardiocentesis  pain control  continued GOC discussions with family     Critically ill patient requiring frequent bedside visits 69 year old woman with dementia and widely metastatic lung CA presents in shock requiring vasopressor support, shock due to sepsis C. diff colitis and VRE UTI     vasopressor requirements improved   leukocytosis improved  on AC for renal infarct  complete course of treatment for UTI continue treatment for C.diff  transfuse 1 U PRBC today  continued GOC discussions with family     Critically ill patient requiring frequent bedside visits

## 2020-11-22 NOTE — PROCEDURE NOTE - NSPOSTCAREGUIDE_GEN_A_CORE
Verbal/written post procedure instructions were given to patient/caregiver/Care for catheter as per unit/ICU protocols/Instructed patient/caregiver to follow-up with primary care physician/Keep the cast/splint/dressing clean and dry/Instructed patient/caregiver regarding signs and symptoms of infection

## 2020-11-22 NOTE — PROGRESS NOTE ADULT - SUBJECTIVE AND OBJECTIVE BOX
CHIEF COMPLAINT:    Interval Events:    REVIEW OF SYSTEMS:  Constitutional: [ ] negative [ ] fevers [ ] chills [ ] weight loss [ ] weight gain  HEENT: [ ] negative [ ] dry eyes [ ] eye irritation [ ] postnasal drip [ ] nasal congestion  CV: [ ] negative  [ ] chest pain [ ] orthopnea [ ] palpitations [ ] murmur  Resp: [ ] negative [ ] cough [ ] shortness of breath [ ] dyspnea [ ] wheezing [ ] sputum [ ] hemoptysis  GI: [ ] negative [ ] nausea [ ] vomiting [ ] diarrhea [ ] constipation [ ] abd pain [ ] dysphagia   : [ ] negative [ ] dysuria [ ] nocturia [ ] hematuria [ ] increased urinary frequency  Musculoskeletal: [ ] negative [ ] back pain [ ] myalgias [ ] arthralgias [ ] fracture  Skin: [ ] negative [ ] rash [ ] itch  Neurological: [ ] negative [ ] headache [ ] dizziness [ ] syncope [ ] weakness [ ] numbness  Psychiatric: [ ] negative [ ] anxiety [ ] depression  Endocrine: [ ] negative [ ] diabetes [ ] thyroid problem  Hematologic/Lymphatic: [ ] negative [ ] anemia [ ] bleeding problem  Allergic/Immunologic: [ ] negative [ ] itchy eyes [ ] nasal discharge [ ] hives [ ] angioedema  [ ] All other systems negative  [ ] Unable to assess ROS because ________    OBJECTIVE:  ICU Vital Signs Last 24 Hrs  T(C): 36.7 (22 Nov 2020 04:00), Max: 36.7 (22 Nov 2020 04:00)  T(F): 98 (22 Nov 2020 04:00), Max: 98 (22 Nov 2020 04:00)  HR: 115 (22 Nov 2020 06:00) (72 - 135)  BP: 100/62 (22 Nov 2020 06:00) (45/29 - 145/97)  BP(mean): 66 (22 Nov 2020 06:00) (22 - 121)  ABP: --  ABP(mean): --  RR: 23 (22 Nov 2020 06:00) (13 - 32)  SpO2: 100% (22 Nov 2020 04:15) (87% - 100%)        11-21 @ 07:01  -  11-22 @ 07:00  --------------------------------------------------------  IN: 2777.9 mL / OUT: 2125 mL / NET: 652.9 mL      CAPILLARY BLOOD GLUCOSE          PHYSICAL EXAM:  General:   HEENT:   Lymph Nodes:  Neck:   Respiratory:   Cardiovascular:   Abdomen:   Extremities:   Skin:   Neurological:  Psychiatry:    LINES:    HOSPITAL MEDICATIONS:  Standing Meds:  chlorhexidine 4% Liquid 1 Application(s) Topical <User Schedule>  dronabinol 2.5 milliGRAM(s) Oral daily  enoxaparin Injectable 50 milliGRAM(s) SubCutaneous two times a day  lactated ringers. 500 milliLiter(s) IV Continuous <Continuous>  lactated ringers. 1000 milliLiter(s) IV Continuous <Continuous>  lidocaine   Patch 2 Patch Transdermal daily  linezolid  IVPB      linezolid  IVPB 600 milliGRAM(s) IV Intermittent every 12 hours  midodrine 30 milliGRAM(s) Oral every 8 hours  multivitamin 1 Tablet(s) Oral daily  norepinephrine Infusion 0.062 MICROgram(s)/kG/Min IV Continuous <Continuous>  oxyCODONE  ER Tablet 20 milliGRAM(s) Oral every 8 hours  polyethylene glycol 3350 17 Gram(s) Oral two times a day  senna 2 Tablet(s) Oral at bedtime  vancomycin    Solution 125 milliGRAM(s) Oral every 6 hours      PRN Meds:  morphine  - Injectable 1 milliGRAM(s) IV Push every 3 hours PRN  oxyCODONE    IR 10 milliGRAM(s) Oral every 3 hours PRN      LABS:                        7.2    19.19 )-----------( 121      ( 22 Nov 2020 05:45 )             21.4     Hgb Trend: 7.2<--, 6.8<--, 8.1<--, 9.2<--, 9.0<--  11-22    126<L>  |  94<L>  |  19  ----------------------------<  101<H>  3.8   |  19<L>  |  0.43<L>    Ca    7.8<L>      22 Nov 2020 05:45  Phos  3.1     11-22  Mg     1.9     11-22    TPro  3.6<L>  /  Alb  1.8<L>  /  TBili  0.5  /  DBili  x   /  AST  33<H>  /  ALT  27  /  AlkPhos  183<H>  11-22    Creatinine Trend: 0.43<--, 0.46<--, 0.46<--, 0.47<--, 0.48<--, 0.44<--  PT/INR - ( 21 Nov 2020 03:02 )   PT: 21.0 SEC;   INR: 1.90          PTT - ( 21 Nov 2020 03:02 )  PTT:43.4 SEC          MICROBIOLOGY:     RADIOLOGY:  [ ] Reviewed and interpreted by me    EKG: CHIEF COMPLAINT: diarrhea    Interval Events: Overnight patient was restarted on pressors. No bowel movement overnight.     REVIEW OF SYSTEMS:  Constitutional: [x ] negative [ ] fevers [ ] chills [ ] weight loss [ ] weight gain  HEENT: [ ] negative [ ] dry eyes [ ] eye irritation [ ] postnasal drip [ ] nasal congestion  CV: [x ] negative  [ ] chest pain [ ] orthopnea [ ] palpitations [ ] murmur  Resp: [ x] negative [ ] cough [ ] shortness of breath [ ] dyspnea [ ] wheezing [ ] sputum [ ] hemoptysis  GI: [x ] negative [ ] nausea [ ] vomiting [ ] diarrhea [ ] constipation [ ] abd pain [ ] dysphagia   : [ ] negative [ ] dysuria [ ] nocturia [ ] hematuria [ ] increased urinary frequency  Musculoskeletal: [x] negative [ ] back pain [ ] myalgias [ ] arthralgias [ ] fracture  Skin: [ ] negative [ ] rash [ ] itch  Neurological: [ ] negative [ ] headache [ ] dizziness [ ] syncope [ ] weakness [ ] numbness  Psychiatric: [ ] negative [ ] anxiety [ ] depression  Endocrine: [ ] negative [ ] diabetes [ ] thyroid problem  Hematologic/Lymphatic: [ ] negative [ ] anemia [ ] bleeding problem  Allergic/Immunologic: [ ] negative [ ] itchy eyes [ ] nasal discharge [ ] hives [ ] angioedema  [ ] All other systems negative  [ ] Unable to assess ROS because ________    OBJECTIVE:  ICU Vital Signs Last 24 Hrs  T(C): 36.7 (22 Nov 2020 04:00), Max: 36.7 (22 Nov 2020 04:00)  T(F): 98 (22 Nov 2020 04:00), Max: 98 (22 Nov 2020 04:00)  HR: 115 (22 Nov 2020 06:00) (72 - 135)  BP: 100/62 (22 Nov 2020 06:00) (45/29 - 145/97)  BP(mean): 66 (22 Nov 2020 06:00) (22 - 121)  ABP: --  ABP(mean): --  RR: 23 (22 Nov 2020 06:00) (13 - 32)  SpO2: 100% (22 Nov 2020 04:15) (87% - 100%)        11-21 @ 07:01  -  11-22 @ 07:00  --------------------------------------------------------  IN: 2777.9 mL / OUT: 2125 mL / NET: 652.9 mL    PHYSICAL EXAM:  General: No acute distress.  HEENT: No scleral icterus or injection.    Neck: Supple.  Full ROM.  No JVD.   Heart: RRR.  Normal S1 and S2.  No murmurs, rubs, or gallops.   Lungs: CTAB. No wheezes, crackles, or rhonchi.    Abdomen: BS+, soft, mildly tender to palpation diffusely   Skin: Warm and dry.  No rashes.  Extremities: significant b/l lower extremity edema  Musculoskeletal: No deformities.    Neuro: Following commands, moving all extremities     LINES:    HOSPITAL MEDICATIONS:  Standing Meds:  chlorhexidine 4% Liquid 1 Application(s) Topical <User Schedule>  dronabinol 2.5 milliGRAM(s) Oral daily  enoxaparin Injectable 50 milliGRAM(s) SubCutaneous two times a day  lactated ringers. 500 milliLiter(s) IV Continuous <Continuous>  lactated ringers. 1000 milliLiter(s) IV Continuous <Continuous>  lidocaine   Patch 2 Patch Transdermal daily  linezolid  IVPB      linezolid  IVPB 600 milliGRAM(s) IV Intermittent every 12 hours  midodrine 30 milliGRAM(s) Oral every 8 hours  multivitamin 1 Tablet(s) Oral daily  norepinephrine Infusion 0.062 MICROgram(s)/kG/Min IV Continuous <Continuous>  oxyCODONE  ER Tablet 20 milliGRAM(s) Oral every 8 hours  polyethylene glycol 3350 17 Gram(s) Oral two times a day  senna 2 Tablet(s) Oral at bedtime  vancomycin    Solution 125 milliGRAM(s) Oral every 6 hours      PRN Meds:  morphine  - Injectable 1 milliGRAM(s) IV Push every 3 hours PRN  oxyCODONE    IR 10 milliGRAM(s) Oral every 3 hours PRN      LABS:                        7.2    19.19 )-----------( 121      ( 22 Nov 2020 05:45 )             21.4     Hgb Trend: 7.2<--, 6.8<--, 8.1<--, 9.2<--, 9.0<--  11-22    126<L>  |  94<L>  |  19  ----------------------------<  101<H>  3.8   |  19<L>  |  0.43<L>    Ca    7.8<L>      22 Nov 2020 05:45  Phos  3.1     11-22  Mg     1.9     11-22    TPro  3.6<L>  /  Alb  1.8<L>  /  TBili  0.5  /  DBili  x   /  AST  33<H>  /  ALT  27  /  AlkPhos  183<H>  11-22    Creatinine Trend: 0.43<--, 0.46<--, 0.46<--, 0.47<--, 0.48<--, 0.44<--  PT/INR - ( 21 Nov 2020 03:02 )   PT: 21.0 SEC;   INR: 1.90          PTT - ( 21 Nov 2020 03:02 )  PTT:43.4 SEC          MICROBIOLOGY:     RADIOLOGY:  [ ] Reviewed and interpreted by me    EKG:

## 2020-11-22 NOTE — PROGRESS NOTE ADULT - ASSESSMENT
69 F Hx Lung Ca in 8/2020 with mets to ?brain, Spine, and Right hip s/p RT x 5 ( last tx 9/2), dementia, DVT, Afib on Eliqius, presenting with hypotension found to be in septic shock 2/2 cdiff pancolitis now with early tamponade physiology. Admitted to MICU for pressure support.     #Neuro  - AOx3, no active issues  - possible brain mets, however PET scan from 10/2 & MRI 8/2020 showed possible meningioma    #Pulm  - satting well on room air   - CXR shows Bilateral patchy patchy right sided and more confluent left lower lobe opacities may represent multifocal pneumonia; likely pulmonary mets    #Cardio  Shock state: Multifactorial: septic/obstructive  - mIVF 100cc/hr  - c/w levophed for pressure support  - c/w Midodrine 30 q8h   - TTE (10/12/20) shows normal LV systolic function, mild diastolic dysfunction (stage 1)  - repeat TTE 11/20: increased pericardial effusion, with early tamponade physiology  - Cards recs appreciated: no role for pericardiocentesis at this time, risks>benefits    #GI  C.diff pancolitis (Cdiff POS): improving  - Po vanc 500 q6h (11/15-11/21)  - Po Vanc 125 q6h (11/21- )  - Flagyl 500 q8h, tigecycline 50mg IV BID (11/16- 11/21)   - Repeat CT a/p 11/20: no ischemia bowel, renal infarct likely 2/2 pAF off AC    #Renal  #AG metabolic Acidosis; likely 2/2 to elevated BUN & hypovolemia  - Diet: adv to soft today  #Hyponatremic most likely 2/2 hypovolemia: improving  - monitor electrolytes, Is&Os  - vol resus as above    #ID  UCx 11/15: VRE s/s to linezolid  - started linezolid 600 q12h (11/18 - 11/22) last dose tomorrow  - s/p azithromycin (11/16-11/17)     Cdiff colitis  - CT A/P shows interval improvement of pancolitis with mild pancolitis remaining   - management as above  - blood cultures 11/15 NGTD  - ID recs appreciated  - s/p methnylnaltrexone 8mg x1 11/21, with 1 large BM 11/20    #Heme/Onc  #Metastatic Lung Cancer w/ mets to bone  - follows with Dr. Xie at [facility]  - palliative care consulted  - Pain control: Back Pain 2/2 mets  - home dose oxy 20 ER TID. c/w Oxy IR 10 q3hs PRN  - Morphine 1mg IVP q3h PRN     #Anemia  - Hgb dropped to 5.7 most likely GI bleed from pancolitis/C.diff  - s/p 1 unit pRBC, goal Hgb >7, stable    #DVT  - hx of DVT left posterior tibial and peroneal veins 10/18  - on eliquis 5 BID, switched to lovenox 50 BID    #Endo   - A1c 6.1 in August    #Ethics  - FULL code according to family  - Palliative care recs appreciated 69 F Hx Lung Ca in 8/2020 with mets to ?brain, Spine, and Right hip s/p RT x 5 ( last tx 9/2), dementia, DVT, Afib on Eliqius, presenting with hypotension found to be in septic shock 2/2 cdiff pancolitis now with early tamponade physiology. Admitted to MICU for pressure support.     #Neuro  - AOx3, no active issues  - possible brain mets, however PET scan from 10/2 & MRI 8/2020 showed possible meningioma    #Pulm  - satting well on room air   - CXR shows Bilateral patchy patchy right sided and more confluent left lower lobe opacities may represent multifocal pneumonia; likely pulmonary mets    #Cardio  Shock state: Multifactorial: septic/obstructive  - mIVF 100cc/hr  - c/w levophed for pressure support, wean off as tolerated  - c/w Midodrine 30 q8h   - TTE (10/12/20) shows normal LV systolic function, mild diastolic dysfunction (stage 1)  - repeat TTE 11/20: increased pericardial effusion, with early tamponade physiology  - Cards recs appreciated: no role for pericardiocentesis at this time, risks>benefits    #GI  C.diff pancolitis (Cdiff POS): improving  - Po vanc 500 q6h (11/15-11/21)  - Po Vanc 125 q6h (11/21- )  - d/c Flagyl 500 q8h, tigecycline 50mg IV BID (11/16- 11/21)   - Repeat CT a/p 11/20: no ischemia bowel, renal infarct likely 2/2 pAF off AC    #Renal  #AG metabolic Acidosis; likely 2/2 to elevated BUN & hypovolemia  - Diet: adv to soft  #Hyponatremic most likely 2/2 hypovolemia: improving  - monitor electrolytes, Is&Os  - vol resus as above    #ID  UCx 11/15: VRE s/s to linezolid  - completed linezolid 600 q12h (11/18 - 11/22)  - s/p azithromycin (11/16-11/17)     Cdiff colitis  - CT A/P shows interval improvement of pancolitis with mild pancolitis remaining   - management as above  - blood cultures 11/15 NGTD  - ID recs appreciated  - s/p methnylnaltrexone 8mg x1 11/21, with 1 large BM 11/20    #Heme/Onc  #Metastatic Lung Cancer w/ mets to bone  - follows with Dr[facility] Xie at [facility]  - palliative care consulted  - Pain control: Back Pain 2/2 mets  - home dose oxy 20 ER TID. c/w Oxy IR 10 q3hs PRN  - Morphine 1mg IVP q3h PRN     #Anemia  - Hgb dropped to 5.7 most likely GI bleed from pancolitis/C.diff  - s/p 1 unit pRBC, goal Hgb >7, stable  - Hgb downtrending, will give 1 unit pRBC  - no obvious source of bleed    #DVT  - hx of DVT left posterior tibial and peroneal veins 10/18  - on eliquis 5 BID, switched to lovenox 50 BID on 11/21  - d/c Lovenox 11/22 due to downtrending Hgb    #Endo   - A1c 6.1 in August    #Ethics  - FULL code according to family  - Palliative care recs appreciated

## 2020-11-23 NOTE — PROGRESS NOTE ADULT - SUBJECTIVE AND OBJECTIVE BOX
Jazmín Fofana (Connor) PGY-2  Pager: NS) [phone]/ (EOY) [phone]    Patient is a 70y old  Female who presents with a chief complaint of Pancolitis, septic shock (22 Nov 2020 07:14)      SUBJECTIVE / OVERNIGHT EVENTS:  No acute complaints over night. Denies any fevers/chills, headache, CP, SOB, abd pain, N/V/D, constipation, or leg swelling.       MEDICATIONS  (STANDING):  chlorhexidine 4% Liquid 1 Application(s) Topical <User Schedule>  dronabinol 2.5 milliGRAM(s) Oral daily  lactated ringers. 500 milliLiter(s) (500 mL/Hr) IV Continuous <Continuous>  lactated ringers. 1000 milliLiter(s) (75 mL/Hr) IV Continuous <Continuous>  lidocaine   Patch 2 Patch Transdermal daily  midodrine 30 milliGRAM(s) Oral every 8 hours  multivitamin 1 Tablet(s) Oral daily  norepinephrine Infusion 0.062 MICROgram(s)/kG/Min (5.63 mL/Hr) IV Continuous <Continuous>  oxyCODONE  ER Tablet 20 milliGRAM(s) Oral every 8 hours  polyethylene glycol 3350 17 Gram(s) Oral two times a day  senna 2 Tablet(s) Oral at bedtime  vancomycin    Solution 125 milliGRAM(s) Oral every 6 hours    MEDICATIONS  (PRN):  morphine  - Injectable 1 milliGRAM(s) IV Push every 3 hours PRN Severe Pain (7 - 10)  oxyCODONE    IR 10 milliGRAM(s) Oral every 3 hours PRN Moderate Pain (4 - 6), severe pain          OBJECTIVE:  Vital Signs Last 24 Hrs  T(C): 37.1 (23 Nov 2020 04:00), Max: 37.1 (23 Nov 2020 04:00)  T(F): 98.8 (23 Nov 2020 04:00), Max: 98.8 (23 Nov 2020 04:00)  HR: 90 (23 Nov 2020 06:00) (72 - 131)  BP: 104/83 (23 Nov 2020 06:00) (74/62 - 180/169)  BP(mean): 91 (23 Nov 2020 06:00) (65 - 175)  RR: 23 (23 Nov 2020 06:00) (13 - 33)  SpO2: 95% (22 Nov 2020 18:00) (93% - 100%)  PHYSICAL EXAM:  GENERAL: NAD, well-developed  HEAD:  Atraumatic, Normocephalic  EYES: EOMI, PERRLA, conjunctiva and sclera clear  NECK: Supple, No JVD  CHEST/LUNG: Clear to auscultation bilaterally; No wheeze  HEART: Regular rate and rhythm; No murmurs, rubs, or gallops  ABDOMEN: Soft, Nontender, Nondistended; Bowel sounds present  EXTREMITIES:  2+ Peripheral Pulses, No clubbing, cyanosis, or edema  PSYCH: AAOx3  NEUROLOGY: non-focal  SKIN: No rashes or lesions    CAPILLARY BLOOD GLUCOSE        I&O's Summary    21 Nov 2020 07:01  -  22 Nov 2020 07:00  --------------------------------------------------------  IN: 2777.9 mL / OUT: 2125 mL / NET: 652.9 mL    22 Nov 2020 07:01  -  23 Nov 2020 06:47  --------------------------------------------------------  IN: 810.8 mL / OUT: 1020 mL / NET: -209.2 mL              LABS:                        9.9    19.96 )-----------( 105      ( 23 Nov 2020 05:20 )             28.5     11-23    125<L>  |  94<L>  |  17  ----------------------------<  102<H>  3.9   |  21<L>  |  0.42<L>    Ca    7.8<L>      23 Nov 2020 05:20  Phos  2.9     11-23  Mg     1.7     11-23    TPro  3.6<L>  /  Alb  1.7<L>  /  TBili  0.9  /  DBili  x   /  AST  38<H>  /  ALT  30  /  AlkPhos  196<H>  11-23      CARDIAC MARKERS ( 22 Nov 2020 05:45 )  x     / x     / 79 u/L / x     / x      CARDIAC MARKERS ( 22 Nov 2020 02:45 )  x     / x     / 85 u/L / x     / x                RADIOLOGY & ADDITIONAL TESTS:       Jazmín Fofana (Connor) PGY-2  Pager: NS) [phone]/ (KIX) [phone]    Patient is a 70y old  Female who presents with a chief complaint of Pancolitis, septic shock (22 Nov 2020 07:14)      SUBJECTIVE / OVERNIGHT EVENTS:  patient was agitated overnight, got haldol 2.5 IVP with good effect. Patient was also agitated this AM, gotten haldol 3mg IV and zyprexa 2.5 IM. off pressors since 10AM 11/22      MEDICATIONS  (STANDING):  chlorhexidine 4% Liquid 1 Application(s) Topical <User Schedule>  dronabinol 2.5 milliGRAM(s) Oral daily  lactated ringers. 500 milliLiter(s) (500 mL/Hr) IV Continuous <Continuous>  lactated ringers. 1000 milliLiter(s) (75 mL/Hr) IV Continuous <Continuous>  lidocaine   Patch 2 Patch Transdermal daily  midodrine 30 milliGRAM(s) Oral every 8 hours  multivitamin 1 Tablet(s) Oral daily  norepinephrine Infusion 0.062 MICROgram(s)/kG/Min (5.63 mL/Hr) IV Continuous <Continuous>  oxyCODONE  ER Tablet 20 milliGRAM(s) Oral every 8 hours  polyethylene glycol 3350 17 Gram(s) Oral two times a day  senna 2 Tablet(s) Oral at bedtime  vancomycin    Solution 125 milliGRAM(s) Oral every 6 hours    MEDICATIONS  (PRN):  morphine  - Injectable 1 milliGRAM(s) IV Push every 3 hours PRN Severe Pain (7 - 10)  oxyCODONE    IR 10 milliGRAM(s) Oral every 3 hours PRN Moderate Pain (4 - 6), severe pain          OBJECTIVE:  Vital Signs Last 24 Hrs  T(C): 37.1 (23 Nov 2020 04:00), Max: 37.1 (23 Nov 2020 04:00)  T(F): 98.8 (23 Nov 2020 04:00), Max: 98.8 (23 Nov 2020 04:00)  HR: 90 (23 Nov 2020 06:00) (72 - 131)  BP: 104/83 (23 Nov 2020 06:00) (74/62 - 180/169)  BP(mean): 91 (23 Nov 2020 06:00) (65 - 175)  RR: 23 (23 Nov 2020 06:00) (13 - 33)  SpO2: 95% (22 Nov 2020 18:00) (93% - 100%)    PHYSICAL EXAM:  GENERAL: no acute distress, pulling at lines  HEAD:  Atraumatic, Normocephalic  EYES:  conjunctiva and sclera clear  NECK: Supple, No JVD  CHEST/LUNG: Clear to auscultation bilaterally; No wheeze  HEART: Regular rate and rhythm; No murmurs, rubs, or gallops  ABDOMEN: mildly distended, mildly tense, pos BS  EXTREMITIES:  No clubbing, cyanosis, or edema  PSYCH: AAOx0    CAPILLARY BLOOD GLUCOSE        I&O's Summary    21 Nov 2020 07:01  -  22 Nov 2020 07:00  --------------------------------------------------------  IN: 2777.9 mL / OUT: 2125 mL / NET: 652.9 mL    22 Nov 2020 07:01  -  23 Nov 2020 06:47  --------------------------------------------------------  IN: 810.8 mL / OUT: 1020 mL / NET: -209.2 mL              LABS:                        9.9    19.96 )-----------( 105      ( 23 Nov 2020 05:20 )             28.5     11-23    125<L>  |  94<L>  |  17  ----------------------------<  102<H>  3.9   |  21<L>  |  0.42<L>    Ca    7.8<L>      23 Nov 2020 05:20  Phos  2.9     11-23  Mg     1.7     11-23    TPro  3.6<L>  /  Alb  1.7<L>  /  TBili  0.9  /  DBili  x   /  AST  38<H>  /  ALT  30  /  AlkPhos  196<H>  11-23      CARDIAC MARKERS ( 22 Nov 2020 05:45 )  x     / x     / 79 u/L / x     / x      CARDIAC MARKERS ( 22 Nov 2020 02:45 )  x     / x     / 85 u/L / x     / x                RADIOLOGY & ADDITIONAL TESTS:

## 2020-11-23 NOTE — PROGRESS NOTE ADULT - SUBJECTIVE AND OBJECTIVE BOX
Follow Up:  recurrent cdiff    Interval History/ROS: sleeping at present, no bowel movement over the last few days, poor po intake - delerious, agitated earlier    Allergies  No Known Allergies    ANTIMICROBIALS:  vancomycin    Solution 125 every 6 hours      OTHER MEDS:  MEDICATIONS  (STANDING):  dronabinol 2.5 daily  midodrine 30 every 8 hours  morphine  - Injectable 1 every 3 hours PRN  oxyCODONE    IR 10 every 3 hours PRN  oxyCODONE  ER Tablet 20 every 8 hours  polyethylene glycol 3350 17 two times a day  senna 2 at bedtime      Vital Signs Last 24 Hrs  T(C): 36 (23 Nov 2020 08:00), Max: 37.1 (23 Nov 2020 04:00)  T(F): 96.8 (23 Nov 2020 08:00), Max: 98.8 (23 Nov 2020 04:00)  HR: 81 (23 Nov 2020 12:00) (78 - 131)  BP: 88/60 (23 Nov 2020 12:00) (74/62 - 180/169)  BP(mean): 71 (23 Nov 2020 12:00) (61 - 175)  RR: 17 (23 Nov 2020 12:00) (15 - 33)  SpO2: 94% (23 Nov 2020 12:00) (94% - 98%)    PHYSICAL EXAM:  General:  NAD, Non-toxic  Neurology: Asleep  Respiratory: Clear to auscultation bilaterally  CV: RRR, S1S2, no murmurs, rubs or gallops  Abdominal: Sl distended,   Line Sites: Clear  Skin: No rash                        9.9    19.96 )-----------( 105      ( 23 Nov 2020 05:20 )             28.5   WBC Count: 19.96 (11-23 @ 05:20)  WBC Count: 19.19 (11-22 @ 05:45)  WBC Count: 19.84 (11-22 @ 02:45)  WBC Count: 27.58 (11-21 @ 03:02)  WBC Count: 39.72 (11-20 @ 07:00)  WBC Count: 35.03 (11-20 @ 02:00)  WBC Count: 34.04 (11-19 @ 01:10)  WBC Count: 29.32 (11-18 @ 16:00)      11-23    125<L>  |  94<L>  |  17  ----------------------------<  102<H>  3.9   |  21<L>  |  0.42<L>    Ca    7.8<L>      23 Nov 2020 05:20  Phos  2.9     11-23  Mg     1.7     11-23    TPro  3.6<L>  /  Alb  1.7<L>  /  TBili  0.9  /  DBili  x   /  AST  38<H>  /  ALT  30  /  AlkPhos  196<H>  11-23      MICROBIOLOGY:  .Urine Catheterized  11-15-20   >100,000 CFU/ml Enterococcus faecium (vancomycin resistant)  <10,000 CFU/ml Normal Urogenital crystal present  --  Enterococcus faecium (vancomycin resistant)      .Blood Blood-Peripheral  11-15-20   No Growth Final  --  --      RADIOLOGY:  ad< from: Xray Abdomen 1 View PORTABLE -Urgent (Xray Abdomen 1 View PORTABLE -Urgent .) (11.23.20 @ 11:03) >  IMPRESSION:  Nonobstructive bowel gas pattern.    < end of copied text >  < from: CT Abdomen and Pelvis w/ IV Cont (11.20.20 @ 09:45) >  BOWEL: No bowel obstruction. Normal enhancement of the bowel wall.  PERITONEUM: Moderate amount of peritoneal and pelvic ascites.  VESSELS: Atherosclerotic changes. The SMA, celiac axis, and DARION are patent.  RETROPERITONEUM/LYMPH NODES: No lymphadenopathy.  ABDOMINAL WALL:  Diffuse anasarca.  BONES: Redemonstration of scattered osseous destructive lesions in the ribs, spine, and pelvis compatible with metastatic disease. Redemonstration of T11 vertebral body compression fracture deformity with associated lytic and sclerotic lesions, likely representing metastatic disease with a pathologic fracture. Partially visualized right hip arthroplasty and plate with multiple screws and intraosseous cement in the right iliac wing. Chronic fracture of the right inferior pubic ramus.    IMPRESSION:  1.  Right renal infarcts involving the mid to lower poles.  2.  No evidence of bowel ischemia.  3.  Redemonstration of osseous metastatic disease. Stable indeterminate left adrenal nodule, probably metastasis.    < end of copied text >      Herb Cabezas MD; Division of Infectious Disease; Pager: [phone]; nights and weekends: [phone]

## 2020-11-23 NOTE — PROGRESS NOTE ADULT - ASSESSMENT
69W with recent severe Cdiff,  Lung Ca since  8/2020 with mets to brain, Spine, and Right hip s/p RT x 5 ( last tx 9/2), dementia, DVT, Afib on Eliqius [id number] with hypotension, diarrhea, leukocytosis.    Antibiotic History  IV Vanco 9/12 --> 13; 10/10 -->11; 11/15  Zosyn 9/12 --> 16; 10/10; 11/16  Azithro 9/12; 11/16  Cefazolin 9/18  Metronidazole 10/10 --> 10/17; 11/16  Meropenem 10/10 --> 10/13  Vanco enteral solution 10/11 -->24; 11/15-->  Linezolid 11/ 18 -->  . 11/22  Tygacil 11/16 --  > 11/22    recurrent Cdiff   lung opacities - likely metastatic disease  VRE bacteruria  11/20: decreased wbc, hyponatremia  delerium    tumor burden, renal infarct, malnourished states, anasarca with pleural effusions with associated atelectasis  delerium  poor ultimate outlook  Cdiff appears improved    discussed with MICU resident: off pressors, being transferred    Suggest  palliative care evaluation  Extended vanco taper:   po vanco 125 mg 4 times daily through 11/29  po vanco 125 mg 2 times daily 11/20 --> 12/6  po vanco 125 mg once daily 12/7 --> 12/13  po vanco 125 mg once every other day 12/14-->   12/20  po vanco 125 mg once every third day 12/20 -->  1/3/21

## 2020-11-23 NOTE — CHART NOTE - NSCHARTNOTEFT_GEN_A_CORE
Jazmín Fofana, PGY1 Pager: [phone]    MICU Transfer Note    Transfer from: MICU  Transfer to:  (x ) Medicine    (  ) Telemetry    (  ) RCU    (  ) Palliative    (  ) Stroke Unit    (  ) _______________  Accepting physican:    HPI: 68yo F Hx Lung Ca in 8/2020 with mets to brain, Spine, and Right hip s/p RT x 5 ( last tx 9/2), dementia, DVT, Afib on Eliqius, presenting with hypotension. Patient was recently admitted to MICU for sepsis 2/2 C.diff colitis and required pressors. Patient completed course of antibiotics and was discharged, however started having diarrhea for past one day. No fevers, chills, chest pain, or shortness of breath. Patient has some lower abdominal discomfort. No dysuria.     In the ED, patient was found to be hypotensive to 72/48. She received total 3L of IVF, po vanc, 20 midodrine X1.    MICU COURSE:  Patient was admitted to the MICU for septic shock 2/2 c diff pancolitis vs VRE UTI (however no urinary sxs), she was on levophed and required multiple IVF boluses.         ASSESSMENT & PLAN:   69 F Hx Lung Ca in 8/2020 with mets to ?brain, Spine, and Right hip s/p RT x 5 ( last tx 9/2), dementia, DVT, Afib on Eliqius, presenting with hypotension found to be in septic shock 2/2 cdiff pancolitis now with early tamponade physiology. Admitted to MICU for pressure support.     #Neuro  - AOx3, no active issues  - possible brain mets, however PET scan from 10/2 & MRI 8/2020 showed possible meningioma    #Pulm  - satting well on room air   - CXR shows Bilateral patchy patchy right sided and more confluent left lower lobe opacities may represent multifocal pneumonia; likely pulmonary mets    #Cardio  Shock state: Multifactorial: septic/obstructive (cardiac tamponade)  - off pressors  - c/w Midodrine 30 q8h   - TTE (10/12/20) shows normal LV systolic function, mild diastolic dysfunction (stage 1)  - repeat TTE 11/20: increased pericardial effusion, with early tamponade physiology  - Cards recs appreciated: no role for pericardiocentesis at this time, risks>benefits    #GI  C.diff pancolitis (Cdiff POS): improving  - Po vanc 500 q6h (11/15-11/21)  - Po Vanc 125 q6h (11/21- )  - d/c Flagyl 500 q8h, tigecycline 50mg IV BID (11/16- 11/21)   - Repeat CT a/p 11/20: no ischemia bowel, renal infarct likely 2/2 pAF off AC    #Renal  #AG metabolic Acidosis; likely 2/2 to elevated BUN & hypovolemia  - Diet: adv to soft  #Hyponatremic most likely 2/2 hypovolemia: improving  - monitor electrolytes, Is&Os  - vol resus as above    #ID  UCx 11/15: VRE s/s to linezolid  - completed linezolid 600 q12h (11/18 - 11/22)  - s/p azithromycin (11/16-11/17)     Cdiff colitis  - CT A/P shows interval improvement of pancolitis with mild pancolitis remaining   - management as above  - blood cultures 11/15 NGTD  - ID recs appreciated  - s/p methnylnaltrexone 8mg x1 11/21, with 1 large BM 11/20    #Heme/Onc  #Metastatic Lung Cancer w/ mets to bone  - follows with Dr. Xie at [facility]  - palliative care consulted  - Pain control: Back Pain 2/2 mets  - home dose oxy 20 ER TID. c/w Oxy IR 10 q3hs PRN  - Morphine 1mg IVP q3h PRN     #Anemia  - Hgb dropped to 5.7 most likely GI bleed from pancolitis/C.diff  - s/p 1 unit pRBC, goal Hgb >7, stable  - Hgb 6.8 11/22 s/p 1 unit pRBC with food response  - no obvious source of bleed    #DVT  - hx of DVT left posterior tibial and peroneal veins 10/18  - on eliquis 5 BID, switched to lovenox 50 BID on 11/21  - d/c Lovenox 11/22 due to downtrending Hgb    #Endo   - A1c 6.1 in August    #Ethics  - FULL code according to family  - Palliative care recs appreciated      For Follow-Up:          Vital Signs Last 24 Hrs  T(C): 36 (23 Nov 2020 08:00), Max: 37.1 (23 Nov 2020 04:00)  T(F): 96.8 (23 Nov 2020 08:00), Max: 98.8 (23 Nov 2020 04:00)  HR: 99 (23 Nov 2020 10:00) (78 - 131)  BP: 85/56 (23 Nov 2020 10:00) (74/62 - 180/169)  BP(mean): 65 (23 Nov 2020 10:00) (61 - 175)  RR: 15 (23 Nov 2020 10:00) (15 - 33)  SpO2: 97% (23 Nov 2020 10:00) (95% - 100%)  I&O's Summary    22 Nov 2020 07:01  -  23 Nov 2020 07:00  --------------------------------------------------------  IN: 810.8 mL / OUT: 1120 mL / NET: -309.2 mL    23 Nov 2020 07:01  -  23 Nov 2020 10:19  --------------------------------------------------------  IN: 0 mL / OUT: 160 mL / NET: -160 mL          MEDICATIONS  (STANDING):  chlorhexidine 4% Liquid 1 Application(s) Topical <User Schedule>  dronabinol 2.5 milliGRAM(s) Oral daily  lidocaine   Patch 2 Patch Transdermal daily  methylnaltrexone Injectable 8 milliGRAM(s) SubCutaneous once  midodrine 30 milliGRAM(s) Oral every 8 hours  multivitamin 1 Tablet(s) Oral daily  OLANZapine Injectable 2.5 milliGRAM(s) IntraMuscular once  oxyCODONE  ER Tablet 20 milliGRAM(s) Oral every 8 hours  polyethylene glycol 3350 17 Gram(s) Oral two times a day  senna 2 Tablet(s) Oral at bedtime  vancomycin    Solution 125 milliGRAM(s) Oral every 6 hours    MEDICATIONS  (PRN):  morphine  - Injectable 1 milliGRAM(s) IV Push every 3 hours PRN Severe Pain (7 - 10)  oxyCODONE    IR 10 milliGRAM(s) Oral every 3 hours PRN Moderate Pain (4 - 6), severe pain        LABS                                            9.9                   Neurophils% (auto):   x      (11-23 @ 05:20):    19.96)-----------(105          Lymphocytes% (auto):  x                                             28.5                   Eosinphils% (auto):   x        Manual%: Neutrophils x    ; Lymphocytes x    ; Eosinophils x    ; Bands%: x    ; Blasts x                                    125    |  94     |  17                  Calcium: 7.8   / iCa: x      (11-23 @ 05:20)    ----------------------------<  102       Magnesium: 1.7                              3.9     |  21     |  0.42             Phosphorous: 2.9      TPro  3.6    /  Alb  1.7    /  TBili  0.9    /  DBili  x      /  AST  38     /  ALT  30     /  AlkPhos  196    23 Nov 2020 05:20 Jazmín Fofana, PGY1 Pager: [phone]    MICU Transfer Note    Transfer from: MICU  Transfer to:  (x ) Medicine    (  ) Telemetry    (  ) RCU    (  ) Palliative    (  ) Stroke Unit    (  ) _______________  Accepting physican:    HPI: 70yo F Hx Lung Ca in 8/2020 with mets to brain, Spine, and Right hip s/p RT x 5 ( last tx 9/2), dementia, DVT, Afib on Eliqius, presenting with hypotension. Patient was recently admitted to MICU for sepsis 2/2 C.diff colitis and required pressors. Patient completed course of antibiotics and was discharged, however started having diarrhea for past one day. No fevers, chills, chest pain, or shortness of breath. Patient has some lower abdominal discomfort. No dysuria.     In the ED, patient was found to be hypotensive to 72/48. She received total 3L of IVF, po vanc, 20 midodrine X1.    MICU COURSE:  Patient was admitted to the MICU for septic shock 2/2 c diff pancolitis vs VRE UTI (however no urinary sxs), she was on levophed and required multiple IVF boluses. Patient was started on Midodrine 30 TID, and pressors were eventually weaned off. She was initially on tigecylcine, IV flagyl, and high dose Vanc michelle cdiff colitis, she improved so abx were de-escalated to vanc 125 q6h. She completed 5 day course of linezolid for VRE uti. She was also found to have early tamponade physiology (increased from prior), however as per cards, not a current candidate for pericardiocentesis given high risk of recurrence, and being on systemic AC, also believed septic shock is the major  of hypotension. Repeat CT abd/pelvis did not show worsening Cdiff/ toxic megacolon, however showed renal infarct likely 2/2 to pAF off AC, AC was resumed, howeer Hb dropped to 6.8, s/p 1 upRBC w/o obvious source of bleed.   Patient is receiving opioids for back pain 2/2 mets to spine, patient has received relistor x2 (2nd dose 11/23) for opioid induced constipation. Course c/b ICU delirium requiring haldol and zyprexa.         ASSESSMENT & PLAN:   69 F Hx Lung Ca in 8/2020 with mets to ?brain, Spine, and Right hip s/p RT x 5 ( last tx 9/2), dementia, DVT, Afib on Eliqius, presenting with hypotension found to be in septic shock 2/2 cdiff pancolitis w/ VRE UTI with early tamponade physiology. Admitted to MICU for pressure support, now off pressors.     #Neuro  - AOx3, no active issues  - possible brain mets, however PET scan from 10/2 & MRI 8/2020 showed possible meningioma    #Pulm  - satting well on room air   - CXR shows Bilateral patchy patchy right sided and more confluent left lower lobe opacities may represent multifocal pneumonia; likely pulmonary mets    #Cardio  Shock state: Multifactorial: septic/obstructive (cardiac tamponade)  - off pressors  - c/w Midodrine 30 q8h   - TTE (10/12/20) shows normal LV systolic function, mild diastolic dysfunction (stage 1)  - repeat TTE 11/20: increased pericardial effusion, with early tamponade physiology  - Cards recs appreciated: no role for pericardiocentesis at this time, risks>benefits    #GI  C.diff pancolitis (Cdiff POS): improving  - Po vanc 500 q6h (11/15-11/21)  - Po Vanc 125 q6h (11/21- )  - d/c Flagyl 500 q8h, tigecycline 50mg IV BID (11/16- 11/21)   - Repeat CT a/p 11/20: no ischemia bowel, renal infarct likely 2/2 pAF off AC    #Renal  #AG metabolic Acidosis; likely 2/2 to elevated BUN & hypovolemia  - Diet: adv to soft  #Hyponatremic most likely 2/2 hypovolemia: improving  - monitor electrolytes, Is&Os  - vol resus as above    #ID  UCx 11/15: VRE s/s to linezolid  - completed linezolid 600 q12h (11/18 - 11/22)  - s/p azithromycin (11/16-11/17)     Cdiff colitis  - CT A/P shows interval improvement of pancolitis with mild pancolitis remaining   - management as above  - blood cultures 11/15 NGTD  - ID recs appreciated  - s/p methnylnaltrexone 8mg x1 11/21, 2nd dose 11/23, with 1 large BM 11/20    #Heme/Onc  #Metastatic Lung Cancer w/ mets to bone  - follows with Dr. Xie at [facility]  - palliative care consulted  - Pain control: Back Pain 2/2 mets  - home dose oxy 20 ER TID. c/w Oxy IR 10 q3hs PRN  - Morphine 1mg IVP q3h PRN     #Anemia  - Hgb dropped to 5.7 most likely GI bleed from pancolitis/C.diff  - s/p 1 unit pRBC, goal Hgb >7, stable  - Hgb 6.8 11/22 s/p 1 unit pRBC with food response  - no obvious source of bleed    #DVT  - hx of DVT left posterior tibial and peroneal veins 10/18  - on eliquis 5 BID, switched to lovenox 50 BID on 11/21  - d/c Lovenox 11/22 due to downtrending Hgb    #Endo   - A1c 6.1 in August    #Ethics  - FULL code according to family  - Palliative care recs appreciated      For Follow-Up:  [ ] f/u cards regarding need for pericardiocentesis now she is off pressors  [ ] f/u ID recs for duration of vanc  [ ] trend CBC, if stable, can resumed AC (eliquis 5 BID)  [ ] monitor BM  [ ] f/u palliative care recs (as per family still FULL CODE, however wants to take patient home for hospice pending further palliative recs)        Vital Signs Last 24 Hrs  T(C): 36 (23 Nov 2020 08:00), Max: 37.1 (23 Nov 2020 04:00)  T(F): 96.8 (23 Nov 2020 08:00), Max: 98.8 (23 Nov 2020 04:00)  HR: 99 (23 Nov 2020 10:00) (78 - 131)  BP: 85/56 (23 Nov 2020 10:00) (74/62 - 180/169)  BP(mean): 65 (23 Nov 2020 10:00) (61 - 175)  RR: 15 (23 Nov 2020 10:00) (15 - 33)  SpO2: 97% (23 Nov 2020 10:00) (95% - 100%)  I&O's Summary    22 Nov 2020 07:01  -  23 Nov 2020 07:00  --------------------------------------------------------  IN: 810.8 mL / OUT: 1120 mL / NET: -309.2 mL    23 Nov 2020 07:01  -  23 Nov 2020 10:19  --------------------------------------------------------  IN: 0 mL / OUT: 160 mL / NET: -160 mL          MEDICATIONS  (STANDING):  chlorhexidine 4% Liquid 1 Application(s) Topical <User Schedule>  dronabinol 2.5 milliGRAM(s) Oral daily  lidocaine   Patch 2 Patch Transdermal daily  methylnaltrexone Injectable 8 milliGRAM(s) SubCutaneous once  midodrine 30 milliGRAM(s) Oral every 8 hours  multivitamin 1 Tablet(s) Oral daily  OLANZapine Injectable 2.5 milliGRAM(s) IntraMuscular once  oxyCODONE  ER Tablet 20 milliGRAM(s) Oral every 8 hours  polyethylene glycol 3350 17 Gram(s) Oral two times a day  senna 2 Tablet(s) Oral at bedtime  vancomycin    Solution 125 milliGRAM(s) Oral every 6 hours    MEDICATIONS  (PRN):  morphine  - Injectable 1 milliGRAM(s) IV Push every 3 hours PRN Severe Pain (7 - 10)  oxyCODONE    IR 10 milliGRAM(s) Oral every 3 hours PRN Moderate Pain (4 - 6), severe pain        LABS                                            9.9                   Neurophils% (auto):   x      (11-23 @ 05:20):    19.96)-----------(105          Lymphocytes% (auto):  x                                             28.5                   Eosinphils% (auto):   x        Manual%: Neutrophils x    ; Lymphocytes x    ; Eosinophils x    ; Bands%: x    ; Blasts x                                    125    |  94     |  17                  Calcium: 7.8   / iCa: x      (11-23 @ 05:20)    ----------------------------<  102       Magnesium: 1.7                              3.9     |  21     |  0.42             Phosphorous: 2.9      TPro  3.6    /  Alb  1.7    /  TBili  0.9    /  DBili  x      /  AST  38     /  ALT  30     /  AlkPhos  196    23 Nov 2020 05:20 Jazmín Fofana, PGY1 Pager: [phone]    MICU Transfer Note    Transfer from: MICU  Transfer to:  (x ) Medicine    (  ) Telemetry    (  ) RCU    (  ) Palliative    (  ) Stroke Unit    (  ) _______________  Accepting physican:    HPI: 70yo F Hx Lung Ca in 8/2020 with mets to brain, Spine, and Right hip s/p RT x 5 ( last tx 9/2), dementia, DVT, Afib on Eliqius, presenting with hypotension. Patient was recently admitted to MICU for sepsis 2/2 C.diff colitis and required pressors. Patient completed course of antibiotics and was discharged, however started having diarrhea for past one day. No fevers, chills, chest pain, or shortness of breath. Patient has some lower abdominal discomfort. No dysuria.     In the ED, patient was found to be hypotensive to 72/48. She received total 3L of IVF, po vanc, 20 midodrine X1.    MICU COURSE:  Patient was admitted to the MICU for septic shock 2/2 c diff pancolitis vs VRE UTI (however no urinary sxs), she was on levophed and required multiple IVF boluses. Patient was started on Midodrine 30 TID, and pressors were eventually weaned off. She was initially on tigecylcine, IV flagyl, and high dose Vanc michelle cdiff colitis, she improved so abx were de-escalated to vanc 125 q6h. She completed 5 day course of linezolid for VRE uti. She was also found to have early tamponade physiology (increased from prior), however as per cards, not a current candidate for pericardiocentesis given high risk of recurrence, and being on systemic AC, also believed septic shock is the major  of hypotension. Repeat CT abd/pelvis did not show worsening Cdiff/ toxic megacolon, however showed renal infarct likely 2/2 to pAF off AC, AC was resumed, howeer Hb dropped to 6.8, s/p 1 upRBC w/o obvious source of bleed.   Patient is receiving opioids for back pain 2/2 mets to spine, patient has received relistor x2 (2nd dose 11/23) for opioid induced constipation. Course c/b ICU delirium requiring haldol and zyprexa.         ASSESSMENT & PLAN:   69 F Hx Lung Ca in 8/2020 with mets to ?brain, Spine, and Right hip s/p RT x 5 ( last tx 9/2), dementia, DVT, Afib on Eliqius, presenting with hypotension found to be in septic shock 2/2 cdiff pancolitis w/ VRE UTI with early tamponade physiology. Admitted to MICU for pressure support, now off pressors.     #Neuro  - AOx3, no active issues  - possible brain mets, however PET scan from 10/2 & MRI 8/2020 showed possible meningioma    #Pulm  - satting well on room air   - CXR shows Bilateral patchy patchy right sided and more confluent left lower lobe opacities may represent multifocal pneumonia; likely pulmonary mets    #Cardio  Shock state: Multifactorial: septic/obstructive (cardiac tamponade)  - off pressors  - c/w Midodrine 30 q8h   - TTE (10/12/20) shows normal LV systolic function, mild diastolic dysfunction (stage 1)  - repeat TTE 11/20: increased pericardial effusion, with early tamponade physiology  - Cards recs appreciated: no role for pericardiocentesis at this time, risks>benefits    #GI  C.diff pancolitis (Cdiff POS): improving  - Po vanc 500 q6h (11/15-11/21)  - Po Vanc 125 q6h (11/21- )  - d/c Flagyl 500 q8h, tigecycline 50mg IV BID (11/16- 11/21)   - Repeat CT a/p 11/20: no ischemia bowel, renal infarct likely 2/2 pAF off AC    #Renal  #AG metabolic Acidosis; likely 2/2 to elevated BUN & hypovolemia  - Diet: adv to soft  #Hyponatremic most likely 2/2 hypovolemia: improving  - monitor electrolytes, Is&Os  - vol resus as above    #ID  UCx 11/15: VRE s/s to linezolid  - completed linezolid 600 q12h (11/18 - 11/22)  - s/p azithromycin (11/16-11/17)     Cdiff colitis  - CT A/P shows interval improvement of pancolitis with mild pancolitis remaining   - management as above  - blood cultures 11/15 NGTD  - ID recs appreciated  - s/p methnylnaltrexone 8mg x1 11/21, 2nd dose 11/23, with 1 large BM 11/20    #Heme/Onc  #Metastatic Lung Cancer w/ mets to bone  - follows with Dr. Xie at [facility]  - palliative care consulted  - Pain control: Back Pain 2/2 mets  - home dose oxy 20 ER TID. c/w Oxy IR 10 q3hs PRN  - Morphine 1mg IVP q3h PRN     #Anemia  - Hgb dropped to 5.7 most likely GI bleed from pancolitis/C.diff  - s/p 1 unit pRBC, goal Hgb >7, stable  - Hgb 6.8 11/22 s/p 1 unit pRBC with food response  - no obvious source of bleed    #DVT  - hx of DVT left posterior tibial and peroneal veins 10/18  - on eliquis 5 BID, switched to lovenox 50 BID on 11/21  - d/c Lovenox 11/22 due to downtrending Hgb    #Endo   - A1c 6.1 in August    #Ethics  - FULL code according to family  - Palliative care recs appreciated      For Follow-Up:  [ ] f/u cards regarding need for pericardiocentesis now she is off pressors  [ ] f/u ID recs for duration of vanc  [ ] trend CBC, if stable, can resumed AC (eliquis 5 BID)  [ ] monitor BM  [ ] f/u palliative care recs (as per family still FULL CODE, however wants to take patient home for hospice pending further palliative recs)  [ ] qTC 520, has gotten zyprexa and haldol. f/u qtc for further doses      Vital Signs Last 24 Hrs  T(C): 36 (23 Nov 2020 08:00), Max: 37.1 (23 Nov 2020 04:00)  T(F): 96.8 (23 Nov 2020 08:00), Max: 98.8 (23 Nov 2020 04:00)  HR: 99 (23 Nov 2020 10:00) (78 - 131)  BP: 85/56 (23 Nov 2020 10:00) (74/62 - 180/169)  BP(mean): 65 (23 Nov 2020 10:00) (61 - 175)  RR: 15 (23 Nov 2020 10:00) (15 - 33)  SpO2: 97% (23 Nov 2020 10:00) (95% - 100%)  I&O's Summary    22 Nov 2020 07:01 - 23 Nov 2020 07:00  --------------------------------------------------------  IN: 810.8 mL / OUT: 1120 mL / NET: -309.2 mL    23 Nov 2020 07:01 - 23 Nov 2020 10:19  --------------------------------------------------------  IN: 0 mL / OUT: 160 mL / NET: -160 mL          MEDICATIONS  (STANDING):  chlorhexidine 4% Liquid 1 Application(s) Topical <User Schedule>  dronabinol 2.5 milliGRAM(s) Oral daily  lidocaine   Patch 2 Patch Transdermal daily  methylnaltrexone Injectable 8 milliGRAM(s) SubCutaneous once  midodrine 30 milliGRAM(s) Oral every 8 hours  multivitamin 1 Tablet(s) Oral daily  OLANZapine Injectable 2.5 milliGRAM(s) IntraMuscular once  oxyCODONE  ER Tablet 20 milliGRAM(s) Oral every 8 hours  polyethylene glycol 3350 17 Gram(s) Oral two times a day  senna 2 Tablet(s) Oral at bedtime  vancomycin    Solution 125 milliGRAM(s) Oral every 6 hours    MEDICATIONS  (PRN):  morphine  - Injectable 1 milliGRAM(s) IV Push every 3 hours PRN Severe Pain (7 - 10)  oxyCODONE    IR 10 milliGRAM(s) Oral every 3 hours PRN Moderate Pain (4 - 6), severe pain        LABS                                            9.9                   Neurophils% (auto):   x      (11-23 @ 05:20):    19.96)-----------(105          Lymphocytes% (auto):  x                                             28.5                   Eosinphils% (auto):   x        Manual%: Neutrophils x    ; Lymphocytes x    ; Eosinophils x    ; Bands%: x    ; Blasts x                                    125    |  94     |  17                  Calcium: 7.8   / iCa: x      (11-23 @ 05:20)    ----------------------------<  102       Magnesium: 1.7                              3.9     |  21     |  0.42             Phosphorous: 2.9      TPro  3.6    /  Alb  1.7    /  TBili  0.9    /  DBili  x      /  AST  38     /  ALT  30     /  AlkPhos  196    23 Nov 2020 05:20 Jazmín Fofana, PGY1 Pager: [phone]    MICU Transfer Note    Transfer from: MICU  Transfer to:  (x ) Medicine 509 B    (  ) Telemetry    (  ) RCU    (  ) Palliative    (  ) Stroke Unit    (  ) _______________  Accepting physican:    HPI: 70yo F Hx Lung Ca in 8/2020 with mets to brain, Spine, and Right hip s/p RT x 5 ( last tx 9/2), dementia, DVT, Afib on Eliqius, presenting with hypotension. Patient was recently admitted to MICU for sepsis 2/2 C.diff colitis and required pressors. Patient completed course of antibiotics and was discharged, however started having diarrhea for past one day. No fevers, chills, chest pain, or shortness of breath. Patient has some lower abdominal discomfort. No dysuria.     In the ED, patient was found to be hypotensive to 72/48. She received total 3L of IVF, po vanc, 20 midodrine X1.    MICU COURSE:  Patient was admitted to the MICU for septic shock 2/2 c diff pancolitis vs VRE UTI (however no urinary sxs), she was on levophed and required multiple IVF boluses. Patient was started on Midodrine 30 TID, and pressors were eventually weaned off. She was initially on tigecylcine, IV flagyl, and high dose Vanc michelle cdiff colitis, she improved so abx were de-escalated to vanc 125 q6h. She completed 5 day course of linezolid for VRE uti. She was also found to have early tamponade physiology (increased from prior), however as per cards, not a current candidate for pericardiocentesis given high risk of recurrence, and being on systemic AC, also believed septic shock is the major  of hypotension. Repeat CT abd/pelvis did not show worsening Cdiff/ toxic megacolon, however showed renal infarct likely 2/2 to pAF off AC, AC was resumed, howeer Hb dropped to 6.8, s/p 1 upRBC w/o obvious source of bleed.   Patient is receiving opioids for back pain 2/2 mets to spine, patient has received relistor x2 (2nd dose 11/23) for opioid induced constipation. Course c/b ICU delirium requiring haldol and zyprexa.         ASSESSMENT & PLAN:   69 F Hx Lung Ca in 8/2020 with mets to ?brain, Spine, and Right hip s/p RT x 5 ( last tx 9/2), dementia, DVT, Afib on Eliqius, presenting with hypotension found to be in septic shock 2/2 cdiff pancolitis now with early tamponade physiology. Admitted to MICU for pressure support.     #Neuro  - AOx3 at baseline, currently AOx0, likely ICU delirium   - possible brain mets, however PET scan from 10/2 & MRI 8/2020 showed possible meningioma  - started melatonin 0.5mg qd    #Pulm  - satting well on room air   - CXR shows Bilateral patchy patchy right sided and more confluent left lower lobe opacities may represent multifocal pneumonia; likely pulmonary mets    #Cardio  Shock state: Multifactorial: septic/obstructive (cardiac tamponade)  - off pressors  - Midodrine 30 q8h -> Midodrine 40 q8h  - TTE (10/12/20) shows normal LV systolic function, mild diastolic dysfunction (stage 1)  - repeat TTE 11/20: increased pericardial effusion, with early tamponade physiology  - Cards recs appreciated: no role for pericardiocentesis at this time, risks>benefits    #GI  C.diff pancolitis (Cdiff POS): improving  - Po vanc 500 q6h (11/15-11/21)  - Po Vanc 125 q6h (11/21- )  - d/c Flagyl 500 q8h, tigecycline 50mg IV BID (11/16- 11/21)   - Repeat CT a/p 11/20: no ischemia bowel, renal infarct likely 2/2 pAF off AC    #Renal  #AG metabolic Acidosis; likely 2/2 to elevated BUN & hypovolemia  - Diet: adv to soft  #Hyponatremic most likely 2/2 hypovolemia: improving  - monitor electrolytes, Is&Os  - vol resus as above    #ID  UCx 11/15: VRE s/s to linezolid  - completed linezolid 600 q12h (11/18 - 11/22)  - s/p azithromycin (11/16-11/17)     Cdiff colitis  - CT A/P shows interval improvement of pancolitis with mild pancolitis remaining   - management as above  - blood cultures 11/15 NGTD  - ID recs appreciated  - s/p methnylnaltrexone 8mg x1 11/21, with 1 large BM 11/20, last BM 11/20, got another dose methnylnaltrexone 8mg x1 11/23    #Heme/Onc  #Metastatic Lung Cancer w/ mets to bone  - follows with Dr. Xie at [facility]  - palliative care consulted  - Pain control: Back Pain 2/2 mets  - home dose oxy 20 ER TID. c/w Oxy IR 10 q3hs PRN  - Morphine 1mg IVP q3h PRN     #Thrombocytopenia  -Trend for now  -Check Vanc trough with next labs, as it may cause thrombocytopenia    #Anemia  - Hgb dropped to 5.7 most likely GI bleed from pancolitis/C.diff  - s/p 1 unit pRBC, goal Hgb >7, stable  - Hgb 6.8 11/22 s/p 1 unit pRBC with food response, stable  - no obvious source of bleed  - monitor CBC    #DVT  - hx of DVT left posterior tibial and peroneal veins 10/18  - on eliquis 5 BID, switched to lovenox 50 BID on 11/21  - d/c Lovenox 11/22 due to downtrending Hgb    #Endo   - A1c 6.1 in August    #Ethics  - FULL code according to family  - Palliative care recs appreciated    For Follow-Up:  [ ] f/u cards regarding need for pericardiocentesis now she is off pressors  [ ] f/u ID recs for duration of vanc  [ ] trend CBC, if stable, can resumed AC (eliquis 5 BID)  [ ] monitor BM  [ ] f/u palliative care recs (as per family still FULL CODE, however wants to take patient home for hospice pending further palliative recs)  [ ] ICU delirum, monitor mental status  [ ] thrombocytopenia, vanc can cause it, check vanc level in the AM      Vital Signs Last 24 Hrs  T(C): 36 (23 Nov 2020 08:00), Max: 37.1 (23 Nov 2020 04:00)  T(F): 96.8 (23 Nov 2020 08:00), Max: 98.8 (23 Nov 2020 04:00)  HR: 99 (23 Nov 2020 10:00) (78 - 131)  BP: 85/56 (23 Nov 2020 10:00) (74/62 - 180/169)  BP(mean): 65 (23 Nov 2020 10:00) (61 - 175)  RR: 15 (23 Nov 2020 10:00) (15 - 33)  SpO2: 97% (23 Nov 2020 10:00) (95% - 100%)  I&O's Summary    22 Nov 2020 07:01  -  23 Nov 2020 07:00  --------------------------------------------------------  IN: 810.8 mL / OUT: 1120 mL / NET: -309.2 mL    23 Nov 2020 07:01  -  23 Nov 2020 10:19  --------------------------------------------------------  IN: 0 mL / OUT: 160 mL / NET: -160 mL          MEDICATIONS  (STANDING):  chlorhexidine 4% Liquid 1 Application(s) Topical <User Schedule>  dronabinol 2.5 milliGRAM(s) Oral daily  lidocaine   Patch 2 Patch Transdermal daily  methylnaltrexone Injectable 8 milliGRAM(s) SubCutaneous once  midodrine 30 milliGRAM(s) Oral every 8 hours  multivitamin 1 Tablet(s) Oral daily  OLANZapine Injectable 2.5 milliGRAM(s) IntraMuscular once  oxyCODONE  ER Tablet 20 milliGRAM(s) Oral every 8 hours  polyethylene glycol 3350 17 Gram(s) Oral two times a day  senna 2 Tablet(s) Oral at bedtime  vancomycin    Solution 125 milliGRAM(s) Oral every 6 hours    MEDICATIONS  (PRN):  morphine  - Injectable 1 milliGRAM(s) IV Push every 3 hours PRN Severe Pain (7 - 10)  oxyCODONE    IR 10 milliGRAM(s) Oral every 3 hours PRN Moderate Pain (4 - 6), severe pain        LABS                                            9.9                   Neurophils% (auto):   x      (11-23 @ 05:20):    19.96)-----------(105          Lymphocytes% (auto):  x                                             28.5                   Eosinphils% (auto):   x        Manual%: Neutrophils x    ; Lymphocytes x    ; Eosinophils x    ; Bands%: x    ; Blasts x                                    125    |  94     |  17                  Calcium: 7.8   / iCa: x      (11-23 @ 05:20)    ----------------------------<  102       Magnesium: 1.7                              3.9     |  21     |  0.42             Phosphorous: 2.9      TPro  3.6    /  Alb  1.7    /  TBili  0.9    /  DBili  x      /  AST  38     /  ALT  30     /  AlkPhos  196    23 Nov 2020 05:20 Jazmín Fofana, PGY1 Pager: [phone]    MICU Transfer Note    Transfer from: MICU  Transfer to:  (x ) Medicine 509 B    (  ) Telemetry    (  ) RCU    (  ) Palliative    (  ) Stroke Unit    (  ) _______________  Accepting physican: Dr. Guerrero    HPI: 70yo F Hx Lung Ca in 8/2020 with mets to brain, Spine, and Right hip s/p RT x 5 ( last tx 9/2), dementia, DVT, Afib on Eliqius, presenting with hypotension. Patient was recently admitted to MICU for sepsis 2/2 C.diff colitis and required pressors. Patient completed course of antibiotics and was discharged, however started having diarrhea for past one day. No fevers, chills, chest pain, or shortness of breath. Patient has some lower abdominal discomfort. No dysuria.     In the ED, patient was found to be hypotensive to 72/48. She received total 3L of IVF, po vanc, 20 midodrine X1.    MICU COURSE:  Patient was admitted to the MICU for septic shock 2/2 c diff pancolitis vs VRE UTI (however no urinary sxs), she was on levophed and required multiple IVF boluses. Patient was started on Midodrine 30 TID, and pressors were eventually weaned off. She was initially on tigecylcine, IV flagyl, and high dose Vanc michelle cdiff colitis, she improved so abx were de-escalated to vanc 125 q6h. She completed 5 day course of linezolid for VRE uti. She was also found to have early tamponade physiology (increased from prior), however as per cards, not a current candidate for pericardiocentesis given high risk of recurrence, and being on systemic AC, also believed septic shock is the major  of hypotension. Repeat CT abd/pelvis did not show worsening Cdiff/ toxic megacolon, however showed renal infarct likely 2/2 to pAF off AC, AC was resumed, howeer Hb dropped to 6.8, s/p 1 upRBC w/o obvious source of bleed.   Patient is receiving opioids for back pain 2/2 mets to spine, patient has received relistor x2 (2nd dose 11/23) for opioid induced constipation. Course c/b ICU delirium requiring haldol and zyprexa.         ASSESSMENT & PLAN:   69 F Hx Lung Ca in 8/2020 with mets to ?brain, Spine, and Right hip s/p RT x 5 ( last tx 9/2), dementia, DVT, Afib on Eliqius, presenting with hypotension found to be in septic shock 2/2 cdiff pancolitis now with early tamponade physiology. Admitted to MICU for pressure support.     #Neuro  - AOx3 at baseline, currently AOx0, likely ICU delirium   - possible brain mets, however PET scan from 10/2 & MRI 8/2020 showed possible meningioma  - started melatonin 0.5mg qd    #Pulm  - satting well on room air   - CXR shows Bilateral patchy patchy right sided and more confluent left lower lobe opacities may represent multifocal pneumonia; likely pulmonary mets    #Cardio  Shock state: Multifactorial: septic/obstructive (cardiac tamponade)  - off pressors  - Midodrine 30 q8h -> Midodrine 40 q8h  - TTE (10/12/20) shows normal LV systolic function, mild diastolic dysfunction (stage 1)  - repeat TTE 11/20: increased pericardial effusion, with early tamponade physiology  - Cards recs appreciated: no role for pericardiocentesis at this time, risks>benefits    #GI  C.diff pancolitis (Cdiff POS): improving  - Po vanc 500 q6h (11/15-11/21)  - Po Vanc 125 q6h (11/21- )  - d/c Flagyl 500 q8h, tigecycline 50mg IV BID (11/16- 11/21)   - Repeat CT a/p 11/20: no ischemia bowel, renal infarct likely 2/2 pAF off AC    #Renal  #AG metabolic Acidosis; likely 2/2 to elevated BUN & hypovolemia  - Diet: adv to soft  #Hyponatremic most likely 2/2 hypovolemia: improving  - monitor electrolytes, Is&Os  - vol resus as above    #ID  UCx 11/15: VRE s/s to linezolid  - completed linezolid 600 q12h (11/18 - 11/22)  - s/p azithromycin (11/16-11/17)     Cdiff colitis  - CT A/P shows interval improvement of pancolitis with mild pancolitis remaining   - management as above  - blood cultures 11/15 NGTD  - ID recs appreciated  - s/p methnylnaltrexone 8mg x1 11/21, with 1 large BM 11/20, last BM 11/20, got another dose methnylnaltrexone 8mg x1 11/23    #Heme/Onc  #Metastatic Lung Cancer w/ mets to bone  - follows with Dr. Xie at [facility]  - palliative care consulted  - Pain control: Back Pain 2/2 mets  - home dose oxy 20 ER TID. c/w Oxy IR 10 q3hs PRN  - Morphine 1mg IVP q3h PRN     #Thrombocytopenia  -Trend for now  -Check Vanc trough with next labs, as it may cause thrombocytopenia    #Anemia  - Hgb dropped to 5.7 most likely GI bleed from pancolitis/C.diff  - s/p 1 unit pRBC, goal Hgb >7, stable  - Hgb 6.8 11/22 s/p 1 unit pRBC with food response, stable  - no obvious source of bleed  - monitor CBC    #DVT  - hx of DVT left posterior tibial and peroneal veins 10/18  - on eliquis 5 BID, switched to lovenox 50 BID on 11/21  - d/c Lovenox 11/22 due to downtrending Hgb    #Endo   - A1c 6.1 in August    #Ethics  - FULL code according to family  - Palliative care recs appreciated    For Follow-Up:  [ ] f/u cards regarding need for pericardiocentesis now she is off pressors  [ ] f/u ID recs for duration of vanc  [ ] trend CBC, if stable, can resumed AC (eliquis 5 BID)  [ ] monitor BM  [ ] f/u palliative care recs (as per family still FULL CODE, however wants to take patient home for hospice pending further palliative recs)  [ ] ICU delirum, monitor mental status  [ ] thrombocytopenia, vanc can cause it, check vanc level in the AM      Vital Signs Last 24 Hrs  T(C): 36 (23 Nov 2020 08:00), Max: 37.1 (23 Nov 2020 04:00)  T(F): 96.8 (23 Nov 2020 08:00), Max: 98.8 (23 Nov 2020 04:00)  HR: 99 (23 Nov 2020 10:00) (78 - 131)  BP: 85/56 (23 Nov 2020 10:00) (74/62 - 180/169)  BP(mean): 65 (23 Nov 2020 10:00) (61 - 175)  RR: 15 (23 Nov 2020 10:00) (15 - 33)  SpO2: 97% (23 Nov 2020 10:00) (95% - 100%)  I&O's Summary    22 Nov 2020 07:01  -  23 Nov 2020 07:00  --------------------------------------------------------  IN: 810.8 mL / OUT: 1120 mL / NET: -309.2 mL    23 Nov 2020 07:01  -  23 Nov 2020 10:19  --------------------------------------------------------  IN: 0 mL / OUT: 160 mL / NET: -160 mL          MEDICATIONS  (STANDING):  chlorhexidine 4% Liquid 1 Application(s) Topical <User Schedule>  dronabinol 2.5 milliGRAM(s) Oral daily  lidocaine   Patch 2 Patch Transdermal daily  methylnaltrexone Injectable 8 milliGRAM(s) SubCutaneous once  midodrine 30 milliGRAM(s) Oral every 8 hours  multivitamin 1 Tablet(s) Oral daily  OLANZapine Injectable 2.5 milliGRAM(s) IntraMuscular once  oxyCODONE  ER Tablet 20 milliGRAM(s) Oral every 8 hours  polyethylene glycol 3350 17 Gram(s) Oral two times a day  senna 2 Tablet(s) Oral at bedtime  vancomycin    Solution 125 milliGRAM(s) Oral every 6 hours    MEDICATIONS  (PRN):  morphine  - Injectable 1 milliGRAM(s) IV Push every 3 hours PRN Severe Pain (7 - 10)  oxyCODONE    IR 10 milliGRAM(s) Oral every 3 hours PRN Moderate Pain (4 - 6), severe pain        LABS                                            9.9                   Neurophils% (auto):   x      (11-23 @ 05:20):    19.96)-----------(105          Lymphocytes% (auto):  x                                             28.5                   Eosinphils% (auto):   x        Manual%: Neutrophils x    ; Lymphocytes x    ; Eosinophils x    ; Bands%: x    ; Blasts x                                    125    |  94     |  17                  Calcium: 7.8   / iCa: x      (11-23 @ 05:20)    ----------------------------<  102       Magnesium: 1.7                              3.9     |  21     |  0.42             Phosphorous: 2.9      TPro  3.6    /  Alb  1.7    /  TBili  0.9    /  DBili  x      /  AST  38     /  ALT  30     /  AlkPhos  196    23 Nov 2020 05:20 Jazmín Fofana, PGY1 Pager: [phone]    MICU Transfer Note    Transfer from: MICU  Transfer to:  (x ) Medicine 545 A    (  ) Telemetry    (  ) RCU    (  ) Palliative    (  ) Stroke Unit    (  ) _______________  Accepting physican: Dr. Guerrero    HPI: 68yo F Hx Lung Ca in 8/2020 with mets to brain, Spine, and Right hip s/p RT x 5 ( last tx 9/2), dementia, DVT, Afib on Eliqius, presenting with hypotension. Patient was recently admitted to MICU for sepsis 2/2 C.diff colitis and required pressors. Patient completed course of antibiotics and was discharged, however started having diarrhea for past one day. No fevers, chills, chest pain, or shortness of breath. Patient has some lower abdominal discomfort. No dysuria.     In the ED, patient was found to be hypotensive to 72/48. She received total 3L of IVF, po vanc, 20 midodrine X1.    MICU COURSE:  Patient was admitted to the MICU for septic shock 2/2 c diff pancolitis vs VRE UTI (however no urinary sxs), she was on levophed and required multiple IVF boluses. Patient was started on Midodrine 30 TID, and pressors were eventually weaned off. She was initially on tigecylcine, IV flagyl, and high dose Vanc michelle cdiff colitis, she improved so abx were de-escalated to vanc 125 q6h. She completed 5 day course of linezolid for VRE uti. She was also found to have early tamponade physiology (increased from prior), however as per cards, not a current candidate for pericardiocentesis given high risk of recurrence, and being on systemic AC, also believed septic shock is the major  of hypotension. Repeat CT abd/pelvis did not show worsening Cdiff/ toxic megacolon, however showed renal infarct likely 2/2 to pAF off AC, AC was resumed, howeer Hb dropped to 6.8, s/p 1 upRBC w/o obvious source of bleed.   Patient is receiving opioids for back pain 2/2 mets to spine, patient has received relistor x2 (2nd dose 11/23) for opioid induced constipation. Course c/b ICU delirium requiring haldol and zyprexa.         ASSESSMENT & PLAN:   69 F Hx Lung Ca in 8/2020 with mets to ?brain, Spine, and Right hip s/p RT x 5 ( last tx 9/2), dementia, DVT, Afib on Eliqius, presenting with hypotension found to be in septic shock 2/2 cdiff pancolitis now with early tamponade physiology. Admitted to MICU for pressure support.     #Neuro  - AOx3 at baseline, currently AOx0, likely ICU delirium   - possible brain mets, however PET scan from 10/2 & MRI 8/2020 showed possible meningioma  - started melatonin 0.5mg qd    #Pulm  - satting well on room air   - CXR shows Bilateral patchy patchy right sided and more confluent left lower lobe opacities may represent multifocal pneumonia; likely pulmonary mets    #Cardio  Shock state: Multifactorial: septic/obstructive (cardiac tamponade)  - off pressors  - Midodrine 30 q8h -> Midodrine 40 q8h  - TTE (10/12/20) shows normal LV systolic function, mild diastolic dysfunction (stage 1)  - repeat TTE 11/20: increased pericardial effusion, with early tamponade physiology  - Cards recs appreciated: no role for pericardiocentesis at this time, risks>benefits    #GI  C.diff pancolitis (Cdiff POS): improving  - Po vanc 500 q6h (11/15-11/21)  - Po Vanc 125 q6h (11/21- )  - d/c Flagyl 500 q8h, tigecycline 50mg IV BID (11/16- 11/21)   - Repeat CT a/p 11/20: no ischemia bowel, renal infarct likely 2/2 pAF off AC    #Renal  #AG metabolic Acidosis; likely 2/2 to elevated BUN & hypovolemia  - Diet: adv to soft  #Hyponatremic most likely 2/2 hypovolemia: improving  - monitor electrolytes, Is&Os  - vol resus as above    #ID  UCx 11/15: VRE s/s to linezolid  - completed linezolid 600 q12h (11/18 - 11/22)  - s/p azithromycin (11/16-11/17)     Cdiff colitis  - CT A/P shows interval improvement of pancolitis with mild pancolitis remaining   - management as above  - blood cultures 11/15 NGTD  - ID recs appreciated  - s/p methnylnaltrexone 8mg x1 11/21, with 1 large BM 11/20, last BM 11/20, got another dose methnylnaltrexone 8mg x1 11/23    #Heme/Onc  #Metastatic Lung Cancer w/ mets to bone  - follows with Dr. Xie at [facility]  - palliative care consulted  - Pain control: Back Pain 2/2 mets  - home dose oxy 20 ER TID. c/w Oxy IR 10 q3hs PRN  - Morphine 1mg IVP q3h PRN     #Thrombocytopenia  -Trend for now  -Check Vanc trough with next labs, as it may cause thrombocytopenia    #Anemia  - Hgb dropped to 5.7 most likely GI bleed from pancolitis/C.diff  - s/p 1 unit pRBC, goal Hgb >7, stable  - Hgb 6.8 11/22 s/p 1 unit pRBC with food response, stable  - no obvious source of bleed  - monitor CBC    #DVT  - hx of DVT left posterior tibial and peroneal veins 10/18  - on eliquis 5 BID, switched to lovenox 50 BID on 11/21  - d/c Lovenox 11/22 due to downtrending Hgb    #Endo   - A1c 6.1 in August    #Ethics  - FULL code according to family  - Palliative care recs appreciated    For Follow-Up:  [ ] f/u cards regarding need for pericardiocentesis now she is off pressors  [ ] f/u ID recs for duration of vanc  [ ] trend CBC, if stable, can resumed AC (eliquis 5 BID)  [ ] monitor BM  [ ] f/u palliative care recs (as per family still FULL CODE, however wants to take patient home for hospice pending further palliative recs)  [ ] ICU delirum, monitor mental status  [ ] thrombocytopenia, vanc can cause it, check vanc level in the AM      Vital Signs Last 24 Hrs  T(C): 36 (23 Nov 2020 08:00), Max: 37.1 (23 Nov 2020 04:00)  T(F): 96.8 (23 Nov 2020 08:00), Max: 98.8 (23 Nov 2020 04:00)  HR: 99 (23 Nov 2020 10:00) (78 - 131)  BP: 85/56 (23 Nov 2020 10:00) (74/62 - 180/169)  BP(mean): 65 (23 Nov 2020 10:00) (61 - 175)  RR: 15 (23 Nov 2020 10:00) (15 - 33)  SpO2: 97% (23 Nov 2020 10:00) (95% - 100%)  I&O's Summary    22 Nov 2020 07:01  -  23 Nov 2020 07:00  --------------------------------------------------------  IN: 810.8 mL / OUT: 1120 mL / NET: -309.2 mL    23 Nov 2020 07:01  -  23 Nov 2020 10:19  --------------------------------------------------------  IN: 0 mL / OUT: 160 mL / NET: -160 mL          MEDICATIONS  (STANDING):  chlorhexidine 4% Liquid 1 Application(s) Topical <User Schedule>  dronabinol 2.5 milliGRAM(s) Oral daily  lidocaine   Patch 2 Patch Transdermal daily  methylnaltrexone Injectable 8 milliGRAM(s) SubCutaneous once  midodrine 30 milliGRAM(s) Oral every 8 hours  multivitamin 1 Tablet(s) Oral daily  OLANZapine Injectable 2.5 milliGRAM(s) IntraMuscular once  oxyCODONE  ER Tablet 20 milliGRAM(s) Oral every 8 hours  polyethylene glycol 3350 17 Gram(s) Oral two times a day  senna 2 Tablet(s) Oral at bedtime  vancomycin    Solution 125 milliGRAM(s) Oral every 6 hours    MEDICATIONS  (PRN):  morphine  - Injectable 1 milliGRAM(s) IV Push every 3 hours PRN Severe Pain (7 - 10)  oxyCODONE    IR 10 milliGRAM(s) Oral every 3 hours PRN Moderate Pain (4 - 6), severe pain        LABS                                            9.9                   Neurophils% (auto):   x      (11-23 @ 05:20):    19.96)-----------(105          Lymphocytes% (auto):  x                                             28.5                   Eosinphils% (auto):   x        Manual%: Neutrophils x    ; Lymphocytes x    ; Eosinophils x    ; Bands%: x    ; Blasts x                                    125    |  94     |  17                  Calcium: 7.8   / iCa: x      (11-23 @ 05:20)    ----------------------------<  102       Magnesium: 1.7                              3.9     |  21     |  0.42             Phosphorous: 2.9      TPro  3.6    /  Alb  1.7    /  TBili  0.9    /  DBili  x      /  AST  38     /  ALT  30     /  AlkPhos  196    23 Nov 2020 05:20

## 2020-11-23 NOTE — PROGRESS NOTE ADULT - ASSESSMENT
69 F Hx Lung Ca in 8/2020 with mets to ?brain, Spine, and Right hip s/p RT x 5 ( last tx 9/2), dementia, DVT, Afib on Eliqius, presenting with hypotension found to be in septic shock 2/2 cdiff pancolitis now with early tamponade physiology. Admitted to MICU for pressure support.     #Neuro  - AOx3, no active issues  - possible brain mets, however PET scan from 10/2 & MRI 8/2020 showed possible meningioma    #Pulm  - satting well on room air   - CXR shows Bilateral patchy patchy right sided and more confluent left lower lobe opacities may represent multifocal pneumonia; likely pulmonary mets    #Cardio  Shock state: Multifactorial: septic/obstructive (cardiac tamponade)  - off pressors  - c/w Midodrine 30 q8h   - TTE (10/12/20) shows normal LV systolic function, mild diastolic dysfunction (stage 1)  - repeat TTE 11/20: increased pericardial effusion, with early tamponade physiology  - Cards recs appreciated: no role for pericardiocentesis at this time, risks>benefits    #GI  C.diff pancolitis (Cdiff POS): improving  - Po vanc 500 q6h (11/15-11/21)  - Po Vanc 125 q6h (11/21- )  - d/c Flagyl 500 q8h, tigecycline 50mg IV BID (11/16- 11/21)   - Repeat CT a/p 11/20: no ischemia bowel, renal infarct likely 2/2 pAF off AC    #Renal  #AG metabolic Acidosis; likely 2/2 to elevated BUN & hypovolemia  - Diet: adv to soft  #Hyponatremic most likely 2/2 hypovolemia: improving  - monitor electrolytes, Is&Os  - vol resus as above    #ID  UCx 11/15: VRE s/s to linezolid  - completed linezolid 600 q12h (11/18 - 11/22)  - s/p azithromycin (11/16-11/17)     Cdiff colitis  - CT A/P shows interval improvement of pancolitis with mild pancolitis remaining   - management as above  - blood cultures 11/15 NGTD  - ID recs appreciated  - s/p methnylnaltrexone 8mg x1 11/21, with 1 large BM 11/20    #Heme/Onc  #Metastatic Lung Cancer w/ mets to bone  - follows with Dr. Xie at [facility]  - palliative care consulted  - Pain control: Back Pain 2/2 mets  - home dose oxy 20 ER TID. c/w Oxy IR 10 q3hs PRN  - Morphine 1mg IVP q3h PRN     #Anemia  - Hgb dropped to 5.7 most likely GI bleed from pancolitis/C.diff  - s/p 1 unit pRBC, goal Hgb >7, stable  - Hgb 6.8 11/22 s/p 1 unit pRBC with food response  - no obvious source of bleed    #DVT  - hx of DVT left posterior tibial and peroneal veins 10/18  - on eliquis 5 BID, switched to lovenox 50 BID on 11/21  - d/c Lovenox 11/22 due to downtrending Hgb    #Endo   - A1c 6.1 in August    #Ethics  - FULL code according to family  - Palliative care recs appreciated 69 F Hx Lung Ca in 8/2020 with mets to ?brain, Spine, and Right hip s/p RT x 5 ( last tx 9/2), dementia, DVT, Afib on Eliqius, presenting with hypotension found to be in septic shock 2/2 cdiff pancolitis now with early tamponade physiology. Admitted to MICU for pressure support.     #Neuro  - AOx3 at baseline, currently AOx0, likely ICU delirium   - possible brain mets, however PET scan from 10/2 & MRI 8/2020 showed possible meningioma    #Pulm  - satting well on room air   - CXR shows Bilateral patchy patchy right sided and more confluent left lower lobe opacities may represent multifocal pneumonia; likely pulmonary mets    #Cardio  Shock state: Multifactorial: septic/obstructive (cardiac tamponade)  - off pressors  - c/w Midodrine 30 q8h   - TTE (10/12/20) shows normal LV systolic function, mild diastolic dysfunction (stage 1)  - repeat TTE 11/20: increased pericardial effusion, with early tamponade physiology  - Cards recs appreciated: no role for pericardiocentesis at this time, risks>benefits    #GI  C.diff pancolitis (Cdiff POS): improving  - Po vanc 500 q6h (11/15-11/21)  - Po Vanc 125 q6h (11/21- )  - d/c Flagyl 500 q8h, tigecycline 50mg IV BID (11/16- 11/21)   - Repeat CT a/p 11/20: no ischemia bowel, renal infarct likely 2/2 pAF off AC    #Renal  #AG metabolic Acidosis; likely 2/2 to elevated BUN & hypovolemia  - Diet: adv to soft  #Hyponatremic most likely 2/2 hypovolemia: improving  - monitor electrolytes, Is&Os  - vol resus as above    #ID  UCx 11/15: VRE s/s to linezolid  - completed linezolid 600 q12h (11/18 - 11/22)  - s/p azithromycin (11/16-11/17)     Cdiff colitis  - CT A/P shows interval improvement of pancolitis with mild pancolitis remaining   - management as above  - blood cultures 11/15 NGTD  - ID recs appreciated  - s/p methnylnaltrexone 8mg x1 11/21, with 1 large BM 11/20, last BM 11/20, will give anotehr dose methnylnaltrexone 8mg x1 today    #Heme/Onc  #Metastatic Lung Cancer w/ mets to bone  - follows with Dr. Xie at [facility]  - palliative care consulted  - Pain control: Back Pain 2/2 mets  - home dose oxy 20 ER TID. c/w Oxy IR 10 q3hs PRN  - Morphine 1mg IVP q3h PRN     #Anemia  - Hgb dropped to 5.7 most likely GI bleed from pancolitis/C.diff  - s/p 1 unit pRBC, goal Hgb >7, stable  - Hgb 6.8 11/22 s/p 1 unit pRBC with food response  - no obvious source of bleed  - monitor CBC    #DVT  - hx of DVT left posterior tibial and peroneal veins 10/18  - on eliquis 5 BID, switched to lovenox 50 BID on 11/21  - d/c Lovenox 11/22 due to downtrending Hgb    #Endo   - A1c 6.1 in August    #Ethics  - FULL code according to family  - Palliative care recs appreciated

## 2020-11-23 NOTE — PROCEDURE NOTE - NSPROCDETAILS_GEN_ALL_CORE
nasogastric
location identified, draped/prepped, sterile technique used/blood seen on insertion/dressing applied/flushes easily/secured in place/sterile technique, catheter placed/ultrasound utilization
sterile technique, catheter placed/ultrasound utilization/dressing applied/flushes easily/location identified, draped/prepped, sterile technique used/secured in place/blood seen on insertion

## 2020-11-24 NOTE — PROGRESS NOTE ADULT - ATTENDING COMMENTS
70 y/o F with PMH as above presented to the hospital with septic shock requiring vasopressor support.  She was found to have c. diff colitis and a VRE UTI.  Completed course of abx for UTI and on PO vanco for c. diff.  Patient is hemodynamically stable, off pressors with MAP>65.  Cont midodrine.  Pericardial effusion noted on bedside US, cards consulted and recs appreciated.  No plan for drainage at this time.  Clinically stable for transfer to the floors.

## 2020-11-24 NOTE — PROGRESS NOTE ADULT - ATTENDING COMMENTS
68 year old woman with poor functional status and metastatic lung cancer admitted from rehab for sepsis and C.Diff infection. Large pericardial effusion on echo may be malignant in etiology, but there is no tamponade clinically.

## 2020-11-24 NOTE — PROGRESS NOTE ADULT - SUBJECTIVE AND OBJECTIVE BOX
Follow Up:  recurrent cdiff    Interval History/ROS: uncooperative with nursing staff, not eating, refusing meds, 2 bowel movements since yesterday when discussed with nurse this am, pulled out NGT earlier    Allergies  No Known Allergies    ANTIMICROBIALS:  vancomycin    Solution 125 every 6 hours    OTHER MEDS:  MEDICATIONS  (STANDING):  albuterol/ipratropium for Nebulization 3 every 6 hours  melatonin 0.5 at bedtime  midodrine 40 every 8 hours  morphine  - Injectable 1 every 3 hours PRN  oxyCODONE    IR 10 every 3 hours PRN  oxyCODONE    Solution 20 every 8 hours      Vital Signs Last 24 Hrs  T(C): 35.8 (24 Nov 2020 08:00), Max: 37.1 (23 Nov 2020 20:18)  T(F): 96.5 (24 Nov 2020 08:00), Max: 98.8 (23 Nov 2020 20:18)  HR: 89 (24 Nov 2020 17:00) (77 - 139)  BP: 101/67 (24 Nov 2020 17:00) (72/50 - 157/120)  BP(mean): 77 (24 Nov 2020 17:00) (55 - 128)  RR: 17 (24 Nov 2020 17:00) (17 - 40)  SpO2: 94% (24 Nov 2020 11:00) (79% - 100%)    PHYSICAL EXAM:  General: ill appearing  Neurology: Asleep  Respiratory: no resp distress  CV: RRR, S1S2, no murmurs, rubs or gallops  Abdominal: doughy   Extremities: No edema,   Line Sites: Clear  Skin: No rash                        9.3    20.91 )-----------( 96       ( 24 Nov 2020 05:00 )             26.9   WBC Count: 20.91 (11-24 @ 05:00)  WBC Count: 19.96 (11-23 @ 05:20)  WBC Count: 19.19 (11-22 @ 05:45)  WBC Count: 19.84 (11-22 @ 02:45)  WBC Count: 27.58 (11-21 @ 03:02)  WBC Count: 39.72 (11-20 @ 07:00)  WBC Count: 35.03 (11-20 @ 02:00)    11-24    128<L>  |  97<L>  |  20  ----------------------------<  95  4.4   |  18<L>  |  0.43<L>    Ca    8.0<L>      24 Nov 2020 05:00  Phos  3.4     11-24  Mg     2.0     11-24    TPro  3.9<L>  /  Alb  1.8<L>  /  TBili  0.8  /  DBili  x   /  AST  47<H>  /  ALT  31  /  AlkPhos  195<H>  11-24        MICROBIOLOGY:  .Urine Catheterized  11-15-20   >100,000 CFU/ml Enterococcus faecium (vancomycin resistant)  <10,000 CFU/ml Normal Urogenital crystal present  --  Enterococcus faecium (vancomycin resistant)      .Blood Blood-Peripheral  11-15-20   No Growth Final  --  --    RADIOLOGY:  aad< from: Xray Chest 1 View- PORTABLE-Urgent (Xray Chest 1 View- PORTABLE-Urgent .) (11.24.20 @ 13:14) >  Tip of theenteric tube is seen in the body of the stomach. Bilateral moderate-sized pleural effusions are present. Lower abdomen was not included on the radiograph.      COMPARISON:  November 23 at 11:30 PM      IMPRESSION:  Follow-up post advancement of enteric tube into the body of the stomach.    < end of copied text >      Herb Cabezas MD; Division of Infectious Disease; Pager: [phone]; nights and weekends: [phone]

## 2020-11-24 NOTE — PROGRESS NOTE ADULT - SUBJECTIVE AND OBJECTIVE BOX
Jazmín Fofana (Connor) PGY-2  Pager: NS) [phone]/ (AAR) [phone]    Patient is a 70y old  Female who presents with a chief complaint of Pancolitis, septic shock (23 Nov 2020 13:59)      SUBJECTIVE / OVERNIGHT EVENTS:  No acute complaints over night. Denies any fevers/chills, headache, CP, SOB, abd pain, N/V/D, constipation, or leg swelling.       MEDICATIONS  (STANDING):  dronabinol 2.5 milliGRAM(s) Oral daily  lidocaine   Patch 2 Patch Transdermal daily  midodrine 30 milliGRAM(s) Oral every 8 hours  multivitamin 1 Tablet(s) Oral daily  oxyCODONE  ER Tablet 20 milliGRAM(s) Oral every 8 hours  vancomycin    Solution 125 milliGRAM(s) Oral every 6 hours    MEDICATIONS  (PRN):  morphine  - Injectable 1 milliGRAM(s) IV Push every 3 hours PRN Severe Pain (7 - 10)  oxyCODONE    IR 10 milliGRAM(s) Oral every 3 hours PRN Moderate Pain (4 - 6), severe pain          OBJECTIVE:  Vital Signs Last 24 Hrs  T(C): 36 (24 Nov 2020 04:00), Max: 37.1 (23 Nov 2020 20:18)  T(F): 96.8 (24 Nov 2020 04:00), Max: 98.8 (23 Nov 2020 20:18)  HR: 89 (24 Nov 2020 06:03) (81 - 139)  BP: 93/68 (24 Nov 2020 06:03) (72/50 - 157/120)  BP(mean): 78 (24 Nov 2020 06:03) (55 - 128)  RR: 29 (24 Nov 2020 03:00) (15 - 322)  SpO2: 100% (24 Nov 2020 04:00) (79% - 100%)  PHYSICAL EXAM:  GENERAL: NAD, well-developed  HEAD:  Atraumatic, Normocephalic  EYES: EOMI, PERRLA, conjunctiva and sclera clear  NECK: Supple, No JVD  CHEST/LUNG: Clear to auscultation bilaterally; No wheeze  HEART: Regular rate and rhythm; No murmurs, rubs, or gallops  ABDOMEN: Soft, Nontender, Nondistended; Bowel sounds present  EXTREMITIES:  2+ Peripheral Pulses, No clubbing, cyanosis, or edema  PSYCH: AAOx3  NEUROLOGY: non-focal  SKIN: No rashes or lesions    CAPILLARY BLOOD GLUCOSE        I&O's Summary    23 Nov 2020 07:01  -  24 Nov 2020 07:00  --------------------------------------------------------  IN: 300 mL / OUT: 1100 mL / NET: -800 mL              LABS:                        9.3    20.91 )-----------( 96       ( 24 Nov 2020 05:00 )             26.9     11-24    128<L>  |  97<L>  |  20  ----------------------------<  95  4.4   |  18<L>  |  0.43<L>    Ca    8.0<L>      24 Nov 2020 05:00  Phos  3.4     11-24  Mg     2.0     11-24    TPro  3.9<L>  /  Alb  1.8<L>  /  TBili  0.8  /  DBili  x   /  AST  47<H>  /  ALT  31  /  AlkPhos  195<H>  11-24                RADIOLOGY & ADDITIONAL TESTS:       Jazmín Fofana (Connor) PGY-2  Pager: NS) [phone]/ (HTA) [phone]    Patient is a 70y old  Female who presents with a chief complaint of Pancolitis, septic shock (23 Nov 2020 13:59)      SUBJECTIVE / OVERNIGHT EVENTS:  patient with soft BPs, given 250cc bolus, night team put in NG tube, was agitated given 2mg IVP x1 at 10PM.       MEDICATIONS  (STANDING):  dronabinol 2.5 milliGRAM(s) Oral daily  lidocaine   Patch 2 Patch Transdermal daily  midodrine 30 milliGRAM(s) Oral every 8 hours  multivitamin 1 Tablet(s) Oral daily  oxyCODONE  ER Tablet 20 milliGRAM(s) Oral every 8 hours  vancomycin    Solution 125 milliGRAM(s) Oral every 6 hours    MEDICATIONS  (PRN):  morphine  - Injectable 1 milliGRAM(s) IV Push every 3 hours PRN Severe Pain (7 - 10)  oxyCODONE    IR 10 milliGRAM(s) Oral every 3 hours PRN Moderate Pain (4 - 6), severe pain          OBJECTIVE:  Vital Signs Last 24 Hrs  T(C): 36 (24 Nov 2020 04:00), Max: 37.1 (23 Nov 2020 20:18)  T(F): 96.8 (24 Nov 2020 04:00), Max: 98.8 (23 Nov 2020 20:18)  HR: 89 (24 Nov 2020 06:03) (81 - 139)  BP: 93/68 (24 Nov 2020 06:03) (72/50 - 157/120)  BP(mean): 78 (24 Nov 2020 06:03) (55 - 128)  RR: 29 (24 Nov 2020 03:00) (15 - 322)  SpO2: 100% (24 Nov 2020 04:00) (79% - 100%)    PHYSICAL EXAM:  GENERAL: no acute distress, pulling at lines  HEAD:  Atraumatic, Normocephalic  EYES:  conjunctiva and sclera clear  NECK: Supple, No JVD  CHEST/LUNG: Clear to auscultation bilaterally; No wheeze  HEART: Regular rate and rhythm; No murmurs, rubs, or gallops  ABDOMEN: mildly distended, mildly tense, pos BS  EXTREMITIES:  No clubbing, cyanosis, or edema  PSYCH: AAOx0    CAPILLARY BLOOD GLUCOSE        I&O's Summary    23 Nov 2020 07:01  -  24 Nov 2020 07:00  --------------------------------------------------------  IN: 300 mL / OUT: 1100 mL / NET: -800 mL              LABS:                        9.3    20.91 )-----------( 96       ( 24 Nov 2020 05:00 )             26.9     11-24    128<L>  |  97<L>  |  20  ----------------------------<  95  4.4   |  18<L>  |  0.43<L>    Ca    8.0<L>      24 Nov 2020 05:00  Phos  3.4     11-24  Mg     2.0     11-24    TPro  3.9<L>  /  Alb  1.8<L>  /  TBili  0.8  /  DBili  x   /  AST  47<H>  /  ALT  31  /  AlkPhos  195<H>  11-24                RADIOLOGY & ADDITIONAL TESTS:       Jazmín Fofana (Connor) PGY-2  Pager: NS) [phone]/ (OOF) [phone]    Patient is a 70y old  Female who presents with a chief complaint of Pancolitis, septic shock (23 Nov 2020 13:59)      SUBJECTIVE / OVERNIGHT EVENTS:  patient with soft BPs, given 250cc bolus, night team put in NG tube, was agitated given haldol 2mg IVP x1 at 10PM.       MEDICATIONS  (STANDING):  dronabinol 2.5 milliGRAM(s) Oral daily  lidocaine   Patch 2 Patch Transdermal daily  midodrine 30 milliGRAM(s) Oral every 8 hours  multivitamin 1 Tablet(s) Oral daily  oxyCODONE  ER Tablet 20 milliGRAM(s) Oral every 8 hours  vancomycin    Solution 125 milliGRAM(s) Oral every 6 hours    MEDICATIONS  (PRN):  morphine  - Injectable 1 milliGRAM(s) IV Push every 3 hours PRN Severe Pain (7 - 10)  oxyCODONE    IR 10 milliGRAM(s) Oral every 3 hours PRN Moderate Pain (4 - 6), severe pain          OBJECTIVE:  Vital Signs Last 24 Hrs  T(C): 36 (24 Nov 2020 04:00), Max: 37.1 (23 Nov 2020 20:18)  T(F): 96.8 (24 Nov 2020 04:00), Max: 98.8 (23 Nov 2020 20:18)  HR: 89 (24 Nov 2020 06:03) (81 - 139)  BP: 93/68 (24 Nov 2020 06:03) (72/50 - 157/120)  BP(mean): 78 (24 Nov 2020 06:03) (55 - 128)  RR: 29 (24 Nov 2020 03:00) (15 - 322)  SpO2: 100% (24 Nov 2020 04:00) (79% - 100%)    PHYSICAL EXAM:  GENERAL: no acute distress, pulling at lines  HEAD:  Atraumatic, Normocephalic  EYES:  conjunctiva and sclera clear  NECK: Supple, No JVD  CHEST/LUNG: Clear to auscultation bilaterally; No wheeze  HEART: Regular rate and rhythm; No murmurs, rubs, or gallops  ABDOMEN: mildly distended, mildly tense, pos BS  EXTREMITIES:  No clubbing, cyanosis, or edema  PSYCH: AAOx0    CAPILLARY BLOOD GLUCOSE        I&O's Summary    23 Nov 2020 07:01  -  24 Nov 2020 07:00  --------------------------------------------------------  IN: 300 mL / OUT: 1100 mL / NET: -800 mL              LABS:                        9.3    20.91 )-----------( 96       ( 24 Nov 2020 05:00 )             26.9     11-24    128<L>  |  97<L>  |  20  ----------------------------<  95  4.4   |  18<L>  |  0.43<L>    Ca    8.0<L>      24 Nov 2020 05:00  Phos  3.4     11-24  Mg     2.0     11-24    TPro  3.9<L>  /  Alb  1.8<L>  /  TBili  0.8  /  DBili  x   /  AST  47<H>  /  ALT  31  /  AlkPhos  195<H>  11-24                RADIOLOGY & ADDITIONAL TESTS:       Jazmín Fofana (Connor) PGY-2  Pager: NS) [phone]/ (XSD) [phone]    Patient is a 70y old  Female who presents with a chief complaint of Pancolitis, septic shock (23 Nov 2020 13:59)      SUBJECTIVE / OVERNIGHT EVENTS:  patient with soft BPs, given 250cc bolus, night team put in NG tube, was agitated given haldol 2mg IVP x1 at 10PM.       MEDICATIONS  (STANDING):  dronabinol 2.5 milliGRAM(s) Oral daily  lidocaine   Patch 2 Patch Transdermal daily  midodrine 30 milliGRAM(s) Oral every 8 hours  multivitamin 1 Tablet(s) Oral daily  oxyCODONE  ER Tablet 20 milliGRAM(s) Oral every 8 hours  vancomycin    Solution 125 milliGRAM(s) Oral every 6 hours    MEDICATIONS  (PRN):  morphine  - Injectable 1 milliGRAM(s) IV Push every 3 hours PRN Severe Pain (7 - 10)  oxyCODONE    IR 10 milliGRAM(s) Oral every 3 hours PRN Moderate Pain (4 - 6), severe pain          OBJECTIVE:  Vital Signs Last 24 Hrs  T(C): 36 (24 Nov 2020 04:00), Max: 37.1 (23 Nov 2020 20:18)  T(F): 96.8 (24 Nov 2020 04:00), Max: 98.8 (23 Nov 2020 20:18)  HR: 89 (24 Nov 2020 06:03) (81 - 139)  BP: 93/68 (24 Nov 2020 06:03) (72/50 - 157/120)  BP(mean): 78 (24 Nov 2020 06:03) (55 - 128)  RR: 29 (24 Nov 2020 03:00) (15 - 322)  SpO2: 100% (24 Nov 2020 04:00) (79% - 100%)    PHYSICAL EXAM:  GENERAL: no acute distress, pulling at lines  HEAD:  Atraumatic, Normocephalic  EYES:  conjunctiva and sclera clear  NECK: Supple, No JVD  CHEST/LUNG: bilateral wheezes appreciated  HEART: Regular rate and rhythm; No murmurs, rubs, or gallops  ABDOMEN: mildly distended, mildly tense, pos BS  EXTREMITIES:  No clubbing, cyanosis, or edema  PSYCH: AAOx0    CAPILLARY BLOOD GLUCOSE        I&O's Summary    23 Nov 2020 07:01  -  24 Nov 2020 07:00  --------------------------------------------------------  IN: 300 mL / OUT: 1100 mL / NET: -800 mL              LABS:                        9.3    20.91 )-----------( 96       ( 24 Nov 2020 05:00 )             26.9     11-24    128<L>  |  97<L>  |  20  ----------------------------<  95  4.4   |  18<L>  |  0.43<L>    Ca    8.0<L>      24 Nov 2020 05:00  Phos  3.4     11-24  Mg     2.0     11-24    TPro  3.9<L>  /  Alb  1.8<L>  /  TBili  0.8  /  DBili  x   /  AST  47<H>  /  ALT  31  /  AlkPhos  195<H>  11-24                RADIOLOGY & ADDITIONAL TESTS:

## 2020-11-24 NOTE — PROGRESS NOTE ADULT - ASSESSMENT
69 YO F with metastatic Lung CA recurrent C.Diff here for sepsis now having increasing pressor requirements found to have early tamponade on TTE.    #pericardial effusion  -findings of early echocardiographic evidence of tamponade  -off of IV pressors; not currently in clinical tamponade  -would repeat TTE on 11/27 for 1 week reassessment of effusion  -high suspicion of malignant effusion, if increase in size, would rec CTS eval for window if in lines with goals of care    Raz Bland MD  Cardiology Fellow - PGY 5  Text or Call: [phone]  For all New Consults and Questions:  [email or web address]   Login: [email or web address] 69 YO F with metastatic Lung CA recurrent C.Diff admitted for sepsis found to have new pericardial effusion with partial hemodynamic effect on echo.     #pericardial effusion  -findings of early echocardiographic evidence of tamponade  -off of IV pressors; not currently in clinical tamponade  -would repeat TTE on 11/27 for 1 week reassessment of effusion  -high suspicion of malignant effusion, can consider CTS eval for window if in lines with goals of care    Raz Bland MD  Cardiology Fellow - PGY 5  Text or Call: [phone]  For all New Consults and Questions:  [email or web address]   Login: [email or web address]

## 2020-11-24 NOTE — PROGRESS NOTE ADULT - ASSESSMENT
69 F Hx Lung Ca in 8/2020 with mets to ?brain, Spine, and Right hip s/p RT x 5 ( last tx 9/2), dementia, DVT, Afib on Eliqius, presenting with hypotension found to be in septic shock 2/2 cdiff pancolitis now with early tamponade physiology. Admitted to MICU for pressure support.     #Neuro  - AOx3 at baseline, currently AOx0, likely ICU delirium   - possible brain mets, however PET scan from 10/2 & MRI 8/2020 showed possible meningioma    #Pulm  - satting well on room air   - CXR shows Bilateral patchy patchy right sided and more confluent left lower lobe opacities may represent multifocal pneumonia; likely pulmonary mets    #Cardio  Shock state: Multifactorial: septic/obstructive (cardiac tamponade)  - off pressors  - c/w Midodrine 30 q8h   - TTE (10/12/20) shows normal LV systolic function, mild diastolic dysfunction (stage 1)  - repeat TTE 11/20: increased pericardial effusion, with early tamponade physiology  - Cards recs appreciated: no role for pericardiocentesis at this time, risks>benefits    #GI  C.diff pancolitis (Cdiff POS): improving  - Po vanc 500 q6h (11/15-11/21)  - Po Vanc 125 q6h (11/21- )  - d/c Flagyl 500 q8h, tigecycline 50mg IV BID (11/16- 11/21)   - Repeat CT a/p 11/20: no ischemia bowel, renal infarct likely 2/2 pAF off AC    #Renal  #AG metabolic Acidosis; likely 2/2 to elevated BUN & hypovolemia  - Diet: adv to soft  #Hyponatremic most likely 2/2 hypovolemia: improving  - monitor electrolytes, Is&Os  - vol resus as above    #ID  UCx 11/15: VRE s/s to linezolid  - completed linezolid 600 q12h (11/18 - 11/22)  - s/p azithromycin (11/16-11/17)     Cdiff colitis  - CT A/P shows interval improvement of pancolitis with mild pancolitis remaining   - management as above  - blood cultures 11/15 NGTD  - ID recs appreciated  - s/p methnylnaltrexone 8mg x1 11/21, with 1 large BM 11/20, last BM 11/20, will give anotehr dose methnylnaltrexone 8mg x1 today    #Heme/Onc  #Metastatic Lung Cancer w/ mets to bone  - follows with Dr. Xie at [facility]  - palliative care consulted  - Pain control: Back Pain 2/2 mets  - home dose oxy 20 ER TID. c/w Oxy IR 10 q3hs PRN  - Morphine 1mg IVP q3h PRN     #Anemia  - Hgb dropped to 5.7 most likely GI bleed from pancolitis/C.diff  - s/p 1 unit pRBC, goal Hgb >7, stable  - Hgb 6.8 11/22 s/p 1 unit pRBC with food response  - no obvious source of bleed  - monitor CBC    #DVT  - hx of DVT left posterior tibial and peroneal veins 10/18  - on eliquis 5 BID, switched to lovenox 50 BID on 11/21  - d/c Lovenox 11/22 due to downtrending Hgb    #Endo   - A1c 6.1 in August    #Ethics  - FULL code according to family  - Palliative care recs appreciated 69 F Hx Lung Ca in 8/2020 with mets to ?brain, Spine, and Right hip s/p RT x 5 ( last tx 9/2), dementia, DVT, Afib on Eliqius, presenting with hypotension found to be in septic shock 2/2 cdiff pancolitis now with early tamponade physiology. Admitted to MICU for pressure support.     #Neuro  - AOx3 at baseline, currently AOx0, likely ICU delirium   - possible brain mets, however PET scan from 10/2 & MRI 8/2020 showed possible meningioma    #Pulm  - satting well on room air   - CXR shows Bilateral patchy patchy right sided and more confluent left lower lobe opacities may represent multifocal pneumonia; likely pulmonary mets    #Cardio  Shock state: Multifactorial: septic/obstructive (cardiac tamponade)  - off pressors  - Midodrine 30 q8h -> Midodrine 40 q8h  - TTE (10/12/20) shows normal LV systolic function, mild diastolic dysfunction (stage 1)  - repeat TTE 11/20: increased pericardial effusion, with early tamponade physiology  - Cards recs appreciated: no role for pericardiocentesis at this time, risks>benefits    #GI  C.diff pancolitis (Cdiff POS): improving  - Po vanc 500 q6h (11/15-11/21)  - Po Vanc 125 q6h (11/21- )  - d/c Flagyl 500 q8h, tigecycline 50mg IV BID (11/16- 11/21)   - Repeat CT a/p 11/20: no ischemia bowel, renal infarct likely 2/2 pAF off AC    #Renal  #AG metabolic Acidosis; likely 2/2 to elevated BUN & hypovolemia  - Diet: adv to soft  #Hyponatremic most likely 2/2 hypovolemia: improving  - monitor electrolytes, Is&Os  - vol resus as above    #ID  UCx 11/15: VRE s/s to linezolid  - completed linezolid 600 q12h (11/18 - 11/22)  - s/p azithromycin (11/16-11/17)     Cdiff colitis  - CT A/P shows interval improvement of pancolitis with mild pancolitis remaining   - management as above  - blood cultures 11/15 NGTD  - ID recs appreciated  - s/p methnylnaltrexone 8mg x1 11/21, with 1 large BM 11/20, last BM 11/20, will give anotehr dose methnylnaltrexone 8mg x1 today    #Heme/Onc  #Metastatic Lung Cancer w/ mets to bone  - follows with Dr. Xie at [facility]  - palliative care consulted  - Pain control: Back Pain 2/2 mets  - home dose oxy 20 ER TID. c/w Oxy IR 10 q3hs PRN  - Morphine 1mg IVP q3h PRN     #Anemia  - Hgb dropped to 5.7 most likely GI bleed from pancolitis/C.diff  - s/p 1 unit pRBC, goal Hgb >7, stable  - Hgb 6.8 11/22 s/p 1 unit pRBC with food response  - no obvious source of bleed  - monitor CBC    #DVT  - hx of DVT left posterior tibial and peroneal veins 10/18  - on eliquis 5 BID, switched to lovenox 50 BID on 11/21  - d/c Lovenox 11/22 due to downtrending Hgb    #Endo   - A1c 6.1 in August    #Ethics  - FULL code according to family  - Palliative care recs appreciated 69 F Hx Lung Ca in 8/2020 with mets to ?brain, Spine, and Right hip s/p RT x 5 ( last tx 9/2), dementia, DVT, Afib on Eliqius, presenting with hypotension found to be in septic shock 2/2 cdiff pancolitis now with early tamponade physiology. Admitted to MICU for pressure support.     #Neuro  - AOx3 at baseline, currently AOx0, likely ICU delirium   - possible brain mets, however PET scan from 10/2 & MRI 8/2020 showed possible meningioma    #Pulm  - satting well on room air   - CXR shows Bilateral patchy patchy right sided and more confluent left lower lobe opacities may represent multifocal pneumonia; likely pulmonary mets    #Cardio  Shock state: Multifactorial: septic/obstructive (cardiac tamponade)  - off pressors  - Midodrine 30 q8h -> Midodrine 40 q8h  - TTE (10/12/20) shows normal LV systolic function, mild diastolic dysfunction (stage 1)  - repeat TTE 11/20: increased pericardial effusion, with early tamponade physiology  - Cards recs appreciated: no role for pericardiocentesis at this time, risks>benefits    #GI  C.diff pancolitis (Cdiff POS): improving  - Po vanc 500 q6h (11/15-11/21)  - Po Vanc 125 q6h (11/21- )  - d/c Flagyl 500 q8h, tigecycline 50mg IV BID (11/16- 11/21)   - Repeat CT a/p 11/20: no ischemia bowel, renal infarct likely 2/2 pAF off AC    #Renal  #AG metabolic Acidosis; likely 2/2 to elevated BUN & hypovolemia  - Diet: adv to soft  #Hyponatremic most likely 2/2 hypovolemia: improving  - monitor electrolytes, Is&Os  - vol resus as above    #ID  UCx 11/15: VRE s/s to linezolid  - completed linezolid 600 q12h (11/18 - 11/22)  - s/p azithromycin (11/16-11/17)     Cdiff colitis  - CT A/P shows interval improvement of pancolitis with mild pancolitis remaining   - management as above  - blood cultures 11/15 NGTD  - ID recs appreciated  - s/p methnylnaltrexone 8mg x1 11/21, with 1 large BM 11/20, last BM 11/20, will give anotehr dose methnylnaltrexone 8mg x1 today    #Heme/Onc  #Metastatic Lung Cancer w/ mets to bone  - follows with Dr. Xie at [facility]  - palliative care consulted  - Pain control: Back Pain 2/2 mets  - home dose oxy 20 ER TID. c/w Oxy IR 10 q3hs PRN  - Morphine 1mg IVP q3h PRN     #Thrombocytopenia  -Trend for now  -Check Vanc trough with next labs, as it may cause thrombocytopenia    #Anemia  - Hgb dropped to 5.7 most likely GI bleed from pancolitis/C.diff  - s/p 1 unit pRBC, goal Hgb >7, stable  - Hgb 6.8 11/22 s/p 1 unit pRBC with food response  - no obvious source of bleed  - monitor CBC    #DVT  - hx of DVT left posterior tibial and peroneal veins 10/18  - on eliquis 5 BID, switched to lovenox 50 BID on 11/21  - d/c Lovenox 11/22 due to downtrending Hgb    #Endo   - A1c 6.1 in August    #Ethics  - FULL code according to family  - Palliative care recs appreciated 69 F Hx Lung Ca in 8/2020 with mets to ?brain, Spine, and Right hip s/p RT x 5 ( last tx 9/2), dementia, DVT, Afib on Eliqius, presenting with hypotension found to be in septic shock 2/2 cdiff pancolitis now with early tamponade physiology. Admitted to MICU for pressure support.     #Neuro  - AOx3 at baseline, currently AOx0, likely ICU delirium   - possible brain mets, however PET scan from 10/2 & MRI 8/2020 showed possible meningioma  - started melatonin 0.5mg qd    #Pulm  - satting well on room air   - CXR shows Bilateral patchy patchy right sided and more confluent left lower lobe opacities may represent multifocal pneumonia; likely pulmonary mets    #Cardio  Shock state: Multifactorial: septic/obstructive (cardiac tamponade)  - off pressors  - Midodrine 30 q8h -> Midodrine 40 q8h  - TTE (10/12/20) shows normal LV systolic function, mild diastolic dysfunction (stage 1)  - repeat TTE 11/20: increased pericardial effusion, with early tamponade physiology  - Cards recs appreciated: no role for pericardiocentesis at this time, risks>benefits    #GI  C.diff pancolitis (Cdiff POS): improving  - Po vanc 500 q6h (11/15-11/21)  - Po Vanc 125 q6h (11/21- )  - d/c Flagyl 500 q8h, tigecycline 50mg IV BID (11/16- 11/21)   - Repeat CT a/p 11/20: no ischemia bowel, renal infarct likely 2/2 pAF off AC    #Renal  #AG metabolic Acidosis; likely 2/2 to elevated BUN & hypovolemia  - Diet: adv to soft  #Hyponatremic most likely 2/2 hypovolemia: improving  - monitor electrolytes, Is&Os  - vol resus as above    #ID  UCx 11/15: VRE s/s to linezolid  - completed linezolid 600 q12h (11/18 - 11/22)  - s/p azithromycin (11/16-11/17)     Cdiff colitis  - CT A/P shows interval improvement of pancolitis with mild pancolitis remaining   - management as above  - blood cultures 11/15 NGTD  - ID recs appreciated  - s/p methnylnaltrexone 8mg x1 11/21, with 1 large BM 11/20, last BM 11/20, will give anotehr dose methnylnaltrexone 8mg x1 today    #Heme/Onc  #Metastatic Lung Cancer w/ mets to bone  - follows with Dr. Xie at [facility]  - palliative care consulted  - Pain control: Back Pain 2/2 mets  - home dose oxy 20 ER TID. c/w Oxy IR 10 q3hs PRN  - Morphine 1mg IVP q3h PRN     #Thrombocytopenia  -Trend for now  -Check Vanc trough with next labs, as it may cause thrombocytopenia    #Anemia  - Hgb dropped to 5.7 most likely GI bleed from pancolitis/C.diff  - s/p 1 unit pRBC, goal Hgb >7, stable  - Hgb 6.8 11/22 s/p 1 unit pRBC with food response, stable  - no obvious source of bleed  - monitor CBC    #DVT  - hx of DVT left posterior tibial and peroneal veins 10/18  - on eliquis 5 BID, switched to lovenox 50 BID on 11/21  - d/c Lovenox 11/22 due to downtrending Hgb    #Endo   - A1c 6.1 in August    #Ethics  - FULL code according to family  - Palliative care recs appreciated

## 2020-11-24 NOTE — PROGRESS NOTE ADULT - SUBJECTIVE AND OBJECTIVE BOX
Patient seen and examined at bedside.    Overnight Events:   Off of IV pressors, remains on midodrine. S/p NGT for tube feeds.      REVIEW OF SYSTEMS:  Constitutional:     [x ] negative [ ] fevers [ ] chills [ ] weight loss [ ] weight gain  HEENT:                  [x ] negative [ ] dry eyes [ ] eye irritation [ ] postnasal drip [ ] nasal congestion  CV:                         [ x] negative  [ ] chest pain [ ] orthopnea [ ] palpitations [ ] murmur  Resp:                     [ x] negative [ ] cough [ ] shortness of breath [ ] dyspnea [ ] wheezing [ ] sputum [ ]hemoptysis  GI:                          [ x] negative [ ] nausea [ ] vomiting [ ] diarrhea [ ] constipation [ ] abd pain [ ] dysphagia   :                        [ x] negative [ ] dysuria [ ] nocturia [ ] hematuria [ ] increased urinary frequency  Musculoskeletal: [ x] negative [ ] back pain [ ] myalgias [ ] arthralgias [ ] fracture  Skin:                       [ x] negative [ ] rash [ ] itch  Neurological:        [x ] negative [ ] headache [ ] dizziness [ ] syncope [ ] weakness [ ] numbness  Psychiatric:           [ x] negative [ ] anxiety [ ] depression  Endocrine:            [ x] negative [ ] diabetes [ ] thyroid problem  Heme/Lymph:      [ x] negative [ ] anemia [ ] bleeding problem  Allergic/Immune: [ x] negative [ ] itchy eyes [ ] nasal discharge [ ] hives [ ] angioedema    [ x] All other systems negative  [ ] Unable to assess ROS due to    Current Meds:  albuterol/ipratropium for Nebulization 3 milliLiter(s) Nebulizer every 6 hours  dronabinol 2.5 milliGRAM(s) Oral daily  lactated ringers. 500 milliLiter(s) IV Continuous <Continuous>  lidocaine   Patch 2 Patch Transdermal daily  melatonin 0.5 milliGRAM(s) Oral at bedtime  midodrine 40 milliGRAM(s) Oral every 8 hours  morphine  - Injectable 1 milliGRAM(s) IV Push every 3 hours PRN  multivitamin 1 Tablet(s) Oral daily  oxyCODONE    IR 10 milliGRAM(s) Oral every 3 hours PRN  oxyCODONE  ER Tablet 20 milliGRAM(s) Oral every 8 hours  vancomycin    Solution 125 milliGRAM(s) Oral every 6 hours      PAST MEDICAL & SURGICAL HISTORY:  Lung cancer metastatic to bone    No significant past surgical history        Vitals:  T(F): 96.5 (11-24), Max: 98.8 (11-23)  HR: 82 (11-24) (79 - 139)  BP: 89/59 (11-24) (72/50 - 157/120)  RR: 18 (11-24)  SpO2: 100% (11-24)  I&O's Summary    23 Nov 2020 07:01  -  24 Nov 2020 07:00  --------------------------------------------------------  IN: 300 mL / OUT: 1100 mL / NET: -800 mL    24 Nov 2020 07:01  -  24 Nov 2020 11:07  --------------------------------------------------------  IN: 150 mL / OUT: 50 mL / NET: 100 mL        Physical Exam:  Appearance: No acute distress; well appearing  Eyes: PERRL, EOMI, pink conjunctiva  HENT: Normal oral mucosa  Cardiovascular: RRR, S1, S2, no murmurs, rubs, or gallops; no edema; no JVD  Respiratory: Clear to auscultation bilaterally  Gastrointestinal: soft, non-tender, non-distended with normal bowel sounds  Musculoskeletal: No clubbing; no joint deformity   Neurologic: Non-focal  Lymphatic: No lymphadenopathy                          9.3    20.91 )-----------( 96       ( 24 Nov 2020 05:00 )             26.9     11-24    128<L>  |  97<L>  |  20  ----------------------------<  95  4.4   |  18<L>  |  0.43<L>    Ca    8.0<L>      24 Nov 2020 05:00  Phos  3.4     11-24  Mg     2.0     11-24    TPro  3.9<L>  /  Alb  1.8<L>  /  TBili  0.8  /  DBili  x   /  AST  47<H>  /  ALT  31  /  AlkPhos  195<H>  11-24      Echo:  11/20/20  OBSERVATIONS:  Mitral Valve: Mitral annular calcification, otherwise  normal mitral valve. Minimal mitral regurgitation.  Aortic Root: Normal aortic root.  Aortic Valve: Calcified trileaflet aortic valve with normal  opening.  Left Atrium: Normal left atrium.  Left Ventricle: Normal left ventricular systolic function.  No segmental wall motion abnormalities. Normal left  ventricular internal dimensions and wall thicknesses.  Right Heart: Normal right atrium. Normal right ventricular  size and function. Normal tricuspid valve. Mild tricuspid  regurgitation. Normal pulmonic valve.  Mild pulmonic  regurgitation.  Pericardium/PleuraModerate pericardial effusion (about  1.1  cm) anterior to the right ventricle.  Moderate pericardial  effusion (about  1.5 cm) around the LV apex.  Moderate  pericardial effusion (about  1.3 cm) adjacent to the RV  free wall.  Small to moderate pericardial effusion (about  0.9 cm) lateral to the left ventricle.  Small pericardial  effusion posterior to the left ventricle and superior to  the right atrium.  Invagination of the right atrial free  wall is seen.  In addition, significant respirophasic  variability in the transmitral and transtricuspid spectral  Doppler signals is seen.  These findings may reflect the  presence of early tamponade physiology. Bilateral pleural  effusions.    Interpretation of Telemetry: NSR

## 2020-11-24 NOTE — CHART NOTE - NSCHARTNOTEFT_GEN_A_CORE
MAR Accept Note  Transfer to:  Medicine  Accepting Attending Physician:  Dr. Aguilar  Assigned Room:  545B    Patient seen and examined.   Labs and data reviewed.   No findings precluding transfer of service.       HPI/MICU COURSE:   Please refer to MICU transfer note for full details. Briefly, this is a 1yo F Hx Lung Ca in 8/2020 with mets to brain, Spine, and Right hip s/p RT x 5 ( last tx 9/2), dementia, DVT, Afib on Eliqius, presenting with hypotension. admitted to the MICU for septic shock 2/2 c diff pancolitis vs VRE UTI (however no urinary sxs), she was on levophed and required multiple IVF boluses. Patient was started on Midodrine 30 TID, and pressors were eventually weaned off. She was initially on tigecylcine, IV flagyl, and high dose Vanc michelle cdiff colitis, she improved so abx were de-escalated to vanc 125 q6h. She completed 5 day course of linezolid for VRE uti. She was also found to have early tamponade physiology (increased from prior), however as per cards, not a current candidate for pericardiocentesis given high risk of recurrence, and being on systemic AC, also believed septic shock is the major  of hypotension. Repeat CT abd/pelvis did not show worsening Cdiff/ toxic megacolon, however showed renal infarct likely 2/2 to pAF off AC, AC was resumed, howeer Hb dropped to 6.8, s/p 1 upRBC w/o obvious source of bleed. Patient is receiving opioids for back pain 2/2 mets to spine, patient has received relistor x2 (2nd dose 11/23) for opioid induced constipation. Course c/b ICU delirium requiring haldol and zyprexa.         FOR FOLLOW-UP:    [ ] f/u cards regarding need for pericardiocentesis now she is off pressors  [ ] f/u ID recs for duration of vanc  [ ] trend CBC, if stable, can resumed AC (eliquis 5 BID)  [ ] monitor BM  [ ] f/u palliative care recs (as per family still FULL CODE, however wants to take patient home for hospice pending further palliative recs)  [ ] ICU delirum, monitor mental status  [ ] thrombocytopenia, vanc can cause it, check vanc level in the AM    Veronica Miller MD  Internal Medicine PGY-3  MAR [id number]

## 2020-11-24 NOTE — CHART NOTE - NSCHARTNOTEFT_GEN_A_CORE
Source: EMR, nursing     Diet : NPO with Tube Feed   - Tube Feeding Modality: Nasogastric Jevity 1.2 Tomy (JEVITY1.2RTH) Total Volume for 24 Hours (mL): 1200 Continuous Starting Tube Feed Rate {mL per Hour}: 10 Increase Tube Feed Rate by (mL): 10 Every 4 hours Until Goal Tube Feed Rate (mL per Hour): 50 Tube Feed Duration (in Hours): 24 Tube Feed Start Time: 23:59     This will provide 1440 kcal  & 58 gm protein/d     Patient visited , per nursing pt. pulled out feeding tube and replaced , awaiting the placement , noted the diet order for Jevity 1.2 @ 240 ml /hr x 24hrs , excessive , met with MICU team and discussed the typo, and the nutrition rec. , diet order adjusted . Pt. with 1+ generalized and 3+ L & R hand , hip, leg, knee and foot edema . Pt. with pressure ulcer - unstageable .       Current Weight:64 kg on 11/24/2020; was 55 kg on 11/17/2020; Admit wt. - 44.5 kg ; UBW - 55.2 kg , IBW - 45.3 kg     Pertinent Medications: MEDICATIONS  (STANDING):  albuterol/ipratropium for Nebulization 3 milliLiter(s) Nebulizer every 6 hours  dronabinol 2.5 milliGRAM(s) Oral daily  lactated ringers. 500 milliLiter(s) (75 mL/Hr) IV Continuous <Continuous>  lidocaine   Patch 2 Patch Transdermal daily  melatonin 0.5 milliGRAM(s) Oral at bedtime  midodrine 40 milliGRAM(s) Oral every 8 hours  multivitamin 1 Tablet(s) Oral daily  oxyCODONE  ER Tablet 20 milliGRAM(s) Oral every 8 hours  vancomycin    Solution 125 milliGRAM(s) Oral every 6 hours      Estimated Needs:  1210 - 1452 kcal & 58 - 68 gm protein/d     Previous Nutrition Diagnosis: SEVERE Malnutrition      Nutrition Diagnosis is  ongoing     Recommend Jevity 1.2 @ 50 ml/hr x24hrs daily    Monitoring and Evaluation: Tolerance to diet prescription , weights & follow up per protocol

## 2020-11-24 NOTE — PROGRESS NOTE ADULT - ASSESSMENT
69W with recent severe Cdiff,  Lung Ca since  8/2020 with mets to brain, Spine, and Right hip s/p RT x 5 ( last tx 9/2), dementia, DVT, Afib on Eliqius [id number] with hypotension, diarrhea, leukocytosis.    Antibiotic History  IV Vanco 9/12 --> 13; 10/10 -->11; 11/15  Zosyn 9/12 --> 16; 10/10; 11/16  Azithro 9/12; 11/16  Cefazolin 9/18  Metronidazole 10/10 --> 10/17; 11/16  Meropenem 10/10 --> 10/13  Vanco enteral solution 10/11 -->24; 11/15-->  Linezolid 11/ 18 -->  . 11/22  Tygacil 11/16 --  > 11/22    recurrent Cdiff   lung opacities - likely metastatic disease  VRE bacteruria  11/20: decreased wbc, hyponatremia  delerium    tumor burden, renal infarct, malnourished states, anasarca with pleural effusions with associated atelectasis  uncooperative  poor ultimate outlook  Cdiff appears improved      Suggest  palliative care evaluation  Extended vanco taper if feasible:   po vanco 125 mg 4 times daily through 11/29  po vanco 125 mg 2 times daily 11/20 --> 12/6  po vanco 125 mg once daily 12/7 --> 12/13  po vanco 125 mg once every other day 12/14-->   12/20  po vanco 125 mg once every third day 12/20 -->  1/3/21

## 2020-11-25 NOTE — SWALLOW BEDSIDE ASSESSMENT ADULT - ADDITIONAL RECOMMENDATIONS
1. Oral care to reduce colonization of bacteria in oral cavity   2. Reconsult this service to re-assess for PO candidacy when overall medical status/ respiratory state improves

## 2020-11-25 NOTE — PROGRESS NOTE ADULT - PROBLEM SELECTOR PLAN 5
Hx of afib on eliquis. Not currently on anticoagulation given possible bleed and acute drop in Hgb  - Consider starting patient on heparin drip  - F/u with cards regarding restarting AC

## 2020-11-25 NOTE — PROGRESS NOTE ADULT - SUBJECTIVE AND OBJECTIVE BOX
[facility] Geriatrics and Palliative Care  Gerald Hu, Palliative Care Attending  Contact Info: Page [phone] (including Nights/Weekend), message on Microsoft Teams (Gerald Hu), or leave VM at Palliative Office [phone] (Non-Urgent)    SUBJECTIVE AND OBJECTIVE:  INTERVAL HPI/OVERNIGHT EVENTS: No acute events overnight. Interval events noted. Patient required PRNs of  in past 24hrs. No adverse effects of opiates noted: no sedation/dizziness/nausea.    Allergies    No Known Allergies    Intolerances    MEDICATIONS  (STANDING):  albuterol/ipratropium for Nebulization 3 milliLiter(s) Nebulizer every 6 hours  lactated ringers. 500 milliLiter(s) (75 mL/Hr) IV Continuous <Continuous>  lidocaine   Patch 2 Patch Transdermal daily  melatonin 3 milliGRAM(s) Oral at bedtime  midodrine 40 milliGRAM(s) Oral every 8 hours  multivitamin 1 Tablet(s) Oral daily  oxyCODONE    Solution 10 milliGRAM(s) Oral every 8 hours  vancomycin    Solution 125 milliGRAM(s) Oral every 6 hours    MEDICATIONS  (PRN):  oxyCODONE    IR 10 milliGRAM(s) Oral every 3 hours PRN Moderate Pain (4 - 6), severe pain      ITEMS UNCHECKED ARE NOT PRESENT    PRESENT SYMPTOMS: []Unable to obtain due to poor mentation   Source if other than patient:  []Family   []Team      %  http://npcrc.org/files/news/palliative_performance_scale_ppsv2.pdf    PHYSICAL EXAM:  Vital Signs Last 24 Hrs  T(C): 36.4 (25 Nov 2020 14:20), Max: 36.7 (25 Nov 2020 00:47)  T(F): 97.5 (25 Nov 2020 14:20), Max: 98.1 (25 Nov 2020 00:47)  HR: 79 (25 Nov 2020 16:08) (58 - 93)  BP: 99/67 (25 Nov 2020 14:20) (96/70 - 120/96)  BP(mean): 104 (24 Nov 2020 23:50) (78 - 104)  RR: 20 (25 Nov 2020 14:20) (15 - 20)  SpO2: 90% (25 Nov 2020 16:08) (90% - 100%) I&O's Summary    24 Nov 2020 07:01  -  25 Nov 2020 07:00  --------------------------------------------------------  IN: 710 mL / OUT: 240 mL / NET: 470 mL            LABS:                         9.1    22.87 )-----------( 59       ( 25 Nov 2020 13:05 )             26.8   11-25    130<L>  |  98  |  24<H>  ----------------------------<  110<H>  4.0   |  21<L>  |  0.51    Ca    8.2<L>      25 Nov 2020 13:05  Phos  3.3     11-25  Mg     2.0     11-25    TPro  4.0<L>  /  Alb  1.9<L>  /  TBili  0.7  /  DBili  x   /  AST  32  /  ALT  29  /  AlkPhos  195<H>  11-25  PT/INR - ( 25 Nov 2020 13:05 )   PT: 12.8 SEC;   INR: 1.12          PTT - ( 25 Nov 2020 13:05 )  PTT:33.1 SEC        RADIOLOGY & ADDITIONAL STUDIES: None new    PROTEIN CALORIE MALNUTRITION PRESENT: [ ]mild [ ]moderate [ ]severe [ ]underweight [ ]morbid obesity  [] PPSV2 < or = 30% [] significant weight loss [] poor nutritional intake [] anasarca [] catabolic state   Albumin, Serum: 1.9 g/dL (11-25-20 @ 13:05)   Artificial Nutrition []     REFERRALS:   []Chaplaincy  []Hospice  []Child Life  [x]Social Work  []Case management []Holistic Therapy []Physical Therapy []Dietary      [facility] Geriatrics and Palliative Care  Gerald Hu, Palliative Care Attending  Contact Info: Page [phone] (including Nights/Weekend), message on Microsoft Teams (Gerald Hu), or leave VM at Palliative Office [phone] (Non-Urgent)    SUBJECTIVE AND OBJECTIVE:  INTERVAL HPI/OVERNIGHT EVENTS: Transferred out of the MICU. Interval events noted. Patient required no additional PRNs in past 24hrs. No adverse effects of opiates noted: no sedation/dizziness/nausea. Patient's mental status remains altered.    Allergies    No Known Allergies    Intolerances    MEDICATIONS  (STANDING):  albuterol/ipratropium for Nebulization 3 milliLiter(s) Nebulizer every 6 hours  lactated ringers. 500 milliLiter(s) (75 mL/Hr) IV Continuous <Continuous>  lidocaine   Patch 2 Patch Transdermal daily  melatonin 3 milliGRAM(s) Oral at bedtime  midodrine 40 milliGRAM(s) Oral every 8 hours  multivitamin 1 Tablet(s) Oral daily  oxyCODONE    Solution 10 milliGRAM(s) Oral every 8 hours  vancomycin    Solution 125 milliGRAM(s) Oral every 6 hours    MEDICATIONS  (PRN):  oxyCODONE    IR 10 milliGRAM(s) Oral every 3 hours PRN Moderate Pain (4 - 6), severe pain      ITEMS UNCHECKED ARE NOT PRESENT    PRESENT SYMPTOMS: []Unable to obtain due to poor mentation   Source if other than patient:  []Family   []Team      %  http://[facility].org/files/news/palliative_performance_scale_[email or web address].pdf    PHYSICAL EXAM:  Vital Signs Last 24 Hrs  T(C): 36.4 (25 Nov 2020 14:20), Max: 36.7 (25 Nov 2020 00:47)  T(F): 97.5 (25 Nov 2020 14:20), Max: 98.1 (25 Nov 2020 00:47)  HR: 79 (25 Nov 2020 16:08) (58 - 93)  BP: 99/67 (25 Nov 2020 14:20) (96/70 - 120/96)  BP(mean): 104 (24 Nov 2020 23:50) (78 - 104)  RR: 20 (25 Nov 2020 14:20) (15 - 20)  SpO2: 90% (25 Nov 2020 16:08) (90% - 100%) I&O's Summary    24 Nov 2020 07:01 - 25 Nov 2020 07:00  --------------------------------------------------------  IN: 710 mL / OUT: 240 mL / NET: 470 mL            LABS:                         9.1    22.87 )-----------( 59       ( 25 Nov 2020 13:05 )             26.8   11-25    130<L>  |  98  |  24<H>  ----------------------------<  110<H>  4.0   |  21<L>  |  0.51    Ca    8.2<L>      25 Nov 2020 13:05  Phos  3.3     11-25  Mg     2.0     11-25    TPro  4.0<L>  /  Alb  1.9<L>  /  TBili  0.7  /  DBili  x   /  AST  32  /  ALT  29  /  AlkPhos  195<H>  11-25  PT/INR - ( 25 Nov 2020 13:05 )   PT: 12.8 SEC;   INR: 1.12          PTT - ( 25 Nov 2020 13:05 )  PTT:33.1 SEC        RADIOLOGY & ADDITIONAL STUDIES: None new    PROTEIN CALORIE MALNUTRITION PRESENT: [ ]mild [ ]moderate [ ]severe [ ]underweight [ ]morbid obesity  [] PPSV2 < or = 30% [] significant weight loss [] poor nutritional intake [] anasarca [] catabolic state   Albumin, Serum: 1.9 g/dL (11-25-20 @ 13:05)   Artificial Nutrition []     REFERRALS:   []Chaplaincy  []Hospice  []Child Life  [x]Social Work  []Case management []Holistic Therapy []Physical Therapy []Dietary      [facility] Geriatrics and Palliative Care  Gerald Hu, Palliative Care Attending  Contact Info: Page [phone] (including Nights/Weekend), message on Microsoft Teams (Gerald Hu), or leave VM at Palliative Office [phone] (Non-Urgent)    SUBJECTIVE AND OBJECTIVE:  INTERVAL HPI/OVERNIGHT EVENTS: Transferred out of the MICU. Interval events noted. Patient required no additional PRNs in past 24hrs. No adverse effects of opiates noted: no sedation/dizziness/nausea. Patient's mental status remains altered.    Allergies    No Known Allergies    Intolerances    MEDICATIONS  (STANDING):  albuterol/ipratropium for Nebulization 3 milliLiter(s) Nebulizer every 6 hours  lactated ringers. 500 milliLiter(s) (75 mL/Hr) IV Continuous <Continuous>  lidocaine   Patch 2 Patch Transdermal daily  melatonin 3 milliGRAM(s) Oral at bedtime  midodrine 40 milliGRAM(s) Oral every 8 hours  multivitamin 1 Tablet(s) Oral daily  oxyCODONE    Solution 10 milliGRAM(s) Oral every 8 hours  vancomycin    Solution 125 milliGRAM(s) Oral every 6 hours    MEDICATIONS  (PRN):  oxyCODONE    IR 10 milliGRAM(s) Oral every 3 hours PRN Moderate Pain (4 - 6), severe pain      ITEMS UNCHECKED ARE NOT PRESENT    PRESENT SYMPTOMS:[x]Unable to obtain due to poor mentation   Source if other than patient:  []Family   []Team     Pain: [x] yes [ ] no  QOL impact - bothersome, debilitating  Location - R Hip, lower abdomen                   Aggravating factors - certain movements,   Quality - sharp, crampy  Radiation - across abdomen  Timing - constant  Severity (0-10 scale):   Minimal acceptable level (0-10 scale):     PAIN AD Score:  http://geriatrictoolkit.[place].[facility]/cog/painad.pdf (press ctrl +  left click to view)    Dyspnea:                           [x]Mild  []Moderate []Severe  Anxiety:                             []Mild []Moderate []Severe  Fatigue:                             []Mild []Moderate []Severe  Nausea:                             []Mild []Moderate []Severe  Loss of appetite:              []Mild []Moderate []Severe  Constipation:                    []Mild []Moderate []Severe    Other Symptoms:  [x]All other review of systems negative     Palliative Performance Status Version 2:  30%  http://npcrc.org/files/news/palliative_performance_scale_ppsv2.pdf    PHYSICAL EXAM:  Vital Signs Last 24 Hrs  T(C): 36.4 (25 Nov 2020 14:20), Max: 36.7 (25 Nov 2020 00:47)  T(F): 97.5 (25 Nov 2020 14:20), Max: 98.1 (25 Nov 2020 00:47)  HR: 79 (25 Nov 2020 16:08) (58 - 93)  BP: 99/67 (25 Nov 2020 14:20) (96/70 - 120/96)  BP(mean): 104 (24 Nov 2020 23:50) (78 - 104)  RR: 20 (25 Nov 2020 14:20) (15 - 20)  SpO2: 90% (25 Nov 2020 16:08) (90% - 100%) I&O's Summary    24 Nov 2020 07:01  -  25 Nov 2020 07:00  --------------------------------------------------------  IN: 710 mL / OUT: 240 mL / NET: 470 mL            LABS:                         9.1    22.87 )-----------( 59       ( 25 Nov 2020 13:05 )             26.8   11-25    130<L>  |  98  |  24<H>  ----------------------------<  110<H>  4.0   |  21<L>  |  0.51    Ca    8.2<L>      25 Nov 2020 13:05  Phos  3.3     11-25  Mg     2.0     11-25    TPro  4.0<L>  /  Alb  1.9<L>  /  TBili  0.7  /  DBili  x   /  AST  32  /  ALT  29  /  AlkPhos  195<H>  11-25  PT/INR - ( 25 Nov 2020 13:05 )   PT: 12.8 SEC;   INR: 1.12          PTT - ( 25 Nov 2020 13:05 )  PTT:33.1 SEC        RADIOLOGY & ADDITIONAL STUDIES: None new    PROTEIN CALORIE MALNUTRITION PRESENT: [ ]mild [ ]moderate [ ]severe [ ]underweight [ ]morbid obesity  [] PPSV2 < or = 30% [] significant weight loss [] poor nutritional intake [] anasarca [] catabolic state   Albumin, Serum: 1.9 g/dL (11-25-20 @ 13:05)   Artificial Nutrition []     REFERRALS:   []Chaplaincy  []Hospice  []Child Life  [x]Social Work  []Case management []Holistic Therapy []Physical Therapy []Dietary

## 2020-11-25 NOTE — PROGRESS NOTE ADULT - PROBLEM SELECTOR PLAN 3
.  Requiring pressors and ICU care  -management as per ICU .  Requiring pressors and ICU care  -now resolving and transferred the medical floor

## 2020-11-25 NOTE — HOSPICE CARE NOTE - CONVESATION DETAILS
Was informed that family would like some information about what home hospice svces consist of.  Called dtr late afternoon.  Informed this is not a good time to talk.  Requests a c/b on Fri morning after 10:00.  She will want to patch her brother into the conversation on Fri.

## 2020-11-25 NOTE — SWALLOW BEDSIDE ASSESSMENT ADULT - COMMENTS
Progress Note 11/25/20: 70 year old female with hx of metastatic lung cancer, dementia, DVT, and Afib admitted initially to MICU for septic shock requiring pressors. Found to have C diff pancolitis and VRE UTI. Patient BPs now stable enough off pressors and transferred to floors.     CXR 11/23/20: Bilateral moderate to large pleural effusions.    Patient was seen upright at bedside with nasal canula in place. Patient was alert/awake with eyes open with vocalizations. Patient orally receptive to PO trials. Patient noted with wet upper airway sounds prior to PO trials.

## 2020-11-25 NOTE — SWALLOW BEDSIDE ASSESSMENT ADULT - SWALLOW EVAL: DIAGNOSIS
Patient presents with Oropharyngeal Dysphagia marked by reduced stripping of bolus from utensil, reduced oral containment with anterior loss for thin liquids, reduced bolus manipulation, reduced anterior to posterior transport and suspected posterior loss of bolus. There was laryngeal elevation upon digital palpation with suspected delay in initiation of pharyngeal swallow across trials. There was inconsistent evidence of cough response subsequent puree consistency and honey thick liquids and immediate cough response subsequent deglutition of thin liquids indicative of laryngeal penetration/aspiration.

## 2020-11-25 NOTE — SWALLOW BEDSIDE ASSESSMENT ADULT - SWALLOW EVAL: RECOMMENDED DIET
1. Oral means of nutrition/ hydration/ medication contraindicated given abovementioned oropharyngeal phase deficits 2. Consideration for short term non oral means of nutrition/ hydration/ medication at MD's discretion

## 2020-11-25 NOTE — PROGRESS NOTE ADULT - PROBLEM SELECTOR PLAN 4
Hgb stable now, but had an acute drop after AC restarted. Possibly GI source from pancolitis. AC stopped and Hgb stable.   - Trend H/H  - Transfuse if Hgb <7

## 2020-11-25 NOTE — PROGRESS NOTE ADULT - PROBLEM SELECTOR PLAN 2
.  Recurrent infection c/b shock  -IV Abx as per ID .  Recurrent infection c/b shock  -Abx as per ID

## 2020-11-25 NOTE — SWALLOW BEDSIDE ASSESSMENT ADULT - ORAL PHASE
Decreased anterior-posterior movement of the bolus/Delayed oral transit time/Suspected posterior loss

## 2020-11-25 NOTE — PROGRESS NOTE ADULT - PROBLEM SELECTOR PLAN 4
.  Treatment-naive metastatic disease  -patient has bounced back and forth between the hospital and [facility], the hope was to improve her functional status to be evaluated for systemic treatment but this has not been possible  -discussed with patient's children that DMT is not possible at this time and seems unlikely given the patient's recurrent decompensations  -if no DMT were pursued, then patient would be eligible for hospice

## 2020-11-25 NOTE — PROGRESS NOTE ADULT - ASSESSMENT
70 year old female with hx of metastatic lung cancer, dementia, DVT, and Afib admitted initially to MICU for septic shock requiring pressors. Found to have C diff pancolitis and VRE UTI. Patient BPs now stable enough off pressors and transferred to floors.

## 2020-11-25 NOTE — PROGRESS NOTE ADULT - PROBLEM SELECTOR PLAN 6
.  Complex medical decision making / symptom management in the setting of .      Will continue to follow for ongoing GOC discussion / titration of pain regimen.   Emotional support provided, questions answered.  Active Psychosocial Referrals: FREYA [facility]    For new or uncontrolled symptoms, please page the Palliative Medicine team (LIREGINALD #[phone]).   The service is available 24/7 (including nights & weekends) to provide symptom management recommendations over the phone as appropriate

## 2020-11-25 NOTE — PROGRESS NOTE ADULT - ASSESSMENT
69W with recent severe Cdiff,  Lung Ca since  8/2020 with mets to brain, Spine, and Right hip s/p RT x 5 ( last tx 9/2), dementia, DVT, Afib on Eliqius [id number] with hypotension, diarrhea, leukocytosis.    Antibiotic History  IV Vanco 9/12 --> 13; 10/10 -->11; 11/15  Zosyn 9/12 --> 16; 10/10; 11/16  Azithro 9/12; 11/16  Cefazolin 9/18  Metronidazole 10/10 --> 10/17; 11/16  Meropenem 10/10 --> 10/13  Vanco enteral solution 10/11 -->24; 11/15-->  Linezolid 11/ 18 -->  . 11/22  Tygacil 11/16 --  > 11/22    recurrent Cdiff   lung opacities - likely metastatic disease  VRE bacteruria  11/20: decreased wbc, hyponatremia  delerium    tumor burden, renal infarct, malnourished states, anasarca with pleural effusions with associated atelectasis  uncooperative  poor ultimate outlook  Cdiff appears improved  not getting nutrition, oral medications  Palliative care follow up appreciated      Suggest  Extended vanco taper if feasible:   po vanco 125 mg 4 times daily through 11/29  po vanco 125 mg 2 times daily 11/20 --> 12/6  po vanco 125 mg once daily 12/7 --> 12/13  po vanco 125 mg once every other day 12/14-->   12/20  po vanco 125 mg once every third day 12/20 -->  1/3/21    I will be out until 11/30  please call ID if needed

## 2020-11-25 NOTE — PROGRESS NOTE ADULT - ATTENDING COMMENTS
70 year old female with hx of metastatic lung cancer, dementia, DVT, and Afib admitted initially to MICU for septic shock 2/2 c.dif pancolitis and VRE UTI - now off pressors and stable for management on floors.     #C.dif, ID following. Vanc PO taper as per ID recommendations.     #Pericardial effusion, cardiology following. Plan for repeat TTE on 11/27 (Friday).     #Hypotension in the setting of pericardial effusion (cardiac tamponade) and septic shock (resolved) C/w midodrine 40mg TID. Taper as tolerated.     #Atrial fibrillation, normal rate currently. Was on anticoagulation and had Hgb drop likely 2/2 GIB in the setting of pancolitis. D/c'ed anticoagulation however has now been stable. Will likely start heparin gtt for anticoagulation for now    #Metastatic lung ca, as per prior documentation plan was for patient to improve functional status to accept chemotherapy however she continues to have hospitalizations. Palliative consulted for ongoing goals of care discussions.

## 2020-11-25 NOTE — PROGRESS NOTE ADULT - SUBJECTIVE AND OBJECTIVE BOX
Follow Up:  cdiff    Interval History/ROS: pulled out NGT again,     Allergies  No Known Allergies        ANTIMICROBIALS:  vancomycin    Solution 125 every 6 hours      OTHER MEDS:  MEDICATIONS  (STANDING):  albuterol/ipratropium for Nebulization 3 every 6 hours  melatonin 3 at bedtime  midodrine 40 every 8 hours  oxyCODONE    IR 10 every 3 hours PRN  oxyCODONE    Solution 10 every 8 hours      Vital Signs Last 24 Hrs  T(C): 36.4 (25 Nov 2020 14:20), Max: 36.7 (25 Nov 2020 00:47)  T(F): 97.5 (25 Nov 2020 14:20), Max: 98.1 (25 Nov 2020 00:47)  HR: 79 (25 Nov 2020 16:08) (58 - 93)  BP: 99/67 (25 Nov 2020 14:20) (96/70 - 120/96)  BP(mean): 104 (24 Nov 2020 23:50) (78 - 104)  RR: 20 (25 Nov 2020 14:20) (15 - 20)  SpO2: 90% (25 Nov 2020 16:08) (90% - 100%)    PHYSICAL EXAM:  General: chronically ill appearing  Neurology: not verbalizing  Respiratory: tachypneic  Abdominal: distended  Extremities: generalized edema  Line Sites: Clear  Skin: No rash                        9.1    22.87 )-----------( 59       ( 25 Nov 2020 13:05 )             26.8       11-25    130<L>  |  98  |  24<H>  ----------------------------<  110<H>  4.0   |  21<L>  |  0.51    Ca    8.2<L>      25 Nov 2020 13:05  Phos  3.3     11-25  Mg     2.0     11-25    TPro  4.0<L>  /  Alb  1.9<L>  /  TBili  0.7  /  DBili  x   /  AST  32  /  ALT  29  /  AlkPhos  195<H>  11-25          MICROBIOLOGY:  .Urine Catheterized  11-15-20   >100,000 CFU/ml Enterococcus faecium (vancomycin resistant)  <10,000 CFU/ml Normal Urogenital crystal present  --  Enterococcus faecium (vancomycin resistant)      .Blood Blood-Peripheral  11-15-20   No Growth Final  --  --          Vancomycin Level, Random: 0.9 ug/mL (11-25-20 @ 07:04)  v          RADIOLOGY:    Herb Cabezas MD; Division of Infectious Disease; Pager: [phone]; nights and weekends: [phone]

## 2020-11-25 NOTE — PROGRESS NOTE ADULT - PROBLEM SELECTOR PLAN 8
DVT ppx: off AC for now  Diet: NPO with tube feeds but pulled out NGT.   Dispo: pending clinical improvement DVT ppx: off AC for now  Diet: NPO with tube feeds but pulled out NGT. - speech and swallow evaluation pending.   Zamorano in place  Dispo: pending clinical improvement

## 2020-11-25 NOTE — PROGRESS NOTE ADULT - ASSESSMENT
Full Note to Follow.    ·	adjusted the standing Oxycodone Solution to 10mg PO q8h ATC (Oxy Solution is IR vs the Oxycodone ER that the patient was previously ordered for)  ·	will follow-up with family, ongoing discussion to help them understand patient's prognosis and realistic trajectory Full Note to Follow.    ·	adjusted the standing Oxycodone Solution to 10mg PO q8h ATC (Oxy Solution is IR vs the Oxycodone ER that the patient was previously ordered for)  ·	will follow-up with family, ongoing discussion to help them understand patient's prognosis and realistic trajectory  ·	if patient has had a change in mental status, consider head imaging 69yo F with recently diagnosed Metastatic NSCLC (no treatment yet) p/w shock due to recurrent C. diff after recent JAYANT. Palliative consulted for pain management in the setting of life-limiting illness.    ·	adjusted the standing Oxycodone Solution to 10mg PO q8h ATC (Oxy Solution is IR vs the Oxycodone ER that the patient was previously ordered for)  ·	will follow-up with family, ongoing discussion to help them understand patient's prognosis and realistic trajectory  ·	if patient has had a change in mental status, consider head imaging

## 2020-11-25 NOTE — PROGRESS NOTE ADULT - PROBLEM SELECTOR PLAN 2
Patient with pericardial effusion showing early tamponade physiology.  - cards following - no plan for pericardiocentesis at this time.  - can repeat TTE 11/27 to re-evaluate pericardial effusion  - can consider CTS eval for pericardial window if malignant effusion and depending on GOC conversation

## 2020-11-25 NOTE — PROGRESS NOTE ADULT - PROBLEM SELECTOR PLAN 3
Resolved. Treated with antibiotics while in MICU. Off pressors now.  - monitor BPs  - monitor for fevers and blood cx if spike  - completed abx for VRE UTI  - c/w PO vanc taper

## 2020-11-25 NOTE — PROGRESS NOTE ADULT - PROBLEM SELECTOR PLAN 6
Follows at [facility]. S/p RT x5.   - F/u palliative with GOC  - c/w with pain medications - oxycodone, lidocaine patch

## 2020-11-25 NOTE — PROGRESS NOTE ADULT - SUBJECTIVE AND OBJECTIVE BOX
PROGRESS NOTE:   Authored by David Lundberg MD  PGY-1, Internal Medicine  Pager [facility] [phone], [phone]     Patient is a 70y old  Female who presents with a chief complaint of Pancolitis, septic shock (25 Nov 2020 11:40)      SUBJECTIVE / OVERNIGHT EVENTS: Patient pulled out NGT overnight. This AM patient seen at bedside and is Alert but not oriented. Patient points to lower abdomen when asked about pain. Patient unable to respond to questioning.     MEDICATIONS  (STANDING):  albuterol/ipratropium for Nebulization 3 milliLiter(s) Nebulizer every 6 hours  lactated ringers. 500 milliLiter(s) (75 mL/Hr) IV Continuous <Continuous>  lidocaine   Patch 2 Patch Transdermal daily  melatonin 3 milliGRAM(s) Oral at bedtime  midodrine 40 milliGRAM(s) Oral every 8 hours  multivitamin 1 Tablet(s) Oral daily  oxyCODONE    Solution 10 milliGRAM(s) Oral every 8 hours  vancomycin    Solution 125 milliGRAM(s) Oral every 6 hours    MEDICATIONS  (PRN):  oxyCODONE    IR 10 milliGRAM(s) Oral every 3 hours PRN Moderate Pain (4 - 6), severe pain      CAPILLARY BLOOD GLUCOSE      POCT Blood Glucose.: 95 mg/dL (24 Nov 2020 23:56)    I&O's Summary    24 Nov 2020 07:01  -  25 Nov 2020 07:00  --------------------------------------------------------  IN: 710 mL / OUT: 240 mL / NET: 470 mL        PHYSICAL EXAM:  Vital Signs Last 24 Hrs  T(C): 36.4 (25 Nov 2020 05:32), Max: 36.7 (25 Nov 2020 00:47)  T(F): 97.6 (25 Nov 2020 05:32), Max: 98.1 (25 Nov 2020 00:47)  HR: 84 (25 Nov 2020 05:32) (77 - 96)  BP: 102/67 (25 Nov 2020 05:32) (85/55 - 121/64)  BP(mean): 104 (24 Nov 2020 23:50) (62 - 104)  RR: 19 (25 Nov 2020 05:32) (15 - 31)  SpO2: 97% (25 Nov 2020 05:32) (96% - 100%)    CONSTITUTIONAL: NAD, lying in bed comfortably  RESPIRATORY: crackles auscultated throughout both lungs, sputum in upper airways.  CARDIOVASCULAR: Regular rate and rhythm, normal S1 and S2, no murmur/rub/gallop  ABDOMEN: firm, flat, nondistended, ?tender to palpation, normoactive bowel sounds, no rebound/guarding  MUSCLOSKELETAL: no joint swelling or tenderness to palpation, FROM all extremities  NEURO: AAOx0  EXTREMITIES: B/L pitting pedal edema    LABS:                        9.3    20.91 )-----------( 96       ( 24 Nov 2020 05:00 )             26.9     11-24    128<L>  |  97<L>  |  20  ----------------------------<  95  4.4   |  18<L>  |  0.43<L>    Ca    8.0<L>      24 Nov 2020 05:00  Phos  3.4     11-24  Mg     2.0     11-24    TPro  3.9<L>  /  Alb  1.8<L>  /  TBili  0.8  /  DBili  x   /  AST  47<H>  /  ALT  31  /  AlkPhos  195<H>  11-24                RADIOLOGY & ADDITIONAL TESTS:

## 2020-11-25 NOTE — PROGRESS NOTE ADULT - PROBLEM SELECTOR PLAN 1
.  -c/w Oxy IR 10mg PO q3h PRN for Severe Pain  -c/w Oxy ER 20mg PO q8h ATC  -consider Decadron 4mg IV BID for bony met pain .  -c/w Oxy IR 10mg PO q3h PRN for Severe Pain  -switched Oxy Solution to 10mg PO q8h ATC  -patient was previously on Oxy ER 20mg PO q8h but this was d/c'd due dysphagia  -consider Decadron 4mg IV BID for bony met pain

## 2020-11-25 NOTE — PROGRESS NOTE ADULT - PROBLEM SELECTOR PLAN 1
Patient treated with Vanc taper as per ID. Repeat imaging showing improvement.  - c/w with Vanc taper:  po vanco 125 mg 4 times daily through 11/29  po vanco 125 mg 2 times daily 11/20 --> 12/6  po vanco 125 mg once daily 12/7 --> 12/13  po vanco 125 mg once every other day 12/14-->   12/20  po vanco 125 mg once every third day 12/20 -->  1/3/21  - ?need for NGT for meds  - Monitor

## 2020-11-26 NOTE — CHART NOTE - NSCHARTNOTEFT_GEN_A_CORE
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 Risk of increasing bleeding worsening of tamponade physio upon starting AC such as argatroban outweigh risks of complications 2/2 a fib and distal DVTs. Will send off HIT panel/ABRIL and reassess clinical situation and need for AC.

## 2020-11-26 NOTE — ADVANCED PRACTICE NURSE CONSULT - RECOMMEDATIONS
Topical Recommendations    Nasogastric Tube- Cleanse nare with NS. Pat dry. Apply Liquid barrier film to periwound skin. Apply Stat-lock NGT dempsey over center of nare, not touch any skin circumferentially. Change every three days or prn if soiled or compromised.     Risk for moisture associated dermatitis beneath intertriginous folds- cleanse with luke-warm soap and water, dry well. Apply Interdry textile sheeting, under intertriginous folds leaving 2 inches exposed at ends to wick, remove to wash & dry affected area, then replace. Individual sheeting may be used for up to 5 days unless soiled.     Sacrum extending to bilateral buttocks- gently cleanse with NS, pat dry. Apply Liquid barrier film to periwound skin and to stable eschar, allow to dry. Cover with silicone foam with border (protect from friction, manage drainage from adipose tissue, protect periwound skin).    Apply liquid barrier film to bilateral heels daily.    Continue low air loss bed therapy, continue heel elevation with Z-flex fluidized positioning boots, continue to turn & reposition q2h with Z-christophe fluidized positioning device, continue incontinence management with liquid barrier film to perianal area and bilateral buttocks & single breathable pad, continue measures to decrease friction/shear/pressure.     Continue with nutritional support as per dietary/orders.    Findings and plan discussed with primary team. All questions and concerns addressed.    Please contact Wound Care Service Line if we can be of further assistance (ext [phone]).

## 2020-11-26 NOTE — PROGRESS NOTE ADULT - SUBJECTIVE AND OBJECTIVE BOX
PROGRESS NOTE:   Authored by David Lundberg MD  PGY-1, Internal Medicine  Pager [facility] [phone], [id number]     Patient is a 70y old  Female who presents with a chief complaint of Pancolitis, septic shock (25 Nov 2020 17:24)      SUBJECTIVE / OVERNIGHT EVENTS: Patient failed S+S eval yesterday so NGT replaced yesterday but patient missed 2 doses of her midodrine. BPs soft but stable. Patient more edematous now     ADDITIONAL REVIEW OF SYSTEMS: Denies fevers, chills, n/v.    MEDICATIONS  (STANDING):  lactated ringers. 500 milliLiter(s) (75 mL/Hr) IV Continuous <Continuous>  lidocaine   Patch 2 Patch Transdermal daily  melatonin 3 milliGRAM(s) Oral at bedtime  midodrine 40 milliGRAM(s) Oral every 8 hours  multivitamin 1 Tablet(s) Oral daily  oxyCODONE    Solution 10 milliGRAM(s) Oral every 8 hours  vancomycin    Solution 125 milliGRAM(s) Oral every 6 hours    MEDICATIONS  (PRN):  oxyCODONE    IR 10 milliGRAM(s) Oral every 3 hours PRN Moderate Pain (4 - 6), severe pain      CAPILLARY BLOOD GLUCOSE        I&O's Summary    24 Nov 2020 07:01  -  25 Nov 2020 07:00  --------------------------------------------------------  IN: 710 mL / OUT: 240 mL / NET: 470 mL        PHYSICAL EXAM:  Vital Signs Last 24 Hrs  T(C): 36.2 (26 Nov 2020 06:10), Max: 36.4 (25 Nov 2020 14:20)  T(F): 97.2 (26 Nov 2020 06:10), Max: 97.5 (25 Nov 2020 14:20)  HR: 78 (26 Nov 2020 06:10) (50 - 102)  BP: 96/58 (26 Nov 2020 06:10) (96/58 - 132/94)  BP(mean): --  RR: 23 (26 Nov 2020 06:10) (19 - 23)  SpO2: 94% (26 Nov 2020 06:10) (90% - 97%)    CONSTITUTIONAL: NAD, lying in bed comfortably  RESPIRATORY: Normal respiratory effort; CTABL  CARDIOVASCULAR: Regular rate and rhythm, normal S1 and S2, no murmur/rub/gallop  ABDOMEN: Soft, nondistended, nontender to palpation, normoactive bowel sounds, no rebound/guarding  MUSCLOSKELETAL: no joint swelling or tenderness to palpation, FROM all extremities  NEURO: AAOx3 to person, place, and time, full and equal strength all extremities   EXTREMITIES: no pedal edema    LABS:                        9.1    22.87 )-----------( 59       ( 25 Nov 2020 13:05 )             26.8     11-25    130<L>  |  98  |  24<H>  ----------------------------<  110<H>  4.0   |  21<L>  |  0.51    Ca    8.2<L>      25 Nov 2020 13:05  Phos  3.3     11-25  Mg     2.0     11-25    TPro  4.0<L>  /  Alb  1.9<L>  /  TBili  0.7  /  DBili  x   /  AST  32  /  ALT  29  /  AlkPhos  195<H>  11-25    PT/INR - ( 25 Nov 2020 13:05 )   PT: 12.8 SEC;   INR: 1.12          PTT - ( 25 Nov 2020 13:05 )  PTT:33.1 SEC            RADIOLOGY & ADDITIONAL TESTS:     PROGRESS NOTE:   Authored by David Lundberg MD  PGY-1, Internal Medicine  Pager [facility] [phone], J [id number]     Patient is a 70y old  Female who presents with a chief complaint of Pancolitis, septic shock (25 Nov 2020 17:24)      SUBJECTIVE / OVERNIGHT EVENTS: Patient failed S+S eval yesterday so NGT replaced yesterday but patient missed 2 doses of her midodrine. BPs soft but stable. Patient more edematous now     ADDITIONAL REVIEW OF SYSTEMS: Denies fevers, chills, n/v.    MEDICATIONS  (STANDING):  lactated ringers. 500 milliLiter(s) (75 mL/Hr) IV Continuous <Continuous>  lidocaine   Patch 2 Patch Transdermal daily  melatonin 3 milliGRAM(s) Oral at bedtime  midodrine 40 milliGRAM(s) Oral every 8 hours  multivitamin 1 Tablet(s) Oral daily  oxyCODONE    Solution 10 milliGRAM(s) Oral every 8 hours  vancomycin    Solution 125 milliGRAM(s) Oral every 6 hours    MEDICATIONS  (PRN):  oxyCODONE    IR 10 milliGRAM(s) Oral every 3 hours PRN Moderate Pain (4 - 6), severe pain      CAPILLARY BLOOD GLUCOSE        I&O's Summary    24 Nov 2020 07:01  -  25 Nov 2020 07:00  --------------------------------------------------------  IN: 710 mL / OUT: 240 mL / NET: 470 mL        PHYSICAL EXAM:  Vital Signs Last 24 Hrs  T(C): 36.2 (26 Nov 2020 06:10), Max: 36.4 (25 Nov 2020 14:20)  T(F): 97.2 (26 Nov 2020 06:10), Max: 97.5 (25 Nov 2020 14:20)  HR: 78 (26 Nov 2020 06:10) (50 - 102)  BP: 96/58 (26 Nov 2020 06:10) (96/58 - 132/94)  BP(mean): --  RR: 23 (26 Nov 2020 06:10) (19 - 23)  SpO2: 94% (26 Nov 2020 06:10) (90% - 97%)    CONSTITUTIONAL: NAD, lying in bed comfortably  RESPIRATORY: Crackles auscultated b/l, poor air flow. Sputum sounds.   CARDIOVASCULAR: Distant heart sounds, regular rate and rhythm, normal S1 and S2, no murmur/rub/gallop  ABDOMEN: Firm, flat, edematous, nontender to palpation, normoactive bowel sounds, no rebound/guarding  MUSCULOSKELETAL: no joint swelling or tenderness to palpation, FROM all extremities  NEURO: AAOx0, full and equal strength all extremities   EXTREMITIES: 3+ pedal edema    LABS:                        9.1    22.87 )-----------( 59       ( 25 Nov 2020 13:05 )             26.8     11-25    130<L>  |  98  |  24<H>  ----------------------------<  110<H>  4.0   |  21<L>  |  0.51    Ca    8.2<L>      25 Nov 2020 13:05  Phos  3.3     11-25  Mg     2.0     11-25    TPro  4.0<L>  /  Alb  1.9<L>  /  TBili  0.7  /  DBili  x   /  AST  32  /  ALT  29  /  AlkPhos  195<H>  11-25    PT/INR - ( 25 Nov 2020 13:05 )   PT: 12.8 SEC;   INR: 1.12          PTT - ( 25 Nov 2020 13:05 )  PTT:33.1 SEC            RADIOLOGY & ADDITIONAL TESTS:     PROGRESS NOTE:   Authored by David Lundberg MD  PGY-1, Internal Medicine  Pager [facility] [phone], J [id number]     Patient is a 70y old  Female who presents with a chief complaint of Pancolitis, septic shock (25 Nov 2020 17:24)      SUBJECTIVE / OVERNIGHT EVENTS: Patient failed S+S eval yesterday so NGT replaced yesterday but patient missed 2 doses of her midodrine. BPs soft but stable. Patient more edematous now. Patient AAOx0 this AM and is no longer verbalizing.     ADDITIONAL REVIEW OF SYSTEMS: Denies fevers, chills, n/v.    MEDICATIONS  (STANDING):  lactated ringers. 500 milliLiter(s) (75 mL/Hr) IV Continuous <Continuous>  lidocaine   Patch 2 Patch Transdermal daily  melatonin 3 milliGRAM(s) Oral at bedtime  midodrine 40 milliGRAM(s) Oral every 8 hours  multivitamin 1 Tablet(s) Oral daily  oxyCODONE    Solution 10 milliGRAM(s) Oral every 8 hours  vancomycin    Solution 125 milliGRAM(s) Oral every 6 hours    MEDICATIONS  (PRN):  oxyCODONE    IR 10 milliGRAM(s) Oral every 3 hours PRN Moderate Pain (4 - 6), severe pain      CAPILLARY BLOOD GLUCOSE        I&O's Summary    24 Nov 2020 07:01  -  25 Nov 2020 07:00  --------------------------------------------------------  IN: 710 mL / OUT: 240 mL / NET: 470 mL        PHYSICAL EXAM:  Vital Signs Last 24 Hrs  T(C): 36.2 (26 Nov 2020 06:10), Max: 36.4 (25 Nov 2020 14:20)  T(F): 97.2 (26 Nov 2020 06:10), Max: 97.5 (25 Nov 2020 14:20)  HR: 78 (26 Nov 2020 06:10) (50 - 102)  BP: 96/58 (26 Nov 2020 06:10) (96/58 - 132/94)  BP(mean): --  RR: 23 (26 Nov 2020 06:10) (19 - 23)  SpO2: 94% (26 Nov 2020 06:10) (90% - 97%)    CONSTITUTIONAL: Uncomfortable, anasarca  RESPIRATORY: Crackles auscultated b/l, poor air flow. Sputum sounds.   CARDIOVASCULAR: Distant heart sounds, regular rate and rhythm, normal S1 and S2, no murmur/rub/gallop  ABDOMEN: Firm, flat, edematous, nontender to palpation, normoactive bowel sounds, no rebound/guarding  MUSCULOSKELETAL: no joint swelling or tenderness to palpation, FROM all extremities  NEURO: AAOx0, full and equal strength all extremities   EXTREMITIES: 3+ pedal edema    LABS:                        9.1    22.87 )-----------( 59       ( 25 Nov 2020 13:05 )             26.8     11-25    130<L>  |  98  |  24<H>  ----------------------------<  110<H>  4.0   |  21<L>  |  0.51    Ca    8.2<L>      25 Nov 2020 13:05  Phos  3.3     11-25  Mg     2.0     11-25    TPro  4.0<L>  /  Alb  1.9<L>  /  TBili  0.7  /  DBili  x   /  AST  32  /  ALT  29  /  AlkPhos  195<H>  11-25    PT/INR - ( 25 Nov 2020 13:05 )   PT: 12.8 SEC;   INR: 1.12          PTT - ( 25 Nov 2020 13:05 )  PTT:33.1 SEC            RADIOLOGY & ADDITIONAL TESTS:

## 2020-11-26 NOTE — PROGRESS NOTE ADULT - PROBLEM SELECTOR PLAN 5
Hx of afib on eliquis. Not currently on anticoagulation given possible bleed and acute drop in Hgb  - No AC at this time as per cardiology as risk of increasing pericardial effusion > risk of stroke/clot from afib.  - F/u with cards recs

## 2020-11-26 NOTE — ADVANCED PRACTICE NURSE CONSULT - REASON FOR CONSULT
Patient seen on skin care rounds after wound care referral received for assessment of skin impairment and recommendations of topical management. Chart reviewed: H/o metastatic lung cancer, dementia, DVT, and Afib admitted initially to MICU for septic shock requiring pressors. Found to have C diff pancolitis and VRE UTI.  Patient was seen and followed by Palliative care, Heme/Onc for lung CA, Infectious disease for septic shock, Cardiology for tamponade see consult note; harm outweighed risk for drainage of pericardial effusion. Patient transferred out of MICU on 11/25 once BP stabilize without vasopressor support, currently on step-down unit.

## 2020-11-26 NOTE — PROGRESS NOTE ADULT - PROBLEM SELECTOR PLAN 1
Patient treated with Vanc taper as per ID. Repeat imaging showing improvement.  - c/w with Vanc taper:  po vanco 125 mg 4 times daily through 11/29  po vanco 125 mg 2 times daily 11/20 --> 12/6  po vanco 125 mg once daily 12/7 --> 12/13  po vanco 125 mg once every other day 12/14-->   12/20  po vanco 125 mg once every third day 12/20 -->  1/3/21  - Monitor

## 2020-11-26 NOTE — PROGRESS NOTE ADULT - PROBLEM SELECTOR PLAN 8
DVT ppx: off AC for now  Diet: NPO with tube feeds   Zamorano in place  Dispo: pending clinical improvement

## 2020-11-26 NOTE — PROVIDER CONTACT NOTE (OTHER) - SITUATION
Pt's BP 94/58 and HR 96. pt is slightly lethargic but easily arousable. pt received midodrine 40mg as per order.

## 2020-11-26 NOTE — PROGRESS NOTE ADULT - PROBLEM SELECTOR PLAN 2
Patient with pericardial effusion showing early tamponade physiology.  - cards following - no plan for pericardiocentesis at this time.  - can repeat TTE 11/27 to re-evaluate pericardial effusion  - can consider CTS eval for pericardial window if malignant effusion and depending on GOC conversation Patient with pericardial effusion showing early tamponade physiology.  - cards following - no plan for pericardiocentesis at this time.  - can repeat TTE to re-evaluate pericardial effusion  - No diuresis for anasarca, will likely decrease preload and worsen symptoms.   - can consider CTS eval for pericardial window if malignant effusion and depending on GOC conversation

## 2020-11-26 NOTE — ADVANCED PRACTICE NURSE CONSULT - ASSESSMENT
General: Patient lethargic, opens eyes to verbal and tactile stimulation but is minimally interactive, labored breathing, requiring supplemental oxygen via nasal cannula. Right nare enteral feeds in place, peritubular skin of nare intact. Patietn bedbound, (+) anasarca/third spacing with wheeping edema of bilateral upper extremities, left arm with increased wheeping. Indwelling andrea catheter in place, incontinence of stool. Skin cool to touch, pallor complexion, increased moisture in intertriginous folds, scattered areas of ecchymosis on bilateral upper extremities. Bilateral plantar feet and toes mottled (100% blanchable purple- maroon and dusky) (SCALES versus vasoconstriction in setting of hypotension and vasopressor support).    Risk for moisture associated dermatitis beneath intertriginous folds.    Sacrum extending to bilateral buttocks- patient turned to left side (breathing appears more comfortable while lying on left side)- unstagable pressure injury complicated by fecal incontinence- 7cmx7.5cmx0.2cm (unable to determine true anatomical depth due to necrotic tissue), well defined irregular borders. 90% black, minimally moist, firmly attached stable eschar,  from wound edge from 3-7 o'clock exposing pale pink adipose tissue, friable with cleansing. Scant serosanguineous drainage from exposed adipose tissue. Periwound skin intact, no induration, no increased warmth, no edema, no erythema noted.    Please note: patient with anasarca, third spacing, cool to touch, wheeping edema, mottled plantar feet and toes. Monitor closely for rapid skin changes as she may begin to develop SCALES; skin changes at life's end. Discussed concern, recommendations, and findings with Team 3.

## 2020-11-26 NOTE — PROGRESS NOTE ADULT - ATTENDING COMMENTS
70 year old female with hx of metastatic lung cancer, dementia, DVT peroneal and posterior tibial 10/18 , and Afib admitted initially to MICU for septic shock 2/2 c.dif pancolitis and VRE UTI - now off pressors and stable for management on floors. CXR with b/l pleural effusion and pulm edema     #Thrombocytopenia- off AC. poss secondary to recent infection.   Prior diagnosis of DVT recent DVT 10/18/20. 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 HemeOnc f/u.    #C.dif, ID following. Vanc PO taper as per ID recommendations.     #Pericardial effusion, likely malignant. Plan for repeat TTE on 11/27 (Friday).     #Hypotension in the setting of pericardial effusion (cardiac tamponade) and septic shock (resolved) C/w midodrine 40mg TID. Taper as tolerated.     #Atrial fibrillation, normal rate currently. Was on anticoagulation and had Hgb drop likely 2/2 GIB in the setting of pancolitis. D/c'ed anticoagulation however has now been stable. Hold AC given decreasing plts off heparin previously on lovenox on 11/21 and 11/22 and DOAC. now off AC    #Metastatic lung ca, as per prior documentation plan was for patient to improve functional status to accept chemotherapy however she continues to have hospitalizations. Palliative consulted for ongoing goals of care discussions.

## 2020-11-26 NOTE — PROVIDER CONTACT NOTE (OTHER) - ASSESSMENT
Pt coarse breath sounds. attempted to do chest PT. unable to nasosuction as pt has NGT in place. pt is sitting upright

## 2020-11-26 NOTE — PROVIDER CONTACT NOTE (OTHER) - ACTION/TREATMENT ORDERED:
Dr. Palmer made aware. Midodrine 40mg given via NGT as per order. no other intervention at this time. will repeat BP in an hour. will continue to monitor.

## 2020-11-26 NOTE — PROVIDER CONTACT NOTE (OTHER) - ASSESSMENT
Pt coarse breath sounds. Pt hr 102 RR 22 95% NC 3 L. /78. pt with abdominal breathing dyspnea on exertion

## 2020-11-27 NOTE — PROGRESS NOTE ADULT - SUBJECTIVE AND OBJECTIVE BOX
PROGRESS NOTE:   Authored by David Lundberg MD  PGY-1, Internal Medicine  Pager [facility] [phone], [facility] [id number]     Patient is a 70y old  Female who presents with a chief complaint of Pancolitis, septic shock (26 Nov 2020 06:59)      SUBJECTIVE / OVERNIGHT EVENTS: BPs soft overnight, SBPs in 80-90s. Was also hypothermic overnight and a bearhugger was placed.     ADDITIONAL REVIEW OF SYSTEMS: Denies fevers, chills, n/v.    MEDICATIONS  (STANDING):  lactated ringers. 500 milliLiter(s) (75 mL/Hr) IV Continuous <Continuous>  lidocaine   Patch 2 Patch Transdermal daily  melatonin 3 milliGRAM(s) Oral at bedtime  midodrine 40 milliGRAM(s) Oral every 8 hours  multivitamin 1 Tablet(s) Oral daily  oxyCODONE    Solution 10 milliGRAM(s) Oral every 8 hours  vancomycin    Solution 125 milliGRAM(s) Oral every 6 hours    MEDICATIONS  (PRN):  oxyCODONE    IR 10 milliGRAM(s) Oral every 3 hours PRN Moderate Pain (4 - 6), severe pain      CAPILLARY BLOOD GLUCOSE        I&O's Summary      PHYSICAL EXAM:  Vital Signs Last 24 Hrs  T(C): 36.3 (27 Nov 2020 06:21), Max: 36.3 (27 Nov 2020 06:21)  T(F): 97.4 (27 Nov 2020 06:21), Max: 97.4 (27 Nov 2020 06:21)  HR: 96 (27 Nov 2020 06:21) (86 - 98)  BP: 90/58 (27 Nov 2020 06:21) (90/58 - 100/62)  BP(mean): --  RR: 18 (27 Nov 2020 06:21) (18 - 20)  SpO2: 95% (27 Nov 2020 06:21) (92% - 96%)    CONSTITUTIONAL: NAD, lying in bed comfortably  RESPIRATORY: Normal respiratory effort; CTABL  CARDIOVASCULAR: Regular rate and rhythm, normal S1 and S2, no murmur/rub/gallop  ABDOMEN: Soft, nondistended, nontender to palpation, normoactive bowel sounds, no rebound/guarding  MUSCLOSKELETAL: no joint swelling or tenderness to palpation, FROM all extremities  NEURO: AAOx3 to person, place, and time, full and equal strength all extremities   EXTREMITIES: no pedal edema    LABS:                        8.7    20.87 )-----------( 42       ( 26 Nov 2020 08:30 )             25.3     11-26    130<L>  |  99  |  30<H>  ----------------------------<  150<H>  4.2   |  18<L>  |  0.60    Ca    8.5      26 Nov 2020 08:30  Phos  4.1     11-26  Mg     2.0     11-26    TPro  4.1<L>  /  Alb  2.0<L>  /  TBili  0.5  /  DBili  x   /  AST  26  /  ALT  26  /  AlkPhos  186<H>  11-26    PT/INR - ( 25 Nov 2020 13:05 )   PT: 12.8 SEC;   INR: 1.12          PTT - ( 25 Nov 2020 13:05 )  PTT:33.1 SEC            RADIOLOGY & ADDITIONAL TESTS:     PROGRESS NOTE:   Authored by David Lundberg MD  PGY-1, Internal Medicine  Pager [facility] [phone], J [id number]     Patient is a 70y old  Female who presents with a chief complaint of Pancolitis, septic shock (26 Nov 2020 06:59)      SUBJECTIVE / OVERNIGHT EVENTS: BPs soft overnight, SBPs in 80-90s. Was also hypothermic overnight and a bearhugger was placed. AAOx0 this AM.     ADDITIONAL REVIEW OF SYSTEMS: Denies fevers, chills, n/v.    MEDICATIONS  (STANDING):  lactated ringers. 500 milliLiter(s) (75 mL/Hr) IV Continuous <Continuous>  lidocaine   Patch 2 Patch Transdermal daily  melatonin 3 milliGRAM(s) Oral at bedtime  midodrine 40 milliGRAM(s) Oral every 8 hours  multivitamin 1 Tablet(s) Oral daily  oxyCODONE    Solution 10 milliGRAM(s) Oral every 8 hours  vancomycin    Solution 125 milliGRAM(s) Oral every 6 hours    MEDICATIONS  (PRN):  oxyCODONE    IR 10 milliGRAM(s) Oral every 3 hours PRN Moderate Pain (4 - 6), severe pain      CAPILLARY BLOOD GLUCOSE        I&O's Summary      PHYSICAL EXAM:  Vital Signs Last 24 Hrs  T(C): 36.3 (27 Nov 2020 06:21), Max: 36.3 (27 Nov 2020 06:21)  T(F): 97.4 (27 Nov 2020 06:21), Max: 97.4 (27 Nov 2020 06:21)  HR: 96 (27 Nov 2020 06:21) (86 - 98)  BP: 90/58 (27 Nov 2020 06:21) (90/58 - 100/62)  BP(mean): --  RR: 18 (27 Nov 2020 06:21) (18 - 20)  SpO2: 95% (27 Nov 2020 06:21) (92% - 96%)    CONSTITUTIONAL: Breathing through mouth, increased work of breathing, uncomfortable  RESPIRATORY: increased respiratory effort; rhochi auscultated throughout  CARDIOVASCULAR: Regular rate and rhythm, normal S1 and S2, no murmur/rub/gallop  ABDOMEN: firm, edematous, nontender to palpation, normoactive bowel sounds, no rebound/guarding  MUSCLOSKELETAL: no joint swelling or tenderness to palpation, FROM all extremities  NEURO: AAOx0   EXTREMITIES: 3+ pitting edema     LABS:                        8.7    20.87 )-----------( 42       ( 26 Nov 2020 08:30 )             25.3     11-26    130<L>  |  99  |  30<H>  ----------------------------<  150<H>  4.2   |  18<L>  |  0.60    Ca    8.5      26 Nov 2020 08:30  Phos  4.1     11-26  Mg     2.0     11-26    TPro  4.1<L>  /  Alb  2.0<L>  /  TBili  0.5  /  DBili  x   /  AST  26  /  ALT  26  /  AlkPhos  186<H>  11-26    PT/INR - ( 25 Nov 2020 13:05 )   PT: 12.8 SEC;   INR: 1.12          PTT - ( 25 Nov 2020 13:05 )  PTT:33.1 SEC            RADIOLOGY & ADDITIONAL TESTS:

## 2020-11-27 NOTE — PROVIDER CONTACT NOTE (OTHER) - ACTION/TREATMENT ORDERED:
continue to monitor BP after midodrine. As for blood work, AM team will be made aware of pending blood work. ABG will be drawn by team.

## 2020-11-27 NOTE — GOALS OF CARE CONVERSATION - ADVANCED CARE PLANNING - CONVERSATION DETAILS
Veda (HCP) was at bedside. She called her brother Hernandez and placed him on speaker. Discussed patients current state. It was explained that she has multiple medical problems that would likely require upgrade from medical floors to the ICU. They were informed that her heart isn't working well due to the pericardial effusion and she is having fluid overload as a result. Cardiology was not recommending a pericardial window due her overall poor prognosis. DNR/DNI was addressed. They expressed they would not want her to be connected to a breathing tube and they would not want chest compressions if her heart were to stop. It was explained to her that we could continue to treat if the patient is DNR/DNI however she would need an ICU stay with more lines, procedures and medications to keep her alive. However, these interventions would not change her prognosis in the setting of metastatic cancer. The family was offered comfort measures. They were offered morphine for comfort and shortness of breath. They were offered for the feeding tube to be removed for comfort and they agreed. The patient was made DNR/DNI with comfort measures only. MOLST was filled out and placed in the chart.

## 2020-11-27 NOTE — PROGRESS NOTE ADULT - PROBLEM SELECTOR PLAN 7
Palliative consulted for GOC conversation  - still full code at this time Follows at [facility]. S/p RT x5.   - F/u palliative with GOC  - c/w with pain medications - oxycodone, lidocaine patch

## 2020-11-27 NOTE — PROGRESS NOTE ADULT - PROBLEM SELECTOR PLAN 4
Hgb stable now, but had an acute drop after AC restarted. Possibly GI source from pancolitis. AC stopped and Hgb stable.   - Trend H/H  - Transfuse if Hgb <7 Resolved. Treated with antibiotics while in MICU. Off pressors now.  - monitor BPs  - monitor for fevers and blood cx if spike  - completed abx for VRE UTI  - c/w PO vanc taper

## 2020-11-27 NOTE — PROVIDER CONTACT NOTE (OTHER) - SITUATION
Pt's /50 and HR 96. This is before midodrine. Pt's /50 and HR 86. pt temp is 96.2 rectally. pt is 92% o2 on 4 L NC. pt is edematous, stomach has bruise and pitting edema.. This is before midodrine.

## 2020-11-27 NOTE — PROVIDER CONTACT NOTE (OTHER) - ASSESSMENT
pt lethargic, has labored RR. Pt is edematous, with pitting edema in the extremities and stomach. Pt skin is weeping.

## 2020-11-27 NOTE — PROVIDER CONTACT NOTE (OTHER) - ASSESSMENT
pt lethargic, this is baseline. pt shows no s.s of pain or distress. pt lethargic, has increased RR. Pt is edematous, with pitting edema in the extremities and stomach. Pt skin is weeping.

## 2020-11-27 NOTE — CHART NOTE - NSCHARTNOTEFT_GEN_A_CORE
Called to bedside for low SBPs in 80s. Upon repeat measurement, SBPs 90s with HR 86. Patient currently asymptomatic without signs of distress. Will continue to monitor.     Aliyah Art, PGY-1  Internal Medicine  Pager: [phone] ([facility])/ [phone] ([facility]) Called to bedside for low SBPs in 80s and temp 96.2. Bear hugger ordered. Upon repeat measurement, SBPs 90s with HR 86. Patient currently asymptomatic without signs of distress. Will continue to monitor.     Aliyah Art, PGY-1  Internal Medicine  Pager: [phone] ([facility])/ [phone] ([place])

## 2020-11-27 NOTE — PROVIDER CONTACT NOTE (OTHER) - SITUATION
Pt's BP 90/58 this is before midodrine. and HR 86. pt is 95% o2 on 5 L NC.  Pt is too edematous for morning labs or cultures to be drawn, multiple attempts conducted. Provider made aware.

## 2020-11-27 NOTE — PROGRESS NOTE ADULT - ASSESSMENT
70 year old female with hx of metastatic lung cancer, dementia, DVT, and Afib admitted initially to MICU for septic shock requiring pressors. Found to have C diff pancolitis and VRE UTI. Patient BPs now stable enough off pressors and transferred to floors. 70 year old female with hx of metastatic lung cancer, dementia, DVT, and Afib admitted initially to MICU for septic shock requiring pressors. Found to have C diff pancolitis and VRE UTI. Also with pericardial effusion with early signs of tamponade physiology. Patient BPs soft despite being on midodrine. Remains full code at this time. GOC conversation ongoing with family.

## 2020-11-27 NOTE — PROGRESS NOTE ADULT - ASSESSMENT
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  Patient was admitted recently to [facility] and discharged 11/4 with the same picture.     Goals of care and possible treatment options were discussed with patient and her daughter at that time. Performance status at that time would preclude her from systemic cancer modifying therapies, however daughter and patient remained hopeful that patient will get better and will be able to receive therapy. They were made aware that this is unlikely and patient is an appropriate candidate for hospice services.    Hospitalization course c/b pericardial effusion, hypotension and hypothermia, and AMS.   Worsening thrombocytopenia in the setting of acute illness, sepsis and possibly medications, lenozelide, vancomycin, tige, etc. HIT panel negative.     -Please check Ddimer, fibrinogen  -Transfuse plt as needed for plt count<10k, if febrile and plt cout<15k and in case of bleeding  -Consider repeat infectious work up  -Will need ongoing goals of care discussions  -Poor prognosis  -Supportive care, pain control, Nutrition, PT, DVT ppx  -Outpatient oncology f/u    Will follow. Please do not hesitate to call back with questions.     Eliana Osullivan MD  Medical Oncology Attending  C: [phone]

## 2020-11-27 NOTE — PROGRESS NOTE ADULT - PROBLEM SELECTOR PLAN 6
Follows at [facility]. S/p RT x5.   - F/u palliative with GOC  - c/w with pain medications - oxycodone, lidocaine patch Hx of afib on eliquis. Not currently on anticoagulation given possible bleed and acute drop in Hgb  - No AC at this time as per cardiology as risk of increasing pericardial effusion > risk of stroke/clot from afib.  - F/u with cards recs

## 2020-11-27 NOTE — PROGRESS NOTE ADULT - SUBJECTIVE AND OBJECTIVE BOX
INTERVAL HPI/OVERNIGHT EVENTS:  Patient seen at bedside.  Somnolent. Bearhugger on.     VITAL SIGNS:  T(F): 97.8 (11-27-20 @ 09:28)  HR: 90 (11-27-20 @ 13:09)  BP: 102/55 (11-27-20 @ 13:09)  RR: 18 (11-27-20 @ 09:28)  SpO2: 96% (11-27-20 @ 09:28)  Wt(kg): --      MEDICATIONS  (STANDING):  lactated ringers. 500 milliLiter(s) (75 mL/Hr) IV Continuous <Continuous>  lidocaine   Patch 2 Patch Transdermal daily  melatonin 3 milliGRAM(s) Oral at bedtime  midodrine 40 milliGRAM(s) Oral every 8 hours  multivitamin 1 Tablet(s) Oral daily  oxyCODONE    Solution 10 milliGRAM(s) Oral every 8 hours  vancomycin    Solution 125 milliGRAM(s) Oral every 6 hours    MEDICATIONS  (PRN):  oxyCODONE    IR 10 milliGRAM(s) Oral every 3 hours PRN Moderate Pain (4 - 6), severe pain      Allergies    No Known Allergies    Intolerances        LABS:                        7.5    16.29 )-----------( 29       ( 27 Nov 2020 09:20 )             22.4     11-27    133<L>  |  101  |  33<H>  ----------------------------<  94  4.6   |  23  |  0.69    Ca    8.5      27 Nov 2020 09:20  Phos  3.2     11-27  Mg     2.0     11-27    TPro  4.2<L>  /  Alb  2.3<L>  /  TBili  0.3  /  DBili  x   /  AST  31  /  ALT  26  /  AlkPhos  203<H>  11-27          RADIOLOGY & ADDITIONAL TESTS:  Studies reviewed.

## 2020-11-27 NOTE — PROGRESS NOTE ADULT - PROBLEM SELECTOR PLAN 2
Patient with pericardial effusion showing early tamponade physiology.  - cards following - no plan for pericardiocentesis at this time.  - can repeat TTE to re-evaluate pericardial effusion  - No diuresis for anasarca, will likely decrease preload and worsen symptoms.   - can consider CTS eval for pericardial window if malignant effusion and depending on GOC conversation Patient treated with Vanc taper as per ID. Repeat imaging showing improvement.  - c/w with Vanc taper:  po vanco 125 mg 4 times daily through 11/29  po vanco 125 mg 2 times daily 11/20 --> 12/6  po vanco 125 mg once daily 12/7 --> 12/13  po vanco 125 mg once every other day 12/14-->   12/20  po vanco 125 mg once every third day 12/20 -->  1/3/21  - Monitor BMs

## 2020-11-27 NOTE — PROGRESS NOTE ADULT - PROBLEM SELECTOR PLAN 5
Health Maintenance Summary     Topic Due On Due Status Completed On Postpone Until Reason    IMMUNIZATION - HPV  Mar 5, 2010 Postponed  Aug 13, 2019 Not Clinically Indicated    IMMUNIZATION - DTaP/Tdap/Td Mar 5, 2018 Postponed  Aug 13, 2019 Not Clinically Indicated    Immunization-Influenza Sep 1, 2018 Not Due       Depression Screening Aug 13, 2019 Not Due Aug 13, 2018            Patient is up to date, no discussion needed.  Over the last 2 weeks, how often have you been bothered by the following problems?         PHQ2 Score:  0  1. Little interest or pleasure in doing things?:  Not at all  2. Feeling down, depressed, or hopeless?:  Not at all                    Hx of afib on eliquis. Not currently on anticoagulation given possible bleed and acute drop in Hgb  - No AC at this time as per cardiology as risk of increasing pericardial effusion > risk of stroke/clot from afib.  - F/u with cards recs Hgb downtrending. Possibly GI source from pancolitis. Holding AC now.  - Trend H/H  - Transfuse if Hgb <7  - Risks of starting AC > risks of DVT/stroke/clot.

## 2020-11-27 NOTE — PROGRESS NOTE ADULT - PROBLEM SELECTOR PLAN 8
DVT ppx: off AC for now  Diet: NPO with tube feeds   Zamorano in place  Dispo: pending clinical improvement DVT ppx: off AC for now  Diet: NPO with tube feeds   Zamorano in place  Dispo: pending clinical improvement  Code: Full code

## 2020-11-27 NOTE — PROGRESS NOTE ADULT - PROBLEM SELECTOR PLAN 1
Patient treated with Vanc taper as per ID. Repeat imaging showing improvement.  - c/w with Vanc taper:  po vanco 125 mg 4 times daily through 11/29  po vanco 125 mg 2 times daily 11/20 --> 12/6  po vanco 125 mg once daily 12/7 --> 12/13  po vanco 125 mg once every other day 12/14-->   12/20  po vanco 125 mg once every third day 12/20 -->  1/3/21  - Monitor Palliative consulted for GOC conversation  - still full code at this time  - Extensive conversations with daughter and son. Explained poor prognosis and risks outweigh benefits of treatment. Still wishes to pursue treatments at this time.   - Family initially considered home hospice, but likely not possible now that patient is too unstable. English

## 2020-11-27 NOTE — PROGRESS NOTE ADULT - PROBLEM SELECTOR PLAN 3
Resolved. Treated with antibiotics while in MICU. Off pressors now.  - monitor BPs  - monitor for fevers and blood cx if spike  - completed abx for VRE UTI  - c/w PO vanc taper Patient with pericardial effusion showing early tamponade physiology.  - cards following - no plan for pericardiocentesis at this time given patient's poor functional status.  - can repeat TTE to re-evaluate pericardial effusion  - No diuresis for anasarca, will likely decrease preload and worsen symptoms.   - can consider CTS eval for pericardial window if malignant effusion but patient likely not surgical candidate.

## 2020-11-27 NOTE — PROGRESS NOTE ADULT - ATTENDING COMMENTS
70 year old female with hx of metastatic lung cancer, dementia, DVT, and Afib admitted initially to MICU for septic shock 2/2 c.dif pancolitis and VRE UTI - now off pressors and stable for management on floors.     #C.dif, ID following. Vanc PO taper as per ID recommendations.     #Pericardial effusion, cardiology following. Plan for repeat TTE on 11/27 (Friday). Worsening volume overload,. Pt is preload dependent and therefore would not tolerate diuresis. Now with increasing oxygen requirements - CXR with bilateral patchy opacities and pulmonary vascular congestion likely 2/2 pulmonary edema. Cardiology saying NTD for now - may not be able to tolerate procedure and likely not in line with goals of care. Will f/u after TTE for further recommendations.     #Hypotension in the setting of pericardial effusion (cardiac tamponade) and septic shock (resolved) C/w midodrine 40mg TID.     #Atrial fibrillation, normal rate currently. Was on anticoagulation and had Hgb drop likely 2/2 GIB in the setting of pancolitis. D/c'ed anticoagulation however has now been stable. As per cardiology, risk outweighs the benefit to start a/c given pericardial effusion which is likely malignant/bloody as well as HIT/thrombocytopenia.     #Metastatic lung ca, as per prior documentation plan was for patient to improve functional status to accept chemotherapy however she continues to have hospitalizations. Palliative consulted for ongoing goals of care discussions.    #Thrombocytopenia, drop in platelets even further to 29. No signs of bleeding. Will send d-dimer, fibrinogen, and PTT/PT/INR. f/u.     #[facility] [place], Dr. Lundberg has been having multiple discussions with the family regarding patient's poor prognosis. Palliative is also following. The family still would like to continue treatment. Patient is full code. Plan for ongoing discussions with the family regarding the patient's poor prognosis.

## 2020-11-28 NOTE — PROGRESS NOTE ADULT - ATTENDING COMMENTS
70 year old female with hx of metastatic lung cancer, dementia, DVT, and Afib admitted initially to MICU for septic shock 2/2 c.dif pancolitis and VRE UTI - now off pressors and on midodrine. complicated by pericardial effusion and worsening pulmonary edema and thrombocytopenia GOC discussion on friday pt now DNR/DNI and CMO. IV morphine PRN for dyspnea/pain.

## 2020-11-28 NOTE — PROGRESS NOTE ADULT - PROBLEM SELECTOR PLAN 8
DVT ppx: off AC for now  Diet: NPO   Zamorano in place  Dispo: pending clinical improvement  Code: Full code

## 2020-11-28 NOTE — PROGRESS NOTE ADULT - SUBJECTIVE AND OBJECTIVE BOX
PROGRESS NOTE:   Authored by David Lundberg MD  PGY-1, Internal Medicine  Pager [facility] [phone], [facility] [phone]     Patient is a 70y old  Female who presents with a chief complaint of Pancolitis, septic shock (27 Nov 2020 15:09)      SUBJECTIVE / OVERNIGHT EVENTS: No events overnight. Daughter at bedside overnight.    ADDITIONAL REVIEW OF SYSTEMS: Denies fevers, chills, n/v.    MEDICATIONS  (STANDING):  lactated ringers. 500 milliLiter(s) (75 mL/Hr) IV Continuous <Continuous>  lidocaine   Patch 2 Patch Transdermal daily  melatonin 3 milliGRAM(s) Oral at bedtime  midodrine 40 milliGRAM(s) Oral every 8 hours  multivitamin 1 Tablet(s) Oral daily  oxyCODONE    Solution 10 milliGRAM(s) Oral every 8 hours    MEDICATIONS  (PRN):  morphine  - Injectable 1 milliGRAM(s) IV Push every 3 hours PRN SOB and tachypnea  oxyCODONE    IR 10 milliGRAM(s) Oral every 3 hours PRN Moderate Pain (4 - 6), severe pain      CAPILLARY BLOOD GLUCOSE        I&O's Summary      PHYSICAL EXAM:  Vital Signs Last 24 Hrs  T(C): 37 (27 Nov 2020 20:04), Max: 37.1 (27 Nov 2020 18:35)  T(F): 98.6 (27 Nov 2020 20:04), Max: 98.7 (27 Nov 2020 18:35)  HR: 118 (27 Nov 2020 20:04) (68 - 118)  BP: 79/57 (27 Nov 2020 20:04) (79/57 - 102/55)  BP(mean): --  RR: 22 (27 Nov 2020 20:04) (18 - 22)  SpO2: 94% (27 Nov 2020 20:04) (94% - 96%)    CONSTITUTIONAL: NAD, lying in bed comfortably  RESPIRATORY: Normal respiratory effort; CTABL  CARDIOVASCULAR: Regular rate and rhythm, normal S1 and S2, no murmur/rub/gallop  ABDOMEN: Soft, nondistended, nontender to palpation, normoactive bowel sounds, no rebound/guarding  MUSCLOSKELETAL: no joint swelling or tenderness to palpation, FROM all extremities  NEURO: AAOx3 to person, place, and time, full and equal strength all extremities   EXTREMITIES: no pedal edema    LABS:                        7.5    16.29 )-----------( 29       ( 27 Nov 2020 09:20 )             22.4     11-27    133<L>  |  101  |  33<H>  ----------------------------<  94  4.6   |  23  |  0.69    Ca    8.5      27 Nov 2020 09:20  Phos  3.2     11-27  Mg     2.0     11-27    TPro  4.2<L>  /  Alb  2.3<L>  /  TBili  0.3  /  DBili  x   /  AST  31  /  ALT  26  /  AlkPhos  203<H>  11-27                RADIOLOGY & ADDITIONAL TESTS:

## 2020-11-28 NOTE — DISCHARGE NOTE FOR THE EXPIRED PATIENT - HOSPITAL COURSE
Hospital course: 68yo F Hx Lung Ca in 8/2020 with mets to brain, Spine, and Right hip s/p RT x 5 ( last tx 9/2), dementia, DVT, Afib on Eliqius, presenting with hypotension. Patient was admitted to the MICU for septic shock 2/2 c diff pancolitis vs VRE UTI. Patient started on levophed and required multiple IVF boluses. Patient was started on Midodrine 30 TID, and pressors were eventually weaned off. She was initially on tigecylcine, IV flagyl, and high dose Vanc michelle cdiff colitis, she improved so abx were de-escalated to vanc 125 q6h. She completed 5 day course of linezolid for VRE uti. She was also found to have early tamponade physiology (increased from prior), however as per cards, not a current candidate for pericardiocentesis given high risk of recurrence and poor functional status. Repeat CT abd/pelvis did not show worsening Cdiff/ toxic megacolon, however showed renal infarct likely 2/2 to pAF. Anticoagulation held because of acute Hgb drop and risks outweighing benefits.Patient received opioids for back pain 2/2 mets to spine, and received relistor x2 (2nd dose 11/23) for opioid induced constipation. Course c/b ICU delirium requiring haldol and zyprexa. Patient was transferred to the floors on 11/24/20. Blood pressures continued to be soft despite being on 40mg midodrine TID. Patient experienced worsening anasarca and declining mental status. Family called for GOC conversation and eventually agreed to make patient DNR/DNI with comfort measures only. NGT removed and antibiotics and midodrine stopped. Morphine prn for comfort.

## 2020-11-28 NOTE — PROGRESS NOTE ADULT - PROBLEM SELECTOR PLAN 1
Palliative consulted for GOC conversation  - DNR/DNI  - Extensive conversations with daughter and son. Explained poor prognosis and risks outweigh benefits of treatment. Family agrees to comfort care. MOLST in patient charts.  - Stopped tube feeds and PO vanc, NGT removed  - No AM labs

## 2020-11-28 NOTE — PROVIDER CONTACT NOTE (OTHER) - ASSESSMENT
Patient has tears coming down her eyes and trembling. Daughter does not feel comfortable with patient being administered with full dose of 1mg morphine because she fears patient will go to respiratory distress

## 2020-11-28 NOTE — PROGRESS NOTE ADULT - NSHPATTENDINGPLANDISCUSS_GEN_ALL_CORE
ICU team
Plan discussed with resident/fellow/nurse.
Plan discussed with resident/fellow/nurse.
HS 3
HS 3

## 2020-11-28 NOTE — PROGRESS NOTE ADULT - PROBLEM SELECTOR PLAN 5
Hgb downtrending. Possibly GI source from pancolitis. Holding AC now.  - H/H downtrending  - Risks of starting AC > risks of DVT/stroke/clot.

## 2020-11-28 NOTE — PROGRESS NOTE ADULT - NUTRITIONAL ASSESSMENT
This patient has been assessed with a concern for Malnutrition and has been determined to have a diagnosis/diagnoses of Severe protein-calorie malnutrition.    This patient is being managed with:   Diet Clear Liquid-  Entered: Nov 18 2020 10:13AM    
This patient has been assessed with a concern for Malnutrition and has been determined to have a diagnosis/diagnoses of Severe protein-calorie malnutrition.    This patient is being managed with:   Diet Clear Liquid-  Entered: Nov 18 2020 10:13AM    
This patient has been assessed with a concern for Malnutrition and has been determined to have a diagnosis/diagnoses of Severe protein-calorie malnutrition.    This patient is being managed with:   Diet NPO with Tube Feed-  Tube Feeding Modality: Nasogastric  Jevity 1.2 Tomy (JEVITY1.2RTH)  Total Volume for 24 Hours (mL): 1200  Continuous  Starting Tube Feed Rate {mL per Hour}: 10  Increase Tube Feed Rate by (mL): 10     Every 4 hours  Until Goal Tube Feed Rate (mL per Hour): 50  Tube Feed Duration (in Hours): 24  Tube Feed Start Time: 23:59  Entered: Nov 24 2020 11:59AM    
This patient has been assessed with a concern for Malnutrition and has been determined to have a diagnosis/diagnoses of Severe protein-calorie malnutrition.    This patient is being managed with:   Diet Clear Liquid-  Entered: Nov 18 2020 10:13AM    
This patient has been assessed with a concern for Malnutrition and has been determined to have a diagnosis/diagnoses of Severe protein-calorie malnutrition.    This patient is being managed with:   Diet Clear Liquid-  Entered: Nov 18 2020 10:13AM    
This patient has been assessed with a concern for Malnutrition and has been determined to have a diagnosis/diagnoses of Severe protein-calorie malnutrition.    This patient is being managed with:   Diet NPO with Tube Feed-  Tube Feeding Modality: Nasogastric  Jevity 1.2 Tomy (JEVITY1.2RTH)  Total Volume for 24 Hours (mL): 5760  Continuous  Starting Tube Feed Rate {mL per Hour}: 10  Increase Tube Feed Rate by (mL): 10     Every 4 hours  Until Goal Tube Feed Rate (mL per Hour): 240  Tube Feed Duration (in Hours): 24  Tube Feed Start Time: 23:59  Entered: Nov 23 2020 11:58PM    
This patient has been assessed with a concern for Malnutrition and has been determined to have a diagnosis/diagnoses of Severe protein-calorie malnutrition.    This patient is being managed with:   Diet NPO-  Except Medications     Special Instructions for Nursing:  Except Medications  Entered: Nov 16 2020 12:19AM    
This patient has been assessed with a concern for Malnutrition and has been determined to have a diagnosis/diagnoses of Severe protein-calorie malnutrition.    This patient is being managed with:   Diet Soft-  Entered: Nov 21 2020 10:16AM    
This patient has been assessed with a concern for Malnutrition and has been determined to have a diagnosis/diagnoses of Severe protein-calorie malnutrition.    This patient is being managed with:   Diet Soft-  Entered: Nov 21 2020 10:16AM    
This patient has been assessed with a concern for Malnutrition and has been determined to have a diagnosis/diagnoses of Severe protein-calorie malnutrition.    This patient is being managed with:   Diet Clear Liquid-  Entered: Nov 18 2020 10:13AM    
This patient has been assessed with a concern for Malnutrition and has been determined to have a diagnosis/diagnoses of Severe protein-calorie malnutrition.    This patient is being managed with:   Diet NPO with Tube Feed-  Tube Feeding Modality: Nasogastric  Jevity 1.2 Tomy (JEVITY1.2RTH)  Total Volume for 24 Hours (mL): 1200  Continuous  Starting Tube Feed Rate {mL per Hour}: 10  Increase Tube Feed Rate by (mL): 10     Every 4 hours  Until Goal Tube Feed Rate (mL per Hour): 50  Tube Feed Duration (in Hours): 24  Tube Feed Start Time: 23:59  Entered: Nov 24 2020 11:59AM    
This patient has been assessed with a concern for Malnutrition and has been determined to have a diagnosis/diagnoses of Severe protein-calorie malnutrition.    This patient is being managed with:   Diet NPO with Tube Feed-  Tube Feeding Modality: Nasogastric  Jevity 1.2 Tomy (JEVITY1.2RTH)  Total Volume for 24 Hours (mL): 1200  Continuous  Starting Tube Feed Rate {mL per Hour}: 10  Increase Tube Feed Rate by (mL): 10     Every 4 hours  Until Goal Tube Feed Rate (mL per Hour): 50  Tube Feed Duration (in Hours): 24  Tube Feed Start Time: 23:59  Entered: Nov 24 2020 11:59AM    
This patient has been assessed with a concern for Malnutrition and has been determined to have a diagnosis/diagnoses of Severe protein-calorie malnutrition.    This patient is being managed with:   Diet Soft-  Entered: Nov 21 2020 10:16AM    
This patient has been assessed with a concern for Malnutrition and has been determined to have a diagnosis/diagnoses of Severe protein-calorie malnutrition.    This patient is being managed with:   Diet Soft-  Entered: Nov 21 2020 10:16AM    
This patient has been assessed with a concern for Malnutrition and has been determined to have a diagnosis/diagnoses of Severe protein-calorie malnutrition.    This patient is being managed with:   Diet NPO with Tube Feed-  Tube Feeding Modality: Nasogastric  Jevity 1.2 Tomy (JEVITY1.2RTH)  Total Volume for 24 Hours (mL): 1200  Continuous  Starting Tube Feed Rate {mL per Hour}: 10  Increase Tube Feed Rate by (mL): 10     Every 4 hours  Until Goal Tube Feed Rate (mL per Hour): 50  Tube Feed Duration (in Hours): 24  Tube Feed Start Time: 23:59  Entered: Nov 24 2020 11:59AM    
This patient has been assessed with a concern for Malnutrition and has been determined to have a diagnosis/diagnoses of Severe protein-calorie malnutrition.    This patient is being managed with:   Diet Clear Liquid-  Entered: Nov 18 2020 10:13AM    
This patient has been assessed with a concern for Malnutrition and has been determined to have a diagnosis/diagnoses of Severe protein-calorie malnutrition.    This patient is being managed with:   Diet NPO with Tube Feed-  Tube Feeding Modality: Nasogastric  Jevity 1.2 Tomy (JEVITY1.2RTH)  Total Volume for 24 Hours (mL): 1200  Continuous  Starting Tube Feed Rate {mL per Hour}: 10  Increase Tube Feed Rate by (mL): 10     Every 4 hours  Until Goal Tube Feed Rate (mL per Hour): 50  Tube Feed Duration (in Hours): 24  Tube Feed Start Time: 23:59  Entered: Nov 24 2020 11:59AM    

## 2020-11-28 NOTE — CHART NOTE - NSCHARTNOTESELECT_GEN_ALL_CORE
Malnutrition Notification
Event Note
Event Note
Event Note/Death Note
Event Note/MAR Accept Note
Follow  up/Nutrition Services
TRANSFER OUT MICU

## 2020-11-28 NOTE — PROGRESS NOTE ADULT - PROBLEM SELECTOR PLAN 4
Resolved. Treated with antibiotics while in MICU. Off pressors now.  - monitor BPs  - monitor for fevers and blood cx if spike  - completed abx for VRE UTI  - stopped PO vanc taper

## 2020-11-28 NOTE — PROGRESS NOTE ADULT - PROBLEM SELECTOR PLAN 3
Patient with pericardial effusion showing early tamponade physiology.  - cards following - no plan for pericardiocentesis at this time given patient's poor functional status.  - can repeat TTE to re-evaluate pericardial effusion  - No diuresis for anasarca, will likely decrease preload and worsen symptoms.   - can consider CTS eval for pericardial window if malignant effusion but patient likely not surgical candidate.

## 2020-11-28 NOTE — PROGRESS NOTE ADULT - PROBLEM SELECTOR PLAN 2
Patient treated with Vanc taper as per ID. Repeat imaging showing improvement.  - Stopped Vanc taper due to comfort measures and NGT being removed:  po vanco 125 mg 4 times daily through 11/29  po vanco 125 mg 2 times daily 11/20 --> 12/6  po vanco 125 mg once daily 12/7 --> 12/13  po vanco 125 mg once every other day 12/14-->   12/20  po vanco 125 mg once every third day 12/20 -->  1/3/21  - Monitor BMs

## 2020-11-28 NOTE — CHART NOTE - NSCHARTNOTEFT_GEN_A_CORE
MEDICINE NOTE    Called to bedside to evaluate the patient for loss of vital signs.     On physical exam, patient did not respond to verbal or noxious stimuli.  No spontaneous respirations.  Absent heart and breath sounds.  Absent radial and carotid pulses.   Pupils are fixed and dilated, no corneal reflex.  EKG rhythm strip shows asystole.   Patient pronounced dead at 20:27 on 11/28/2020.  Attending notified.  Family declined autopsy.

## 2020-11-28 NOTE — PROGRESS NOTE ADULT - REASON FOR ADMISSION
Pancolitis, septic shock

## 2020-12-01 LAB — SRA INTERP SER-IMP: SIGNIFICANT CHANGE UP

## 2020-12-02 LAB
CULTURE RESULTS: SIGNIFICANT CHANGE UP
SPECIMEN SOURCE: SIGNIFICANT CHANGE UP

## 2021-01-05 ENCOUNTER — APPOINTMENT (OUTPATIENT)
Dept: ORTHOPEDIC SURGERY | Facility: CLINIC | Age: 71
End: 2021-01-05

## 2021-02-01 NOTE — PATIENT PROFILE ADULT - NSPROMUTINFOINDIVIDFT_GEN_A_NUR
No concerns

## 2021-02-07 NOTE — PROGRESS NOTE ADULT - PROBLEM SELECTOR PLAN 4
Problem: Adult Inpatient Plan of Care  Goal: Plan of Care Review  Outcome: Ongoing, Progressing  Flowsheets (Taken 2/7/2021 0323)  Progress: no change  Plan of Care Reviewed With: patient  Outcome Summary: Monitor pain,labs,and vitals. Isolation for CDIFF. Patient continues with confusion and trying to get out of bed-unable to reorient. Falls risk-bed alarm. Encouraged PO intake and pulmonary toilet. Will continue to monitor.  Goal: Absence of Hospital-Acquired Illness or Injury  Intervention: Identify and Manage Fall Risk  Recent Flowsheet Documentation  Taken 2/7/2021 0120 by Angela Kaminski, ORTEGA  Safety Promotion/Fall Prevention:   activity supervised   assistive device/personal items within reach   clutter free environment maintained   fall prevention program maintained   lighting adjusted   nonskid shoes/slippers when out of bed   room organization consistent   safety round/check completed  Taken 2/7/2021 0002 by Angela Kaminski RN  Safety Promotion/Fall Prevention:   activity supervised   assistive device/personal items within reach   clutter free environment maintained   fall prevention program maintained   lighting adjusted   nonskid shoes/slippers when out of bed   room organization consistent   safety round/check completed  Taken 2/6/2021 2215 by Angela Kaminski, ORTEGA  Safety Promotion/Fall Prevention:   activity supervised   assistive device/personal items within reach   clutter free environment maintained   fall prevention program maintained   lighting adjusted   nonskid shoes/slippers when out of bed   room organization consistent   safety round/check completed  Taken 2/6/2021 2047 by Angela Kaminski, RN  Safety Promotion/Fall Prevention:   activity supervised   assistive device/personal items within reach   clutter free environment maintained   fall prevention program maintained   room organization consistent   safety round/check completed   nonskid shoes/slippers when out of bed  Intervention: Prevent Skin  Injury  Recent Flowsheet Documentation  Taken 2/7/2021 0120 by Angela Kaminski RN  Body Position: position maintained  Taken 2/6/2021 2047 by Angela Kaminski RN  Body Position: supine  Intervention: Prevent and Manage VTE (venous thromboembolism) Risk  Recent Flowsheet Documentation  Taken 2/7/2021 0120 by Angela Kaminski RN  VTE Prevention/Management:   bilateral   dorsiflexion/plantar flexion performed  Taken 2/6/2021 2047 by Angela Kaminski RN  VTE Prevention/Management:   bilateral   dorsiflexion/plantar flexion performed  Intervention: Prevent Infection  Recent Flowsheet Documentation  Taken 2/7/2021 0120 by Angela Kaminski RN  Infection Prevention:   hand hygiene promoted   personal protective equipment utilized   rest/sleep promoted   single patient room provided  Taken 2/7/2021 0002 by Angela Kaminski RN  Infection Prevention:   hand hygiene promoted   personal protective equipment utilized   rest/sleep promoted   single patient room provided  Taken 2/6/2021 2215 by Angela Kaminski RN  Infection Prevention:   hand hygiene promoted   personal protective equipment utilized   rest/sleep promoted   single patient room provided  Taken 2/6/2021 2047 by Angela Kaminski RN  Infection Prevention:   hand hygiene promoted   personal protective equipment utilized   rest/sleep promoted   single patient room provided  Goal: Optimal Comfort and Wellbeing  Intervention: Provide Person-Centered Care  Recent Flowsheet Documentation  Taken 2/7/2021 0120 by Angela Kaminski RN  Trust Relationship/Rapport: care explained   Goal Outcome Evaluation:  Plan of Care Reviewed With: patient  Progress: no change  Outcome Summary: Monitor pain,labs,and vitals. Isolation for CDIFF. Patient continues with confusion and trying to get out of bed-unable to reorient. Falls risk-bed alarm. Encouraged PO intake and pulmonary toilet. Will continue to monitor.   spoke with daughter over the phone  she is aware of plan  goal is home with PT and home care

## 2021-11-15 NOTE — CONSULT NOTE ADULT - SUBJECTIVE AND OBJECTIVE BOX
[facility]iatric[facility] and Palliative Care  Gerald Hu, Palliative Care Attending  Contact Info: Page [phone] (including Nights/Weekend), message on Microsoft Teams (Gerald Hu), or leave VM at Palliative Office [phone] (Non-Urgent)      HPI:  68yo F Hx Lung Ca in 8/2020 with mets to brain, Spine, and Right hip s/p RT x 5 ( last tx 9/2) presenting with hypotension. Was recently admitted into the hospital for right hip pain s/p wide resection JAYANT on 9/17 c/b intubation and pneumonia. Patient was hypotensive to 90's systolic at rehab. Was given 3.5L of fluids and a dose of vancomycin/zosyn and sent to the ED. Collateral was obtained from family. According to them, patient was in usual health prior to symptoms; had constipation in the past week, but had a bowel movement yesterday after a enema. Patient endorsed some pain during the enema. Per patient, she had been experiencing decreased appetite for several weeks w/ nausea and intermittent coughing up clear sputum. Her main complaint is abdominal discomfort.  Patient had received radiation therapy in the past for her lung CA, but no chemotherapy - following at [facility] with Dr. Xie. Patient is full code according to family.     In the ED, patient was afebrile, BP was 70's systolic was given additional 2 L of fluid resuscitation and 20 mg + 30 mg of midodrine, satting well on nasal canula. Pressures continued to be labile despite adequate volume resuscitation. Patient was started on Levophed for pressure support and transferred to MICU.  (10 Oct 2020 16:16)    PERTINENT PM/SXH:   Lung cancer metastatic to bone  No significant past surgical history    FAMILY HISTORY:  FH: colon cancer  Family history of cervical cancer    ITEMS NOT CHECKED ARE NOT PRESENT    SOCIAL HISTORY:   Significant other/partner:  []  Children:  [x]  Denominational/Spirituality:  Substance hx:  []   Tobacco hx:  [x]   Alcohol hx: []   Home Opioid hx:  [] I-Stop Reference No: [id number]  10/02/2020 10/03/2020 oxycodone hcl 15 mg tablet  45 11 Anthony Hernandez MD [facility]   09/30/2020 10/01/2020 oxycodone hcl 5 mg tablet  60 7 Anthony Hernandez MD [facility]   10/01/2020 10/01/2020 oxycontin er 15 mg tablet  28 14 Anthony Hernandez MD [facility]   09/23/2020 09/23/2020 morphine sulf er 15 mg tablet  30 10 Anthony Hernandez MD [facility]   09/23/2020 09/23/2020 oxycodone hcl 5 mg tablet  30 3 Anthony Hernandez MD [facility]  Living Situation: []Home  []Long term care  [x]Rehab []Other    ADVANCE DIRECTIVES:    DNR  []MOLST  []Living Will  DECISION MAKER(s):  [x] Health Care Proxy(s)  [] Surrogate(s)  [] Guardian           Name(s): Veda Gallegos            Phone Number(s): [phone]    BASELINE (I)ADL(s) (prior to admission):  [place]: []Total  [x] Moderate []Dependent    ALLERGIES:  No Known Allergies    MEDICATIONS  (STANDING):  acetaminophen   Tablet .. 500 milliGRAM(s) Oral every 4 hours  apixaban 10 milliGRAM(s) Oral two times a day  gabapentin 100 milliGRAM(s) Oral three times a day  lactobacillus acidophilus 1 Tablet(s) Oral two times a day  melatonin 3 milliGRAM(s) Oral at bedtime  midodrine 10 milliGRAM(s) Oral every 8 hours  oxyCODONE  ER Tablet 10 milliGRAM(s) Oral every 8 hours  pantoprazole    Tablet 40 milliGRAM(s) Oral before breakfast  sodium chloride 0.9%. 1000 milliLiter(s) (75 mL/Hr) IV Continuous <Continuous>  vancomycin    Solution 500 milliGRAM(s) Oral every 6 hours    MEDICATIONS  (PRN):  benzocaine 15 mG/menthol 3.6 mG (Sugar-Free) Lozenge 1 Lozenge Oral every 3 hours PRN Sore Throat  ondansetron Injectable 4 milliGRAM(s) IV Push every 6 hours PRN Nausea and/or Vomiting  oxyCODONE    IR 5 milliGRAM(s) Oral every 4 hours PRN Severe Pain (7 - 10)    PRESENT SYMPTOMS: []Unable to obtain due to poor mentation/encephalopathy  Source if other than patient:  []Family   []Team     Pain: [x] yes [ ] no  QOL impact - bothersome, debilitating  Location - R Hip, lower abdomen                   Aggravating factors - certain movements,   Quality - sharp, crampy  Radiation - across abdomen  Timing - constant  Severity (0-10 scale): 7  Minimal acceptable level (0-10 scale): 4    PAIN AD Score:  [email or web address] (press ctrl +  left click to view)    Dyspnea:                           [x]Mild  []Moderate []Severe  Anxiety:                             []Mild []Moderate []Severe  Fatigue:                             []Mild []Moderate []Severe  Nausea:                             [x]Mild []Moderate []Severe  Loss of appetite:              []Mild []Moderate []Severe  Constipation:                    []Mild []Moderate []Severe    Other Symptoms:  [x]All other review of systems negative     Palliative Performance Status Version 2:  40%    http://npcrc.org/files/news/palliative_performance_scale_ppsv2.pdf    PHYSICAL EXAM:  Vital Signs Last 24 Hrs  T(C): 36.8 (20 Oct 2020 12:55), Max: 36.8 (19 Oct 2020 18:17)  T(F): 98.2 (20 Oct 2020 12:55), Max: 98.2 (19 Oct 2020 18:17)  HR: 96 (20 Oct 2020 12:55) (96 - 106)  BP: 93/53 (20 Oct 2020 12:55) (91/58 - 97/60)  BP(mean): --  RR: 17 (20 Oct 2020 12:55) (16 - 17)  SpO2: 96% (20 Oct 2020 12:55) (92% - 98%) I&O's Summary    19 Oct 2020 07:01  -  20 Oct 2020 07:00  --------------------------------------------------------  IN: 0 mL / OUT: 780 mL / NET: -780 mL    GENERAL:  [x]Alert  [x]Oriented x3   []Lethargic  []Cachexia  []Unarousable  [x]Verbal  []Non-Verbal  Behavioral:   [] Anxiety  [] Delirium [] Agitation [] Other  HEENT:  [x]Normal   []Dry mouth   []ET Tube/Trach  []Oral lesions  PULMONARY:   [x]Clear []Tachypnea  []Audible excessive secretions   []Rhonchi        []Right []Left []Bilateral  [x]Crackles        []Right []Left [x]Bilateral  []Wheezing     []Right []Left []Bilateral  CARDIOVASCULAR:    [x]Regular []Irregular []Tachy  []Prasanth []Murmur []Other  GASTROINTESTINAL:  [x]Soft  [x]Distended   [x]+BS  []Non tender [x]Tender  []PEG [x]OGT/ NGT  Last BM: 10/20  10-15-20 @ 07:01  -  10-16-20 @ 07:00  --------------------------------------------------------  OUT: 200 mL  GENITOURINARY:  [x]Normal [] Incontinent   []Oliguria/Anuria   []Zamorano  MUSCULOSKELETAL:   []Normal   [x]Weakness  []Bed/Wheelchair bound []Edema  NEUROLOGIC:   [x]No focal deficits  [] Cognitive impairment  [] Dysphagia []Dysarthria [] Paresis []Other   SKIN:   []Normal   [x]Pressure ulcer(s)  []Rash    CRITICAL CARE:  [ ] Shock Present  [ ]Septic [ ]Cardiogenic [ ]Neurologic [ ]Hypovolemic  [ ]  Vasopressors [ ]  Inotropes   [ ] Respiratory failure present [ ] Mechanical Ventilation [ ] Non-invasive ventilatory support [ ] High-Flow  [ ] Acute  [ ] Chronic [ ] Hypoxic  [ ] Hypercarbic [ ] Other  [ ] Other organ failure    LABS:                        9.1    15.12 )-----------( 486      ( 20 Oct 2020 06:10 )             29.2   10-20    133<L>  |  101  |  8   ----------------------------<  105<H>  3.6   |  22  |  0.38<L>    Ca    7.9<L>      20 Oct 2020 06:10  Phos  2.7     10-20  Mg     1.8     10-20    IMAGING:  < from: CT Angio Chest w/ IV Cont (10.10.20 @ 13:22) >  Right upper lobe spiculated mass, consistent with known lung carcinoma, unchanged. Right hilar and right retrocrural adenopathy is again noted.  Bilateral pleural effusions, left greater than right, with associated compressive atelectasis.  Moderate pericardial effusion, slightly increased since 10/2/2020.  New diffuse colonic wall thickening involving the entire colon and rectum, consistent with a proctocolitis, new since 10/2/2020.  Ill-defined somewhat wedge-shaped area of decreased attenuation in the lower pole of the left kidney. Differential diagnosis includes an area of ischemia and infection.  Extensive lytic osseous metastatic disease with involvement of the spine at multiple levels and encroachment upon the spinal canal as on prior PET/CT dated 10/2/2020.  Gas within the urinary bladder. Correlation with any history of recent instrumentation is recommended. In the absence of recent instrumentation, infection or fistula to bowel is considered.    PROTEIN CALORIE MALNUTRITION PRESENT: [ ]mild [x]moderate [ ]severe [ ]underweight [ ]morbid obesity  []PPSV2 < or = to 30% []significant weight loss  [x]poor nutritional intake [x]catabolic state []anasarca     Albumin, Serum: 1.8 g/dL (10-13-20 @ 07:31)  Artificial Nutrition []     REFERRALS:   []Chaplaincy  []Hospice  []Child Life  [x]Social Work  [x]Case management []Holistic Therapy     Goals of Care Document:   Care Coordination Assessment 201 [C. Provider] (10-12-20 @ 15:56)   Progress Notes - Care Coordination [C. Provider] (10-19-20 @ 16:13)   3

## 2022-05-06 NOTE — PROGRESS NOTE ADULT - PROBLEM SELECTOR PROBLEM 7
Detail Level: Zone Quality 226: Preventive Care And Screening: Tobacco Use: Screening And Cessation Intervention: Patient screened for tobacco use and is an ex/non-smoker Advanced care planning/counseling discussion

## 2022-06-20 NOTE — PROGRESS NOTE ADULT - NSREFPHYEXREFTO_GEN_ALL_CORE
Called pt to schedule f/u post amyloidosis screening. Scheduled for first available slot. Pt verbalized understanding of appointment date, time, and location.    Inpatient Physical Exam

## 2022-09-08 NOTE — PHYSICAL THERAPY INITIAL EVALUATION ADULT - ASR WT BEARING STATUS EVAL
as per discretion of PT/Right LE Detail Level: Detailed Quality 130: Documentation Of Current Medications In The Medical Record: Current Medications Documented

## 2022-09-13 NOTE — ED ADULT TRIAGE NOTE - DOMESTIC TRAVEL HIGH RISK QUESTION
Impression: Primary open-angle glaucoma, bilateral, severe stage: H90.9920. Plan: Possible progression in the VF, Borderline high IOP. patient on maximum therapy. Recommend repeat SLT OD Continue: Dorzolamide BID OU, Latanoprost QHS OU, Rhopressa QHS OU Discussed and reviewed diagnosis with patient today, glaucoma progression, intraocular pressure above target, understood by patient. Recommend Selective Laser Trabeculoplasty. Risk and benefits, alternatives, and expectations of the procedure were discussed. SLT may be repeated to reduce eye pressure again, it may provide long-term control of eye pressure and may reduce or eliminate the need to take eye drop medication. Continue taking current eye drop medication, Patient expressed understanding. Patient to start Ketorolac TID starting one day after laser and use for 4 days then stop. Patient to schedule SLT in RIGHT EYE. 
RL 2 No

## 2022-12-09 NOTE — PROGRESS NOTE ADULT - PROBLEM SELECTOR PLAN 2
LLE DVT in peroneal and posterior tibial veins  - no clinical signs of bleeding, hgb stable 8.5  - passed heparin challenge--> eliquis 10 Libtayo Pregnancy And Lactation Text: This medication is contraindicated in pregnancy and when breast feeding.

## 2023-01-03 NOTE — PATIENT PROFILE ADULT - TOBACCO USE
Detail Level: Zone Render In Strict Bullet Format?: No Plan: Monthly ilk injections x 3 months. Today is month one Continue Regimen: Minoxidil compound Never smoker

## 2023-03-09 NOTE — ED ADULT NURSE NOTE - NSFALLRSKASSESASSIST_ED_ALL_ED
Ct ready
Ct waiting on creat
Lab notified to draw second set of blood cultures.      Cheryle Scarlet, RN  03/08/23 1600
Patient changed from non rebreather mask to a 50% venti mask per Dr. Nirali Mims.      Jermaine Manning RN  03/08/23 3375
Portable cxr at bedside.      Noe Albright RN  03/08/23 6390
Pt taken off venturi mask and placed on 5 l nasal cannula.  Pt spo2 remained at 98 %     Yusraangie Vazquez, [facility]  03/08/23 7518
Pt to CT via cart.       Kamala Salgado RN  03/08/23 3690
Report called to Bright, [facility]  03/08/23 2014
yes

## 2023-04-06 NOTE — ED ADULT NURSE NOTE - EXTENSIONS OF SELF_ADULT
None Clofazimine Counseling:  I discussed with the patient the risks of clofazimine including but not limited to skin and eye pigmentation, liver damage, nausea/vomiting, gastrointestinal bleeding and allergy.

## 2023-08-01 NOTE — DIETITIAN INITIAL EVALUATION ADULT. - PERTINENT MEDS FT
Clarification on Lisinopril -- sent as once daily but patient told pharmacist twice daily -- resend script if change needed or call [phone] MEDICATIONS  (STANDING):  acetaminophen   Tablet .. 500 milliGRAM(s) Oral every 4 hours  apixaban 10 milliGRAM(s) Oral two times a day  gabapentin 100 milliGRAM(s) Oral three times a day  lactobacillus acidophilus 1 Tablet(s) Oral two times a day  melatonin 3 milliGRAM(s) Oral at bedtime  midodrine 10 milliGRAM(s) Oral every 8 hours  oxyCODONE  ER Tablet 10 milliGRAM(s) Oral every 12 hours  pantoprazole    Tablet 40 milliGRAM(s) Oral before breakfast  sodium chloride 0.9%. 1000 milliLiter(s) (75 mL/Hr) IV Continuous <Continuous>  vancomycin    Solution 500 milliGRAM(s) Oral every 6 hours    MEDICATIONS  (PRN):  benzocaine 15 mG/menthol 3.6 mG (Sugar-Free) Lozenge 1 Lozenge Oral every 3 hours PRN Sore Throat  ondansetron Injectable 4 milliGRAM(s) IV Push every 6 hours PRN Nausea and/or Vomiting  oxyCODONE    IR 5 milliGRAM(s) Oral every 4 hours PRN Severe Pain (7 - 10)

## 2024-02-21 NOTE — PROGRESS NOTE ADULT - SUBJECTIVE AND OBJECTIVE BOX
----- Message from Yelitza Albert sent at 2/21/2024  1:26 PM CST -----  Contact: patient portal message  type: Lab    Caller is requesting to schedule their Lab appointment prior to annual appointment.  Order is not listed in EPIC.  Please enter order and contact patient to schedule.    Name of Caller:Patient    Preferred Date and Time of Labs:8:30 am 02/26/2024    Date of Annual Physical Appointment 03/4/2024     Where would they like the lab performed?[facility]     Would the patient rather a call back or a response via [facility]? Portal message     Best Call Back Number: [phone]    Additional Information:Patient is asking for this LABS    CBC Auto Differential [KNJ4343]  Comprehensive Metabolic Panel [LAB17]  Hemoglobin A1C [LAB90]  Lipid Panel [LAB18]  T4, Free [MYQ169]  TSH [KVH954]  PSA, SCREENING [XTB167]  TESTOSTERONE [VHB060]            Patient is seen and examined at bedside. Resting in bed with no current complaints. Denies CP/SOB/DIzziness/N/V/D/HA. translation obtained.      Vital Signs Last 24 Hrs  T(C): 36.9 (17 Sep 2020 04:41), Max: 37.1 (16 Sep 2020 21:55)  T(F): 98.5 (17 Sep 2020 04:41), Max: 98.7 (16 Sep 2020 21:55)  HR: 67 (17 Sep 2020 04:41) (67 - 92)  BP: 111/64 (17 Sep 2020 04:41) (103/61 - 122/71)  BP(mean): --  RR: 18 (17 Sep 2020 04:41) (18 - 18)  SpO2: 98% (17 Sep 2020 04:41) (96% - 98%)    Gen: NAD    RLE: skin intact  RLE: Motor intact EHL/FHL/ TA/ GS. Sensation is grossly intact to light touch in the distal extremities. Positive log roll. Unable to SLR. Extremity is warm. compartments soft.     Labs:                        10.9   5.21  )-----------( 255      ( 17 Sep 2020 04:29 )             34.6     09-17    136  |  100  |  6<L>  ----------------------------<  102<H>  4.7   |  25  |  0.42<L>    Ca    9.0      17 Sep 2020 04:29  Phos  2.8     09-17  Mg     2.0     09-17        A/P: Patient is a 69y y/o Female Pending OR today  -Pending confirmed type and screen  -pain control  -NPO  -Hold anticoagulation  -cleared by anesthesia and medicine  -NWB  -OR today

## 2024-12-31 NOTE — PROGRESS NOTE ADULT - ASSESSMENT
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  Recent PET/CT 10/2/2020 with Hypermetabolic right upper lobe lung mass. Hypermetabolic right perihilar and right retrocrural lymph node metastases. Extensive hypermetabolic lytic osseous metastasis in axial skeleton. Lesions in the thoracic spine, extend into the epidural space, better evaluated on MRI. Small hypermetabolic focus in left lesser trochanter, difficult to delineate on CT, may represent osseous metastasis.    10/15: Oncology spoke to the patient's daughter in the presence of the patient and explained the clinical situation and disease trajectory. Daughter explained that she is very concerned about the cancer spreading because pt has not received any treatments. explained that to safely give cancer treatment, infection should be completely treated first and then we can discuss cancer treatment, however performance status will need to be assessed at that time to see if the patient will be a candidate to receive treatment. Patient and daughter understand this and remain hopeful that pt will get better and be able to receive cancer treatment.     -No inpatient oncologic interventions  -10/16: started on apixaban given history of pAF  -10/18:  + DVT on LE dopplers  -Will need to start treatment for metastatic NSCLC as outpatient if performance status allows  -Appreciate palliative care consult recs for symptom management. Patient and dtr interested in CBD oil/ cannabis for pain management, please reach to Palliative Care if it would be possible to coordinate.  -Dispo: likely [facility], pending insurance auth  -Patient to followup with Dr. Xie ([facility]) upon discharge  -C/w Supportive care, pain control, Nutrition, PT, DVT ppx  -Oncology will continue to follow with you      Case d/w Dr. Abran HO  Oncology Physician Assistant  Denis THOMPSON/[facility]  Pager [phone]    If after 5pm or weekends please page On-call Oncology Fellow     No

## 2025-03-12 NOTE — PROGRESS NOTE ADULT - PROBLEM SELECTOR PLAN 1
Chief Complaint   Patient presents with    Office Visit    Follow-up    Thyroid Problem     Multinodular goiter       Ms. Dallas is a 68 year old female with PMHx of prediabetes, gout, acid reflux, DLD here for management of MNG. Last seen in 8/2024. Ptdoing well. Denies complaints. Taking vit D 2000 IU daily. Pt denies compressive sx. Recently saw PCP on Monday, had annual visit.   No heat or cold intolerance. No changes in bowel habits. Pt denies any tremors or palpitations. No difficulty swallowing or breathing.       Prediabetes- The pt denies polyuria or polydipsia. Pt does not have numbness and tingling in their feet. No abdominal complaints. No CP or SOB. The pt denies depression and anxiety. No blurry vision. No issues with the feet.        General: no fatigue, no fever, no weight loss, no appetite changes   Resp: no dyspnea, no cough   GI: no abdominal pain, no diarrhea, no nausea, no vomiting          Vitals  Visit Vitals  /81 (BP Location: LUE - Left upper extremity, Patient Position: Sitting)   Pulse 72   Ht 5' 7\" (1.702 m)   Wt 93.9 kg (207 lb 2 oz)   LMP  (LMP Unknown)   BMI 32.44 kg/m²       Physical Exam  General: No apparent distress, well appearing, generalized obesity  HEENT: EOMI, no significant proptosis, enlarged, palpable, nodule not distinct  Chest: CTA, no crackles or rhonchi   CVS: S1 S2, no murmur   Abd: Soft, non tender   Ext: No edema  CNS: Conscious, alert, oriented, speech intact  Skin: No rash    Discussion/Summary  Endocrine Impression: 68 year old female  with PMHx of prediabetes, gout, acid reflux, DLD here for management of MNG. Mother has dementia. She used to work at [employer] and works part time as a crisis counselor. She lives with her daughter, age 33, and two grandchildren, age 4 and 9.    MNG, no FHx thyroid issues  - Denies radiation exposure in childhood.   - Thyroid US from 11/2021 reported Right thyroid lobe measures 5.6 x 2.0 x 2.4 cm. Left thyroid lobe measures 4.5 x  1.4 x 1.7 cm. Isthmus measures 0.4 cm. Multiple thyroid nodules bilaterally.  Dominant lesions and those of highest TI-RADS designation are described below.  Additional benign and/or nonsuspicious cystic and spongiform nodules, corresponding to a designation of TI-RADS 2 or less.   Mildly suspicious 3.0 cm nodule at the mid pole right thyroid, TI-RADS. Moderately suspicious 0.8 cm nodule at the superior pole, TI-RADS 4.  - FNA BX in 12/2021 reported Right thyroid mid pole nodule (3 cm); Negative for malignancy. Consistent with a Benign Follicular Nodule, colloid type [[place] classification II: (Benign)]  - US of thyroid from 12/2022 reported Right thyroid lobe measures 5.5 x 2.4 x 1.9 cm previously 5.6 x 2 x 2.4.Left thyroid lobe measures 4.5 x 1.7 x 1.5 cm previously 4.5 x 1.4 x 1.7. Isthmus measures 0.3 cm previously 0.4.R-1 Size: 2.8 x 2 x 1.2 cm TR3, previously 3 x 1.3 x 2.1. R-2 Size: 0.8 x 0.6 x 0.7 cm, TR4, previously 0.7 x 0.6 x 0.7. Bilateral thyroid nodules that do no meet criteria for follow up or biopsy are also noted bilaterally.   - ordered thyroid US, not yet done, advised compliance     Abnormal TFTs, mildly low TSH Ddx TNG vs thyroiditis, will check TSI with next blood work to screen for Grave's   Lab Results   Component Value Date    TSH 0.333 (L) 01/16/2025    TSH 0.659 08/16/2024    TSH 0.414 02/19/2024    T4FREE 0.9 01/16/2025    FT3 3.0 01/16/2025   - denies anxiety, palpitations, or tremors.   - TSH mildly low. If worsens, can treat with MMI. Discussed Methimazole and side effects of rash, agranulocytosis, and liver injury. This is the first time it it abnormal. Does not take biotin. Can possible be TNG. Check TSH and TSI   - may consider thyroid scan before starting MMI if TSH still low      Prediabetes, FHx DM in father  Lab Results   Component Value Date    HGBA1C 5.9 (H) 01/16/2025    HGBA1C 6.1 (H) 02/19/2024    CREATININE 0.58 01/16/2025    GFRESTIMATE >90 01/16/2025    TSH 0.333 (L)  01/16/2025    MALBCR 11.2 08/16/2024     HGB (g/dL)   Date Value   01/16/2025 12.0               - Continue healthy diet and lifestyle modifications. Pt watches her sugar intake. trying to walk more. Pt reports she will give up junk food.   - Pt previously not keen on adding medications for prediabetes  - advised pt a1c of 6.5% would indicate DM  - Eye exam from 12/2023 reported no DM findings. Had eye exam in 12/2024.     HTN, controlled  - BP   131/81  Lab Results   Component Value Date    MALBCR 11.2 08/16/2024    POTASSIUM 4.2 01/16/2025   - mgmt per PCP      Bone health  - Menopause in her 50's.   - DEXA scan from 4/25/2022 reported Normal bone mineral density.  Lab Results   Component Value Date    VITD25 40.7 01/16/2025               - Continue Vitamin D 2000 IU daily over-the-counter   - Continue 4 servings of calcium-containing foods daily      DLD  Lab Results   Component Value Date    CHOLESTEROL 165 01/16/2025    CALCLDL 97 01/16/2025    HDL 51 01/16/2025    TRIGLYCERIDE 83 01/16/2025     Lab Results   Component Value Date    ALKPT 96 01/16/2025    AST 20 01/16/2025    GPT 23 01/16/2025               - Pt taking atorvastatin 20 mg daily. Discussed cardiovascular benefits of statin medications as well as reduced risk of CAD and CVA.   - LDL higher than target.   - Continue dietary modifications    Obesity  - weight: 207 lbs in 3/2025, 212 lbs in 8/2024, 200 lbs in 2/2024, 206 lbs in 7/2023, 212 lbs in 1/2023, 205 lbs in 4/2022  - pt is somewhat keen on weight loss medications, she wants to lose weight naturally. She wants to wait to discuss GLP1s next visit.   - trying to walk more. Pt reports she will give up junk food. Reports her go-to snack was potato chips.          I thank Dr. Mohan Ledesma for allowing me to participate in the care of the patient If any of the questions are unanswered please don't hesitate to give me a call.     Plan  Management Plan and Instructions:          - Schedule thyroid  ultrasound!  - Continue Vitamin D 2000 IU daily over-the-counter    - Recommend to walk at least 30 minutes 5 days a week  - Try to eat 4 servings of calcium-containing foods daily like low-fat cheese, low-fat yogurt, milk, almond milk, and spinach  - Limit carb intake of things like rice, pasta, potatoes, and bread. Avoid sugary foods and drinks like juice and soda  - Follow up in 6 months     Scribe Attestation:  On 3/12/2025, IShelley scribed the services personally performed by Varsha White MD MD Attestation:   The documentation recorded by the scribe accurately and completely reflects the service(s) I personally performed and the decisions made by me.              Allergies  ALLERGIES:  No Known Allergies    MEDICATIONS  Current Outpatient Medications   Medication Sig Dispense Refill    omeprazole (PrilOSEC) 20 MG capsule TAKE ONE CAPSULE BY MOUTH ONE TIME DAILY. THIS IS A COURTESY REFILL SCHEDULE APPOINTment WITH DR. TOMMIE SOUTH FOR FURTHER REFILLS 90 capsule 0    atorvastatin (LIPITOR) 20 MG tablet TAKE ONE TABLET BY MOUTH ONE TIME DAILY 90 tablet 0    lisinopril (ZESTRIL) 2.5 MG tablet TAKE ONE TABLET BY MOUTH ONE TIME DAILY 90 tablet 0    ibuprofen (MOTRIN) 800 MG tablet Take 1 tablet by mouth every 8 hours as needed for Pain. 30 tablet 0    Cholecalciferol (Vitamin D3) 10 mcg (400 units) capsule        No current facility-administered medications for this visit.       HISTORIES  Active Problems  Patient Active Problem List   Diagnosis    Pre-diabetes    Acid reflux    Anemia    Gout    Dyslipidemia    Overweight    Vitamin D deficiency    History of abnormal mammogram    Fluid level behind tympanic membrane of both ears    Impacted cerumen of right ear    Obesity (BMI 30-39.9)    Multinodular goiter    Postmenopausal    Primary hypertension       Past Medical History  Past Medical History:   Diagnosis Date    Cataracts, bilateral     Elevated cholesterol        Surgical History  Past  Surgical History:   Procedure Laterality Date    No past surgeries         Family History  Family History   Problem Relation Age of Onset    Dementia/Alzheimers Mother     Heart disease Father     Diabetes Father     Cancer Maternal Grandmother        Social History  Social History     Socioeconomic History    Marital status:      Spouse name: Not on file    Number of children: Not on file    Years of education: Not on file    Highest education level: Not on file   Occupational History    Not on file   Tobacco Use    Smoking status: Never     Passive exposure: Never    Smokeless tobacco: Never   Vaping Use    Vaping status: never used   Substance and Sexual Activity    Alcohol use: Never    Drug use: Never    Sexual activity: Not Currently   Other Topics Concern     Service No    Blood Transfusions No    Caffeine Concern No    Occupational Exposure No    Hobby Hazards No    Sleep Concern No    Stress Concern No    Weight Concern No    Special Diet No    Back Care No    Exercise No    Bike Helmet No    Seat Belt No    Self-Exams No   Social History Narrative    Not on file     Social Determinants of Health     Financial Resource Strain: Low Risk  (9/21/2021)    Financial Resource Strain     Unable to Get: None   Food Insecurity: Not At Risk (9/21/2021)    Food Insecurity     Food Insecurity: Worried or Stressed about Money for Food: Never   Transportation Needs: No Transportation Needs (9/22/2022)    PRAPARE - Transportation     Lack of Transportation (Medical): No     Lack of Transportation (Non-Medical): No   Physical Activity: Not on file   Stress: Low Risk  (9/21/2021)    Stress     How Stressed: A little bit   Social Connections: Unknown (9/21/2021)    Social Connections     Social Connectivity: 3 to 5 times a week   Interpersonal Safety: Not on file (9/21/2021)       Review  Past medical history, problem list, family medical history, surgical history and social history reviewed.        New LLL infiltrate on Ct chest associated with fever, left pleuritic cp, and known lung cancer; No hypoxia or SOB.  - C/w Zosyn. D/c Vanco as low suspicion for MRSA at this time   - Blood Cx NGTD  -Plan for 5 day course. D4/5 today.

## 2025-05-09 NOTE — PATIENT PROFILE ADULT - NSPROPTRIGHTSUPPORTPERSON_GEN_A_NUR
Medication: levothyroxine  passed protocol.   Last office visit date: 4/16/25  Next appointment scheduled?: No;   
same name as above